# Patient Record
Sex: FEMALE | Race: OTHER | NOT HISPANIC OR LATINO
[De-identification: names, ages, dates, MRNs, and addresses within clinical notes are randomized per-mention and may not be internally consistent; named-entity substitution may affect disease eponyms.]

---

## 2018-09-07 ENCOUNTER — TRANSCRIPTION ENCOUNTER (OUTPATIENT)
Age: 38
End: 2018-09-07

## 2018-09-07 ENCOUNTER — INPATIENT (INPATIENT)
Facility: HOSPITAL | Age: 38
LOS: 0 days | Discharge: HOME | DRG: 287 | End: 2018-09-07
Attending: INTERNAL MEDICINE | Admitting: INTERNAL MEDICINE
Payer: MEDICAID

## 2018-09-07 VITALS
DIASTOLIC BLOOD PRESSURE: 92 MMHG | RESPIRATION RATE: 18 BRPM | SYSTOLIC BLOOD PRESSURE: 145 MMHG | TEMPERATURE: 98 F | WEIGHT: 138.67 LBS | OXYGEN SATURATION: 99 % | HEART RATE: 86 BPM

## 2018-09-07 VITALS
HEIGHT: 61.42 IN | WEIGHT: 134.92 LBS | TEMPERATURE: 98 F | HEART RATE: 86 BPM | OXYGEN SATURATION: 100 % | DIASTOLIC BLOOD PRESSURE: 92 MMHG | SYSTOLIC BLOOD PRESSURE: 145 MMHG | RESPIRATION RATE: 18 BRPM

## 2018-09-07 DIAGNOSIS — R07.9 CHEST PAIN, UNSPECIFIED: ICD-10-CM

## 2018-09-07 DIAGNOSIS — E11.9 TYPE 2 DIABETES MELLITUS WITHOUT COMPLICATIONS: ICD-10-CM

## 2018-09-07 DIAGNOSIS — Z98.51 TUBAL LIGATION STATUS: Chronic | ICD-10-CM

## 2018-09-07 DIAGNOSIS — Z98.891 HISTORY OF UTERINE SCAR FROM PREVIOUS SURGERY: Chronic | ICD-10-CM

## 2018-09-07 DIAGNOSIS — I10 ESSENTIAL (PRIMARY) HYPERTENSION: ICD-10-CM

## 2018-09-07 LAB
ALBUMIN SERPL ELPH-MCNC: 3.3 G/DL — SIGNIFICANT CHANGE UP (ref 3.3–5)
ALP SERPL-CCNC: 80 U/L — SIGNIFICANT CHANGE UP (ref 40–120)
ALT FLD-CCNC: 11 U/L — SIGNIFICANT CHANGE UP (ref 10–45)
ANION GAP SERPL CALC-SCNC: 15 MMOL/L — SIGNIFICANT CHANGE UP (ref 5–17)
APTT BLD: 29 SEC — SIGNIFICANT CHANGE UP (ref 27.5–37.4)
AST SERPL-CCNC: 15 U/L — SIGNIFICANT CHANGE UP (ref 10–40)
BASOPHILS NFR BLD AUTO: 0.3 % — SIGNIFICANT CHANGE UP (ref 0–2)
BILIRUB SERPL-MCNC: 0.3 MG/DL — SIGNIFICANT CHANGE UP (ref 0.2–1.2)
BUN SERPL-MCNC: 18 MG/DL — SIGNIFICANT CHANGE UP (ref 7–23)
CALCIUM SERPL-MCNC: 9.1 MG/DL — SIGNIFICANT CHANGE UP (ref 8.4–10.5)
CHLORIDE SERPL-SCNC: 95 MMOL/L — LOW (ref 96–108)
CK MB CFR SERPL CALC: 2.7 NG/ML — SIGNIFICANT CHANGE UP (ref 0–6.7)
CK SERPL-CCNC: 78 U/L — SIGNIFICANT CHANGE UP (ref 25–170)
CO2 SERPL-SCNC: 22 MMOL/L — SIGNIFICANT CHANGE UP (ref 22–31)
CREAT SERPL-MCNC: 0.92 MG/DL — SIGNIFICANT CHANGE UP (ref 0.5–1.3)
EOSINOPHIL NFR BLD AUTO: 1.7 % — SIGNIFICANT CHANGE UP (ref 0–6)
GLUCOSE SERPL-MCNC: 424 MG/DL — HIGH (ref 70–99)
HCG SERPL-ACNC: <.1 MIU/ML — SIGNIFICANT CHANGE UP
HCT VFR BLD CALC: 39.5 % — SIGNIFICANT CHANGE UP (ref 34.5–45)
HGB BLD-MCNC: 13.3 G/DL — SIGNIFICANT CHANGE UP (ref 11.5–15.5)
INR BLD: 0.99 — SIGNIFICANT CHANGE UP (ref 0.88–1.16)
LYMPHOCYTES # BLD AUTO: 22.8 % — SIGNIFICANT CHANGE UP (ref 13–44)
MCHC RBC-ENTMCNC: 29.2 PG — SIGNIFICANT CHANGE UP (ref 27–34)
MCHC RBC-ENTMCNC: 33.7 G/DL — SIGNIFICANT CHANGE UP (ref 32–36)
MCV RBC AUTO: 86.6 FL — SIGNIFICANT CHANGE UP (ref 80–100)
MONOCYTES NFR BLD AUTO: 6.4 % — SIGNIFICANT CHANGE UP (ref 2–14)
NEUTROPHILS NFR BLD AUTO: 68.8 % — SIGNIFICANT CHANGE UP (ref 43–77)
PLATELET # BLD AUTO: 293 K/UL — SIGNIFICANT CHANGE UP (ref 150–400)
POTASSIUM SERPL-MCNC: 4.4 MMOL/L — SIGNIFICANT CHANGE UP (ref 3.5–5.3)
POTASSIUM SERPL-SCNC: 4.4 MMOL/L — SIGNIFICANT CHANGE UP (ref 3.5–5.3)
PROT SERPL-MCNC: 7.1 G/DL — SIGNIFICANT CHANGE UP (ref 6–8.3)
PROTHROM AB SERPL-ACNC: 11 SEC — SIGNIFICANT CHANGE UP (ref 9.8–12.7)
RBC # BLD: 4.56 M/UL — SIGNIFICANT CHANGE UP (ref 3.8–5.2)
RBC # FLD: 12.7 % — SIGNIFICANT CHANGE UP (ref 10.3–16.9)
SODIUM SERPL-SCNC: 132 MMOL/L — LOW (ref 135–145)
TROPONIN T SERPL-MCNC: <0.01 NG/ML — SIGNIFICANT CHANGE UP (ref 0–0.01)
WBC # BLD: 11.5 K/UL — HIGH (ref 3.8–10.5)
WBC # FLD AUTO: 11.5 K/UL — HIGH (ref 3.8–10.5)

## 2018-09-07 PROCEDURE — 71045 X-RAY EXAM CHEST 1 VIEW: CPT | Mod: 26

## 2018-09-07 PROCEDURE — 93010 ELECTROCARDIOGRAM REPORT: CPT

## 2018-09-07 PROCEDURE — 93458 L HRT ARTERY/VENTRICLE ANGIO: CPT | Mod: 26

## 2018-09-07 PROCEDURE — 99285 EMERGENCY DEPT VISIT HI MDM: CPT | Mod: 25

## 2018-09-07 PROCEDURE — 99205 OFFICE O/P NEW HI 60 MIN: CPT

## 2018-09-07 RX ORDER — INSULIN LISPRO 100/ML
VIAL (ML) SUBCUTANEOUS
Qty: 0 | Refills: 0 | Status: DISCONTINUED | OUTPATIENT
Start: 2018-09-07 | End: 2018-09-07

## 2018-09-07 RX ORDER — ASPIRIN/CALCIUM CARB/MAGNESIUM 324 MG
81 TABLET ORAL DAILY
Qty: 0 | Refills: 0 | Status: DISCONTINUED | OUTPATIENT
Start: 2018-09-07 | End: 2018-09-07

## 2018-09-07 RX ORDER — SODIUM CHLORIDE 9 MG/ML
1000 INJECTION INTRAMUSCULAR; INTRAVENOUS; SUBCUTANEOUS
Qty: 0 | Refills: 0 | Status: DISCONTINUED | OUTPATIENT
Start: 2018-09-07 | End: 2018-09-07

## 2018-09-07 RX ORDER — GLUCAGON INJECTION, SOLUTION 0.5 MG/.1ML
1 INJECTION, SOLUTION SUBCUTANEOUS ONCE
Qty: 0 | Refills: 0 | Status: DISCONTINUED | OUTPATIENT
Start: 2018-09-07 | End: 2018-09-07

## 2018-09-07 RX ORDER — DEXTROSE 50 % IN WATER 50 %
12.5 SYRINGE (ML) INTRAVENOUS ONCE
Qty: 0 | Refills: 0 | Status: DISCONTINUED | OUTPATIENT
Start: 2018-09-07 | End: 2018-09-07

## 2018-09-07 RX ORDER — METFORMIN HYDROCHLORIDE 850 MG/1
1 TABLET ORAL
Qty: 0 | Refills: 0 | COMMUNITY

## 2018-09-07 RX ORDER — ASPIRIN/CALCIUM CARB/MAGNESIUM 324 MG
325 TABLET ORAL ONCE
Qty: 0 | Refills: 0 | Status: COMPLETED | OUTPATIENT
Start: 2018-09-07 | End: 2018-09-07

## 2018-09-07 RX ORDER — DEXTROSE 50 % IN WATER 50 %
25 SYRINGE (ML) INTRAVENOUS ONCE
Qty: 0 | Refills: 0 | Status: DISCONTINUED | OUTPATIENT
Start: 2018-09-07 | End: 2018-09-07

## 2018-09-07 RX ORDER — METOPROLOL TARTRATE 50 MG
25 TABLET ORAL DAILY
Qty: 0 | Refills: 0 | Status: DISCONTINUED | OUTPATIENT
Start: 2018-09-07 | End: 2018-09-07

## 2018-09-07 RX ORDER — CLOPIDOGREL BISULFATE 75 MG/1
75 TABLET, FILM COATED ORAL ONCE
Qty: 0 | Refills: 0 | Status: COMPLETED | OUTPATIENT
Start: 2018-09-07 | End: 2018-09-07

## 2018-09-07 RX ORDER — SODIUM CHLORIDE 9 MG/ML
1000 INJECTION, SOLUTION INTRAVENOUS
Qty: 0 | Refills: 0 | Status: DISCONTINUED | OUTPATIENT
Start: 2018-09-07 | End: 2018-09-07

## 2018-09-07 RX ORDER — CLOPIDOGREL BISULFATE 75 MG/1
450 TABLET, FILM COATED ORAL ONCE
Qty: 0 | Refills: 0 | Status: COMPLETED | OUTPATIENT
Start: 2018-09-07 | End: 2018-09-07

## 2018-09-07 RX ORDER — ONDANSETRON 8 MG/1
4 TABLET, FILM COATED ORAL ONCE
Qty: 0 | Refills: 0 | Status: COMPLETED | OUTPATIENT
Start: 2018-09-07 | End: 2018-09-07

## 2018-09-07 RX ORDER — SODIUM CHLORIDE 9 MG/ML
500 INJECTION INTRAMUSCULAR; INTRAVENOUS; SUBCUTANEOUS
Qty: 0 | Refills: 0 | Status: DISCONTINUED | OUTPATIENT
Start: 2018-09-07 | End: 2018-09-07

## 2018-09-07 RX ORDER — GABAPENTIN 400 MG/1
100 CAPSULE ORAL DAILY
Qty: 0 | Refills: 0 | Status: DISCONTINUED | OUTPATIENT
Start: 2018-09-07 | End: 2018-09-07

## 2018-09-07 RX ORDER — DEXTROSE 50 % IN WATER 50 %
15 SYRINGE (ML) INTRAVENOUS ONCE
Qty: 0 | Refills: 0 | Status: DISCONTINUED | OUTPATIENT
Start: 2018-09-07 | End: 2018-09-07

## 2018-09-07 RX ORDER — CLOPIDOGREL BISULFATE 75 MG/1
600 TABLET, FILM COATED ORAL ONCE
Qty: 0 | Refills: 0 | Status: DISCONTINUED | OUTPATIENT
Start: 2018-09-07 | End: 2018-09-07

## 2018-09-07 RX ORDER — CLOPIDOGREL BISULFATE 75 MG/1
1 TABLET, FILM COATED ORAL
Qty: 0 | Refills: 0 | COMMUNITY

## 2018-09-07 RX ORDER — CLOPIDOGREL BISULFATE 75 MG/1
75 TABLET, FILM COATED ORAL DAILY
Qty: 0 | Refills: 0 | Status: DISCONTINUED | OUTPATIENT
Start: 2018-09-07 | End: 2018-09-07

## 2018-09-07 RX ORDER — SIMVASTATIN 20 MG/1
40 TABLET, FILM COATED ORAL AT BEDTIME
Qty: 0 | Refills: 0 | Status: DISCONTINUED | OUTPATIENT
Start: 2018-09-07 | End: 2018-09-07

## 2018-09-07 RX ADMIN — Medication: at 17:00

## 2018-09-07 RX ADMIN — CLOPIDOGREL BISULFATE 75 MILLIGRAM(S): 75 TABLET, FILM COATED ORAL at 13:45

## 2018-09-07 RX ADMIN — CLOPIDOGREL BISULFATE 450 MILLIGRAM(S): 75 TABLET, FILM COATED ORAL at 14:27

## 2018-09-07 RX ADMIN — Medication 325 MILLIGRAM(S): at 11:22

## 2018-09-07 RX ADMIN — ONDANSETRON 4 MILLIGRAM(S): 8 TABLET, FILM COATED ORAL at 19:35

## 2018-09-07 NOTE — DISCHARGE NOTE ADULT - MEDICATION SUMMARY - MEDICATIONS TO TAKE
I will START or STAY ON the medications listed below when I get home from the hospital:    Aspirin Enteric Coated 81 mg oral delayed release tablet  -- 1 tab(s) by mouth once a day  -- Indication: For Coronary Artery Disease    gabapentin 100 mg oral capsule  -- 1 cap(s) by mouth once a day  -- Indication: For Pain    metFORMIN 1000 mg oral tablet  -- 1 tab(s) by mouth 2 times a day, HOLD FOR 2 DAYS AND RESUME REGULAR USE ON MONDAY, SEPTEMBER 10, 2018  -- Indication: For Diabetes    simvastatin 40 mg oral tablet  -- 1 tab(s) by mouth once a day (at bedtime)  -- Indication: For Cholesterol    Toprol-XL 25 mg oral tablet, extended release  -- 1 tab(s) by mouth once a day  -- Indication: For Coronary Artery Disease

## 2018-09-07 NOTE — CONSULT NOTE ADULT - ASSESSMENT
38 y/o F with former smoker, strong FHx of heart disease (father  @age 42 from MI), PMHx of HTN, HLD, DM, recent MI in 2017 managed medically in [facility], who presents to [facility] ED 18 c/o progressively worsening chest pain x 1 week. Patient states symptom onset is typically after walking 4-5 blocks, describes it as being non-radiating midsternal chest tightness, 4-5/10 in severity. Associated symptoms including SOB and palpitations. Patient denies any N/V, diaphoresis, dizziness, recent PND, orthopnea or LE edema. Patient denies recent noninvasive cardiac work-up and states she has been compliant with all her medications. In ED,  EKG revealed NSR@85BPM with TWI in inferolateral leads. Cardiac enzymes were negative x 1 set. Cardiac Cath on 18 revealed,  normal LM, 85% pLAD lesion, 80% mLAD lesion, 50% mD1 lesion, 50-60% mLCx lesion, 50% mRCA lesion, 80% dRCA lesion. EF:40-45%. Dr. Vasquez was consulted for MIDCAB evaluation and possible future PCI to RCA.      Plan: As discussed with Dr. Vasquez   -will need pre-op w/u for MIDCAB   -Carotid u/s, PFTs, TSH, A1c, EKG, CXR (PA/Lat), Echocardiogram, 2 Type and Screens  - Will continue to follow, continue medical care as per primary team   - No ACE/ARB prior to OR due to it's dilating effects   -continue BB and ASA   -needs GI ppx 38 y/o F with former smoker, strong FHx of heart disease (father  @age 42 from MI), PMHx of HTN, HLD, DM, recent MI in 2017 managed medically in [facility], who presents to [facility] ED 18 c/o progressively worsening chest pain x 1 week. Patient states symptom onset is typically after walking 4-5 blocks, describes it as being non-radiating midsternal chest tightness, 4-5/10 in severity. Associated symptoms including SOB and palpitations. Patient denies any N/V, diaphoresis, dizziness, recent PND, orthopnea or LE edema. Patient denies recent noninvasive cardiac work-up and states she has been compliant with all her medications. In ED,  EKG revealed NSR@85BPM with TWI in inferolateral leads. Cardiac enzymes were negative x 1 set. Cardiac Cath on 18 revealed,  normal LM, 85% pLAD lesion, 80% mLAD lesion, 50% mD1 lesion, 50-60% mLCx lesion, 50% mRCA lesion, 80% dRCA lesion. EF:40-45%. Dr. Vasquez was consulted for MIDCAB evaluation and possible future PCI to RCA.      Plan: As discussed with Dr. Vasquez   - As per Dr. Vasquez, if cleared by primary team, pt may d/c home and come as outpatient to Dr. Vasquez's office for pre-op w/u completion: needs EKG/Pa/latCXR/TSH/A1c/Type&Sx2/ Echocardiogram/carotid U/S, consent.   -Plan for OR Wednesday for OPCAB w/ Dr. Vasquez as SDA

## 2018-09-07 NOTE — H&P ADULT - PROBLEM SELECTOR PLAN 3
continue FS's, ICS PRN, F/U HgbAIC. Metformin on hold given plan for cardiac cath.      Dispo: Patient hemodynamically stable, NPO awaiting cardiac catheterization with Dr. Gayle.

## 2018-09-07 NOTE — ED PROVIDER NOTE - MEDICAL DECISION MAKING DETAILS
Pt w chest pain on exertion with hx of prior MI, DM  consider ACS, no sx at present, less likely acute PE/dissecn/pna  - obtain labs, cxr No

## 2018-09-07 NOTE — H&P ADULT - FAMILY HISTORY
No pertinent family history in first degree relatives Father  Still living? Unknown  Family history of heart disease, Age at diagnosis: 41-50

## 2018-09-07 NOTE — H&P ADULT - ASSESSMENT
37 yr old F with PMHx of DM, recent MI in 12/2017 treated medically in [facility], who presents to [facility] ED 9/7/18 c/o progressively worsening exertional chest pain x 1 week, EKG with inferolateral TWIs, cardiac enzymes negative x 1 set, planned for cardiac catheterization with possible intervention. 37 yr old F former smoker with strong FHx of heart disease, PMHx of HTN, hyperlipidemia, DM, recent MI in 12/2017 treated medically in [facility], who presents to [facility] ED 9/7/18 c/o worsening exertional chest pain x 1 week, EKG with inferolateral TWIs, cardiac enzymes negative x 1 set, planned for cardiac catheterization with possible intervention.    ASA III                 Mallampati II    Risks & benefits of procedure and alternative therapy have been explained to the patient including but not limited to: allergic reaction, bleeding w/possible need for blood transfusion, infection, renal and vascular compromise, limb damage, arrhythmia, stroke, vessel dissection/perforation, Myocardial infarction, emergent CABG. Informed consent obtained and in chart.

## 2018-09-07 NOTE — ED ADULT NURSE NOTE - NSIMPLEMENTINTERV_GEN_ALL_ED
Implemented All Universal Safety Interventions:  [place] to call system. Call bell, personal items and telephone within reach. Instruct patient to call for assistance. Room bathroom lighting operational. Non-slip footwear when patient is off stretcher. Physically safe environment: no spills, clutter or unnecessary equipment. Stretcher in lowest position, wheels locked, appropriate side rails in place.

## 2018-09-07 NOTE — ED ADULT NURSE REASSESSMENT NOTE - NS ED NURSE REASSESS COMMENT FT1
Pt sleeping at this time, VSS. No c/o CP, SOB. Pt pending sign out and cath lab per MD esquivel. Pt aware with plan of care, remains NPO and aware of NPO status. Will continue to monitor.

## 2018-09-07 NOTE — DISCHARGE NOTE ADULT - CARE PROVIDERS DIRECT ADDRESSES
,[email or web address]

## 2018-09-07 NOTE — H&P ADULT - NSHPSOCIALHISTORY_GEN_ALL_CORE
Tobacco/ETOH/illicit drugs: denies Tobacco: quit 1 year ago, previously 1 pack/week x 4 years  ETOH/Illicit drugs: denies

## 2018-09-07 NOTE — DISCHARGE NOTE ADULT - ADDITIONAL INSTRUCTIONS
Follow-up with Dr. Vasquez at your appointment on Tuesday, September 11, 2018. His office will call you on Monday 9/10/18 to give you the time to report on Tuesday. Follow-up with Dr. Vasquez at your appointment on Tuesday, September 11, 2018. His office will call you on Monday 9/10/18 to give you the time to report on Tuesday.    Follow-up with your cardiologist Dr. Neumann in 1-2 weeks.

## 2018-09-07 NOTE — ED ADULT NURSE NOTE - OBJECTIVE STATEMENT
Pt presents to ED for medical eval. Pt with PMH MI last year, reports her PMD referred her for a cardiologist appt, saw cards yesterday who referred her for cath today. Pt reports x1 episode CP in the last week, on 9/1 when she was "walking a long distance, I became short of breath and had some pain but not like last year when I had my heart attack." On arrival to ED pt has no specific complaints, denies fever, chills, HA, cough, dizziness, lightheadedness, numbness/tingling, neck pain, CP, palpitations, SOB, N/V/D, difficulty urinating, difficulty passing BM. Pt presents in NAD speaking full sentences ambulatory through triage. EKG preformed in triage. Pt placed on continuous cardiac monitor and pulse ox on arrival to bed 18. Pt reports she did not take her metformin this morning because she was told to be NPO, pt also knows she is on a blood thinner but does not know the names of her other medications.

## 2018-09-07 NOTE — DISCHARGE NOTE ADULT - CARE PLAN
Principal Discharge DX:	Chest pain  Goal:	Follow-up with Dr. Vasquez at your appointment on Tuesday, September 11, 2018. His office will call you on Monday 9/10/18 to give you the time to report on Tuesday.  Assessment and plan of treatment:	You underwent a coronary angiogram which revealed multiple blockages in your coronary arteries. You should wait 3 days before returning to ordinary activities. Do not drive for 2 days. Consult your doctor before returning to vigorous activity. You may return to work in 3-5 days. The catheter from your wrist was removed and you may remove the dressing in 24 hours. Once the dressing is removed, you may shower, but avoid baths, hot tubs, or swimming for 5 days to prevent infection. If you notice bleeding from the site, hardening and pain at the site, drainage or redness from the site, coolness/paleness of the extremity, swelling, or fever, please call [phone]. Please continue your Aspirin and Plavix as prescribed unless otherwise indicated by your cardiologist. If you experience any symptoms including but not limited to chest pain, shortness of breath, or dizziness, please call your doctor and seek immediate medical attention. Please follow up with Dr. Vasquez at your scheduled appointment on Tuesday, September 11, 2018.  Secondary Diagnosis:	Hypertension  Assessment and plan of treatment:	You have a history of elevated blood pressure and you should continue your blood pressure medications as prescribed.  Secondary Diagnosis:	Hyperlipidemia  Assessment and plan of treatment:	Please continue your daily statin to ensure your cardiac stent remains open.  Secondary Diagnosis:	Type 2 diabetes mellitus  Assessment and plan of treatment:	You have a diagnosis of diabetes. Please DO NOT TAKE YOUR METFORMIN FOR 2 DAYS to prevent interaction with the contrast dye you received. Restart regular use on Monday, September 10, 2018. Principal Discharge DX:	Chest pain  Goal:	Follow-up with Dr. Vasquez at your appointment on Tuesday, September 11, 2018. His office will call you on Monday 9/10/18 to give you the time to report on Tuesday.  Assessment and plan of treatment:	You underwent a coronary angiogram which revealed multiple blockages in your coronary arteries. You should wait 3 days before returning to ordinary activities. Do not drive for 2 days. Consult your doctor before returning to vigorous activity. You may return to work in 3-5 days. The catheter from your wrist was removed and you may remove the dressing in 24 hours. Once the dressing is removed, you may shower, but avoid baths, hot tubs, or swimming for 5 days to prevent infection. If you notice bleeding from the site, hardening and pain at the site, drainage or redness from the site, coolness/paleness of the extremity, swelling, or fever, please call [phone]. Please continue your Aspirin as prescribed until otherwise instructed by your cardiologist. Please STOP taking Plavix in preparation for your upcoming surgery. If you experience any symptoms including but not limited to chest pain, shortness of breath, or dizziness, please call your doctor and seek immediate medical attention. Please follow up with Dr. Vasquez at your scheduled appointment on Tuesday, September 11, 2018.  Secondary Diagnosis:	Hypertension  Assessment and plan of treatment:	You have a history of elevated blood pressure and you should continue your blood pressure medications as prescribed.  Secondary Diagnosis:	Hyperlipidemia  Assessment and plan of treatment:	Please continue your daily statin to ensure your cardiac stent remains open.  Secondary Diagnosis:	Type 2 diabetes mellitus  Assessment and plan of treatment:	You have a diagnosis of diabetes. Please DO NOT TAKE YOUR METFORMIN FOR 2 DAYS to prevent interaction with the contrast dye you received. Restart regular use on Monday, September 10, 2018. Principal Discharge DX:	Chest pain  Goal:	Follow-up with Dr. Vasquez at your appointment on Tuesday, September 11, 2018. His office will call you on Monday 9/10/18 to give you the time to report on Tuesday.  Assessment and plan of treatment:	You underwent a coronary angiogram which revealed multiple blockages in your coronary arteries. You should wait 3 days before returning to ordinary activities. Do not drive for 2 days. Consult your doctor before returning to vigorous activity. You may return to work in 3-5 days. The catheter from your wrist was removed and you may remove the dressing in 24 hours. Once the dressing is removed, you may shower, but avoid baths, hot tubs, or swimming for 5 days to prevent infection. If you notice bleeding from the site, hardening and pain at the site, drainage or redness from the site, coolness/paleness of the extremity, swelling, or fever, please call [phone]. Please continue your Aspirin as prescribed until otherwise instructed by your cardiologist. Please STOP taking Plavix in preparation for your upcoming surgery. If you experience any symptoms including but not limited to chest pain, shortness of breath, or dizziness, please call your doctor and seek immediate medical attention. Please follow up with Dr. Vasquez at your scheduled appointment on Tuesday, September 11, 2018.  Secondary Diagnosis:	Hypertension  Assessment and plan of treatment:	You have a history of elevated blood pressure and you should continue your blood pressure medications as prescribed.  Secondary Diagnosis:	Hyperlipidemia  Assessment and plan of treatment:	Please continue your daily statin to control your cholesterol levels.  Secondary Diagnosis:	Type 2 diabetes mellitus  Assessment and plan of treatment:	You have a diagnosis of diabetes. Please DO NOT TAKE YOUR METFORMIN FOR 2 DAYS to prevent interaction with the contrast dye you received. Restart regular use on Monday, September 10, 2018.

## 2018-09-07 NOTE — DISCHARGE NOTE ADULT - CARE PROVIDER_API CALL
Tip Vasquez (MD), Cardiovascular Surgery  [address]  [address]  [place], [place] [address]  Phone: [phone]  Fax: [phone] Tip Vasquez), Cardiovascular Surgery  [address]  [address] [place] [address]  Phone: [phone]  Fax: [phone]    Shaik KEN Neumann (MD), Cardiology; Nuclear Medicine  [address] [place] [address]  Phone: [phone]  Fax: [phone]

## 2018-09-07 NOTE — H&P ADULT - PROBLEM SELECTOR PLAN 1
currently chest pain free, EKG with TWI inferolateral leads, cardiac enzymes negative x 1 set. Will continue ASA/Plavix/BB/Statin. NPO for cardiac catheterization with Dr. Gayle this afternoon.

## 2018-09-07 NOTE — DISCHARGE NOTE ADULT - PLAN OF CARE
You have a history of elevated blood pressure and you should continue your blood pressure medications as prescribed. Please continue your daily statin to ensure your cardiac stent remains open. You have a diagnosis of diabetes. Please DO NOT TAKE YOUR METFORMIN FOR 2 DAYS to prevent interaction with the contrast dye you received. Restart regular use on Monday, September 10, 2018. Follow-up with Dr. Vasquez at your appointment on Tuesday, September 11, 2018. His office will call you on Monday 9/10/18 to give you the time to report on Tuesday. You underwent a coronary angiogram which revealed multiple blockages in your coronary arteries. You should wait 3 days before returning to ordinary activities. Do not drive for 2 days. Consult your doctor before returning to vigorous activity. You may return to work in 3-5 days. The catheter from your wrist was removed and you may remove the dressing in 24 hours. Once the dressing is removed, you may shower, but avoid baths, hot tubs, or swimming for 5 days to prevent infection. If you notice bleeding from the site, hardening and pain at the site, drainage or redness from the site, coolness/paleness of the extremity, swelling, or fever, please call [phone]. Please continue your Aspirin and Plavix as prescribed unless otherwise indicated by your cardiologist. If you experience any symptoms including but not limited to chest pain, shortness of breath, or dizziness, please call your doctor and seek immediate medical attention. Please follow up with Dr. Vasquez at your scheduled appointment on Tuesday, September 11, 2018. You underwent a coronary angiogram which revealed multiple blockages in your coronary arteries. You should wait 3 days before returning to ordinary activities. Do not drive for 2 days. Consult your doctor before returning to vigorous activity. You may return to work in 3-5 days. The catheter from your wrist was removed and you may remove the dressing in 24 hours. Once the dressing is removed, you may shower, but avoid baths, hot tubs, or swimming for 5 days to prevent infection. If you notice bleeding from the site, hardening and pain at the site, drainage or redness from the site, coolness/paleness of the extremity, swelling, or fever, please call [phone]. Please continue your Aspirin as prescribed until otherwise instructed by your cardiologist. Please STOP taking Plavix in preparation for your upcoming surgery. If you experience any symptoms including but not limited to chest pain, shortness of breath, or dizziness, please call your doctor and seek immediate medical attention. Please follow up with Dr. Vasquez at your scheduled appointment on Tuesday, September 11, 2018. Please continue your daily statin to control your cholesterol levels.

## 2018-09-07 NOTE — CONSULT NOTE ADULT - SUBJECTIVE AND OBJECTIVE BOX
Surgeon: Dr. Vasquez     Requesting Physician: Interventional Cardiology     HISTORY OF PRESENT ILLNESS:  This is a 38 y/o F with former smoker, strong FHx of heart disease (father  @age 42 from MI), PMHx of HTN, HLD, DM, recent MI in 2017 managed medically in [facility], who presents to [facility] ED 18 c/o progressively worsening chest pain x 1 week. Patient states symptom onset is typically after walking 4-5 blocks, describes it as being non-radiating midsternal chest tightness, 4-5/10 in severity. Associated symptoms including SOB and palpitations. Patient denies any N/V, diaphoresis, dizziness, recent PND, orthopnea or LE edema. Patient denies recent noninvasive cardiac work-up and states she has been compliant with all her medications. In ED,  EKG revealed NSR@85BPM with TWI in inferolateral leads. Cardiac enzymes were negative x 1 set. Cardiac Cath on 18 revealed,  normal LM, 85% pLAD lesion, 80% mLAD lesion, 50% mD1 lesion, 50-60% mLCx lesion, 50% mRCA lesion, 80% dRCA lesion. EF:40-45%. Dr. Vasquez was consulted for MIDCAB evaluation and possible future PCI to RCA.    PAST MEDICAL & SURGICAL HISTORY:  Hyperlipidemia  Hypertension  Type 2 diabetes mellitus  MI (myocardial infarction)  H/O tubal ligation  S/P       MEDICATIONS  (STANDING):  aspirin enteric coated 81 milliGRAM(s) Oral daily  clopidogrel Tablet 75 milliGRAM(s) Oral daily  dextrose 5%. 1000 milliLiter(s) (50 mL/Hr) IV Continuous <Continuous>  dextrose 50% Injectable 12.5 Gram(s) IV Push once  dextrose 50% Injectable 25 Gram(s) IV Push once  dextrose 50% Injectable 25 Gram(s) IV Push once  gabapentin 100 milliGRAM(s) Oral daily  insulin lispro (HumaLOG) corrective regimen sliding scale   SubCutaneous Before meals and at bedtime  metoprolol succinate ER 25 milliGRAM(s) Oral daily  simvastatin 40 milliGRAM(s) Oral at bedtime  sodium chloride 0.9%. 500 milliLiter(s) (50 mL/Hr) IV Continuous <Continuous>    MEDICATIONS  (PRN):  dextrose 40% Gel 15 Gram(s) Oral once PRN Blood Glucose LESS THAN 70 milliGRAM(s)/deciliter  glucagon  Injectable 1 milliGRAM(s) IntraMuscular once PRN Glucose LESS THAN 70 milligrams/deciliter      Allergies: No Known Allergies    SOCIAL HISTORY:   Tobacco: quit 1 year ago, previously 1 pack/week x 4 years  ETOH/Illicit drugs: denies    FAMILY HISTORY:  Family history of heart disease (Father)      Review of Systems  CONSTITUTIONAL:     NEGATIVE  NEURO:       NEGATIVE  EYES:     NEGATIVE  ENMT:        NEGATIVE  CV:  +Chest pain, +palpitations, +GODINEZ, -orthopnea                                                                                           RESPIRATORY:  -Wheezing, +SOB, -cough / sputum, -hemoptysis                                                                GI:    NEGATIVE  :  NEGATIVE  MUSKULOSKELETAL:   NEGATIVE  SKIN/BREAST:      NEGATIVE  PSYCH:    NEGATIVE  HEME/LYMPH:   NEGATIVE  ENDOCRINE:     NEGATIVE    PHYSICAL EXAM  Vital Signs Last 24 Hrs  T(C): 36.7 (07 Sep 2018 13:11), Max: 36.7 (07 Sep 2018 10:07)  T(F): 98 (07 Sep 2018 13:11), Max: 98 (07 Sep 2018 10:07)  HR: 86 (07 Sep 2018 13:11) (82 - 86)  BP: 145/92 (07 Sep 2018 13:11) (123/82 - 145/92)  BP(mean): 109 (07 Sep 2018 13:11) (109 - 109)  RR: 18 (07 Sep 2018 13:11) (18 - 18)  SpO2: 99% (07 Sep 2018 13:11) (99% - 100%)    CONSTITUTIONAL:                                                                          WNL  NEURO:                                                                                             WNL                      EYES:                                                                                                  WNL  ENMT:                                                                                                WNL  CV:                                                                                                      WNL  RESPIRATORY:                                                                                  WNL  GI:                                                                                                       WNL  : BRYANT + / -                                                                                 WNL  MUSKULOSKELETAL:                                                                       WNL  SKIN / BREAST:                                                                                 WNL                                                          LABS:                        13.3   11.5  )-----------( 293      ( 07 Sep 2018 10:47 )             39.5     -    132<L>  |  95<L>  |  18  ----------------------------<  424<H>  4.4   |  22  |  0.92    Ca    9.1      07 Sep 2018 10:47    TPro  7.1  /  Alb  3.3  /  TBili  0.3  /  DBili  x   /  AST  15  /  ALT  11  /  AlkPhos  80      PT/INR - ( 07 Sep 2018 10:47 )   PT: 11.0 sec;   INR: 0.99          PTT - ( 07 Sep 2018 10:47 )  PTT:29.0 sec    CARDIAC MARKERS ( 07 Sep 2018 10:47 )  x     / <0.01 ng/mL / 78 U/L / x     / 2.7 ng/mL          RADIOLOGY & ADDITIONAL STUDIES:  CAROTID U/S: PENDING    CXR: pending    EKG: pending    TTE: pending    Cardiac Cath: 18 revealed,  normal LM, 85% pLAD lesion, 80% mLAD lesion, 50% mD1 lesion, 50-60% mLCx lesion, 50% mRCA lesion, 80% dRCA lesion. EF:40-45% Surgeon: Dr. Vasquez     Requesting Physician: Interventional Cardiology     HISTORY OF PRESENT ILLNESS:  This is a 38 y/o F with former smoker, strong FHx of heart disease (father  @age 42 from MI), PMHx of HTN, HLD, DM, recent MI in 2017 managed medically in [facility], who presents to [facility] ED 18 c/o progressively worsening chest pain x 1 week. Patient states symptom onset is typically after walking 4-5 blocks, describes it as being non-radiating midsternal chest tightness, 4-5/10 in severity. Associated symptoms including SOB and palpitations. Patient denies any N/V, diaphoresis, dizziness, recent PND, orthopnea or LE edema. Patient denies recent noninvasive cardiac work-up and states she has been compliant with all her medications. In ED,  EKG revealed NSR@85BPM with TWI in inferolateral leads. Cardiac enzymes were negative x 1 set. Cardiac Cath on 18 revealed,  normal LM, 85% pLAD lesion, 80% mLAD lesion, 50% mD1 lesion, 50-60% mLCx lesion, 50% mRCA lesion, 80% dRCA lesion. EF:40-45%. Dr. Vasquez was consulted for MIDCAB evaluation and possible future PCI to RCA.    PAST MEDICAL & SURGICAL HISTORY:  Hyperlipidemia  Hypertension  Type 2 diabetes mellitus  MI (myocardial infarction)  H/O tubal ligation  S/P       MEDICATIONS  (STANDING):  aspirin enteric coated 81 milliGRAM(s) Oral daily  clopidogrel Tablet 75 milliGRAM(s) Oral daily  dextrose 5%. 1000 milliLiter(s) (50 mL/Hr) IV Continuous <Continuous>  dextrose 50% Injectable 12.5 Gram(s) IV Push once  dextrose 50% Injectable 25 Gram(s) IV Push once  dextrose 50% Injectable 25 Gram(s) IV Push once  gabapentin 100 milliGRAM(s) Oral daily  insulin lispro (HumaLOG) corrective regimen sliding scale   SubCutaneous Before meals and at bedtime  metoprolol succinate ER 25 milliGRAM(s) Oral daily  simvastatin 40 milliGRAM(s) Oral at bedtime  sodium chloride 0.9%. 500 milliLiter(s) (50 mL/Hr) IV Continuous <Continuous>    MEDICATIONS  (PRN):  dextrose 40% Gel 15 Gram(s) Oral once PRN Blood Glucose LESS THAN 70 milliGRAM(s)/deciliter  glucagon  Injectable 1 milliGRAM(s) IntraMuscular once PRN Glucose LESS THAN 70 milligrams/deciliter      Allergies: No Known Allergies    SOCIAL HISTORY:   Tobacco: quit 1 year ago, previously 1 pack/week x 4 years  ETOH/Illicit drugs: denies    FAMILY HISTORY:  Family history of heart disease (Father)      Review of Systems  CONSTITUTIONAL:     NEGATIVE  NEURO:       NEGATIVE  EYES:     NEGATIVE  ENMT:        NEGATIVE  CV:  +Chest pain, +palpitations, +GODINEZ, -orthopnea                                                                                           RESPIRATORY:  -Wheezing, +SOB, -cough / sputum, -hemoptysis                                                                GI:    NEGATIVE  :  NEGATIVE  MUSKULOSKELETAL:   NEGATIVE  SKIN/BREAST:      NEGATIVE  PSYCH:    NEGATIVE  HEME/LYMPH:   NEGATIVE  ENDOCRINE:     NEGATIVE    PHYSICAL EXAM  Vital Signs Last 24 Hrs  T(C): 36.7 (07 Sep 2018 13:11), Max: 36.7 (07 Sep 2018 10:07)  T(F): 98 (07 Sep 2018 13:11), Max: 98 (07 Sep 2018 10:07)  HR: 86 (07 Sep 2018 13:11) (82 - 86)  BP: 145/92 (07 Sep 2018 13:11) (123/82 - 145/92)  BP(mean): 109 (07 Sep 2018 13:11) (109 - 109)  RR: 18 (07 Sep 2018 13:11) (18 - 18)  SpO2: 99% (07 Sep 2018 13:11) (99% - 100%)    General: NAD, sitting, laying in bed  Neuro: moving all extremities with no focal defiicts  CV: RRR, no m/r/g  Resp: CTA b/l   PV: warm and well perfused, no edema or calf tenderness b/l   Vascular: +2 radial left, +2 DP RLE, +1 LLE, +right radial band                                                          LABS:                        13.3   11.5  )-----------( 293      ( 07 Sep 2018 10:47 )             39.5     09-07    132<L>  |  95<L>  |  18  ----------------------------<  424<H>  4.4   |  22  |  0.92    Ca    9.1      07 Sep 2018 10:47    TPro  7.1  /  Alb  3.3  /  TBili  0.3  /  DBili  x   /  AST  15  /  ALT  11  /  AlkPhos  80  -    PT/INR - ( 07 Sep 2018 10:47 )   PT: 11.0 sec;   INR: 0.99          PTT - ( 07 Sep 2018 10:47 )  PTT:29.0 sec    CARDIAC MARKERS ( 07 Sep 2018 10:47 )  x     / <0.01 ng/mL / 78 U/L / x     / 2.7 ng/mL    RADIOLOGY & ADDITIONAL STUDIES:  CAROTID U/S: PENDING    CXR: pending    EKG: pending    TTE: pending    Cardiac Cath: 18 revealed,  normal LM, 85% pLAD lesion, 80% mLAD lesion, 50% mD1 lesion, 50-60% mLCx lesion, 50% mRCA lesion, 80% dRCA lesion. EF:40-45% Surgeon: Dr. Vasquez     Requesting Physician: Interventional Cardiology     HISTORY OF PRESENT ILLNESS:  This is a 36 y/o F with former smoker, strong FHx of heart disease (father  @age 42 from MI), PMHx of HTN, HLD, DM, recent MI in 2017 managed medically in [facility], who presents to [facility] ED 18 c/o progressively worsening chest pain x 1 week. Patient states symptom onset is typically after walking 4-5 blocks, describes it as being non-radiating midsternal chest tightness, 4-5/10 in severity. Associated symptoms including SOB and palpitations. Patient denies any N/V, diaphoresis, dizziness, recent PND, orthopnea or LE edema. Patient denies recent noninvasive cardiac work-up and states she has been compliant with all her medications. In ED,  EKG revealed NSR@85BPM with TWI in inferolateral leads. Cardiac enzymes were negative x 1 set. Cardiac Cath on 18 revealed,  normal LM, 85% pLAD lesion, 80% mLAD lesion, 50% mD1 lesion, 50-60% mLCx lesion, 50% mRCA lesion, 80% dRCA lesion. EF:40-45%. Dr. Vasquez was consulted for OPCAB evaluation.    PAST MEDICAL & SURGICAL HISTORY:  Hyperlipidemia  Hypertension  Type 2 diabetes mellitus  MI (myocardial infarction)  H/O tubal ligation  S/P       MEDICATIONS  (STANDING):  aspirin enteric coated 81 milliGRAM(s) Oral daily  clopidogrel Tablet 75 milliGRAM(s) Oral daily  dextrose 5%. 1000 milliLiter(s) (50 mL/Hr) IV Continuous <Continuous>  dextrose 50% Injectable 12.5 Gram(s) IV Push once  dextrose 50% Injectable 25 Gram(s) IV Push once  dextrose 50% Injectable 25 Gram(s) IV Push once  gabapentin 100 milliGRAM(s) Oral daily  insulin lispro (HumaLOG) corrective regimen sliding scale   SubCutaneous Before meals and at bedtime  metoprolol succinate ER 25 milliGRAM(s) Oral daily  simvastatin 40 milliGRAM(s) Oral at bedtime  sodium chloride 0.9%. 500 milliLiter(s) (50 mL/Hr) IV Continuous <Continuous>    MEDICATIONS  (PRN):  dextrose 40% Gel 15 Gram(s) Oral once PRN Blood Glucose LESS THAN 70 milliGRAM(s)/deciliter  glucagon  Injectable 1 milliGRAM(s) IntraMuscular once PRN Glucose LESS THAN 70 milligrams/deciliter      Allergies: No Known Allergies    SOCIAL HISTORY:   Tobacco: quit 1 year ago, previously 1 pack/week x 4 years  ETOH/Illicit drugs: denies    FAMILY HISTORY:  Family history of heart disease (Father)      Review of Systems  CONSTITUTIONAL:     NEGATIVE  NEURO:       NEGATIVE  EYES:     NEGATIVE  ENMT:        NEGATIVE  CV:  +Chest pain, +palpitations, +GODINEZ, -orthopnea                                                                                           RESPIRATORY:  -Wheezing, +SOB, -cough / sputum, -hemoptysis                                                                GI:    NEGATIVE  :  NEGATIVE  MUSKULOSKELETAL:   NEGATIVE  SKIN/BREAST:      NEGATIVE  PSYCH:    NEGATIVE  HEME/LYMPH:   NEGATIVE  ENDOCRINE:     NEGATIVE    PHYSICAL EXAM  Vital Signs Last 24 Hrs  T(C): 36.7 (07 Sep 2018 13:11), Max: 36.7 (07 Sep 2018 10:07)  T(F): 98 (07 Sep 2018 13:11), Max: 98 (07 Sep 2018 10:07)  HR: 86 (07 Sep 2018 13:11) (82 - 86)  BP: 145/92 (07 Sep 2018 13:11) (123/82 - 145/92)  BP(mean): 109 (07 Sep 2018 13:11) (109 - 109)  RR: 18 (07 Sep 2018 13:11) (18 - 18)  SpO2: 99% (07 Sep 2018 13:11) (99% - 100%)    General: NAD, sitting, laying in bed  Neuro: moving all extremities with no focal defiicts  CV: RRR, no m/r/g  Resp: CTA b/l   PV: warm and well perfused, no edema or calf tenderness b/l   Vascular: +2 radial left, +2 DP RLE, +1 LLE, +right radial band                                                          LABS:                        13.3   11.5  )-----------( 293      ( 07 Sep 2018 10:47 )             39.5     09-07    132<L>  |  95<L>  |  18  ----------------------------<  424<H>  4.4   |  22  |  0.92    Ca    9.1      07 Sep 2018 10:47    TPro  7.1  /  Alb  3.3  /  TBili  0.3  /  DBili  x   /  AST  15  /  ALT  11  /  AlkPhos  80  09-07    PT/INR - ( 07 Sep 2018 10:47 )   PT: 11.0 sec;   INR: 0.99          PTT - ( 07 Sep 2018 10:47 )  PTT:29.0 sec    CARDIAC MARKERS ( 07 Sep 2018 10:47 )  x     / <0.01 ng/mL / 78 U/L / x     / 2.7 ng/mL    RADIOLOGY & ADDITIONAL STUDIES:  CAROTID U/S: PENDING    CXR: pending    EKG: pending    TTE: pending    Cardiac Cath: 18 revealed,  normal LM, 85% pLAD lesion, 80% mLAD lesion, 50% mD1 lesion, 50-60% mLCx lesion, 50% mRCA lesion, 80% dRCA lesion. EF:40-45%

## 2018-09-07 NOTE — DISCHARGE NOTE ADULT - PATIENT PORTAL LINK FT
You can access the [facility] Portal, offered by [facility], by registering with the following website: [email or web address]

## 2018-09-07 NOTE — H&P ADULT - PMH
MI (myocardial infarction)    Type 2 diabetes mellitus Hyperlipidemia    Hypertension    MI (myocardial infarction)    Type 2 diabetes mellitus

## 2018-09-07 NOTE — ED ADULT TRIAGE NOTE - CHIEF COMPLAINT QUOTE
" My doctor referred me here for a test for my heart, I had a heart attack last year, I have been having chest pain for a week now when I walk".

## 2018-09-07 NOTE — DISCHARGE NOTE ADULT - HOSPITAL COURSE
37 yr old F former smoker, strong FHx heart disease, with PMHx of HTN, hyperlipidemia, DM, recent MI in 12/2017 treated medically in [facility], who presents to [facility] ED 9/7/18 c/o progressively worsening exertional chest pain x 1 week, EKG with inferolateral TWIs, cardiac enzymes negative x 1 set, planned for cardiac catheterization with possible intervention. The patient is now s/p diagnostic cardiac cath 9/7 which revealed normal LM, 85% pLAD lesion, 80% mLAD lesion, 50% mD1 lesion, 50-60% mLCx lesion, 50% mRCA lesion, 80% dRCA lesion. EF:40-45%. R groin PC, R radial access. Dr Vasquez consulted for possible mid CAB followed by PCI of RCA. CTS consulted, and as per Dr. Vasquez and primary team, patient is cleared for discharge home and come as outpatient to Dr. Vasquez's office for pre-op w/u completion: needs EKG/Pa/latCXR/TSH/A1c/Type&Sx2/ Echocardiogram/carotid U/S, consent. Plan for appointment with Dr. Vasquez on Tuesday 9/11/18. Plan for OR Wednesday for OPCAB w/ Dr. Vasquez as SDA. Discharge plan discussed with interventionalist  Dr. Campos who agrees.

## 2018-09-07 NOTE — ED PROVIDER NOTE - OBJECTIVE STATEMENT
38 yo hx of DM w chest pain for one week, intermittent in nature when pt ambulates,  across chest described as tightness. stated last had MI in Dec with similar symptoms. no symptoms at present. no assoc radiation of pain, sob, abd pain, n/v/f/c. currently on asa, statin, does not remember other medications. no other recent illnesses, cough, no prior stents in place. currently on period and has some lower abd cramping due to that.

## 2018-09-07 NOTE — ED PROVIDER NOTE - PROGRESS NOTE DETAILS
disc with Dr. Shaikh niño's cardiologist, to be cath today, already disc by him with Dr. Deven Gayle

## 2018-09-07 NOTE — H&P ADULT - HISTORY OF PRESENT ILLNESS
37 yr old F with PMHx of DM, recent MI in 12/2017 treated medically in [facility], who presents to [facility] ED 9/7/18 c/o progressively worsening chest pain x 1 week. Patient states symptom onset is typically after walking 1-2 blocks, describes it as being exertional midsternal chest pressure, 5/10 in severity. Associated symptoms including SOB and palpitations. In ED, BP: 145/92, HR:86, RR: 18, Temp: 98F, O2 sat: 99% RA. EKG revealed NSR@85BPM with TWI in inferolateral leads. Cardiac enzymes negative x 1 set.   Patient treated with ASA 325mg PO x 1 dose.    Patient currently stable now planned for cardiac catheterization with possible intervention. 37 yr old F with former smoker, strong FHx of heart disease (father  @age 42 from MI), PMHx of HTN, hyperlipidemia, DM, recent MI in 2017 managed medically in [facility], who presents to [facility] ED 18 c/o progressively worsening chest pain x 1 week. Patient states symptom onset is typically after walking 4-5 blocks, describes it as being nonradiating midsternal chest tightness, 4-5/10 in severity. Associated symptoms including SOB and palpitations. Patient denies any N/V, diaphoresis, dizziness, recent PND, orthopnea or LE edema. Patient denies recent noninvasive cardiac work-up and states she has been compliant with all her medications. In ED, BP: 145/92, HR:86, RR: 18, Temp: 98F, O2 sat: 99% RA. EKG revealed NSR@85BPM with TWI in inferolateral leads. Cardiac enzymes negative x 1 set. Patient treated with ASA 325mg PO x 1 dose.  Patient currently stable now planned for cardiac catheterization with possible intervention.

## 2018-09-10 VITALS
DIASTOLIC BLOOD PRESSURE: 92 MMHG | HEIGHT: 61.42 IN | SYSTOLIC BLOOD PRESSURE: 145 MMHG | RESPIRATION RATE: 18 BRPM | HEART RATE: 86 BPM | TEMPERATURE: 98 F | WEIGHT: 134.92 LBS | OXYGEN SATURATION: 99 %

## 2018-09-10 PROBLEM — Z00.00 ENCOUNTER FOR PREVENTIVE HEALTH EXAMINATION: Status: ACTIVE | Noted: 2018-09-10

## 2018-09-10 PROBLEM — E11.9 TYPE 2 DIABETES MELLITUS WITHOUT COMPLICATIONS: Chronic | Status: ACTIVE | Noted: 2018-09-07

## 2018-09-10 PROBLEM — E78.5 HYPERLIPIDEMIA, UNSPECIFIED: Chronic | Status: ACTIVE | Noted: 2018-09-07

## 2018-09-10 PROBLEM — I10 ESSENTIAL (PRIMARY) HYPERTENSION: Chronic | Status: ACTIVE | Noted: 2018-09-07

## 2018-09-10 NOTE — H&P ADULT - HISTORY OF PRESENT ILLNESS
36 y/o female, former smoker, strong FHx of heart disease (father  @age 42 from MI), PMHx of HTN, HLD, DM, recent MI in 2017 (managed medically in [facility]), who presented to [facility] ED 18 c/o progressively worsening, exerional chest pain x 1 week. Patient stated symptom onset arose after walking 4-5 blocks. Pain described as non-radiating, midsternal chest tightness, 4-5/10 in severity. Associated symptoms included SOB and palpitations. Patient underwent cardiac catheterization with Dr. Campos on 18, which revealed normal LM, 85% pLAD lesion, 80% mLAD lesion, 50% mD1 lesion, 50-60% mLCx lesion, 50% mRCA lesion, 80% dRCA lesion. EF:40-45%. Dr. Vasquez was consulted for OPCAB evaluation. She presents today for elective surgery.

## 2018-09-10 NOTE — H&P ADULT - ASSESSMENT
37 year old female with stable angina and triple -vessel CAD, consulted by Dr. Vasquez for cardiac surgery. Dr. Vasquez reviewed the cardiac cath imaging and reports with the patient and family and discussed the case with Dr. Shaikh Neumann.  Dr. Vasquez discussed the risks, benefits and alternatives to surgery. Risks include but not limited to death, heart attack, bleeding, stroke, kidney problems and infection. He quoted a 1-2% operative mortality and complication risk.  He also discussed the various approaches, minimally invasive versus sternotomy. Dr. Vasquez feels the patient will benefit and is a candidate for OPCAB. All questions were addressed and patient agrees to proceed with surgery on Wednesday, September 12th.     Plan  3 soaps given  Metorpolol on morning of surgery  SDA- 9/12/18 37 year old female with stable angina and triple -vessel CAD, consulted by Dr. Vasquez for cardiac surgery. Dr. Vasquez reviewed the cardiac cath imaging and reports with the patient and family and discussed the case with Dr. Shaikh Neumann.  Dr. Vasquez discussed the risks, benefits and alternatives to surgery. Risks include but not limited to death, heart attack, bleeding, stroke, kidney problems and infection. He quoted a 1-2% operative mortality and complication risk.  He also discussed the various approaches, minimally invasive versus sternotomy. Dr. Vasquez feels the patient will benefit and is a candidate for OPCAB. All questions were addressed and patient agrees to proceed with surgery on Wednesday, September 12th.     Plan  3 soaps given  Metoprolol on morning of surgery  SDA- 9/12/18

## 2018-09-10 NOTE — H&P ADULT - NSHPLABSRESULTS_GEN_ALL_CORE
Hgb A1C = <4.2  creat = 0.92   hct= 39.5  hgb= 13.3  plt= 293  WBC= 11.5  INR= 0.99  tot alb=3.3  tot bili=0.3    TSH=       Chest xray:   EKG:     Echo:    Cardiac Cath: normal LM, 85% pLAD lesion, 80% mLAD lesion, 50% mD1 lesion, 50-60% mLCx lesion, 50% mRCA lesion, 80% dRCA lesion. EF:40-45%. Hgb A1C = <4.2  creat = 0.92   hct= 39.5  hgb= 13.3  plt= 293  WBC= 11.5  INR= 0.99  tot alb=3.3  tot bili=0.3    TSH=       Chest xray:   EKG: NSR HR 85bpm    Echo:    Cardiac Cath: normal LM, 85% pLAD lesion, 80% mLAD lesion, 50% mD1 lesion, 50-60% mLCx lesion, 50% mRCA lesion, 80% dRCA lesion. EF:40-45%.

## 2018-09-11 ENCOUNTER — OUTPATIENT (OUTPATIENT)
Dept: OUTPATIENT SERVICES | Facility: HOSPITAL | Age: 38
LOS: 1 days | End: 2018-09-11
Payer: MEDICAID

## 2018-09-11 DIAGNOSIS — Z98.891 HISTORY OF UTERINE SCAR FROM PREVIOUS SURGERY: Chronic | ICD-10-CM

## 2018-09-11 DIAGNOSIS — Z98.51 TUBAL LIGATION STATUS: Chronic | ICD-10-CM

## 2018-09-11 DIAGNOSIS — Z01.818 ENCOUNTER FOR OTHER PREPROCEDURAL EXAMINATION: ICD-10-CM

## 2018-09-11 LAB
APPEARANCE UR: CLEAR — SIGNIFICANT CHANGE UP
BACTERIA # UR AUTO: PRESENT /HPF
BILIRUB UR-MCNC: NEGATIVE — SIGNIFICANT CHANGE UP
COLOR SPEC: YELLOW — SIGNIFICANT CHANGE UP
DIFF PNL FLD: ABNORMAL
EPI CELLS # UR: ABNORMAL /HPF (ref 0–5)
GLUCOSE UR QL: 500
HCG SERPL-ACNC: 0.2 MIU/ML — SIGNIFICANT CHANGE UP
KETONES UR-MCNC: NEGATIVE — SIGNIFICANT CHANGE UP
LEUKOCYTE ESTERASE UR-ACNC: NEGATIVE — SIGNIFICANT CHANGE UP
NITRITE UR-MCNC: NEGATIVE — SIGNIFICANT CHANGE UP
PH UR: 5.5 — SIGNIFICANT CHANGE UP (ref 5–8)
PROT UR-MCNC: 100 MG/DL
RBC CASTS # UR COMP ASSIST: ABNORMAL /HPF
SP GR SPEC: 1.02 — SIGNIFICANT CHANGE UP (ref 1–1.03)
UROBILINOGEN FLD QL: 0.2 E.U./DL — SIGNIFICANT CHANGE UP
WBC UR QL: < 5 /HPF — SIGNIFICANT CHANGE UP

## 2018-09-11 RX ORDER — INFLUENZA VIRUS VACCINE 15; 15; 15; 15 UG/.5ML; UG/.5ML; UG/.5ML; UG/.5ML
0.5 SUSPENSION INTRAMUSCULAR ONCE
Qty: 0 | Refills: 0 | Status: DISCONTINUED | OUTPATIENT
Start: 2018-09-12 | End: 2018-09-17

## 2018-09-11 NOTE — PRE-OP CHECKLIST - SELECT TESTS ORDERED
CBC/Type and Screen/EKG/CXR/CMP/PT/PTT/INR/Urinalysis Urinalysis/EKG/CMP/CBC/INR/HCG/CXR/PT/PTT/Type and Screen/HCG negative 09.12.2018

## 2018-09-11 NOTE — PRE-OP CHECKLIST - ORDERS/MEDICATION ADMINISTRATION RECORD ON CHART
On reviewing the records patient left AMA from the emergency department and admitted to the medical floor. I did see the patient in the emergency department and she was going to be admitted for possible seizures and further evaluation.       Sue Essex, MD  [phone] done

## 2018-09-12 ENCOUNTER — APPOINTMENT (OUTPATIENT)
Dept: CARDIOTHORACIC SURGERY | Facility: HOSPITAL | Age: 38
End: 2018-09-12
Payer: MEDICAID

## 2018-09-12 ENCOUNTER — INPATIENT (INPATIENT)
Facility: HOSPITAL | Age: 38
LOS: 4 days | Discharge: HOME HEALTH | DRG: 236 | End: 2018-09-17
Attending: THORACIC SURGERY (CARDIOTHORACIC VASCULAR SURGERY) | Admitting: THORACIC SURGERY (CARDIOTHORACIC VASCULAR SURGERY)
Payer: MEDICAID

## 2018-09-12 DIAGNOSIS — Z87.891 PERSONAL HISTORY OF NICOTINE DEPENDENCE: ICD-10-CM

## 2018-09-12 DIAGNOSIS — Z98.891 HISTORY OF UTERINE SCAR FROM PREVIOUS SURGERY: Chronic | ICD-10-CM

## 2018-09-12 DIAGNOSIS — Z82.49 FAMILY HISTORY OF ISCHEMIC HEART DISEASE AND OTHER DISEASES OF THE CIRCULATORY SYSTEM: ICD-10-CM

## 2018-09-12 DIAGNOSIS — Z98.51 TUBAL LIGATION STATUS: Chronic | ICD-10-CM

## 2018-09-12 DIAGNOSIS — Z86.39 PERSONAL HISTORY OF OTHER ENDOCRINE, NUTRITIONAL AND METABOLIC DISEASE: ICD-10-CM

## 2018-09-12 DIAGNOSIS — I25.2 OLD MYOCARDIAL INFARCTION: ICD-10-CM

## 2018-09-12 DIAGNOSIS — Z86.79 PERSONAL HISTORY OF OTHER DISEASES OF THE CIRCULATORY SYSTEM: ICD-10-CM

## 2018-09-12 DIAGNOSIS — Z09 ENCOUNTER FOR FOLLOW-UP EXAMINATION AFTER COMPLETED TREATMENT FOR CONDITIONS OTHER THAN MALIGNANT NEOPLASM: ICD-10-CM

## 2018-09-12 PROBLEM — I21.9 ACUTE MYOCARDIAL INFARCTION, UNSPECIFIED: Chronic | Status: ACTIVE | Noted: 2018-09-07

## 2018-09-12 LAB
ALBUMIN SERPL ELPH-MCNC: 2.3 G/DL — LOW (ref 3.3–5)
ALP SERPL-CCNC: 46 U/L — SIGNIFICANT CHANGE UP (ref 40–120)
ALT FLD-CCNC: 7 U/L — LOW (ref 10–45)
ANION GAP SERPL CALC-SCNC: 12 MMOL/L — SIGNIFICANT CHANGE UP (ref 5–17)
APTT BLD: 27.7 SEC — SIGNIFICANT CHANGE UP (ref 27.5–37.4)
AST SERPL-CCNC: 17 U/L — SIGNIFICANT CHANGE UP (ref 10–40)
BASOPHILS NFR BLD AUTO: 0.2 % — SIGNIFICANT CHANGE UP (ref 0–2)
BILIRUB SERPL-MCNC: 0.2 MG/DL — SIGNIFICANT CHANGE UP (ref 0.2–1.2)
BLD GP AB SCN SERPL QL: NEGATIVE — SIGNIFICANT CHANGE UP
BUN SERPL-MCNC: 12 MG/DL — SIGNIFICANT CHANGE UP (ref 7–23)
CALCIUM SERPL-MCNC: 9.5 MG/DL — SIGNIFICANT CHANGE UP (ref 8.4–10.5)
CHLORIDE SERPL-SCNC: 104 MMOL/L — SIGNIFICANT CHANGE UP (ref 96–108)
CO2 SERPL-SCNC: 21 MMOL/L — LOW (ref 22–31)
CREAT SERPL-MCNC: 0.66 MG/DL — SIGNIFICANT CHANGE UP (ref 0.5–1.3)
EOSINOPHIL NFR BLD AUTO: 0.8 % — SIGNIFICANT CHANGE UP (ref 0–6)
GAS PNL BLDA: SIGNIFICANT CHANGE UP
GAS PNL BLDA: SIGNIFICANT CHANGE UP
GLUCOSE BLDC GLUCOMTR-MCNC: 234 MG/DL — HIGH (ref 70–99)
GLUCOSE BLDC GLUCOMTR-MCNC: 252 MG/DL — HIGH (ref 70–99)
GLUCOSE BLDC GLUCOMTR-MCNC: 287 MG/DL — HIGH (ref 70–99)
GLUCOSE BLDC GLUCOMTR-MCNC: 313 MG/DL — HIGH (ref 70–99)
GLUCOSE BLDC GLUCOMTR-MCNC: 343 MG/DL — HIGH (ref 70–99)
GLUCOSE SERPL-MCNC: 191 MG/DL — HIGH (ref 70–99)
HCT VFR BLD CALC: 28 % — LOW (ref 34.5–45)
HGB BLD-MCNC: 9.6 G/DL — LOW (ref 11.5–15.5)
INR BLD: 1.17 — HIGH (ref 0.88–1.16)
LACTATE SERPL-SCNC: 1.3 MMOL/L — SIGNIFICANT CHANGE UP (ref 0.5–2)
LACTATE SERPL-SCNC: 2.8 MMOL/L — HIGH (ref 0.5–2)
LYMPHOCYTES # BLD AUTO: 22.6 % — SIGNIFICANT CHANGE UP (ref 13–44)
MAGNESIUM SERPL-MCNC: 1.2 MG/DL — LOW (ref 1.6–2.6)
MCHC RBC-ENTMCNC: 29.8 PG — SIGNIFICANT CHANGE UP (ref 27–34)
MCHC RBC-ENTMCNC: 34.3 G/DL — SIGNIFICANT CHANGE UP (ref 32–36)
MCV RBC AUTO: 87 FL — SIGNIFICANT CHANGE UP (ref 80–100)
MONOCYTES NFR BLD AUTO: 5.9 % — SIGNIFICANT CHANGE UP (ref 2–14)
NEUTROPHILS NFR BLD AUTO: 70.5 % — SIGNIFICANT CHANGE UP (ref 43–77)
PHOSPHATE SERPL-MCNC: 3.5 MG/DL — SIGNIFICANT CHANGE UP (ref 2.5–4.5)
PLATELET # BLD AUTO: 231 K/UL — SIGNIFICANT CHANGE UP (ref 150–400)
POTASSIUM SERPL-MCNC: 3.9 MMOL/L — SIGNIFICANT CHANGE UP (ref 3.5–5.3)
POTASSIUM SERPL-SCNC: 3.9 MMOL/L — SIGNIFICANT CHANGE UP (ref 3.5–5.3)
PROT SERPL-MCNC: 4.3 G/DL — LOW (ref 6–8.3)
PROTHROM AB SERPL-ACNC: 13 SEC — HIGH (ref 9.8–12.7)
RBC # BLD: 3.22 M/UL — LOW (ref 3.8–5.2)
RBC # FLD: 12.5 % — SIGNIFICANT CHANGE UP (ref 10.3–16.9)
RH IG SCN BLD-IMP: POSITIVE — SIGNIFICANT CHANGE UP
SODIUM SERPL-SCNC: 137 MMOL/L — SIGNIFICANT CHANGE UP (ref 135–145)
WBC # BLD: 24.7 K/UL — HIGH (ref 3.8–10.5)
WBC # FLD AUTO: 24.7 K/UL — HIGH (ref 3.8–10.5)

## 2018-09-12 PROCEDURE — 99292 CRITICAL CARE ADDL 30 MIN: CPT

## 2018-09-12 PROCEDURE — 86900 BLOOD TYPING SEROLOGIC ABO: CPT

## 2018-09-12 PROCEDURE — 33517 CABG ARTERY-VEIN SINGLE: CPT | Mod: 80

## 2018-09-12 PROCEDURE — 99291 CRITICAL CARE FIRST HOUR: CPT

## 2018-09-12 PROCEDURE — 33535 CABG ARTERIAL THREE: CPT | Mod: 80

## 2018-09-12 PROCEDURE — 36620 INSERTION CATHETER ARTERY: CPT

## 2018-09-12 PROCEDURE — 86850 RBC ANTIBODY SCREEN: CPT

## 2018-09-12 PROCEDURE — 71045 X-RAY EXAM CHEST 1 VIEW: CPT | Mod: 26

## 2018-09-12 PROCEDURE — 33535 CABG ARTERIAL THREE: CPT

## 2018-09-12 PROCEDURE — 33517 CABG ARTERY-VEIN SINGLE: CPT

## 2018-09-12 PROCEDURE — 76998 US GUIDE INTRAOP: CPT | Mod: 26,59

## 2018-09-12 PROCEDURE — 93010 ELECTROCARDIOGRAM REPORT: CPT

## 2018-09-12 PROCEDURE — 86901 BLOOD TYPING SEROLOGIC RH(D): CPT

## 2018-09-12 PROCEDURE — 81001 URINALYSIS AUTO W/SCOPE: CPT

## 2018-09-12 PROCEDURE — 84702 CHORIONIC GONADOTROPIN TEST: CPT

## 2018-09-12 RX ORDER — CHLORHEXIDINE GLUCONATE 213 G/1000ML
5 SOLUTION TOPICAL EVERY 4 HOURS
Qty: 0 | Refills: 0 | Status: DISCONTINUED | OUTPATIENT
Start: 2018-09-12 | End: 2018-09-14

## 2018-09-12 RX ORDER — INSULIN LISPRO 100/ML
VIAL (ML) SUBCUTANEOUS
Qty: 0 | Refills: 0 | Status: DISCONTINUED | OUTPATIENT
Start: 2018-09-12 | End: 2018-09-13

## 2018-09-12 RX ORDER — ACETAMINOPHEN 500 MG
1000 TABLET ORAL ONCE
Qty: 0 | Refills: 0 | Status: COMPLETED | OUTPATIENT
Start: 2018-09-12 | End: 2018-09-12

## 2018-09-12 RX ORDER — DEXTROSE 50 % IN WATER 50 %
25 SYRINGE (ML) INTRAVENOUS
Qty: 0 | Refills: 0 | Status: DISCONTINUED | OUTPATIENT
Start: 2018-09-12 | End: 2018-09-15

## 2018-09-12 RX ORDER — DEXTROSE 50 % IN WATER 50 %
25 SYRINGE (ML) INTRAVENOUS ONCE
Qty: 0 | Refills: 0 | Status: DISCONTINUED | OUTPATIENT
Start: 2018-09-12 | End: 2018-09-15

## 2018-09-12 RX ORDER — GLUCAGON INJECTION, SOLUTION 0.5 MG/.1ML
1 INJECTION, SOLUTION SUBCUTANEOUS ONCE
Qty: 0 | Refills: 0 | Status: DISCONTINUED | OUTPATIENT
Start: 2018-09-12 | End: 2018-09-15

## 2018-09-12 RX ORDER — CEFAZOLIN SODIUM 1 G
2000 VIAL (EA) INJECTION EVERY 8 HOURS
Qty: 0 | Refills: 0 | Status: DISCONTINUED | OUTPATIENT
Start: 2018-09-12 | End: 2018-09-12

## 2018-09-12 RX ORDER — CLOPIDOGREL BISULFATE 75 MG/1
75 TABLET, FILM COATED ORAL DAILY
Qty: 0 | Refills: 0 | Status: DISCONTINUED | OUTPATIENT
Start: 2018-09-12 | End: 2018-09-17

## 2018-09-12 RX ORDER — FENTANYL CITRATE 50 UG/ML
25 INJECTION INTRAVENOUS EVERY 4 HOURS
Qty: 0 | Refills: 0 | Status: DISCONTINUED | OUTPATIENT
Start: 2018-09-12 | End: 2018-09-13

## 2018-09-12 RX ORDER — POTASSIUM CHLORIDE 20 MEQ
20 PACKET (EA) ORAL ONCE
Qty: 0 | Refills: 0 | Status: COMPLETED | OUTPATIENT
Start: 2018-09-12 | End: 2018-09-12

## 2018-09-12 RX ORDER — INSULIN HUMAN 100 [IU]/ML
1 INJECTION, SOLUTION SUBCUTANEOUS
Qty: 50 | Refills: 0 | Status: DISCONTINUED | OUTPATIENT
Start: 2018-09-12 | End: 2018-09-13

## 2018-09-12 RX ORDER — NOREPINEPHRINE BITARTRATE/D5W 8 MG/250ML
0.05 PLASTIC BAG, INJECTION (ML) INTRAVENOUS
Qty: 8 | Refills: 0 | Status: DISCONTINUED | OUTPATIENT
Start: 2018-09-12 | End: 2018-09-13

## 2018-09-12 RX ORDER — PROPOFOL 10 MG/ML
5 INJECTION, EMULSION INTRAVENOUS
Qty: 1000 | Refills: 0 | Status: DISCONTINUED | OUTPATIENT
Start: 2018-09-12 | End: 2018-09-13

## 2018-09-12 RX ORDER — CEFAZOLIN SODIUM 1 G
2000 VIAL (EA) INJECTION EVERY 8 HOURS
Qty: 0 | Refills: 0 | Status: COMPLETED | OUTPATIENT
Start: 2018-09-12 | End: 2018-09-14

## 2018-09-12 RX ORDER — VASOPRESSIN 20 [USP'U]/ML
0.03 INJECTION INTRAVENOUS
Qty: 100 | Refills: 0 | Status: DISCONTINUED | OUTPATIENT
Start: 2018-09-12 | End: 2018-09-13

## 2018-09-12 RX ORDER — DEXMEDETOMIDINE HYDROCHLORIDE IN 0.9% SODIUM CHLORIDE 4 UG/ML
0.2 INJECTION INTRAVENOUS
Qty: 200 | Refills: 0 | Status: DISCONTINUED | OUTPATIENT
Start: 2018-09-12 | End: 2018-09-13

## 2018-09-12 RX ORDER — SIMVASTATIN 20 MG/1
40 TABLET, FILM COATED ORAL AT BEDTIME
Qty: 0 | Refills: 0 | Status: DISCONTINUED | OUTPATIENT
Start: 2018-09-12 | End: 2018-09-17

## 2018-09-12 RX ORDER — DEXTROSE 50 % IN WATER 50 %
15 SYRINGE (ML) INTRAVENOUS ONCE
Qty: 0 | Refills: 0 | Status: DISCONTINUED | OUTPATIENT
Start: 2018-09-12 | End: 2018-09-15

## 2018-09-12 RX ORDER — HEPARIN SODIUM 5000 [USP'U]/ML
5000 INJECTION INTRAVENOUS; SUBCUTANEOUS EVERY 8 HOURS
Qty: 0 | Refills: 0 | Status: DISCONTINUED | OUTPATIENT
Start: 2018-09-12 | End: 2018-09-17

## 2018-09-12 RX ORDER — SODIUM CHLORIDE 9 MG/ML
1000 INJECTION, SOLUTION INTRAVENOUS
Qty: 0 | Refills: 0 | Status: DISCONTINUED | OUTPATIENT
Start: 2018-09-12 | End: 2018-09-15

## 2018-09-12 RX ORDER — DEXTROSE 50 % IN WATER 50 %
12.5 SYRINGE (ML) INTRAVENOUS ONCE
Qty: 0 | Refills: 0 | Status: DISCONTINUED | OUTPATIENT
Start: 2018-09-12 | End: 2018-09-15

## 2018-09-12 RX ORDER — SODIUM CHLORIDE 9 MG/ML
1000 INJECTION INTRAMUSCULAR; INTRAVENOUS; SUBCUTANEOUS
Qty: 0 | Refills: 0 | Status: DISCONTINUED | OUTPATIENT
Start: 2018-09-12 | End: 2018-09-15

## 2018-09-12 RX ORDER — MEPERIDINE HYDROCHLORIDE 50 MG/ML
25 INJECTION INTRAMUSCULAR; INTRAVENOUS; SUBCUTANEOUS ONCE
Qty: 0 | Refills: 0 | Status: DISCONTINUED | OUTPATIENT
Start: 2018-09-12 | End: 2018-09-12

## 2018-09-12 RX ORDER — MAGNESIUM SULFATE 500 MG/ML
2 VIAL (ML) INJECTION ONCE
Qty: 0 | Refills: 0 | Status: COMPLETED | OUTPATIENT
Start: 2018-09-12 | End: 2018-09-12

## 2018-09-12 RX ORDER — DEXTROSE 50 % IN WATER 50 %
50 SYRINGE (ML) INTRAVENOUS
Qty: 0 | Refills: 0 | Status: DISCONTINUED | OUTPATIENT
Start: 2018-09-12 | End: 2018-09-15

## 2018-09-12 RX ORDER — ASPIRIN/CALCIUM CARB/MAGNESIUM 324 MG
81 TABLET ORAL DAILY
Qty: 0 | Refills: 0 | Status: DISCONTINUED | OUTPATIENT
Start: 2018-09-12 | End: 2018-09-17

## 2018-09-12 RX ORDER — PANTOPRAZOLE SODIUM 20 MG/1
40 TABLET, DELAYED RELEASE ORAL DAILY
Qty: 0 | Refills: 0 | Status: DISCONTINUED | OUTPATIENT
Start: 2018-09-12 | End: 2018-09-13

## 2018-09-12 RX ADMIN — Medication 400 MILLIGRAM(S): at 22:04

## 2018-09-12 RX ADMIN — PANTOPRAZOLE SODIUM 40 MILLIGRAM(S): 20 TABLET, DELAYED RELEASE ORAL at 22:05

## 2018-09-12 RX ADMIN — Medication 1000 MILLIGRAM(S): at 22:19

## 2018-09-12 RX ADMIN — FENTANYL CITRATE 25 MICROGRAM(S): 50 INJECTION INTRAVENOUS at 20:21

## 2018-09-12 RX ADMIN — Medication 100 MILLIEQUIVALENT(S): at 21:22

## 2018-09-12 RX ADMIN — Medication 50 GRAM(S): at 21:13

## 2018-09-12 RX ADMIN — FENTANYL CITRATE 25 MICROGRAM(S): 50 INJECTION INTRAVENOUS at 21:51

## 2018-09-12 RX ADMIN — Medication 4: at 10:43

## 2018-09-12 NOTE — PROGRESS NOTE ADULT - SUBJECTIVE AND OBJECTIVE BOX
CTICU  CRITICAL  CARE  attending     Hand off received 					   Pertinent clinical, laboratory, radiographic, hemodynamic, echocardiographic, respiratory data, microbiologic data and chart were reviewed and analyzed frequently throughout the course of the day and night  Patient seen and examined with CTS/ SH attending at bedside    36 y/o female, former smoker, strong FHx of heart disease (father  @age 42 from MI), PMHx of HTN, HLD, DM, recent MI in 2017 (managed medically in [place]), who presented to [facility] ED 18 c/o progressively worsening, exerional chest pain x 1 week. Patient stated symptom onset arose after walking 4-5 blocks. Pain described as non-radiating, midsternal chest tightness, 4-5/10 in severity. Associated symptoms included SOB and palpitations. Patient underwent cardiac catheterization with Dr. Campos on 18, which revealed normal LM, 85% pLAD lesion, 80% mLAD lesion, 50% mD1 lesion, 50-60% mLCx lesion, 50% mRCA lesion, 80% dRCA lesion. EF:40-45%. Dr. Vasquez was consulted for OPCAB evaluation. She presents today for elective surgery.         FAMILY HISTORY:  Family history of heart disease (Father)  PAST MEDICAL & SURGICAL HISTORY:  Hyperlipidemia  Hypertension  Type 2 diabetes mellitus  MI (myocardial infarction): 2017  H/O tubal ligation  S/P     Patient is a 37y old  Female who presents with a chief complaint of CABG (11 Sep 2018 12:07)      14 system review was unremarkable  acute changes include acute respiratory failure  Vital signs, hemodynamic and respiratory parameters were reviewed from the bedside nursing flow sheet.  ICU Vital Signs Last 24 Hrs  T(C): --  T(F): --  HR: 96 (12 Sep 2018 19:30) (82 - 96)  BP: 181/88 (12 Sep 2018 19:30) (181/88 - 181/88)  BP(mean): 125 (12 Sep 2018 19:30) (125 - 125)  ABP: 120/98 (12 Sep 2018 19:15) (116/98 - 120/98)  ABP(mean): 106 (12 Sep 2018 19:15) (106 - 106)  RR: 10 (12 Sep 2018 19:30) (0 - 10)  SpO2: 100% (12 Sep 2018 19:30) (100% - 100%)    Adult Advanced Hemodynamics Last 24 Hrs  CVP(mm Hg): --  CVP(cm H2O): --  CO: --  CI: --  PA: --  PA(mean): --  PCWP: --  SVR: --  SVRI: --  PVR: --  PVRI: --, ABG - ( 12 Sep 2018 19:16 )  pH, Arterial: 7.39  pH, Blood: x     /  pCO2: 34    /  pO2: 134   / HCO3: 20    / Base Excess: -4.2  /  SaO2: 99                  Intake and output was reviewed and the fluid balance was calculated  Daily     Daily   I&O's Summary      All lines and drain sites were assessed  Glycemic trend was reviewed CAPILLARY BLOOD GLUCOSE      POCT Blood Glucose.: 287 mg/dL (12 Sep 2018 11:34)    NEURO: PUPILS 3 mm (Reactive). Not Fully Awake yet.  CVS: S1, S2, No S3.  RESPIRATORY: Auscultation of the chest reveals equal breath sounds.  GI: Abdomen is soft  Extremities are warm and well perfused.  VASCULAR: + Distal  pulses.  Wounds appear clean and unremarkable  Antibiotics are perioperative cefazolin.        labs  CBC Full  -  ( 12 Sep 2018 19:22 )  WBC Count : 24.7 K/uL  Hemoglobin : 9.6 g/dL  Hematocrit : 28.0 %  Platelet Count - Automated : 231 K/uL  Mean Cell Volume : 87.0 fL  Mean Cell Hemoglobin : 29.8 pg  Mean Cell Hemoglobin Concentration : 34.3 g/dL  Auto Neutrophil # : x  Auto Lymphocyte # : x  Auto Monocyte # : x  Auto Eosinophil # : x  Auto Basophil # : x  Auto Neutrophil % : 70.5 %  Auto Lymphocyte % : 22.6 %  Auto Monocyte % : 5.9 %  Auto Eosinophil % : 0.8 %  Auto Basophil % : 0.2 %        137  |  104  |  12  ----------------------------<  191<H>  3.9   |  21<L>  |  0.66    Ca    9.5      12 Sep 2018 19:22  Phos  3.5       Mg     1.2         TPro  4.3<L>  /  Alb  2.3<L>  /  TBili  0.2  /  DBili  x   /  AST  17  /  ALT  7<L>  /  AlkPhos  46      PT/INR - ( 12 Sep 2018 19:22 )   PT: 13.0 sec;   INR: 1.17          PTT - ( 12 Sep 2018 19:22 )  PTT:27.7 sec  The current medications were reviewed   MEDICATIONS  (STANDING):  acetaminophen  IVPB .. 1000 milliGRAM(s) IV Intermittent once  aspirin enteric coated 81 milliGRAM(s) Oral daily  ceFAZolin  Injectable. 2000 milliGRAM(s) IV Push every 8 hours  chlorhexidine 0.12% Liquid 5 milliLiter(s) Swish and Spit every 4 hours  clopidogrel Tablet 75 milliGRAM(s) Oral daily  dextrose 5%. 1000 milliLiter(s) (50 mL/Hr) IV Continuous <Continuous>  dextrose 50% Injectable 12.5 Gram(s) IV Push once  dextrose 50% Injectable 25 Gram(s) IV Push once  dextrose 50% Injectable 25 Gram(s) IV Push once  dextrose 50% Injectable 50 milliLiter(s) IV Push every 15 minutes  dextrose 50% Injectable 25 milliLiter(s) IV Push every 15 minutes  heparin  Injectable 5000 Unit(s) SubCutaneous every 8 hours  influenza   Vaccine 0.5 milliLiter(s) IntraMuscular once  insulin Infusion 1 Unit(s)/Hr (1 mL/Hr) IV Continuous <Continuous>  insulin lispro (HumaLOG) corrective regimen sliding scale   SubCutaneous Before meals and at bedtime  norepinephrine Infusion 0.05 MICROgram(s)/kG/Min (5.738 mL/Hr) IV Continuous <Continuous>  pantoprazole  Injectable 40 milliGRAM(s) IV Push daily  potassium chloride  20 mEq/100 mL IVPB 20 milliEquivalent(s) IV Intermittent once  simvastatin 40 milliGRAM(s) Oral at bedtime  sodium chloride 0.9%. 1000 milliLiter(s) (10 mL/Hr) IV Continuous <Continuous>  vasopressin Infusion 0.033 Unit(s)/Min (2 mL/Hr) IV Continuous <Continuous>    MEDICATIONS  (PRN):  dextrose 40% Gel 15 Gram(s) Oral once PRN Blood Glucose LESS THAN 70 milliGRAM(s)/deciliter  fentaNYL    Injectable 25 MICROGram(s) IV Push every 4 hours PRN Severe Pain (7 - 10)  glucagon  Injectable 1 milliGRAM(s) IntraMuscular once PRN Glucose LESS THAN 70 milligrams/deciliter      PROBLEM LIST/ ASSESSMENT:  HEALTH ISSUES - PROBLEM Dx:        Patient is a 37y old  Female who presents with triple vessel CAD  S/P  CABG   Hyperglycemic metabolic acidosis.  Hemodynamically stable.  Good oxygenation.  Diuresing well.          My plan includes :  Close hemodynamic, ventilatory and drain monitoring and management.  WEAN to Extubate.  Monitor for arrhythmias and monitor parameters for organ perfusion  Monitor neurologic status  Head of the bed should remain elevated to 45 deg .   Chest PT and IS will be encouraged  Monitor adequacy of oxygenation and ventilation and attempt to wean oxygen  Nutritional goals will be met using po eventually , ensure adequate caloric intake and monitor  the same  Stress ulcer and VTE prophylaxis will be achieved    OPTIMIZE Glycemic control to satisfactory levels.  Electrolytes have been repleted as necessary and wound care has been carried out. Pain control has been achieved.   Aggressive physical therapy and early mobility and ambulation goals will be met   The family was updated about the course and plan  CRITICAL CARE TIME SPENT in evaluation and management, reassessments, review and interpretation of labs and x-rays, ventilator and hemodynamic management, formulating a plan and coordinating care: ___90____ MIN.  Time does not include procedural time.  CTICU ATTENDING     					      Jose Cruz MD

## 2018-09-12 NOTE — BRIEF OPERATIVE NOTE - PROCEDURE
<<-----Click on this checkbox to enter Procedure CABG  09/12/2018  LIMA-Diag, MARGAUX-LAD, Radial -OM, SVG-PDA  Active  MODONOGUE

## 2018-09-12 NOTE — PROGRESS NOTE ADULT - SUBJECTIVE AND OBJECTIVE BOX
CTICU  CRITICAL  CARE  attending     Hand off received 					   Pertinent clinical, laboratory, radiographic, hemodynamic, echocardiographic, respiratory data, microbiologic data and chart were reviewed and analyzed frequently throughout the course of the day and night  Patient seen and examined with CTS/ SH attending at bedside  Pt is a 37y , Female, HEALTH ISSUES - PROBLEM Dx:      , FAMILY HISTORY:  Family history of heart disease (Father)  PAST MEDICAL & SURGICAL HISTORY:  Hyperlipidemia  Hypertension  Type 2 diabetes mellitus  MI (myocardial infarction): 2017  H/O tubal ligation  S/P     Patient is a 37y old  Female who presents with a chief complaint of CAD (13 Sep 2018 08:55)      14 system review was unremarkable  acute changes include acute respiratory failure  Vital signs, hemodynamic and respiratory parameters were reviewed from the bedside nursing flowsheet.  ICU Vital Signs Last 24 Hrs  T(C): 36.2 (13 Sep 2018 09:30), Max: 36.2 (13 Sep 2018 09:30)  T(F): 97.2 (13 Sep 2018 09:30), Max: 97.2 (13 Sep 2018 09:30)  HR: 100 (13 Sep 2018 12:00) (72 - 110)  BP: 112/77 (13 Sep 2018 11:00) (112/77 - 181/88)  BP(mean): 95 (13 Sep 2018 11:00) (95 - 125)  ABP: 110/56 (13 Sep 2018 12:00) (96/58 - 154/148)  ABP(mean): 76 (13 Sep 2018 12:00) (68 - 150)  RR: 18 (13 Sep 2018 11:00) (0 - 21)  SpO2: 100% (13 Sep 2018 12:00) (98% - 100%)    Adult Advanced Hemodynamics Last 24 Hrs  CVP(mm Hg): --  CVP(cm H2O): --  CO: --  CI: --  PA: --  PA(mean): --  PCWP: --  SVR: --  SVRI: --  PVR: --  PVRI: --, ABG - ( 13 Sep 2018 09:39 )  pH, Arterial: 7.40  pH, Blood: x     /  pCO2: 31    /  pO2: 120   / HCO3: 19    / Base Excess: -5.1  /  SaO2: 99                Mode: CPAP with PS  FiO2: 50  PEEP: 5  PS: 10    Intake and output was reviewed and the fluid balance was calculated  Daily     Daily   I&O's Summary    12 Sep 2018 07:  -  13 Sep 2018 07:00  --------------------------------------------------------  IN: 1668 mL / OUT: 1365 mL / NET: 303 mL    13 Sep 2018 07:  -  13 Sep 2018 12:27  --------------------------------------------------------  IN: 415 mL / OUT: 785 mL / NET: -370 mL        All lines and drain sites were assessed  Glycemic trend was reviewedCAPILLARY BLOOD GLUCOSE      POCT Blood Glucose.: 172 mg/dL (13 Sep 2018 10:49)    No acute change in mental status  Auscultation of the chest reveals equal bs  Abdomen is soft  Extremities are warm and well perfused  Wounds appear clean and unremarkable  Antibiotics are periop    labs  CBC Full  -  ( 13 Sep 2018 09:47 )  WBC Count : 13.5 K/uL  Hemoglobin : 10.7 g/dL  Hematocrit : 31.4 %  Platelet Count - Automated : 206 K/uL  Mean Cell Volume : 85.8 fL  Mean Cell Hemoglobin : 29.2 pg  Mean Cell Hemoglobin Concentration : 34.1 g/dL  Auto Neutrophil # : x  Auto Lymphocyte # : x  Auto Monocyte # : x  Auto Eosinophil # : x  Auto Basophil # : x  Auto Neutrophil % : x  Auto Lymphocyte % : x  Auto Monocyte % : x  Auto Eosinophil % : x  Auto Basophil % : x        136  |  102  |  14  ----------------------------<  180<H>  4.5   |  20<L>  |  0.67    Ca    8.6      13 Sep 2018 09:47  Phos  3.4       Mg     3.1         TPro  5.2<L>  /  Alb  3.0<L>  /  TBili  0.7  /  DBili  x   /  AST  26  /  ALT  7<L>  /  AlkPhos  45      PT/INR - ( 13 Sep 2018 09:47 )   PT: 12.6 sec;   INR: 1.13          PTT - ( 13 Sep 2018 09:47 )  PTT:29.4 sec  The current medications were reviewed   MEDICATIONS  (STANDING):  albumin human  5% IVPB 250 milliLiter(s) IV Intermittent once  albumin human  5% IVPB 250 milliLiter(s) IV Intermittent once  aspirin enteric coated 81 milliGRAM(s) Oral daily  ceFAZolin  Injectable. 2000 milliGRAM(s) IV Push every 8 hours  chlorhexidine 0.12% Liquid 5 milliLiter(s) Swish and Spit every 4 hours  chlorhexidine 2% Cloths 1 Application(s) Topical daily  clopidogrel Tablet 75 milliGRAM(s) Oral daily  dextrose 5%. 1000 milliLiter(s) (50 mL/Hr) IV Continuous <Continuous>  dextrose 5%. 1000 milliLiter(s) (50 mL/Hr) IV Continuous <Continuous>  dextrose 50% Injectable 12.5 Gram(s) IV Push once  dextrose 50% Injectable 25 Gram(s) IV Push once  dextrose 50% Injectable 25 Gram(s) IV Push once  dextrose 50% Injectable 12.5 Gram(s) IV Push once  dextrose 50% Injectable 25 Gram(s) IV Push once  dextrose 50% Injectable 25 Gram(s) IV Push once  dextrose 50% Injectable 50 milliLiter(s) IV Push every 15 minutes  dextrose 50% Injectable 25 milliLiter(s) IV Push every 15 minutes  heparin  Injectable 5000 Unit(s) SubCutaneous every 8 hours  influenza   Vaccine 0.5 milliLiter(s) IntraMuscular once  insulin lispro (HumaLOG) corrective regimen sliding scale   SubCutaneous Before meals and at bedtime  pantoprazole    Tablet 40 milliGRAM(s) Oral before breakfast  potassium chloride  20 mEq/100 mL IVPB 20 milliEquivalent(s) IV Intermittent once  simvastatin 40 milliGRAM(s) Oral at bedtime  sodium chloride 0.9%. 1000 milliLiter(s) (10 mL/Hr) IV Continuous <Continuous>    MEDICATIONS  (PRN):  dextrose 40% Gel 15 Gram(s) Oral once PRN Blood Glucose LESS THAN 70 milliGRAM(s)/deciliter  dextrose 40% Gel 15 Gram(s) Oral once PRN Blood Glucose LESS THAN 70 milliGRAM(s)/deciliter  glucagon  Injectable 1 milliGRAM(s) IntraMuscular once PRN Glucose LESS THAN 70 milligrams/deciliter  glucagon  Injectable 1 milliGRAM(s) IntraMuscular once PRN Glucose LESS THAN 70 milligrams/deciliter  oxyCODONE    5 mG/acetaminophen 325 mG 1 Tablet(s) Oral every 6 hours PRN Moderate Pain (4 - 6)       PROBLEM LIST/ ASSESSMENT:  HEALTH ISSUES - PROBLEM Dx:      ,   Patient is a 37y old  Female who presents with a chief complaint of CAD (13 Sep 2018 08:55)     s/p cardiac surgery      My plan includes :  close hemodynamic, ventilatory and drain monitoring and management per post op routine    Monitor for arrhythmias and monitor parameters for organ perfusion  monitor neurologic status  Head of the bed should remain elevated to 45 deg .   chest PT and IS will be encouraged  monitor adequacy of oxygenation and ventilation and attempt to wean oxygen  Nutritional goals will be met using po eventually , ensure adequate caloric intake and montior the same  Stress ulcer and VTE prophylaxis will be achieved    Glycemic control is satisfactory  Electrolytes have been repleted as necessary and wound care has been carried out. Pain control has been achieved.   agressive physical therapy and early mobility and ambulation goals will be met   The family was updated about the course and plan  CRITICAL CARE TIME SPENT in evaluation and management, reassessments, review and interpretation of labs and x-rays, ventilator and hemodynamic management, formulating a plan and coordinating care: ___90____ MIN.  Time does not include procedural time.  CTICU ATTENDING     					    Deng Lala MD

## 2018-09-13 LAB
ALBUMIN SERPL ELPH-MCNC: 2.4 G/DL — LOW (ref 3.3–5)
ALBUMIN SERPL ELPH-MCNC: 2.6 G/DL — LOW (ref 3.3–5)
ALBUMIN SERPL ELPH-MCNC: 2.9 G/DL — LOW (ref 3.3–5)
ALBUMIN SERPL ELPH-MCNC: 3 G/DL — LOW (ref 3.3–5)
ALP SERPL-CCNC: 36 U/L — LOW (ref 40–120)
ALP SERPL-CCNC: 43 U/L — SIGNIFICANT CHANGE UP (ref 40–120)
ALP SERPL-CCNC: 45 U/L — SIGNIFICANT CHANGE UP (ref 40–120)
ALP SERPL-CCNC: 56 U/L — SIGNIFICANT CHANGE UP (ref 40–120)
ALT FLD-CCNC: 10 U/L — SIGNIFICANT CHANGE UP (ref 10–45)
ALT FLD-CCNC: 6 U/L — LOW (ref 10–45)
ALT FLD-CCNC: 6 U/L — LOW (ref 10–45)
ALT FLD-CCNC: 7 U/L — LOW (ref 10–45)
ANION GAP SERPL CALC-SCNC: 13 MMOL/L — SIGNIFICANT CHANGE UP (ref 5–17)
ANION GAP SERPL CALC-SCNC: 14 MMOL/L — SIGNIFICANT CHANGE UP (ref 5–17)
ANION GAP SERPL CALC-SCNC: 14 MMOL/L — SIGNIFICANT CHANGE UP (ref 5–17)
ANION GAP SERPL CALC-SCNC: 15 MMOL/L — SIGNIFICANT CHANGE UP (ref 5–17)
APTT BLD: 27.5 SEC — SIGNIFICANT CHANGE UP (ref 27.5–37.4)
APTT BLD: 27.8 SEC — SIGNIFICANT CHANGE UP (ref 27.5–37.4)
APTT BLD: 29.4 SEC — SIGNIFICANT CHANGE UP (ref 27.5–37.4)
AST SERPL-CCNC: 19 U/L — SIGNIFICANT CHANGE UP (ref 10–40)
AST SERPL-CCNC: 21 U/L — SIGNIFICANT CHANGE UP (ref 10–40)
AST SERPL-CCNC: 26 U/L — SIGNIFICANT CHANGE UP (ref 10–40)
AST SERPL-CCNC: 26 U/L — SIGNIFICANT CHANGE UP (ref 10–40)
BILIRUB SERPL-MCNC: 0.4 MG/DL — SIGNIFICANT CHANGE UP (ref 0.2–1.2)
BILIRUB SERPL-MCNC: 0.4 MG/DL — SIGNIFICANT CHANGE UP (ref 0.2–1.2)
BILIRUB SERPL-MCNC: 0.6 MG/DL — SIGNIFICANT CHANGE UP (ref 0.2–1.2)
BILIRUB SERPL-MCNC: 0.7 MG/DL — SIGNIFICANT CHANGE UP (ref 0.2–1.2)
BUN SERPL-MCNC: 12 MG/DL — SIGNIFICANT CHANGE UP (ref 7–23)
BUN SERPL-MCNC: 13 MG/DL — SIGNIFICANT CHANGE UP (ref 7–23)
BUN SERPL-MCNC: 14 MG/DL — SIGNIFICANT CHANGE UP (ref 7–23)
BUN SERPL-MCNC: 14 MG/DL — SIGNIFICANT CHANGE UP (ref 7–23)
CALCIUM SERPL-MCNC: 7.6 MG/DL — LOW (ref 8.4–10.5)
CALCIUM SERPL-MCNC: 8.6 MG/DL — SIGNIFICANT CHANGE UP (ref 8.4–10.5)
CALCIUM SERPL-MCNC: 8.7 MG/DL — SIGNIFICANT CHANGE UP (ref 8.4–10.5)
CALCIUM SERPL-MCNC: 8.9 MG/DL — SIGNIFICANT CHANGE UP (ref 8.4–10.5)
CHLORIDE SERPL-SCNC: 101 MMOL/L — SIGNIFICANT CHANGE UP (ref 96–108)
CHLORIDE SERPL-SCNC: 102 MMOL/L — SIGNIFICANT CHANGE UP (ref 96–108)
CHLORIDE SERPL-SCNC: 104 MMOL/L — SIGNIFICANT CHANGE UP (ref 96–108)
CHLORIDE SERPL-SCNC: 104 MMOL/L — SIGNIFICANT CHANGE UP (ref 96–108)
CO2 SERPL-SCNC: 18 MMOL/L — LOW (ref 22–31)
CO2 SERPL-SCNC: 19 MMOL/L — LOW (ref 22–31)
CO2 SERPL-SCNC: 20 MMOL/L — LOW (ref 22–31)
CO2 SERPL-SCNC: 22 MMOL/L — SIGNIFICANT CHANGE UP (ref 22–31)
CREAT SERPL-MCNC: 0.67 MG/DL — SIGNIFICANT CHANGE UP (ref 0.5–1.3)
CREAT SERPL-MCNC: 0.77 MG/DL — SIGNIFICANT CHANGE UP (ref 0.5–1.3)
CREAT SERPL-MCNC: 0.83 MG/DL — SIGNIFICANT CHANGE UP (ref 0.5–1.3)
CREAT SERPL-MCNC: 0.98 MG/DL — SIGNIFICANT CHANGE UP (ref 0.5–1.3)
GAS PNL BLDA: SIGNIFICANT CHANGE UP
GLUCOSE BLDC GLUCOMTR-MCNC: 110 MG/DL — HIGH (ref 70–99)
GLUCOSE BLDC GLUCOMTR-MCNC: 122 MG/DL — HIGH (ref 70–99)
GLUCOSE BLDC GLUCOMTR-MCNC: 135 MG/DL — HIGH (ref 70–99)
GLUCOSE BLDC GLUCOMTR-MCNC: 143 MG/DL — HIGH (ref 70–99)
GLUCOSE BLDC GLUCOMTR-MCNC: 153 MG/DL — HIGH (ref 70–99)
GLUCOSE BLDC GLUCOMTR-MCNC: 155 MG/DL — HIGH (ref 70–99)
GLUCOSE BLDC GLUCOMTR-MCNC: 169 MG/DL — HIGH (ref 70–99)
GLUCOSE BLDC GLUCOMTR-MCNC: 172 MG/DL — HIGH (ref 70–99)
GLUCOSE BLDC GLUCOMTR-MCNC: 172 MG/DL — HIGH (ref 70–99)
GLUCOSE BLDC GLUCOMTR-MCNC: 184 MG/DL — HIGH (ref 70–99)
GLUCOSE BLDC GLUCOMTR-MCNC: 188 MG/DL — HIGH (ref 70–99)
GLUCOSE BLDC GLUCOMTR-MCNC: 267 MG/DL — HIGH (ref 70–99)
GLUCOSE SERPL-MCNC: 106 MG/DL — HIGH (ref 70–99)
GLUCOSE SERPL-MCNC: 156 MG/DL — HIGH (ref 70–99)
GLUCOSE SERPL-MCNC: 180 MG/DL — HIGH (ref 70–99)
GLUCOSE SERPL-MCNC: 262 MG/DL — HIGH (ref 70–99)
HCT VFR BLD CALC: 21.9 % — LOW (ref 34.5–45)
HCT VFR BLD CALC: 28 % — LOW (ref 34.5–45)
HCT VFR BLD CALC: 30.2 % — LOW (ref 34.5–45)
HCT VFR BLD CALC: 31.4 % — LOW (ref 34.5–45)
HGB BLD-MCNC: 10.4 G/DL — LOW (ref 11.5–15.5)
HGB BLD-MCNC: 10.7 G/DL — LOW (ref 11.5–15.5)
HGB BLD-MCNC: 7.4 G/DL — LOW (ref 11.5–15.5)
HGB BLD-MCNC: 9.8 G/DL — LOW (ref 11.5–15.5)
INR BLD: 1.07 — SIGNIFICANT CHANGE UP (ref 0.88–1.16)
INR BLD: 1.13 — SIGNIFICANT CHANGE UP (ref 0.88–1.16)
INR BLD: 1.21 — HIGH (ref 0.88–1.16)
LACTATE SERPL-SCNC: 1 MMOL/L — SIGNIFICANT CHANGE UP (ref 0.5–2)
LACTATE SERPL-SCNC: 2.3 MMOL/L — HIGH (ref 0.5–2)
LACTATE SERPL-SCNC: 2.9 MMOL/L — HIGH (ref 0.5–2)
MAGNESIUM SERPL-MCNC: 1.9 MG/DL — SIGNIFICANT CHANGE UP (ref 1.6–2.6)
MAGNESIUM SERPL-MCNC: 2.1 MG/DL — SIGNIFICANT CHANGE UP (ref 1.6–2.6)
MAGNESIUM SERPL-MCNC: 3.1 MG/DL — HIGH (ref 1.6–2.6)
MCHC RBC-ENTMCNC: 29.2 PG — SIGNIFICANT CHANGE UP (ref 27–34)
MCHC RBC-ENTMCNC: 29.4 PG — SIGNIFICANT CHANGE UP (ref 27–34)
MCHC RBC-ENTMCNC: 29.5 PG — SIGNIFICANT CHANGE UP (ref 27–34)
MCHC RBC-ENTMCNC: 29.8 PG — SIGNIFICANT CHANGE UP (ref 27–34)
MCHC RBC-ENTMCNC: 33.8 G/DL — SIGNIFICANT CHANGE UP (ref 32–36)
MCHC RBC-ENTMCNC: 34.1 G/DL — SIGNIFICANT CHANGE UP (ref 32–36)
MCHC RBC-ENTMCNC: 34.4 G/DL — SIGNIFICANT CHANGE UP (ref 32–36)
MCHC RBC-ENTMCNC: 35 G/DL — SIGNIFICANT CHANGE UP (ref 32–36)
MCV RBC AUTO: 85.1 FL — SIGNIFICANT CHANGE UP (ref 80–100)
MCV RBC AUTO: 85.8 FL — SIGNIFICANT CHANGE UP (ref 80–100)
MCV RBC AUTO: 85.8 FL — SIGNIFICANT CHANGE UP (ref 80–100)
MCV RBC AUTO: 86.9 FL — SIGNIFICANT CHANGE UP (ref 80–100)
PHOSPHATE SERPL-MCNC: 2.6 MG/DL — SIGNIFICANT CHANGE UP (ref 2.5–4.5)
PHOSPHATE SERPL-MCNC: 3.4 MG/DL — SIGNIFICANT CHANGE UP (ref 2.5–4.5)
PHOSPHATE SERPL-MCNC: 4 MG/DL — SIGNIFICANT CHANGE UP (ref 2.5–4.5)
PLATELET # BLD AUTO: 183 K/UL — SIGNIFICANT CHANGE UP (ref 150–400)
PLATELET # BLD AUTO: 201 K/UL — SIGNIFICANT CHANGE UP (ref 150–400)
PLATELET # BLD AUTO: 206 K/UL — SIGNIFICANT CHANGE UP (ref 150–400)
PLATELET # BLD AUTO: 263 K/UL — SIGNIFICANT CHANGE UP (ref 150–400)
POTASSIUM SERPL-MCNC: 4 MMOL/L — SIGNIFICANT CHANGE UP (ref 3.5–5.3)
POTASSIUM SERPL-MCNC: 4 MMOL/L — SIGNIFICANT CHANGE UP (ref 3.5–5.3)
POTASSIUM SERPL-MCNC: 4.1 MMOL/L — SIGNIFICANT CHANGE UP (ref 3.5–5.3)
POTASSIUM SERPL-MCNC: 4.5 MMOL/L — SIGNIFICANT CHANGE UP (ref 3.5–5.3)
POTASSIUM SERPL-SCNC: 4 MMOL/L — SIGNIFICANT CHANGE UP (ref 3.5–5.3)
POTASSIUM SERPL-SCNC: 4 MMOL/L — SIGNIFICANT CHANGE UP (ref 3.5–5.3)
POTASSIUM SERPL-SCNC: 4.1 MMOL/L — SIGNIFICANT CHANGE UP (ref 3.5–5.3)
POTASSIUM SERPL-SCNC: 4.5 MMOL/L — SIGNIFICANT CHANGE UP (ref 3.5–5.3)
PROT SERPL-MCNC: 4.6 G/DL — LOW (ref 6–8.3)
PROT SERPL-MCNC: 5 G/DL — LOW (ref 6–8.3)
PROT SERPL-MCNC: 5.2 G/DL — LOW (ref 6–8.3)
PROT SERPL-MCNC: 5.3 G/DL — LOW (ref 6–8.3)
PROTHROM AB SERPL-ACNC: 11.9 SEC — SIGNIFICANT CHANGE UP (ref 9.8–12.7)
PROTHROM AB SERPL-ACNC: 12.6 SEC — SIGNIFICANT CHANGE UP (ref 9.8–12.7)
PROTHROM AB SERPL-ACNC: 13.5 SEC — HIGH (ref 9.8–12.7)
RBC # BLD: 2.52 M/UL — LOW (ref 3.8–5.2)
RBC # BLD: 3.29 M/UL — LOW (ref 3.8–5.2)
RBC # BLD: 3.52 M/UL — LOW (ref 3.8–5.2)
RBC # BLD: 3.66 M/UL — LOW (ref 3.8–5.2)
RBC # FLD: 12.5 % — SIGNIFICANT CHANGE UP (ref 10.3–16.9)
RBC # FLD: 12.7 % — SIGNIFICANT CHANGE UP (ref 10.3–16.9)
RBC # FLD: 13.3 % — SIGNIFICANT CHANGE UP (ref 10.3–16.9)
RBC # FLD: 13.4 % — SIGNIFICANT CHANGE UP (ref 10.3–16.9)
SODIUM SERPL-SCNC: 135 MMOL/L — SIGNIFICANT CHANGE UP (ref 135–145)
SODIUM SERPL-SCNC: 136 MMOL/L — SIGNIFICANT CHANGE UP (ref 135–145)
SODIUM SERPL-SCNC: 136 MMOL/L — SIGNIFICANT CHANGE UP (ref 135–145)
SODIUM SERPL-SCNC: 139 MMOL/L — SIGNIFICANT CHANGE UP (ref 135–145)
WBC # BLD: 13.5 K/UL — HIGH (ref 3.8–10.5)
WBC # BLD: 14.5 K/UL — HIGH (ref 3.8–10.5)
WBC # BLD: 15.5 K/UL — HIGH (ref 3.8–10.5)
WBC # BLD: 20 K/UL — HIGH (ref 3.8–10.5)
WBC # FLD AUTO: 13.5 K/UL — HIGH (ref 3.8–10.5)
WBC # FLD AUTO: 14.5 K/UL — HIGH (ref 3.8–10.5)
WBC # FLD AUTO: 15.5 K/UL — HIGH (ref 3.8–10.5)
WBC # FLD AUTO: 20 K/UL — HIGH (ref 3.8–10.5)

## 2018-09-13 PROCEDURE — 99291 CRITICAL CARE FIRST HOUR: CPT

## 2018-09-13 PROCEDURE — 71045 X-RAY EXAM CHEST 1 VIEW: CPT | Mod: 26

## 2018-09-13 RX ORDER — ALBUMIN HUMAN 25 %
250 VIAL (ML) INTRAVENOUS ONCE
Qty: 0 | Refills: 0 | Status: COMPLETED | OUTPATIENT
Start: 2018-09-13 | End: 2018-09-14

## 2018-09-13 RX ORDER — ALBUMIN HUMAN 25 %
250 VIAL (ML) INTRAVENOUS ONCE
Qty: 0 | Refills: 0 | Status: COMPLETED | OUTPATIENT
Start: 2018-09-13 | End: 2018-09-13

## 2018-09-13 RX ORDER — CHLORHEXIDINE GLUCONATE 213 G/1000ML
1 SOLUTION TOPICAL DAILY
Qty: 0 | Refills: 0 | Status: DISCONTINUED | OUTPATIENT
Start: 2018-09-13 | End: 2018-09-15

## 2018-09-13 RX ORDER — KETOROLAC TROMETHAMINE 30 MG/ML
30 SYRINGE (ML) INJECTION ONCE
Qty: 0 | Refills: 0 | Status: DISCONTINUED | OUTPATIENT
Start: 2018-09-13 | End: 2018-09-13

## 2018-09-13 RX ORDER — PANTOPRAZOLE SODIUM 20 MG/1
40 TABLET, DELAYED RELEASE ORAL
Qty: 0 | Refills: 0 | Status: DISCONTINUED | OUTPATIENT
Start: 2018-09-13 | End: 2018-09-17

## 2018-09-13 RX ORDER — METOPROLOL TARTRATE 50 MG
12.5 TABLET ORAL EVERY 12 HOURS
Qty: 0 | Refills: 0 | Status: DISCONTINUED | OUTPATIENT
Start: 2018-09-13 | End: 2018-09-14

## 2018-09-13 RX ORDER — POLYETHYLENE GLYCOL 3350 17 G/17G
17 POWDER, FOR SOLUTION ORAL DAILY
Qty: 0 | Refills: 0 | Status: DISCONTINUED | OUTPATIENT
Start: 2018-09-13 | End: 2018-09-17

## 2018-09-13 RX ORDER — DEXTROSE 50 % IN WATER 50 %
25 SYRINGE (ML) INTRAVENOUS ONCE
Qty: 0 | Refills: 0 | Status: DISCONTINUED | OUTPATIENT
Start: 2018-09-13 | End: 2018-09-17

## 2018-09-13 RX ORDER — DEXTROSE 50 % IN WATER 50 %
12.5 SYRINGE (ML) INTRAVENOUS ONCE
Qty: 0 | Refills: 0 | Status: DISCONTINUED | OUTPATIENT
Start: 2018-09-13 | End: 2018-09-17

## 2018-09-13 RX ORDER — SODIUM CHLORIDE 9 MG/ML
1000 INJECTION, SOLUTION INTRAVENOUS
Qty: 0 | Refills: 0 | Status: DISCONTINUED | OUTPATIENT
Start: 2018-09-13 | End: 2018-09-15

## 2018-09-13 RX ORDER — OXYCODONE AND ACETAMINOPHEN 5; 325 MG/1; MG/1
1 TABLET ORAL EVERY 6 HOURS
Qty: 0 | Refills: 0 | Status: DISCONTINUED | OUTPATIENT
Start: 2018-09-13 | End: 2018-09-15

## 2018-09-13 RX ORDER — SODIUM CHLORIDE 9 MG/ML
500 INJECTION, SOLUTION INTRAVENOUS ONCE
Qty: 0 | Refills: 0 | Status: COMPLETED | OUTPATIENT
Start: 2018-09-13 | End: 2018-09-13

## 2018-09-13 RX ORDER — GLUCAGON INJECTION, SOLUTION 0.5 MG/.1ML
1 INJECTION, SOLUTION SUBCUTANEOUS ONCE
Qty: 0 | Refills: 0 | Status: DISCONTINUED | OUTPATIENT
Start: 2018-09-13 | End: 2018-09-17

## 2018-09-13 RX ORDER — DOCUSATE SODIUM 100 MG
100 CAPSULE ORAL
Qty: 0 | Refills: 0 | Status: DISCONTINUED | OUTPATIENT
Start: 2018-09-13 | End: 2018-09-17

## 2018-09-13 RX ORDER — MAGNESIUM SULFATE 500 MG/ML
2 VIAL (ML) INJECTION ONCE
Qty: 0 | Refills: 0 | Status: COMPLETED | OUTPATIENT
Start: 2018-09-13 | End: 2018-09-13

## 2018-09-13 RX ORDER — ACETAMINOPHEN 500 MG
650 TABLET ORAL EVERY 6 HOURS
Qty: 0 | Refills: 0 | Status: DISCONTINUED | OUTPATIENT
Start: 2018-09-13 | End: 2018-09-17

## 2018-09-13 RX ORDER — METOCLOPRAMIDE HCL 10 MG
10 TABLET ORAL ONCE
Qty: 0 | Refills: 0 | Status: COMPLETED | OUTPATIENT
Start: 2018-09-13 | End: 2018-09-13

## 2018-09-13 RX ORDER — ACETAMINOPHEN 500 MG
1000 TABLET ORAL ONCE
Qty: 0 | Refills: 0 | Status: COMPLETED | OUTPATIENT
Start: 2018-09-13 | End: 2018-09-13

## 2018-09-13 RX ORDER — INSULIN LISPRO 100/ML
VIAL (ML) SUBCUTANEOUS
Qty: 0 | Refills: 0 | Status: DISCONTINUED | OUTPATIENT
Start: 2018-09-13 | End: 2018-09-17

## 2018-09-13 RX ORDER — LIDOCAINE 4 G/100G
1 CREAM TOPICAL DAILY
Qty: 0 | Refills: 0 | Status: DISCONTINUED | OUTPATIENT
Start: 2018-09-13 | End: 2018-09-17

## 2018-09-13 RX ORDER — SENNA PLUS 8.6 MG/1
2 TABLET ORAL AT BEDTIME
Qty: 0 | Refills: 0 | Status: DISCONTINUED | OUTPATIENT
Start: 2018-09-13 | End: 2018-09-17

## 2018-09-13 RX ORDER — DEXTROSE 50 % IN WATER 50 %
15 SYRINGE (ML) INTRAVENOUS ONCE
Qty: 0 | Refills: 0 | Status: DISCONTINUED | OUTPATIENT
Start: 2018-09-13 | End: 2018-09-17

## 2018-09-13 RX ADMIN — Medication 30 MILLIGRAM(S): at 14:15

## 2018-09-13 RX ADMIN — Medication 2: at 22:07

## 2018-09-13 RX ADMIN — Medication 50 GRAM(S): at 05:12

## 2018-09-13 RX ADMIN — DEXMEDETOMIDINE HYDROCHLORIDE IN 0.9% SODIUM CHLORIDE 3.06 MICROGRAM(S)/KG/HR: 4 INJECTION INTRAVENOUS at 00:50

## 2018-09-13 RX ADMIN — CHLORHEXIDINE GLUCONATE 5 MILLILITER(S): 213 SOLUTION TOPICAL at 00:50

## 2018-09-13 RX ADMIN — Medication 125 MILLILITER(S): at 15:41

## 2018-09-13 RX ADMIN — Medication 125 MILLILITER(S): at 02:09

## 2018-09-13 RX ADMIN — Medication 81 MILLIGRAM(S): at 10:54

## 2018-09-13 RX ADMIN — Medication 30 MILLIGRAM(S): at 23:48

## 2018-09-13 RX ADMIN — SODIUM CHLORIDE 1000 MILLILITER(S): 9 INJECTION, SOLUTION INTRAVENOUS at 02:21

## 2018-09-13 RX ADMIN — SODIUM CHLORIDE 500 MILLILITER(S): 9 INJECTION, SOLUTION INTRAVENOUS at 13:42

## 2018-09-13 RX ADMIN — OXYCODONE AND ACETAMINOPHEN 1 TABLET(S): 5; 325 TABLET ORAL at 21:51

## 2018-09-13 RX ADMIN — Medication 30 MILLIGRAM(S): at 23:30

## 2018-09-13 RX ADMIN — HEPARIN SODIUM 5000 UNIT(S): 5000 INJECTION INTRAVENOUS; SUBCUTANEOUS at 21:51

## 2018-09-13 RX ADMIN — OXYCODONE AND ACETAMINOPHEN 1 TABLET(S): 5; 325 TABLET ORAL at 22:30

## 2018-09-13 RX ADMIN — Medication 125 MILLILITER(S): at 16:28

## 2018-09-13 RX ADMIN — Medication 100 MILLIGRAM(S): at 17:29

## 2018-09-13 RX ADMIN — Medication 10 MILLIGRAM(S): at 05:12

## 2018-09-13 RX ADMIN — FENTANYL CITRATE 25 MICROGRAM(S): 50 INJECTION INTRAVENOUS at 00:44

## 2018-09-13 RX ADMIN — Medication 30 MILLIGRAM(S): at 14:00

## 2018-09-13 RX ADMIN — FENTANYL CITRATE 25 MICROGRAM(S): 50 INJECTION INTRAVENOUS at 11:49

## 2018-09-13 RX ADMIN — SENNA PLUS 2 TABLET(S): 8.6 TABLET ORAL at 21:51

## 2018-09-13 RX ADMIN — CLOPIDOGREL BISULFATE 75 MILLIGRAM(S): 75 TABLET, FILM COATED ORAL at 10:54

## 2018-09-13 RX ADMIN — PANTOPRAZOLE SODIUM 40 MILLIGRAM(S): 20 TABLET, DELAYED RELEASE ORAL at 11:13

## 2018-09-13 RX ADMIN — CHLORHEXIDINE GLUCONATE 5 MILLILITER(S): 213 SOLUTION TOPICAL at 02:10

## 2018-09-13 RX ADMIN — HEPARIN SODIUM 5000 UNIT(S): 5000 INJECTION INTRAVENOUS; SUBCUTANEOUS at 05:13

## 2018-09-13 RX ADMIN — Medication 2: at 17:29

## 2018-09-13 RX ADMIN — FENTANYL CITRATE 25 MICROGRAM(S): 50 INJECTION INTRAVENOUS at 00:59

## 2018-09-13 RX ADMIN — Medication 1000 MILLIGRAM(S): at 09:30

## 2018-09-13 RX ADMIN — SIMVASTATIN 40 MILLIGRAM(S): 20 TABLET, FILM COATED ORAL at 21:51

## 2018-09-13 RX ADMIN — Medication 12.5 MILLIGRAM(S): at 18:30

## 2018-09-13 RX ADMIN — OXYCODONE AND ACETAMINOPHEN 1 TABLET(S): 5; 325 TABLET ORAL at 13:43

## 2018-09-13 RX ADMIN — Medication 2000 MILLIGRAM(S): at 02:52

## 2018-09-13 RX ADMIN — Medication 2: at 10:54

## 2018-09-13 RX ADMIN — Medication 12.5 MILLIGRAM(S): at 13:03

## 2018-09-13 RX ADMIN — Medication 2000 MILLIGRAM(S): at 10:55

## 2018-09-13 RX ADMIN — HEPARIN SODIUM 5000 UNIT(S): 5000 INJECTION INTRAVENOUS; SUBCUTANEOUS at 13:03

## 2018-09-13 RX ADMIN — Medication 125 MILLILITER(S): at 02:21

## 2018-09-13 RX ADMIN — Medication 400 MILLIGRAM(S): at 09:00

## 2018-09-13 RX ADMIN — Medication 2000 MILLIGRAM(S): at 18:30

## 2018-09-13 RX ADMIN — OXYCODONE AND ACETAMINOPHEN 1 TABLET(S): 5; 325 TABLET ORAL at 12:53

## 2018-09-13 RX ADMIN — FENTANYL CITRATE 25 MICROGRAM(S): 50 INJECTION INTRAVENOUS at 09:00

## 2018-09-13 NOTE — PROGRESS NOTE ADULT - SUBJECTIVE AND OBJECTIVE BOX
INTERVAL HPI/OVERNIGHT EVENTS:    POD #1 OPCAB x 4  EF 40%    38yo female with Hx tobacco use,  HTN, HLD, DM, CAD/MI 2017, strong family Hx CAD with sxs angina/SOB.    Cath (Dr. Campos ) EF 40%. normal LM, 85% pLAD lesion, 80% mLAD lesion, 50% mD1 lesion, 50-60% mLCx lesion, 50% mRCA lesion, 80% dRCA lesion.    OR  - OPCAB x 4 - 1 U pRBC given intraop  additional 1 U pRBC given overnight for Hct 21  Extubated 6:30 a this am to FM    PMHx includes but is not limited to:   Hyperlipidemia  Hypertension  Type 2 diabetes mellitus  CAD/MI: 2017  H/O tubal ligation  S/P     ICU Vital Signs Last 24 Hrs  T(C): 36.1 (13 Sep 2018 05:01), Max: 36.1 (13 Sep 2018 01:01)  T(F): 96.9 (13 Sep 2018 05:01), Max: 97 (13 Sep 2018 01:01)  HR: 80 (13 Sep 2018 08:00) (72 - 106) sinus  BP: 162/92 (12 Sep 2018 19:45) (162/92 - 181/88)  BP(mean): 118 (12 Sep 2018 19:45) (118 - 125)  ABP: 112/64 (13 Sep 2018 08:00) (96/58 - 154/148)  ABP(mean): 84 (13 Sep 2018 08:00) (68 - 150)  SpO2: 100% (13 Sep 2018 08:00) (98% - 100%) Fm 50%    Qtts:  insulin - to be transitioned off  Levophed 0.02    I&O's Summary    12 Sep 2018 07:01  -  13 Sep 2018 07:00  --------------------------------------------------------  IN: 1668 mL / OUT: 1365 mL / NET: 303 mL    13 Sep 2018 07:01  -  13 Sep 2018 08:55  --------------------------------------------------------  IN: 25 mL / OUT: 190 mL / NET: -165 mL        Mode: CPAP with PS  FiO2: 50  PEEP: 5  PS: 10      Physical Exam    Heart  Lungs  Abd  Ext  Chest  Neuro  Skin    LABS:                        7.4    14.5  )-----------( 183      ( 13 Sep 2018 03:12 )             21.9         139  |  104  |  12  ----------------------------<  106<H>  4.0   |  22  |  0.77    Ca    8.7      13 Sep 2018 03:12  Phos  2.6       Mg     1.9         TPro  4.6<L>  /  Alb  2.9<L>  /  TBili  0.4  /  DBili  x   /  AST  19  /  ALT  6<L>  /  AlkPhos  36<L>      PT/INR - ( 13 Sep 2018 03:12 )   PT: 13.5 sec;   INR: 1.21          PTT - ( 13 Sep 2018 03:12 )  PTT:27.5 sec  Urinalysis Basic - ( 11 Sep 2018 09:33 )    Color: Yellow / Appearance: Clear / S.025 / pH: x  Gluc: x / Ketone: NEGATIVE  / Bili: Negative / Urobili: 0.2 E.U./dL   Blood: x / Protein: 100 mg/dL / Nitrite: NEGATIVE   Leuk Esterase: NEGATIVE / RBC: Many /HPF / WBC < 5 /HPF   Sq Epi: x / Non Sq Epi: Many /HPF / Bacteria: Present /HPF      ABG - ( 13 Sep 2018 07:43 )  pH, Arterial: 7.45  pH, Blood: x     /  pCO2: 32    /  pO2: 170   / HCO3: 22    / Base Excess: -1.7  /  SaO2: 99                  RADIOLOGY & ADDITIONAL STUDIES:    I have spent/provided stated minutes of critical care time to this patient: INTERVAL HPI/OVERNIGHT EVENTS:    POD #1 OPCAB x 4  EF 40%    36yo female with Hx tobacco use,  HTN, HLD, DM, CAD/MI 2017, strong family Hx CAD with sxs angina/SOB.    Cath (Dr. Campos ) EF 40%. normal LM, 85% pLAD lesion, 80% mLAD lesion, 50% mD1 lesion, 50-60% mLCx lesion, 50% mRCA lesion, 80% dRCA lesion.    OR  - OPCAB x 4 - 1 U pRBC given intraop  additional 1 U pRBC given overnight for Hct 21  Extubated 6:30 a this am to FM    PMHx includes but is not limited to:   Hyperlipidemia  Hypertension  Diabetes   CAD/MI: 2017  H/O tubal ligation  S/P     ICU Vital Signs Last 24 Hrs  T(C): 36.1 (13 Sep 2018 05:01), Max: 36.1 (13 Sep 2018 01:01)  T(F): 96.9 (13 Sep 2018 05:01), Max: 97 (13 Sep 2018 01:01)  HR: 80 (13 Sep 2018 08:00) (72 - 106) sinus  BP: 162/92 (12 Sep 2018 19:45) (162/92 - 181/88)  BP(mean): 118 (12 Sep 2018 19:45) (118 - 125)  ABP: 112/64 (13 Sep 2018 08:00) (96/58 - 154/148)  ABP(mean): 84 (13 Sep 2018 08:00) (68 - 150)  SpO2: 100% (13 Sep 2018 08:00) (98% - 100%) Fm 50% - titrated to Nasal cannula - now on 2L    Qtts:  insulin -  transitioned off this am  Levophed - now    I&O's Summary    12 Sep 2018 07:  -  13 Sep 2018 07:00  --------------------------------------------------------  IN: 1668 mL / OUT: 1365 mL / NET: 303 mL    13 Sep 2018 07:01  -  13 Sep 2018 08:55  --------------------------------------------------------  IN: 25 mL / OUT: 190 mL / NET: -165 mL    Physical Exam    Heart - regular (-) rub/gallop  Lungs - poor inspiratory effort/splinting (-)rhonchi/wheeze - dec BS bases appreciated  Abd = (+)BS soft - NTND (-)r/r/g  Ext - warm to touch; no cyanosis/clubbing/edema  Chest -op bandage in place  Neuro - alert/oriented and interactive;  moving all extremities and non-focal  Skin - no rash    LABS:                        7.4    14.5  )-----------( 183      ( 13 Sep 2018 03:12 )             21.9         139  |  104  |  12  ----------------------------<  106<H>  4.0   |  22  |  0.77    Ca    8.7      13 Sep 2018 03:12  Phos  2.6       Mg     1.9         TPro  4.6<L>  /  Alb  2.9<L>  /  TBili  0.4  /  DBili  x   /  AST  19  /  ALT  6<L>  /  AlkPhos  36<L>      PT/INR - ( 13 Sep 2018 03:12 )   PT: 13.5 sec;   INR: 1.21     PTT - ( 13 Sep 2018 03:12 )  PTT:27.5 sec    ABG - ( 13 Sep 2018 07:43 ) 7.45/32/170/77    RADIOLOGY & ADDITIONAL STUDIES: reviewed    Patient with known CAD and MI - Dec 2017 with persistent anginal sxs now s/p OPCAB x 4    1. CV  hemodynamically stable  sinus  complete cefazolin prophylaxis  ASA/Plavix/statin  assess for ability to start BB today    2. Endocrine  Hx DM on metformin  POC testing 122/135  transitioned off insulin infusion - ISS for now    d/w patient/family present in room/staff and CTS    I have spent/provided stated minutes of critical care time to this patient: 60 min

## 2018-09-13 NOTE — PHYSICAL THERAPY INITIAL EVALUATION ADULT - ADDITIONAL COMMENTS
Pt lives with her mom in an apartment with no stairs to enter. Prior to hospitalization ambulated independently with no DME. Pt preoccupied with pain and not answering further social history questions at time of evaluation.

## 2018-09-13 NOTE — PHYSICAL THERAPY INITIAL EVALUATION ADULT - PERTINENT HX OF CURRENT PROBLEM, REHAB EVAL
36 y/o female, former smoker, strong FHx of heart disease (father  @age 42 from MI), PMHx of HTN, HLD, DM, recent MI in 2017 (managed medically in [facility]), who presented to [facility] ED 18 c/o progressively worsening, exerional chest pain x 1 week.

## 2018-09-13 NOTE — PHYSICAL THERAPY INITIAL EVALUATION ADULT - IMPAIRMENTS FOUND, PT EVAL
muscle strength/ventilation and respiration/gas exchange/gait, locomotion, and balance/aerobic capacity/endurance

## 2018-09-13 NOTE — PHYSICAL THERAPY INITIAL EVALUATION ADULT - ACTIVE RANGE OF MOTION EXAMINATION, REHAB EVAL
b/l UE AROM WFL with exception of shoulder flex/abd limited to 90 degrees secondary to sternal precautions/bilateral  lower extremity Active ROM was WFL (within functional limits)

## 2018-09-13 NOTE — PHYSICAL THERAPY INITIAL EVALUATION ADULT - GENERAL OBSERVATIONS, REHAB EVAL
Pt received supine, +TPM, +CT to suction x2, +R axillary a-line, +sternal incision bandage C/D/I, +2L NC O2, L wrist ace bandage, +ponce, +telemetry, +pulse ox, +RIJ heplock, +b/l SCDs, NAD, agreeable to PT.

## 2018-09-14 LAB
ALBUMIN SERPL ELPH-MCNC: 3 G/DL — LOW (ref 3.3–5)
ALBUMIN SERPL ELPH-MCNC: 3.4 G/DL — SIGNIFICANT CHANGE UP (ref 3.3–5)
ALP SERPL-CCNC: 100 U/L — SIGNIFICANT CHANGE UP (ref 40–120)
ALP SERPL-CCNC: 80 U/L — SIGNIFICANT CHANGE UP (ref 40–120)
ALT FLD-CCNC: 16 U/L — SIGNIFICANT CHANGE UP (ref 10–45)
ALT FLD-CCNC: 16 U/L — SIGNIFICANT CHANGE UP (ref 10–45)
ANION GAP SERPL CALC-SCNC: 14 MMOL/L — SIGNIFICANT CHANGE UP (ref 5–17)
ANION GAP SERPL CALC-SCNC: 15 MMOL/L — SIGNIFICANT CHANGE UP (ref 5–17)
APTT BLD: 33.3 SEC — SIGNIFICANT CHANGE UP (ref 27.5–37.4)
APTT BLD: 34.7 SEC — SIGNIFICANT CHANGE UP (ref 27.5–37.4)
AST SERPL-CCNC: 50 U/L — HIGH (ref 10–40)
AST SERPL-CCNC: 60 U/L — HIGH (ref 10–40)
BASE EXCESS BLDA CALC-SCNC: -4.8 MMOL/L — LOW (ref -2–3)
BILIRUB DIRECT SERPL-MCNC: <0.2 MG/DL — SIGNIFICANT CHANGE UP (ref 0–0.2)
BILIRUB INDIRECT FLD-MCNC: >0.5 MG/DL — SIGNIFICANT CHANGE UP (ref 0.2–1)
BILIRUB SERPL-MCNC: 0.5 MG/DL — SIGNIFICANT CHANGE UP (ref 0.2–1.2)
BILIRUB SERPL-MCNC: 0.7 MG/DL — SIGNIFICANT CHANGE UP (ref 0.2–1.2)
BUN SERPL-MCNC: 19 MG/DL — SIGNIFICANT CHANGE UP (ref 7–23)
BUN SERPL-MCNC: 19 MG/DL — SIGNIFICANT CHANGE UP (ref 7–23)
CALCIUM SERPL-MCNC: 7.7 MG/DL — LOW (ref 8.4–10.5)
CALCIUM SERPL-MCNC: 8 MG/DL — LOW (ref 8.4–10.5)
CHLORIDE SERPL-SCNC: 104 MMOL/L — SIGNIFICANT CHANGE UP (ref 96–108)
CHLORIDE SERPL-SCNC: 104 MMOL/L — SIGNIFICANT CHANGE UP (ref 96–108)
CO2 SERPL-SCNC: 18 MMOL/L — LOW (ref 22–31)
CO2 SERPL-SCNC: 21 MMOL/L — LOW (ref 22–31)
CREAT SERPL-MCNC: 1.03 MG/DL — SIGNIFICANT CHANGE UP (ref 0.5–1.3)
CREAT SERPL-MCNC: 1.18 MG/DL — SIGNIFICANT CHANGE UP (ref 0.5–1.3)
GAS PNL BLDA: SIGNIFICANT CHANGE UP
GAS PNL BLDA: SIGNIFICANT CHANGE UP
GLUCOSE BLDC GLUCOMTR-MCNC: 151 MG/DL — HIGH (ref 70–99)
GLUCOSE BLDC GLUCOMTR-MCNC: 189 MG/DL — HIGH (ref 70–99)
GLUCOSE BLDC GLUCOMTR-MCNC: 191 MG/DL — HIGH (ref 70–99)
GLUCOSE BLDC GLUCOMTR-MCNC: 281 MG/DL — HIGH (ref 70–99)
GLUCOSE SERPL-MCNC: 177 MG/DL — HIGH (ref 70–99)
GLUCOSE SERPL-MCNC: 241 MG/DL — HIGH (ref 70–99)
HCO3 BLDA-SCNC: 20 MMOL/L — LOW (ref 21–28)
HCT VFR BLD CALC: 24.8 % — LOW (ref 34.5–45)
HCT VFR BLD CALC: 30.8 % — LOW (ref 34.5–45)
HGB BLD-MCNC: 10.4 G/DL — LOW (ref 11.5–15.5)
HGB BLD-MCNC: 8.5 G/DL — LOW (ref 11.5–15.5)
INR BLD: 1.32 — HIGH (ref 0.88–1.16)
INR BLD: 1.37 — HIGH (ref 0.88–1.16)
MAGNESIUM SERPL-MCNC: 2.1 MG/DL — SIGNIFICANT CHANGE UP (ref 1.6–2.6)
MCHC RBC-ENTMCNC: 29 PG — SIGNIFICANT CHANGE UP (ref 27–34)
MCHC RBC-ENTMCNC: 29.6 PG — SIGNIFICANT CHANGE UP (ref 27–34)
MCHC RBC-ENTMCNC: 33.8 G/DL — SIGNIFICANT CHANGE UP (ref 32–36)
MCHC RBC-ENTMCNC: 34.3 G/DL — SIGNIFICANT CHANGE UP (ref 32–36)
MCV RBC AUTO: 85.8 FL — SIGNIFICANT CHANGE UP (ref 80–100)
MCV RBC AUTO: 86.4 FL — SIGNIFICANT CHANGE UP (ref 80–100)
PCO2 BLDA: 35 MMHG — SIGNIFICANT CHANGE UP (ref 32–45)
PH BLDA: 7.38 — SIGNIFICANT CHANGE UP (ref 7.35–7.45)
PHOSPHATE SERPL-MCNC: 3.5 MG/DL — SIGNIFICANT CHANGE UP (ref 2.5–4.5)
PLATELET # BLD AUTO: 174 K/UL — SIGNIFICANT CHANGE UP (ref 150–400)
PLATELET # BLD AUTO: 183 K/UL — SIGNIFICANT CHANGE UP (ref 150–400)
PO2 BLDA: 132 MMHG — HIGH (ref 83–108)
POTASSIUM SERPL-MCNC: 4.1 MMOL/L — SIGNIFICANT CHANGE UP (ref 3.5–5.3)
POTASSIUM SERPL-MCNC: 4.2 MMOL/L — SIGNIFICANT CHANGE UP (ref 3.5–5.3)
POTASSIUM SERPL-SCNC: 4.1 MMOL/L — SIGNIFICANT CHANGE UP (ref 3.5–5.3)
POTASSIUM SERPL-SCNC: 4.2 MMOL/L — SIGNIFICANT CHANGE UP (ref 3.5–5.3)
PROT SERPL-MCNC: 4.9 G/DL — LOW (ref 6–8.3)
PROT SERPL-MCNC: 6.1 G/DL — SIGNIFICANT CHANGE UP (ref 6–8.3)
PROTHROM AB SERPL-ACNC: 14.7 SEC — HIGH (ref 9.8–12.7)
PROTHROM AB SERPL-ACNC: 15.3 SEC — HIGH (ref 9.8–12.7)
RBC # BLD: 2.87 M/UL — LOW (ref 3.8–5.2)
RBC # BLD: 3.59 M/UL — LOW (ref 3.8–5.2)
RBC # FLD: 13.7 % — SIGNIFICANT CHANGE UP (ref 10.3–16.9)
RBC # FLD: 13.7 % — SIGNIFICANT CHANGE UP (ref 10.3–16.9)
SAO2 % BLDA: 98 % — SIGNIFICANT CHANGE UP (ref 95–100)
SODIUM SERPL-SCNC: 137 MMOL/L — SIGNIFICANT CHANGE UP (ref 135–145)
SODIUM SERPL-SCNC: 139 MMOL/L — SIGNIFICANT CHANGE UP (ref 135–145)
WBC # BLD: 13.2 K/UL — HIGH (ref 3.8–10.5)
WBC # BLD: 15 K/UL — HIGH (ref 3.8–10.5)
WBC # FLD AUTO: 13.2 K/UL — HIGH (ref 3.8–10.5)
WBC # FLD AUTO: 15 K/UL — HIGH (ref 3.8–10.5)

## 2018-09-14 PROCEDURE — 99291 CRITICAL CARE FIRST HOUR: CPT

## 2018-09-14 PROCEDURE — 71045 X-RAY EXAM CHEST 1 VIEW: CPT | Mod: 26,77,76

## 2018-09-14 PROCEDURE — 71045 X-RAY EXAM CHEST 1 VIEW: CPT | Mod: 26

## 2018-09-14 RX ORDER — ACETAMINOPHEN 500 MG
1000 TABLET ORAL ONCE
Qty: 0 | Refills: 0 | Status: COMPLETED | OUTPATIENT
Start: 2018-09-14 | End: 2018-09-14

## 2018-09-14 RX ORDER — INSULIN LISPRO 100/ML
3 VIAL (ML) SUBCUTANEOUS
Qty: 0 | Refills: 0 | Status: DISCONTINUED | OUTPATIENT
Start: 2018-09-14 | End: 2018-09-17

## 2018-09-14 RX ORDER — ONDANSETRON 8 MG/1
4 TABLET, FILM COATED ORAL ONCE
Qty: 0 | Refills: 0 | Status: COMPLETED | OUTPATIENT
Start: 2018-09-14 | End: 2018-09-14

## 2018-09-14 RX ORDER — ALBUMIN HUMAN 25 %
250 VIAL (ML) INTRAVENOUS ONCE
Qty: 0 | Refills: 0 | Status: COMPLETED | OUTPATIENT
Start: 2018-09-14 | End: 2018-09-14

## 2018-09-14 RX ORDER — CALCIUM GLUCONATE 100 MG/ML
2 VIAL (ML) INTRAVENOUS ONCE
Qty: 0 | Refills: 0 | Status: COMPLETED | OUTPATIENT
Start: 2018-09-14 | End: 2018-09-14

## 2018-09-14 RX ORDER — POTASSIUM CHLORIDE 20 MEQ
20 PACKET (EA) ORAL
Qty: 0 | Refills: 0 | Status: DISCONTINUED | OUTPATIENT
Start: 2018-09-14 | End: 2018-09-16

## 2018-09-14 RX ORDER — FUROSEMIDE 40 MG
20 TABLET ORAL ONCE
Qty: 0 | Refills: 0 | Status: COMPLETED | OUTPATIENT
Start: 2018-09-14 | End: 2018-09-14

## 2018-09-14 RX ORDER — INSULIN GLARGINE 100 [IU]/ML
10 INJECTION, SOLUTION SUBCUTANEOUS AT BEDTIME
Qty: 0 | Refills: 0 | Status: DISCONTINUED | OUTPATIENT
Start: 2018-09-14 | End: 2018-09-17

## 2018-09-14 RX ORDER — PANTOPRAZOLE SODIUM 20 MG/1
20 TABLET, DELAYED RELEASE ORAL ONCE
Qty: 0 | Refills: 0 | Status: COMPLETED | OUTPATIENT
Start: 2018-09-14 | End: 2018-09-14

## 2018-09-14 RX ADMIN — OXYCODONE AND ACETAMINOPHEN 1 TABLET(S): 5; 325 TABLET ORAL at 17:20

## 2018-09-14 RX ADMIN — Medication 100 MILLIGRAM(S): at 19:10

## 2018-09-14 RX ADMIN — SIMVASTATIN 40 MILLIGRAM(S): 20 TABLET, FILM COATED ORAL at 22:28

## 2018-09-14 RX ADMIN — Medication 81 MILLIGRAM(S): at 11:31

## 2018-09-14 RX ADMIN — Medication 2000 MILLIGRAM(S): at 11:31

## 2018-09-14 RX ADMIN — Medication 2: at 12:50

## 2018-09-14 RX ADMIN — Medication 100 MILLIGRAM(S): at 07:27

## 2018-09-14 RX ADMIN — POLYETHYLENE GLYCOL 3350 17 GRAM(S): 17 POWDER, FOR SOLUTION ORAL at 11:31

## 2018-09-14 RX ADMIN — ONDANSETRON 4 MILLIGRAM(S): 8 TABLET, FILM COATED ORAL at 12:09

## 2018-09-14 RX ADMIN — LIDOCAINE 1 PATCH: 4 CREAM TOPICAL at 22:28

## 2018-09-14 RX ADMIN — Medication 125 MILLILITER(S): at 03:26

## 2018-09-14 RX ADMIN — Medication 6: at 16:47

## 2018-09-14 RX ADMIN — Medication 3 UNIT(S): at 16:48

## 2018-09-14 RX ADMIN — Medication 1000 MILLIGRAM(S): at 09:19

## 2018-09-14 RX ADMIN — HEPARIN SODIUM 5000 UNIT(S): 5000 INJECTION INTRAVENOUS; SUBCUTANEOUS at 07:27

## 2018-09-14 RX ADMIN — PANTOPRAZOLE SODIUM 40 MILLIGRAM(S): 20 TABLET, DELAYED RELEASE ORAL at 07:27

## 2018-09-14 RX ADMIN — Medication 2: at 07:31

## 2018-09-14 RX ADMIN — OXYCODONE AND ACETAMINOPHEN 1 TABLET(S): 5; 325 TABLET ORAL at 16:48

## 2018-09-14 RX ADMIN — SENNA PLUS 2 TABLET(S): 8.6 TABLET ORAL at 22:28

## 2018-09-14 RX ADMIN — CHLORHEXIDINE GLUCONATE 1 APPLICATION(S): 213 SOLUTION TOPICAL at 07:22

## 2018-09-14 RX ADMIN — Medication 200 GRAM(S): at 16:37

## 2018-09-14 RX ADMIN — HEPARIN SODIUM 5000 UNIT(S): 5000 INJECTION INTRAVENOUS; SUBCUTANEOUS at 15:09

## 2018-09-14 RX ADMIN — CLOPIDOGREL BISULFATE 75 MILLIGRAM(S): 75 TABLET, FILM COATED ORAL at 11:31

## 2018-09-14 RX ADMIN — Medication 12.5 MILLIGRAM(S): at 07:27

## 2018-09-14 RX ADMIN — OXYCODONE AND ACETAMINOPHEN 1 TABLET(S): 5; 325 TABLET ORAL at 04:24

## 2018-09-14 RX ADMIN — Medication 400 MILLIGRAM(S): at 08:51

## 2018-09-14 RX ADMIN — PANTOPRAZOLE SODIUM 20 MILLIGRAM(S): 20 TABLET, DELAYED RELEASE ORAL at 23:55

## 2018-09-14 RX ADMIN — INSULIN GLARGINE 10 UNIT(S): 100 INJECTION, SOLUTION SUBCUTANEOUS at 22:28

## 2018-09-14 RX ADMIN — Medication 2: at 22:28

## 2018-09-14 RX ADMIN — Medication 20 MILLIGRAM(S): at 01:02

## 2018-09-14 RX ADMIN — HEPARIN SODIUM 5000 UNIT(S): 5000 INJECTION INTRAVENOUS; SUBCUTANEOUS at 22:28

## 2018-09-14 RX ADMIN — Medication 125 MILLILITER(S): at 03:27

## 2018-09-14 RX ADMIN — OXYCODONE AND ACETAMINOPHEN 1 TABLET(S): 5; 325 TABLET ORAL at 05:30

## 2018-09-14 RX ADMIN — LIDOCAINE 1 PATCH: 4 CREAM TOPICAL at 11:31

## 2018-09-14 RX ADMIN — Medication 2000 MILLIGRAM(S): at 03:37

## 2018-09-14 NOTE — PROGRESS NOTE ADULT - SUBJECTIVE AND OBJECTIVE BOX
s/p OPCAB X4 EF 40-45% (LIMA to Diag, MARGAUX to LAD, RAD to OM, SVG to PDA)     36yo with history of tobacco, HTN, HLD, DM, CAD/MI in 2017 with recent Angina symptoms.  On LHD on  diagnosed with multivessel CAD now s/p CABG.      ; EF 40%. normal LM, 85% pLAD lesion, 80% mLAD lesion, 50% mD1 lesion, 50-60% mLCx lesion, 50% mRCA lesion, 80% dRCA lesion.    PMH :  Hyperlipidemia  Hypertension  Type 2 diabetes mellitus  MI (myocardial infarction)  Coronary artery disease with other form of angina pectoris  CABG  H/O tubal ligation  S/P     ROS :  incision pain  All other systems reviewed and negative.    ICU Vital Signs Last 24 Hrs9/  T(C): 36.7 (18 @ 10:00), Max: 36.7 (18 @ 01:01)  T(F): 98.1 (18 @ 10:00), Max: 98.1 (18 @ 10:00)  HR: 92 (18 @ 10:00) (80 - 112)  ABP: 138/80 (18 @ 10:00) (100/58 - 150/74)  ABP(mean): 102 (18 @ 10:00) (74 - 106)  RR: 30 (18 @ 10:00) (14 - 30)  SpO2: 94% (18 @ 10:00) (94% - 100%)    I&O's Summary    13 Sep 2018 07:  -  14 Sep 2018 07:00  --------------------------------------------------------  IN: 3255 mL / OUT: 1775 mL / NET: 1480 mL    14 Sep 2018 07:  -  14 Sep 2018 11:56  --------------------------------------------------------  IN: 350 mL / OUT: 705 mL / NET: -355 mL      ABG - ( 14 Sep 2018 03:30 )  pH: 7.38  /  pCO2: 35    /  pO2: 132   / HCO3: 20    / Base Excess: -4.8  /  SaO2: 98                              8.5    13.2  )-----------( 174      ( 14 Sep 2018 03:19 )             24.8     14 Sep 2018 03:19    139    |  104    |  19     ----------------------------<  177    4.2     |  21     |  1.18     Ca    7.7        14 Sep 2018 03:19  Phos  3.5       14 Sep 2018 03:19  Mg     2.1       14 Sep 2018 03:19    TPro  4.9    /  Alb  3.0    /  TBili  0.5    /  DBili  x      /  AST  60     /  ALT  16     /  AlkPhos  80     14 Sep 2018 03:19    PT/INR - ( 14 Sep 2018 03:19 )   PT: 15.3 sec;   INR: 1.37     PTT - ( 14 Sep 2018 03:19 )  PTT:33.3 sec    MEDICATIONS  (STANDING):  aspirin enteric coated 81 milliGRAM(s) Oral daily  clopidogrel Tablet 75 milliGRAM(s) Oral daily  heparin  Injectable 5000 Unit(s) SubCutaneous every 8 hours  insulin lispro (HumaLOG) corrective regimen sliding scale   SubCutaneous Before meals and at bedtime  pantoprazole    Tablet 40 milliGRAM(s) Oral before breakfast  potassium chloride  20 mEq/100 mL IVPB 20 milliEquivalent(s) IV Intermittent once  senna 2 Tablet(s) Oral at bedtime  simvastatin 40 milliGRAM(s) Oral at bedtime      Home Medications:  Aspirin Enteric Coated 81 mg oral delayed release tablet: 1 tab(s) orally once a day (11 Sep 2018 12:47)  clopidogrel 75 mg oral tablet: 1 tab(s) orally once a day (11 Sep 2018 12:47)  gabapentin 100 mg oral capsule: 1 cap(s) orally once a day (11 Sep 2018 12:47)  metFORMIN 1000 mg oral tablet: 1 tab(s) orally 2 times a day, HOLD FOR 2 DAYS AND RESUME REGULAR USE ON MONDAY, SEPTEMBER 10, 2018 (11 Sep 2018 12:47)  simvastatin 40 mg oral tablet: 1 tab(s) orally once a day (at bedtime) (11 Sep 2018 12:47)  Toprol-XL 25 mg oral tablet, extended release: 1 tab(s) orally once a day (11 Sep 2018 12:47)    PHYSICAL EXAM:  Gen : no acute distress  Neck: No LAD, No JVD  Respiratory: decreased in the bases  Cardiovascular: S1 and S2, RRR, no M/G/R  Gastrointestinal: BS+, soft, NT/ND  Extremities: No peripheral edema  Vascular: 2+ peripheral pulses  Neurological: A/O x 3, no focal deficits  Incision: clean dry/ no sign of infection  Lines: no sign of infection

## 2018-09-14 NOTE — CHART NOTE - NSCHARTNOTEFT_GEN_A_CORE
CT Removal:    Pt seen and examined at bedside.  Case discussed with Dr. Servin   Minimal output from CTs.  No air leak appreciated.  CT removed without incident per Dr. Servin's request.  Occlusive DSD placed.  CXR no obvious PTX noted.  Pt tolerated procedure well.

## 2018-09-14 NOTE — PROGRESS NOTE ADULT - ASSESSMENT
Problems  1. Problems  1. s/p OPCAB X4  2.Chronic systolic CHF  3. H/o HTN  4. H/o DM    Plan   Neuro -- pain control  CVS - Holding BB, pt with borderline UOP, allowing permissive hypertension for renal perfusion  Will need CHF regimen started  ASA/Plavix   Pulm - stable resp status, weaning oxygen.    IS, pulm toileting  sternal precautions (pt with arteril grafts - BIMA)   GI - GI proph   - monitor UOP  Endo - glycemic control  Heme - DVT proph      Critical post op.    Critical care time spent 50 min

## 2018-09-15 DIAGNOSIS — E78.5 HYPERLIPIDEMIA, UNSPECIFIED: ICD-10-CM

## 2018-09-15 DIAGNOSIS — I25.110 ATHEROSCLEROTIC HEART DISEASE OF NATIVE CORONARY ARTERY WITH UNSTABLE ANGINA PECTORIS: ICD-10-CM

## 2018-09-15 DIAGNOSIS — Z87.891 PERSONAL HISTORY OF NICOTINE DEPENDENCE: ICD-10-CM

## 2018-09-15 DIAGNOSIS — Z79.84 LONG TERM (CURRENT) USE OF ORAL HYPOGLYCEMIC DRUGS: ICD-10-CM

## 2018-09-15 DIAGNOSIS — E11.9 TYPE 2 DIABETES MELLITUS WITHOUT COMPLICATIONS: ICD-10-CM

## 2018-09-15 DIAGNOSIS — I10 ESSENTIAL (PRIMARY) HYPERTENSION: ICD-10-CM

## 2018-09-15 DIAGNOSIS — I25.2 OLD MYOCARDIAL INFARCTION: ICD-10-CM

## 2018-09-15 LAB
ANION GAP SERPL CALC-SCNC: 15 MMOL/L — SIGNIFICANT CHANGE UP (ref 5–17)
APTT BLD: 134.9 SEC — CRITICAL HIGH (ref 27.5–37.4)
APTT BLD: 57.3 SEC — HIGH (ref 27.5–37.4)
BUN SERPL-MCNC: 18 MG/DL — SIGNIFICANT CHANGE UP (ref 7–23)
CALCIUM SERPL-MCNC: 8.8 MG/DL — SIGNIFICANT CHANGE UP (ref 8.4–10.5)
CHLORIDE SERPL-SCNC: 100 MMOL/L — SIGNIFICANT CHANGE UP (ref 96–108)
CO2 SERPL-SCNC: 21 MMOL/L — LOW (ref 22–31)
CREAT SERPL-MCNC: 0.98 MG/DL — SIGNIFICANT CHANGE UP (ref 0.5–1.3)
GLUCOSE BLDC GLUCOMTR-MCNC: 125 MG/DL — HIGH (ref 70–99)
GLUCOSE BLDC GLUCOMTR-MCNC: 130 MG/DL — HIGH (ref 70–99)
GLUCOSE BLDC GLUCOMTR-MCNC: 175 MG/DL — HIGH (ref 70–99)
GLUCOSE BLDC GLUCOMTR-MCNC: 93 MG/DL — SIGNIFICANT CHANGE UP (ref 70–99)
GLUCOSE SERPL-MCNC: 136 MG/DL — HIGH (ref 70–99)
HCT VFR BLD CALC: 32.9 % — LOW (ref 34.5–45)
HGB BLD-MCNC: 11.2 G/DL — LOW (ref 11.5–15.5)
INR BLD: 1.2 — HIGH (ref 0.88–1.16)
INR BLD: 1.22 — HIGH (ref 0.88–1.16)
MAGNESIUM SERPL-MCNC: 1.9 MG/DL — SIGNIFICANT CHANGE UP (ref 1.6–2.6)
MCHC RBC-ENTMCNC: 29.2 PG — SIGNIFICANT CHANGE UP (ref 27–34)
MCHC RBC-ENTMCNC: 34 G/DL — SIGNIFICANT CHANGE UP (ref 32–36)
MCV RBC AUTO: 85.7 FL — SIGNIFICANT CHANGE UP (ref 80–100)
PHOSPHATE SERPL-MCNC: 2 MG/DL — LOW (ref 2.5–4.5)
PLATELET # BLD AUTO: 206 K/UL — SIGNIFICANT CHANGE UP (ref 150–400)
POTASSIUM SERPL-MCNC: 4.3 MMOL/L — SIGNIFICANT CHANGE UP (ref 3.5–5.3)
POTASSIUM SERPL-SCNC: 4.3 MMOL/L — SIGNIFICANT CHANGE UP (ref 3.5–5.3)
PROTHROM AB SERPL-ACNC: 13.4 SEC — HIGH (ref 9.8–12.7)
PROTHROM AB SERPL-ACNC: 23.6 SEC — HIGH (ref 9.8–12.7)
RBC # BLD: 3.84 M/UL — SIGNIFICANT CHANGE UP (ref 3.8–5.2)
RBC # FLD: 13.6 % — SIGNIFICANT CHANGE UP (ref 10.3–16.9)
SODIUM SERPL-SCNC: 136 MMOL/L — SIGNIFICANT CHANGE UP (ref 135–145)
WBC # BLD: 14 K/UL — HIGH (ref 3.8–10.5)
WBC # FLD AUTO: 14 K/UL — HIGH (ref 3.8–10.5)

## 2018-09-15 PROCEDURE — 99233 SBSQ HOSP IP/OBS HIGH 50: CPT

## 2018-09-15 PROCEDURE — 71045 X-RAY EXAM CHEST 1 VIEW: CPT | Mod: 26

## 2018-09-15 RX ORDER — MAGNESIUM OXIDE 400 MG ORAL TABLET 241.3 MG
800 TABLET ORAL ONCE
Qty: 0 | Refills: 0 | Status: COMPLETED | OUTPATIENT
Start: 2018-09-15 | End: 2018-09-15

## 2018-09-15 RX ORDER — METOPROLOL TARTRATE 50 MG
12.5 TABLET ORAL EVERY 6 HOURS
Qty: 0 | Refills: 0 | Status: DISCONTINUED | OUTPATIENT
Start: 2018-09-15 | End: 2018-09-16

## 2018-09-15 RX ORDER — KETOROLAC TROMETHAMINE 30 MG/ML
15 SYRINGE (ML) INJECTION EVERY 6 HOURS
Qty: 0 | Refills: 0 | Status: DISCONTINUED | OUTPATIENT
Start: 2018-09-15 | End: 2018-09-16

## 2018-09-15 RX ORDER — FUROSEMIDE 40 MG
20 TABLET ORAL ONCE
Qty: 0 | Refills: 0 | Status: COMPLETED | OUTPATIENT
Start: 2018-09-15 | End: 2018-09-15

## 2018-09-15 RX ORDER — MAGNESIUM OXIDE 400 MG ORAL TABLET 241.3 MG
400 TABLET ORAL
Qty: 0 | Refills: 0 | Status: COMPLETED | OUTPATIENT
Start: 2018-09-15 | End: 2018-09-15

## 2018-09-15 RX ADMIN — Medication 3 UNIT(S): at 08:03

## 2018-09-15 RX ADMIN — Medication 100 MILLIGRAM(S): at 17:33

## 2018-09-15 RX ADMIN — Medication 20 MILLIGRAM(S): at 15:17

## 2018-09-15 RX ADMIN — HEPARIN SODIUM 5000 UNIT(S): 5000 INJECTION INTRAVENOUS; SUBCUTANEOUS at 06:54

## 2018-09-15 RX ADMIN — PANTOPRAZOLE SODIUM 40 MILLIGRAM(S): 20 TABLET, DELAYED RELEASE ORAL at 06:54

## 2018-09-15 RX ADMIN — Medication 12.5 MILLIGRAM(S): at 17:33

## 2018-09-15 RX ADMIN — HEPARIN SODIUM 5000 UNIT(S): 5000 INJECTION INTRAVENOUS; SUBCUTANEOUS at 21:55

## 2018-09-15 RX ADMIN — SIMVASTATIN 40 MILLIGRAM(S): 20 TABLET, FILM COATED ORAL at 21:56

## 2018-09-15 RX ADMIN — Medication 15 MILLIGRAM(S): at 11:00

## 2018-09-15 RX ADMIN — CHLORHEXIDINE GLUCONATE 1 APPLICATION(S): 213 SOLUTION TOPICAL at 06:55

## 2018-09-15 RX ADMIN — Medication 12.5 MILLIGRAM(S): at 23:38

## 2018-09-15 RX ADMIN — MAGNESIUM OXIDE 400 MG ORAL TABLET 800 MILLIGRAM(S): 241.3 TABLET ORAL at 12:07

## 2018-09-15 RX ADMIN — Medication 100 MILLIGRAM(S): at 06:54

## 2018-09-15 RX ADMIN — LIDOCAINE 1 PATCH: 4 CREAM TOPICAL at 12:06

## 2018-09-15 RX ADMIN — SENNA PLUS 2 TABLET(S): 8.6 TABLET ORAL at 21:56

## 2018-09-15 RX ADMIN — Medication 15 MILLIGRAM(S): at 21:54

## 2018-09-15 RX ADMIN — INSULIN GLARGINE 10 UNIT(S): 100 INJECTION, SOLUTION SUBCUTANEOUS at 21:55

## 2018-09-15 RX ADMIN — Medication 15 MILLIGRAM(S): at 22:09

## 2018-09-15 RX ADMIN — MAGNESIUM OXIDE 400 MG ORAL TABLET 400 MILLIGRAM(S): 241.3 TABLET ORAL at 17:33

## 2018-09-15 RX ADMIN — POLYETHYLENE GLYCOL 3350 17 GRAM(S): 17 POWDER, FOR SOLUTION ORAL at 12:06

## 2018-09-15 RX ADMIN — Medication 81 MILLIGRAM(S): at 12:08

## 2018-09-15 RX ADMIN — OXYCODONE AND ACETAMINOPHEN 1 TABLET(S): 5; 325 TABLET ORAL at 03:55

## 2018-09-15 RX ADMIN — CLOPIDOGREL BISULFATE 75 MILLIGRAM(S): 75 TABLET, FILM COATED ORAL at 12:08

## 2018-09-15 RX ADMIN — Medication 15 MILLIGRAM(S): at 11:30

## 2018-09-15 RX ADMIN — Medication 2: at 21:55

## 2018-09-15 RX ADMIN — Medication 3 UNIT(S): at 12:07

## 2018-09-15 RX ADMIN — OXYCODONE AND ACETAMINOPHEN 1 TABLET(S): 5; 325 TABLET ORAL at 02:45

## 2018-09-15 RX ADMIN — Medication 12.5 MILLIGRAM(S): at 09:13

## 2018-09-15 RX ADMIN — HEPARIN SODIUM 5000 UNIT(S): 5000 INJECTION INTRAVENOUS; SUBCUTANEOUS at 15:17

## 2018-09-15 RX ADMIN — MAGNESIUM OXIDE 400 MG ORAL TABLET 400 MILLIGRAM(S): 241.3 TABLET ORAL at 06:56

## 2018-09-15 RX ADMIN — LIDOCAINE 1 PATCH: 4 CREAM TOPICAL at 23:38

## 2018-09-15 RX ADMIN — MAGNESIUM OXIDE 400 MG ORAL TABLET 400 MILLIGRAM(S): 241.3 TABLET ORAL at 12:07

## 2018-09-15 RX ADMIN — Medication 3 UNIT(S): at 17:14

## 2018-09-15 NOTE — PROGRESS NOTE ADULT - SUBJECTIVE AND OBJECTIVE BOX
s/p OPCAB X4 EF 40-45% (LIMA to Diag, MARGAUX to LAD, RAD to OM, SVG to PDA)     36yo with history of tobacco, HTN, HLD, DM, CAD/MI in 2017 with recent Angina symptoms.  On LHD on  diagnosed with multivessel CAD now s/p CABG.      ; EF 40%. normal LM, 85% pLAD lesion, 80% mLAD lesion, 50% mD1 lesion, 50-60% mLCx lesion, 50% mRCA lesion, 80% dRCA lesion.    24hrs;  No acute issues  BB started  Diuresis  Ambulation/pulm toileting    PMH :  Hyperlipidemia  Hypertension  Type 2 diabetes mellitus  MI (myocardial infarction)  Coronary artery disease with other form of angina pectoris  CABG  H/O tubal ligation  S/P     ROS no compliant  All other systems reviewed and negative.    ICU Vital Signs Last 24 Hrs  T(C): 36.6 (09-15-18 @ 14:00), Max: 37.6 (18 @ 20:41)  T(F): 97.9 (09-15-18 @ 14:00), Max: 99.7 (-18 @ 20:41)  HR: 94 (09-15-18 @ 15:00) (92 - 110)  BP: 133/86 (09-15-18 @ 15:00) (133/73 - 173/88)  BP(mean): 106 (09-15-18 @ 15:00) (93 - 122)  ABP: 146/76 (18 @ 18:00) (146/76 - 160/80)  ABP(mean): 104 (18 @ 18:00) (104 - 112)  RR: 15 (09-15-18 @ 15:00) (15 - 33)  SpO2: 94% (09-15-18 @ 15:00) (92% - 97%)    I&O's Summary    14 Sep 2018 07:01  -  15 Sep 2018 07:00  --------------------------------------------------------  IN: 770 mL / OUT: 1855 mL / NET: -1085 mL    15 Sep 2018 07:01  -  15 Sep 2018 15:13  --------------------------------------------------------  IN: 550 mL / OUT: 700 mL / NET: -150 mL        ABG - ( 14 Sep 2018 13:01 )  pH: 7.45  /  pCO2: 28    /  pO2: 76    / HCO3: 19    / Base Excess: -3.7  /  SaO2: 96                              11.2   14.0  )-----------( 206      ( 15 Sep 2018 03:16 )             32.9     15 Sep 2018 03:16    136    |  100    |  18     ----------------------------<  136    4.3     |  21     |  0.98     Ca    8.8        15 Sep 2018 03:16  Phos  2.0       15 Sep 2018 03:16  Mg     1.9       15 Sep 2018 03:16    TPro  6.1    /  Alb  3.4    /  TBili  0.7    /  DBili  <0.2   /  AST  50     /  ALT  16     /  AlkPhos  100    14 Sep 2018 13:08    PT/INR - ( 15 Sep 2018 04:27 )   PT: 13.4 sec;   INR: 1.20      PTT - ( 15 Sep 2018 04:27 )  PTT:57.3 sec    MEDICATIONS  (STANDING):  aspirin enteric coated 81 milliGRAM(s) Oral daily  clopidogrel Tablet 75 milliGRAM(s) Oral daily  docusate sodium 100 milliGRAM(s) Oral two times a day  heparin  Injectable 5000 Unit(s) SubCutaneous every 8 hours  insulin glargine Injectable (LANTUS) 10 Unit(s) SubCutaneous at bedtime  insulin lispro (HumaLOG) corrective regimen sliding scale   SubCutaneous Before meals and at bedtime  insulin lispro Injectable (HumaLOG) 3 Unit(s) SubCutaneous three times a day before meals  metoprolol tartrate 12.5 milliGRAM(s) Oral every 6 hours  pantoprazole    Tablet 40 milliGRAM(s) Oral before breakfast  simvastatin 40 milliGRAM(s) Oral at bedtime    Home Medications:  Aspirin Enteric Coated 81 mg oral delayed release tablet: 1 tab(s) orally once a day (11 Sep 2018 12:47)  clopidogrel 75 mg oral tablet: 1 tab(s) orally once a day (11 Sep 2018 12:47)  gabapentin 100 mg oral capsule: 1 cap(s) orally once a day (11 Sep 2018 12:47)  metFORMIN 1000 mg oral tablet: 1 tab(s) orally 2 times a day, HOLD FOR 2 DAYS AND RESUME REGULAR USE ON MONDAY, SEPTEMBER 10, 2018 (11 Sep 2018 12:47)  simvastatin 40 mg oral tablet: 1 tab(s) orally once a day (at bedtime) (11 Sep 2018 12:47)  Toprol-XL 25 mg oral tablet, extended release: 1 tab(s) orally once a day (11 Sep 2018 12:47)    PHYSICAL EXAM:  Gen : no acute distress  Neck: No LAD, No JVD  Respiratory: decreased in the bases  Cardiovascular: S1 and S2, RRR, no M/G/R  Gastrointestinal: BS+, soft, NT/ND  Extremities: No peripheral edema  Vascular: 2+ peripheral pulses  Neurological: A/O x 3, no focal deficits  Incision: clean dry/ no sign of infection

## 2018-09-15 NOTE — PROGRESS NOTE ADULT - ASSESSMENT
Problems  1. s/p OPCAB X4  2. Chronic systolic CHF  3. H/o HTN  4. H/o DM    Plan   Neuro -- pain control  CVS - BB started, diuresis DAPT  Will need CHF regimen started  Pulm - CXR with bibasilar ATX, but stable RA  IS, pulm toileting  sternal precautions (pt with arterial grafts - BIMA)   GI - GI proph   - monitor UOP  Endo - glycemic control (120-180s)  Heme - DVT proph      Floor eligible

## 2018-09-16 LAB
ANION GAP SERPL CALC-SCNC: 14 MMOL/L — SIGNIFICANT CHANGE UP (ref 5–17)
BUN SERPL-MCNC: 17 MG/DL — SIGNIFICANT CHANGE UP (ref 7–23)
CALCIUM SERPL-MCNC: 9.3 MG/DL — SIGNIFICANT CHANGE UP (ref 8.4–10.5)
CHLORIDE SERPL-SCNC: 98 MMOL/L — SIGNIFICANT CHANGE UP (ref 96–108)
CO2 SERPL-SCNC: 27 MMOL/L — SIGNIFICANT CHANGE UP (ref 22–31)
CREAT SERPL-MCNC: 0.93 MG/DL — SIGNIFICANT CHANGE UP (ref 0.5–1.3)
GLUCOSE BLDC GLUCOMTR-MCNC: 107 MG/DL — HIGH (ref 70–99)
GLUCOSE BLDC GLUCOMTR-MCNC: 126 MG/DL — HIGH (ref 70–99)
GLUCOSE BLDC GLUCOMTR-MCNC: 133 MG/DL — HIGH (ref 70–99)
GLUCOSE BLDC GLUCOMTR-MCNC: 223 MG/DL — HIGH (ref 70–99)
GLUCOSE BLDC GLUCOMTR-MCNC: 82 MG/DL — SIGNIFICANT CHANGE UP (ref 70–99)
GLUCOSE SERPL-MCNC: 120 MG/DL — HIGH (ref 70–99)
HCT VFR BLD CALC: 37.3 % — SIGNIFICANT CHANGE UP (ref 34.5–45)
HGB BLD-MCNC: 12.4 G/DL — SIGNIFICANT CHANGE UP (ref 11.5–15.5)
MAGNESIUM SERPL-MCNC: 1.9 MG/DL — SIGNIFICANT CHANGE UP (ref 1.6–2.6)
MCHC RBC-ENTMCNC: 29.2 PG — SIGNIFICANT CHANGE UP (ref 27–34)
MCHC RBC-ENTMCNC: 33.2 G/DL — SIGNIFICANT CHANGE UP (ref 32–36)
MCV RBC AUTO: 88 FL — SIGNIFICANT CHANGE UP (ref 80–100)
PHOSPHATE SERPL-MCNC: 2.9 MG/DL — SIGNIFICANT CHANGE UP (ref 2.5–4.5)
PLATELET # BLD AUTO: 316 K/UL — SIGNIFICANT CHANGE UP (ref 150–400)
POTASSIUM SERPL-MCNC: 4.2 MMOL/L — SIGNIFICANT CHANGE UP (ref 3.5–5.3)
POTASSIUM SERPL-SCNC: 4.2 MMOL/L — SIGNIFICANT CHANGE UP (ref 3.5–5.3)
RBC # BLD: 4.24 M/UL — SIGNIFICANT CHANGE UP (ref 3.8–5.2)
RBC # FLD: 13.5 % — SIGNIFICANT CHANGE UP (ref 10.3–16.9)
SODIUM SERPL-SCNC: 139 MMOL/L — SIGNIFICANT CHANGE UP (ref 135–145)
WBC # BLD: 10.4 K/UL — SIGNIFICANT CHANGE UP (ref 3.8–10.5)
WBC # FLD AUTO: 10.4 K/UL — SIGNIFICANT CHANGE UP (ref 3.8–10.5)

## 2018-09-16 PROCEDURE — 71046 X-RAY EXAM CHEST 2 VIEWS: CPT | Mod: 26

## 2018-09-16 PROCEDURE — 76604 US EXAM CHEST: CPT | Mod: 26

## 2018-09-16 RX ORDER — FUROSEMIDE 40 MG
20 TABLET ORAL ONCE
Qty: 0 | Refills: 0 | Status: DISCONTINUED | OUTPATIENT
Start: 2018-09-16 | End: 2018-09-16

## 2018-09-16 RX ORDER — BENZOCAINE AND MENTHOL 5; 1 G/100ML; G/100ML
1 LIQUID ORAL ONCE
Qty: 0 | Refills: 0 | Status: COMPLETED | OUTPATIENT
Start: 2018-09-16 | End: 2018-09-16

## 2018-09-16 RX ORDER — MAGNESIUM OXIDE 400 MG ORAL TABLET 241.3 MG
800 TABLET ORAL ONCE
Qty: 0 | Refills: 0 | Status: COMPLETED | OUTPATIENT
Start: 2018-09-16 | End: 2018-09-16

## 2018-09-16 RX ORDER — FUROSEMIDE 40 MG
20 TABLET ORAL ONCE
Qty: 0 | Refills: 0 | Status: COMPLETED | OUTPATIENT
Start: 2018-09-16 | End: 2018-09-16

## 2018-09-16 RX ORDER — METOPROLOL TARTRATE 50 MG
25 TABLET ORAL EVERY 8 HOURS
Qty: 0 | Refills: 0 | Status: DISCONTINUED | OUTPATIENT
Start: 2018-09-16 | End: 2018-09-17

## 2018-09-16 RX ORDER — POTASSIUM CHLORIDE 20 MEQ
20 PACKET (EA) ORAL ONCE
Qty: 0 | Refills: 0 | Status: COMPLETED | OUTPATIENT
Start: 2018-09-16 | End: 2018-09-16

## 2018-09-16 RX ADMIN — Medication 100 MILLIGRAM(S): at 17:32

## 2018-09-16 RX ADMIN — LIDOCAINE 1 PATCH: 4 CREAM TOPICAL at 23:00

## 2018-09-16 RX ADMIN — PANTOPRAZOLE SODIUM 40 MILLIGRAM(S): 20 TABLET, DELAYED RELEASE ORAL at 06:02

## 2018-09-16 RX ADMIN — Medication 12.5 MILLIGRAM(S): at 12:02

## 2018-09-16 RX ADMIN — Medication 3 UNIT(S): at 12:03

## 2018-09-16 RX ADMIN — CLOPIDOGREL BISULFATE 75 MILLIGRAM(S): 75 TABLET, FILM COATED ORAL at 12:02

## 2018-09-16 RX ADMIN — POLYETHYLENE GLYCOL 3350 17 GRAM(S): 17 POWDER, FOR SOLUTION ORAL at 12:05

## 2018-09-16 RX ADMIN — Medication 650 MILLIGRAM(S): at 22:28

## 2018-09-16 RX ADMIN — Medication 12.5 MILLIGRAM(S): at 06:02

## 2018-09-16 RX ADMIN — HEPARIN SODIUM 5000 UNIT(S): 5000 INJECTION INTRAVENOUS; SUBCUTANEOUS at 06:02

## 2018-09-16 RX ADMIN — Medication 4: at 12:02

## 2018-09-16 RX ADMIN — HEPARIN SODIUM 5000 UNIT(S): 5000 INJECTION INTRAVENOUS; SUBCUTANEOUS at 14:46

## 2018-09-16 RX ADMIN — Medication 20 MILLIEQUIVALENT(S): at 14:47

## 2018-09-16 RX ADMIN — Medication 650 MILLIGRAM(S): at 23:30

## 2018-09-16 RX ADMIN — Medication 81 MILLIGRAM(S): at 12:02

## 2018-09-16 RX ADMIN — HEPARIN SODIUM 5000 UNIT(S): 5000 INJECTION INTRAVENOUS; SUBCUTANEOUS at 22:29

## 2018-09-16 RX ADMIN — Medication 3 UNIT(S): at 07:29

## 2018-09-16 RX ADMIN — Medication 3 UNIT(S): at 17:32

## 2018-09-16 RX ADMIN — Medication 15 MILLIGRAM(S): at 10:06

## 2018-09-16 RX ADMIN — INSULIN GLARGINE 10 UNIT(S): 100 INJECTION, SOLUTION SUBCUTANEOUS at 22:29

## 2018-09-16 RX ADMIN — SIMVASTATIN 40 MILLIGRAM(S): 20 TABLET, FILM COATED ORAL at 22:29

## 2018-09-16 RX ADMIN — Medication 15 MILLIGRAM(S): at 11:04

## 2018-09-16 RX ADMIN — Medication 20 MILLIGRAM(S): at 14:47

## 2018-09-16 RX ADMIN — MAGNESIUM OXIDE 400 MG ORAL TABLET 800 MILLIGRAM(S): 241.3 TABLET ORAL at 12:03

## 2018-09-16 RX ADMIN — Medication 25 MILLIGRAM(S): at 22:28

## 2018-09-16 RX ADMIN — Medication 100 MILLIGRAM(S): at 06:02

## 2018-09-16 RX ADMIN — LIDOCAINE 1 PATCH: 4 CREAM TOPICAL at 12:02

## 2018-09-16 RX ADMIN — SENNA PLUS 2 TABLET(S): 8.6 TABLET ORAL at 22:28

## 2018-09-16 NOTE — PROGRESS NOTE ADULT - ASSESSMENT
36 y/o female, former smoker, strong FHx of heart disease (father  @age 42 from MI), PMHx of HTN, HLD, DM, recent MI in 2017 (managed medically in [facility]), who presented to [facility] ED 18 c/o progressively worsening, exertional chest pain x 1 week.  Cardiac catheterization with Dr. Campos on 18, revealed normal LM, 85% pLAD lesion, 80% mLAD lesion, 50% mD1 lesion, 50-60% mLCx lesion, 50% mRCA lesion, 80% dRCA lesion. EF:40-45%. Dr. Vasquez was consulted for OPCAB evaluation. She was deemed a good surgical candidate and on 18 underwent CABG LIMA-Diag, MARGAUX-LAD, Radial -OM, SVG-PDA, EF normal, uncomplicated, and brought to the CTICU. POD1 extubated. POD2 delined, chest tubes removed, small apical PTX, stable on CXR. POD4 transferred to floor.       A/P:  Neurovascular: No delirium. Pain well controlled with current regimen.  -Toradol, tylenol PRN    Cardiovascular: Hemodynamically stable. HR controlled. NSR.  - PMHx of HTN, HLD, MI, s/p CABG POD4  - Continue ASA, Plavix, metoprolol 25mg PO q8hrs (titrate up as tolerated), simvastatin 40mg PO qhs.   - Continue to monitor HR/BP/tele.     Respiratory: 02 Sat = 98% on RA.  -If on oxygen wean to RA from for O2 Sat > 93%.  -Encourage C+DB and Use of IS 10x / hr while awake.  - Needs encouragement, pulling 500cc, after education, can get up to 600cc.  -CXR with Right effusion, ultrasound today shows ~300cc. Continue diuresis today and U/s in AM.      GI: Stable. Has not yet had a BM, but is passing flatus.   -GI PPX with pantoprazole.   -PO Diet.  - continue bowel regimen    Renal / : BUN/Cr: 17/0.93  - Lasix 20mg PO with potassium supplementation, patient refusing IV medications.  - negative 1.9L over 24hrs  -Monitor renal function.  -Monitor I/O's.    Endocrine:  PMHx of DM  - Continue ISS, Lantus 10U qhs and Lispro 3U with meals, continue to monitor BS. Goal is <150  -A1c: 4.2  -TSH: 1.314    Hematologic: H&H: 12.4/37.3  - F/u AM CBC    ID: afebrile  - WBC: 10.4  -Observe for SIRS/Sepsis Syndrome.    Prophylaxis:  -DVT prophylaxis with 5000 SubQ Heparin q8h.  -SCD's    Disposition:  - Home tomorrow/Tuesday

## 2018-09-16 NOTE — PROGRESS NOTE ADULT - SUBJECTIVE AND OBJECTIVE BOX
Patient discussed on morning rounds with Dr. Gracia and Dr. Servin    Operation / Date: 09/12/2018  CABG LIMA-Diag, MARGAUX-LAD, Radial -OM, SVG-PDA     SUBJECTIVE ASSESSMENT:  37y Female seen after walking over from CTICU. Patient c/o cough x2days with "scratchy throat" with occasional "whitish phlegm." Otherwise patient is ambulating multiple times in the halls on room air, using IS, pulling 500cc, and passing flatus, but has not yet had a BM. Denies chest pain, SOB, palpitations, hemopytsis, dysphagia, feeling of throat closure, N/V/D, abdominal pain, dizziness, fever or chills.       Vital Signs Last 24 Hrs  T(C): 36.6 (16 Sep 2018 13:31), Max: 36.8 (16 Sep 2018 05:21)  T(F): 97.8 (16 Sep 2018 13:31), Max: 98.2 (16 Sep 2018 05:21)  HR: 94 (16 Sep 2018 14:40) (90 - 102)  BP: 132/88 (16 Sep 2018 14:40) (132/88 - 152/86)  BP(mean): 105 (16 Sep 2018 14:40) (101 - 113)  RR: 17 (16 Sep 2018 10:10) (15 - 26)  SpO2: 98% (16 Sep 2018 14:40) (94% - 99%)  I&O's Detail    15 Sep 2018 07:01  -  16 Sep 2018 07:00  --------------------------------------------------------  IN:    Oral Fluid: 1090 mL  Total IN: 1090 mL    OUT:    Voided: 3075 mL  Total OUT: 3075 mL    Total NET: -1985 mL      16 Sep 2018 07:01  -  16 Sep 2018 14:54  --------------------------------------------------------  IN:    Oral Fluid: 805 mL  Total IN: 805 mL    OUT:    Voided: 565 mL  Total OUT: 565 mL    Total NET: 240 mL          CHEST TUBE:  No.   PETER DRAIN:  No.  EPICARDIAL WIRES: Yes.  TIE DOWNS: Yes.  BRYANT: No.    PHYSICAL EXAM:    General: Young patient sitting comfortably in bed, mother at bedside, no acute distress     Neurological: Alert and oriented. No focal neurological deficits     Cardiovascular: S1S2, RRR, no murmurs appreciated on exam     Respiratory: Diminished at the bases, otherwise clear to auscultation, no wheezes rales or rhonchi.     Gastrointestinal: Abdomen soft, non tender, non distended     Extremities: Warm and well perfused. No peripheral edema or calf tenderness     Vascular: Peripheral pulses palpable bilaterally.     Incision Sites: MSI: c/d/i, no sternal click or dehiscence. R EVH: c/d/i, soft, no ecchymosis or surrounding erythema.     LABS:                        12.4   10.4  )-----------( 316      ( 16 Sep 2018 07:07 )             37.3       COUMADIN:  No.   PT/INR - ( 15 Sep 2018 04:27 )   PT: 13.4 sec;   INR: 1.20          PTT - ( 15 Sep 2018 04:27 )  PTT:57.3 sec    09-16    139  |  98  |  17  ----------------------------<  120<H>  4.2   |  27  |  0.93    Ca    9.3      16 Sep 2018 07:07  Phos  2.9     09-16  Mg     1.9     09-16            MEDICATIONS  (STANDING):  aspirin enteric coated 81 milliGRAM(s) Oral daily  clopidogrel Tablet 75 milliGRAM(s) Oral daily  dextrose 50% Injectable 12.5 Gram(s) IV Push once  dextrose 50% Injectable 25 Gram(s) IV Push once  dextrose 50% Injectable 25 Gram(s) IV Push once  docusate sodium 100 milliGRAM(s) Oral two times a day  heparin  Injectable 5000 Unit(s) SubCutaneous every 8 hours  influenza   Vaccine 0.5 milliLiter(s) IntraMuscular once  insulin glargine Injectable (LANTUS) 10 Unit(s) SubCutaneous at bedtime  insulin lispro (HumaLOG) corrective regimen sliding scale   SubCutaneous Before meals and at bedtime  insulin lispro Injectable (HumaLOG) 3 Unit(s) SubCutaneous three times a day before meals  lidocaine   Patch 1 Patch Transdermal daily  metoprolol tartrate 12.5 milliGRAM(s) Oral every 6 hours  pantoprazole    Tablet 40 milliGRAM(s) Oral before breakfast  polyethylene glycol 3350 17 Gram(s) Oral daily  senna 2 Tablet(s) Oral at bedtime  simvastatin 40 milliGRAM(s) Oral at bedtime    MEDICATIONS  (PRN):  acetaminophen   Tablet .. 650 milliGRAM(s) Oral every 6 hours PRN Mild Pain (1 - 3)  dextrose 40% Gel 15 Gram(s) Oral once PRN Blood Glucose LESS THAN 70 milliGRAM(s)/deciliter  glucagon  Injectable 1 milliGRAM(s) IntraMuscular once PRN Glucose LESS THAN 70 milligrams/deciliter  ketorolac   Injectable 15 milliGRAM(s) IV Push every 6 hours PRN Moderate Pain (4 - 6)        RADIOLOGY & ADDITIONAL TESTS:  < from: Xray Chest 1 View- PORTABLE-Routine (09.15.18 @ 03:20) >    EXAM:  XR CHEST PORTABLE ROUTINE 1V                          PROCEDURE DATE:  09/15/2018          INTERPRETATION:  Clinical History: Postop    Portable examination the chest demonstrates cardiomegaly. Bilateral   effusions. Underlying infiltrates cannot be excluded    Impression: Bilateral effusions. Underlying infiltrates cannot be excluded      < end of copied text >

## 2018-09-17 ENCOUNTER — TRANSCRIPTION ENCOUNTER (OUTPATIENT)
Age: 38
End: 2018-09-17

## 2018-09-17 VITALS — TEMPERATURE: 98 F

## 2018-09-17 LAB
ANION GAP SERPL CALC-SCNC: 13 MMOL/L — SIGNIFICANT CHANGE UP (ref 5–17)
APTT BLD: 31.9 SEC — SIGNIFICANT CHANGE UP (ref 27.5–37.4)
BUN SERPL-MCNC: 19 MG/DL — SIGNIFICANT CHANGE UP (ref 7–23)
CALCIUM SERPL-MCNC: 9 MG/DL — SIGNIFICANT CHANGE UP (ref 8.4–10.5)
CHLORIDE SERPL-SCNC: 98 MMOL/L — SIGNIFICANT CHANGE UP (ref 96–108)
CO2 SERPL-SCNC: 27 MMOL/L — SIGNIFICANT CHANGE UP (ref 22–31)
CREAT SERPL-MCNC: 0.99 MG/DL — SIGNIFICANT CHANGE UP (ref 0.5–1.3)
GLUCOSE BLDC GLUCOMTR-MCNC: 95 MG/DL — SIGNIFICANT CHANGE UP (ref 70–99)
GLUCOSE SERPL-MCNC: 98 MG/DL — SIGNIFICANT CHANGE UP (ref 70–99)
HCT VFR BLD CALC: 35.1 % — SIGNIFICANT CHANGE UP (ref 34.5–45)
HGB BLD-MCNC: 11.8 G/DL — SIGNIFICANT CHANGE UP (ref 11.5–15.5)
INR BLD: 1.15 — SIGNIFICANT CHANGE UP (ref 0.88–1.16)
MAGNESIUM SERPL-MCNC: 1.9 MG/DL — SIGNIFICANT CHANGE UP (ref 1.6–2.6)
MCHC RBC-ENTMCNC: 29.6 PG — SIGNIFICANT CHANGE UP (ref 27–34)
MCHC RBC-ENTMCNC: 33.6 G/DL — SIGNIFICANT CHANGE UP (ref 32–36)
MCV RBC AUTO: 88.2 FL — SIGNIFICANT CHANGE UP (ref 80–100)
PLATELET # BLD AUTO: 348 K/UL — SIGNIFICANT CHANGE UP (ref 150–400)
POTASSIUM SERPL-MCNC: 4 MMOL/L — SIGNIFICANT CHANGE UP (ref 3.5–5.3)
POTASSIUM SERPL-SCNC: 4 MMOL/L — SIGNIFICANT CHANGE UP (ref 3.5–5.3)
PROTHROM AB SERPL-ACNC: 12.8 SEC — HIGH (ref 9.8–12.7)
RBC # BLD: 3.98 M/UL — SIGNIFICANT CHANGE UP (ref 3.8–5.2)
RBC # FLD: 13.2 % — SIGNIFICANT CHANGE UP (ref 10.3–16.9)
SODIUM SERPL-SCNC: 138 MMOL/L — SIGNIFICANT CHANGE UP (ref 135–145)
WBC # BLD: 7.7 K/UL — SIGNIFICANT CHANGE UP (ref 3.8–10.5)
WBC # FLD AUTO: 7.7 K/UL — SIGNIFICANT CHANGE UP (ref 3.8–10.5)

## 2018-09-17 PROCEDURE — 85610 PROTHROMBIN TIME: CPT

## 2018-09-17 PROCEDURE — 85027 COMPLETE CBC AUTOMATED: CPT

## 2018-09-17 PROCEDURE — 97162 PT EVAL MOD COMPLEX 30 MIN: CPT

## 2018-09-17 PROCEDURE — 82962 GLUCOSE BLOOD TEST: CPT

## 2018-09-17 PROCEDURE — 82803 BLOOD GASES ANY COMBINATION: CPT

## 2018-09-17 PROCEDURE — 71046 X-RAY EXAM CHEST 2 VIEWS: CPT

## 2018-09-17 PROCEDURE — 86850 RBC ANTIBODY SCREEN: CPT

## 2018-09-17 PROCEDURE — 84295 ASSAY OF SERUM SODIUM: CPT

## 2018-09-17 PROCEDURE — 86923 COMPATIBILITY TEST ELECTRIC: CPT

## 2018-09-17 PROCEDURE — 80048 BASIC METABOLIC PNL TOTAL CA: CPT

## 2018-09-17 PROCEDURE — 80053 COMPREHEN METABOLIC PANEL: CPT

## 2018-09-17 PROCEDURE — 86900 BLOOD TYPING SEROLOGIC ABO: CPT

## 2018-09-17 PROCEDURE — 71045 X-RAY EXAM CHEST 1 VIEW: CPT

## 2018-09-17 PROCEDURE — 86901 BLOOD TYPING SEROLOGIC RH(D): CPT

## 2018-09-17 PROCEDURE — 36415 COLL VENOUS BLD VENIPUNCTURE: CPT

## 2018-09-17 PROCEDURE — 80076 HEPATIC FUNCTION PANEL: CPT

## 2018-09-17 PROCEDURE — 36430 TRANSFUSION BLD/BLD COMPNT: CPT

## 2018-09-17 PROCEDURE — 71045 X-RAY EXAM CHEST 1 VIEW: CPT | Mod: 26,59

## 2018-09-17 PROCEDURE — P9016: CPT

## 2018-09-17 PROCEDURE — 85025 COMPLETE CBC W/AUTO DIFF WBC: CPT

## 2018-09-17 PROCEDURE — 82330 ASSAY OF CALCIUM: CPT

## 2018-09-17 PROCEDURE — 71046 X-RAY EXAM CHEST 2 VIEWS: CPT | Mod: 26

## 2018-09-17 PROCEDURE — P9045: CPT

## 2018-09-17 PROCEDURE — 93005 ELECTROCARDIOGRAM TRACING: CPT

## 2018-09-17 PROCEDURE — 84132 ASSAY OF SERUM POTASSIUM: CPT

## 2018-09-17 PROCEDURE — 83605 ASSAY OF LACTIC ACID: CPT

## 2018-09-17 PROCEDURE — 83735 ASSAY OF MAGNESIUM: CPT

## 2018-09-17 PROCEDURE — 84100 ASSAY OF PHOSPHORUS: CPT

## 2018-09-17 PROCEDURE — C1889: CPT

## 2018-09-17 PROCEDURE — 85730 THROMBOPLASTIN TIME PARTIAL: CPT

## 2018-09-17 RX ORDER — ASPIRIN/CALCIUM CARB/MAGNESIUM 324 MG
1 TABLET ORAL
Qty: 30 | Refills: 0
Start: 2018-09-17 | End: 2018-10-16

## 2018-09-17 RX ORDER — DOCUSATE SODIUM 100 MG
1 CAPSULE ORAL
Qty: 14 | Refills: 0
Start: 2018-09-17 | End: 2018-09-23

## 2018-09-17 RX ORDER — METOPROLOL TARTRATE 50 MG
1 TABLET ORAL
Qty: 0 | Refills: 0 | COMMUNITY

## 2018-09-17 RX ORDER — CLOPIDOGREL BISULFATE 75 MG/1
1 TABLET, FILM COATED ORAL
Qty: 30 | Refills: 0
Start: 2018-09-17 | End: 2018-10-16

## 2018-09-17 RX ORDER — MAGNESIUM OXIDE 400 MG ORAL TABLET 241.3 MG
800 TABLET ORAL ONCE
Qty: 0 | Refills: 0 | Status: COMPLETED | OUTPATIENT
Start: 2018-09-17 | End: 2018-09-17

## 2018-09-17 RX ORDER — ACETAMINOPHEN 500 MG
2 TABLET ORAL
Qty: 240 | Refills: 0
Start: 2018-09-17 | End: 2018-10-16

## 2018-09-17 RX ORDER — CLOPIDOGREL BISULFATE 75 MG/1
1 TABLET, FILM COATED ORAL
Qty: 0 | Refills: 0 | COMMUNITY

## 2018-09-17 RX ORDER — ASPIRIN/CALCIUM CARB/MAGNESIUM 324 MG
1 TABLET ORAL
Qty: 0 | Refills: 0 | COMMUNITY

## 2018-09-17 RX ORDER — FUROSEMIDE 40 MG
1 TABLET ORAL
Qty: 30 | Refills: 0
Start: 2018-09-17 | End: 2018-10-16

## 2018-09-17 RX ORDER — FUROSEMIDE 40 MG
20 TABLET ORAL DAILY
Qty: 0 | Refills: 0 | Status: DISCONTINUED | OUTPATIENT
Start: 2018-09-17 | End: 2018-09-17

## 2018-09-17 RX ORDER — POTASSIUM CHLORIDE 20 MEQ
20 PACKET (EA) ORAL ONCE
Qty: 0 | Refills: 0 | Status: COMPLETED | OUTPATIENT
Start: 2018-09-17 | End: 2018-09-17

## 2018-09-17 RX ORDER — POTASSIUM CHLORIDE 20 MEQ
10 PACKET (EA) ORAL DAILY
Qty: 0 | Refills: 0 | Status: DISCONTINUED | OUTPATIENT
Start: 2018-09-17 | End: 2018-09-17

## 2018-09-17 RX ORDER — SIMVASTATIN 20 MG/1
1 TABLET, FILM COATED ORAL
Qty: 30 | Refills: 0
Start: 2018-09-17 | End: 2018-10-16

## 2018-09-17 RX ORDER — METFORMIN HYDROCHLORIDE 850 MG/1
1 TABLET ORAL
Qty: 60 | Refills: 0
Start: 2018-09-17 | End: 2018-10-16

## 2018-09-17 RX ORDER — PANTOPRAZOLE SODIUM 20 MG/1
1 TABLET, DELAYED RELEASE ORAL
Qty: 30 | Refills: 0
Start: 2018-09-17 | End: 2018-10-16

## 2018-09-17 RX ORDER — POTASSIUM CHLORIDE 20 MEQ
1 PACKET (EA) ORAL
Qty: 30 | Refills: 0
Start: 2018-09-17 | End: 2018-10-16

## 2018-09-17 RX ORDER — METFORMIN HYDROCHLORIDE 850 MG/1
1 TABLET ORAL
Qty: 0 | Refills: 0 | COMMUNITY

## 2018-09-17 RX ORDER — METOPROLOL TARTRATE 50 MG
1 TABLET ORAL
Qty: 90 | Refills: 0
Start: 2018-09-17 | End: 2018-10-16

## 2018-09-17 RX ORDER — IBUPROFEN 200 MG
1 TABLET ORAL
Qty: 28 | Refills: 0
Start: 2018-09-17 | End: 2018-09-23

## 2018-09-17 RX ORDER — SIMVASTATIN 20 MG/1
1 TABLET, FILM COATED ORAL
Qty: 0 | Refills: 0 | COMMUNITY

## 2018-09-17 RX ORDER — POLYETHYLENE GLYCOL 3350 17 G/17G
17 POWDER, FOR SOLUTION ORAL
Qty: 119 | Refills: 0
Start: 2018-09-17 | End: 2018-09-23

## 2018-09-17 RX ADMIN — PANTOPRAZOLE SODIUM 40 MILLIGRAM(S): 20 TABLET, DELAYED RELEASE ORAL at 06:06

## 2018-09-17 RX ADMIN — HEPARIN SODIUM 5000 UNIT(S): 5000 INJECTION INTRAVENOUS; SUBCUTANEOUS at 06:06

## 2018-09-17 RX ADMIN — Medication 20 MILLIGRAM(S): at 09:29

## 2018-09-17 RX ADMIN — Medication 100 MILLIGRAM(S): at 06:06

## 2018-09-17 RX ADMIN — Medication 81 MILLIGRAM(S): at 09:29

## 2018-09-17 RX ADMIN — CLOPIDOGREL BISULFATE 75 MILLIGRAM(S): 75 TABLET, FILM COATED ORAL at 09:30

## 2018-09-17 RX ADMIN — Medication 25 MILLIGRAM(S): at 06:06

## 2018-09-17 RX ADMIN — Medication 650 MILLIGRAM(S): at 11:18

## 2018-09-17 RX ADMIN — MAGNESIUM OXIDE 400 MG ORAL TABLET 800 MILLIGRAM(S): 241.3 TABLET ORAL at 09:29

## 2018-09-17 RX ADMIN — Medication 650 MILLIGRAM(S): at 09:36

## 2018-09-17 RX ADMIN — Medication 10 MILLIEQUIVALENT(S): at 09:30

## 2018-09-17 NOTE — PROGRESS NOTE ADULT - SUBJECTIVE AND OBJECTIVE BOX
Patient discussed on morning rounds with Dr. Vasquez      Operation / Date: 18 OPCAB x 4     Surgeon: Dr. Vasquez     Referring Physician: Dr. Shaikh Neumann     SUBJECTIVE ASSESSMENT:  Patient seen this morning at bedside, doing well and not offering any complaints at this time. States her incisional pain is controlled with current pain regimen and she denies any shortness of breath or palpitations. Ambulated 6 times yesterday without difficulty and practiced stairs this morning. Patient ready and agreeable for discharge home.     Hospital Course:  37 year old female, former smoker with strong family history of heart disease (father  at age 42 from MI) with PMHx of HTN, HLD, DM, recent MI 2017 medically managed in [place] originally presented to [facility] ED on 18 complaining of worsening exertional chest pain. She underwent cardiac cath on  revealing normal LM, 85% pLAD lesion, 80% mLAD lesion, 50% mD1 lesion, 50-60% mLCx lesion, 50% mRCA lesion, 80% dRCA lesion. EF:40-45%. She was evaluated by Dr. Vasquez and discharged home and returned to [facility] on  for elective surgery. Patient underwent OPCAB x 4 with Dr. Vasquez on , procedure was uncomplicated and she arrived to CT ICU intubated and stable. She was extubated POD#1, she continued to remain stable and transferred to  on POD#4. She continued to ambulate without difficulty, on room air, tolerating PO and as per Dr. Vasquez is ready for discharge home on POD#5.     Of note, patient found to have small R sided effusion on CXR and ultrasound, as per Dr. Vasquez patient discharged home on Lasix 20mg PO and will follow up in 1 week with repeat CXR Pa/Lat.       Vital Signs Last 24 Hrs  T(C): 37.1 (17 Sep 2018 09:51), Max: 37.2 (17 Sep 2018 05:50)  T(F): 98.7 (17 Sep 2018 09:51), Max: 99 (17 Sep 2018 05:50)  HR: 84 (17 Sep 2018 08:40) (84 - 96)  BP: 144/81 (17 Sep 2018 08:40) (132/88 - 167/86)  BP(mean): 107 (17 Sep 2018 08:40) (101 - 121)  RR: 16 (17 Sep 2018 08:40) (15 - 16)  SpO2: 97% (17 Sep 2018 08:40) (96% - 100%)  I&O's Detail    16 Sep 2018 07:01  -  17 Sep 2018 07:00  --------------------------------------------------------  IN:    Oral Fluid: 1325 mL  Total IN: 1325 mL    OUT:    Voided: 3965 mL  Total OUT: 3965 mL    Total NET: -2640 mL          EPICARDIAL WIRES REMOVED: Yes.  TIE DOWNS REMOVED: Yes    PHYSICAL EXAM:    General: Patient lying comfortably in bed, no acute distress     Neurological: Alert and oriented. No focal neurological deficits     Cardiovascular: S1S2, RRR, no murmurs appreciated on exam     Respiratory: Clear to ausculation bilaterally     Gastrointestinal: Abdomen soft, non tender, non distended     Extremities: Warm and well perfused. No edema or calf tenderness     Vascular: Peripheral pulses 2+ bilaterally     Incision Sites:  Sternotomy incision C/D/I, no drainage or surrounding erythema  L radial EVH site C/D/I   LLE EVH site C/D/I     LABS:                        11.8   7.7   )-----------( 348      ( 17 Sep 2018 06:20 )             35.1       COUMADIN:  No    PT/INR - ( 17 Sep 2018 06:20 )   PT: 12.8 sec;   INR: 1.15          PTT - ( 17 Sep 2018 06:20 )  PTT:31.9 sec        138  |  98  |  19  ----------------------------<  98  4.0   |  27  |  0.99    Ca    9.0      17 Sep 2018 06:20  Phos  2.9     -16  Mg     1.9                 MEDICATIONS  (STANDING): See Med Rec     Discharge CXR: Patient discussed on morning rounds with Dr. Vasquez      Operation / Date: 18 OPCAB x 4     Surgeon: Dr. Vasuqez     Referring Physician: Dr. Shaikh Neumann     SUBJECTIVE ASSESSMENT:  Patient seen this morning at bedside, doing well and not offering any complaints at this time. States her incisional pain is controlled with current pain regimen and she denies any shortness of breath or palpitations. Ambulated 6 times yesterday without difficulty and practiced stairs this morning. Patient ready and agreeable for discharge home.     Hospital Course:  37 year old female, former smoker with strong family history of heart disease (father  at age 42 from MI) with PMHx of HTN, HLD, DM, recent MI 2017 medically managed in [place] originally presented to [facility] ED on 18 complaining of worsening exertional chest pain. She underwent cardiac cath on  revealing normal LM, 85% pLAD lesion, 80% mLAD lesion, 50% mD1 lesion, 50-60% mLCx lesion, 50% mRCA lesion, 80% dRCA lesion. EF:40-45%. She was evaluated by Dr. Vasquez and discharged home and returned to [facility] on  for elective surgery. Patient underwent OPCAB x 4 with Dr. Vasquez on , procedure was uncomplicated and she arrived to CT ICU intubated and stable. She was extubated POD#1, she continued to remain stable and transferred to  on POD#4. She continued to ambulate without difficulty, on room air, tolerating PO and as per Dr. Vasquez is ready for discharge home on POD#5.     Of note, patient found to have small R sided effusion on CXR and ultrasound, as per Dr. Vasquez patient discharged home on Lasix 20mg PO and will follow up in 1 week with repeat CXR Pa/Lat.       Vital Signs Last 24 Hrs  T(C): 37.1 (17 Sep 2018 09:51), Max: 37.2 (17 Sep 2018 05:50)  T(F): 98.7 (17 Sep 2018 09:51), Max: 99 (17 Sep 2018 05:50)  HR: 84 (17 Sep 2018 08:40) (84 - 96)  BP: 144/81 (17 Sep 2018 08:40) (132/88 - 167/86)  BP(mean): 107 (17 Sep 2018 08:40) (101 - 121)  RR: 16 (17 Sep 2018 08:40) (15 - 16)  SpO2: 97% (17 Sep 2018 08:40) (96% - 100%)  I&O's Detail    16 Sep 2018 07:01  -  17 Sep 2018 07:00  --------------------------------------------------------  IN:    Oral Fluid: 1325 mL  Total IN: 1325 mL    OUT:    Voided: 3965 mL  Total OUT: 3965 mL    Total NET: -2640 mL          EPICARDIAL WIRES REMOVED: Yes.  TIE DOWNS REMOVED: Yes    PHYSICAL EXAM:    General: Patient lying comfortably in bed, no acute distress     Neurological: Alert and oriented. No focal neurological deficits     Cardiovascular: S1S2, RRR, no murmurs appreciated on exam     Respiratory: Clear to ausculation bilaterally     Gastrointestinal: Abdomen soft, non tender, non distended     Extremities: Warm and well perfused. No edema or calf tenderness     Vascular: Peripheral pulses 2+ bilaterally     Incision Sites:  Sternotomy incision C/D/I, no drainage or surrounding erythema  L radial EVH site C/D/I   LLE EVH site C/D/I     LABS:                        11.8   7.7   )-----------( 348      ( 17 Sep 2018 06:20 )             35.1       COUMADIN:  No    PT/INR - ( 17 Sep 2018 06:20 )   PT: 12.8 sec;   INR: 1.15          PTT - ( 17 Sep 2018 06:20 )  PTT:31.9 sec        138  |  98  |  19  ----------------------------<  98  4.0   |  27  |  0.99    Ca    9.0      17 Sep 2018 06:20  Phos  2.9     -16  Mg     1.9     -17            MEDICATIONS  (STANDING): See Med Rec     Discharge CXR: pending official read, small right effusion improved compared to prior exam     - Please follow up with Dr. Vasquez on 18 @ 11:30AM.  The office is located at [facility], [facility], 4th floor. Call us with any questions #[phone].    - Please follow up with Dr. Shaikh Neumann on 18 at 11:30 AM. His office information is listed below, please call  [phone] with any questions.

## 2018-09-17 NOTE — DISCHARGE NOTE ADULT - PATIENT PORTAL LINK FT
You can access the [facility], offered by [facility], by registering with the following website: [email or web address]

## 2018-09-17 NOTE — DISCHARGE NOTE ADULT - HOSPITAL COURSE
37 year old female, former smoker with strong family history of heart disease (father  at age 42 from MI) with PMHx of HTN, HLD, DM, recent MI 2017 medically managed in [place] originally presented to [facility] ED on 18 complaining of worsening exertional chest pain. She underwent cardiac cath on  revealing normal LM, 85% pLAD lesion, 80% mLAD lesion, 50% mD1 lesion, 50-60% mLCx lesion, 50% mRCA lesion, 80% dRCA lesion. EF:40-45%. She was evaluated by Dr. Vasquez and discharged home and returned to [facility] on  for elective surgery. Patient underwent OPCAB x 4 with Dr. Vasquez on , procedure was uncomplicated and she arrived to CT ICU intubated and stable. She was extubated POD#1, she continued to remain stable and transferred to  on POD#4. She continued to ambulate without difficulty, on room air, tolerating PO and as per Dr. Vasquez is ready for discharge home on POD#5.     Of note, patient found to have small R sided effusion on CXR and ultrasound, as per Dr. Vasquez patient discharged home on Lasix 20mg PO and will follow up in 1 week with repeat CXR Pa/Lat.

## 2018-09-17 NOTE — DISCHARGE NOTE ADULT - MEDICATION SUMMARY - MEDICATIONS TO TAKE
I will START or STAY ON the medications listed below when I get home from the hospital:    aspirin 81 mg oral delayed release tablet  -- 1 tab(s) by mouth once a day  -- Indication: For Heart Disease    acetaminophen 325 mg oral tablet  -- 2 tab(s) by mouth every 6 hours, As needed, Mild Pain (1 - 3). Please do not exceed more than 4g of acetaminophen per day  -- Indication: For As needed for mild pain     mg oral tablet  -- 1 tab(s) by mouth every 6 hours, As Needed for moderate pain  -- Do not take this drug if you are pregnant.  It is very important that you take or use this exactly as directed.  Do not skip doses or discontinue unless directed by your doctor.  May cause drowsiness or dizziness.  Obtain medical advice before taking any non-prescription drugs as some may affect the action of this medication.  Take with food or milk.    -- Indication: For As needed for moderate pian    oxyCODONE-acetaminophen 5 mg-325 mg oral tablet  -- 1 tab(s) by mouth every 6 hours, As Needed for severe pain MDD:4. Please do not exceed more than 4g of acetaminophen per day  -- Caution federal law prohibits the transfer of this drug to any person other  than the person for whom it was prescribed.  May cause drowsiness.  Alcohol may intensify this effect.  Use care when operating dangerous machinery.  This prescription cannot be refilled.  This product contains acetaminophen.  Do not use  with any other product containing acetaminophen to prevent possible liver damage.  Using more of this medication than prescribed may cause serious breathing problems.    -- Indication: For As needed for severe pain    gabapentin 100 mg oral capsule  -- 1 cap(s) by mouth once a day  -- Indication: For Diabetic neuropathy     metFORMIN 1000 mg oral tablet  -- 1 tab(s) by mouth 2 times a day  -- Indication: For Diabetes    simvastatin 40 mg oral tablet  -- 1 tab(s) by mouth once a day (at bedtime)  -- Indication: For Cholesterol    clopidogrel 75 mg oral tablet  -- 1 tab(s) by mouth once a day  -- Indication: For Heart Disease    metoprolol tartrate 25 mg oral tablet  -- 1 tab(s) by mouth every 8 hours  -- Indication: For Heart Disease    furosemide 20 mg oral tablet  -- 1 tab(s) by mouth once a day  -- Indication: For Water pill    docusate sodium 100 mg oral capsule  -- 1 cap(s) by mouth 2 times a day  -- Indication: For Stool softener, nold for loose stool    polyethylene glycol 3350 oral powder for reconstitution  -- 17 gram(s) by mouth once a day, As Needed for constipation  -- Indication: For As needed  for constipation     potassium chloride 10 mEq oral tablet, extended release  -- 1 tab(s) by mouth once a day  -- Indication: For Potassium to take while taking furosemide     pantoprazole 40 mg oral delayed release tablet  -- 1 tab(s) by mouth once a day (before a meal)  -- Indication: For GI protection

## 2018-09-17 NOTE — CHART NOTE - NSCHARTNOTEFT_GEN_A_CORE
As per Dr. Vasquez epicardial wires to be removed prior to discharge. Ventricular epicardial wire cleansed with chlorhexidine and cut at skin level. Patient tolerated procedure well and remained hemodynamically stable.

## 2018-09-17 NOTE — DISCHARGE NOTE ADULT - CARE PLAN
Principal Discharge DX:	CAD (coronary artery disease)  Goal:	s/p CABG  Assessment and plan of treatment:	- Please follow up with Dr. Vasquez on 9/25/18 @ 11:30AM.  The office is located at [facility], [facility], 4th floor. Call us with any questions #[phone].    - Please follow up with Dr. Shaikh Neumann on 9/20/18 at 11:30 AM. His office information is listed below, please call  [phone] with any questions.     -Walk daily as tolerated and use your incentive spirometer every hour.    -No driving or strenuous activity/exercise for 6 weeks, or until cleared by your surgeon.    -Gently clean your incisions with anti-bacterial soap and water, pat dry.  You may leave them open to air.    -Call your doctor if you have shortness of breath, chest pain not relieved by pain medication, dizziness, fever >101.5, or increased redness or drainage from incisions.

## 2018-09-17 NOTE — DISCHARGE NOTE ADULT - PLAN OF CARE
s/p CABG - Please follow up with Dr. Vasquez on 9/25/18 @ 11:30AM.  The office is located at [facility], [facility], 4th floor. Call us with any questions #[phone].    - Please follow up with Dr. Shaikh Neumann on 9/20/18 at 11:30 AM. His office information is listed below, please call  [phone] with any questions.     -Walk daily as tolerated and use your incentive spirometer every hour.    -No driving or strenuous activity/exercise for 6 weeks, or until cleared by your surgeon.    -Gently clean your incisions with anti-bacterial soap and water, pat dry.  You may leave them open to air.    -Call your doctor if you have shortness of breath, chest pain not relieved by pain medication, dizziness, fever >101.5, or increased redness or drainage from incisions.

## 2018-09-18 VITALS — WEIGHT: 134.48 LBS | BODY MASS INDEX: 21.61 KG/M2 | HEIGHT: 65.98 IN

## 2018-09-18 PROBLEM — Z86.79 HISTORY OF HYPERTENSION: Status: RESOLVED | Noted: 2018-09-18 | Resolved: 2018-09-18

## 2018-09-18 PROBLEM — Z86.39 HISTORY OF HYPERLIPIDEMIA: Status: RESOLVED | Noted: 2018-09-18 | Resolved: 2018-09-18

## 2018-09-18 PROBLEM — Z86.39 HISTORY OF DIABETES MELLITUS: Status: RESOLVED | Noted: 2018-09-18 | Resolved: 2018-09-18

## 2018-09-18 PROBLEM — Z82.49 FAMILY HISTORY OF HEART ATTACK: Status: ACTIVE | Noted: 2018-09-18

## 2018-09-18 PROBLEM — Z09 POSTOP CHECK: Status: ACTIVE | Noted: 2018-09-18

## 2018-09-18 PROBLEM — Z87.891 FORMER SMOKER: Status: ACTIVE | Noted: 2018-09-18

## 2018-09-18 PROBLEM — I25.2 HISTORY OF NON-ST ELEVATION MYOCARDIAL INFARCTION (NSTEMI): Status: RESOLVED | Noted: 2018-09-18 | Resolved: 2018-09-18

## 2018-09-18 RX ORDER — METOPROLOL SUCCINATE 25 MG/1
25 TABLET, EXTENDED RELEASE ORAL
Refills: 0 | Status: COMPLETED | COMMUNITY
End: 2018-09-18

## 2018-09-19 DIAGNOSIS — I25.118 ATHEROSCLEROTIC HEART DISEASE OF NATIVE CORONARY ARTERY WITH OTHER FORMS OF ANGINA PECTORIS: ICD-10-CM

## 2018-09-19 DIAGNOSIS — I11.0 HYPERTENSIVE HEART DISEASE WITH HEART FAILURE: ICD-10-CM

## 2018-09-19 DIAGNOSIS — E78.5 HYPERLIPIDEMIA, UNSPECIFIED: ICD-10-CM

## 2018-09-19 DIAGNOSIS — E87.2 ACIDOSIS: ICD-10-CM

## 2018-09-19 DIAGNOSIS — I25.2 OLD MYOCARDIAL INFARCTION: ICD-10-CM

## 2018-09-19 DIAGNOSIS — Z87.891 PERSONAL HISTORY OF NICOTINE DEPENDENCE: ICD-10-CM

## 2018-09-19 DIAGNOSIS — Z98.51 TUBAL LIGATION STATUS: ICD-10-CM

## 2018-09-19 DIAGNOSIS — I50.32 CHRONIC DIASTOLIC (CONGESTIVE) HEART FAILURE: ICD-10-CM

## 2018-09-19 DIAGNOSIS — Z82.49 FAMILY HISTORY OF ISCHEMIC HEART DISEASE AND OTHER DISEASES OF THE CIRCULATORY SYSTEM: ICD-10-CM

## 2018-09-19 DIAGNOSIS — E11.65 TYPE 2 DIABETES MELLITUS WITH HYPERGLYCEMIA: ICD-10-CM

## 2018-09-21 ENCOUNTER — APPOINTMENT (OUTPATIENT)
Age: 38
End: 2018-09-21
Payer: MEDICAID

## 2018-09-21 PROCEDURE — XXXXX: CPT

## 2018-09-24 ENCOUNTER — FORM ENCOUNTER (OUTPATIENT)
Age: 38
End: 2018-09-24

## 2018-09-25 ENCOUNTER — OUTPATIENT (OUTPATIENT)
Dept: OUTPATIENT SERVICES | Facility: HOSPITAL | Age: 38
LOS: 1 days | End: 2018-09-25
Payer: MEDICAID

## 2018-09-25 ENCOUNTER — APPOINTMENT (OUTPATIENT)
Dept: CARDIOTHORACIC SURGERY | Facility: CLINIC | Age: 38
End: 2018-09-25
Payer: MEDICAID

## 2018-09-25 VITALS
BODY MASS INDEX: 21.48 KG/M2 | HEART RATE: 96 BPM | WEIGHT: 133 LBS | RESPIRATION RATE: 20 BRPM | SYSTOLIC BLOOD PRESSURE: 105 MMHG | OXYGEN SATURATION: 98 % | DIASTOLIC BLOOD PRESSURE: 69 MMHG | TEMPERATURE: 97.3 F

## 2018-09-25 DIAGNOSIS — Z98.51 TUBAL LIGATION STATUS: Chronic | ICD-10-CM

## 2018-09-25 DIAGNOSIS — Z98.891 HISTORY OF UTERINE SCAR FROM PREVIOUS SURGERY: Chronic | ICD-10-CM

## 2018-09-25 PROCEDURE — 99024 POSTOP FOLLOW-UP VISIT: CPT

## 2018-09-25 PROCEDURE — 71046 X-RAY EXAM CHEST 2 VIEWS: CPT

## 2018-09-25 PROCEDURE — 71046 X-RAY EXAM CHEST 2 VIEWS: CPT | Mod: 26

## 2018-10-31 ENCOUNTER — MESSAGE (OUTPATIENT)
Age: 38
End: 2018-10-31

## 2018-11-08 ENCOUNTER — MESSAGE (OUTPATIENT)
Age: 38
End: 2018-11-08

## 2018-11-12 ENCOUNTER — MESSAGE (OUTPATIENT)
Age: 38
End: 2018-11-12

## 2018-12-26 PROCEDURE — 99285 EMERGENCY DEPT VISIT HI MDM: CPT | Mod: 25

## 2018-12-26 PROCEDURE — 80053 COMPREHEN METABOLIC PANEL: CPT

## 2018-12-26 PROCEDURE — 85610 PROTHROMBIN TIME: CPT

## 2018-12-26 PROCEDURE — 36415 COLL VENOUS BLD VENIPUNCTURE: CPT

## 2018-12-26 PROCEDURE — 84702 CHORIONIC GONADOTROPIN TEST: CPT

## 2018-12-26 PROCEDURE — 83036 HEMOGLOBIN GLYCOSYLATED A1C: CPT

## 2018-12-26 PROCEDURE — C1760: CPT

## 2018-12-26 PROCEDURE — 82962 GLUCOSE BLOOD TEST: CPT

## 2018-12-26 PROCEDURE — 82553 CREATINE MB FRACTION: CPT

## 2018-12-26 PROCEDURE — 84484 ASSAY OF TROPONIN QUANT: CPT

## 2018-12-26 PROCEDURE — 84443 ASSAY THYROID STIM HORMONE: CPT

## 2018-12-26 PROCEDURE — C1894: CPT

## 2018-12-26 PROCEDURE — 93005 ELECTROCARDIOGRAM TRACING: CPT

## 2018-12-26 PROCEDURE — 85025 COMPLETE CBC W/AUTO DIFF WBC: CPT

## 2018-12-26 PROCEDURE — 71045 X-RAY EXAM CHEST 1 VIEW: CPT

## 2018-12-26 PROCEDURE — C1887: CPT

## 2018-12-26 PROCEDURE — 82550 ASSAY OF CK (CPK): CPT

## 2018-12-26 PROCEDURE — C1889: CPT

## 2018-12-26 PROCEDURE — C1769: CPT

## 2018-12-26 PROCEDURE — 85730 THROMBOPLASTIN TIME PARTIAL: CPT

## 2019-06-06 NOTE — H&P ADULT - WEIGHT IN LBS
Extubation    Cuff leak noted Yes  Stridor present no     FiO2%: 30 % (06/06/19 0800)        Patient toleration Yes  RCP Complete? Yes  Events/Summary/Plan: Parameters obtained. Patient placed back on support per protocol (06/06/19 0734)    Pt extubated, placed on 2 liter nasal cannula.    
Respiratory Trauma Red Note    Intubation yes  Positive Color Change on EZCap? Yes  Difficult Airway no  Number of attempts 1  Evidence of aspiration no                                   
134.9

## 2020-11-16 ENCOUNTER — APPOINTMENT (OUTPATIENT)
Dept: ELECTROPHYSIOLOGY | Facility: CLINIC | Age: 40
End: 2020-11-16
Payer: MEDICAID

## 2020-11-16 VITALS
SYSTOLIC BLOOD PRESSURE: 107 MMHG | DIASTOLIC BLOOD PRESSURE: 72 MMHG | WEIGHT: 130 LBS | BODY MASS INDEX: 20.4 KG/M2 | HEART RATE: 84 BPM | RESPIRATION RATE: 16 BRPM | TEMPERATURE: 96.6 F | OXYGEN SATURATION: 100 % | HEIGHT: 67 IN

## 2020-11-16 DIAGNOSIS — Z86.73 PERSONAL HISTORY OF TRANSIENT ISCHEMIC ATTACK (TIA), AND CEREBRAL INFARCTION W/OUT RESIDUAL DEFICITS: ICD-10-CM

## 2020-11-16 PROCEDURE — 99205 OFFICE O/P NEW HI 60 MIN: CPT

## 2020-11-16 PROCEDURE — 93000 ELECTROCARDIOGRAM COMPLETE: CPT

## 2020-11-16 PROCEDURE — 99072 ADDL SUPL MATRL&STAF TM PHE: CPT

## 2020-11-16 RX ORDER — PEN NEEDLE, DIABETIC 32 GX 1/4"
32G X 6 MM NEEDLE, DISPOSABLE MISCELLANEOUS
Qty: 100 | Refills: 0 | Status: ACTIVE | COMMUNITY
Start: 2020-08-12

## 2020-11-16 RX ORDER — LACTULOSE 10 G/15ML
10 SOLUTION ORAL
Qty: 300 | Refills: 0 | Status: DISCONTINUED | COMMUNITY
Start: 2020-06-09

## 2020-11-16 RX ORDER — POTASSIUM CHLORIDE 750 MG/1
10 CAPSULE, EXTENDED RELEASE ORAL DAILY
Refills: 1 | Status: DISCONTINUED | COMMUNITY
End: 2020-11-16

## 2020-11-16 RX ORDER — GLIMEPIRIDE 4 MG/1
4 TABLET ORAL
Qty: 30 | Refills: 0 | Status: DISCONTINUED | COMMUNITY
Start: 2020-06-10

## 2020-11-16 RX ORDER — BLOOD-GLUCOSE METER
31G X 5/16" EACH MISCELLANEOUS
Qty: 100 | Refills: 0 | Status: ACTIVE | COMMUNITY
Start: 2020-09-06

## 2020-11-16 RX ORDER — BLOOD SUGAR DIAGNOSTIC
STRIP MISCELLANEOUS
Qty: 50 | Refills: 0 | Status: DISCONTINUED | COMMUNITY
Start: 2020-08-12

## 2020-11-16 RX ORDER — DAPAGLIFLOZIN 10 MG/1
10 TABLET, FILM COATED ORAL
Qty: 30 | Refills: 0 | Status: DISCONTINUED | COMMUNITY
Start: 2020-06-05

## 2020-11-16 RX ORDER — SPIRONOLACTONE 25 MG/1
25 TABLET ORAL
Qty: 30 | Refills: 0 | Status: DISCONTINUED | COMMUNITY
Start: 2020-11-05

## 2020-11-16 RX ORDER — SYRINGE AND NEEDLE,INSULIN,1ML 31 GX5/16"
30G X 5/16" SYRINGE, EMPTY DISPOSABLE MISCELLANEOUS
Qty: 100 | Refills: 0 | Status: ACTIVE | COMMUNITY
Start: 2020-06-15

## 2020-11-16 RX ORDER — PEN NEEDLE, DIABETIC 29 G X1/2"
31G X 8 MM NEEDLE, DISPOSABLE MISCELLANEOUS
Qty: 100 | Refills: 0 | Status: ACTIVE | COMMUNITY
Start: 2020-07-06

## 2020-11-16 RX ORDER — INSULIN LISPRO 100 [IU]/ML
100 INJECTION, SOLUTION INTRAVENOUS; SUBCUTANEOUS
Qty: 10 | Refills: 0 | Status: DISCONTINUED | COMMUNITY
Start: 2020-06-10

## 2020-11-16 RX ORDER — BLOOD-GLUCOSE METER
31G X 5 MM EACH MISCELLANEOUS
Qty: 100 | Refills: 0 | Status: ACTIVE | COMMUNITY
Start: 2020-06-15

## 2020-11-16 RX ORDER — METFORMIN HYDROCHLORIDE 500 MG/1
500 TABLET, COATED ORAL
Qty: 60 | Refills: 0 | Status: DISCONTINUED | COMMUNITY
Start: 2020-07-06

## 2020-11-16 RX ORDER — BLOOD PRESSURE TEST KIT-MEDIUM
KIT MISCELLANEOUS
Qty: 1 | Refills: 0 | Status: DISCONTINUED | COMMUNITY
Start: 2020-06-03

## 2020-11-16 RX ORDER — METOLAZONE 10 MG/1
10 TABLET ORAL
Qty: 30 | Refills: 0 | Status: DISCONTINUED | COMMUNITY
Start: 2020-11-05

## 2020-11-16 RX ORDER — METOPROLOL SUCCINATE 25 MG/1
25 CAPSULE, EXTENDED RELEASE ORAL
Qty: 3 | Refills: 0 | Status: DISCONTINUED | COMMUNITY
Start: 2020-07-25

## 2020-11-16 RX ORDER — FUROSEMIDE 40 MG/1
40 TABLET ORAL
Qty: 60 | Refills: 0 | Status: DISCONTINUED | COMMUNITY
Start: 2020-06-01

## 2020-11-16 RX ORDER — ATORVASTATIN CALCIUM 80 MG/1
80 TABLET, FILM COATED ORAL
Qty: 30 | Refills: 0 | Status: DISCONTINUED | COMMUNITY
Start: 2020-06-01

## 2020-11-16 RX ORDER — INSULIN GLARGINE 100 [IU]/ML
100 INJECTION, SOLUTION SUBCUTANEOUS
Qty: 15 | Refills: 0 | Status: DISCONTINUED | COMMUNITY
Start: 2020-06-11

## 2020-11-16 RX ORDER — IBUPROFEN 400 MG/1
400 TABLET, FILM COATED ORAL
Refills: 0 | Status: DISCONTINUED | COMMUNITY
End: 2020-11-16

## 2020-11-16 RX ORDER — FUROSEMIDE 80 MG/1
80 TABLET ORAL
Qty: 30 | Refills: 0 | Status: DISCONTINUED | COMMUNITY
Start: 2020-11-05

## 2020-11-16 RX ORDER — OXYCODONE HYDROCHLORIDE AND ACETAMINOPHEN 5; 325 MG/1; MG/1
5-325 TABLET ORAL
Refills: 0 | Status: DISCONTINUED | COMMUNITY
End: 2020-11-16

## 2020-11-16 RX ORDER — GABAPENTIN 300 MG/1
300 CAPSULE ORAL
Qty: 30 | Refills: 0 | Status: DISCONTINUED | COMMUNITY
Start: 2020-07-06

## 2020-11-16 NOTE — PHYSICAL EXAM
[General Appearance - Well Developed] : well developed [Normal Appearance] : normal appearance [Well Groomed] : well groomed [General Appearance - Well Nourished] : well nourished [No Deformities] : no deformities [General Appearance - In No Acute Distress] : no acute distress [Normal Conjunctiva] : the conjunctiva exhibited no abnormalities [Eyelids - No Xanthelasma] : the eyelids demonstrated no xanthelasmas [Normal Oral Mucosa] : normal oral mucosa [No Oral Pallor] : no oral pallor [No Oral Cyanosis] : no oral cyanosis [Normal Jugular Venous A Waves Present] : normal jugular venous A waves present [Normal Jugular Venous V Waves Present] : normal jugular venous V waves present [No Jugular Venous Luong A Waves] : no jugular venous luong A waves [Heart Rate And Rhythm] : heart rate and rhythm were normal [Heart Sounds] : normal S1 and S2 [Murmurs] : no murmurs present [Respiration, Rhythm And Depth] : normal respiratory rhythm and effort [Exaggerated Use Of Accessory Muscles For Inspiration] : no accessory muscle use [Auscultation Breath Sounds / Voice Sounds] : lungs were clear to auscultation bilaterally [Abdomen Soft] : soft [Abdomen Tenderness] : non-tender [Abdomen Mass (___ Cm)] : no abdominal mass palpated [Abnormal Walk] : normal gait [Gait - Sufficient For Exercise Testing] : the gait was sufficient for exercise testing [Nail Clubbing] : no clubbing of the fingernails [Cyanosis, Localized] : no localized cyanosis [Petechial Hemorrhages (___cm)] : no petechial hemorrhages [Skin Color & Pigmentation] : normal skin color and pigmentation [] : no rash [No Venous Stasis] : no venous stasis [Skin Lesions] : no skin lesions [No Skin Ulcers] : no skin ulcer [No Xanthoma] : no  xanthoma was observed [Oriented To Time, Place, And Person] : oriented to person, place, and time [Affect] : the affect was normal [Mood] : the mood was normal [No Anxiety] : not feeling anxious

## 2020-11-16 NOTE — HISTORY OF PRESENT ILLNESS
[FreeTextEntry1] : Shaik Nel MD\par \par Jocelyne Conrad is a 40y/o woman with Hx of HTN, HLD, both of which are stable, DMII, CAD s/p CABG, LV thrombus, on warfarin, CVA, and ICM/chronic systolic CHF who presents today for initial evaluation. Admits stable dyspnea on exertion. Compliant with medication as prescribed. On OMT including Entresto and metoprolol. Denies chest pain, palpitations, SOB at rest, syncope or near syncope.\par

## 2020-11-16 NOTE — DISCUSSION/SUMMARY
[FreeTextEntry1] : Jocelyne Conrad is a 40y/o woman with Hx of HTN, HLD, both of which are stable, DMII, CAD s/p CABG, LV thrombus, on warfarin, CVA, and ICM/chronic systolic CHF who presents today for initial evaluation. \par \par Impression:\par \par 1. ICM/chronic systolic CHF: EKG today NSR. NYHA Class II symptoms. LVEF < 35% despite > 3 mo of OMT. Recommend placement of ICD for primary prevention of SCD. Risks, benefits, and alternatives discussed. \par \par 2. HTN: resume oral antihypertensives as prescribed. Encouraged heart healthy diet, sodium restriction, and weight loss. Continue regular f/u with Cardiologist for further HTN management.\par \par 3. HLD: resume statin therapy as prescribed and regular f/u with Cardiologist for routine lipid monitoring and management.\par \par 4. LV thrombus: resume warfarin as prescribed.\par \par Plan for dual chamber ICD placement.

## 2020-11-17 ENCOUNTER — NON-APPOINTMENT (OUTPATIENT)
Age: 40
End: 2020-11-17

## 2020-11-27 ENCOUNTER — OUTPATIENT (OUTPATIENT)
Dept: OUTPATIENT SERVICES | Facility: HOSPITAL | Age: 40
LOS: 1 days | End: 2020-11-27

## 2020-11-27 VITALS
OXYGEN SATURATION: 99 % | SYSTOLIC BLOOD PRESSURE: 126 MMHG | HEIGHT: 64.5 IN | WEIGHT: 130.95 LBS | TEMPERATURE: 97 F | DIASTOLIC BLOOD PRESSURE: 70 MMHG | RESPIRATION RATE: 16 BRPM | HEART RATE: 87 BPM

## 2020-11-27 DIAGNOSIS — E11.9 TYPE 2 DIABETES MELLITUS WITHOUT COMPLICATIONS: ICD-10-CM

## 2020-11-27 DIAGNOSIS — I25.5 ISCHEMIC CARDIOMYOPATHY: ICD-10-CM

## 2020-11-27 DIAGNOSIS — Z01.818 ENCOUNTER FOR OTHER PREPROCEDURAL EXAMINATION: ICD-10-CM

## 2020-11-27 DIAGNOSIS — Z98.891 HISTORY OF UTERINE SCAR FROM PREVIOUS SURGERY: Chronic | ICD-10-CM

## 2020-11-27 DIAGNOSIS — I25.10 ATHEROSCLEROTIC HEART DISEASE OF NATIVE CORONARY ARTERY WITHOUT ANGINA PECTORIS: Chronic | ICD-10-CM

## 2020-11-27 DIAGNOSIS — Z98.51 TUBAL LIGATION STATUS: Chronic | ICD-10-CM

## 2020-11-27 DIAGNOSIS — Z86.79 PERSONAL HISTORY OF OTHER DISEASES OF THE CIRCULATORY SYSTEM: ICD-10-CM

## 2020-11-27 LAB
ALBUMIN SERPL ELPH-MCNC: 4.1 G/DL — SIGNIFICANT CHANGE UP (ref 3.3–5)
ALP SERPL-CCNC: 87 U/L — SIGNIFICANT CHANGE UP (ref 40–120)
ALT FLD-CCNC: 13 U/L — SIGNIFICANT CHANGE UP (ref 4–33)
ANION GAP SERPL CALC-SCNC: 17 MMO/L — HIGH (ref 7–14)
APTT BLD: 32.7 SEC — SIGNIFICANT CHANGE UP (ref 27–36.3)
AST SERPL-CCNC: 16 U/L — SIGNIFICANT CHANGE UP (ref 4–32)
BILIRUB SERPL-MCNC: 0.3 MG/DL — SIGNIFICANT CHANGE UP (ref 0.2–1.2)
BUN SERPL-MCNC: 29 MG/DL — HIGH (ref 7–23)
CALCIUM SERPL-MCNC: 9.7 MG/DL — SIGNIFICANT CHANGE UP (ref 8.4–10.5)
CHLORIDE SERPL-SCNC: 97 MMOL/L — LOW (ref 98–107)
CO2 SERPL-SCNC: 22 MMOL/L — SIGNIFICANT CHANGE UP (ref 22–31)
CREAT SERPL-MCNC: 1.27 MG/DL — SIGNIFICANT CHANGE UP (ref 0.5–1.3)
GLUCOSE SERPL-MCNC: 173 MG/DL — HIGH (ref 70–99)
HBA1C BLD-MCNC: 9.4 % — HIGH (ref 4–5.6)
HCT VFR BLD CALC: 33.7 % — LOW (ref 34.5–45)
HGB BLD-MCNC: 9.3 G/DL — LOW (ref 11.5–15.5)
INR BLD: 2.07 — HIGH (ref 0.88–1.16)
MCHC RBC-ENTMCNC: 20.4 PG — LOW (ref 27–34)
MCHC RBC-ENTMCNC: 27.6 % — LOW (ref 32–36)
MCV RBC AUTO: 73.7 FL — LOW (ref 80–100)
NRBC # FLD: 0 K/UL — SIGNIFICANT CHANGE UP (ref 0–0)
PLATELET # BLD AUTO: 479 K/UL — HIGH (ref 150–400)
PMV BLD: 11.4 FL — SIGNIFICANT CHANGE UP (ref 7–13)
POTASSIUM SERPL-MCNC: 4.3 MMOL/L — SIGNIFICANT CHANGE UP (ref 3.5–5.3)
POTASSIUM SERPL-SCNC: 4.3 MMOL/L — SIGNIFICANT CHANGE UP (ref 3.5–5.3)
PROT SERPL-MCNC: 8.8 G/DL — HIGH (ref 6–8.3)
PROTHROM AB SERPL-ACNC: 22.8 SEC — HIGH (ref 10.6–13.6)
RBC # BLD: 4.57 M/UL — SIGNIFICANT CHANGE UP (ref 3.8–5.2)
RBC # FLD: 19.6 % — HIGH (ref 10.3–14.5)
SODIUM SERPL-SCNC: 136 MMOL/L — SIGNIFICANT CHANGE UP (ref 135–145)
WBC # BLD: 10.39 K/UL — SIGNIFICANT CHANGE UP (ref 3.8–10.5)
WBC # FLD AUTO: 10.39 K/UL — SIGNIFICANT CHANGE UP (ref 3.8–10.5)

## 2020-11-27 RX ORDER — METFORMIN HYDROCHLORIDE 850 MG/1
0 TABLET ORAL
Qty: 0 | Refills: 0 | DISCHARGE

## 2020-11-27 RX ORDER — GABAPENTIN 400 MG/1
1 CAPSULE ORAL
Qty: 0 | Refills: 0 | DISCHARGE

## 2020-11-27 NOTE — H&P PST ADULT - NSICDXPROBLEM_GEN_ALL_CORE_FT
PROBLEM DIAGNOSES  Problem: History of CHF (congestive heart failure)  Assessment and Plan: This is a 40 y/o female who is scheduled for implantable cardioverter defibrillator implant on 12-4-20  * Scheduled for covid testing  * Given preop and cleanser instructions with good teach back and patient verbalized understanding  * Await stress test and echo done by Dr. Neumann  * Instructed to take normal am dose of        PROBLEM DIAGNOSES  Problem: History of CHF (congestive heart failure)  Assessment and Plan: This is a 38 y/o female who is scheduled for implantable cardioverter defibrillator implant on 12-4-20  * Scheduled for covid testing  * Given preop and cleanser instructions with good teach back and patient verbalized understanding  * Await stress test and echo done by Dr. Neumann  * Instructed to take normal am dose of     Problem: Diabetes mellitus  Assessment and Plan: Await stress test and echo from cardiologist  * Instructed to continue aspirin and warfarin as per protocol  * Instructed to stop metformin 24 hours before surgery  * Instructed to stop farxiga 3 days before surgery  * Instructed to take Lantus 8 units (80% normal pm dose) the night before surgery  * Instructed to take normal am dose of aspirin, furosemide, metoprolol, entresto, and famotidine the am of surgery       PROBLEM DIAGNOSES  Problem: History of CHF (congestive heart failure)  Assessment and Plan: This is a 38 y/o female who is scheduled for implantable cardioverter defibrillator implant on 12-4-20  * Scheduled for covid testing  * Given preop and cleanser instructions with good teach back and patient verbalized understanding      Problem: Diabetes mellitus  Assessment and Plan: Await stress test and echo from cardiologist  * Instructed to continue aspirin and warfarin as per protocol  * Instructed to stop metformin 24 hours before surgery  * Instructed to stop farxiga 3 days before surgery  * Instructed to take Lantus 8 units (80% normal pm dose) the night before surgery  * Instructed to take normal am dose of aspirin, furosemide, metoprolol, entresto, and famotidine the am of surgery

## 2020-11-27 NOTE — H&P PST ADULT - NSICDXPASTMEDICALHX_GEN_ALL_CORE_FT
PAST MEDICAL HISTORY:  Cerebrovascular accident (CVA) residual of right sided weakness    Coronary artery disease had CABG x 4    H/O congestive heart disease ICM/chronic systolic CHF as per Dr. Diana Menjivar    History of cardiomyopathy     Hyperlipidemia     Hypertension     MI (myocardial infarction) 12/2017    Thrombus left ventricular mural thrombus    Type 2 diabetes mellitus

## 2020-11-27 NOTE — H&P PST ADULT - REASON FOR ADMISSION
they are putting in a pacemaker (patient had to be told by NP that she is having a defibrillator inserted. Instructed her to call DR. Menjivar and inquire why she is having the defibrillator

## 2020-11-27 NOTE — H&P PST ADULT - NSICDXFAMILYHX_GEN_ALL_CORE_FT
FAMILY HISTORY:  Father  Still living? Unknown  Family history of heart disease, Age at diagnosis: 41-50

## 2020-11-27 NOTE — H&P PST ADULT - ENDOCRINE COMMENTS
denies thyroid disease; DM type II -- finger sticks in the am range 110-130; in the afternoon 160-170

## 2020-11-27 NOTE — H&P PST ADULT - HISTORY OF PRESENT ILLNESS
This is a 38 y/o female who presents with h/o hypertension, HLD DM type II, CAD s/p CABG x 4, LV thrombus (on warfarin), CVA and ICM/Chronic systolic CHF who is scheduled for a defibrillator. Asymptomatic except for stable dyspnea on exertion. Scheduled for implantable cardioverter defibrillator implant This is a 38 y/o female who presents with h/o hypertension, HLD DM type II, CAD s/p CABG x 4, LV thrombus (on warfarin), CVA and ICM/Chronic systolic CHF who is scheduled for a defibrillator. Asymptomatic except for stable dyspnea on exertion. Scheduled for implantable cardioverter defibrillator implant)  ADDENDUM: 11-30-20  Spoke with Kerry in surgeon's office and confirmed patient is having an AICD placement

## 2020-11-27 NOTE — H&P PST ADULT - NSICDXPASTSURGICALHX_GEN_ALL_CORE_FT
PAST SURGICAL HISTORY:  Coronary artery disease CABG x 4 in 2018    H/O tubal ligation 2016    S/P

## 2020-11-30 ENCOUNTER — NON-APPOINTMENT (OUTPATIENT)
Age: 40
End: 2020-11-30

## 2020-11-30 PROBLEM — Z86.79 PERSONAL HISTORY OF OTHER DISEASES OF THE CIRCULATORY SYSTEM: Chronic | Status: ACTIVE | Noted: 2020-11-27

## 2020-11-30 PROBLEM — I25.10 ATHEROSCLEROTIC HEART DISEASE OF NATIVE CORONARY ARTERY WITHOUT ANGINA PECTORIS: Chronic | Status: ACTIVE | Noted: 2020-11-27

## 2020-11-30 PROBLEM — I82.90 ACUTE EMBOLISM AND THROMBOSIS OF UNSPECIFIED VEIN: Chronic | Status: ACTIVE | Noted: 2020-11-27

## 2020-11-30 PROBLEM — I63.9 CEREBRAL INFARCTION, UNSPECIFIED: Chronic | Status: ACTIVE | Noted: 2020-11-27

## 2020-12-01 ENCOUNTER — APPOINTMENT (OUTPATIENT)
Dept: DISASTER EMERGENCY | Facility: CLINIC | Age: 40
End: 2020-12-01

## 2020-12-02 LAB — SARS-COV-2 N GENE NPH QL NAA+PROBE: NOT DETECTED

## 2020-12-04 ENCOUNTER — OUTPATIENT (OUTPATIENT)
Dept: OUTPATIENT SERVICES | Facility: HOSPITAL | Age: 40
LOS: 1 days | Discharge: HOME | End: 2020-12-04
Payer: MEDICAID

## 2020-12-04 ENCOUNTER — RESULT REVIEW (OUTPATIENT)
Age: 40
End: 2020-12-04

## 2020-12-04 DIAGNOSIS — Z98.51 TUBAL LIGATION STATUS: Chronic | ICD-10-CM

## 2020-12-04 DIAGNOSIS — I25.10 ATHEROSCLEROTIC HEART DISEASE OF NATIVE CORONARY ARTERY WITHOUT ANGINA PECTORIS: Chronic | ICD-10-CM

## 2020-12-04 DIAGNOSIS — I25.5 ISCHEMIC CARDIOMYOPATHY: ICD-10-CM

## 2020-12-04 DIAGNOSIS — Z98.891 HISTORY OF UTERINE SCAR FROM PREVIOUS SURGERY: Chronic | ICD-10-CM

## 2020-12-04 LAB
GLUCOSE BLDC GLUCOMTR-MCNC: 124 MG/DL — HIGH (ref 70–99)
GLUCOSE BLDC GLUCOMTR-MCNC: 165 MG/DL — HIGH (ref 70–99)
INR BLD: 2.12 — HIGH (ref 0.88–1.16)
PROTHROM AB SERPL-ACNC: 23.5 SEC — HIGH (ref 10.6–13.6)

## 2020-12-04 PROCEDURE — 71045 X-RAY EXAM CHEST 1 VIEW: CPT | Mod: 26

## 2020-12-04 PROCEDURE — 93010 ELECTROCARDIOGRAM REPORT: CPT

## 2020-12-04 PROCEDURE — 33249 INSJ/RPLCMT DEFIB W/LEAD(S): CPT

## 2020-12-04 RX ORDER — SODIUM CHLORIDE 9 MG/ML
3 INJECTION INTRAMUSCULAR; INTRAVENOUS; SUBCUTANEOUS EVERY 8 HOURS
Refills: 0 | Status: DISCONTINUED | OUTPATIENT
Start: 2020-12-04 | End: 2020-12-18

## 2020-12-04 RX ORDER — FUROSEMIDE 40 MG
60 TABLET ORAL
Qty: 0 | Refills: 0 | DISCHARGE

## 2020-12-04 NOTE — CHART NOTE - NSCHARTNOTEFT_GEN_A_CORE
Type of Procedure: ICD implant  Licensed independent practitioner: Diana Menjivar MD  Assistant: None  Description of procedure:   After informed consent was obtained, and in the fasting state, the patient was prepped and draped in the usual sterile fashion.  Vancomycin 1g was administered for prophylaxis.    Local anesthetic was delivered to the left pectoral region, and an incision was made in the left deltopectoral groove.  The incision was extended inward by blunt dissection and the cephalic vein was identified.  The vein was cannulated and an 9F peel away sheath was placed in the vein over a retaining wire.  A single coil active fixation lead was advanced to the right ventricle under fluoroscopic guidance and screwed in place.  Ventricular pacing and sensing thresholds were checked and were good.    Next, A 7F peel away sheath was then placed in the vein along with the ventricular lead, and a pacing lead was placed in the RAA under fluoroscopic guidance.  Atrial pacing and sensing thresholds were checked and were good.  No far field sensing by the atrial lead was noted. High output pacing from neither lead resulted in diaphragmatic pacing.  The leads were sutured to the underlying pectoralis muscle using 2-0 Ethibond suture.  Final sensing and pacing thresholds were checked and were good.  A subcutaneous pocket was created using blunt dissection, and was copiously irrigated with a saline solution containing gentamicin and vancomycin.  The leads were connected to a +DR ICD, and the entire system was implanted into the pocket.    The subcutaneous tissues were then closed with a layer of interrupted 2-0 vicryl suture and a running layer of 2-0 vicryl suture and the skin was closed with dermabond.  The wound was covered with a dry sterile dressing.  The patient tolerated the procedure well and left the laboratory in good condition.    Conscious sedation was provided and appropriate monitoring performed by members of the EP nursing staff and Anesthesia.  During the procedure, a MDT rep was present to manage a complex .    Findings of procedure: As above  Estimated blood loss: 10CC  Specimen removed: N/A  Preoperative Dx: Cardiomyopathy  Postoperative Dx: Cardiomyopathy  Complications: None   Anesthesia type: Conscious

## 2020-12-04 NOTE — CHART NOTE - NSCHARTNOTEFT_GEN_A_CORE
40 y/o F w/ PMH pf HTN, HLD, DMII, CAD s/p CABG, LV thrombus on warfarin, CVA and ICM/chronic systolic CHF who presents today for elective ICD implantation. LVEF < 35% despite > 3 mo of OMT. ICD will be placed for primary prevention of SCD.    PST H&P and labs reviewed  Pt is COVID negative as of 12/1/2020  Pt has no complaints at this time

## 2020-12-04 NOTE — CHART NOTE - NSCHARTNOTEFT_GEN_A_CORE
Patient is s/p ICD implantation. Device site clean, dry and intact with no bleeding or hematoma. Device teaching given to patient and she demonstrates understanding of the instructions. Written instructions given. F/U appointment with device clinic on 12/17/20 @ 2:30 pm

## 2020-12-07 ENCOUNTER — NON-APPOINTMENT (OUTPATIENT)
Age: 40
End: 2020-12-07

## 2020-12-16 ENCOUNTER — APPOINTMENT (OUTPATIENT)
Dept: ELECTROPHYSIOLOGY | Facility: CLINIC | Age: 40
End: 2020-12-16
Payer: MEDICAID

## 2020-12-16 PROCEDURE — 99024 POSTOP FOLLOW-UP VISIT: CPT

## 2021-02-25 ENCOUNTER — APPOINTMENT (OUTPATIENT)
Dept: ELECTROPHYSIOLOGY | Facility: CLINIC | Age: 41
End: 2021-02-25
Payer: MEDICAID

## 2021-02-25 VITALS — DIASTOLIC BLOOD PRESSURE: 84 MMHG | SYSTOLIC BLOOD PRESSURE: 131 MMHG

## 2021-02-25 PROCEDURE — 93283 PRGRMG EVAL IMPLANTABLE DFB: CPT

## 2021-02-25 PROCEDURE — 99072 ADDL SUPL MATRL&STAF TM PHE: CPT

## 2021-02-25 RX ORDER — WARFARIN 6 MG/1
6 TABLET ORAL
Qty: 16 | Refills: 0 | Status: DISCONTINUED | COMMUNITY
Start: 2020-06-04 | End: 2021-02-25

## 2021-02-25 RX ORDER — WARFARIN 1 MG/1
1 TABLET ORAL
Qty: 6 | Refills: 0 | Status: DISCONTINUED | COMMUNITY
Start: 2020-08-03 | End: 2021-02-25

## 2021-02-25 RX ORDER — CLOPIDOGREL BISULFATE 75 MG/1
75 TABLET, FILM COATED ORAL DAILY
Qty: 1 | Refills: 0 | Status: DISCONTINUED | COMMUNITY
End: 2021-02-25

## 2021-03-04 ENCOUNTER — APPOINTMENT (OUTPATIENT)
Dept: CARDIOLOGY | Facility: CLINIC | Age: 41
End: 2021-03-04
Payer: MEDICAID

## 2021-03-04 ENCOUNTER — NON-APPOINTMENT (OUTPATIENT)
Age: 41
End: 2021-03-04

## 2021-03-04 ENCOUNTER — LABORATORY RESULT (OUTPATIENT)
Age: 41
End: 2021-03-04

## 2021-03-04 VITALS
RESPIRATION RATE: 16 BRPM | SYSTOLIC BLOOD PRESSURE: 125 MMHG | BODY MASS INDEX: 20.4 KG/M2 | DIASTOLIC BLOOD PRESSURE: 85 MMHG | WEIGHT: 130 LBS | TEMPERATURE: 94.6 F | OXYGEN SATURATION: 100 % | HEART RATE: 94 BPM | HEIGHT: 67 IN

## 2021-03-04 PROCEDURE — 99204 OFFICE O/P NEW MOD 45 MIN: CPT

## 2021-03-04 PROCEDURE — 99072 ADDL SUPL MATRL&STAF TM PHE: CPT

## 2021-03-04 PROCEDURE — 93000 ELECTROCARDIOGRAM COMPLETE: CPT

## 2021-03-04 PROCEDURE — 36415 COLL VENOUS BLD VENIPUNCTURE: CPT

## 2021-03-04 RX ORDER — FUROSEMIDE 40 MG/1
40 TABLET ORAL TWICE DAILY
Qty: 60 | Refills: 5 | Status: DISCONTINUED | COMMUNITY
End: 2021-03-04

## 2021-03-04 RX ORDER — GABAPENTIN 100 MG/1
100 CAPSULE ORAL
Qty: 90 | Refills: 3 | Status: DISCONTINUED | COMMUNITY
End: 2021-03-04

## 2021-03-04 RX ORDER — METOPROLOL TARTRATE 25 MG/1
25 TABLET, FILM COATED ORAL
Refills: 0 | Status: DISCONTINUED | COMMUNITY
End: 2021-03-04

## 2021-03-04 RX ORDER — SIMVASTATIN 40 MG/1
40 TABLET, FILM COATED ORAL
Qty: 30 | Refills: 0 | Status: DISCONTINUED | COMMUNITY
End: 2021-03-04

## 2021-03-04 NOTE — REVIEW OF SYSTEMS
[Dyspnea on exertion] : dyspnea during exertion [Negative] : Heme/Lymph [Lower Ext Edema] : lower extremity edema

## 2021-03-04 NOTE — PHYSICAL EXAM
[Normal Appearance] : normal appearance [Well Groomed] : well groomed [General Appearance - In No Acute Distress] : no acute distress [Normal Oral Mucosa] : normal oral mucosa [Normal Oropharynx] : normal oropharynx [Heart Rate And Rhythm] : heart rate and rhythm were normal [Heart Sounds] : normal S1 and S2 [Arterial Pulses Normal] : the arterial pulses were normal [] : no respiratory distress [Respiration, Rhythm And Depth] : normal respiratory rhythm and effort [Bowel Sounds] : normal bowel sounds [Abdomen Soft] : soft [Abdomen Tenderness] : non-tender [Abnormal Walk] : normal gait [Nail Clubbing] : no clubbing of the fingernails [Cyanosis, Localized] : no localized cyanosis [FreeTextEntry1] : 1+ lower extremity edema bilaterally [Skin Color & Pigmentation] : normal skin color and pigmentation [Oriented To Time, Place, And Person] : oriented to person, place, and time [Affect] : the affect was normal

## 2021-03-04 NOTE — ASSESSMENT
[FreeTextEntry1] : 39y/o woman with Hx of HTN, HLD,  DMII, CAD s/p 4 V CABG ( [place] 2018), LV thrombus, on warfarin but changed to Eliquis, CVA, and ICM/chronic systolic CHF, S/P ICD 12/4/21 who presents today for initial evaluation secondary to chronic systolic heart failure LVEF 25%. Pt had a recent admission at [facility] 1 month ago with increasing edema and orthopnea and was transferred to [place] where she had a C and reports grafts were open. Pt is here for an initial HF evaluation to assist with optimization of HF therapy. \par ACC/AHA Stage C, NYHA Class II-III\par Appears mildly volume overloaded and normotensive

## 2021-03-04 NOTE — DISCUSSION/SUMMARY
[___ Week(s)] : [unfilled] week(s) [FreeTextEntry1] : 1. ICM/chronic systolic CHF: EKG today NSR. NYHA Class II-III symptoms\par - . LVEF < 35% on prior TTE and is S/P ICD\par - d/c furosemide and will start torsemide 40 mg daily\par - d/c metoprolol and will start Coreg 6.25 mg bid\par - schedule TTE to eval LVEF\par - lab work today and will up titrate Entresto as tolerated, currently on 24-26 mg bid\par \par 2. HTN B/P 125/85\par - Coreg as above\par - Entresto as above \par -  Encouraged heart healthy diet, sodium restriction, and weight loss. \par \par 3. HLD: \par - continue statin therapy as prescribed and regular f/u with Cardiologist for routine lipid monitoring and management.\par \par 4. LV thrombus/ Prior CVA\par - continue Eliquis 5 mg bid\par - schedule Carotid Dopplers\par \par 5. CAD S/P 4 V CABG- no active chest pain or EKG changes\par - continue ASA 81 mg daily\par - continue statin\par \par Follow up in office in 2 weeks, will call with labs\par \par

## 2021-03-05 LAB
25(OH)D3 SERPL-MCNC: 12.7 NG/ML
ALBUMIN SERPL ELPH-MCNC: 3.3 G/DL
ALP BLD-CCNC: 88 U/L
ALT SERPL-CCNC: 7 U/L
ANION GAP SERPL CALC-SCNC: 15 MMOL/L
AST SERPL-CCNC: 19 U/L
BASOPHILS # BLD AUTO: 0.07 K/UL
BASOPHILS NFR BLD AUTO: 0.8 %
BILIRUB SERPL-MCNC: 0.3 MG/DL
BUN SERPL-MCNC: 16 MG/DL
CALCIUM SERPL-MCNC: 9.8 MG/DL
CHLORIDE SERPL-SCNC: 97 MMOL/L
CHOLEST SERPL-MCNC: 185 MG/DL
CO2 SERPL-SCNC: 26 MMOL/L
CREAT SERPL-MCNC: 1.15 MG/DL
EOSINOPHIL # BLD AUTO: 0.26 K/UL
EOSINOPHIL NFR BLD AUTO: 2.9 %
ESTIMATED AVERAGE GLUCOSE: 232 MG/DL
HBA1C MFR BLD HPLC: 9.7 %
HCT VFR BLD CALC: 35.2 %
HDLC SERPL-MCNC: 33 MG/DL
HGB BLD-MCNC: 10.4 G/DL
IMM GRANULOCYTES NFR BLD AUTO: 0.2 %
LDLC SERPL CALC-MCNC: 117 MG/DL
LYMPHOCYTES # BLD AUTO: 2.19 K/UL
LYMPHOCYTES NFR BLD AUTO: 24.7 %
MAGNESIUM SERPL-MCNC: 2 MG/DL
MAN DIFF?: NORMAL
MCHC RBC-ENTMCNC: 23.3 PG
MCHC RBC-ENTMCNC: 29.5 GM/DL
MCV RBC AUTO: 78.7 FL
MONOCYTES # BLD AUTO: 0.79 K/UL
MONOCYTES NFR BLD AUTO: 8.9 %
NEUTROPHILS # BLD AUTO: 5.54 K/UL
NEUTROPHILS NFR BLD AUTO: 62.5 %
NONHDLC SERPL-MCNC: 153 MG/DL
NT-PROBNP SERPL-MCNC: 4916 PG/ML
PLATELET # BLD AUTO: 424 K/UL
POTASSIUM SERPL-SCNC: 4.1 MMOL/L
PROT SERPL-MCNC: 7.8 G/DL
RBC # BLD: 4.47 M/UL
RBC # FLD: 25.5 %
SODIUM SERPL-SCNC: 138 MMOL/L
TRIGL SERPL-MCNC: 181 MG/DL
TSH SERPL-ACNC: 2 UIU/ML
WBC # FLD AUTO: 8.87 K/UL

## 2021-03-15 ENCOUNTER — NON-APPOINTMENT (OUTPATIENT)
Age: 41
End: 2021-03-15

## 2021-03-15 ENCOUNTER — APPOINTMENT (OUTPATIENT)
Dept: CARDIOLOGY | Facility: CLINIC | Age: 41
End: 2021-03-15
Payer: MEDICAID

## 2021-03-15 ENCOUNTER — APPOINTMENT (OUTPATIENT)
Dept: CV DIAGNOSITCS | Facility: HOSPITAL | Age: 41
End: 2021-03-15
Payer: MEDICAID

## 2021-03-15 ENCOUNTER — OUTPATIENT (OUTPATIENT)
Dept: OUTPATIENT SERVICES | Facility: HOSPITAL | Age: 41
LOS: 1 days | End: 2021-03-15

## 2021-03-15 VITALS
HEART RATE: 94 BPM | TEMPERATURE: 96.3 F | BODY MASS INDEX: 22.2 KG/M2 | WEIGHT: 130 LBS | SYSTOLIC BLOOD PRESSURE: 120 MMHG | DIASTOLIC BLOOD PRESSURE: 80 MMHG | HEIGHT: 64 IN | OXYGEN SATURATION: 100 %

## 2021-03-15 DIAGNOSIS — Z98.891 HISTORY OF UTERINE SCAR FROM PREVIOUS SURGERY: Chronic | ICD-10-CM

## 2021-03-15 DIAGNOSIS — I25.5 ISCHEMIC CARDIOMYOPATHY: ICD-10-CM

## 2021-03-15 DIAGNOSIS — Z98.51 TUBAL LIGATION STATUS: Chronic | ICD-10-CM

## 2021-03-15 DIAGNOSIS — I25.10 ATHEROSCLEROTIC HEART DISEASE OF NATIVE CORONARY ARTERY WITHOUT ANGINA PECTORIS: Chronic | ICD-10-CM

## 2021-03-15 PROCEDURE — 99072 ADDL SUPL MATRL&STAF TM PHE: CPT

## 2021-03-15 PROCEDURE — 93306 TTE W/DOPPLER COMPLETE: CPT | Mod: 26

## 2021-03-15 PROCEDURE — 93880 EXTRACRANIAL BILAT STUDY: CPT | Mod: 26

## 2021-03-15 PROCEDURE — 99214 OFFICE O/P EST MOD 30 MIN: CPT

## 2021-03-15 PROCEDURE — 36415 COLL VENOUS BLD VENIPUNCTURE: CPT

## 2021-03-15 PROCEDURE — 93000 ELECTROCARDIOGRAM COMPLETE: CPT

## 2021-03-16 LAB
ALBUMIN SERPL ELPH-MCNC: 3.4 G/DL
ALP BLD-CCNC: 87 U/L
ALT SERPL-CCNC: 8 U/L
ANION GAP SERPL CALC-SCNC: 16 MMOL/L
AST SERPL-CCNC: 18 U/L
BILIRUB SERPL-MCNC: 0.5 MG/DL
BUN SERPL-MCNC: 18 MG/DL
CALCIUM SERPL-MCNC: 9.6 MG/DL
CHLORIDE SERPL-SCNC: 97 MMOL/L
CO2 SERPL-SCNC: 22 MMOL/L
CREAT SERPL-MCNC: 1.04 MG/DL
NT-PROBNP SERPL-MCNC: 2433 PG/ML
POTASSIUM SERPL-SCNC: 4.6 MMOL/L
PROT SERPL-MCNC: 8.1 G/DL
SODIUM SERPL-SCNC: 134 MMOL/L

## 2021-03-16 NOTE — PHYSICAL EXAM
[Normal Appearance] : normal appearance [Well Groomed] : well groomed [General Appearance - In No Acute Distress] : no acute distress [Normal Oral Mucosa] : normal oral mucosa [Normal Oropharynx] : normal oropharynx [] : no respiratory distress [Respiration, Rhythm And Depth] : normal respiratory rhythm and effort [Heart Rate And Rhythm] : heart rate and rhythm were normal [Heart Sounds] : normal S1 and S2 [Arterial Pulses Normal] : the arterial pulses were normal [Bowel Sounds] : normal bowel sounds [Abdomen Soft] : soft [Abdomen Tenderness] : non-tender [Abnormal Walk] : normal gait [Nail Clubbing] : no clubbing of the fingernails [Cyanosis, Localized] : no localized cyanosis [Skin Color & Pigmentation] : normal skin color and pigmentation [Oriented To Time, Place, And Person] : oriented to person, place, and time [Affect] : the affect was normal [Auscultation Breath Sounds / Voice Sounds] : lungs were clear to auscultation bilaterally [FreeTextEntry1] : no lower extremity edema bilaterally

## 2021-03-16 NOTE — DISCUSSION/SUMMARY
[Patient] : the patient [___ Week(s)] : [unfilled] week(s) [FreeTextEntry1] : 1. ICM/chronic systolic CHF: EKG today NSR. NYHA Class II-III symptoms\par - . LVEF < 35% on prior TTE and is S/P ICD\par - continue torsemide 40 mg daily\par - increase Coreg to 12.5 mg bid from  6.25 mg bid\par -  TTE done today with results pending. \par - lab work today and will up titrate Entresto as tolerated, currently on 49-51 mg bid\par \par 2. HTN B/P 125/85\par - Coreg as above\par - Entresto as above \par -  Encouraged heart healthy diet, sodium restriction, and weight loss. \par \par 3. HLD: \par - continue statin therapy as prescribed and regular f/u with Cardiologist for routine lipid monitoring and management.\par \par 4. LV thrombus/ Prior CVA\par - continue Eliquis 5 mg bid, unsure if it is causing lesions on arms. Will apply aquaphor to arms\par - Follow up with dermatology or in derm clinic\par -  Carotid Dopplers with LILI with 16-49% plaque with no hemodynamically significant stenosis, LICA normal\par \par 5. CAD S/P 4 V CABG- no active chest pain or EKG changes\par - continue ASA 81 mg daily\par - continue statin\par \par Follow up in office in 3 weeks, will call with labs\par \par

## 2021-03-16 NOTE — ASSESSMENT
[FreeTextEntry1] : 39y/o woman with Hx of HTN, HLD,  DMII, CAD s/p 4 V CABG ( [place] 2018), LV thrombus, on warfarin but changed to Eliquis, CVA, and ICM/chronic systolic CHF, S/P ICD 12/4/21 who presents today for initial evaluation secondary to chronic systolic heart failure LVEF 25%. Pt had a recent admission at [facility] 1 month ago with increasing edema and orthopnea and was transferred to [place] where she had a C and reports grafts were open. Pt is here for an initial HF evaluation to assist with optimization of HF therapy. \par ACC/AHA Stage C, NYHA Class II-III\par Appears euvolemic and normotensive

## 2021-03-16 NOTE — HISTORY OF PRESENT ILLNESS
[FreeTextEntry1] : Amrit Coello is a 41y/o woman with Hx of HTN, HLD,  DMII, CAD s/p 4 V CABG ( [place] 2018), LV thrombus, on warfarin but changed to Eliquis, CVA, and ICM/chronic systolic CHF, S/P ICD 12/4/21 who presents today for initial evaluation secondary to chronic systolic heart failure LVEF 25%. Pt had a recent admission at [facility] 1 month ago with increasing edema and orthopnea and was transferred to [place] where she had a [facility] and reports grafts were open. Pt is here for a follow up, TTE and carotid doppers after an initial HF evaluation to assist with optimization of HF therapy on 3/4/21 . 3/15/21 TTE with LVEF 27%.\par PCP: Shaik Nel MD\[facility] \[facility] Pt states she came from [place] in 2017, sponsored by her mother. She has a  and 4 year old in [place] and 2 older children here. 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 She is a non smoker and no ETOH, no family history. \par \par Pt is here for a follow up after an initial consultation 3/4 and TTE with results pending. Last visit, Entresto was increased to 49-51 mg bid from 24-26 mg bid and furosemide 40 mg was d/c and started torsemide 40 mg daily. Since last visit, she has been feeling better. States she can walk 3-4 blocks, can climb a fight of stairs and sleeps with one pillow with no orthopnea. She states she has some skin lesions on her arms that are itchy that she associates with starting on Eliquis. Carotid dopplers with 16-49% mild plaque in prox LILI, no hemodynamically sig stenosis and LICA normal. \par  She is adhering to a low salt diet and is drinking less than 2 liters of fluid per day. Denies chest pain, palpitations, SOB at rest, syncope or near syncope and has not had any ICD firing. Of note, pt had a device check 2/25/21 and Optivol was elevated. \par \par \par Patient name: AMRIT COELLO\[facility] Date of test: 3/15/2021\par MR#: [id number] #: [id number]    Location: [facility]\par Ref Physician(s): , BAYLEE SAENZ\par Interpreted by: Bridger Castillo MD, [facility], [facility], [facility], BISI\par Tech: Manuel Guzman, [facility]\par Type of Test: Carotid Doppler Evaluation\par ------------------------------------------------------------------------\par Procedure: Duplex Real-time grayscale/color Doppler\par ultrasonography used to interrogate extracranial arteries\par bilaterally.\par Indications: Occlusion and stenosis of bilateral carotid\par arteries (I65.23)\par ------------------------------------------------------------------------\par VELOCITY: \par ------------------------------------------------------------------------\par RIGHT: \par   Subclavian: 163 / \par   Prox CCA: 90/28\par   Mid CCA: 93/37\par   Dist CCA: 82/32\par   Prox ICA: 108/51\par   Mid ICA: 96 / 41\par   Distal ICA: 92 / 41\par   ECA: 88 / 14\par   Vertebral: 96 / 37\par   ICA/CCA: 1.2\par ------------------------------------------------------------------------\par   Subclavian: Normal\par   CCA Plaque: Normal\par   ICA Plaque: Mild\par   Vertebral: Antegrade flow\par ------------------------------------------------------------------------\par LEFT: \par   Subclavian: 173 / \par   Prox CCA: 100/45\par   Mid CCA: 117/51\par   Dist CCA: 106/43\par   Prox ICA: 74/33\par   Mid ICA: 69 / 30\par   Distal ICA: 109 / 56\par   ECA: 64 / 15\par   Vertebral: 33 / 15\par   ICA/CCA: 1.0\par ------------------------------------------------------------------------\par   Subclavian: Normal\par   CCA Plaque: Normal\par   ICA Plaque: Normal\par   Vertebral: Antegrade flow\par ------------------------------------------------------------------------\par Right Findings: Right Common Carotid Artery:  Normal right\par common carotid artery.\par Right Internal Carotid Artery:  B-mode and spectral\par analyses consistent with a diameter reduction of 16-49%.\par There is mild heterogenous plaque within the proximal\par vessel. No hemodynamically significant stenosis.\par Right Vertebral Artery:  Antegrade flow with normal\par velocities.\par Left Findings: Left Common Carotid Artery:  Normal left\par common carotid artery.\par Left Internal Carotid Artery: Normal left internal carotid\par artery.  No hemodynamically significant stenosis.\par Left Vertebral Artery:  Antegrade flow with normal\par velocities.\par ------------------------------------------------------------------------\par Summary/Impressions: \par Right Internal Carotid Artery:  There is mild heterogenous\par plaque within the proximal vessel, consistent with a\par 16-49% stenosis. No hemodynamically significant stenosis.\par Left Internal Carotid Artery:  Normal left internal\par carotid artery.  No hemodynamically significant stenosis\par noted.\par ------------------------------------------------------------------------\par Confirmed on  3/15/2021 - 12:08 PM by Bridger Castillo MD,\par [facility], [place], MARGARET\par By signing this report, the attending physician certifies\par that he or she has personally supervised and interpreted\par the vascular study and has reviewed and or edited and\par agrees with the written comments contained within the\par report.

## 2021-03-16 NOTE — ADDENDUM
[FreeTextEntry1] : Called with results of TTE with LVEF 27%, LVIDD 5.6 cm, severe global LV systolic dysfunction, decreased RV systolic dysfunction. \par Labs with normal K 4.6 and renal function 18/1.04 and decrease in pro BNP to 2432 from 4916. Will increase Entresto to  mg bid and decrease torsemide to 20 mg daily from 40 mg daily. States her B/P went down to 96 with increasing Coreg so will go badk to 6.125 mg bid for now and may consider changing to metoprolol succinate. May consider adding SGLT2.

## 2021-04-05 ENCOUNTER — NON-APPOINTMENT (OUTPATIENT)
Age: 41
End: 2021-04-05

## 2021-04-12 ENCOUNTER — LABORATORY RESULT (OUTPATIENT)
Age: 41
End: 2021-04-12

## 2021-04-12 ENCOUNTER — APPOINTMENT (OUTPATIENT)
Dept: CARDIOLOGY | Facility: CLINIC | Age: 41
End: 2021-04-12
Payer: MEDICAID

## 2021-04-12 VITALS
HEART RATE: 89 BPM | TEMPERATURE: 97.1 F | SYSTOLIC BLOOD PRESSURE: 115 MMHG | OXYGEN SATURATION: 100 % | BODY MASS INDEX: 22.92 KG/M2 | DIASTOLIC BLOOD PRESSURE: 78 MMHG | HEIGHT: 64 IN

## 2021-04-12 VITALS — WEIGHT: 133.5 LBS | BODY MASS INDEX: 22.92 KG/M2

## 2021-04-12 PROCEDURE — 36415 COLL VENOUS BLD VENIPUNCTURE: CPT

## 2021-04-12 PROCEDURE — 99072 ADDL SUPL MATRL&STAF TM PHE: CPT

## 2021-04-12 PROCEDURE — 93000 ELECTROCARDIOGRAM COMPLETE: CPT

## 2021-04-12 PROCEDURE — 99214 OFFICE O/P EST MOD 30 MIN: CPT

## 2021-04-13 ENCOUNTER — NON-APPOINTMENT (OUTPATIENT)
Age: 41
End: 2021-04-13

## 2021-04-13 NOTE — PHYSICAL EXAM
[Normal Appearance] : normal appearance [Well Groomed] : well groomed [General Appearance - In No Acute Distress] : no acute distress [Normal Oral Mucosa] : normal oral mucosa [Normal Oropharynx] : normal oropharynx [] : no respiratory distress [Respiration, Rhythm And Depth] : normal respiratory rhythm and effort [Auscultation Breath Sounds / Voice Sounds] : lungs were clear to auscultation bilaterally [Heart Rate And Rhythm] : heart rate and rhythm were normal [Heart Sounds] : normal S1 and S2 [Arterial Pulses Normal] : the arterial pulses were normal [Bowel Sounds] : normal bowel sounds [Abdomen Soft] : soft [Abdomen Tenderness] : non-tender [Abnormal Walk] : normal gait [Nail Clubbing] : no clubbing of the fingernails [Cyanosis, Localized] : no localized cyanosis [Skin Color & Pigmentation] : normal skin color and pigmentation [Oriented To Time, Place, And Person] : oriented to person, place, and time [Affect] : the affect was normal [FreeTextEntry1] : no lower extremity edema bilaterally

## 2021-04-13 NOTE — ASSESSMENT
[FreeTextEntry1] : 39 y/o woman with Hx of HTN, HLD,  DMII, CAD s/p 4 V CABG ( [place] 2018), LV thrombus, on warfarin but changed to Eliquis, CVA, and ICM/chronic systolic CHF, S/P ICD 12/4/21 who presents today for initial evaluation secondary to chronic systolic heart failure LVEF 25%. Pt had a recent admission at [facility] 1 month ago with increasing edema and orthopnea and was transferred to [place] where she had a C and reports grafts were open. Carotid dopplers with 16-49% mild plaque in prox LILI, no hemodynamically sig stenosis and LICA normal. \par ACC/AHA Stage C, NYHA Class II-III\par Appears euvolemic and normotensive

## 2021-04-13 NOTE — REVIEW OF SYSTEMS
[Negative] : Heme/Lymph [Recent Weight Gain (___ Lbs)] : recent [unfilled] ~Ulb weight gain [FreeTextEntry1] : skin lesions on her arms with urticaria

## 2021-04-13 NOTE — ADDENDUM
[FreeTextEntry1] : Labs notable for decrease in H&H to 8.1/27.6 from 10.4/35.2 . Pt had heavy menses recently and is on eliiquis 5 mg bid for LV thrombus and ASA 81 mg daily. Will have patient follow up with GYN and increase iron to bid. CMP with K 4.7 and BUN/creat 29/1.29 on Entresto  mg bid and everton 25 mg daily, will continue up titration of Coreg.

## 2021-04-13 NOTE — HISTORY OF PRESENT ILLNESS
[FreeTextEntry1] : Amrit Coello is a 41y/o woman with Hx of HTN, HLD,  DMII, CAD s/p 4 V CABG ( [place] 2018), LV thrombus, on warfarin but changed to Eliquis, CVA, and ICM/chronic systolic CHF, S/P ICD 12/4/21 who presents today for initial evaluation secondary to chronic systolic heart failure LVEF 25%. Pt had a recent admission at [facility] 1 month ago with increasing edema and orthopnea and was transferred to [place] where she had a [facility] and reports grafts were open. Carotid dopplers with 16-49% mild plaque in prox LILI, no hemodynamically sig stenosis and LICA normal. Pt is here for a follow up, TTE and carotid dopplers.  3/15/21 TTE with LVEF 27%.\par PCP: Shaik Nel MD\[facility] \[facility] Pt states she came from [place] in 2017, sponsored by her mother. She has a  and 4 year old in [place] and 2 older children here. 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 She is a non smoker and no ETOH, no family history. \par \par Pt is here for a follow up. Pt is tolerating the up titration of Entresto to  mg bid and has been taking everton 25 mg daily.  Her torsemide was decreased to 20 mg daily from 40 mg daily. States she can walk 3-4 blocks, can climb a fight of stairs and sleeps with one pillow with no orthopnea.  \par She is adhering to a low salt diet and is drinking less than 2 liters of fluid per day. Denies chest pain, palpitations, SOB at rest, syncope or near syncope and has not had any ICD firing. \par \par \par Patient name: AMRIT COELLO\[facility] Date of test: 3/15/2021\par MR#: [id number]\[facility] #: [id number]    Location: [facility] Ref Physician(s): , BAYLEE SAENZ\[facility] Interpreted by: Bridger Castillo MD, FACC, FASE, RPVI\par Tech: Manuel Guzman, RVT\par Type of Test: Carotid Doppler Evaluation\par ------------------------------------------------------------------------\par Procedure: Duplex Real-time grayscale/color Doppler\par ultrasonography used to interrogate extracranial arteries\par bilaterally.\par Indications: Occlusion and stenosis of bilateral carotid\par arteries (I65.23)\par ------------------------------------------------------------------------\par VELOCITY: \par ------------------------------------------------------------------------\par RIGHT: \par   Subclavian: 163 / \par   Prox CCA: 90/28\par   Mid CCA: 93/37\par   Dist CCA: 82/32\par   Prox ICA: 108/51\par   Mid ICA: 96 / 41\par   Distal ICA: 92 / 41\par   ECA: 88 / 14\par   Vertebral: 96 / 37\par   ICA/CCA: 1.2\par ------------------------------------------------------------------------\par   Subclavian: Normal\par   CCA Plaque: Normal\par   ICA Plaque: Mild\par   Vertebral: Antegrade flow\par ------------------------------------------------------------------------\par LEFT: \par   Subclavian: 173 / \par   Prox CCA: 100/45\par   Mid CCA: 117/51\par   Dist CCA: 106/43\par   Prox ICA: 74/33\par   Mid ICA: 69 / 30\par   Distal ICA: 109 / 56\par   ECA: 64 / 15\par   Vertebral: 33 / 15\par   ICA/CCA: 1.0\par ------------------------------------------------------------------------\par   Subclavian: Normal\par   CCA Plaque: Normal\par   ICA Plaque: Normal\par   Vertebral: Antegrade flow\par ------------------------------------------------------------------------\par Right Findings: Right Common Carotid Artery:  Normal right\par common carotid artery.\par Right Internal Carotid Artery:  B-mode and spectral\par analyses consistent with a diameter reduction of 16-49%.\par There is mild heterogenous plaque within the proximal\par vessel. No hemodynamically significant stenosis.\par Right Vertebral Artery:  Antegrade flow with normal\par velocities.\par Left Findings: Left Common Carotid Artery:  Normal left\par common carotid artery.\par Left Internal Carotid Artery: Normal left internal carotid\par artery.  No hemodynamically significant stenosis.\par Left Vertebral Artery:  Antegrade flow with normal\par velocities.\par ------------------------------------------------------------------------\par Summary/Impressions: \par Right Internal Carotid Artery:  There is mild heterogenous\par plaque within the proximal vessel, consistent with a\par 16-49% stenosis. No hemodynamically significant stenosis.\par Left Internal Carotid Artery:  Normal left internal\par carotid artery.  No hemodynamically significant stenosis\par noted.\par ------------------------------------------------------------------------\par Confirmed on  3/15/2021 - 12:08 PM by Bridger Castillo MD,\par [facility], [place], MARGARET\par By signing this report, the attending physician certifies\par that he or she has personally supervised and interpreted\par the vascular study and has reviewed and or edited and\par agrees with the written comments contained within the\par report.

## 2021-04-13 NOTE — DISCUSSION/SUMMARY
[Patient] : the patient [___ Week(s)] : [unfilled] week(s) [FreeTextEntry1] : 1. ICM/chronic systolic CHF: EKG today NSR. NYHA Class II-III symptoms\par - . LVEF < 35% on prior TTE and is S/P ICD\par - TTE 3/15/21 with LVEF 27%,  LVIDD 5.6 cm, severe global LV systolic dysfunction, decreased RV systolic function, mild TR\par - continue torsemide 20 mg daily\par - increase Coreg to  9.375 mg bid from 6.25 mg bid, did not tolerate 12.5 mg bid previously but due to low B/P but improved after decrease in torsemide\par - lab work today on Entresto\par f\par 2. HTN B/P 125/85\par - Coreg as above\par - Entresto as above \par -  Encouraged heart healthy diet, sodium restriction, and weight loss. \par \par 3. HLD: \par - continue statin therapy as prescribed and regular f/u with Cardiologist for routine lipid monitoring and management.\par \par 4. LV thrombus/ Prior CVA\par - continue Eliquis 5 mg bid\par -  Carotid Dopplers with LILI with 16-49% plaque with no hemodynamically significant stenosis, LICA normal\par \par 5. CAD S/P 4 V CABG- no active chest pain or EKG changes\par - continue ASA 81 mg daily\par - continue statin\par \par Follow up in office in 3-4 weeks, will call with labs\par \par

## 2021-04-14 LAB
ALBUMIN SERPL ELPH-MCNC: 3.4 G/DL
ALP BLD-CCNC: 68 U/L
ALT SERPL-CCNC: 8 U/L
ANION GAP SERPL CALC-SCNC: 12 MMOL/L
AST SERPL-CCNC: 15 U/L
BASOPHILS # BLD AUTO: 0.06 K/UL
BASOPHILS NFR BLD AUTO: 0.7 %
BILIRUB SERPL-MCNC: 0.2 MG/DL
BUN SERPL-MCNC: 29 MG/DL
CALCIUM SERPL-MCNC: 9.3 MG/DL
CHLORIDE SERPL-SCNC: 101 MMOL/L
CO2 SERPL-SCNC: 22 MMOL/L
CREAT SERPL-MCNC: 1.24 MG/DL
EOSINOPHIL # BLD AUTO: 0.19 K/UL
EOSINOPHIL NFR BLD AUTO: 2.2 %
HCT VFR BLD CALC: 27.6 %
HGB BLD-MCNC: 8.1 G/DL
IMM GRANULOCYTES NFR BLD AUTO: 0.2 %
LYMPHOCYTES # BLD AUTO: 1.78 K/UL
LYMPHOCYTES NFR BLD AUTO: 20.8 %
MAN DIFF?: NORMAL
MCHC RBC-ENTMCNC: 23.7 PG
MCHC RBC-ENTMCNC: 29.3 GM/DL
MCV RBC AUTO: 80.7 FL
MONOCYTES # BLD AUTO: 0.98 K/UL
MONOCYTES NFR BLD AUTO: 11.4 %
NEUTROPHILS # BLD AUTO: 5.54 K/UL
NEUTROPHILS NFR BLD AUTO: 64.7 %
NT-PROBNP SERPL-MCNC: 2446 PG/ML
PLATELET # BLD AUTO: 467 K/UL
POTASSIUM SERPL-SCNC: 4.7 MMOL/L
PROT SERPL-MCNC: 7.1 G/DL
RBC # BLD: 3.42 M/UL
RBC # FLD: 20.1 %
SODIUM SERPL-SCNC: 134 MMOL/L
WBC # FLD AUTO: 8.57 K/UL

## 2021-05-10 ENCOUNTER — LABORATORY RESULT (OUTPATIENT)
Age: 41
End: 2021-05-10

## 2021-05-10 ENCOUNTER — NON-APPOINTMENT (OUTPATIENT)
Age: 41
End: 2021-05-10

## 2021-05-10 ENCOUNTER — APPOINTMENT (OUTPATIENT)
Dept: CARDIOLOGY | Facility: CLINIC | Age: 41
End: 2021-05-10
Payer: MEDICAID

## 2021-05-10 VITALS
HEART RATE: 90 BPM | BODY MASS INDEX: 24.07 KG/M2 | HEIGHT: 64 IN | DIASTOLIC BLOOD PRESSURE: 85 MMHG | TEMPERATURE: 97.2 F | OXYGEN SATURATION: 99 % | WEIGHT: 141 LBS | SYSTOLIC BLOOD PRESSURE: 147 MMHG

## 2021-05-10 PROCEDURE — 99072 ADDL SUPL MATRL&STAF TM PHE: CPT

## 2021-05-10 PROCEDURE — 99214 OFFICE O/P EST MOD 30 MIN: CPT

## 2021-05-10 PROCEDURE — 93000 ELECTROCARDIOGRAM COMPLETE: CPT

## 2021-05-10 PROCEDURE — 36415 COLL VENOUS BLD VENIPUNCTURE: CPT

## 2021-05-11 LAB
ALBUMIN SERPL ELPH-MCNC: 3.5 G/DL
ALP BLD-CCNC: 152 U/L
ALT SERPL-CCNC: 47 U/L
ANION GAP SERPL CALC-SCNC: 11 MMOL/L
AST SERPL-CCNC: 75 U/L
BASOPHILS # BLD AUTO: 0.08 K/UL
BASOPHILS NFR BLD AUTO: 0.7 %
BILIRUB SERPL-MCNC: 0.4 MG/DL
BUN SERPL-MCNC: 37 MG/DL
CALCIUM SERPL-MCNC: 9.9 MG/DL
CHLORIDE SERPL-SCNC: 91 MMOL/L
CO2 SERPL-SCNC: 26 MMOL/L
CREAT SERPL-MCNC: 1.75 MG/DL
EOSINOPHIL # BLD AUTO: 0.23 K/UL
EOSINOPHIL NFR BLD AUTO: 2 %
HCT VFR BLD CALC: 27 %
HGB BLD-MCNC: 7.9 G/DL
IMM GRANULOCYTES NFR BLD AUTO: 0.4 %
LYMPHOCYTES # BLD AUTO: 1.84 K/UL
LYMPHOCYTES NFR BLD AUTO: 15.6 %
MAN DIFF?: NORMAL
MCHC RBC-ENTMCNC: 23.4 PG
MCHC RBC-ENTMCNC: 29.3 GM/DL
MCV RBC AUTO: 80.1 FL
MONOCYTES # BLD AUTO: 0.83 K/UL
MONOCYTES NFR BLD AUTO: 7 %
NEUTROPHILS # BLD AUTO: 8.76 K/UL
NEUTROPHILS NFR BLD AUTO: 74.3 %
NT-PROBNP SERPL-MCNC: ABNORMAL PG/ML
PLATELET # BLD AUTO: 416 K/UL
POTASSIUM SERPL-SCNC: 5.4 MMOL/L
PROT SERPL-MCNC: 7.8 G/DL
RBC # BLD: 3.37 M/UL
RBC # FLD: 20.5 %
SODIUM SERPL-SCNC: 128 MMOL/L
WBC # FLD AUTO: 11.79 K/UL

## 2021-05-11 NOTE — REVIEW OF SYSTEMS
[SOB] : shortness of breath [Dyspnea on exertion] : dyspnea during exertion [Orthopnea] : orthopnea [Negative] : Constitutional [FreeTextEntry5] : + lower extremity edema

## 2021-05-11 NOTE — HISTORY OF PRESENT ILLNESS
[FreeTextEntry1] : Amrit Coello is a 41y/o woman with Hx of HTN, HLD,  DMII, CAD s/p 4 V CABG ( [place] 2018), LV thrombus, on warfarin but changed to Eliquis, CVA, and ICM/chronic systolic CHF, S/P ICD 12/4/21 who presents today for initial evaluation secondary to chronic systolic heart failure LVEF 25%. Pt had a recent admission at [facility] 1 month ago with increasing edema and orthopnea and was transferred to [place] where she had a [facility] and reports grafts were open. Carotid dopplers with 16-49% mild plaque in prox LILI, no hemodynamically sig stenosis and LICA normal. Pt is here for a follow up, TTE and carotid dopplers.  3/15/21 TTE with LVEF 27%.\par [facility] Pt states she came from [place] in 2017, sponsored by her mother. She has a  and 4 year old in [place] and 2 older children here. 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 She is a non smoker and no ETOH, no family history. \par \[facility] Pt is here for a follow up. Patient states that she has had weight gain of 8 lbs since last visit with increasing edema in her legs for 3 days and with orthopnea.  Her torsemide was previously decreased to 20 mg daily from 40 mg daily when her Entresto was elevated.  Previously, she could walk 3-4 blocks and climb a fight of stairs with no orthopnea. Currently, she has shortness of breath with mild exertion and if walking 1 block and is using 3 pillows with orthopnea. She admits to heavy menses and had low H&H 8.1/27.6 from 10.4/36.2 and is 7.9/27 on labs today with increase in pro BNP to 11,129 from 2446. \par She is adhering to a low salt diet and is drinking less than 2 liters of fluid per day. Denies chest pain, palpitations, syncope or near syncope and has not had any ICD firing. \par \par \[facility] Patient name: AMRIT COELLO\[facility] Date of test: 3/15/2021\[facility] MR#: [id number] [place] #: [id number]    Location: [facility]\par Ref Physician(s): , BAYLEE SAENZ\par Interpreted by: Bridger Castillo MD, [facility], [place], BISI\par Tech: Manuel Guzman, T\par Type of Test: Carotid Doppler Evaluation\par ------------------------------------------------------------------------\par Procedure: Duplex Real-time grayscale/color Doppler\par ultrasonography used to interrogate extracranial arteries\par bilaterally.\par Indications: Occlusion and stenosis of bilateral carotid\par arteries (I65.23)\par ------------------------------------------------------------------------\par VELOCITY: \par ------------------------------------------------------------------------\par RIGHT: \par   Subclavian: 163 / \par   Prox CCA: 90/28\par   Mid CCA: 93/37\par   Dist CCA: 82/32\par   Prox ICA: 108/51\par   Mid ICA: 96 / 41\par   Distal ICA: 92 / 41\par   ECA: 88 / 14\par   Vertebral: 96 / 37\par   ICA/CCA: 1.2\par ------------------------------------------------------------------------\par   Subclavian: Normal\par   CCA Plaque: Normal\par   ICA Plaque: Mild\par   Vertebral: Antegrade flow\par ------------------------------------------------------------------------\par LEFT: \par   Subclavian: 173 / \par   Prox CCA: 100/45\par   Mid CCA: 117/51\par   Dist CCA: 106/43\par   Prox ICA: 74/33\par   Mid ICA: 69 / 30\par   Distal ICA: 109 / 56\par   ECA: 64 / 15\par   Vertebral: 33 / 15\par   ICA/CCA: 1.0\par ------------------------------------------------------------------------\par   Subclavian: Normal\par   CCA Plaque: Normal\par   ICA Plaque: Normal\par   Vertebral: Antegrade flow\par ------------------------------------------------------------------------\par Right Findings: Right Common Carotid Artery:  Normal right\par common carotid artery.\par Right Internal Carotid Artery:  B-mode and spectral\par analyses consistent with a diameter reduction of 16-49%.\par There is mild heterogenous plaque within the proximal\par vessel. No hemodynamically significant stenosis.\par Right Vertebral Artery:  Antegrade flow with normal\par velocities.\par Left Findings: Left Common Carotid Artery:  Normal left\par common carotid artery.\par Left Internal Carotid Artery: Normal left internal carotid\par artery.  No hemodynamically significant stenosis.\par Left Vertebral Artery:  Antegrade flow with normal\par velocities.\par ------------------------------------------------------------------------\par Summary/Impressions: \par Right Internal Carotid Artery:  There is mild heterogenous\par plaque within the proximal vessel, consistent with a\par 16-49% stenosis. No hemodynamically significant stenosis.\par Left Internal Carotid Artery:  Normal left internal\par carotid artery.  No hemodynamically significant stenosis\par noted.\par ------------------------------------------------------------------------\par Confirmed on  3/15/2021 - 12:08 PM by Bridger Castillo MD,\par [facility], [place], MARGARET\par By signing this report, the attending physician certifies\par that he or she has personally supervised and interpreted\par the vascular study and has reviewed and or edited and\par agrees with the written comments contained within the\par report.

## 2021-05-11 NOTE — DISCUSSION/SUMMARY
[Patient] : the patient [___ Week(s)] : in [unfilled] week(s) [FreeTextEntry1] : 1. ICM/chronic systolic CHF: EKG today NSR. NYHA Class II-III symptoms\par Appears volume overloaded and hypertensive with anemia, elevated pro BNP\par - . LVEF < 35% on prior TTE and is S/P ICD\par - TTE 3/15/21 with LVEF 27%,  LVIDD 5.6 cm, severe global LV systolic dysfunction, decreased RV systolic function, mild TR\par - torsemide 40 mg bid for 3 days and then torsemide 40 mg daily from 20 mg daily\par - Continue Coreg 9.375 mg bid \par - lab work today on Entresto with K 5.4, BUN/creat 37/1.75 from K 4.7, BUN/creat 29/1.24\par - Increasing torsemide and will recheck K\par \par 2. HTN B/P 147/85- volume overloaded\par - increase torsemide as above\par - Coreg as above\par - Entresto as above \par -  Encouraged heart healthy diet, sodium restriction, and weight loss. \par \par 3. HLD: \par - continue statin therapy as prescribed and regular f/u with Cardiologist for routine lipid monitoring and management.\par \par 4. LV thrombus/ Prior CVA\par - continue Eliquis 5 mg bid\par -  Carotid Dopplers with LILI with 16-49% plaque with no hemodynamically significant stenosis, LICA normal\par \par 5. CAD S/P 4 V CABG- no active chest pain or EKG changes\par - continue ASA 81 mg daily\par - continue statin\par \par Follow up in office in 1-2 weeks to reassess volume status.H&H is low, continue Iron but may need workup and treatment with IV iron.\par \par

## 2021-05-11 NOTE — PHYSICAL EXAM
[Normal Appearance] : normal appearance [General Appearance - In No Acute Distress] : no acute distress [Normal Oral Mucosa] : normal oral mucosa [Normal Oropharynx] : normal oropharynx [] : no respiratory distress [Respiration, Rhythm And Depth] : normal respiratory rhythm and effort [Auscultation Breath Sounds / Voice Sounds] : lungs were clear to auscultation bilaterally [Heart Rate And Rhythm] : heart rate and rhythm were normal [Heart Sounds] : normal S1 and S2 [Arterial Pulses Normal] : the arterial pulses were normal [Bowel Sounds] : normal bowel sounds [Abdomen Soft] : soft [Abdomen Tenderness] : non-tender [Abnormal Walk] : normal gait [Nail Clubbing] : no clubbing of the fingernails [Cyanosis, Localized] : no localized cyanosis [Skin Color & Pigmentation] : normal skin color and pigmentation [Oriented To Time, Place, And Person] : oriented to person, place, and time [Affect] : the affect was normal [FreeTextEntry1] : 1+ pitting  lower extremity edema bilaterally

## 2021-05-11 NOTE — ADDENDUM
[FreeTextEntry1] : Called with results of labs notable for decrease in H&H 7.9 and elevated K  and BUN/creat. Pt is following up with PCP for anemia and will increase iron to bid. She was volume overloaded on exam and will increase torsemide.

## 2021-05-17 ENCOUNTER — NON-APPOINTMENT (OUTPATIENT)
Age: 41
End: 2021-05-17

## 2021-05-17 ENCOUNTER — APPOINTMENT (OUTPATIENT)
Dept: CARDIOLOGY | Facility: CLINIC | Age: 41
End: 2021-05-17
Payer: MEDICAID

## 2021-05-17 VITALS
TEMPERATURE: 96.3 F | WEIGHT: 137 LBS | OXYGEN SATURATION: 100 % | SYSTOLIC BLOOD PRESSURE: 123 MMHG | HEIGHT: 64 IN | BODY MASS INDEX: 23.39 KG/M2 | DIASTOLIC BLOOD PRESSURE: 85 MMHG | HEART RATE: 80 BPM

## 2021-05-17 PROCEDURE — 99072 ADDL SUPL MATRL&STAF TM PHE: CPT

## 2021-05-17 PROCEDURE — 93000 ELECTROCARDIOGRAM COMPLETE: CPT

## 2021-05-17 PROCEDURE — 99214 OFFICE O/P EST MOD 30 MIN: CPT

## 2021-05-17 PROCEDURE — 36415 COLL VENOUS BLD VENIPUNCTURE: CPT

## 2021-05-18 LAB
ALBUMIN SERPL ELPH-MCNC: 3.5 G/DL
ALP BLD-CCNC: 114 U/L
ALT SERPL-CCNC: 19 U/L
ANION GAP SERPL CALC-SCNC: 16 MMOL/L
AST SERPL-CCNC: 27 U/L
BASOPHILS # BLD AUTO: 0.08 K/UL
BASOPHILS NFR BLD AUTO: 0.8 %
BILIRUB SERPL-MCNC: 0.5 MG/DL
BUN SERPL-MCNC: 35 MG/DL
CALCIUM SERPL-MCNC: 9.3 MG/DL
CHLORIDE SERPL-SCNC: 94 MMOL/L
CO2 SERPL-SCNC: 24 MMOL/L
CREAT SERPL-MCNC: 1.79 MG/DL
EOSINOPHIL # BLD AUTO: 0.34 K/UL
EOSINOPHIL NFR BLD AUTO: 3.2 %
HCT VFR BLD CALC: 31.8 %
HGB BLD-MCNC: 8.7 G/DL
IMM GRANULOCYTES NFR BLD AUTO: 0.3 %
LYMPHOCYTES # BLD AUTO: 2.41 K/UL
LYMPHOCYTES NFR BLD AUTO: 22.8 %
MAN DIFF?: NORMAL
MCHC RBC-ENTMCNC: 22.3 PG
MCHC RBC-ENTMCNC: 27.4 GM/DL
MCV RBC AUTO: 81.5 FL
MONOCYTES # BLD AUTO: 0.85 K/UL
MONOCYTES NFR BLD AUTO: 8 %
NEUTROPHILS # BLD AUTO: 6.85 K/UL
NEUTROPHILS NFR BLD AUTO: 64.9 %
NT-PROBNP SERPL-MCNC: 9863 PG/ML
PLATELET # BLD AUTO: 472 K/UL
POTASSIUM SERPL-SCNC: 4.7 MMOL/L
PROT SERPL-MCNC: 8.1 G/DL
RBC # BLD: 3.9 M/UL
RBC # FLD: 21.5 %
SODIUM SERPL-SCNC: 134 MMOL/L
WBC # FLD AUTO: 10.56 K/UL

## 2021-05-18 NOTE — DISCUSSION/SUMMARY
[Patient] : the patient [___ Week(s)] : in [unfilled] week(s) [FreeTextEntry3] : will call next week to report weight and B/P [FreeTextEntry1] : 1. ICM/chronic systolic CHF: EKG today NSR. NYHA Class II-III symptoms\par Appears mild to mod volume overloaded but normotensive with anemia, elevated pro BNP\par - . LVEF < 35% on prior TTE and is S/P ICD\par - TTE 3/15/21 with LVEF 27%,  LVIDD 5.6 cm, severe global LV systolic dysfunction, decreased RV systolic function, mild TR\par - torsemide 40 mg bid for 7 days and then torsemide 40 mg daily \par - Continue Coreg 9.375 mg bid \par - lab work today on Entresto with K 5.4, BUN/creat 37/1.75 from K 4.7, BUN/creat 29/1.24\par - Increasing torsemide and will recheck K\par \par 2. HTN B/P stable \par - increase torsemide as above\par - Coreg as above\par - Entresto as above \par -  Encouraged heart healthy diet, sodium restriction, and weight loss. \par \par 3. HLD: \par - continue statin therapy as prescribed and regular f/u with Cardiologist for routine lipid monitoring and management.\par \par 4. LV thrombus/ Prior CVA\par - continue Eliquis 5 mg bid- will discuss with Cardiologist changing back to coumadin if associates itchiness with eliquis\par - will add claritin 10 mg as needed\par -  Carotid Dopplers with LILI with 16-49% plaque with no hemodynamically significant stenosis, LICA normal\par \par 5. CAD S/P 4 V CABG- no active chest pain or EKG changes\par - continue ASA 81 mg daily\par - continue statin\par \par Follow up in office in 4 weeks, will call next week with weight and B/P, will call with labs. \par \par

## 2021-05-18 NOTE — REVIEW OF SYSTEMS
[Dyspnea on exertion] : dyspnea during exertion [Negative] : Constitutional [FreeTextEntry5] : + lower extremity edema

## 2021-05-18 NOTE — ASSESSMENT
[FreeTextEntry1] : 41 y/o woman with Hx of HTN, HLD,  DMII, CAD s/p 4 V CABG ( [place] 2018), LV thrombus, on warfarin but changed to Eliquis, CVA, and ICM/chronic systolic CHF, S/P ICD 12/4/21 who presents today for initial evaluation secondary to chronic systolic heart failure LVEF 25%. Pt had a recent admission at [facility] 1 month ago with increasing edema and orthopnea and was transferred to [place] where she had a C and reports grafts were open. Carotid dopplers with 16-49% mild plaque in prox LILI, no hemodynamically sig stenosis and LICA normal. \par ACC/AHA Stage C, NYHA Class II-III\par Appears mild to moderately volume overloaded with JVD and normotensive, improving since last visit 5/10

## 2021-05-18 NOTE — PHYSICAL EXAM
[Normal Appearance] : normal appearance [General Appearance - In No Acute Distress] : no acute distress [Normal Oral Mucosa] : normal oral mucosa [Normal Oropharynx] : normal oropharynx [] : no respiratory distress [Respiration, Rhythm And Depth] : normal respiratory rhythm and effort [Auscultation Breath Sounds / Voice Sounds] : lungs were clear to auscultation bilaterally [Heart Rate And Rhythm] : heart rate and rhythm were normal [Heart Sounds] : normal S1 and S2 [Arterial Pulses Normal] : the arterial pulses were normal [Bowel Sounds] : normal bowel sounds [Abdomen Soft] : soft [Abdomen Tenderness] : non-tender [Abnormal Walk] : normal gait [Nail Clubbing] : no clubbing of the fingernails [Cyanosis, Localized] : no localized cyanosis [Skin Color & Pigmentation] : normal skin color and pigmentation [Oriented To Time, Place, And Person] : oriented to person, place, and time [Affect] : the affect was normal [FreeTextEntry1] : + generalized skin lesions

## 2021-05-18 NOTE — HISTORY OF PRESENT ILLNESS
[FreeTextEntry1] : Amrit Coello is a 39y/o woman with Hx of HTN, HLD,  DMII, CAD s/p 4 V CABG ( [place] [facility] 2018), LV thrombus, on warfarin but changed to Eliquis, CVA, and ICM/chronic systolic CHF, S/P ICD 12/4/21 who presents today for initial evaluation secondary to chronic systolic heart failure LVEF 25%. Pt had a recent admission at [facility] 1 month ago with increasing edema and orthopnea and was transferred to [place] where she had a [facility] and reports grafts were open. Carotid dopplers with 16-49% mild plaque in prox LILI, no hemodynamically sig stenosis and LICA normal. Pt is here for a follow up, TTE and carotid dopplers.  3/15/21 TTE with LVEF 27%.\par PCP: Shaik Nel MD\par \par Pt states she came from [place] in 2017, sponsored by her mother. She has a  and 4 year old in [place] and 2 older children here. 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 She is a non smoker and no ETOH, no family history. \par \par Pt is here for a one week follow up after she was seen 5/10 and gained 8 lbs with increasing edema in her legs for 3 days and with orthopnea.  Her torsemide was increased to 40 mg bid and she is feeling a little better but still has some edema and dyspnea, weight went down by 4 lbs. Previously, she could walk 3-4 blocks and climb a fight of stairs with no orthopnea. Last week she had shortness of breath with mild exertion and if walking 1 block and is using 3 pillows with orthopnea but this week she does not have orthopnea and can walk a longer distance. She has generalized itchiness that she states started when she changed from coumadin to eliquis. . She admits to heavy menses and had low H&H 8.1/27.6 from 10.4/36.2 and is 7.9/27 on labs today with increase in pro BNP to 11,129 from 2446. \par She is adhering to a low salt diet and is drinking less than 2 liters of fluid per day. Denies chest pain, palpitations, syncope or near syncope and has not had any ICD firing. \par \par \par Patient name: AMRIT COELLO\par Date of test: 3/15/2021\par MR#: [id number]\[facility] #: [id number]    Location: [facility]\par Ref Physician(s): , BAYLEE SAENZ\par Interpreted by: Bridger Castillo MD, [facility], FASE, RPVI\par Tech: Manuel Guzman, T\par Type of Test: Carotid Doppler Evaluation\par ------------------------------------------------------------------------\par Procedure: Duplex Real-time grayscale/color Doppler\par ultrasonography used to interrogate extracranial arteries\par bilaterally.\par Indications: Occlusion and stenosis of bilateral carotid\par arteries (I65.23)\par ------------------------------------------------------------------------\par VELOCITY: \par ------------------------------------------------------------------------\par RIGHT: \par   Subclavian: 163 / \par   Prox CCA: 90/28\par   Mid CCA: 93/37\par   Dist CCA: 82/32\par   Prox ICA: 108/51\par   Mid ICA: 96 / 41\par   Distal ICA: 92 / 41\par   ECA: 88 / 14\par   Vertebral: 96 / 37\par   ICA/CCA: 1.2\par ------------------------------------------------------------------------\par   Subclavian: Normal\par   CCA Plaque: Normal\par   ICA Plaque: Mild\par   Vertebral: Antegrade flow\par ------------------------------------------------------------------------\par LEFT: \par   Subclavian: 173 / \par   Prox CCA: 100/45\par   Mid CCA: 117/51\par   Dist CCA: 106/43\par   Prox ICA: 74/33\par   Mid ICA: 69 / 30\par   Distal ICA: 109 / 56\par   ECA: 64 / 15\par   Vertebral: 33 / 15\par   ICA/CCA: 1.0\par ------------------------------------------------------------------------\par   Subclavian: Normal\par   CCA Plaque: Normal\par   ICA Plaque: Normal\par   Vertebral: Antegrade flow\par ------------------------------------------------------------------------\par Right Findings: Right Common Carotid Artery:  Normal right\par common carotid artery.\par Right Internal Carotid Artery:  B-mode and spectral\par analyses consistent with a diameter reduction of 16-49%.\par There is mild heterogenous plaque within the proximal\par vessel. No hemodynamically significant stenosis.\par Right Vertebral Artery:  Antegrade flow with normal\par velocities.\par Left Findings: Left Common Carotid Artery:  Normal left\par common carotid artery.\par Left Internal Carotid Artery: Normal left internal carotid\par artery.  No hemodynamically significant stenosis.\par Left Vertebral Artery:  Antegrade flow with normal\par velocities.\par ------------------------------------------------------------------------\par Summary/Impressions: \par Right Internal Carotid Artery:  There is mild heterogenous\par plaque within the proximal vessel, consistent with a\par 16-49% stenosis. No hemodynamically significant stenosis.\par Left Internal Carotid Artery:  Normal left internal\par carotid artery.  No hemodynamically significant stenosis\par noted.\par ------------------------------------------------------------------------\par Confirmed on  3/15/2021 - 12:08 PM by Bridger Castillo MD,\par FAC, DELON, MARGARET\par By signing this report, the attending physician certifies\par that he or she has personally supervised and interpreted\par the vascular study and has reviewed and or edited and\par agrees with the written comments contained within the\par report.

## 2021-05-24 ENCOUNTER — NON-APPOINTMENT (OUTPATIENT)
Age: 41
End: 2021-05-24

## 2021-05-26 ENCOUNTER — NON-APPOINTMENT (OUTPATIENT)
Age: 41
End: 2021-05-26

## 2021-05-26 ENCOUNTER — APPOINTMENT (OUTPATIENT)
Dept: ELECTROPHYSIOLOGY | Facility: CLINIC | Age: 41
End: 2021-05-26
Payer: MEDICAID

## 2021-05-26 PROCEDURE — 93295 DEV INTERROG REMOTE 1/2/MLT: CPT

## 2021-05-26 PROCEDURE — 93296 REM INTERROG EVL PM/IDS: CPT

## 2021-06-02 ENCOUNTER — APPOINTMENT (OUTPATIENT)
Dept: CARDIOLOGY | Facility: CLINIC | Age: 41
End: 2021-06-02
Payer: MEDICAID

## 2021-06-02 PROCEDURE — 36415 COLL VENOUS BLD VENIPUNCTURE: CPT

## 2021-06-03 ENCOUNTER — NON-APPOINTMENT (OUTPATIENT)
Age: 41
End: 2021-06-03

## 2021-06-03 LAB
ALBUMIN SERPL ELPH-MCNC: 3.9 G/DL
ALP BLD-CCNC: 104 U/L
ALT SERPL-CCNC: 13 U/L
ANION GAP SERPL CALC-SCNC: 22 MMOL/L
AST SERPL-CCNC: 21 U/L
BASOPHILS # BLD AUTO: 0.08 K/UL
BASOPHILS NFR BLD AUTO: 0.7 %
BILIRUB SERPL-MCNC: 0.6 MG/DL
BUN SERPL-MCNC: 23 MG/DL
CALCIUM SERPL-MCNC: 10 MG/DL
CHLORIDE SERPL-SCNC: 97 MMOL/L
CO2 SERPL-SCNC: 22 MMOL/L
CREAT SERPL-MCNC: 1.35 MG/DL
EOSINOPHIL # BLD AUTO: 0.19 K/UL
EOSINOPHIL NFR BLD AUTO: 1.7 %
HCT VFR BLD CALC: 37.2 %
HGB BLD-MCNC: 10.2 G/DL
IMM GRANULOCYTES NFR BLD AUTO: 0.2 %
LYMPHOCYTES # BLD AUTO: 1.66 K/UL
LYMPHOCYTES NFR BLD AUTO: 15.2 %
MAN DIFF?: NORMAL
MCHC RBC-ENTMCNC: 25.2 PG
MCHC RBC-ENTMCNC: 27.4 GM/DL
MCV RBC AUTO: 91.9 FL
MONOCYTES # BLD AUTO: 0.5 K/UL
MONOCYTES NFR BLD AUTO: 4.6 %
NEUTROPHILS # BLD AUTO: 8.48 K/UL
NEUTROPHILS NFR BLD AUTO: 77.6 %
NT-PROBNP SERPL-MCNC: ABNORMAL PG/ML
PLATELET # BLD AUTO: 442 K/UL
POTASSIUM SERPL-SCNC: 4.6 MMOL/L
PROT SERPL-MCNC: 8.6 G/DL
RBC # BLD: 4.05 M/UL
RBC # FLD: 26 %
SODIUM SERPL-SCNC: 141 MMOL/L
WBC # FLD AUTO: 10.93 K/UL

## 2021-06-28 ENCOUNTER — LABORATORY RESULT (OUTPATIENT)
Age: 41
End: 2021-06-28

## 2021-06-28 ENCOUNTER — NON-APPOINTMENT (OUTPATIENT)
Age: 41
End: 2021-06-28

## 2021-06-28 ENCOUNTER — APPOINTMENT (OUTPATIENT)
Dept: CARDIOLOGY | Facility: CLINIC | Age: 41
End: 2021-06-28
Payer: MEDICAID

## 2021-06-28 VITALS
SYSTOLIC BLOOD PRESSURE: 105 MMHG | WEIGHT: 133 LBS | OXYGEN SATURATION: 96 % | HEIGHT: 64 IN | HEART RATE: 81 BPM | DIASTOLIC BLOOD PRESSURE: 69 MMHG | BODY MASS INDEX: 22.71 KG/M2

## 2021-06-28 PROCEDURE — 99214 OFFICE O/P EST MOD 30 MIN: CPT

## 2021-06-28 PROCEDURE — 93000 ELECTROCARDIOGRAM COMPLETE: CPT

## 2021-06-28 PROCEDURE — 36415 COLL VENOUS BLD VENIPUNCTURE: CPT

## 2021-06-29 LAB
ALBUMIN SERPL ELPH-MCNC: 2.5 G/DL
ALP BLD-CCNC: 97 U/L
ALT SERPL-CCNC: 12 U/L
ANION GAP SERPL CALC-SCNC: 11 MMOL/L
AST SERPL-CCNC: 22 U/L
BASOPHILS # BLD AUTO: 0.08 K/UL
BASOPHILS NFR BLD AUTO: 0.9 %
BILIRUB SERPL-MCNC: 0.2 MG/DL
BUN SERPL-MCNC: 17 MG/DL
CALCIUM SERPL-MCNC: 9 MG/DL
CHLORIDE SERPL-SCNC: 100 MMOL/L
CO2 SERPL-SCNC: 25 MMOL/L
CREAT SERPL-MCNC: 1.33 MG/DL
EOSINOPHIL # BLD AUTO: 0.26 K/UL
EOSINOPHIL NFR BLD AUTO: 2.8 %
HCT VFR BLD CALC: 37.5 %
HGB BLD-MCNC: 11.3 G/DL
IMM GRANULOCYTES NFR BLD AUTO: 0.3 %
LYMPHOCYTES # BLD AUTO: 2.03 K/UL
LYMPHOCYTES NFR BLD AUTO: 21.8 %
MAN DIFF?: NORMAL
MCHC RBC-ENTMCNC: 27.2 PG
MCHC RBC-ENTMCNC: 30.1 GM/DL
MCV RBC AUTO: 90.4 FL
MONOCYTES # BLD AUTO: 0.76 K/UL
MONOCYTES NFR BLD AUTO: 8.2 %
NEUTROPHILS # BLD AUTO: 6.14 K/UL
NEUTROPHILS NFR BLD AUTO: 66 %
NT-PROBNP SERPL-MCNC: 8239 PG/ML
PLATELET # BLD AUTO: 507 K/UL
POTASSIUM SERPL-SCNC: 4.4 MMOL/L
PROT SERPL-MCNC: 6.9 G/DL
RBC # BLD: 4.15 M/UL
RBC # FLD: 21.2 %
SODIUM SERPL-SCNC: 136 MMOL/L
WBC # FLD AUTO: 9.3 K/UL

## 2021-06-29 NOTE — ASSESSMENT
[FreeTextEntry1] : 39 y/o woman with Hx of HTN, HLD,  DMII, CAD s/p 4 V CABG ( [place] [facility] 2018), LV thrombus, on warfarin but changed to Eliquis, CVA, and ICM/chronic systolic CHF, S/P ICD 12/4/21 who presents today for initial evaluation secondary to chronic systolic heart failure LVEF 25%. Pt had a recent admission at [facility] 1 month ago with increasing edema and orthopnea and was transferred to [place] where she had a C and reports grafts were open. Carotid dopplers with 16-49% mild plaque in prox LILI, no hemodynamically sig stenosis and LICA normal. \par ACC/AHA Stage C, NYHA Class II-III\par Appears compensated and normotensive

## 2021-06-29 NOTE — DISCUSSION/SUMMARY
[Patient] : the patient [___ Week(s)] : in [unfilled] week(s) [FreeTextEntry1] : 1. ICM/chronic systolic CHF: EKG today NSR. NYHA Class II-III symptoms- improved volume status\par - . LVEF < 35% on prior TTE and is S/P ICD\par - TTE 3/15/21 with LVEF 27%,  LVIDD 5.6 cm, severe global LV systolic dysfunction, decreased RV systolic function, mild TR\par - Continue torsemide  60 mg daily \par - Increase Coreg to 25mg bid from 12.5 mg bid \par - lab work today on Entresto  mg bid\par - repeat TTE on GDMT\par \par 2. HTN B/P stable \par - torsemide as above\par - Coreg as above\par - Entresto as above \par -  Encouraged heart healthy diet, sodium restriction, and weight loss. \par \par 3. HLD: \par - continue statin therapy as prescribed and regular f/u with Cardiologist for routine lipid monitoring and management.\par \par 4. LV thrombus/ Prior CVA\par - continue Eliquis 5 mg bid- will discuss with Cardiologist changing back to coumadin if associates itchiness with eliquis\par - Continue claritin 10 mg as needed\par -  Carotid Dopplers with LILI with 16-49% plaque with no hemodynamically significant stenosis, LICA normal\par \par 5. CAD S/P 4 V CABG- no active chest pain or EKG changes\par - continue ASA 81 mg daily\par - continue statin\par \par Pt is traveling to [place] to see family, will follow up in office in 8 weeks, will call with labs. \par \par

## 2021-06-29 NOTE — HISTORY OF PRESENT ILLNESS
[FreeTextEntry1] : Amrit Coello is a 39y/o woman with Hx of HTN, HLD,  DMII, CAD s/p 4 V CABG ( [facility] [facility] 2018), LV thrombus, on warfarin but changed to Eliquis, CVA, and ICM/chronic systolic CHF, S/P ICD 12/4/21 who presents today for initial evaluation secondary to chronic systolic heart failure LVEF 25%. Pt had  admission at [facility] 2/[facility]21 with increasing edema and orthopnea and was transferred to [place] where she had a [facility] and reports grafts were open. Carotid dopplers with 16-49% mild plaque in prox LILI, no hemodynamically sig stenosis and LICA normal.  3/15/21 TTE with LVEF 27%. Pt is here for a follow up. \par PCP: Shaik Nel MD\par \par Pt states she came from [place] in 2017, sponsored by her mother. She has a  and 4 year old in [place] and 2 older children here. 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 She is a non smoker and no ETOH, no family history. \par \par Pt is here for a follow up after recently found to be volume overloaded and diuretic were adjusted. She is feeling better since last visit, weight has gone down to 133 lbs from a high of 141 lbs. States she can walk 3-4 blocks and climb a fight of stairs with no orthopnea. She had heavy menses last month with low H&H 8.1/27.6 from 10.4/36.2 but states it isn't as heavy this month.  \par She is adhering to a low salt diet and is drinking less than 2 liters of fluid per day. Denies chest pain, palpitations, syncope or near syncope and has not had any ICD firing. \par Plans on visiting her young child and  in [place] in July.\par \par She received the Covid 19 vaccine x 2 without adverse reaction. \par \par \par Patient name: AMRIT COELOL\par Date of test: 3/15/2021\par MR#: [id number]\par Hospital #: [id number]    Location: [facility] Ref Physician(s): , BYALEE SAENZ\par Interpreted by: Bridger Castillo MD, FACC, DELON, MARGARET\par Tech: Manuel Guzman, T\par Type of Test: Carotid Doppler Evaluation\par ------------------------------------------------------------------------\par Procedure: Duplex Real-time grayscale/color Doppler\par ultrasonography used to interrogate extracranial arteries\par bilaterally.\par Indications: Occlusion and stenosis of bilateral carotid\par arteries (I65.23)\par ------------------------------------------------------------------------\par VELOCITY: \par ------------------------------------------------------------------------\par RIGHT: \par   Subclavian: 163 / \par   Prox CCA: 90/28\par   Mid CCA: 93/37\par   Dist CCA: 82/32\par   Prox ICA: 108/51\par   Mid ICA: 96 / 41\par   Distal ICA: 92 / 41\par   ECA: 88 / 14\par   Vertebral: 96 / 37\par   ICA/CCA: 1.2\par ------------------------------------------------------------------------\par   Subclavian: Normal\par   CCA Plaque: Normal\par   ICA Plaque: Mild\par   Vertebral: Antegrade flow\par ------------------------------------------------------------------------\par LEFT: \par   Subclavian: 173 / \par   Prox CCA: 100/45\par   Mid CCA: 117/51\par   Dist CCA: 106/43\par   Prox ICA: 74/33\par   Mid ICA: 69 / 30\par   Distal ICA: 109 / 56\par   ECA: 64 / 15\par   Vertebral: 33 / 15\par   ICA/CCA: 1.0\par ------------------------------------------------------------------------\par   Subclavian: Normal\par   CCA Plaque: Normal\par   ICA Plaque: Normal\par   Vertebral: Antegrade flow\par ------------------------------------------------------------------------\par Right Findings: Right Common Carotid Artery:  Normal right\par common carotid artery.\par Right Internal Carotid Artery:  B-mode and spectral\par analyses consistent with a diameter reduction of 16-49%.\par There is mild heterogenous plaque within the proximal\par vessel. No hemodynamically significant stenosis.\par Right Vertebral Artery:  Antegrade flow with normal\par velocities.\par Left Findings: Left Common Carotid Artery:  Normal left\par common carotid artery.\par Left Internal Carotid Artery: Normal left internal carotid\par artery.  No hemodynamically significant stenosis.\par Left Vertebral Artery:  Antegrade flow with normal\par velocities.\par ------------------------------------------------------------------------\par Summary/Impressions: \par Right Internal Carotid Artery:  There is mild heterogenous\par plaque within the proximal vessel, consistent with a\par 16-49% stenosis. No hemodynamically significant stenosis.\par Left Internal Carotid Artery:  Normal left internal\par carotid artery.  No hemodynamically significant stenosis\par noted.\par ------------------------------------------------------------------------\par Confirmed on  3/15/2021 - 12:08 PM by Bridger Castillo MD,\par [facility], DELON, RPVI\par By signing this report, the attending physician certifies\par that he or she has personally supervised and interpreted\par the vascular study and has reviewed and or edited and\par agrees with the written comments contained within the\par report.

## 2021-07-23 NOTE — ED ADULT NURSE NOTE - TEMPLATE
HEMATOLOGY ONCOLOGY CONSULT NOTE     CONSULTING PHYSICIAN:  Timbo Shah MD  DATE OF SERVICE: 07/23/21     REASON FOR CONSULT:  Invasive ductal carcinoma left breast    STAGING:   Cancer Staging Summary for Zulma Lowe     Malignant neoplasm of upper-outer quadrant of left breast in female, estrogen receptor positive (CMS/HCC)     Stage Date Classification Stage Status    5/28/21 Pathologic Stage IA (pT2, pN1mi, cM0, G1, ER+, CT+, HER2-) Signed by Timbo Shah MD on 6/7/21                 HISTORY OF PRESENT ILLNESS:   Zulma Lowe is a 47 year old female patient with left breast cancer. The patient noted a lump in the lateral aspect of her left breast in March 2021, prompting her presentation. A mammogram and ultrasound on 4/12/2021 revealed an irregular 2.1 cm mass in the left breast in the 2 o'clock position 7 cm from the nipple. A US guided biopsy on 4/15/2021 was consistent with a grade 1 invasive ductal carcinoma which was estrogen receptor positive (100%), progesterone receptor positive (100%), HER-2/traci negative (2+ by IHC and non amplified by FISH), Ki-67 of 15%,. MammaPrint was low risk. The patient has a strong family history of breast cancer and underwent multi-panel genetic testing demonstrating no deleterious mutation. The patient underwent an MRI bilateral breast on 4/26/2021 demonstrated the known tumor  (2.7 cm) as well as a satellite nodule just anterior to the tumor (7 mm).  She was also noted to have prominent left axillary lymph nodes.  On 5/28/2021, she had a left breast oncoplasty and right breast reduction surgery. Pathology revealed an invasive and in situ ductal carcinoma measuring 3.2 cm which was grade 1.  1 out of 4 sentinel lymph nodes showed micrometastatic carcinoma.  She had no extranodal invasion.  The margins were negative.  She was staged as a T2N1mi.   On 6/16/2021, she had a bilateral lower extremity venous ultrasound revealing areas of mild isolated reflux within  the right greater saphenous vein at the knee and left calf greater saphenous vein.  Also at the distal right lesser saphenous vein; no DVT; no reflux within the deep systems; no abnormal perforators. She had an MRI abdomen on 6/30/2021 showing significant interval improvement in splenic lesion since 10/16/2020, with imaging features most compatible with a resolving scar, and no suspicious features currently.    She is well today.      HISTORIES:    Oncology History  Oncology History   Malignant neoplasm of upper-outer quadrant of left breast in female, estrogen receptor positive (CMS/HCC)   5/28/2021 -  Cancer Staged    Staging form: Breast, AJCC 8th Edition  - Pathologic stage from 5/28/2021: Stage IA (pT2, pN1mi, cM0, G1, ER+, VT+, HER2-) - Signed by Timbo Shah MD on 6/7/2021 6/7/2021 Initial Diagnosis    Malignant neoplasm of upper-outer quadrant of left breast in female, estrogen receptor positive (CMS/HCC)     Malignant neoplasm of upper-outer quadrant of female breast (CMS/HCC)   6/17/2021 Initial Diagnosis    Malignant neoplasm of upper-outer quadrant of female breast (CMS/HCC)     6/17/2021 -  Radiation Therapy    The patient saw Thao Smith MD for radiation treatment. This is the current list of radiation treatment:  Radiation Treatments     No radiation treatments to show. (Treatments may have been administered in another system.)                Past Medical History  Past Medical History:   Diagnosis Date   • Chronic kidney disease     kidney stones   • Essential (primary) hypertension    • Malignant neoplasm (CMS/HCC)     left breast   • Sleep apnea     does not use c pap       Past Surgical History  Past Surgical History:   Procedure Laterality Date   • Breast lumpectomy     • Hb lithotripsy     • Iud insertion     • Shoulder surgery Right        Family History  Family History   Problem Relation Age of Onset   • Coronary Artery Disease Mother    • Hypertension Mother    •  Stroke/TIA Father    • Diabetes Father    • Suicide Brother    • Diabetes Son        Social History  Social History     Tobacco Use   • Smoking status: Never Smoker   • Smokeless tobacco: Never Used   Substance Use Topics   • Alcohol use: Yes     Comment: occasionally       Medications   Current Outpatient Medications   Medication Sig Dispense Refill   • metFORMIN (GLUCOPHAGE) 500 MG tablet TAKE 1 TABLET BY MOUTH EVERY DAY WITH A MEAL     • losartan (COZAAR) 50 MG tablet Take 50 mg by mouth 2 times daily.     • amLODIPine (NORVASC) 2.5 MG tablet Take 2.5 mg by mouth 2 times daily.     • hydrochlorothiazide (MICROZIDE) 12.5 MG capsule TAKE 1 CAPSULE BY MOUTH EVERY DAY IN THE MORNING     • amLODIPine (NORVASC) 5 MG tablet Take 5 mg by mouth daily.     • losartan (COZAAR) 25 MG tablet Take 25 mg by mouth 2 times daily.     • metFORMIN (GLUCOPHAGE-XR) 500 MG 24 hr tablet Take 500 mg by mouth daily (with breakfast).       No current facility-administered medications for this visit.        Allergies  ALLERGIES:   Allergen Reactions   • Cefdinir Other (See Comments)   • Codeine Other (See Comments)        REVIEW OF SYSTEMS:    Constitutional: no fatigue. no weight loss, no fever, no chills, no night sweats.  Eyes: no diplopia, no transient or permanent loss of vision, no scotomata.  ENT/mouth: no tinnitus, normal hearing, no epistaxis, no hoarseness, no oral ulcers, no gingival bleeding, no sore throat, no postnasal drip, no nasal drip, no mouth pain, no sinus pain  Cardiovascular: no chest pain, no palpitations, no syncope, no upper extremity edema, no lower extremity edema, no calf discomfort.  Respiratory: no cough, no hemoptysis, no dyspnea, no pleurisy, no wheezing.  Gastrointestinal: no abdominal pain, no nausea, no vomiting, no constipation, no hematochezia, no jaundice, no abdominal cramping, no hematemesis, no diarrhea, no melena, no dyspepsia, no dysphagia.  Musculoskeletal: no muscle pain, no swollen joints, no  joint redness, no bone pain, no spine tenderness.  Skin: no rash, no nodules, no pruritus, no lesions.  Neurologic: no confusion, no seizures, no syncope, no tremor, no speech change, no headache, no hiccups, no abnormal gait, no weakness, no sensory changes.  Psychiatric: no anxiety.  no depression, concentration normal.  Endocrine: no polyuria, no polydipsia, no thyroid disease symptoms, no hot flashes.  Hematologic: no epistaxis, no gingival bleeding, no petechiae, no ecchymosis.  Lymphatic: no lymphadenopathy, no lymphedema.  Allergy/immunologic: no eczema, no frequent mucous membrane infections, no frequent respiratory infections, no recurrent urticaria, no frequent skin infections.     OBJECTIVE:      Vital Signs  There were no vitals filed for this visit.   ECOG   ECOG Performance Status         Physical Examination  General: awake, alert oriented, NAD, appears staged age  Skin: skin color, texture, and turgor normal. No visible rashes or skin lesions  Head: normocephalic, without obvious abnormality  Eyes: conjunctiva clear bilaterally, no scleral icterus   Ears/Nose/Throat: external ears and nose normal, nares normal, moist mucus membranes   Neck: supple, no masses.  Lungs: clear to auscultation bilaterally, no wheezing/rales/rhonchi. Symmetrical breath sounds.   Heart: regular rate and rhythm. S1 and S2 normal. No murmur/rubs/gallops.   Abdomen: soft, non-tender, non-distended, bowel sounds normal.  No organomegaly.  Lymph Nodes: no cervical adenopathy, no axillary adenopathy, no supraclavicular adenopathy.  Musculoskeletal: symmetrical strength and tone.  Neurologic:  awake, alert, oriented x 3, no focal deficits.       ASSESSMENT/PLAN:  47 year old lady presents for evaluation with left breast cancer.    1. Left breast cancer: She palpated a mass in her left breast.  Imaging revealed a mass in the 2 o'clock position of her left breast.  Biopsy revealed a grade 1, invasive ductal carcinoma which was ER/NC  positive and HER-2 negative.  MammaPrint revealed a low risk luminal a malignancy.  MRI breast showed the known malignancy as well as prominent lymphadenopathy and a satellite nodule.  She underwent surgery on May 28, 2021.  Pathology revealed an invasive and in situ ductal carcinoma measuring 3.2 cm which was grade 1.  1 out of 4 sentinel lymph nodes showed micrometastatic carcinoma.  She had no extranodal invasion.  The margins were negative.  She was staged as a T2N1mi.  This is stage Ia breast cancer.  Given the low risk MammaPrint, the benefit to chemotherapy would be low.  She was evaluated by Dr. Martines for radiation.  After completion of radiation, we will start her on endocrine therapy. Since she is premenopausal, I will give her tamoxifen 20 mg p.o. daily.  She will need a yearly pelvic exam while on tamoxifen.  We have reviewed the side effects which include: Hair thinning, fatigue, hot flashes, mood swings, arthralgias, myalgias, dry skin, vaginal dryness, deep vein thrombosis, uterine cancer, cataracts. She will need her IUD removed.     2. Splenic lesion: This was found incidentally.  CT on 09/29/2020  revealed a heterogeneously hypovascular lesion in the spleen measuring 2.5 cm.  MRI on 10/16/2020 demonstrated a slight interval decrease in size of previously noted inferior splenic lesion now measuring 2.2 cm in diameter with smooth peripheral enhancement. Nuclear scan on 11/09/2020 showed a small peripheral wedge-shaped photopenic defect in the posterior spleen corresponding to the lesion seen on previous MRI. Echo on 12/29/2020 was normal. This could be an infarct and/or infection. She had an MRI abdomen on 6/30/2021 showing significant interval improvement in splenic lesion since 10/16/2020, with imaging features most compatible with a resolving scar, and no suspicious features currently.    3. Venous insufficiency:  On 6/16/2021, she had a bilateral lower extremity venous ultrasound revealing areas  of mild isolated reflux within the right greater saphenous vein at the knee and left calf greater saphenous vein.  Also at the distal right lesser saphenous vein; no DVT; no reflux within the deep systems; no abnormal perforators.    All of the patient's questions were answered to their satisfaction. I will see her in 6-7 weeks to finalize therapy  -----------------------------------------------------------------------------------------------------------------------  Charting performed by She BE for Dr. Shah  The documentation recorded by the she accurately reflects the service I personally performed and the decisions made by sloane Shah MD   Cardiac

## 2021-08-23 ENCOUNTER — APPOINTMENT (OUTPATIENT)
Dept: CARDIOLOGY | Facility: CLINIC | Age: 41
End: 2021-08-23

## 2021-08-25 ENCOUNTER — APPOINTMENT (OUTPATIENT)
Dept: ELECTROPHYSIOLOGY | Facility: CLINIC | Age: 41
End: 2021-08-25
Payer: MEDICAID

## 2021-08-25 ENCOUNTER — NON-APPOINTMENT (OUTPATIENT)
Age: 41
End: 2021-08-25

## 2021-08-25 PROCEDURE — 93296 REM INTERROG EVL PM/IDS: CPT

## 2021-08-25 PROCEDURE — 93295 DEV INTERROG REMOTE 1/2/MLT: CPT

## 2021-12-21 ENCOUNTER — APPOINTMENT (OUTPATIENT)
Dept: ELECTROPHYSIOLOGY | Facility: CLINIC | Age: 41
End: 2021-12-21

## 2022-01-19 ENCOUNTER — APPOINTMENT (OUTPATIENT)
Dept: ELECTROPHYSIOLOGY | Facility: CLINIC | Age: 42
End: 2022-01-19

## 2022-01-20 ENCOUNTER — APPOINTMENT (OUTPATIENT)
Dept: CARDIOLOGY | Facility: CLINIC | Age: 42
End: 2022-01-20

## 2022-01-27 ENCOUNTER — FORM ENCOUNTER (OUTPATIENT)
Age: 42
End: 2022-01-27

## 2022-02-16 ENCOUNTER — APPOINTMENT (OUTPATIENT)
Dept: ELECTROPHYSIOLOGY | Facility: CLINIC | Age: 42
End: 2022-02-16

## 2022-02-24 ENCOUNTER — NON-APPOINTMENT (OUTPATIENT)
Age: 42
End: 2022-02-24

## 2022-02-24 ENCOUNTER — APPOINTMENT (OUTPATIENT)
Dept: CARDIOLOGY | Facility: CLINIC | Age: 42
End: 2022-02-24
Payer: MEDICAID

## 2022-02-24 ENCOUNTER — APPOINTMENT (OUTPATIENT)
Dept: ELECTROPHYSIOLOGY | Facility: CLINIC | Age: 42
End: 2022-02-24
Payer: MEDICAID

## 2022-02-24 VITALS
SYSTOLIC BLOOD PRESSURE: 152 MMHG | HEIGHT: 64 IN | BODY MASS INDEX: 24.84 KG/M2 | DIASTOLIC BLOOD PRESSURE: 91 MMHG | RESPIRATION RATE: 16 BRPM | HEART RATE: 80 BPM | WEIGHT: 145.5 LBS | OXYGEN SATURATION: 96 % | TEMPERATURE: 97 F

## 2022-02-24 VITALS — DIASTOLIC BLOOD PRESSURE: 90 MMHG | SYSTOLIC BLOOD PRESSURE: 149 MMHG

## 2022-02-24 PROCEDURE — 93283 PRGRMG EVAL IMPLANTABLE DFB: CPT

## 2022-02-24 PROCEDURE — 36415 COLL VENOUS BLD VENIPUNCTURE: CPT

## 2022-02-24 PROCEDURE — 99214 OFFICE O/P EST MOD 30 MIN: CPT

## 2022-02-24 PROCEDURE — 93000 ELECTROCARDIOGRAM COMPLETE: CPT

## 2022-02-24 RX ORDER — FERROUS GLUCONATE 324(37.5)
324 (37.5 FE) TABLET ORAL
Qty: 180 | Refills: 3 | Status: ACTIVE | COMMUNITY
Start: 2022-02-24 | End: 1900-01-01

## 2022-02-24 RX ORDER — FERROUS SULFATE TAB EC 324 MG (65 MG FE EQUIVALENT) 324 (65 FE) MG
324 (65 FE) TABLET DELAYED RESPONSE ORAL
Qty: 180 | Refills: 3 | Status: DISCONTINUED | COMMUNITY
Start: 2021-04-05 | End: 2022-02-24

## 2022-02-24 RX ORDER — APIXABAN 5 MG/1
5 TABLET, FILM COATED ORAL
Qty: 180 | Refills: 2 | Status: DISCONTINUED | COMMUNITY
End: 2022-02-24

## 2022-02-24 RX ORDER — FAMOTIDINE 20 MG/1
20 TABLET, FILM COATED ORAL
Qty: 90 | Refills: 0 | Status: DISCONTINUED | COMMUNITY
Start: 2021-03-04 | End: 2022-02-24

## 2022-02-24 RX ORDER — PANTOPRAZOLE 40 MG/1
40 TABLET, DELAYED RELEASE ORAL DAILY
Qty: 90 | Refills: 0 | Status: DISCONTINUED | COMMUNITY
End: 2022-02-24

## 2022-02-24 RX ORDER — MULTIVIT-MINS/IRON/FOLIC/LYCOP 8-200-600
TABLET ORAL DAILY
Qty: 90 | Refills: 0 | Status: ACTIVE | COMMUNITY
Start: 2022-02-24 | End: 1900-01-01

## 2022-02-24 RX ORDER — OMEPRAZOLE 40 MG/1
40 CAPSULE, DELAYED RELEASE ORAL
Qty: 90 | Refills: 3 | Status: ACTIVE | COMMUNITY
Start: 2022-02-24 | End: 1900-01-01

## 2022-02-25 LAB
25(OH)D3 SERPL-MCNC: 17.6 NG/ML
ALBUMIN SERPL ELPH-MCNC: 3.1 G/DL
ALP BLD-CCNC: 86 U/L
ALT SERPL-CCNC: 13 U/L
ANION GAP SERPL CALC-SCNC: 15 MMOL/L
AST SERPL-CCNC: 21 U/L
BASOPHILS # BLD AUTO: 0.08 K/UL
BASOPHILS NFR BLD AUTO: 0.8 %
BILIRUB SERPL-MCNC: 0.2 MG/DL
BUN SERPL-MCNC: 27 MG/DL
CALCIUM SERPL-MCNC: 8.9 MG/DL
CHLORIDE SERPL-SCNC: 101 MMOL/L
CHOLEST SERPL-MCNC: 207 MG/DL
CO2 SERPL-SCNC: 21 MMOL/L
CREAT SERPL-MCNC: 1.15 MG/DL
EOSINOPHIL # BLD AUTO: 0.3 K/UL
EOSINOPHIL NFR BLD AUTO: 3 %
ESTIMATED AVERAGE GLUCOSE: 163 MG/DL
HBA1C MFR BLD HPLC: 7.3 %
HCT VFR BLD CALC: 40.2 %
HDLC SERPL-MCNC: 34 MG/DL
HGB BLD-MCNC: 12.6 G/DL
IMM GRANULOCYTES NFR BLD AUTO: 0.2 %
LDLC SERPL CALC-MCNC: 128 MG/DL
LYMPHOCYTES # BLD AUTO: 2.7 K/UL
LYMPHOCYTES NFR BLD AUTO: 27 %
MAGNESIUM SERPL-MCNC: 2.2 MG/DL
MAN DIFF?: NORMAL
MCHC RBC-ENTMCNC: 29.3 PG
MCHC RBC-ENTMCNC: 31.3 GM/DL
MCV RBC AUTO: 93.5 FL
MONOCYTES # BLD AUTO: 0.91 K/UL
MONOCYTES NFR BLD AUTO: 9.1 %
NEUTROPHILS # BLD AUTO: 5.99 K/UL
NEUTROPHILS NFR BLD AUTO: 59.9 %
NONHDLC SERPL-MCNC: 173 MG/DL
NT-PROBNP SERPL-MCNC: 2595 PG/ML
PLATELET # BLD AUTO: 393 K/UL
POTASSIUM SERPL-SCNC: 5.9 MMOL/L
PROT SERPL-MCNC: 7.2 G/DL
RBC # BLD: 4.3 M/UL
RBC # FLD: 13.5 %
SODIUM SERPL-SCNC: 137 MMOL/L
TRIGL SERPL-MCNC: 226 MG/DL
TSH SERPL-ACNC: 3.53 UIU/ML
WBC # FLD AUTO: 10 K/UL

## 2022-02-25 NOTE — HISTORY OF PRESENT ILLNESS
[FreeTextEntry1] : Jocelyne Conrad is a 42 y/o woman with Hx of HTN, HLD,  DMII, CAD s/p 4 V CABG ( [place] 2018), LV thrombus, on warfarin but changed to Eliquis, CVA, and ICM/chronic systolic CHF, S/P ICD 12/4/21 who presents today for initial evaluation secondary to chronic systolic heart failure LVEF 25%. Pt had  admission at [facility] 2/2021 with increasing edema and orthopnea and was transferred to [place] where she had a [facility] and reports grafts were open. Carotid Dopplers with 16-49% mild plaque in prox LILI, no hemodynamically significant  stenosis and LICA normal.  3/15/21 TTE with LVEF 27%. Pt is here for a follow up. \par PCP: Shaik Nel MD\par \par Pt states she came from [place] in 2017, sponsored by her mother. She has a  and 4 year old in [place] and 2 older children here. 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 She is a non smoker and no ETOH, no family history. \par \par Pt is here for a follow up. Since last visit on 6/28/22, she followed up with her cardiologist Dr. Neumann and had a TTE reported to have improvement in LVEF 33%. He also took her off Eliquis, had previously had anemia with heavy menses.  She spent some time visiting family in [place] including her young daughter.. \par States her torsemide was decreased to 20 mg QOD from 40 mg bid since last visit. Her weight is 149 lbs today with clothes from 133 lbs last visit. She has a remote monitor but the transmissions have not come through. She had a device check in office today and assisted with setting up remote monitor. Interrogation with Optivol that was above threshold in January and is currently returning to baseline. \par States she can walk 3-4 blocks and climb 4 fights of stairs up to he apartment with mild dyspnea but feels a stiffness in her chest after. She sleeps with 2 pillows with no orthopnea. She reports she has been having increased reflux and states pepcid has not helped. \par She is adhering to a low salt diet and is drinking less than 2 liters of fluid per day. Denies chest pain, palpitations, syncope or near syncope and has not had any ICD firing. \par \par She received the Covid 19 vaccine x 2 without adverse reaction. \par \par \par

## 2022-02-25 NOTE — CARDIOLOGY SUMMARY
[de-identified] : 2/24/22 NSR 83, PRWP, NSST\par 6/28/21 NSR, PRWP, NSST\par 5/17/21 NSR, PRWP, NSST\par  [de-identified] : \par 3/15/21 TTE: LVEF 27%, LVIDD 5.6 cm, severe global LV systolic dysfunction, decreased RV systolic function\par \par 10/20/2020 LVEF 25%. \par \par 2/17/2020, LVEF 37% \par  [de-identified] : 9/7/2018, ormal LM, 85% pLAD lesion, 80% mLAD lesion, 50% mD1 lesion, 50-60% mLCx lesion, 50% mRCA lesion, 80% dRCA lesion. EF:40-45%. \par \par  \par  [de-identified] : 3/15/21; Right Internal Carotid Artery: There is mild heterogenous\par plaque within the proximal vessel, consistent with a\par 16-49% stenosis. No hemodynamically significant stenosis.\par Left Internal Carotid Artery: Normal left internal\par carotid artery. No hemodynamically significant stenosis\par noted.\par

## 2022-02-25 NOTE — DISCUSSION/SUMMARY
[FreeTextEntry1] : 1. ICM/chronic systolic CHF: EKG today NSR. \par - NYHA Class II symptoms\par - . LVEF < 35% on prior TTE and is S/P ICD\par - device interrogated today and explained how to set up remote monitor. Optivol elevated in January and improving in February\par - start Farxiga 10 mg daily for HF and DM benefit. Pt was given a coupon for free 30 days\par - continue Entresto  mg bid\par - continue everton 25 mg daily\par - check labs today including CMP, CBC, HgA1C and serum pro BNP\par - Continue torsemide  20 mg daily with additional as needed for weight gain of 2-3 lbs in 2-3 days\par - Continue Coreg  25 mg bid \par - limit salt to less than 2000 mg per day\par - limit fluid to less than 2 liters per day\par - daily weight and B/P log\par \par 2. HTN B/P elevated\par - torsemide as above\par - Coreg as above\par - Entresto as above \par - everton as above\par - add Farxiga as above\par -  Encouraged heart healthy diet, sodium restriction, and weight loss. \par \par 3. HLD: \par - continue statin therapy as prescribed and regular f/u with Cardiologist for routine lipid monitoring and management.\par \par 4. LV thrombus/ Prior CVA\par - pt was taken off eliquis but cardiologist Dr. Neumann . She had urticaria with eliquis \par - Continue claritin 10 mg as needed\par -  Carotid Dopplers with LILI with 16-49% plaque with no hemodynamically significant stenosis, LICA normal\par \par 5. CAD S/P 4 V CABG- no active chest pain or EKG changes\par - continue ASA 81 mg daily\par - continue statin\par \par Follow up in office in 6-8 weeks, will call with labs. Pt will report B/P readings.\par \par

## 2022-02-25 NOTE — ASSESSMENT
[FreeTextEntry1] : 39 y/o woman with Hx of HTN, HLD,  DMII, CAD s/p 4 V CABG ( [place] 2018), LV thrombus, on warfarin but changed to Eliquis, CVA, and ICM/chronic systolic CHF, S/P ICD 12/4/21 who presents today for initial evaluation secondary to chronic systolic heart failure LVEF 25%. Pt had a recent admission at [facility] 1 month ago with increasing edema and orthopnea and was transferred to [place] where she had a C and reports grafts were open. Carotid dopplers with 16-49% mild plaque in prox LILI, no hemodynamically sig stenosis and LICA normal. \par ACC/AHA Stage C, NYHA Class II-III\par Appears compensated but with B/P 152/91 to 149/90

## 2022-02-25 NOTE — PHYSICAL EXAM
[Well Nourished] : well nourished [No Acute Distress] : no acute distress [Normal Conjunctiva] : normal conjunctiva [Normal S1, S2] : normal S1, S2 [Clear Lung Fields] : clear lung fields [Good Air Entry] : good air entry [Soft] : abdomen soft [Non Tender] : non-tender [Normal Gait] : normal gait [Normal] : alert and oriented, normal memory [de-identified] : JVP 8 cm  [de-identified] : Left chest ICD [de-identified] : trace lower extremity edema B/L

## 2022-02-25 NOTE — ADDENDUM
[FreeTextEntry1] : Called with results of labs notable for K 5.9. Will decrease everton to 12.5 mg dailly and will take 2 additional doses of torsemide 20 mg and then 20 mg every other day. Repeat labs on 3/9 at 830 am including fasting lipid profile and BMP.

## 2022-03-09 ENCOUNTER — APPOINTMENT (OUTPATIENT)
Dept: CARDIOLOGY | Facility: CLINIC | Age: 42
End: 2022-03-09

## 2022-04-14 ENCOUNTER — APPOINTMENT (OUTPATIENT)
Dept: CARDIOLOGY | Facility: CLINIC | Age: 42
End: 2022-04-14

## 2022-05-26 ENCOUNTER — NON-APPOINTMENT (OUTPATIENT)
Age: 42
End: 2022-05-26

## 2022-05-26 ENCOUNTER — APPOINTMENT (OUTPATIENT)
Dept: ELECTROPHYSIOLOGY | Facility: CLINIC | Age: 42
End: 2022-05-26
Payer: MEDICAID

## 2022-05-26 PROCEDURE — 93296 REM INTERROG EVL PM/IDS: CPT

## 2022-05-26 PROCEDURE — 93295 DEV INTERROG REMOTE 1/2/MLT: CPT

## 2022-06-21 NOTE — HISTORY OF PRESENT ILLNESS
Detail Level: Zone
Detail Level: Simple
Detail Level: Detailed
[FreeTextEntry1] : Jocelyne Conrad is a 39y/o woman with Hx of HTN, HLD,  DMII, CAD s/p 4 V CABG ( [place] 2018), LV thrombus, on warfarin but changed to Eliquis, CVA, and ICM/chronic systolic CHF, S/P ICD 12/4/21 who presents today for initial evaluation secondary to chronic systolic heart failure LVEF 25%. Pt had a recent admission at [facility] 1 month ago with increasing edema and orthopnea and was transferred to [place] where she had a [facility] and reports grafts were open. Pt is here for an initial HF evaluation to assist with optimization of HF therapy. \par PCP: Shaik Nel MD\par \par Pt is here for an initial consultation. Pt states she came from [place] in 2017, sponsored by her mother. She has a  and 4 year old in [place] and 2 older children here. 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 She is a non smoker and no ETOH, no family history. \par Pt had been admitted to [facility] and transferred to [place]. Her meds were changed o D/C and she was started on Entresto 24-26 mg bid, metoprolol 25 mg bid, eeliquis 5 mg bid and furosemide 40 mg daily. \par Currently, she can walk 4 blocks and can climb one flight of stairs without dyspnea.. Compliant with medication as prescribed. She is adhering to a low salt diet and is drinking less than 2 liters of fluid per day. Denies chest pain, palpitations, SOB at rest, syncope or near syncope and has not had any ICD firing. Of note, pt had a device check 2/25/21 and Optivol was elevated. \par

## 2022-08-25 ENCOUNTER — NON-APPOINTMENT (OUTPATIENT)
Age: 42
End: 2022-08-25

## 2022-08-25 ENCOUNTER — APPOINTMENT (OUTPATIENT)
Dept: ELECTROPHYSIOLOGY | Facility: CLINIC | Age: 42
End: 2022-08-25

## 2022-08-25 PROCEDURE — 93295 DEV INTERROG REMOTE 1/2/MLT: CPT

## 2022-08-25 PROCEDURE — 93296 REM INTERROG EVL PM/IDS: CPT

## 2022-11-25 ENCOUNTER — APPOINTMENT (OUTPATIENT)
Dept: ELECTROPHYSIOLOGY | Facility: CLINIC | Age: 42
End: 2022-11-25

## 2022-11-25 ENCOUNTER — NON-APPOINTMENT (OUTPATIENT)
Age: 42
End: 2022-11-25

## 2022-11-25 PROCEDURE — 93295 DEV INTERROG REMOTE 1/2/MLT: CPT

## 2022-11-25 PROCEDURE — 93296 REM INTERROG EVL PM/IDS: CPT

## 2022-11-26 ENCOUNTER — EMERGENCY (EMERGENCY)
Facility: HOSPITAL | Age: 42
LOS: 1 days | Discharge: HOME | End: 2022-11-26
Attending: STUDENT IN AN ORGANIZED HEALTH CARE EDUCATION/TRAINING PROGRAM | Admitting: STUDENT IN AN ORGANIZED HEALTH CARE EDUCATION/TRAINING PROGRAM

## 2022-11-26 VITALS
TEMPERATURE: 99 F | HEART RATE: 86 BPM | DIASTOLIC BLOOD PRESSURE: 60 MMHG | RESPIRATION RATE: 16 BRPM | SYSTOLIC BLOOD PRESSURE: 95 MMHG | OXYGEN SATURATION: 100 %

## 2022-11-26 DIAGNOSIS — I25.10 ATHEROSCLEROTIC HEART DISEASE OF NATIVE CORONARY ARTERY WITHOUT ANGINA PECTORIS: Chronic | ICD-10-CM

## 2022-11-26 DIAGNOSIS — Z98.51 TUBAL LIGATION STATUS: Chronic | ICD-10-CM

## 2022-11-26 DIAGNOSIS — Z98.891 HISTORY OF UTERINE SCAR FROM PREVIOUS SURGERY: Chronic | ICD-10-CM

## 2022-11-26 PROCEDURE — 99284 EMERGENCY DEPT VISIT MOD MDM: CPT

## 2022-11-26 NOTE — ED ADULT TRIAGE NOTE - RESPIRATORY RATE (BREATHS/MIN)
Hpi Title: Evaluation of Skin Lesions
How Severe Are Your Spot(S)?: mild
Have Your Spot(S) Been Treated In The Past?: has been treated
16

## 2022-11-26 NOTE — ED PROVIDER NOTE - OBJECTIVE STATEMENT
41 year old female with a PMHx of CAD s/p CABG, HTN, diabetes presenting with Abnormal US results. Patient has been having intermittent Left sided abdominal pain over the last month. Pain has not been present for 1 week. She had a complete abdominal US on 11/22 showing Right sided pleural effusion, Stone filled contracted gallbladder. She was told to see a physician, thus why she is presenting to the ED today. Patient denies fevers, cp, sob, abdominal pain, nausea, vomiting.

## 2022-11-26 NOTE — ED ADULT NURSE NOTE - OBJECTIVE STATEMENT
Pt presents to ED ambulatory with steady gait c/o intermittent sharp left sided abdominal pain x1 month, but has not had the pain in a week. States she had an abdominal US done on 11/22 which showed a right pleural effusion and gallstones. States she was told to see an MD. Denies any other medical complaints. call bell within reach. Education provided to pt on how to and when to use call bell for assistance. Will continue to monitor.

## 2022-11-26 NOTE — ED PROVIDER NOTE - NSFOLLOWUPINSTRUCTIONS_ED_ALL_ED_FT
Please follow up with your primary care physician within the next 4-6 days.    There are no signs of emergency conditions requiring admission to the hospital on today's workup. Based on the evaluation, a presumptive diagnosis was made, however, further evaluation may be required by your primary care physician or a specialist for a more definitive diagnosis. Please bring a copy of your discharge paperwork (including any test results) to your doctor. Therefore, please follow-up as directed or return to the Emergency Department if your symptoms change or worsen.     Please return to the emergency department if you experience any of the following symptoms:    Fever  Chest pain  Difficulty breathing  Abdominal pain  Nausea  Vomiting Please follow up with your primary care physician within the next 4-6 days to discuss your abdominal ultrasound results.    There are no signs of emergency conditions requiring admission to the hospital on today's workup. Based on the evaluation, a presumptive diagnosis was made, however, further evaluation may be required by your primary care physician or a specialist for a more definitive diagnosis. Please bring a copy of your discharge paperwork (including any test results) to your doctor. Therefore, please follow-up as directed or return to the Emergency Department if your symptoms change or worsen.     Please return to the emergency department if you experience any of the following symptoms:    Fever  Chest pain  Difficulty breathing  Abdominal pain  Nausea  Vomiting

## 2022-11-26 NOTE — ED ADULT TRIAGE NOTE - CHIEF COMPLAINT QUOTE
Patient c/o left sided abdominal pain  on and off x 1 month.  Patient had a sonogram done on Nov 21st . results show a moderate right pleural effusion and gallbladder stone.  Type 2 diabetes  fs = 151 .

## 2022-11-26 NOTE — ED PROVIDER NOTE - ATTENDING CONTRIBUTION TO CARE
Dr. Toledo, Attending Physician-  I performed a face to face bedside interview with patient regarding history of present illness, review of symptoms and past medical history. I completed an independent physical exam.  I have discussed patient's plan of care with the resident.    41F, CAD s/p CABG, HTN, who presents after sono results. Patient with localized abdominal pain for the past one month. Non-exertional. No PND/orthopnea. No changes in exercise tolerance. Had outpatient sono that showing contracted bladder (no evidence of cholecystitis). Patient is without complaints at this time. No headaches, fevers, chills, cough, sputum, sob, nvd, dysuria, hematuria, recent travel, trauma, syncope. Physical: afebrile, well appearing, rrr, ctabl, adbomen soft, no cvat, no le edema. Plan: dc with PMD follow up.

## 2022-11-26 NOTE — ED PROVIDER NOTE - CLINICAL SUMMARY MEDICAL DECISION MAKING FREE TEXT BOX
41 year old female with a PMHx of CAD s/p CABG, HTN, diabetes presenting with Abnormal US results after having intermittent left sided abdominal pain for 1 month. Patient was told to seek medical attention which is what brought her to the ED today.

## 2022-11-26 NOTE — ED PROVIDER NOTE - PATIENT PORTAL LINK FT
You can access the FollowMyHealth Patient Portal offered by [facility] by registering at the following website: [email or web address]. By joining [facility] FollowMyHealth portal, you will also be able to view your health information using other applications (apps) compatible with our system.

## 2023-01-01 ENCOUNTER — APPOINTMENT (OUTPATIENT)
Dept: CARDIOLOGY | Facility: CLINIC | Age: 43
End: 2023-01-01
Payer: MEDICAID

## 2023-01-01 ENCOUNTER — LABORATORY RESULT (OUTPATIENT)
Age: 43
End: 2023-01-01

## 2023-01-01 ENCOUNTER — APPOINTMENT (OUTPATIENT)
Dept: ELECTROPHYSIOLOGY | Facility: CLINIC | Age: 43
End: 2023-01-01
Payer: MEDICAID

## 2023-01-01 ENCOUNTER — APPOINTMENT (OUTPATIENT)
Dept: HEART FAILURE | Facility: CLINIC | Age: 43
End: 2023-01-01
Payer: MEDICAID

## 2023-01-01 ENCOUNTER — NON-APPOINTMENT (OUTPATIENT)
Age: 43
End: 2023-01-01

## 2023-01-01 ENCOUNTER — APPOINTMENT (OUTPATIENT)
Dept: ELECTROPHYSIOLOGY | Facility: CLINIC | Age: 43
End: 2023-01-01

## 2023-01-01 ENCOUNTER — APPOINTMENT (OUTPATIENT)
Dept: VASCULAR SURGERY | Facility: CLINIC | Age: 43
End: 2023-01-01
Payer: MEDICAID

## 2023-01-01 ENCOUNTER — APPOINTMENT (OUTPATIENT)
Dept: HEART FAILURE | Facility: CLINIC | Age: 43
End: 2023-01-01

## 2023-01-01 VITALS
SYSTOLIC BLOOD PRESSURE: 81 MMHG | HEART RATE: 80 BPM | OXYGEN SATURATION: 96 % | HEIGHT: 64 IN | DIASTOLIC BLOOD PRESSURE: 44 MMHG | BODY MASS INDEX: 24.03 KG/M2

## 2023-01-01 VITALS
WEIGHT: 140 LBS | DIASTOLIC BLOOD PRESSURE: 69 MMHG | SYSTOLIC BLOOD PRESSURE: 103 MMHG | OXYGEN SATURATION: 99 % | HEIGHT: 64 IN | BODY MASS INDEX: 23.9 KG/M2 | HEART RATE: 85 BPM

## 2023-01-01 VITALS
OXYGEN SATURATION: 97 % | DIASTOLIC BLOOD PRESSURE: 70 MMHG | HEART RATE: 73 BPM | HEIGHT: 64 IN | SYSTOLIC BLOOD PRESSURE: 113 MMHG

## 2023-01-01 VITALS — BODY MASS INDEX: 24.03 KG/M2 | WEIGHT: 140 LBS

## 2023-01-01 VITALS — DIASTOLIC BLOOD PRESSURE: 65 MMHG | SYSTOLIC BLOOD PRESSURE: 96 MMHG

## 2023-01-01 VITALS
WEIGHT: 137 LBS | HEART RATE: 80 BPM | HEIGHT: 64 IN | TEMPERATURE: 98.7 F | SYSTOLIC BLOOD PRESSURE: 98 MMHG | DIASTOLIC BLOOD PRESSURE: 64 MMHG | BODY MASS INDEX: 23.39 KG/M2

## 2023-01-01 DIAGNOSIS — E78.5 HYPERLIPIDEMIA, UNSPECIFIED: ICD-10-CM

## 2023-01-01 DIAGNOSIS — S88.112A COMPLETE TRAUMATIC AMPUTATION AT LVL BETWEEN KNEE AND ANKLE, LEFT LOWER LEG, INITIAL ENCOUNTER: ICD-10-CM

## 2023-01-01 DIAGNOSIS — I25.10 ATHEROSCLEROTIC HEART DISEASE OF NATIVE CORONARY ARTERY W/OUT ANGINA PECTORIS: ICD-10-CM

## 2023-01-01 DIAGNOSIS — I10 ESSENTIAL (PRIMARY) HYPERTENSION: ICD-10-CM

## 2023-01-01 DIAGNOSIS — Z95.1 PRESENCE OF AORTOCORONARY BYPASS GRAFT: ICD-10-CM

## 2023-01-01 DIAGNOSIS — I51.3 INTRACARDIAC THROMBOSIS, NOT ELSEWHERE CLASSIFIED: ICD-10-CM

## 2023-01-01 DIAGNOSIS — I25.5 ISCHEMIC CARDIOMYOPATHY: ICD-10-CM

## 2023-01-01 DIAGNOSIS — I50.22 CHRONIC SYSTOLIC (CONGESTIVE) HEART FAILURE: ICD-10-CM

## 2023-01-01 LAB
ALBUMIN SERPL ELPH-MCNC: 2.4 G/DL
ALBUMIN SERPL ELPH-MCNC: 2.8 G/DL
ALP BLD-CCNC: 100 U/L
ALP BLD-CCNC: 103 U/L
ALT SERPL-CCNC: 16 U/L
ALT SERPL-CCNC: 37 U/L
ANION GAP SERPL CALC-SCNC: 13 MMOL/L
ANION GAP SERPL CALC-SCNC: 14 MMOL/L
AST SERPL-CCNC: 22 U/L
AST SERPL-CCNC: 96 U/L
BASOPHILS # BLD AUTO: 0.04 K/UL
BASOPHILS # BLD AUTO: 0.12 K/UL
BASOPHILS NFR BLD AUTO: 0.5 %
BASOPHILS NFR BLD AUTO: 1.8 %
BILIRUB SERPL-MCNC: 0.2 MG/DL
BILIRUB SERPL-MCNC: 0.3 MG/DL
BUN SERPL-MCNC: 42 MG/DL
BUN SERPL-MCNC: 52 MG/DL
CALCIUM SERPL-MCNC: 8.3 MG/DL
CALCIUM SERPL-MCNC: 9.1 MG/DL
CHLORIDE SERPL-SCNC: 93 MMOL/L
CHLORIDE SERPL-SCNC: 97 MMOL/L
CO2 SERPL-SCNC: 25 MMOL/L
CO2 SERPL-SCNC: 27 MMOL/L
CREAT SERPL-MCNC: 1.4 MG/DL
CREAT SERPL-MCNC: 1.82 MG/DL
EGFR: 35 ML/MIN/1.73M2
EGFR: 48 ML/MIN/1.73M2
EOSINOPHIL # BLD AUTO: 0.18 K/UL
EOSINOPHIL # BLD AUTO: 0.25 K/UL
EOSINOPHIL NFR BLD AUTO: 2.6 %
EOSINOPHIL NFR BLD AUTO: 3 %
ESTIMATED AVERAGE GLUCOSE: 166 MG/DL
ESTIMATED AVERAGE GLUCOSE: 203 MG/DL
HBA1C MFR BLD HPLC: 7.4 %
HBA1C MFR BLD HPLC: 8.7 %
HCT VFR BLD CALC: 27.2 %
HCT VFR BLD CALC: 34.7 %
HGB BLD-MCNC: 10.3 G/DL
HGB BLD-MCNC: 7.6 G/DL
IMM GRANULOCYTES NFR BLD AUTO: 0.2 %
LYMPHOCYTES # BLD AUTO: 1.14 K/UL
LYMPHOCYTES # BLD AUTO: 2.13 K/UL
LYMPHOCYTES NFR BLD AUTO: 16.7 %
LYMPHOCYTES NFR BLD AUTO: 25.9 %
MAGNESIUM SERPL-MCNC: 2.2 MG/DL
MAN DIFF?: NORMAL
MAN DIFF?: NORMAL
MCHC RBC-ENTMCNC: 25.1 PG
MCHC RBC-ENTMCNC: 27.9 GM/DL
MCHC RBC-ENTMCNC: 28 PG
MCHC RBC-ENTMCNC: 29.7 GM/DL
MCV RBC AUTO: 89.8 FL
MCV RBC AUTO: 94.3 FL
MONOCYTES # BLD AUTO: 0.48 K/UL
MONOCYTES # BLD AUTO: 0.81 K/UL
MONOCYTES NFR BLD AUTO: 7 %
MONOCYTES NFR BLD AUTO: 9.9 %
NEUTROPHILS # BLD AUTO: 4.9 K/UL
NEUTROPHILS # BLD AUTO: 4.97 K/UL
NEUTROPHILS NFR BLD AUTO: 60.5 %
NEUTROPHILS NFR BLD AUTO: 71.9 %
NT-PROBNP SERPL-MCNC: 1827 PG/ML
NT-PROBNP SERPL-MCNC: ABNORMAL PG/ML
PLATELET # BLD AUTO: 298 K/UL
PLATELET # BLD AUTO: 568 K/UL
POTASSIUM SERPL-SCNC: 4.6 MMOL/L
POTASSIUM SERPL-SCNC: 5.3 MMOL/L
PROT SERPL-MCNC: 7 G/DL
PROT SERPL-MCNC: 7.2 G/DL
RBC # BLD: 3.03 M/UL
RBC # BLD: 3.68 M/UL
RBC # FLD: 17.5 %
RBC # FLD: 22.3 %
SODIUM SERPL-SCNC: 133 MMOL/L
SODIUM SERPL-SCNC: 135 MMOL/L
TSH SERPL-ACNC: 3.69 UIU/ML
TSH SERPL-ACNC: 6.91 UIU/ML
URATE SERPL-MCNC: 7.3 MG/DL
WBC # FLD AUTO: 6.82 K/UL
WBC # FLD AUTO: 8.22 K/UL

## 2023-01-01 PROCEDURE — 93000 ELECTROCARDIOGRAM COMPLETE: CPT

## 2023-01-01 PROCEDURE — 36415 COLL VENOUS BLD VENIPUNCTURE: CPT

## 2023-01-01 PROCEDURE — 99214 OFFICE O/P EST MOD 30 MIN: CPT | Mod: 25

## 2023-01-01 PROCEDURE — 93296 REM INTERROG EVL PM/IDS: CPT

## 2023-01-01 PROCEDURE — 93295 DEV INTERROG REMOTE 1/2/MLT: CPT

## 2023-01-01 PROCEDURE — 99213 OFFICE O/P EST LOW 20 MIN: CPT

## 2023-01-01 PROCEDURE — 93283 PRGRMG EVAL IMPLANTABLE DFB: CPT

## 2023-01-01 RX ORDER — SPIRONOLACTONE 25 MG/1
25 TABLET ORAL
Qty: 90 | Refills: 5 | Status: DISCONTINUED | COMMUNITY
Start: 2021-04-12 | End: 2023-01-01

## 2023-01-01 RX ORDER — DAPAGLIFLOZIN 10 MG/1
10 TABLET, FILM COATED ORAL
Qty: 30 | Refills: 3 | Status: DISCONTINUED | COMMUNITY
Start: 2022-02-24 | End: 2023-01-01

## 2023-01-01 RX ORDER — ASPIRIN ENTERIC COATED TABLETS 81 MG 81 MG/1
81 TABLET, DELAYED RELEASE ORAL DAILY
Qty: 90 | Refills: 3 | Status: DISCONTINUED | COMMUNITY
Start: 2018-09-10 | End: 2023-01-01

## 2023-01-01 RX ORDER — PETROLATUM,WHITE 41 %
OINTMENT (GRAM) TOPICAL
Qty: 1 | Refills: 0 | Status: DISCONTINUED | COMMUNITY
Start: 2021-03-15 | End: 2023-01-01

## 2023-01-01 RX ORDER — BUMETANIDE 1 MG/1
1 TABLET ORAL
Qty: 270 | Refills: 3 | Status: ACTIVE | COMMUNITY
Start: 2023-01-01 | End: 1900-01-01

## 2023-01-01 RX ORDER — ATORVASTATIN CALCIUM 80 MG/1
80 TABLET, FILM COATED ORAL
Qty: 30 | Refills: 6 | Status: ACTIVE | COMMUNITY
Start: 2021-03-04

## 2023-01-01 RX ORDER — APIXABAN 5 MG/1
5 TABLET, FILM COATED ORAL
Qty: 60 | Refills: 5 | Status: ACTIVE | COMMUNITY
Start: 2023-01-01 | End: 1900-01-01

## 2023-01-01 RX ORDER — TORSEMIDE 20 MG/1
20 TABLET ORAL
Qty: 90 | Refills: 1 | Status: DISCONTINUED | COMMUNITY
Start: 2021-03-04 | End: 2023-01-01

## 2023-01-01 RX ORDER — METOPROLOL SUCCINATE 50 MG/1
50 TABLET, EXTENDED RELEASE ORAL
Refills: 3 | Status: ACTIVE | COMMUNITY
Start: 2023-01-01

## 2023-01-01 RX ORDER — INSULIN DETEMIR 100 [IU]/ML
100 INJECTION, SOLUTION SUBCUTANEOUS
Refills: 0 | Status: ACTIVE | COMMUNITY
Start: 2023-01-01

## 2023-01-01 RX ORDER — GLIMEPIRIDE 4 MG/1
4 TABLET ORAL
Qty: 60 | Refills: 0 | Status: DISCONTINUED | COMMUNITY
Start: 2023-01-01 | End: 2023-01-01

## 2023-01-01 RX ORDER — METFORMIN HYDROCHLORIDE 500 MG/1
500 TABLET, COATED ORAL
Qty: 60 | Refills: 3 | Status: DISCONTINUED | COMMUNITY
End: 2023-01-01

## 2023-01-01 RX ORDER — MULTIVIT-MIN/FOLIC/VIT K/LYCOP 400-300MCG
50 MCG TABLET ORAL
Qty: 90 | Refills: 3 | Status: DISCONTINUED | COMMUNITY
Start: 2021-03-05 | End: 2023-01-01

## 2023-01-01 RX ORDER — INSULIN LISPRO 100 U/ML
100 INJECTION, SOLUTION INTRAVENOUS; SUBCUTANEOUS
Refills: 0 | Status: ACTIVE | COMMUNITY
Start: 2023-01-01

## 2023-01-01 RX ORDER — NAPROXEN SODIUM 220 MG/1
1000 TABLET ORAL
Qty: 90 | Refills: 0 | Status: DISCONTINUED | COMMUNITY
Start: 2021-03-05 | End: 2023-01-01

## 2023-01-01 RX ORDER — CARVEDILOL 6.25 MG/1
6.25 TABLET, FILM COATED ORAL
Qty: 60 | Refills: 3 | Status: DISCONTINUED | COMMUNITY
Start: 2021-03-04 | End: 2023-01-01

## 2023-01-25 ENCOUNTER — INPATIENT (INPATIENT)
Facility: HOSPITAL | Age: 43
LOS: 27 days | Discharge: NURSING, REHAB OR LONG-TERM CARE | End: 2023-02-22
Attending: HOSPITALIST | Admitting: HOSPITALIST
Payer: MEDICAID

## 2023-01-25 VITALS
HEART RATE: 86 BPM | OXYGEN SATURATION: 100 % | RESPIRATION RATE: 18 BRPM | SYSTOLIC BLOOD PRESSURE: 130 MMHG | DIASTOLIC BLOOD PRESSURE: 72 MMHG | TEMPERATURE: 98 F

## 2023-01-25 DIAGNOSIS — Z98.51 TUBAL LIGATION STATUS: Chronic | ICD-10-CM

## 2023-01-25 DIAGNOSIS — M79.675 PAIN IN LEFT TOE(S): ICD-10-CM

## 2023-01-25 DIAGNOSIS — Z98.891 HISTORY OF UTERINE SCAR FROM PREVIOUS SURGERY: Chronic | ICD-10-CM

## 2023-01-25 DIAGNOSIS — I96 GANGRENE, NOT ELSEWHERE CLASSIFIED: ICD-10-CM

## 2023-01-25 DIAGNOSIS — I50.20 UNSPECIFIED SYSTOLIC (CONGESTIVE) HEART FAILURE: ICD-10-CM

## 2023-01-25 DIAGNOSIS — E11.9 TYPE 2 DIABETES MELLITUS WITHOUT COMPLICATIONS: ICD-10-CM

## 2023-01-25 DIAGNOSIS — I10 ESSENTIAL (PRIMARY) HYPERTENSION: ICD-10-CM

## 2023-01-25 DIAGNOSIS — I73.9 PERIPHERAL VASCULAR DISEASE, UNSPECIFIED: ICD-10-CM

## 2023-01-25 DIAGNOSIS — Z29.9 ENCOUNTER FOR PROPHYLACTIC MEASURES, UNSPECIFIED: ICD-10-CM

## 2023-01-25 DIAGNOSIS — I25.10 ATHEROSCLEROTIC HEART DISEASE OF NATIVE CORONARY ARTERY WITHOUT ANGINA PECTORIS: Chronic | ICD-10-CM

## 2023-01-25 LAB
ALBUMIN SERPL ELPH-MCNC: 2.6 G/DL — LOW (ref 3.3–5)
ALP SERPL-CCNC: 151 U/L — HIGH (ref 40–120)
ALT FLD-CCNC: 16 U/L — SIGNIFICANT CHANGE UP (ref 4–33)
ANION GAP SERPL CALC-SCNC: 10 MMOL/L — SIGNIFICANT CHANGE UP (ref 7–14)
AST SERPL-CCNC: 22 U/L — SIGNIFICANT CHANGE UP (ref 4–32)
BASOPHILS # BLD AUTO: 0.05 K/UL — SIGNIFICANT CHANGE UP (ref 0–0.2)
BASOPHILS NFR BLD AUTO: 0.5 % — SIGNIFICANT CHANGE UP (ref 0–2)
BILIRUB SERPL-MCNC: <0.2 MG/DL — SIGNIFICANT CHANGE UP (ref 0.2–1.2)
BLOOD GAS VENOUS COMPREHENSIVE RESULT: SIGNIFICANT CHANGE UP
BUN SERPL-MCNC: 27 MG/DL — HIGH (ref 7–23)
CALCIUM SERPL-MCNC: 8.6 MG/DL — SIGNIFICANT CHANGE UP (ref 8.4–10.5)
CHLORIDE SERPL-SCNC: 99 MMOL/L — SIGNIFICANT CHANGE UP (ref 98–107)
CO2 SERPL-SCNC: 27 MMOL/L — SIGNIFICANT CHANGE UP (ref 22–31)
CREAT SERPL-MCNC: 1.22 MG/DL — SIGNIFICANT CHANGE UP (ref 0.5–1.3)
CRP SERPL-MCNC: 56.5 MG/L — HIGH
EGFR: 56 ML/MIN/1.73M2 — LOW
EOSINOPHIL # BLD AUTO: 0.21 K/UL — SIGNIFICANT CHANGE UP (ref 0–0.5)
EOSINOPHIL NFR BLD AUTO: 2 % — SIGNIFICANT CHANGE UP (ref 0–6)
ERYTHROCYTE [SEDIMENTATION RATE] IN BLOOD: 96 MM/HR — HIGH (ref 4–25)
FLUAV AG NPH QL: SIGNIFICANT CHANGE UP
FLUBV AG NPH QL: SIGNIFICANT CHANGE UP
GLUCOSE SERPL-MCNC: 180 MG/DL — HIGH (ref 70–99)
HCG SERPL-ACNC: <5 MIU/ML — SIGNIFICANT CHANGE UP
HCT VFR BLD CALC: 30.3 % — LOW (ref 34.5–45)
HGB BLD-MCNC: 8.7 G/DL — LOW (ref 11.5–15.5)
IANC: 7.88 K/UL — HIGH (ref 1.8–7.4)
IMM GRANULOCYTES NFR BLD AUTO: 0.3 % — SIGNIFICANT CHANGE UP (ref 0–0.9)
LYMPHOCYTES # BLD AUTO: 1.88 K/UL — SIGNIFICANT CHANGE UP (ref 1–3.3)
LYMPHOCYTES # BLD AUTO: 17.5 % — SIGNIFICANT CHANGE UP (ref 13–44)
MCHC RBC-ENTMCNC: 24.4 PG — LOW (ref 27–34)
MCHC RBC-ENTMCNC: 28.7 GM/DL — LOW (ref 32–36)
MCV RBC AUTO: 85.1 FL — SIGNIFICANT CHANGE UP (ref 80–100)
MONOCYTES # BLD AUTO: 0.7 K/UL — SIGNIFICANT CHANGE UP (ref 0–0.9)
MONOCYTES NFR BLD AUTO: 6.5 % — SIGNIFICANT CHANGE UP (ref 2–14)
NEUTROPHILS # BLD AUTO: 7.88 K/UL — HIGH (ref 1.8–7.4)
NEUTROPHILS NFR BLD AUTO: 73.2 % — SIGNIFICANT CHANGE UP (ref 43–77)
NRBC # BLD: 0 /100 WBCS — SIGNIFICANT CHANGE UP (ref 0–0)
NRBC # FLD: 0 K/UL — SIGNIFICANT CHANGE UP (ref 0–0)
PLATELET # BLD AUTO: 674 K/UL — HIGH (ref 150–400)
POTASSIUM SERPL-MCNC: 3.8 MMOL/L — SIGNIFICANT CHANGE UP (ref 3.5–5.3)
POTASSIUM SERPL-SCNC: 3.8 MMOL/L — SIGNIFICANT CHANGE UP (ref 3.5–5.3)
PROT SERPL-MCNC: 7.9 G/DL — SIGNIFICANT CHANGE UP (ref 6–8.3)
RBC # BLD: 3.56 M/UL — LOW (ref 3.8–5.2)
RBC # FLD: 17.3 % — HIGH (ref 10.3–14.5)
RSV RNA NPH QL NAA+NON-PROBE: SIGNIFICANT CHANGE UP
SARS-COV-2 RNA SPEC QL NAA+PROBE: SIGNIFICANT CHANGE UP
SODIUM SERPL-SCNC: 136 MMOL/L — SIGNIFICANT CHANGE UP (ref 135–145)
WBC # BLD: 10.75 K/UL — HIGH (ref 3.8–10.5)
WBC # FLD AUTO: 10.75 K/UL — HIGH (ref 3.8–10.5)

## 2023-01-25 PROCEDURE — 99285 EMERGENCY DEPT VISIT HI MDM: CPT

## 2023-01-25 PROCEDURE — 75635 CT ANGIO ABDOMINAL ARTERIES: CPT | Mod: 26

## 2023-01-25 PROCEDURE — 99223 1ST HOSP IP/OBS HIGH 75: CPT | Mod: GC

## 2023-01-25 PROCEDURE — 73630 X-RAY EXAM OF FOOT: CPT | Mod: 26,LT

## 2023-01-25 RX ORDER — METFORMIN HYDROCHLORIDE 850 MG/1
1 TABLET ORAL
Qty: 0 | Refills: 0 | DISCHARGE

## 2023-01-25 RX ORDER — WARFARIN SODIUM 2.5 MG/1
1 TABLET ORAL
Qty: 0 | Refills: 0 | DISCHARGE

## 2023-01-25 RX ORDER — ACETAMINOPHEN 500 MG
650 TABLET ORAL EVERY 6 HOURS
Refills: 0 | Status: DISCONTINUED | OUTPATIENT
Start: 2023-01-25 | End: 2023-01-28

## 2023-01-25 RX ORDER — SODIUM CHLORIDE 9 MG/ML
1000 INJECTION, SOLUTION INTRAVENOUS
Refills: 0 | Status: DISCONTINUED | OUTPATIENT
Start: 2023-01-25 | End: 2023-02-22

## 2023-01-25 RX ORDER — INSULIN GLARGINE 100 [IU]/ML
5 INJECTION, SOLUTION SUBCUTANEOUS AT BEDTIME
Refills: 0 | Status: DISCONTINUED | OUTPATIENT
Start: 2023-01-25 | End: 2023-01-29

## 2023-01-25 RX ORDER — DEXTROSE 50 % IN WATER 50 %
12.5 SYRINGE (ML) INTRAVENOUS ONCE
Refills: 0 | Status: DISCONTINUED | OUTPATIENT
Start: 2023-01-25 | End: 2023-02-22

## 2023-01-25 RX ORDER — GLUCAGON INJECTION, SOLUTION 0.5 MG/.1ML
1 INJECTION, SOLUTION SUBCUTANEOUS ONCE
Refills: 0 | Status: DISCONTINUED | OUTPATIENT
Start: 2023-01-25 | End: 2023-02-22

## 2023-01-25 RX ORDER — INSULIN GLARGINE 100 [IU]/ML
10 INJECTION, SOLUTION SUBCUTANEOUS
Qty: 0 | Refills: 0 | DISCHARGE

## 2023-01-25 RX ORDER — FUROSEMIDE 40 MG
1 TABLET ORAL
Qty: 0 | Refills: 0 | DISCHARGE

## 2023-01-25 RX ORDER — GLIMEPIRIDE 1 MG
1 TABLET ORAL
Qty: 0 | Refills: 0 | DISCHARGE

## 2023-01-25 RX ORDER — PANTOPRAZOLE SODIUM 20 MG/1
1 TABLET, DELAYED RELEASE ORAL
Qty: 0 | Refills: 0 | DISCHARGE

## 2023-01-25 RX ORDER — INSULIN GLARGINE 100 [IU]/ML
8 INJECTION, SOLUTION SUBCUTANEOUS
Qty: 0 | Refills: 0 | DISCHARGE

## 2023-01-25 RX ORDER — DEXTROSE 50 % IN WATER 50 %
25 SYRINGE (ML) INTRAVENOUS ONCE
Refills: 0 | Status: DISCONTINUED | OUTPATIENT
Start: 2023-01-25 | End: 2023-02-22

## 2023-01-25 RX ORDER — ATORVASTATIN CALCIUM 80 MG/1
1 TABLET, FILM COATED ORAL
Qty: 0 | Refills: 0 | DISCHARGE

## 2023-01-25 RX ORDER — INSULIN LISPRO 100/ML
VIAL (ML) SUBCUTANEOUS
Refills: 0 | Status: DISCONTINUED | OUTPATIENT
Start: 2023-01-25 | End: 2023-02-22

## 2023-01-25 RX ORDER — PIPERACILLIN AND TAZOBACTAM 4; .5 G/20ML; G/20ML
3.38 INJECTION, POWDER, LYOPHILIZED, FOR SOLUTION INTRAVENOUS ONCE
Refills: 0 | Status: COMPLETED | OUTPATIENT
Start: 2023-01-25 | End: 2023-01-25

## 2023-01-25 RX ORDER — VANCOMYCIN HCL 1 G
1000 VIAL (EA) INTRAVENOUS ONCE
Refills: 0 | Status: COMPLETED | OUTPATIENT
Start: 2023-01-25 | End: 2023-01-25

## 2023-01-25 RX ORDER — METOPROLOL TARTRATE 50 MG
1 TABLET ORAL
Qty: 0 | Refills: 0 | DISCHARGE

## 2023-01-25 RX ORDER — INSULIN LISPRO 100/ML
VIAL (ML) SUBCUTANEOUS AT BEDTIME
Refills: 0 | Status: DISCONTINUED | OUTPATIENT
Start: 2023-01-25 | End: 2023-02-22

## 2023-01-25 RX ORDER — SACUBITRIL AND VALSARTAN 24; 26 MG/1; MG/1
1 TABLET, FILM COATED ORAL
Qty: 0 | Refills: 0 | DISCHARGE

## 2023-01-25 RX ORDER — FAMOTIDINE 10 MG/ML
1 INJECTION INTRAVENOUS
Qty: 0 | Refills: 0 | DISCHARGE

## 2023-01-25 RX ORDER — DEXTROSE 50 % IN WATER 50 %
15 SYRINGE (ML) INTRAVENOUS ONCE
Refills: 0 | Status: DISCONTINUED | OUTPATIENT
Start: 2023-01-25 | End: 2023-02-22

## 2023-01-25 RX ORDER — DAPAGLIFLOZIN 10 MG/1
1 TABLET, FILM COATED ORAL
Qty: 0 | Refills: 0 | DISCHARGE

## 2023-01-25 RX ORDER — MORPHINE SULFATE 50 MG/1
4 CAPSULE, EXTENDED RELEASE ORAL ONCE
Refills: 0 | Status: DISCONTINUED | OUTPATIENT
Start: 2023-01-25 | End: 2023-01-25

## 2023-01-25 RX ORDER — ASPIRIN/CALCIUM CARB/MAGNESIUM 324 MG
1 TABLET ORAL
Qty: 0 | Refills: 0 | DISCHARGE

## 2023-01-25 RX ADMIN — PIPERACILLIN AND TAZOBACTAM 200 GRAM(S): 4; .5 INJECTION, POWDER, LYOPHILIZED, FOR SOLUTION INTRAVENOUS at 15:33

## 2023-01-25 RX ADMIN — MORPHINE SULFATE 4 MILLIGRAM(S): 50 CAPSULE, EXTENDED RELEASE ORAL at 15:43

## 2023-01-25 RX ADMIN — MORPHINE SULFATE 4 MILLIGRAM(S): 50 CAPSULE, EXTENDED RELEASE ORAL at 16:07

## 2023-01-25 RX ADMIN — Medication 250 MILLIGRAM(S): at 16:03

## 2023-01-25 NOTE — H&P ADULT - NSHPPHYSICALEXAM_GEN_ALL_CORE
LOS:     VITALS:   T(C): 36.7 (01-25-23 @ 23:12), Max: 36.8 (01-25-23 @ 15:53)  HR: 85 (01-25-23 @ 23:12) (85 - 91)  BP: 130/75 (01-25-23 @ 23:12) (130/72 - 133/71)  RR: 17 (01-25-23 @ 23:12) (17 - 18)  SpO2: 100% (01-25-23 @ 23:12) (100% - 100%)    GENERAL: NAD, lying in bed comfortably  HEAD:  Atraumatic, Normocephalic  EYES: EOMI, PERRLA, conjunctiva and sclera clear  ENT: Moist mucous membranes  NECK: Supple, No JVD  CHEST/LUNG: Clear to auscultation bilaterally; No rales, rhonchi, wheezing, or rubs. Unlabored respirations  HEART: Regular rate and rhythm; No murmurs, rubs, or gallops  ABDOMEN: BSx4; Soft, nontender, nondistended  EXTREMITIES:   No clubbing, cyanosis. +2 pitting edema R > L,   NERVOUS SYSTEM:  A&Ox3, no focal deficits   SKIN: No rashes or lesions LOS:     VITALS:   T(C): 36.7 (01-25-23 @ 23:12), Max: 36.8 (01-25-23 @ 15:53)  HR: 85 (01-25-23 @ 23:12) (85 - 91)  BP: 130/75 (01-25-23 @ 23:12) (130/72 - 133/71)  RR: 17 (01-25-23 @ 23:12) (17 - 18)  SpO2: 100% (01-25-23 @ 23:12) (100% - 100%)    GENERAL: NAD, lying in bed comfortably  HEAD:  Atraumatic, Normocephalic  EYES: EOMI, PERRLA, conjunctiva and sclera clear  ENT: Moist mucous membranes  NECK: Supple, No JVD  CHEST/LUNG: Clear to auscultation bilaterally; No rales, rhonchi, wheezing, or rubs. Unlabored respirations  HEART: Regular rate and rhythm; No murmurs, rubs, or gallops  ABDOMEN: BSx4; Soft, nontender, nondistended  EXTREMITIES:   No clubbing, cyanosis. +2 pitting edema R > L,  cold to touch, unable to palpate DP pulse, ttp left sole, granulation tissue at site of 3rd toe amputation, no visible purulence   NERVOUS SYSTEM:  A&Ox3, no focal deficits   SKIN: No rashes or lesions LOS:     VITALS:   T(C): 36.7 (01-25-23 @ 23:12), Max: 36.8 (01-25-23 @ 15:53)  HR: 85 (01-25-23 @ 23:12) (85 - 91)  BP: 130/75 (01-25-23 @ 23:12) (130/72 - 133/71)  RR: 17 (01-25-23 @ 23:12) (17 - 18)  SpO2: 100% (01-25-23 @ 23:12) (100% - 100%)    GENERAL: NAD, lying in bed comfortably  HEAD:  Atraumatic, Normocephalic  EYES: EOMI, PERRLA, conjunctiva and sclera clear  ENT: Moist mucous membranes  NECK: Supple, No JVD  CHEST/LUNG: Clear to auscultation bilaterally; No rales, rhonchi, wheezing, or rubs. Unlabored respirations  HEART: S1S2 Regular rate and rhythm; No murmurs, rubs, or gallops  ABDOMEN: BSx4; Soft, nontender, nondistended  EXTREMITIES:   No clubbing, cyanosis. +2 pitting edema R > L,  cold to touch, unable to palpate DP pulse, ttp left sole, granulation tissue at site of 3rd toe amputation, no visible purulence   NERVOUS SYSTEM:  A&Ox3, no focal deficits   SKIN: necrotic 2nd digit L foot

## 2023-01-25 NOTE — ED PROVIDER NOTE - OBJECTIVE STATEMENT
42-year-old female past medical history coronary artery disease status post CABG, CHF, diabetes, PAD status post amputation of third and fourth left toes December 2022, here complaining of discolored left second toe x1 week.  States after her surgery she was started on 1 month of IV antibiotics at home, which she completed.  Has not followed up with her podiatrist/vascular doctor since the surgery.  Does not want to follow [facility] anymore.  Complaining of pain to the left 2nd toe.  Denies fevers or chills.

## 2023-01-25 NOTE — ED PROVIDER NOTE - NS ED ATTENDING STATEMENT MOD
This was a shared visit with the SASHA. I reviewed and verified the documentation and independently performed the documented:

## 2023-01-25 NOTE — ED PROVIDER NOTE - CLINICAL SUMMARY MEDICAL DECISION MAKING FREE TEXT BOX
42-year-old female past medical history coronary artery disease status post CABG, CHF, diabetes, PAD status post amputation of third and fourth left toes December 2022, here complaining of discolored left second toe x1 week.  States after her surgery she was started on 1 month of IV antibiotics at home, which she completed.  Has not followed up with her podiatrist/vascular doctor since the surgery.  Does not want to follow [facility] anymore.  Complaining of pain to the left 2nd toe.  Denies fevers or chills. L 2nd toe discolored, cold foot, unable to palpate DP or PT pulses.  Vascular surgery and podiatry consulted immediately.  Plan for labs/inflammatory markers, broad-spectrum antibiotics, x-ray to evaluate for subcutaneous gas.  CT angio to assess vascular flow.  Patient seen by surgery, recommend duplex of bilateral lower extremities, dispo pending results.

## 2023-01-25 NOTE — H&P ADULT - ATTENDING COMMENTS
42-year-old female w/CAD s/p CABG x4 in 2018, HF s/p ICD placement (interrogated 2022), IDDM2, PAD s/p 3rd toe amputation who presents with left 2nd toe pain concerning for dry gangrene. Seen by podiatry and vascular, recs appreciated, plans for possible transmetatarsal amputation pending vascular w/u.

## 2023-01-25 NOTE — H&P ADULT - PROBLEM SELECTOR PLAN 1
Blackening of 2nd digit i/s/o PAD, concerning for dry gangrene, elevated ESR/CRP. No signs of active infection   X-ray foot without gas or osseous involvement, limited to soft tissue   Podiatry and vascular recs appreciated  CT Angio with occluded left posterior tibial and peroneal arteries  - start IV unasyn   - f/u SEBASTIÁN/PVRs  - plans for L transmetatarsal amputation Necrosis of 2nd digit i/s/o PAD, concerning for dry gangrene, elevated ESR/CRP. No signs of active infection   X-ray foot without gas or osseous involvement, limited to soft tissue   Podiatry and vascular recs appreciated  CT Angio prelim read with occluded left posterior tibial and peroneal arteries  - start IV unasyn   - f/u SEBASTIÁN/PVRs  - plans for L transmetatarsal amputation pending further vascular recs

## 2023-01-25 NOTE — H&P ADULT - PROBLEM SELECTOR PLAN 5
DVT PPx:   Diet: CC   Dispo: Medically active    Code Status: Full Code DVT PPx: lovenox 40mg   Diet: CC   Dispo: Medically active    Code Status: Full Code Does not appear to be acutely decompensated on exam  Last TTE from 3/21 with EF 27%, s/p ICD placement and interrogated in 2022   - c/w coreg 25 BID  - c/w entresto 24/26  - c/w torsemide 20mg   - c/w spironolactone 25mg

## 2023-01-25 NOTE — ED PROVIDER NOTE - PROGRESS NOTE DETAILS
ANALISA Starkey: Pt seen by surgery, recommend vascular arterial duplex, vascular lab called to expedite study,. ANALISA Starkey: Vascular surgery recommend CTA AORTA w/ run off, reordered. Recommend broad spectrum abx and admission to medicine. Pt ok w/ plan.

## 2023-01-25 NOTE — ED ADULT NURSE NOTE - OBJECTIVE STATEMENT
Pt arrived to ED room 12B, aaox4, ambulatory, breathing even and unlabored in bed. Pt came to the ED with c/o 9/10 left toe pain on second digit. PMHx: DM, HTN, MI, and AICD. Pt states she had a toe amputation of third left foot digit about a month ago, ever since amputation the pt states her second toe started to hurt. Pt received toe amputation r/t diabetes, pt states her foot was ran over by a stretcher and due to her diabetes the wound did not heal and became infected. No pallor or cyanosis noted upon assessment. Pt denies SOB, chest pain, headache, dizziness, N/V/D. Second digit appears black in color, pt states toe has looked like this for the past few days with 9/10 pain. #20G placed in left AC, labs drawn and sent, bed in lowest position, family member at bedside. Pt arrived to ED room 12B, aaox4, ambulatory, breathing even and unlabored in bed. Pt came to the ED with c/o 9/10 left toe pain on second digit. PMHx: DM, HTN, MI, and AICD. Pt states she had a toe amputation of third left foot digit about a month ago, ever since amputation the pt states her second toe started to hurt. Pt received toe amputation r/t diabetes, pt states her foot was ran over by a stretcher and due to her diabetes the wound did not heal and became infected. Upon assessment surgical site is red. Right foot shows +3 edema. No pallor or cyanosis noted upon assessment. Pt denies SOB, chest pain, headache, dizziness, N/V/D. Second digit appears black in color, pt states toe has looked like this for the past few days with 9/10 pain. #20G placed in left AC, labs drawn and sent, bed in lowest position, family member at bedside.

## 2023-01-25 NOTE — H&P ADULT - NSHPREVIEWOFSYSTEMS_GEN_ALL_CORE
Review of Systems:     CONSTITUTIONAL: No fever, weight loss  EYES: No eye pain, visual disturbances, or discharge  ENMT:  No difficulty hearing, tinnitus, vertigo; No sinus or throat pain  RESPIRATORY: No SOB. No cough, wheezing, chills or hemoptysis  CARDIOVASCULAR: No chest pain, palpitations, dizziness, or leg swelling  GASTROINTESTINAL: No abdominal or epigastric pain. +nausea, no vomiting, or hematemesis; No diarrhea or constipation. No melena or hematochezia.  GENITOURINARY: No dysuria, frequency, hematuria, or incontinence  NEUROLOGICAL: No headaches, memory loss, loss of strength, numbness, or tremors  SKIN: No itching, burning,  LYMPH NODES: No enlarged glands  ENDOCRINE: No heat or cold intolerance; No hair loss  MUSCULOSKELETAL: No joint pain or swelling; No muscle, back pain  PSYCHIATRIC: No depression, anxiety, mood swings, or difficulty sleeping  HEME/LYMPH: No easy bruising, or bleeding gums

## 2023-01-25 NOTE — CONSULT NOTE ADULT - SUBJECTIVE AND OBJECTIVE BOX
Patient is a 42y old  Female who presents with a chief complaint of left foot gangrene.    HPI: 42-year-old female past medical history coronary artery disease status post CABG, CHF, diabetes, PAD status post amputation of third and fourth left toes 2022, here complaining of discolored left second toe x1 week.  States after her surgery she was started on 1 month of IV antibiotics at home, which she completed.  Has not followed up with her podiatrist/vascular doctor since the surgery.  Does not want to follow [facility] anymore.  Complaining of pain to the left 2nd toe.  Denies fevers or chills.      PAST MEDICAL & SURGICAL HISTORY:  MI (myocardial infarction)  2017      Type 2 diabetes mellitus      Hypertension      Hyperlipidemia      Coronary artery disease  had CABG x 4      Cerebrovascular accident (CVA)  residual of right sided weakness      H/O congestive heart disease  ICM/chronic systolic CHF as per Dr. Diana Menjivar      History of cardiomyopathy      Thrombus  left ventricular mural thrombus      S/P       H/O tubal ligation  2016      Coronary artery disease  CABG x 4 in 2018          MEDICATIONS  (STANDING):    MEDICATIONS  (PRN):      Allergies    No Known Allergies    Intolerances        VITALS:    Vital Signs Last 24 Hrs  T(C): 36.8 (2023 15:53), Max: 36.8 (2023 15:53)  T(F): 98.3 (2023 15:53), Max: 98.3 (2023 15:53)  HR: 91 (2023 15:53) (86 - 91)  BP: 133/71 (2023 15:53) (130/72 - 133/71)  BP(mean): --  RR: 18 (2023 15:53) (18 - 18)  SpO2: 100% (2023 15:53) (100% - 100%)    Parameters below as of 2023 15:53  Patient On (Oxygen Delivery Method): room air        LABS:                          8.7    10.75 )-----------( 674      ( 2023 15:03 )             30.3           136  |  99  |  27<H>  ----------------------------<  180<H>  3.8   |  27  |  1.22    Ca    8.6      2023 15:03    TPro  7.9  /  Alb  2.6<L>  /  TBili  <0.2  /  DBili  x   /  AST  22  /  ALT  16  /  AlkPhos  151<H>        CAPILLARY BLOOD GLUCOSE      POCT Blood Glucose.: 217 mg/dL (2023 13:06)          LOWER EXTREMITY PHYSICAL EXAM:    Vascular: DP/PT 0/4 left, DT/PT 1/4, CFT <3 seconds B/L, Temperature gradient warm to cold left, warm to cool right.   Neuro: Epicritic sensation decreased to the level of toes, B/L.  Musculoskeletal/Ortho: s/p left foot partial 3rd ray resection.  Skin: Left foot 2nd digit dry gangrene, dehisced partial 3rd ray resection to subq, ischemic changes the dorsal MPJs 1-5, no drainage, no purulence, no acute signs of infection. Right foot no wounds, no acute signs of infection.    RADIOLOGY & ADDITIONAL STUDIES:

## 2023-01-25 NOTE — H&P ADULT - PROBLEM SELECTOR PLAN 4
Does not appear to be acutely decompensated on exam  Last TTE from 3/21 with EF 27%, s/p ICD placement and interrogated in 2022   - c/w coreg 25 BID  - c/w entresto 24/26  - c/w torsemide 20mg   - c/w spironolactone 25mg On metformin 500mg BID, glimepiride 4mg, lantus 8U at bedtime   - started 5U lantus + ROOSEVELT  - f/u HA1C  - check A1c

## 2023-01-25 NOTE — H&P ADULT - PROBLEM SELECTOR PLAN 2
CT angio with moderate-marked bilateral lower extremity peripheral vascular disease, calcifications of AAA   Per vascular, no concern for acute limb ischemia, no surgical intervention   - c/w atorvastatin   - ASA?  - f/u lipid panel CT angio with moderate-marked bilateral lower extremity peripheral vascular disease, calcifications of AAA   Per vascular, no concern for acute limb ischemia, no surgical intervention   - c/w atorvastatin   - c/w ASA  - f/u lipid panel CT angio with moderate-marked bilateral lower extremity peripheral vascular disease, calcifications of AAA   Per vascular, no concern for acute limb ischemia, no surgical intervention   - c/w atorvastatin   - c/w ASA  - f/u lipid panel  - trend lactate

## 2023-01-25 NOTE — ED PROVIDER NOTE - PHYSICAL EXAMINATION
Left foot- necrotic appearing 2nd toe, wound to base of 2nd/3rd toe sp amputation w/o active drainage or warmth to touch, foot +cold to touch, diminished sensation.

## 2023-01-25 NOTE — H&P ADULT - PROBLEM SELECTOR PLAN 3
On metformin 500mg BID, glimepiride 4mg, lantus 8U at bedtime   - started 5U lantus + ROOSEVELT  - f/u HA1C relatively stable since 11/2020  no signs/sx of bleeding  trend H/H  outpt f/u with PMD for further w/u and age appropriate cancer screening

## 2023-01-25 NOTE — H&P ADULT - TIME BILLING
Preparing to see the patient including review of tests and other providers' notes, confirming history with patient, performing medical examination and evaluation, counseling and educating the patient, ordering tests, confirming orders, communicating with other health care professionals, documenting clinical information in the EMR, independently interpreting results and communicating results to the patient, care coordination

## 2023-01-25 NOTE — CONSULT NOTE ADULT - ASSESSMENT
42F presents with left foot 2nd digit dry gangrene and ischemic changes to dorsal MPJs 1-5.  - Pt seen and evaluated.  - Afebrile, WBC 10.75, ESR pending, CRP 5.65.  - Left foot 2nd digit dry gangrene, dehisced partial 3rd ray resection to subq, ischemic changes the dorsal MPJs 1-5, no drainage, no purulence, no acute signs of infection. Right foot no wounds, no acute signs of infection.  - Recommend admit to medicine.  - Recommend IV unasyn.  - No left foot wound culture taken 2/2 no acute signs of infection.  - Ordered SEBASTIÁN/PVRs.  - Pod Plan: Left foot transmetatarsal amputation with tendoachilles lengthening pending SEBASTIÁN/PVRs and vasc recs.  - Please document medical/cardiac clearance for podiatric procedure under anesthesia.  - Discussed with attending. 42F presents with left foot 2nd digit dry gangrene and ischemic changes to dorsal MPJs 1-5.  - Pt seen and evaluated.  - Afebrile, WBC 10.75, ESR pending, CRP 5.65.  - Left Foot X-Ray: No gas, no osteomyelitis.  - Left foot 2nd digit dry gangrene, dehisced partial 3rd ray resection to subq, ischemic changes the dorsal MPJs 1-5, no drainage, no purulence, no acute signs of infection. Right foot no wounds, no acute signs of infection.  - Recommend admit to medicine.  - Recommend IV unasyn.  - No left foot wound culture taken 2/2 no acute signs of infection.  - Ordered SEBASTIÁN/PVRs.  - Pod Plan: Left foot transmetatarsal amputation with tendoachilles lengthening pending SEBASTIÁN/PVRs and vasc recs.  - Please document medical/cardiac clearance for podiatric procedure under anesthesia.  - Discussed with attending.

## 2023-01-25 NOTE — H&P ADULT - HISTORY OF PRESENT ILLNESS
Pt is 42-year-old female past medical history coronary artery disease status post CABG x4 in 2018, CHF s/p ICD placement (interrogated 2022), DM, PAD s/p 3rd toe amputation who presents with left 2nd toe pain. Patient reports that she was discharged from [facility] post amputation beginning of December 2022. Reports that the trigger was a cart running over her toe, did not have any issues with her 2nd toe at the time. She was put on IV antibiotics for a month post-discharge and completed 1/3. Has not followed up with her podiatrist/vascular doctor since the surgery Patient reports that she has been walking without issues until 5 days ago when she noticed blackening of the toe as well as severe pain. Notices that it is worse when she lays flat or walks around, had been taking tylenol every few hours without relief. Reports feeling chills occasionally but denies fevers at home. Reports poor PO intake due to loss of appetite every since she was discharged. Denies chest pain, palpitations, SOB, diarrhea, dysuria.     ED course:   130/72, HR 72, afebrile, 100% on RA   S/p 1 dose vanc/zosyn  Pt is 42-year-old female past medical history coronary artery disease status post CABG x4 in 2018, CHF s/p ICD placement (interrogated 2022), DM, PAD s/p 3rd toe amputation who presents with left 2nd toe pain. Patient reports that she was discharged from [facility] post amputation beginning of December 2022. Reports that the trigger was a cart running over her toe, did not have any issues with her 2nd toe at the time. She was put on IV antibiotics for a month post-discharge and completed 1/3. Has not followed up with her podiatrist/vascular doctor since the surgery Patient reports that she has been walking without issues until 5 days ago when she noticed blackening of the toe as well as severe pain. Notices that it is worse when she lays flat or walks around, had been taking tylenol every few hours without relief. Reports feeling chills occasionally but denies fevers at home. Reports poor PO intake due to loss of appetite every since she was discharged. Denies chest pain, palpitations, worsening SOB, diarrhea, dysuria.     ED course:   130/72, HR 72, afebrile, 100% on RA   S/p 1 dose vanc/zosyn

## 2023-01-25 NOTE — ED ADULT TRIAGE NOTE - CHIEF COMPLAINT QUOTE
Pt reporting to the ED for L foot pain and possible  infection starting 5 days ago. pmh of CHF, AICD placement, DM, HTN.

## 2023-01-25 NOTE — H&P ADULT - PROBLEM SELECTOR PLAN 6
DVT PPx: lovenox 40mg   Diet: CC   Dispo: Medically active    Code Status: Full Code DVT PPx: lovenox 40mg   Diet: CC   Dispo: Medically active    LE dopplers pending given asymmetric edema, however may be secondary to PAD.     Code Status: Full Code

## 2023-01-25 NOTE — ED PROVIDER NOTE - ATTENDING APP SHARED VISIT CONTRIBUTION OF CARE
I have evaluated the patient and agree with the documentation and assessment as made by the SASHA. We have discussed plan of care and work up for the patient.   This was a shared visit with the SASHA. I reviewed and verified the documentation and independently performed the documented:   History, Exam and Medical Decision Making.    42F, CAD, CHF, PAD s/p amputation of L 3/4 toes, who presents with discoloration of L 2nd toe for the past one week. Reports that she just finished a 6 week course of IV ABX for the L toe amputations. Since then, has not followed with podiatrist. No fevers/chills/purulence. No headaches, cough, sputum, cp, sob, belly pain. Physical: afebrile, non-toxic appearing, rrr, ctabl, abdomen soft. L Foot: gangrenous 2nd toe, foot diffusely cold to touch, unable to palpate pulses, however silt to s/s/t/dp/sp. Plan: labs, will attempt to doppler pulses of LE, vascular/podiatry consultation, ABX, CTA imaging to evaluate patency of LE vasculature.

## 2023-01-25 NOTE — H&P ADULT - NSHPLABSRESULTS_GEN_ALL_CORE
Personally reviewed labs, imaging, ekg                           8.7    10.75 )-----------( 674      ( 25 Jan 2023 15:03 )             30.3       01-25    136  |  99  |  27<H>  ----------------------------<  180<H>  3.8   |  27  |  1.22    Ca    8.6      25 Jan 2023 15:03    TPro  7.9  /  Alb  2.6<L>  /  TBili  <0.2  /  DBili  x   /  AST  22  /  ALT  16  /  AlkPhos  151<H>  01-25                      Lactate Trend            CAPILLARY BLOOD GLUCOSE      POCT Blood Glucose.: 267 mg/dL (25 Jan 2023 23:33)    Personal interpretation EKG: not performed    < from: Xray Foot AP + Lateral + Oblique, Left (01.25.23 @ 15:18) >    OSSEOUS STRUCTURES    Fractures:  None.    Postoperative Changes: Prior 3rd toe amputation is noted.    JOINTS    Joint Space(s):  Maintained.    OTHER FINDINGS:  No soft tissue gas is seen. Soft tissue wound appears to   be present around the 3rd distal phalanx. Plantar and retrocalcaneal   enthesophytes are noted.    IMPRESSION:  1.  No soft tissue gas or osseous destruction is seen.    < end of copied text > 8.7    10.75 )-----------( 674      ( 25 Jan 2023 15:03 )             30.3       01-25    136  |  99  |  27<H>  ----------------------------<  180<H>  3.8   |  27  |  1.22    Ca    8.6      25 Jan 2023 15:03    TPro  7.9  /  Alb  2.6<L>  /  TBili  <0.2  /  DBili  x   /  AST  22  /  ALT  16  /  AlkPhos  151<H>  01-25    C-Reactive Protein, Serum: 56.5 mg/L (01.25.23 @ 15:03)  Sedimentation Rate, Erythrocyte: 96 mm/hr (01.25.23 @ 15:03)    15:03 - VBG - pH: 7.34  | pCO2: 52    | pO2: <20   | Lactate: 2.9          HCG Quantitative, Serum: <5.0 mIU/mL (01.25.23 @ 15:03)    CAPILLARY BLOOD GLUCOSE  POCT Blood Glucose.: 267 mg/dL (25 Jan 2023 23:33)    Flu With COVID-19 By CARLOTA (01.25.23 @ 18:31)    SARS-CoV-2 Result: NotDetec    Influenza A Result: NotDetec    Influenza B Result: NotDetec    Resp Syn Virus Result: NotDetec    < from: CT Angio Abd Aorta w/run-off w/ IV Cont (01.25.23 @ 20:40) >  ******PRELIMINARY REPORT******    IMPRESSION:  1. Moderate-marked bilateral lower extremity peripheral vascular disease.  2. Multifocal mural irregularity-thrombus and calcification of the abdominal aorta consistent with atherosclerotic disease. No evidence abdominal aortic aneurysm or dissection.  3. Patent bilateral aortic, iliac, femoral and popliteal arteries.  4. Occluded distal right peroneal artery and left posterior tibial and peroneal arteries as described above.  5. Large right pleural effusion.  6. Near complete collapse right lower lobe. Uterus appears within normal limits.  7. Mild-moderate abdominal - pelvic wall and bilateral lower extremity soft tissue edema.  8. No evidence of small bowel obstruction.  < end of copied text >    < from: Xray Foot AP + Lateral + Oblique, Left (01.25.23 @ 15:18) >  OSSEOUS STRUCTURES    Fractures:  None.    Postoperative Changes: Prior 3rd toe amputation is noted.  JOINTS    Joint Space(s):  Maintained.  OTHER FINDINGS:  No soft tissue gas is seen. Soft tissue wound appears to be present around the 3rd distal phalanx. Plantar and retrocalcaneal enthesophytes are noted.  IMPRESSION:  No soft tissue gas or osseous destruction is seen.  < end of copied text >    EKG personally reviewed and interpreted - ST 103bpm, biphasic T in II, III, aVF, V5-V6, Q in III, QTc 458ms (similar to prior)

## 2023-01-25 NOTE — CONSULT NOTE ADULT - SUBJECTIVE AND OBJECTIVE BOX
VASCULAR SURGERY CONSULT NOTE  --------------------------------------------------------------------------------------------    Patient is a 42y old  Female who presents with a chief complaint of L foot pain    HPI: 42F w PMHx HTN, HLD, CAD w CABG, LV thrombus previously on coumadin now off, CVA w/o residual deficits, CHF, ICD, DM2 who presents with left foot pain. Vascular surgery consulted in the setting of possible limb ischemia. Patient reports Left foot pain for approximately 1 week, in the setting of recent L 3rd toe amputation @ [facility] ~ 1.5 months ago. She had post operative pain for the first month, however reports gradual increase in pain and discoloration of 2nd toe ~ 1 week ago. Denies fevers, but does report chills. Denies CP, SOB, claudication symptoms, drainage from L foot. Ambulates at baseline but reports difficulty ambulating in past week d/t severe pain.       PAST MEDICAL & SURGICAL HISTORY:  MI (myocardial infarction)  2017      Type 2 diabetes mellitus      Hypertension      Hyperlipidemia      Coronary artery disease  had CABG x 4      Cerebrovascular accident (CVA)  residual of right sided weakness      H/O congestive heart disease  ICM/chronic systolic CHF as per Dr. Diana Menjivar      History of cardiomyopathy      Thrombus  left ventricular mural thrombus      S/P       H/O tubal ligation  2016      Coronary artery disease  CABG x 4 in 2018        FAMILY HISTORY:  Family history of heart disease (Father)      [] Family history not pertinent as reviewed with the patient and family    ALLERGIES: No Known Allergies    CURRENT MEDICATIONS  MEDICATIONS (STANDING):   MEDICATIONS (PRN):  --------------------------------------------------------------------------------------------    Vitals:   T(C): 36.8 (23 @ 15:53), Max: 36.8 (23 @ 15:53)  HR: 91 (23 @ 15:53) (86 - 91)  BP: 133/71 (23 @ 15:53) (130/72 - 133/71)  RR: 18 (23 @ 15:53) (18 - 18)  SpO2: 100% (23 @ 15:53) (100% - 100%)  CAPILLARY BLOOD GLUCOSE      POCT Blood Glucose.: 217 mg/dL (2023 13:06)    CAPILLARY BLOOD GLUCOSE      POCT Blood Glucose.: 217 mg/dL (2023 13:06)          PHYSICAL EXAM:   General: Alert, NAD  Neuro: A+Ox3  HEENT: NC/AT, no asymmetry, no scleral icterus  Neck: Soft, supple  Cardio: RRR  Resp: Airway patent, unlabored breathing  Thorax: No chest wall tenderness, well healed sternotomy  GI/Abd: Soft, NT/ND, no rebound/guarding, no masses palpated  Vascular: B/l Upper extremities warm, R lower extremity warm and well perfused, Left lower extremity with distal foot cool to touch, some mottling present  -Pulses:   -Right Lower: Fem palpable, Pop palpable, DP/PT dopplerable  -Left Lower: Fem palpable, pop palpable, AT dopperlable, PT/DP no signals   Skin: L 2nd toe gangrene, open wound at prior 3rd toe, non draining  Musculoskeletal: All 4 extremities moving spontaneously, plantar/dorsiflexion intact bilaterally, toes wiggle b/l, L  to light touch, sensation diminished over distal L dorsal/plantar foot, b/l LE edema   --------------------------------------------------------------------------------------------    LABS  CBC ( @ 15:03)                              8.7<L>                         10.75<H>  )----------------(  674<H>     73.2  % Neutrophils, 17.5  % Lymphocytes, ANC: 7.88<H>                              30.3<L>    BMP ( 15:03)             136     |  99      |  27<H> 		Ca++ --      Ca 8.6                ---------------------------------( 180<H>		Mg --                 3.8     |  27      |  1.22  			Ph --        LFTs ( 15:03)      TPro 7.9 / Alb 2.6<L> / TBili <0.2 / DBili -- / AST 22 / ALT 16 / AlkPhos 151<H>          VBG ( 15:03)     7.34 / 52<H> / <20 / 28 / 1.8 / 22.3%     Lactate: 2.9<H>    --------------------------------------------------------------------------------------------    MICROBIOLOGY      --------------------------------------------------------------------------------------------    IMAGING

## 2023-01-25 NOTE — CONSULT NOTE ADULT - ASSESSMENT
42F w PMHx HTN, HLD, CAD w CABG, LV thrombus previously on coumadin now off, CVA w/o residual deficits, CHF, ICD, DM2 who presents with left foot pain. Vascular surgery consulted in the setting of possible limb ischemia. History and physical exam consistent with likely chronic limb ischemia, particularly in the setting of recent non healing toe amputation @ OSH. Coolness to touch is concerning, although AT signal is present and proximal pulses palpable, therefore low suspicion for acute limb ischemia. L 2nd toe appears non viable and will likely require amputation, although await podiatry recs.     Plan:   -No acute surgical intervention at this time   -Obtain STAT CTA Aorta with b/l run off   -Obtain SEBASTIÁN/PVR w Toe Pressure   -Admit to Medicine   -Broad spectrum IV abx   -Appreciate Podiatry recommendations   -Will follow up imaging  -Vascular to follow     Discussed with Vascular Fellow Dr. Ga on behalf of Attending Surgeon Dr. Kvng Leonard MD PGY2   C Team, [id number]    42F w PMHx HTN, HLD, CAD w CABG, LV thrombus previously on coumadin now off, CVA w/o residual deficits, CHF, ICD, DM2 who presents with left foot pain. Vascular surgery consulted in the setting of possible limb ischemia. History and physical exam consistent with likely chronic limb ischemia, particularly in the setting of recent non healing toe amputation @ OSH. Coolness to touch is concerning, although AT signal is present and proximal pulses palpable, therefore low suspicion for acute limb ischemia. L 2nd toe appears non viable and will likely require amputation, although await podiatry recs.     Plan:   -No acute surgical intervention at this time   -Obtain STAT CTA Aorta with b/l run off   -Obtain SEBASTIÁN/PVR w Toe Pressure   -Admit to Medicine   -Broad spectrum IV abx   -Appreciate Podiatry recommendations   -Will follow up imaging  -Vascular to follow     Discussed with Vascular Fellow Dr. Ga on behalf of Attending Surgeon Dr. Kvng Leonard MD PGY2   C Team, [id number]     ATTENDING STATEMENT :   Full consult note as above; discussed with surgery resident and vascular fellow.   She has PAD, DM and a toe wound. She has an elevated creatinine . She was getting diuresis. She is now hypotensive and hypothermic. She appears to be getting sepsis. The toe wound has dry gangrene. It could be contributing to  bacteremia but there is no evidence that the toe in itself is infected.  There is no clear indication for intervention on the toe. If she stabilizes at some point she should get an angiogram to assess the blood supply in her leg and potentially get an angioplasty / stent.

## 2023-01-25 NOTE — H&P ADULT - NSHPSOCIALHISTORY_GEN_ALL_CORE
Patient denies smoking, alcohol use or other illicit drug use. Now lives with mother since [place]  [place]. Does have a home health aid. Before this episode, patient was able to ambulate independently.

## 2023-01-25 NOTE — H&P ADULT - ASSESSMENT
Pt is 42-year-old female past medical history coronary artery disease status post CABG x4 in 2018, CHF s/p ICD placement (interrogated 2022), DM, PAD s/p 3rd toe amputation who presents with left 2nd toe pain concerning for dry gangrene. Seen by podiatry and vascular teams with plans for possible transmetatarsal amputation.

## 2023-01-26 DIAGNOSIS — D64.9 ANEMIA, UNSPECIFIED: ICD-10-CM

## 2023-01-26 LAB
A1C WITH ESTIMATED AVERAGE GLUCOSE RESULT: 7.1 % — HIGH (ref 4–5.6)
ALBUMIN SERPL ELPH-MCNC: 2 G/DL — LOW (ref 3.3–5)
ALP SERPL-CCNC: 131 U/L — HIGH (ref 40–120)
ALT FLD-CCNC: 10 U/L — SIGNIFICANT CHANGE UP (ref 4–33)
ANION GAP SERPL CALC-SCNC: 9 MMOL/L — SIGNIFICANT CHANGE UP (ref 7–14)
APTT BLD: 31.2 SEC — SIGNIFICANT CHANGE UP (ref 27–36.3)
AST SERPL-CCNC: 21 U/L — SIGNIFICANT CHANGE UP (ref 4–32)
BILIRUB SERPL-MCNC: 0.2 MG/DL — SIGNIFICANT CHANGE UP (ref 0.2–1.2)
BUN SERPL-MCNC: 25 MG/DL — HIGH (ref 7–23)
CALCIUM SERPL-MCNC: 8.3 MG/DL — LOW (ref 8.4–10.5)
CHLORIDE SERPL-SCNC: 101 MMOL/L — SIGNIFICANT CHANGE UP (ref 98–107)
CHOLEST SERPL-MCNC: 112 MG/DL — SIGNIFICANT CHANGE UP
CO2 SERPL-SCNC: 25 MMOL/L — SIGNIFICANT CHANGE UP (ref 22–31)
CREAT SERPL-MCNC: 1.2 MG/DL — SIGNIFICANT CHANGE UP (ref 0.5–1.3)
EGFR: 57 ML/MIN/1.73M2 — LOW
ESTIMATED AVERAGE GLUCOSE: 157 — SIGNIFICANT CHANGE UP
GLUCOSE BLDC GLUCOMTR-MCNC: 114 MG/DL — HIGH (ref 70–99)
GLUCOSE BLDC GLUCOMTR-MCNC: 194 MG/DL — HIGH (ref 70–99)
GLUCOSE BLDC GLUCOMTR-MCNC: 93 MG/DL — SIGNIFICANT CHANGE UP (ref 70–99)
GLUCOSE SERPL-MCNC: 91 MG/DL — SIGNIFICANT CHANGE UP (ref 70–99)
HCT VFR BLD CALC: 27.5 % — LOW (ref 34.5–45)
HDLC SERPL-MCNC: 20 MG/DL — LOW
HGB BLD-MCNC: 8 G/DL — LOW (ref 11.5–15.5)
INR BLD: 1.43 RATIO — HIGH (ref 0.88–1.16)
LACTATE SERPL-SCNC: 1.9 MMOL/L — SIGNIFICANT CHANGE UP (ref 0.5–2)
LIPID PNL WITH DIRECT LDL SERPL: 69 MG/DL — SIGNIFICANT CHANGE UP
MCHC RBC-ENTMCNC: 24.5 PG — LOW (ref 27–34)
MCHC RBC-ENTMCNC: 29.1 GM/DL — LOW (ref 32–36)
MCV RBC AUTO: 84.4 FL — SIGNIFICANT CHANGE UP (ref 80–100)
NON HDL CHOLESTEROL: 92 MG/DL — SIGNIFICANT CHANGE UP
NRBC # BLD: 0 /100 WBCS — SIGNIFICANT CHANGE UP (ref 0–0)
NRBC # FLD: 0 K/UL — SIGNIFICANT CHANGE UP (ref 0–0)
PLATELET # BLD AUTO: 601 K/UL — HIGH (ref 150–400)
POTASSIUM SERPL-MCNC: 3.9 MMOL/L — SIGNIFICANT CHANGE UP (ref 3.5–5.3)
POTASSIUM SERPL-SCNC: 3.9 MMOL/L — SIGNIFICANT CHANGE UP (ref 3.5–5.3)
PROT SERPL-MCNC: 6.9 G/DL — SIGNIFICANT CHANGE UP (ref 6–8.3)
PROTHROM AB SERPL-ACNC: 16.7 SEC — HIGH (ref 10.5–13.4)
RBC # BLD: 3.26 M/UL — LOW (ref 3.8–5.2)
RBC # FLD: 17.4 % — HIGH (ref 10.3–14.5)
SODIUM SERPL-SCNC: 135 MMOL/L — SIGNIFICANT CHANGE UP (ref 135–145)
TRANSFERRIN SERPL-MCNC: 191 MG/DL — LOW (ref 200–360)
TRIGL SERPL-MCNC: 116 MG/DL — SIGNIFICANT CHANGE UP
WBC # BLD: 9.45 K/UL — SIGNIFICANT CHANGE UP (ref 3.8–10.5)
WBC # FLD AUTO: 9.45 K/UL — SIGNIFICANT CHANGE UP (ref 3.8–10.5)

## 2023-01-26 PROCEDURE — 99233 SBSQ HOSP IP/OBS HIGH 50: CPT | Mod: GC

## 2023-01-26 PROCEDURE — 71045 X-RAY EXAM CHEST 1 VIEW: CPT | Mod: 26

## 2023-01-26 PROCEDURE — 93923 UPR/LXTR ART STDY 3+ LVLS: CPT | Mod: 26

## 2023-01-26 PROCEDURE — 93970 EXTREMITY STUDY: CPT | Mod: 26

## 2023-01-26 PROCEDURE — 93306 TTE W/DOPPLER COMPLETE: CPT | Mod: 26

## 2023-01-26 RX ORDER — POTASSIUM CHLORIDE 20 MEQ
20 PACKET (EA) ORAL
Refills: 0 | Status: COMPLETED | OUTPATIENT
Start: 2023-01-26 | End: 2023-01-26

## 2023-01-26 RX ORDER — SACUBITRIL AND VALSARTAN 24; 26 MG/1; MG/1
1 TABLET, FILM COATED ORAL
Refills: 0 | Status: DISCONTINUED | OUTPATIENT
Start: 2023-01-26 | End: 2023-01-27

## 2023-01-26 RX ORDER — CARVEDILOL PHOSPHATE 80 MG/1
25 CAPSULE, EXTENDED RELEASE ORAL EVERY 12 HOURS
Refills: 0 | Status: DISCONTINUED | OUTPATIENT
Start: 2023-01-26 | End: 2023-01-29

## 2023-01-26 RX ORDER — SPIRONOLACTONE 25 MG/1
25 TABLET, FILM COATED ORAL DAILY
Refills: 0 | Status: DISCONTINUED | OUTPATIENT
Start: 2023-01-26 | End: 2023-01-27

## 2023-01-26 RX ORDER — MORPHINE SULFATE 50 MG/1
2 CAPSULE, EXTENDED RELEASE ORAL ONCE
Refills: 0 | Status: DISCONTINUED | OUTPATIENT
Start: 2023-01-26 | End: 2023-01-26

## 2023-01-26 RX ORDER — PANTOPRAZOLE SODIUM 20 MG/1
40 TABLET, DELAYED RELEASE ORAL
Refills: 0 | Status: DISCONTINUED | OUTPATIENT
Start: 2023-01-26 | End: 2023-02-22

## 2023-01-26 RX ORDER — FUROSEMIDE 40 MG
40 TABLET ORAL
Refills: 0 | Status: DISCONTINUED | OUTPATIENT
Start: 2023-01-26 | End: 2023-01-27

## 2023-01-26 RX ORDER — ENOXAPARIN SODIUM 100 MG/ML
40 INJECTION SUBCUTANEOUS EVERY 24 HOURS
Refills: 0 | Status: DISCONTINUED | OUTPATIENT
Start: 2023-01-26 | End: 2023-01-30

## 2023-01-26 RX ORDER — MAGNESIUM SULFATE 500 MG/ML
2 VIAL (ML) INJECTION ONCE
Refills: 0 | Status: DISCONTINUED | OUTPATIENT
Start: 2023-01-26 | End: 2023-01-30

## 2023-01-26 RX ORDER — ATORVASTATIN CALCIUM 80 MG/1
80 TABLET, FILM COATED ORAL AT BEDTIME
Refills: 0 | Status: DISCONTINUED | OUTPATIENT
Start: 2023-01-26 | End: 2023-02-22

## 2023-01-26 RX ORDER — CARVEDILOL PHOSPHATE 80 MG/1
25 CAPSULE, EXTENDED RELEASE ORAL EVERY 12 HOURS
Refills: 0 | Status: DISCONTINUED | OUTPATIENT
Start: 2023-01-26 | End: 2023-01-26

## 2023-01-26 RX ORDER — INFLUENZA VIRUS VACCINE 15; 15; 15; 15 UG/.5ML; UG/.5ML; UG/.5ML; UG/.5ML
0.5 SUSPENSION INTRAMUSCULAR ONCE
Refills: 0 | Status: DISCONTINUED | OUTPATIENT
Start: 2023-01-26 | End: 2023-02-22

## 2023-01-26 RX ORDER — AMPICILLIN SODIUM AND SULBACTAM SODIUM 250; 125 MG/ML; MG/ML
3 INJECTION, POWDER, FOR SUSPENSION INTRAMUSCULAR; INTRAVENOUS EVERY 6 HOURS
Refills: 0 | Status: DISCONTINUED | OUTPATIENT
Start: 2023-01-26 | End: 2023-01-29

## 2023-01-26 RX ORDER — ASPIRIN/CALCIUM CARB/MAGNESIUM 324 MG
81 TABLET ORAL DAILY
Refills: 0 | Status: DISCONTINUED | OUTPATIENT
Start: 2023-01-26 | End: 2023-02-22

## 2023-01-26 RX ADMIN — AMPICILLIN SODIUM AND SULBACTAM SODIUM 200 GRAM(S): 250; 125 INJECTION, POWDER, FOR SUSPENSION INTRAMUSCULAR; INTRAVENOUS at 23:48

## 2023-01-26 RX ADMIN — INSULIN GLARGINE 5 UNIT(S): 100 INJECTION, SOLUTION SUBCUTANEOUS at 22:09

## 2023-01-26 RX ADMIN — AMPICILLIN SODIUM AND SULBACTAM SODIUM 200 GRAM(S): 250; 125 INJECTION, POWDER, FOR SUSPENSION INTRAMUSCULAR; INTRAVENOUS at 11:18

## 2023-01-26 RX ADMIN — Medication 40 MILLIGRAM(S): at 22:08

## 2023-01-26 RX ADMIN — PANTOPRAZOLE SODIUM 40 MILLIGRAM(S): 20 TABLET, DELAYED RELEASE ORAL at 05:05

## 2023-01-26 RX ADMIN — MORPHINE SULFATE 2 MILLIGRAM(S): 50 CAPSULE, EXTENDED RELEASE ORAL at 11:41

## 2023-01-26 RX ADMIN — Medication 650 MILLIGRAM(S): at 01:46

## 2023-01-26 RX ADMIN — Medication 650 MILLIGRAM(S): at 23:47

## 2023-01-26 RX ADMIN — Medication 1: at 00:23

## 2023-01-26 RX ADMIN — MORPHINE SULFATE 2 MILLIGRAM(S): 50 CAPSULE, EXTENDED RELEASE ORAL at 11:56

## 2023-01-26 RX ADMIN — Medication 20 MILLIGRAM(S): at 05:05

## 2023-01-26 RX ADMIN — AMPICILLIN SODIUM AND SULBACTAM SODIUM 200 GRAM(S): 250; 125 INJECTION, POWDER, FOR SUSPENSION INTRAMUSCULAR; INTRAVENOUS at 17:42

## 2023-01-26 RX ADMIN — Medication 650 MILLIGRAM(S): at 10:21

## 2023-01-26 RX ADMIN — SACUBITRIL AND VALSARTAN 1 TABLET(S): 24; 26 TABLET, FILM COATED ORAL at 09:21

## 2023-01-26 RX ADMIN — Medication 650 MILLIGRAM(S): at 02:46

## 2023-01-26 RX ADMIN — Medication 81 MILLIGRAM(S): at 11:18

## 2023-01-26 RX ADMIN — CARVEDILOL PHOSPHATE 25 MILLIGRAM(S): 80 CAPSULE, EXTENDED RELEASE ORAL at 17:42

## 2023-01-26 RX ADMIN — CARVEDILOL PHOSPHATE 25 MILLIGRAM(S): 80 CAPSULE, EXTENDED RELEASE ORAL at 06:17

## 2023-01-26 RX ADMIN — ATORVASTATIN CALCIUM 80 MILLIGRAM(S): 80 TABLET, FILM COATED ORAL at 22:09

## 2023-01-26 RX ADMIN — Medication 20 MILLIEQUIVALENT(S): at 22:09

## 2023-01-26 RX ADMIN — SPIRONOLACTONE 25 MILLIGRAM(S): 25 TABLET, FILM COATED ORAL at 05:05

## 2023-01-26 RX ADMIN — AMPICILLIN SODIUM AND SULBACTAM SODIUM 200 GRAM(S): 250; 125 INJECTION, POWDER, FOR SUSPENSION INTRAMUSCULAR; INTRAVENOUS at 05:05

## 2023-01-26 RX ADMIN — Medication 20 MILLIEQUIVALENT(S): at 20:00

## 2023-01-26 RX ADMIN — Medication 650 MILLIGRAM(S): at 09:21

## 2023-01-26 RX ADMIN — ENOXAPARIN SODIUM 40 MILLIGRAM(S): 100 INJECTION SUBCUTANEOUS at 06:17

## 2023-01-26 RX ADMIN — INSULIN GLARGINE 5 UNIT(S): 100 INJECTION, SOLUTION SUBCUTANEOUS at 01:43

## 2023-01-26 NOTE — PROGRESS NOTE ADULT - SUBJECTIVE AND OBJECTIVE BOX
SURGERY  Pager: #[phone]    INTERVAL EVENTS/SUBJECTIVE: No acute events overnight.     ______________________________________________  OBJECTIVE:   T(C): 36.3 (01-26-23 @ 06:50), Max: 36.9 (01-26-23 @ 00:14)  HR: 78 (01-26-23 @ 06:50) (78 - 101)  BP: 133/65 (01-26-23 @ 06:50) (102/70 - 144/79)  RR: 18 (01-26-23 @ 06:50) (17 - 20)  SpO2: 99% (01-26-23 @ 06:50) (97% - 100%)  Wt(kg): --  CAPILLARY BLOOD GLUCOSE      POCT Blood Glucose.: 222 mg/dL (26 Jan 2023 01:42)  POCT Blood Glucose.: 267 mg/dL (25 Jan 2023 23:33)  POCT Blood Glucose.: 217 mg/dL (25 Jan 2023 13:06)    I&O's Detail        PHYSICAL EXAM:   General: Alert, NAD  Neuro: A+Ox3  HEENT: NC/AT, no asymmetry, no scleral icterus  Neck: Soft, supple  Cardio: RRR  Resp: Airway patent, unlabored breathing  Thorax: No chest wall tenderness, well healed sternotomy  GI/Abd: Soft, NT/ND, no rebound/guarding, no masses palpated  Vascular: B/l Upper extremities warm, R lower extremity warm and well perfused, Left lower extremity with distal foot cool to touch, some mottling present  -Pulses:   -Right Lower: Fem palpable, Pop palpable, DP/PT dopplerable  -Left Lower: Fem palpable, pop palpable, AT dopperlable, PT/DP no signals   Skin: L 2nd toe gangrene, open wound at prior 3rd toe, non draining  Musculoskeletal: All 4 extremities moving spontaneously, plantar/dorsiflexion intact bilaterally, toes wiggle b/l, L  to light touch, sensation diminished over distal L dorsal/plantar foot, b/l LE edema   ______________________________________________  LABS:  CBC Full  -  ( 25 Jan 2023 15:03 )  WBC Count : 10.75 K/uL  RBC Count : 3.56 M/uL  Hemoglobin : 8.7 g/dL  Hematocrit : 30.3 %  Platelet Count - Automated : 674 K/uL  Mean Cell Volume : 85.1 fL  Mean Cell Hemoglobin : 24.4 pg  Mean Cell Hemoglobin Concentration : 28.7 gm/dL  Auto Neutrophil # : 7.88 K/uL  Auto Lymphocyte # : 1.88 K/uL  Auto Monocyte # : 0.70 K/uL  Auto Eosinophil # : 0.21 K/uL  Auto Basophil # : 0.05 K/uL  Auto Neutrophil % : 73.2 %  Auto Lymphocyte % : 17.5 %  Auto Monocyte % : 6.5 %  Auto Eosinophil % : 2.0 %  Auto Basophil % : 0.5 %    01-25    136  |  99  |  27<H>  ----------------------------<  180<H>  3.8   |  27  |  1.22    Ca    8.6      25 Jan 2023 15:03    TPro  7.9  /  Alb  2.6<L>  /  TBili  <0.2  /  DBili  x   /  AST  22  /  ALT  16  /  AlkPhos  151<H>  01-25    _____________________________________________  RADIOLOGY:

## 2023-01-26 NOTE — PROGRESS NOTE ADULT - PROBLEM SELECTOR PLAN 4
On metformin 500mg BID, glimepiride 4mg, lantus 8U at bedtime   - started 5U lantus + ROOSEVELT  - f/u HA1C  - check A1c On metformin 500mg BID, glimepiride 4mg, lantus 8U at bedtime   A1c 7.1    - started 5U lantus + ROOSEVELT

## 2023-01-26 NOTE — PROGRESS NOTE ADULT - SUBJECTIVE AND OBJECTIVE BOX
Patient is a 42y old  Female who presents with a chief complaint of Toe pain (26 Jan 2023 08:18)       INTERVAL HPI/OVERNIGHT EVENTS:  Patient seen and evaluated at bedside.  Pt is resting comfortable in NAD. Denies N/V/F/C.    Allergies    No Known Allergies    Intolerances        Vital Signs Last 24 Hrs  T(C): 36.3 (26 Jan 2023 06:50), Max: 36.9 (26 Jan 2023 00:14)  T(F): 97.4 (26 Jan 2023 06:50), Max: 98.5 (26 Jan 2023 00:14)  HR: 78 (26 Jan 2023 06:50) (78 - 101)  BP: 133/65 (26 Jan 2023 06:50) (102/70 - 144/79)  BP(mean): --  RR: 18 (26 Jan 2023 06:50) (17 - 20)  SpO2: 99% (26 Jan 2023 06:50) (97% - 100%)    Parameters below as of 26 Jan 2023 06:50  Patient On (Oxygen Delivery Method): room air        LABS:                        8.0    9.45  )-----------( 601      ( 26 Jan 2023 07:30 )             27.5     01-26    135  |  101  |  25<H>  ----------------------------<  91  3.9   |  25  |  1.20    Ca    8.3<L>      26 Jan 2023 07:30  Phos  3.5     01-26  Mg     1.60     01-26    TPro  6.9  /  Alb  2.0<L>  /  TBili  0.2  /  DBili  x   /  AST  21  /  ALT  10  /  AlkPhos  131<H>  01-26    PT/INR - ( 26 Jan 2023 07:30 )   PT: 16.7 sec;   INR: 1.43 ratio         PTT - ( 26 Jan 2023 07:30 )  PTT:31.2 sec    CAPILLARY BLOOD GLUCOSE      POCT Blood Glucose.: 78 mg/dL (26 Jan 2023 08:38)  POCT Blood Glucose.: 71 mg/dL (26 Jan 2023 08:37)  POCT Blood Glucose.: 222 mg/dL (26 Jan 2023 01:42)  POCT Blood Glucose.: 267 mg/dL (25 Jan 2023 23:33)  POCT Blood Glucose.: 217 mg/dL (25 Jan 2023 13:06)      Lower Extremity Physical Exam:  Vascular: DP/PT 0/4 left, DT/PT 1/4, CFT <3 seconds B/L, Temperature gradient warm to cold left, warm to cool right.   Neuro: Epicritic sensation decreased to the level of toes, B/L.  Musculoskeletal/Ortho: s/p left foot partial 3rd ray resection.  Skin: Left foot 2nd digit dry gangrene, dehisced partial 3rd ray resection to subq, ischemic changes the dorsal MPJs 1-5, no drainage, no purulence, no acute signs of infection. Right foot no wounds, no acute signs of infection.    RADIOLOGY & ADDITIONAL TESTS:

## 2023-01-26 NOTE — PROGRESS NOTE ADULT - ATTENDING COMMENTS
42-year-old female w/CAD s/p CABG x4 in 2018, HFrEF (EF 21% on 1/26) s/p ICD placement, IDDM2, PAD s/p 3rd toe amputation who presents with left 2nd toe pain concerning for dry gangrene. Seen by podiatry and vascular, recs appreciated, plans for possible transmetatarsal amputation with podiatry.     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 X-ray foot without gas or osseous involvement  - Podiatry and vascular recs appreciated  - c/w IV unasyn   - plan for L transmetatarsal amputation per podiatry   - TTE on 1/26 with EF  21%, severe global left ventricular systolic dysfunction, Increased E/e'  is consistent with elevated left ventricular filling pressure. Right ventricular enlargement with decreased right ventricular systolic function.  - RCRI >11% risk of adverse cardiac outcome. Patient is at high risk for complication from surgery.    - given significant vasculopathy and increased filling pressures on echo, consult cardiology for risk strat and recs on further optimization PRIOR to surgery. Patient should NOT yet proceed to surgery.     #Peripheral artery disease  - aspirin/statin   - f/u vascular studies and recs     #HFrEF   - TTE on 1/26 with EF  21%, severe global left ventricular systolic dysfunction, Increased E/e'  is consistent with elevated left ventricular filling pressure. Right ventricular enlargement with decreased right ventricular systolic function.  - check CXR, BNP ordered (although on entresto)   - c/w coreg 25 BID  - c/w entresto 24/26  - c/w torsemide 20mg   - c/w spironolactone 25mg  - interrogate ICD tomorrow   - cards consult

## 2023-01-26 NOTE — PROGRESS NOTE ADULT - ASSESSMENT
42F presents with left foot 2nd digit dry gangrene and ischemic changes to dorsal MPJs 1-5.  - Pt seen and evaluated.  - Afebrile, WBC 9.45, ESR 96, CRP 5.65.  - Left Foot X-Ray: No gas, no osteomyelitis.  - Left foot 2nd digit dry gangrene, dehisced partial 3rd ray resection to subq, ischemic changes the dorsal MPJs 1-5, no drainage, no purulence, no acute signs of infection. Right foot no wounds, no acute signs of infection.  - Recommend continuing IV unasyn.  - No left foot wound culture taken 2/2 no acute signs of infection.  - SEBASTIÁN/PVR: Pending.  - CTA: Right distal peroneal artery and left PT/peroneal arteries occluded.  - Vasc recs, appreciated.  - Daily wound care nursing orders placed.  - Pod Plan: Left foot transmetatarsal amputation with tendoachilles lengthening pending SEBASTIÁN/PVRs and vasc recs.  - Please document medical/cardiac clearance for podiatric procedure under anesthesia.  - Seen with attending. 42F presents with left foot 2nd digit dry gangrene and ischemic changes to dorsal MPJs 1-5.  - Pt seen and evaluated.  - Afebrile, WBC 9.45, ESR 96, CRP 5.65.  - Left Foot X-Ray: No gas, no osteomyelitis.  - Left foot 2nd digit dry gangrene, dehisced partial 3rd ray resection to subq, ischemic changes the dorsal MPJs 1-5, no drainage, no purulence, no acute signs of infection. Right foot no wounds, no acute signs of infection.  - Recommend continuing IV unasyn.  - No left foot wound culture taken 2/2 no acute signs of infection.  - SEBASTIÁN/PVR: Pending.  - CTA: Right distal peroneal artery and left PT/peroneal arteries occluded.  - Vasc recs, appreciated.  - Daily wound care nursing orders placed.  - Recommend EP consult to interrogate pacemaker.  - Pod Plan: Left foot transmetatarsal amputation with tendoachilles lengthening pending SEBASTIÁN/PVRs and vasc recs.  - Please document medical/cardiac clearance for podiatric procedure under anesthesia.  - Seen with attending.

## 2023-01-26 NOTE — PROGRESS NOTE ADULT - SUBJECTIVE AND OBJECTIVE BOX
PROGRESS NOTE:   Authored by Dr. Edwin Joseph    Patient is a 42y old  Female who presents with a chief complaint of Toe pain (26 Jan 2023 07:41)      SUBJECTIVE / OVERNIGHT EVENTS: admitted to floors overnight.     ADDITIONAL REVIEW OF SYSTEMS:  No fever     MEDICATIONS  (STANDING):  ampicillin/sulbactam  IVPB 3 Gram(s) IV Intermittent every 6 hours  aspirin enteric coated 81 milliGRAM(s) Oral daily  atorvastatin 80 milliGRAM(s) Oral at bedtime  carvedilol 25 milliGRAM(s) Oral every 12 hours  dextrose 5%. 1000 milliLiter(s) (100 mL/Hr) IV Continuous <Continuous>  dextrose 5%. 1000 milliLiter(s) (50 mL/Hr) IV Continuous <Continuous>  dextrose 50% Injectable 25 Gram(s) IV Push once  dextrose 50% Injectable 12.5 Gram(s) IV Push once  dextrose 50% Injectable 25 Gram(s) IV Push once  enoxaparin Injectable 40 milliGRAM(s) SubCutaneous every 24 hours  glucagon  Injectable 1 milliGRAM(s) IntraMuscular once  influenza   Vaccine 0.5 milliLiter(s) IntraMuscular once  insulin glargine Injectable (LANTUS) 5 Unit(s) SubCutaneous at bedtime  insulin lispro (ADMELOG) corrective regimen sliding scale   SubCutaneous three times a day before meals  insulin lispro (ADMELOG) corrective regimen sliding scale   SubCutaneous at bedtime  pantoprazole    Tablet 40 milliGRAM(s) Oral before breakfast  sacubitril 24 mG/valsartan 26 mG 1 Tablet(s) Oral <User Schedule>  spironolactone 25 milliGRAM(s) Oral daily  torsemide 20 milliGRAM(s) Oral daily    MEDICATIONS  (PRN):  acetaminophen     Tablet .. 650 milliGRAM(s) Oral every 6 hours PRN Mild Pain (1 - 3), Moderate Pain (4 - 6)  dextrose Oral Gel 15 Gram(s) Oral once PRN Blood Glucose LESS THAN 70 milliGRAM(s)/deciliter      CAPILLARY BLOOD GLUCOSE      POCT Blood Glucose.: 222 mg/dL (26 Jan 2023 01:42)  POCT Blood Glucose.: 267 mg/dL (25 Jan 2023 23:33)  POCT Blood Glucose.: 217 mg/dL (25 Jan 2023 13:06)    I&O's Summary      PHYSICAL EXAM:  Vital Signs Last 24 Hrs  T(C): 36.3 (26 Jan 2023 06:50), Max: 36.9 (26 Jan 2023 00:14)  T(F): 97.4 (26 Jan 2023 06:50), Max: 98.5 (26 Jan 2023 00:14)  HR: 78 (26 Jan 2023 06:50) (78 - 101)  BP: 133/65 (26 Jan 2023 06:50) (102/70 - 144/79)  BP(mean): --  RR: 18 (26 Jan 2023 06:50) (17 - 20)  SpO2: 99% (26 Jan 2023 06:50) (97% - 100%)    Parameters below as of 26 Jan 2023 06:50  Patient On (Oxygen Delivery Method): room air        GENERAL: NAD, lying comfortably in bed  HEAD: Atraumatic, normocephalic  EYES: EOMI b/l, conjunctiva and sclera clear  NECK: Supple, No JVD, No LAD  RESPIRATORY: Normal respiratory effort; lungs are clear to auscultation bilaterally  CARDIOVASCULAR: Regular rate and rhythm, normal S1 and S2, no murmur/rub/gallop; No lower extremity edema  ABDOMEN: Nontender, normoactive bowel sounds, no rebound/guarding; No hepatosplenomegaly  MUSCULOSKELETAL: no clubbing or cyanosis of digits; no joint swelling or tenderness to palpation  NEURO: Non focal   SKIN:   PSYCH: A+O to person, place, and time; affect appropriate          LABS:                          8.0    9.45  )-----------( 601      ( 26 Jan 2023 07:30 )             27.5     01-25    136  |  99  |  27<H>  ----------------------------<  180<H>  3.8   |  27  |  1.22    Ca    8.6      25 Jan 2023 15:03    TPro  7.9  /  Alb  2.6<L>  /  TBili  <0.2  /  DBili  x   /  AST  22  /  ALT  16  /  AlkPhos  151<H>  01-25                  RADIOLOGY:    Consulted note reviewed  Reviewed Imaging personally   PROGRESS NOTE:   Authored by Dr. Edwin Joseph    Patient is a 42y old  Female who presents with a chief complaint of Toe pain (26 Jan 2023 07:41)      SUBJECTIVE / OVERNIGHT EVENTS: admitted to floors overnight. Patient eating breakfast, states her pain is controlled with acetaminophen. at 4 vs. 10 before. No other active complaints.     ADDITIONAL REVIEW OF SYSTEMS:  No fever   No SOB  No CP  No abd pain  No N/V  + leg edema more pronounced on R, improving.  + pain and black discoloration of L 2nd toe.      MEDICATIONS  (STANDING):  ampicillin/sulbactam  IVPB 3 Gram(s) IV Intermittent every 6 hours  aspirin enteric coated 81 milliGRAM(s) Oral daily  atorvastatin 80 milliGRAM(s) Oral at bedtime  carvedilol 25 milliGRAM(s) Oral every 12 hours  dextrose 5%. 1000 milliLiter(s) (100 mL/Hr) IV Continuous <Continuous>  dextrose 5%. 1000 milliLiter(s) (50 mL/Hr) IV Continuous <Continuous>  dextrose 50% Injectable 25 Gram(s) IV Push once  dextrose 50% Injectable 12.5 Gram(s) IV Push once  dextrose 50% Injectable 25 Gram(s) IV Push once  enoxaparin Injectable 40 milliGRAM(s) SubCutaneous every 24 hours  glucagon  Injectable 1 milliGRAM(s) IntraMuscular once  influenza   Vaccine 0.5 milliLiter(s) IntraMuscular once  insulin glargine Injectable (LANTUS) 5 Unit(s) SubCutaneous at bedtime  insulin lispro (ADMELOG) corrective regimen sliding scale   SubCutaneous three times a day before meals  insulin lispro (ADMELOG) corrective regimen sliding scale   SubCutaneous at bedtime  pantoprazole    Tablet 40 milliGRAM(s) Oral before breakfast  sacubitril 24 mG/valsartan 26 mG 1 Tablet(s) Oral <User Schedule>  spironolactone 25 milliGRAM(s) Oral daily  torsemide 20 milliGRAM(s) Oral daily    MEDICATIONS  (PRN):  acetaminophen     Tablet .. 650 milliGRAM(s) Oral every 6 hours PRN Mild Pain (1 - 3), Moderate Pain (4 - 6)  dextrose Oral Gel 15 Gram(s) Oral once PRN Blood Glucose LESS THAN 70 milliGRAM(s)/deciliter      CAPILLARY BLOOD GLUCOSE      POCT Blood Glucose.: 222 mg/dL (26 Jan 2023 01:42)  POCT Blood Glucose.: 267 mg/dL (25 Jan 2023 23:33)  POCT Blood Glucose.: 217 mg/dL (25 Jan 2023 13:06)    I&O's Summary      PHYSICAL EXAM:  Vital Signs Last 24 Hrs  T(C): 36.3 (26 Jan 2023 06:50), Max: 36.9 (26 Jan 2023 00:14)  T(F): 97.4 (26 Jan 2023 06:50), Max: 98.5 (26 Jan 2023 00:14)  HR: 78 (26 Jan 2023 06:50) (78 - 101)  BP: 133/65 (26 Jan 2023 06:50) (102/70 - 144/79)  BP(mean): --  RR: 18 (26 Jan 2023 06:50) (17 - 20)  SpO2: 99% (26 Jan 2023 06:50) (97% - 100%)    Parameters below as of 26 Jan 2023 06:50  Patient On (Oxygen Delivery Method): room air        GENERAL: NAD, sitting comfortably in bed eating breakfast   HEAD: Atraumatic, normocephalic  EYES: EOMI b/l, conjunctiva and sclera clear  NECK: Supple   RESPIRATORY: Normal respiratory effort; lungs are clear to auscultation bilaterally  CARDIOVASCULAR: Regular rate and rhythm, normal S1 and S2, no murmur/rub/gallop; 3+ pitting edema R, trace edema L LE   ABDOMEN: Nontender, normoactive bowel sounds, no rebound/guarding; No hepatosplenomegaly  MUSCULOSKELETAL: no joint swelling or tenderness to palpation. L 2nd toe with dressing intact, dry gangrene with eschar around the 2nd toe extending to plantar surface, tender to light palpation. L 3rd toe s/p amputation. No other abnormalities noted.   NEURO: Non focal   SKIN: eschar of L 2nd toe as mentioned above.   PSYCH: A+O to person, place, and time; affect appropriate          LABS:                          8.0    9.45  )-----------( 601      ( 26 Jan 2023 07:30 )             27.5     01-25    136  |  99  |  27<H>  ----------------------------<  180<H>  3.8   |  27  |  1.22    Ca    8.6      25 Jan 2023 15:03    TPro  7.9  /  Alb  2.6<L>  /  TBili  <0.2  /  DBili  x   /  AST  22  /  ALT  16  /  AlkPhos  151<H>  01-25                  RADIOLOGY:    Consulted note reviewed  Reviewed Imaging personally

## 2023-01-26 NOTE — PATIENT PROFILE ADULT - FALL HARM RISK - HARM RISK INTERVENTIONS
Assistance with ambulation/Assistance OOB with selected safe patient handling equipment/Communicate Risk of Fall with Harm to all staff/Discuss with provider need for PT consult/Monitor gait and stability/Reinforce activity limits and safety measures with patient and family/Tailored Fall Risk Interventions/Visual Cue: Yellow wristband and red socks/Bed in lowest position, wheels locked, appropriate side rails in place/Call bell, personal items and telephone in reach/Instruct patient to call for assistance before getting out of bed or chair/Non-slip footwear when patient is out of bed/[place] to call system/Physically safe environment - no spills, clutter or unnecessary equipment/Purposeful Proactive Rounding/Room/bathroom lighting operational, light cord in reach

## 2023-01-26 NOTE — PATIENT PROFILE ADULT - LIVING ENVIRONMENT
no

## 2023-01-26 NOTE — PROGRESS NOTE ADULT - PROBLEM SELECTOR PLAN 6
DVT PPx: lovenox 40mg   Diet: CC   Dispo: Medically active    Code Status: Full Code DVT PPx: lovenox 40mg   Diet: CC   Dispo: pending amputation surgery     Code Status: Full Code

## 2023-01-26 NOTE — PROGRESS NOTE ADULT - PROBLEM SELECTOR PLAN 3
relatively stable since 11/2020  no signs/sx of bleeding, likely related to inflammation   trend H/H  outpt f/u with PMD for further w/u and age appropriate cancer screening relatively stable since 11/2020  hb today 8.0 from 8.7  no signs/sx of bleeding, likely related to inflammation   trend H/H  outpt f/u with PMD for further w/u and age appropriate cancer screening

## 2023-01-26 NOTE — PROGRESS NOTE ADULT - PROBLEM SELECTOR PLAN 5
Does not appear to be acutely decompensated on exam  Last TTE from 3/21 with EF 27%, s/p ICD placement and interrogated in 2022   - repeat TTE?   - c/w coreg 25 BID  - c/w entresto 24/26  - c/w torsemide 20mg   - c/w spironolactone 25mg Does not appear to be acutely decompensated on exam  Last TTE from 3/21 with EF 27%, s/p ICD placement and interrogated in 2022     - repeat TTE pending for medical clearance  - c/w coreg 25 BID  - c/w entresto 24/26  - c/w torsemide 20mg   - c/w spironolactone 25mg

## 2023-01-26 NOTE — PROGRESS NOTE ADULT - ASSESSMENT
42F w PMHx HTN, HLD, CAD w CABG, LV thrombus previously on coumadin now off, CVA w/o residual deficits, CHF, ICD, DM2 who presents with left foot pain. Vascular surgery consulted in the setting of possible limb ischemia. History and physical exam consistent with likely chronic limb ischemia, particularly in the setting of recent non healing toe amputation @ OSH. Coolness to touch is concerning, although AT signal is present and proximal pulses palpable, therefore low suspicion for acute limb ischemia. L 2nd toe appears non viable and will likely require amputation, although await podiatry recs.     Plan:   - CTA showing patient bilateral aortic, iliac, femoral and popliteal arteries; occluded distal R peroneal artery at the level of the ankle. L proximal PT and peroneal occlusions, with reconstitution at level of ankle.   - Obtain SEBASTIÁN/PVR w Toe Pressure   - Continue IV abx   - Appreciate Podiatry recommendations: plan for L foot TMA       C Team Surgery  [id number]

## 2023-01-26 NOTE — PROGRESS NOTE ADULT - PROBLEM SELECTOR PLAN 1
Necrosis of 2nd digit i/s/o PAD, concerning for dry gangrene, elevated ESR/CRP. No signs of active infection   X-ray foot without gas or osseous involvement, limited to soft tissue   Podiatry and vascular recs appreciated  CT Angio prelim read with occluded left posterior tibial and peroneal arteries  - start IV unasyn   - f/u SEBASTIÁN/PVRs  - plans for L transmetatarsal amputation pending further vascular recs Necrosis of 2nd digit i/s/o PAD, concerning for dry gangrene, elevated ESR/CRP. No signs of active infection   X-ray foot without gas or osseous involvement, limited to soft tissue   Podiatry and vascular recs appreciated  CT Angio prelim read with occluded left posterior tibial and peroneal arteries  - c/w IV unasyn   - f/u SEBASTIÁN/PVRs  - f/u duplex venous LE  - plans for L transmetatarsal amputation pending further vascular recs on date

## 2023-01-26 NOTE — PROGRESS NOTE ADULT - PROBLEM SELECTOR PLAN 2
CT angio with moderate-marked bilateral lower extremity peripheral vascular disease, calcifications of AAA   Per vascular, no concern for acute limb ischemia, no surgical intervention   - c/w atorvastatin   - c/w ASA  - f/u lipid panel  - trend lactate CT angio with moderate-marked bilateral lower extremity peripheral vascular disease, calcifications of AAA   Per vascular, no concern for acute limb ischemia, no surgical intervention   unremarkable lipid panel   repeat lactate 1.9 from 2.9    - c/w atorvastatin   - c/w ASA

## 2023-01-26 NOTE — ED ADULT NURSE REASSESSMENT NOTE - NS ED NURSE REASSESS COMMENT FT1
3 attempts to give report to unit. [phone] ext used. UR stated at second call that RN was in rapid. 3rd call, made aware of RN faxing report.
Admelog administered as ordered. Unit ([id number]) called, spoke with unit RN Tanika. Made aware that Admelog was given and Lantus would be given if received in ED from pharmacy (order message already put in for Lantus). RN verbalizes understanding. Agrees to administering Lantus if transport arrives prior to Lantus being received.
Elevated finger stick noted. 267. Kimberlyn Melendrez made aware via Teams. No further orders at this time.
Pt at baseline mental status, laying in bed breathing even and unlabored. Pt states her pain decreased significantly to 3/10.
Report faxed to [facility] ([phone]). Faxed confirmation and copy of report tubed to [address] [place] [address] (station [id number]). Original copies in pt's chart. Awaiting transport.
Report received from day RNSilvana. A&Ox4 and amb with walker at baseline. Verbalizes improvement in left toe pain. Left foot, 2nd digit necrosis noted. Denies CP, SOB, nausea, vomiting, HA, fever or chills. Resp even and unlabored. No acute distress noted. Speaking in full and complete sentences. Bed in lowest position, wheels locked, safety maintained. Pt is admitted, awaiting bed assignment.
Pt at baseline mental status, breathing even and unlabored. Pt c/o 8/10 foot pain, pt medicated as per eMar.

## 2023-01-27 DIAGNOSIS — N17.9 ACUTE KIDNEY FAILURE, UNSPECIFIED: ICD-10-CM

## 2023-01-27 DIAGNOSIS — J90 PLEURAL EFFUSION, NOT ELSEWHERE CLASSIFIED: ICD-10-CM

## 2023-01-27 LAB
ALBUMIN SERPL ELPH-MCNC: 2.2 G/DL — LOW (ref 3.3–5)
ALP SERPL-CCNC: 135 U/L — HIGH (ref 40–120)
ALT FLD-CCNC: 11 U/L — SIGNIFICANT CHANGE UP (ref 4–33)
ANION GAP SERPL CALC-SCNC: 10 MMOL/L — SIGNIFICANT CHANGE UP (ref 7–14)
ANION GAP SERPL CALC-SCNC: 11 MMOL/L — SIGNIFICANT CHANGE UP (ref 7–14)
ANION GAP SERPL CALC-SCNC: 12 MMOL/L — SIGNIFICANT CHANGE UP (ref 7–14)
AST SERPL-CCNC: 17 U/L — SIGNIFICANT CHANGE UP (ref 4–32)
BASOPHILS # BLD AUTO: 0.08 K/UL — SIGNIFICANT CHANGE UP (ref 0–0.2)
BASOPHILS NFR BLD AUTO: 1 % — SIGNIFICANT CHANGE UP (ref 0–2)
BILIRUB SERPL-MCNC: 0.3 MG/DL — SIGNIFICANT CHANGE UP (ref 0.2–1.2)
BLD GP AB SCN SERPL QL: NEGATIVE — SIGNIFICANT CHANGE UP
BUN SERPL-MCNC: 30 MG/DL — HIGH (ref 7–23)
BUN SERPL-MCNC: 31 MG/DL — HIGH (ref 7–23)
BUN SERPL-MCNC: 32 MG/DL — HIGH (ref 7–23)
CALCIUM SERPL-MCNC: 8.4 MG/DL — SIGNIFICANT CHANGE UP (ref 8.4–10.5)
CALCIUM SERPL-MCNC: 8.8 MG/DL — SIGNIFICANT CHANGE UP (ref 8.4–10.5)
CALCIUM SERPL-MCNC: 9.2 MG/DL — SIGNIFICANT CHANGE UP (ref 8.4–10.5)
CHLORIDE SERPL-SCNC: 101 MMOL/L — SIGNIFICANT CHANGE UP (ref 98–107)
CHLORIDE SERPL-SCNC: 102 MMOL/L — SIGNIFICANT CHANGE UP (ref 98–107)
CHLORIDE SERPL-SCNC: 98 MMOL/L — SIGNIFICANT CHANGE UP (ref 98–107)
CO2 SERPL-SCNC: 22 MMOL/L — SIGNIFICANT CHANGE UP (ref 22–31)
CO2 SERPL-SCNC: 23 MMOL/L — SIGNIFICANT CHANGE UP (ref 22–31)
CO2 SERPL-SCNC: 24 MMOL/L — SIGNIFICANT CHANGE UP (ref 22–31)
CREAT SERPL-MCNC: 2.07 MG/DL — HIGH (ref 0.5–1.3)
CREAT SERPL-MCNC: 2.46 MG/DL — HIGH (ref 0.5–1.3)
CREAT SERPL-MCNC: 2.61 MG/DL — HIGH (ref 0.5–1.3)
EGFR: 23 ML/MIN/1.73M2 — LOW
EGFR: 24 ML/MIN/1.73M2 — LOW
EGFR: 30 ML/MIN/1.73M2 — LOW
EOSINOPHIL # BLD AUTO: 0.14 K/UL — SIGNIFICANT CHANGE UP (ref 0–0.5)
EOSINOPHIL NFR BLD AUTO: 1.7 % — SIGNIFICANT CHANGE UP (ref 0–6)
GLUCOSE BLDC GLUCOMTR-MCNC: 110 MG/DL — HIGH (ref 70–99)
GLUCOSE BLDC GLUCOMTR-MCNC: 158 MG/DL — HIGH (ref 70–99)
GLUCOSE BLDC GLUCOMTR-MCNC: 72 MG/DL — SIGNIFICANT CHANGE UP (ref 70–99)
GLUCOSE BLDC GLUCOMTR-MCNC: 73 MG/DL — SIGNIFICANT CHANGE UP (ref 70–99)
GLUCOSE SERPL-MCNC: 175 MG/DL — HIGH (ref 70–99)
GLUCOSE SERPL-MCNC: 68 MG/DL — LOW (ref 70–99)
GLUCOSE SERPL-MCNC: 74 MG/DL — SIGNIFICANT CHANGE UP (ref 70–99)
HCT VFR BLD CALC: 26.8 % — LOW (ref 34.5–45)
HCT VFR BLD CALC: 28.3 % — LOW (ref 34.5–45)
HGB BLD-MCNC: 7.8 G/DL — LOW (ref 11.5–15.5)
HGB BLD-MCNC: 8.2 G/DL — LOW (ref 11.5–15.5)
IANC: 5.74 K/UL — SIGNIFICANT CHANGE UP (ref 1.8–7.4)
IMM GRANULOCYTES NFR BLD AUTO: 0.5 % — SIGNIFICANT CHANGE UP (ref 0–0.9)
LYMPHOCYTES # BLD AUTO: 1.4 K/UL — SIGNIFICANT CHANGE UP (ref 1–3.3)
LYMPHOCYTES # BLD AUTO: 16.8 % — SIGNIFICANT CHANGE UP (ref 13–44)
MAGNESIUM SERPL-MCNC: 1.7 MG/DL — SIGNIFICANT CHANGE UP (ref 1.6–2.6)
MAGNESIUM SERPL-MCNC: 1.8 MG/DL — SIGNIFICANT CHANGE UP (ref 1.6–2.6)
MCHC RBC-ENTMCNC: 24.3 PG — LOW (ref 27–34)
MCHC RBC-ENTMCNC: 24.6 PG — LOW (ref 27–34)
MCHC RBC-ENTMCNC: 29 GM/DL — LOW (ref 32–36)
MCHC RBC-ENTMCNC: 29.1 GM/DL — LOW (ref 32–36)
MCV RBC AUTO: 83.5 FL — SIGNIFICANT CHANGE UP (ref 80–100)
MCV RBC AUTO: 84.7 FL — SIGNIFICANT CHANGE UP (ref 80–100)
MONOCYTES # BLD AUTO: 0.95 K/UL — HIGH (ref 0–0.9)
MONOCYTES NFR BLD AUTO: 11.4 % — SIGNIFICANT CHANGE UP (ref 2–14)
NEUTROPHILS # BLD AUTO: 5.74 K/UL — SIGNIFICANT CHANGE UP (ref 1.8–7.4)
NEUTROPHILS NFR BLD AUTO: 68.6 % — SIGNIFICANT CHANGE UP (ref 43–77)
NRBC # BLD: 0 /100 WBCS — SIGNIFICANT CHANGE UP (ref 0–0)
NRBC # BLD: 0 /100 WBCS — SIGNIFICANT CHANGE UP (ref 0–0)
NRBC # FLD: 0 K/UL — SIGNIFICANT CHANGE UP (ref 0–0)
NRBC # FLD: 0 K/UL — SIGNIFICANT CHANGE UP (ref 0–0)
NT-PROBNP SERPL-SCNC: HIGH PG/ML
PHOSPHATE SERPL-MCNC: 4.8 MG/DL — HIGH (ref 2.5–4.5)
PHOSPHATE SERPL-MCNC: 5.5 MG/DL — HIGH (ref 2.5–4.5)
PLATELET # BLD AUTO: 588 K/UL — HIGH (ref 150–400)
PLATELET # BLD AUTO: 605 K/UL — HIGH (ref 150–400)
POTASSIUM SERPL-MCNC: 5 MMOL/L — SIGNIFICANT CHANGE UP (ref 3.5–5.3)
POTASSIUM SERPL-MCNC: 5.5 MMOL/L — HIGH (ref 3.5–5.3)
POTASSIUM SERPL-MCNC: 5.6 MMOL/L — HIGH (ref 3.5–5.3)
POTASSIUM SERPL-SCNC: 5 MMOL/L — SIGNIFICANT CHANGE UP (ref 3.5–5.3)
POTASSIUM SERPL-SCNC: 5.5 MMOL/L — HIGH (ref 3.5–5.3)
POTASSIUM SERPL-SCNC: 5.6 MMOL/L — HIGH (ref 3.5–5.3)
PROT SERPL-MCNC: 6.6 G/DL — SIGNIFICANT CHANGE UP (ref 6–8.3)
RBC # BLD: 3.21 M/UL — LOW (ref 3.8–5.2)
RBC # BLD: 3.34 M/UL — LOW (ref 3.8–5.2)
RBC # FLD: 17.6 % — HIGH (ref 10.3–14.5)
RBC # FLD: 17.7 % — HIGH (ref 10.3–14.5)
RH IG SCN BLD-IMP: POSITIVE — SIGNIFICANT CHANGE UP
SODIUM SERPL-SCNC: 132 MMOL/L — LOW (ref 135–145)
SODIUM SERPL-SCNC: 135 MMOL/L — SIGNIFICANT CHANGE UP (ref 135–145)
SODIUM SERPL-SCNC: 136 MMOL/L — SIGNIFICANT CHANGE UP (ref 135–145)
WBC # BLD: 8.35 K/UL — SIGNIFICANT CHANGE UP (ref 3.8–10.5)
WBC # BLD: 9.74 K/UL — SIGNIFICANT CHANGE UP (ref 3.8–10.5)
WBC # FLD AUTO: 8.35 K/UL — SIGNIFICANT CHANGE UP (ref 3.8–10.5)
WBC # FLD AUTO: 9.74 K/UL — SIGNIFICANT CHANGE UP (ref 3.8–10.5)

## 2023-01-27 PROCEDURE — 99233 SBSQ HOSP IP/OBS HIGH 50: CPT | Mod: GC

## 2023-01-27 PROCEDURE — 99222 1ST HOSP IP/OBS MODERATE 55: CPT

## 2023-01-27 RX ORDER — CALCIUM GLUCONATE 100 MG/ML
1 VIAL (ML) INTRAVENOUS ONCE
Refills: 0 | Status: COMPLETED | OUTPATIENT
Start: 2023-01-27 | End: 2023-01-27

## 2023-01-27 RX ORDER — MORPHINE SULFATE 50 MG/1
2 CAPSULE, EXTENDED RELEASE ORAL ONCE
Refills: 0 | Status: DISCONTINUED | OUTPATIENT
Start: 2023-01-27 | End: 2023-01-27

## 2023-01-27 RX ORDER — DEXTROSE 50 % IN WATER 50 %
25 SYRINGE (ML) INTRAVENOUS ONCE
Refills: 0 | Status: COMPLETED | OUTPATIENT
Start: 2023-01-27 | End: 2023-01-27

## 2023-01-27 RX ORDER — HYDROMORPHONE HYDROCHLORIDE 2 MG/ML
0.5 INJECTION INTRAMUSCULAR; INTRAVENOUS; SUBCUTANEOUS ONCE
Refills: 0 | Status: DISCONTINUED | OUTPATIENT
Start: 2023-01-27 | End: 2023-01-27

## 2023-01-27 RX ORDER — CALCIUM GLUCONATE 100 MG/ML
2 VIAL (ML) INTRAVENOUS ONCE
Refills: 0 | Status: DISCONTINUED | OUTPATIENT
Start: 2023-01-27 | End: 2023-01-27

## 2023-01-27 RX ORDER — INSULIN HUMAN 100 [IU]/ML
5 INJECTION, SOLUTION SUBCUTANEOUS ONCE
Refills: 0 | Status: COMPLETED | OUTPATIENT
Start: 2023-01-27 | End: 2023-01-27

## 2023-01-27 RX ORDER — HYDROMORPHONE HYDROCHLORIDE 2 MG/ML
0.5 INJECTION INTRAMUSCULAR; INTRAVENOUS; SUBCUTANEOUS EVERY 6 HOURS
Refills: 0 | Status: DISCONTINUED | OUTPATIENT
Start: 2023-01-27 | End: 2023-01-28

## 2023-01-27 RX ORDER — SODIUM ZIRCONIUM CYCLOSILICATE 10 G/10G
10 POWDER, FOR SUSPENSION ORAL ONCE
Refills: 0 | Status: COMPLETED | OUTPATIENT
Start: 2023-01-27 | End: 2023-01-27

## 2023-01-27 RX ORDER — ONDANSETRON 8 MG/1
4 TABLET, FILM COATED ORAL EVERY 8 HOURS
Refills: 0 | Status: DISCONTINUED | OUTPATIENT
Start: 2023-01-27 | End: 2023-02-20

## 2023-01-27 RX ADMIN — ONDANSETRON 4 MILLIGRAM(S): 8 TABLET, FILM COATED ORAL at 22:11

## 2023-01-27 RX ADMIN — SPIRONOLACTONE 25 MILLIGRAM(S): 25 TABLET, FILM COATED ORAL at 05:21

## 2023-01-27 RX ADMIN — AMPICILLIN SODIUM AND SULBACTAM SODIUM 200 GRAM(S): 250; 125 INJECTION, POWDER, FOR SUSPENSION INTRAMUSCULAR; INTRAVENOUS at 05:22

## 2023-01-27 RX ADMIN — ATORVASTATIN CALCIUM 80 MILLIGRAM(S): 80 TABLET, FILM COATED ORAL at 22:15

## 2023-01-27 RX ADMIN — SODIUM ZIRCONIUM CYCLOSILICATE 10 GRAM(S): 10 POWDER, FOR SUSPENSION ORAL at 18:29

## 2023-01-27 RX ADMIN — Medication 81 MILLIGRAM(S): at 18:15

## 2023-01-27 RX ADMIN — INSULIN HUMAN 5 UNIT(S): 100 INJECTION, SOLUTION SUBCUTANEOUS at 18:30

## 2023-01-27 RX ADMIN — Medication 650 MILLIGRAM(S): at 20:59

## 2023-01-27 RX ADMIN — AMPICILLIN SODIUM AND SULBACTAM SODIUM 200 GRAM(S): 250; 125 INJECTION, POWDER, FOR SUSPENSION INTRAMUSCULAR; INTRAVENOUS at 12:28

## 2023-01-27 RX ADMIN — CARVEDILOL PHOSPHATE 25 MILLIGRAM(S): 80 CAPSULE, EXTENDED RELEASE ORAL at 05:21

## 2023-01-27 RX ADMIN — Medication 50 GRAM(S): at 18:34

## 2023-01-27 RX ADMIN — Medication 40 MILLIGRAM(S): at 05:23

## 2023-01-27 RX ADMIN — Medication 650 MILLIGRAM(S): at 00:47

## 2023-01-27 RX ADMIN — MORPHINE SULFATE 2 MILLIGRAM(S): 50 CAPSULE, EXTENDED RELEASE ORAL at 03:47

## 2023-01-27 RX ADMIN — HYDROMORPHONE HYDROCHLORIDE 0.5 MILLIGRAM(S): 2 INJECTION INTRAMUSCULAR; INTRAVENOUS; SUBCUTANEOUS at 18:14

## 2023-01-27 RX ADMIN — CARVEDILOL PHOSPHATE 25 MILLIGRAM(S): 80 CAPSULE, EXTENDED RELEASE ORAL at 18:15

## 2023-01-27 RX ADMIN — HYDROMORPHONE HYDROCHLORIDE 0.5 MILLIGRAM(S): 2 INJECTION INTRAMUSCULAR; INTRAVENOUS; SUBCUTANEOUS at 09:38

## 2023-01-27 RX ADMIN — HYDROMORPHONE HYDROCHLORIDE 0.5 MILLIGRAM(S): 2 INJECTION INTRAMUSCULAR; INTRAVENOUS; SUBCUTANEOUS at 22:27

## 2023-01-27 RX ADMIN — HYDROMORPHONE HYDROCHLORIDE 0.5 MILLIGRAM(S): 2 INJECTION INTRAMUSCULAR; INTRAVENOUS; SUBCUTANEOUS at 09:18

## 2023-01-27 RX ADMIN — HYDROMORPHONE HYDROCHLORIDE 0.5 MILLIGRAM(S): 2 INJECTION INTRAMUSCULAR; INTRAVENOUS; SUBCUTANEOUS at 22:12

## 2023-01-27 RX ADMIN — SACUBITRIL AND VALSARTAN 1 TABLET(S): 24; 26 TABLET, FILM COATED ORAL at 09:19

## 2023-01-27 RX ADMIN — Medication 1: at 08:44

## 2023-01-27 RX ADMIN — AMPICILLIN SODIUM AND SULBACTAM SODIUM 200 GRAM(S): 250; 125 INJECTION, POWDER, FOR SUSPENSION INTRAMUSCULAR; INTRAVENOUS at 23:59

## 2023-01-27 RX ADMIN — AMPICILLIN SODIUM AND SULBACTAM SODIUM 200 GRAM(S): 250; 125 INJECTION, POWDER, FOR SUSPENSION INTRAMUSCULAR; INTRAVENOUS at 18:15

## 2023-01-27 RX ADMIN — ONDANSETRON 4 MILLIGRAM(S): 8 TABLET, FILM COATED ORAL at 12:26

## 2023-01-27 RX ADMIN — Medication 25 MILLILITER(S): at 18:27

## 2023-01-27 RX ADMIN — Medication 50 GRAM(S): at 20:24

## 2023-01-27 RX ADMIN — PANTOPRAZOLE SODIUM 40 MILLIGRAM(S): 20 TABLET, DELAYED RELEASE ORAL at 05:21

## 2023-01-27 RX ADMIN — MORPHINE SULFATE 2 MILLIGRAM(S): 50 CAPSULE, EXTENDED RELEASE ORAL at 04:02

## 2023-01-27 NOTE — PROGRESS NOTE ADULT - PROBLEM SELECTOR PLAN 6
DVT PPx: lovenox 40mg   Diet: CC   Dispo: pending amputation surgery     Code Status: Full Code On metformin 500mg BID, glimepiride 4mg, lantus 8U at bedtime   A1c 7.1    - started 5U lantus + ROOSEVELT

## 2023-01-27 NOTE — CONSULT NOTE ADULT - SUBJECTIVE AND OBJECTIVE BOX
Patient seen and evaluated at bedside    Chief Complaint:    HPI:  Pt is 42-year-old female past medical history coronary artery disease status post CABG x4 in 2018, CHF s/p ICD placement (interrogated ), DM, PAD s/p 3rd toe amputation who presents with left 2nd toe pain. Patient reports that she was discharged from [facility] post amputation beginning of 2022. Reports that the trigger was a cart running over her toe, did not have any issues with her 2nd toe at the time. She was put on IV antibiotics for a month post-discharge and completed 1/3. Has not followed up with her podiatrist/vascular doctor since the surgery Patient reports that she has been walking without issues until 5 days ago when she noticed blackening of the toe as well as severe pain. Notices that it is worse when she lays flat or walks around, had been taking tylenol every few hours without relief. Reports feeling chills occasionally but denies fevers at home. Reports poor PO intake due to loss of appetite every since she was discharged. Denies chest pain, palpitations, worsening SOB, diarrhea, dysuria.     ED course:   130/72, HR 72, afebrile, 100% on RA   S/p 1 dose vanc/zosyn  (2023 23:16)    43 yo F w/ PMH CAD s/p CABG, HFrEF, ICD, T2DM, PAD who presents with left toe pain. She had an injury more than a month ago to her toe which caused an infection. Was treated in December at [facility] w/ abx. However, she continues to have pain of her left toe especially when walking, but in the past week, she notes pain at rest as well. She also noted discloration of the toe as well. She denies any chest pain, SOB, palpitations, NV, lightheadedness, dizziness. She uses 2 pillows usually to sleep at night. Has noted orthopnea since admission.     EKG shows sinus tachy with LVH and T wave inversions in precordial leads       PMHx:   MI (myocardial infarction)    Type 2 diabetes mellitus    Hypertension    Hyperlipidemia    Coronary artery disease    Cerebrovascular accident (CVA)    H/O congestive heart disease    History of cardiomyopathy    Thrombus        PSHx:   No significant past surgical history    S/P     H/O tubal ligation    Coronary artery disease        Allergies:  No Known Allergies      Home Meds:    Current Medications:   acetaminophen     Tablet .. 650 milliGRAM(s) Oral every 6 hours PRN  ampicillin/sulbactam  IVPB 3 Gram(s) IV Intermittent every 6 hours  aspirin enteric coated 81 milliGRAM(s) Oral daily  atorvastatin 80 milliGRAM(s) Oral at bedtime  carvedilol 25 milliGRAM(s) Oral every 12 hours  dextrose 5%. 1000 milliLiter(s) IV Continuous <Continuous>  dextrose 5%. 1000 milliLiter(s) IV Continuous <Continuous>  dextrose 50% Injectable 25 Gram(s) IV Push once  dextrose 50% Injectable 12.5 Gram(s) IV Push once  dextrose 50% Injectable 25 Gram(s) IV Push once  dextrose Oral Gel 15 Gram(s) Oral once PRN  enoxaparin Injectable 40 milliGRAM(s) SubCutaneous every 24 hours  furosemide   Injectable 40 milliGRAM(s) IV Push two times a day  glucagon  Injectable 1 milliGRAM(s) IntraMuscular once  influenza   Vaccine 0.5 milliLiter(s) IntraMuscular once  insulin glargine Injectable (LANTUS) 5 Unit(s) SubCutaneous at bedtime  insulin lispro (ADMELOG) corrective regimen sliding scale   SubCutaneous three times a day before meals  insulin lispro (ADMELOG) corrective regimen sliding scale   SubCutaneous at bedtime  magnesium sulfate  IVPB 2 Gram(s) IV Intermittent once  pantoprazole    Tablet 40 milliGRAM(s) Oral before breakfast  sacubitril 24 mG/valsartan 26 mG 1 Tablet(s) Oral <User Schedule>  spironolactone 25 milliGRAM(s) Oral daily      FAMILY HISTORY:  Family history of heart disease (Father)        Social History:  Smoking History:  Alcohol Use:  Drug Use:    REVIEW OF SYSTEMS:  As above       Physical Exam:  T(F): 97.6 (), Max: 97.9 ()  HR: 77 () (72 - 80)  BP: 117/67 () (115/71 - 126/76)  RR: 17 ()  SpO2: 100% ()  GENERAL: No acute distress   ENT: No JVD noted   CHEST/LUNG: Decreased R breath sounds   HEART: Regular rate and rhythm; No murmurs, rubs, or gallops  EXTREMITIES: LE edema   PSYCH: Nl behavior, nl affect  NEUROLOGY: AAOx3, non-focal   SKIN: Normal color, No rashes or lesions  LINES:    Cardiovascular Diagnostic Testing:    ECG: Personally reviewed:    Echo:    1. Tethered mitral valve leaflets with normal opening.  2. Normal trileaflet aortic valve.  3. Normal left ventricular internal dimensions and wall  thicknesses.  4. Endocardial visualization enhanced with intravenous  injection of echo contrast (Definity). Severe global left  ventricular systolic dysfunction.  5. Increased E/e'  is consistent with elevated left  ventricular filling pressure.  6. A device wire is noted in the right heart.  7. Right ventricular enlargement with decreased right  ventricular systolic function.    Stress Testing:    Cath:    Imaging:    CXR: Personally reviewed    Labs: Personally reviewed                        8.0    9.45  )-----------( 601      ( 2023 07:30 )             27.5         135  |  101  |  25<H>  ----------------------------<  91  3.9   |  25  |  1.20    Ca    8.3<L>      2023 07:30  Phos  3.5       Mg     1.60         TPro  6.9  /  Alb  2.0<L>  /  TBili  0.2  /  DBili  x   /  AST  21  /  ALT  10  /  AlkPhos  131<H>      PT/INR - ( 2023 07:30 )   PT: 16.7 sec;   INR: 1.43 ratio         PTT - ( 2023 07:30 )  PTT:31.2 sec    Total Cholesterol: 112  LDL: --  HDL: 20  T

## 2023-01-27 NOTE — PROGRESS NOTE ADULT - PROBLEM SELECTOR PLAN 2
CT angio with moderate-marked bilateral lower extremity peripheral vascular disease, calcifications of AAA   Per vascular, no concern for acute limb ischemia, no surgical intervention   unremarkable lipid panel   repeat lactate 1.9 from 2.9    - c/w atorvastatin   - c/w ASA reduced breath sound on R lung field  CXR 1/26: moderate R sided pleural effusion.   on RA, stable, no complaints of sob    could be secondary to CHF     - f/u CT chest non con  - possible IR consult next week to tap reduced breath sound on R lung field  CXR 1/26: moderate R sided pleural effusion.   on RA, stable, no complaints of sob    could be secondary to CHF     - f/u CT chest non con  - f/u abdominal US   - possible IR consult next week to tap

## 2023-01-27 NOTE — CONSULT NOTE ADULT - ASSESSMENT
43 yo F w/ PMH CAD s/p CABG, HFrEF, ICD, T2DM, PAD who presents with left toe pain found to have left toe gangrene plan for L foot TMA. Cardiology consulted for pre-op risk stratification.     #L foot TMA   Cardiovascular Risk Stratification - RCRI =  3  1. History of ischemic heart disease: 1  2. History of congestive heart failure: 1  3. History of cerebrovascular disease (stroke or transient ischemic attack):   4. History of diabetes requiring preoperative insulin use: 1  5. Chronic kidney disease (creatinine > 2 mg/dL):   6. Undergoing suprainguinal vascular, intraperitoneal, or intrathoracic surgery:   0 predictors = 0.4%, 1 predictor = 0.9%, 2 predictors = 6.6%, =3 predictors = >11%    - Overall this patient is as >11% risk (for cardiac death, nonfatal myocardial infarction, and nonfatal cardiac arrest perioperatively for this moderate risk procedure).    No need for further cardiac testing, METS >4. Patient endorsing orthopnea and abd distension. Notable R pleural effusion on CT/CXRAY. Recommend to diurese patient with lasix IV before procedure and evaluate if patient can lie flat without hypoxia/orthopnea.     #CAD  #Abd aortic plaque   -C/w aspirin, statin     #HFrEF   -C/w IV lasix   -C/w entresto, carvedilol, spirinolactone  43 yo F w/ PMH CAD s/p CABG, HFrEF, ICD, T2DM, PAD who presents with left toe pain found to have left toe gangrene plan for L foot TMA. Cardiology consulted for pre-op risk stratification.     #L foot TMA   Cardiovascular Risk Stratification - RCRI =  3  1. History of ischemic heart disease: 1  2. History of congestive heart failure: 1  3. History of cerebrovascular disease (stroke or transient ischemic attack):   4. History of diabetes requiring preoperative insulin use: 1  5. Chronic kidney disease (creatinine > 2 mg/dL):   6. Undergoing suprainguinal vascular, intraperitoneal, or intrathoracic surgery:   0 predictors = 0.4%, 1 predictor = 0.9%, 2 predictors = 6.6%, =3 predictors = >11%    - Overall this patient is as >11% risk (for cardiac death, nonfatal myocardial infarction, and nonfatal cardiac arrest perioperatively for this moderate risk procedure).    No need for further cardiac testing, METS >4. Patient endorsing orthopnea and abd distension. Notable R pleural effusion on CT/CXRAY. Recommend to diurese patient with lasix IV before procedure and evaluate if patient can lie flat without hypoxia/orthopnea.     #CAD  #Abd aortic plaque   -C/w aspirin, statin     #HFrEF   -Can hold lasix for today given elevated cr   -C/w entresto, carvedilol, spirinolactone

## 2023-01-27 NOTE — PROGRESS NOTE ADULT - ASSESSMENT
42F w PMHx HTN, HLD, CAD w CABG, LV thrombus previously on coumadin now off, CVA w/o residual deficits, CHF, ICD, DM2 who presents with left foot pain. Vascular surgery consulted in the setting of possible limb ischemia. History and physical exam consistent with likely chronic limb ischemia, particularly in the setting of recent non healing toe amputation @ OSH. Coolness to touch is concerning, although AT signal is present and proximal pulses palpable, therefore low suspicion for acute limb ischemia. L 2nd toe appears non viable and will likely require amputation, although await podiatry recs.     Recommendations:   - Continue IV abx   - Appreciate Podiatry recommendations: plan for L foot TMA   - CTA showing patient bilateral aortic, iliac, femoral and popliteal arteries; occluded distal R peroneal artery at the level of the ankle. L proximal PT and peroneal occlusions, with reconstitution at level of ankle.   - SEBASTIÁN/PVRs reviewed  - Planning for add-on today for left leg angio pending cath lab availability   - Please keep patient NPO, type+screen pending     C Team Surgery  #[id number]

## 2023-01-27 NOTE — PROGRESS NOTE ADULT - PROBLEM SELECTOR PLAN 7
Does not appear to be acutely decompensated on exam  Last TTE from 3/21 with EF 27%, s/p ICD placement and interrogated in 2022   repeat TTE: 21%. Severe LV systolic dysfunction. decreased RV function     - c/w coreg 25 BID  - c/w entresto 24/26  - c/w spironolactone 25mg  - d/c home torsemide -> IV lasix per cardiology preop Does not appear to be acutely decompensated on exam  Last TTE from 3/21 with EF 27%, s/p ICD placement and interrogated in 2022   repeat TTE: 21%. Severe LV systolic dysfunction. decreased RV function     - c/w coreg 25 BID  - c/w entresto 24/26  -> hold given BOB   - c/w spironolactone 25mg -> hold given BOB   - d/c home torsemide -> IV lasix per cardiology preop

## 2023-01-27 NOTE — PROGRESS NOTE ADULT - PROBLEM SELECTOR PLAN 4
On metformin 500mg BID, glimepiride 4mg, lantus 8U at bedtime   A1c 7.1    - started 5U lantus + ROOSEVELT CT angio with moderate-marked bilateral lower extremity peripheral vascular disease, calcifications of AAA   Per vascular, no concern for acute limb ischemia, no surgical intervention   unremarkable lipid panel   repeat lactate 1.9 from 2.9    - c/w atorvastatin   - c/w ASA

## 2023-01-27 NOTE — PROGRESS NOTE ADULT - PROBLEM SELECTOR PLAN 3
Hb today 7.8 from 8.7 on admission   no signs/sx of bleeding, likely related to inflammation   trend H/H  outpt f/u with PMD for further w/u and age appropriate cancer screening Cr 1.20 - Cr 1.20 - 2.02    could be from contrast from CT angiogram on 1/25 or IV furosemide yesterday.     - hold further diuresis today per cards  - urine lytes   - repeat BMP in the afternoon

## 2023-01-27 NOTE — PROGRESS NOTE ADULT - PROBLEM SELECTOR PLAN 5
Does not appear to be acutely decompensated on exam  Last TTE from 3/21 with EF 27%, s/p ICD placement and interrogated in 2022   repeat TTE: 21%. Severe LV systolic dysfunction. decreased RV function     - c/w coreg 25 BID  - c/w entresto 24/26  - c/w spironolactone 25mg  - d/c home torsemide -> IV lasix per cardiology preop Hb today 7.8 from 8.7 on admission   no signs/sx of bleeding, likely related to inflammation   trend H/H  outpt f/u with PMD for further w/u and age appropriate cancer screening Hb today 7.8 from 8.7 on admission   no signs/sx of bleeding, likely related to inflammation   pt notes brown urine   trend H/H  outpt f/u with PMD for further w/u and age appropriate cancer screening    - urinalysis  - repeat CBC in the afternoon

## 2023-01-27 NOTE — PROGRESS NOTE ADULT - PROBLEM SELECTOR PLAN 1
Necrosis of 2nd digit i/s/o PAD, concerning for dry gangrene, elevated ESR/CRP. No signs of active infection   X-ray foot without gas or osseous involvement, limited to soft tissue   Podiatry and vascular recs appreciated  CT Angio prelim read with occluded left posterior tibial and peroneal arteries  No DVT on venous duplex    - c/w IV unasyn   - f/u SEBASTIÁN/PVRs  - plans for L transmetatarsal amputation pending further vascular recs on date Necrosis of 2nd digit i/s/o PAD, concerning for dry gangrene, elevated ESR/CRP. No signs of active infection   X-ray foot without gas or osseous involvement, limited to soft tissue   Podiatry and vascular recs appreciated  CT Angio prelim read with occluded left posterior tibial and peroneal arteries  No DVT on venous duplex    - c/w IV unasyn   - f/u SEBASTIÁN/PVRs  - possible angiogram of L leg with vascular team today   - plans for L transmetatarsal amputation pending further vascular recs on date  - cardiology consulted for medical clearance; >11% risk of postop complication.      s/p IV 40mg furosemide 1/26, but with new BOB with Cr 2.02 from 1.20 day prior, cardiology recs to hold diuresis or IV fluid. Necrosis of 2nd digit i/s/o PAD, concerning for dry gangrene, elevated ESR/CRP. No signs of active infection   X-ray foot without gas or osseous involvement, limited to soft tissue   Podiatry and vascular recs appreciated  CT Angio prelim read with occluded left posterior tibial and peroneal arteries  No DVT on venous duplex    - c/w IV unasyn   - pain management with IV 0.5mg dilaudid PRN for severe pain and acetaminophen for mild-mod pain   - f/u SEBASTIÁN/PVRs  - possible angiogram of L leg with vascular team today   - plans for L transmetatarsal amputation pending further vascular recs on date  - cardiology consulted for medical clearance; >11% risk of postop complication.      s/p IV 40mg furosemide 1/26, but with new BOB with Cr 2.02 from 1.20 day prior, cardiology recs to hold diuresis or IV fluid.

## 2023-01-27 NOTE — PROGRESS NOTE ADULT - SUBJECTIVE AND OBJECTIVE BOX
SURGERY  Pager: #[phone]    INTERVAL EVENTS/SUBJECTIVE: No acute events overnight.     ______________________________________________  OBJECTIVE:   T(C): 36.4 (01-27-23 @ 04:58), Max: 36.6 (01-26-23 @ 21:13)  HR: 77 (01-27-23 @ 04:58) (72 - 80)  BP: 117/67 (01-27-23 @ 04:58) (115/71 - 126/76)  RR: 17 (01-27-23 @ 04:58) (16 - 17)  SpO2: 100% (01-27-23 @ 04:58) (98% - 100%)  Wt(kg): --  CAPILLARY BLOOD GLUCOSE      POCT Blood Glucose.: 194 mg/dL (26 Jan 2023 21:59)  POCT Blood Glucose.: 114 mg/dL (26 Jan 2023 18:00)  POCT Blood Glucose.: 93 mg/dL (26 Jan 2023 12:40)  POCT Blood Glucose.: 78 mg/dL (26 Jan 2023 08:38)  POCT Blood Glucose.: 71 mg/dL (26 Jan 2023 08:37)    I&O's Detail      Physical exam:  Gen: resting in bed comfortably in NAD  Chest: no increased WOB, regular inspiratory effort   Abdomen: Soft, nontender, nondistended with no rebound tenderness or guarding. Incisions clean, dry, intact, with no erythema.   Vascular: B/l Upper extremities warm, R lower extremity warm and well perfused, Left lower extremity with distal foot cool to touch, some mottling present  -Pulses:   -Right Lower: Fem palpable, Pop palpable, DP/PT dopplerable  -Left Lower: Fem palpable, pop palpable, AT dopperlable, PT/DP no signals   Skin: L 2nd toe gangrene, open wound at prior 3rd toe, non draining  Musculoskeletal: All 4 extremities moving spontaneously, plantar/dorsiflexion intact bilaterally, toes wiggle b/l, L  to light touch, sensation diminished over distal L dorsal/plantar foot, b/l LE edema     ______________________________________________  LABS:  CBC Full  -  ( 27 Jan 2023 07:25 )  WBC Count : 8.35 K/uL  RBC Count : 3.21 M/uL  Hemoglobin : 7.8 g/dL  Hematocrit : 26.8 %  Platelet Count - Automated : 588 K/uL  Mean Cell Volume : 83.5 fL  Mean Cell Hemoglobin : 24.3 pg  Mean Cell Hemoglobin Concentration : 29.1 gm/dL  Auto Neutrophil # : 5.74 K/uL  Auto Lymphocyte # : 1.40 K/uL  Auto Monocyte # : 0.95 K/uL  Auto Eosinophil # : 0.14 K/uL  Auto Basophil # : 0.08 K/uL  Auto Neutrophil % : 68.6 %  Auto Lymphocyte % : 16.8 %  Auto Monocyte % : 11.4 %  Auto Eosinophil % : 1.7 %  Auto Basophil % : 1.0 %    01-26    135  |  101  |  25<H>  ----------------------------<  91  3.9   |  25  |  1.20    Ca    8.3<L>      26 Jan 2023 07:30  Phos  3.5     01-26  Mg     1.70     01-27    TPro  6.9  /  Alb  2.0<L>  /  TBili  0.2  /  DBili  x   /  AST  21  /  ALT  10  /  AlkPhos  131<H>  01-26    _____________________________________________  RADIOLOGY:

## 2023-01-27 NOTE — CONSULT NOTE ADULT - ATTENDING COMMENTS
There are no active cardiac conditions. There is no evidence of ACS, heart failure, or obstructive valvular heart disease. The patient may safely proceed with the planned procedure, including the use of sedation or GET as clinically indicated.  No further cardiac testing is required.

## 2023-01-27 NOTE — PROGRESS NOTE ADULT - SUBJECTIVE AND OBJECTIVE BOX
PROGRESS NOTE:   Authored by Dr. Edwin Joseph    Patient is a 42y old  Female who presents with a chief complaint of Toe pain (27 Jan 2023 07:39)      SUBJECTIVE / OVERNIGHT EVENTS: No overnight events.     ADDITIONAL REVIEW OF SYSTEMS:  No fever      MEDICATIONS  (STANDING):  ampicillin/sulbactam  IVPB 3 Gram(s) IV Intermittent every 6 hours  aspirin enteric coated 81 milliGRAM(s) Oral daily  atorvastatin 80 milliGRAM(s) Oral at bedtime  carvedilol 25 milliGRAM(s) Oral every 12 hours  dextrose 5%. 1000 milliLiter(s) (100 mL/Hr) IV Continuous <Continuous>  dextrose 5%. 1000 milliLiter(s) (50 mL/Hr) IV Continuous <Continuous>  dextrose 50% Injectable 25 Gram(s) IV Push once  dextrose 50% Injectable 12.5 Gram(s) IV Push once  dextrose 50% Injectable 25 Gram(s) IV Push once  enoxaparin Injectable 40 milliGRAM(s) SubCutaneous every 24 hours  furosemide   Injectable 40 milliGRAM(s) IV Push two times a day  glucagon  Injectable 1 milliGRAM(s) IntraMuscular once  influenza   Vaccine 0.5 milliLiter(s) IntraMuscular once  insulin glargine Injectable (LANTUS) 5 Unit(s) SubCutaneous at bedtime  insulin lispro (ADMELOG) corrective regimen sliding scale   SubCutaneous three times a day before meals  insulin lispro (ADMELOG) corrective regimen sliding scale   SubCutaneous at bedtime  magnesium sulfate  IVPB 2 Gram(s) IV Intermittent once  pantoprazole    Tablet 40 milliGRAM(s) Oral before breakfast  sacubitril 24 mG/valsartan 26 mG 1 Tablet(s) Oral <User Schedule>  spironolactone 25 milliGRAM(s) Oral daily    MEDICATIONS  (PRN):  acetaminophen     Tablet .. 650 milliGRAM(s) Oral every 6 hours PRN Mild Pain (1 - 3), Moderate Pain (4 - 6)  dextrose Oral Gel 15 Gram(s) Oral once PRN Blood Glucose LESS THAN 70 milliGRAM(s)/deciliter      CAPILLARY BLOOD GLUCOSE      POCT Blood Glucose.: 194 mg/dL (26 Jan 2023 21:59)  POCT Blood Glucose.: 114 mg/dL (26 Jan 2023 18:00)  POCT Blood Glucose.: 93 mg/dL (26 Jan 2023 12:40)  POCT Blood Glucose.: 78 mg/dL (26 Jan 2023 08:38)  POCT Blood Glucose.: 71 mg/dL (26 Jan 2023 08:37)    I&O's Summary      PHYSICAL EXAM:  Vital Signs Last 24 Hrs  T(C): 36.4 (27 Jan 2023 04:58), Max: 36.6 (26 Jan 2023 21:13)  T(F): 97.6 (27 Jan 2023 04:58), Max: 97.9 (26 Jan 2023 21:13)  HR: 77 (27 Jan 2023 04:58) (72 - 80)  BP: 117/67 (27 Jan 2023 04:58) (115/71 - 126/76)  BP(mean): --  RR: 17 (27 Jan 2023 04:58) (16 - 17)  SpO2: 100% (27 Jan 2023 04:58) (98% - 100%)    Parameters below as of 27 Jan 2023 04:58  Patient On (Oxygen Delivery Method): room air        GENERAL: NAD, lying comfortably in bed  HEAD: Atraumatic, normocephalic  EYES: EOMI b/l, conjunctiva and sclera clear  NECK: Supple   RESPIRATORY: Normal respiratory effort; lungs are clear to auscultation bilaterally  CARDIOVASCULAR: Regular rate and rhythm, normal S1 and S2, no murmur/rub/gallop; No lower extremity edema  ABDOMEN: Nontender, normoactive bowel sounds, no rebound/guarding   MUSCULOSKELETAL: no joint swelling or tenderness to palpation  NEURO: Non focal   SKIN: eschar around L 2nd toe.   PSYCH: A+O to person, place, and time; affect appropriate    LABS:                          7.8    8.35  )-----------( 588      ( 27 Jan 2023 07:25 )             26.8     01-26    135  |  101  |  25<H>  ----------------------------<  91  3.9   |  25  |  1.20    Ca    8.3<L>      26 Jan 2023 07:30  Phos  3.5     01-26  Mg     1.70     01-27    TPro  6.9  /  Alb  2.0<L>  /  TBili  0.2  /  DBili  x   /  AST  21  /  ALT  10  /  AlkPhos  131<H>  01-26    PT/INR - ( 26 Jan 2023 07:30 )   PT: 16.7 sec;   INR: 1.43 ratio         PTT - ( 26 Jan 2023 07:30 )  PTT:31.2 sec              RADIOLOGY:    Consulted note reviewed  Reviewed Imaging personally   PROGRESS NOTE:   Authored by Dr. Edwin Joseph    Patient is a 42y old  Female who presents with a chief complaint of Toe pain (27 Jan 2023 07:39)      SUBJECTIVE / OVERNIGHT EVENTS: No overnight events. Patient received 2mg IV morphine due to foot pain overnight. This morning patient in 7.5/10 pain. Denies any other active complaints, including sob, CP, abdominal pain, N/V.     ADDITIONAL REVIEW OF SYSTEMS:  No fever  No SOB  No abd pain  No N/V  + toe pain  + RLE edema  + brown urine  No dysuria       MEDICATIONS  (STANDING):  ampicillin/sulbactam  IVPB 3 Gram(s) IV Intermittent every 6 hours  aspirin enteric coated 81 milliGRAM(s) Oral daily  atorvastatin 80 milliGRAM(s) Oral at bedtime  carvedilol 25 milliGRAM(s) Oral every 12 hours  dextrose 5%. 1000 milliLiter(s) (100 mL/Hr) IV Continuous <Continuous>  dextrose 5%. 1000 milliLiter(s) (50 mL/Hr) IV Continuous <Continuous>  dextrose 50% Injectable 25 Gram(s) IV Push once  dextrose 50% Injectable 12.5 Gram(s) IV Push once  dextrose 50% Injectable 25 Gram(s) IV Push once  enoxaparin Injectable 40 milliGRAM(s) SubCutaneous every 24 hours  furosemide   Injectable 40 milliGRAM(s) IV Push two times a day  glucagon  Injectable 1 milliGRAM(s) IntraMuscular once  influenza   Vaccine 0.5 milliLiter(s) IntraMuscular once  insulin glargine Injectable (LANTUS) 5 Unit(s) SubCutaneous at bedtime  insulin lispro (ADMELOG) corrective regimen sliding scale   SubCutaneous three times a day before meals  insulin lispro (ADMELOG) corrective regimen sliding scale   SubCutaneous at bedtime  magnesium sulfate  IVPB 2 Gram(s) IV Intermittent once  pantoprazole    Tablet 40 milliGRAM(s) Oral before breakfast  sacubitril 24 mG/valsartan 26 mG 1 Tablet(s) Oral <User Schedule>  spironolactone 25 milliGRAM(s) Oral daily    MEDICATIONS  (PRN):  acetaminophen     Tablet .. 650 milliGRAM(s) Oral every 6 hours PRN Mild Pain (1 - 3), Moderate Pain (4 - 6)  dextrose Oral Gel 15 Gram(s) Oral once PRN Blood Glucose LESS THAN 70 milliGRAM(s)/deciliter      CAPILLARY BLOOD GLUCOSE      POCT Blood Glucose.: 194 mg/dL (26 Jan 2023 21:59)  POCT Blood Glucose.: 114 mg/dL (26 Jan 2023 18:00)  POCT Blood Glucose.: 93 mg/dL (26 Jan 2023 12:40)  POCT Blood Glucose.: 78 mg/dL (26 Jan 2023 08:38)  POCT Blood Glucose.: 71 mg/dL (26 Jan 2023 08:37)    I&O's Summary      PHYSICAL EXAM:  Vital Signs Last 24 Hrs  T(C): 36.4 (27 Jan 2023 04:58), Max: 36.6 (26 Jan 2023 21:13)  T(F): 97.6 (27 Jan 2023 04:58), Max: 97.9 (26 Jan 2023 21:13)  HR: 77 (27 Jan 2023 04:58) (72 - 80)  BP: 117/67 (27 Jan 2023 04:58) (115/71 - 126/76)  BP(mean): --  RR: 17 (27 Jan 2023 04:58) (16 - 17)  SpO2: 100% (27 Jan 2023 04:58) (98% - 100%)    Parameters below as of 27 Jan 2023 04:58  Patient On (Oxygen Delivery Method): room air        GENERAL: NAD, lying in bed grimacing in pain   HEAD: Atraumatic, normocephalic  EYES: EOMI b/l, conjunctiva and sclera clear  NECK: Supple   RESPIRATORY: Normal respiratory effort; reduced breath sound RLL. otherwise clear to auscultation without wheezes, crackles or rhonchi   CARDIOVASCULAR: Regular rate and rhythm, normal S1 and S2, no murmur/rub/gallop; 2+pitting edema RLE, trace pitting edema LLE   ABDOMEN: Nontender, normoactive bowel sounds, no rebound/guarding   MUSCULOSKELETAL: no joint swelling or tenderness to palpation, L foot wrapped in dressing.   NEURO: Non focal   SKIN: eschar around L 2nd toe.   PSYCH: A+O to person, place, and time; affect appropriate        LABS:                          7.8    8.35  )-----------( 588      ( 27 Jan 2023 07:25 )             26.8     01-26    135  |  101  |  25<H>  ----------------------------<  91  3.9   |  25  |  1.20    Ca    8.3<L>      26 Jan 2023 07:30  Phos  3.5     01-26  Mg     1.70     01-27    TPro  6.9  /  Alb  2.0<L>  /  TBili  0.2  /  DBili  x   /  AST  21  /  ALT  10  /  AlkPhos  131<H>  01-26    PT/INR - ( 26 Jan 2023 07:30 )   PT: 16.7 sec;   INR: 1.43 ratio         PTT - ( 26 Jan 2023 07:30 )  PTT:31.2 sec              RADIOLOGY:    Consulted note reviewed  Reviewed Imaging personally

## 2023-01-27 NOTE — PROGRESS NOTE ADULT - ATTENDING COMMENTS
42-year-old female w/CAD s/p CABG x4 in 2018, HFrEF (EF 21% on 1/26) s/p ICD placement, IDDM2, PAD s/p 3rd toe amputation who presents with left 2nd toe pain concerning for dry gangrene. Seen by podiatry and vascular, recs appreciated, plans for angiogram with vascular and transmetatarsal amputation with podiatry.     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 X-ray foot without gas or osseous involvement  - Podiatry and vascular recs appreciated  - c/w IV unasyn   - plan for angiogram with vascular prior to L transmetatarsal amputation per podiatry   - TTE on 1/26 with EF  21%, severe global left ventricular systolic dysfunction, Increased E/e'  is consistent with elevated left ventricular filling pressure. Right ventricular enlargement with decreased right ventricular systolic function.  - RCRI >11% risk of adverse cardiac outcome. Patient is at high risk for complication from surgery.    - given significant vasculopathy and increased filling pressures on echo, consult cardiology for risk strat and recs on further optimization PRIOR to surgery. Patient should NOT yet proceed to surgery unless further optimized.     #Peripheral artery disease  - CTA showing occluded distal R peroneal artery at the level of the ankle. L proximal PT and peroneal occlusions   - HOLD on left leg angio. Patient now has an BOB and any further contrast administration may lead to worsening BOB necessitating dialysis    - aspirin/statin     #HFrEF   - TTE on 1/26 with EF  21%, severe global left ventricular systolic dysfunction, Increased E/e'  is consistent with elevated left ventricular filling pressure. Right ventricular enlargement with decreased right ventricular systolic function.  - CXR with right mod pleural effusion, BNP 30k+ (although on entresto)   - c/w coreg 25 BID  - hold IV diuresis given bob, hold home torsemide 20mg   - given BOB: hold home entresto 24/26,  spironolactone 25mg  - strict in/out   - interrogate ICD    - cards recs     #Mod right pleural effusion   - patient may have had it drained at [facility] in the past  - check CT chest for further characterization   - therapeutic and diagnostic tap with IR on Monday   - hold diuresis, but if Cr improves, will diurese further    Discussed with HS 8

## 2023-01-28 DIAGNOSIS — N18.9 CHRONIC KIDNEY DISEASE, UNSPECIFIED: ICD-10-CM

## 2023-01-28 LAB
ALBUMIN SERPL ELPH-MCNC: 2 G/DL — LOW (ref 3.3–5)
ALP SERPL-CCNC: 131 U/L — HIGH (ref 40–120)
ALT FLD-CCNC: 10 U/L — SIGNIFICANT CHANGE UP (ref 4–33)
ANION GAP SERPL CALC-SCNC: 11 MMOL/L — SIGNIFICANT CHANGE UP (ref 7–14)
APPEARANCE UR: ABNORMAL
AST SERPL-CCNC: 17 U/L — SIGNIFICANT CHANGE UP (ref 4–32)
BACTERIA # UR AUTO: ABNORMAL
BASOPHILS # BLD AUTO: 0.06 K/UL — SIGNIFICANT CHANGE UP (ref 0–0.2)
BASOPHILS NFR BLD AUTO: 0.6 % — SIGNIFICANT CHANGE UP (ref 0–2)
BILIRUB SERPL-MCNC: 0.3 MG/DL — SIGNIFICANT CHANGE UP (ref 0.2–1.2)
BILIRUB UR-MCNC: NEGATIVE — SIGNIFICANT CHANGE UP
BLD GP AB SCN SERPL QL: NEGATIVE — SIGNIFICANT CHANGE UP
BUN SERPL-MCNC: 32 MG/DL — HIGH (ref 7–23)
CALCIUM SERPL-MCNC: 8.9 MG/DL — SIGNIFICANT CHANGE UP (ref 8.4–10.5)
CHLORIDE SERPL-SCNC: 100 MMOL/L — SIGNIFICANT CHANGE UP (ref 98–107)
CK SERPL-CCNC: 30 U/L — SIGNIFICANT CHANGE UP (ref 25–170)
CO2 SERPL-SCNC: 23 MMOL/L — SIGNIFICANT CHANGE UP (ref 22–31)
COLOR SPEC: YELLOW — SIGNIFICANT CHANGE UP
COMMENT - URINE: SIGNIFICANT CHANGE UP
CREAT ?TM UR-MCNC: 68 MG/DL — SIGNIFICANT CHANGE UP
CREAT SERPL-MCNC: 2.99 MG/DL — HIGH (ref 0.5–1.3)
DIFF PNL FLD: ABNORMAL
EGFR: 19 ML/MIN/1.73M2 — LOW
EOSINOPHIL # BLD AUTO: 0.17 K/UL — SIGNIFICANT CHANGE UP (ref 0–0.5)
EOSINOPHIL NFR BLD AUTO: 1.6 % — SIGNIFICANT CHANGE UP (ref 0–6)
EPI CELLS # UR: >27 /HPF — HIGH (ref 0–5)
GLUCOSE BLDC GLUCOMTR-MCNC: 105 MG/DL — HIGH (ref 70–99)
GLUCOSE BLDC GLUCOMTR-MCNC: 109 MG/DL — HIGH (ref 70–99)
GLUCOSE BLDC GLUCOMTR-MCNC: 110 MG/DL — HIGH (ref 70–99)
GLUCOSE BLDC GLUCOMTR-MCNC: 133 MG/DL — HIGH (ref 70–99)
GLUCOSE BLDC GLUCOMTR-MCNC: 72 MG/DL — SIGNIFICANT CHANGE UP (ref 70–99)
GLUCOSE SERPL-MCNC: 64 MG/DL — LOW (ref 70–99)
GLUCOSE UR QL: ABNORMAL
GRAN CASTS # UR COMP ASSIST: 3 /LPF — HIGH
HCT VFR BLD CALC: 27.1 % — LOW (ref 34.5–45)
HGB BLD-MCNC: 7.7 G/DL — LOW (ref 11.5–15.5)
HYALINE CASTS # UR AUTO: 7 /LPF — SIGNIFICANT CHANGE UP (ref 0–7)
IANC: 7.66 K/UL — HIGH (ref 1.8–7.4)
IMM GRANULOCYTES NFR BLD AUTO: 0.5 % — SIGNIFICANT CHANGE UP (ref 0–0.9)
KETONES UR-MCNC: ABNORMAL
LEUKOCYTE ESTERASE UR-ACNC: ABNORMAL
LYMPHOCYTES # BLD AUTO: 1.71 K/UL — SIGNIFICANT CHANGE UP (ref 1–3.3)
LYMPHOCYTES # BLD AUTO: 16.1 % — SIGNIFICANT CHANGE UP (ref 13–44)
MAGNESIUM SERPL-MCNC: 1.8 MG/DL — SIGNIFICANT CHANGE UP (ref 1.6–2.6)
MCHC RBC-ENTMCNC: 24.1 PG — LOW (ref 27–34)
MCHC RBC-ENTMCNC: 28.4 GM/DL — LOW (ref 32–36)
MCV RBC AUTO: 84.7 FL — SIGNIFICANT CHANGE UP (ref 80–100)
MONOCYTES # BLD AUTO: 0.94 K/UL — HIGH (ref 0–0.9)
MONOCYTES NFR BLD AUTO: 8.9 % — SIGNIFICANT CHANGE UP (ref 2–14)
NEUTROPHILS # BLD AUTO: 7.66 K/UL — HIGH (ref 1.8–7.4)
NEUTROPHILS NFR BLD AUTO: 72.3 % — SIGNIFICANT CHANGE UP (ref 43–77)
NITRITE UR-MCNC: NEGATIVE — SIGNIFICANT CHANGE UP
NRBC # BLD: 0 /100 WBCS — SIGNIFICANT CHANGE UP (ref 0–0)
NRBC # FLD: 0 K/UL — SIGNIFICANT CHANGE UP (ref 0–0)
OSMOLALITY UR: 325 MOSM/KG — SIGNIFICANT CHANGE UP (ref 50–1200)
PH UR: 6 — SIGNIFICANT CHANGE UP (ref 5–8)
PHOSPHATE SERPL-MCNC: 6.3 MG/DL — HIGH (ref 2.5–4.5)
PLATELET # BLD AUTO: 580 K/UL — HIGH (ref 150–400)
POTASSIUM SERPL-MCNC: 5.3 MMOL/L — SIGNIFICANT CHANGE UP (ref 3.5–5.3)
POTASSIUM SERPL-SCNC: 5.3 MMOL/L — SIGNIFICANT CHANGE UP (ref 3.5–5.3)
POTASSIUM UR-SCNC: 65.3 MMOL/L — SIGNIFICANT CHANGE UP
PROT ?TM UR-MCNC: 556 MG/DL — SIGNIFICANT CHANGE UP
PROT SERPL-MCNC: 6.6 G/DL — SIGNIFICANT CHANGE UP (ref 6–8.3)
PROT UR-MCNC: ABNORMAL
PROT/CREAT UR-RTO: 8.1 RATIO — HIGH (ref 0–0.2)
RBC # BLD: 3.2 M/UL — LOW (ref 3.8–5.2)
RBC # FLD: 17.6 % — HIGH (ref 10.3–14.5)
RBC CASTS # UR COMP ASSIST: 7 /HPF — HIGH (ref 0–4)
RH IG SCN BLD-IMP: POSITIVE — SIGNIFICANT CHANGE UP
SODIUM SERPL-SCNC: 134 MMOL/L — LOW (ref 135–145)
SODIUM UR-SCNC: 23 MMOL/L — SIGNIFICANT CHANGE UP
SP GR SPEC: 1.04 — SIGNIFICANT CHANGE UP (ref 1.01–1.05)
UROBILINOGEN FLD QL: SIGNIFICANT CHANGE UP
UUN UR-MCNC: 151.4 MG/DL — SIGNIFICANT CHANGE UP
WBC # BLD: 10.59 K/UL — HIGH (ref 3.8–10.5)
WBC # FLD AUTO: 10.59 K/UL — HIGH (ref 3.8–10.5)
WBC UR QL: 34 /HPF — HIGH (ref 0–5)

## 2023-01-28 PROCEDURE — 99233 SBSQ HOSP IP/OBS HIGH 50: CPT | Mod: GC

## 2023-01-28 PROCEDURE — 99254 IP/OBS CNSLTJ NEW/EST MOD 60: CPT | Mod: GC

## 2023-01-28 RX ORDER — OXYCODONE AND ACETAMINOPHEN 5; 325 MG/1; MG/1
1 TABLET ORAL EVERY 6 HOURS
Refills: 0 | Status: DISCONTINUED | OUTPATIENT
Start: 2023-01-28 | End: 2023-01-28

## 2023-01-28 RX ORDER — FERROUS SULFATE 325(65) MG
325 TABLET ORAL DAILY
Refills: 0 | Status: DISCONTINUED | OUTPATIENT
Start: 2023-01-28 | End: 2023-02-15

## 2023-01-28 RX ORDER — HYDROMORPHONE HYDROCHLORIDE 2 MG/ML
0.5 INJECTION INTRAMUSCULAR; INTRAVENOUS; SUBCUTANEOUS EVERY 4 HOURS
Refills: 0 | Status: DISCONTINUED | OUTPATIENT
Start: 2023-01-28 | End: 2023-01-28

## 2023-01-28 RX ORDER — ACETAMINOPHEN 500 MG
650 TABLET ORAL EVERY 6 HOURS
Refills: 0 | Status: DISCONTINUED | OUTPATIENT
Start: 2023-01-28 | End: 2023-02-18

## 2023-01-28 RX ORDER — OXYCODONE HYDROCHLORIDE 5 MG/1
5 TABLET ORAL EVERY 4 HOURS
Refills: 0 | Status: DISCONTINUED | OUTPATIENT
Start: 2023-01-28 | End: 2023-02-04

## 2023-01-28 RX ORDER — SODIUM ZIRCONIUM CYCLOSILICATE 10 G/10G
10 POWDER, FOR SUSPENSION ORAL ONCE
Refills: 0 | Status: COMPLETED | OUTPATIENT
Start: 2023-01-28 | End: 2023-01-28

## 2023-01-28 RX ORDER — HYDROMORPHONE HYDROCHLORIDE 2 MG/ML
1 INJECTION INTRAMUSCULAR; INTRAVENOUS; SUBCUTANEOUS EVERY 4 HOURS
Refills: 0 | Status: DISCONTINUED | OUTPATIENT
Start: 2023-01-28 | End: 2023-02-04

## 2023-01-28 RX ADMIN — HYDROMORPHONE HYDROCHLORIDE 1 MILLIGRAM(S): 2 INJECTION INTRAMUSCULAR; INTRAVENOUS; SUBCUTANEOUS at 10:35

## 2023-01-28 RX ADMIN — HYDROMORPHONE HYDROCHLORIDE 1 MILLIGRAM(S): 2 INJECTION INTRAMUSCULAR; INTRAVENOUS; SUBCUTANEOUS at 10:50

## 2023-01-28 RX ADMIN — HYDROMORPHONE HYDROCHLORIDE 1 MILLIGRAM(S): 2 INJECTION INTRAMUSCULAR; INTRAVENOUS; SUBCUTANEOUS at 19:41

## 2023-01-28 RX ADMIN — AMPICILLIN SODIUM AND SULBACTAM SODIUM 200 GRAM(S): 250; 125 INJECTION, POWDER, FOR SUSPENSION INTRAMUSCULAR; INTRAVENOUS at 17:24

## 2023-01-28 RX ADMIN — ENOXAPARIN SODIUM 40 MILLIGRAM(S): 100 INJECTION SUBCUTANEOUS at 05:34

## 2023-01-28 RX ADMIN — HYDROMORPHONE HYDROCHLORIDE 1 MILLIGRAM(S): 2 INJECTION INTRAMUSCULAR; INTRAVENOUS; SUBCUTANEOUS at 23:40

## 2023-01-28 RX ADMIN — HYDROMORPHONE HYDROCHLORIDE 0.5 MILLIGRAM(S): 2 INJECTION INTRAMUSCULAR; INTRAVENOUS; SUBCUTANEOUS at 01:24

## 2023-01-28 RX ADMIN — SODIUM ZIRCONIUM CYCLOSILICATE 10 GRAM(S): 10 POWDER, FOR SUSPENSION ORAL at 10:36

## 2023-01-28 RX ADMIN — HYDROMORPHONE HYDROCHLORIDE 1 MILLIGRAM(S): 2 INJECTION INTRAMUSCULAR; INTRAVENOUS; SUBCUTANEOUS at 19:56

## 2023-01-28 RX ADMIN — OXYCODONE HYDROCHLORIDE 5 MILLIGRAM(S): 5 TABLET ORAL at 10:02

## 2023-01-28 RX ADMIN — HYDROMORPHONE HYDROCHLORIDE 0.5 MILLIGRAM(S): 2 INJECTION INTRAMUSCULAR; INTRAVENOUS; SUBCUTANEOUS at 06:06

## 2023-01-28 RX ADMIN — CARVEDILOL PHOSPHATE 25 MILLIGRAM(S): 80 CAPSULE, EXTENDED RELEASE ORAL at 05:38

## 2023-01-28 RX ADMIN — Medication 325 MILLIGRAM(S): at 18:47

## 2023-01-28 RX ADMIN — CARVEDILOL PHOSPHATE 25 MILLIGRAM(S): 80 CAPSULE, EXTENDED RELEASE ORAL at 18:47

## 2023-01-28 RX ADMIN — INSULIN GLARGINE 5 UNIT(S): 100 INJECTION, SOLUTION SUBCUTANEOUS at 21:28

## 2023-01-28 RX ADMIN — AMPICILLIN SODIUM AND SULBACTAM SODIUM 200 GRAM(S): 250; 125 INJECTION, POWDER, FOR SUSPENSION INTRAMUSCULAR; INTRAVENOUS at 05:34

## 2023-01-28 RX ADMIN — ATORVASTATIN CALCIUM 80 MILLIGRAM(S): 80 TABLET, FILM COATED ORAL at 21:17

## 2023-01-28 RX ADMIN — Medication 81 MILLIGRAM(S): at 13:05

## 2023-01-28 RX ADMIN — HYDROMORPHONE HYDROCHLORIDE 1 MILLIGRAM(S): 2 INJECTION INTRAMUSCULAR; INTRAVENOUS; SUBCUTANEOUS at 23:55

## 2023-01-28 RX ADMIN — HYDROMORPHONE HYDROCHLORIDE 0.5 MILLIGRAM(S): 2 INJECTION INTRAMUSCULAR; INTRAVENOUS; SUBCUTANEOUS at 01:54

## 2023-01-28 RX ADMIN — AMPICILLIN SODIUM AND SULBACTAM SODIUM 200 GRAM(S): 250; 125 INJECTION, POWDER, FOR SUSPENSION INTRAMUSCULAR; INTRAVENOUS at 23:09

## 2023-01-28 RX ADMIN — AMPICILLIN SODIUM AND SULBACTAM SODIUM 200 GRAM(S): 250; 125 INJECTION, POWDER, FOR SUSPENSION INTRAMUSCULAR; INTRAVENOUS at 13:04

## 2023-01-28 RX ADMIN — OXYCODONE HYDROCHLORIDE 5 MILLIGRAM(S): 5 TABLET ORAL at 09:02

## 2023-01-28 RX ADMIN — PANTOPRAZOLE SODIUM 40 MILLIGRAM(S): 20 TABLET, DELAYED RELEASE ORAL at 06:06

## 2023-01-28 RX ADMIN — HYDROMORPHONE HYDROCHLORIDE 0.5 MILLIGRAM(S): 2 INJECTION INTRAMUSCULAR; INTRAVENOUS; SUBCUTANEOUS at 06:03

## 2023-01-28 NOTE — PROGRESS NOTE ADULT - TIME BILLING
Preparing to see the patient including review of tests and other providers' notes, confirming history with patient, performing medical examination and evaluation, counseling and educating the patient, ordering tests, confirming orders, communicating with other health care professionals, documenting clinical information in the EMR, independently interpreting results and communicating results to the patient, care coordination
Preparing to see the patient including review of tests and other providers' notes, confirming history with patient, performing medical examination and evaluation, counseling and educating the patient, ordering tests, confirming orders, communicating with other health care professionals, documenting clinical information in the EMR, independently interpreting results and communicating results to the patient, care coordination

## 2023-01-28 NOTE — PROGRESS NOTE ADULT - PROBLEM SELECTOR PLAN 5
Hb today 7.8 from 8.7 on admission   no signs/sx of bleeding, likely related to inflammation   pt notes brown urine   trend H/H  outpt f/u with PMD for further w/u and age appropriate cancer screening    - urinalysis  - repeat CBC in the afternoon

## 2023-01-28 NOTE — PROGRESS NOTE ADULT - SUBJECTIVE AND OBJECTIVE BOX
Patient is a 42y old  Female who presents with a chief complaint of Toe pain (28 Jan 2023 06:29)       INTERVAL HPI/OVERNIGHT EVENTS:  Patient seen and evaluated at bedside.  Pt is resting comfortable in NAD. Denies N/V/F/C.    Allergies    No Known Allergies    Intolerances        Vital Signs Last 24 Hrs  T(C): 36.3 (28 Jan 2023 05:30), Max: 36.8 (27 Jan 2023 12:24)  T(F): 97.3 (28 Jan 2023 05:30), Max: 98.2 (27 Jan 2023 12:24)  HR: 72 (28 Jan 2023 05:30) (70 - 80)  BP: 111/66 (28 Jan 2023 05:30) (107/70 - 121/77)  BP(mean): --  RR: 18 (28 Jan 2023 05:30) (16 - 18)  SpO2: 100% (28 Jan 2023 05:30) (100% - 100%)    Parameters below as of 28 Jan 2023 05:30  Patient On (Oxygen Delivery Method): room air        LABS:                        7.7    10.59 )-----------( 580      ( 28 Jan 2023 06:36 )             27.1     01-28    134<L>  |  100  |  32<H>  ----------------------------<  64<L>  5.3   |  23  |  2.99<H>    Ca    8.9      28 Jan 2023 06:36  Phos  6.3     01-28  Mg     1.80     01-28    TPro  6.6  /  Alb  2.0<L>  /  TBili  0.3  /  DBili  x   /  AST  17  /  ALT  10  /  AlkPhos  131<H>  01-28        CAPILLARY BLOOD GLUCOSE      POCT Blood Glucose.: 110 mg/dL (28 Jan 2023 10:18)  POCT Blood Glucose.: 72 mg/dL (28 Jan 2023 08:49)  POCT Blood Glucose.: 72 mg/dL (27 Jan 2023 21:08)  POCT Blood Glucose.: 73 mg/dL (27 Jan 2023 18:06)  POCT Blood Glucose.: 110 mg/dL (27 Jan 2023 12:12)      Lower Extremity Physical Exam:  Vascular: DP/PT 0/4 left, DT/PT 1/4, CFT <3 seconds B/L, Temperature gradient warm to cold left, warm to cool right.   Neuro: Epicritic sensation decreased to the level of toes, B/L.  Musculoskeletal/Ortho: s/p left foot partial 3rd ray resection.  Skin: Left foot 2nd digit dry gangrene, dehisced partial 3rd ray resection to sub-dermis, ischemic changes the dorsal MPJs 1-5, no drainage, no purulence, no acute signs of infection. Right foot no wounds, no acute signs of infection.    RADIOLOGY & ADDITIONAL TESTS:

## 2023-01-28 NOTE — PROGRESS NOTE ADULT - PROBLEM SELECTOR PLAN 2
reduced breath sound on R lung field  CXR 1/26: moderate R sided pleural effusion.   on RA, stable, no complaints of sob    could be secondary to CHF     - f/u CT chest non con  - f/u abdominal US   - possible IR consult next week to tap Baseline Cr 1.20, now rising to 2.02  DDx ATN, 2/2 contrast from CT angiogram on 1/25 or IV furosemide yesterday. Patient also hypervolemic as well     - hold further diuresis as well as nephrotoxic medications  - Pending renal lytes  - Bladder scan  - Pending abdominal ultrasound to look at kidneys specifically  - Renal to be consulted    #Hyperkalemia  - Improved  -s/p Lokelma, Insulin, D50

## 2023-01-28 NOTE — CONSULT NOTE ADULT - PROBLEM SELECTOR RECOMMENDATION 9
Pt with BOB on CKD in setting of CHF and recent IV contrast use. Upon review of labs on [facility] SCr was elevated at 1.33 on 6/28/21 and was 1.15 on 2/24/22. SCr was 1.2 on 1/26/23. Pt underwent LE angiogram on presentation. Pt also noted to be on diuretics (torsemide, aldactone) that was held given worsening renal functions. Pt with nephrotic syndrome likely due to DM nephropathy. CT angio showed no evidence of JAMIL. Echo reviewed. Scr increased to 2.99 today. Pt is clinically volume overload. Recommend IV Bumex 1 mg q12 hours. Check kidney/bladder US with doppler to rule out renal vein thromboisi. Strict I/O. Bladder scan prn. Avoid nephrotoxins, Dose meds as per egfr.

## 2023-01-28 NOTE — PROGRESS NOTE ADULT - PROBLEM SELECTOR PLAN 6
On metformin 500mg BID, glimepiride 4mg, lantus 8U at bedtime   A1c 7.1    - started 5U lantus + [place]

## 2023-01-28 NOTE — PROGRESS NOTE ADULT - SUBJECTIVE AND OBJECTIVE BOX
Francia Adame, PGY-2  Internal Medicine  Pager: [phone]/ARTUR [phone]    PROGRESS NOTE:     Patient is a 42y old  Female who presents with a chief complaint of Toe pain (27 Jan 2023 08:07)      SUBJECTIVE / OVERNIGHT EVENTS:    ADDITIONAL REVIEW OF SYSTEMS:    MEDICATIONS  (STANDING):  ampicillin/sulbactam  IVPB 3 Gram(s) IV Intermittent every 6 hours  aspirin enteric coated 81 milliGRAM(s) Oral daily  atorvastatin 80 milliGRAM(s) Oral at bedtime  carvedilol 25 milliGRAM(s) Oral every 12 hours  dextrose 5%. 1000 milliLiter(s) (100 mL/Hr) IV Continuous <Continuous>  dextrose 5%. 1000 milliLiter(s) (50 mL/Hr) IV Continuous <Continuous>  dextrose 50% Injectable 25 Gram(s) IV Push once  dextrose 50% Injectable 12.5 Gram(s) IV Push once  dextrose 50% Injectable 25 Gram(s) IV Push once  enoxaparin Injectable 40 milliGRAM(s) SubCutaneous every 24 hours  glucagon  Injectable 1 milliGRAM(s) IntraMuscular once  influenza   Vaccine 0.5 milliLiter(s) IntraMuscular once  insulin glargine Injectable (LANTUS) 5 Unit(s) SubCutaneous at bedtime  insulin lispro (ADMELOG) corrective regimen sliding scale   SubCutaneous three times a day before meals  insulin lispro (ADMELOG) corrective regimen sliding scale   SubCutaneous at bedtime  magnesium sulfate  IVPB 2 Gram(s) IV Intermittent once  pantoprazole    Tablet 40 milliGRAM(s) Oral before breakfast    MEDICATIONS  (PRN):  acetaminophen     Tablet .. 650 milliGRAM(s) Oral every 6 hours PRN Mild Pain (1 - 3), Moderate Pain (4 - 6)  dextrose Oral Gel 15 Gram(s) Oral once PRN Blood Glucose LESS THAN 70 milliGRAM(s)/deciliter  HYDROmorphone  Injectable 0.5 milliGRAM(s) IV Push every 4 hours PRN Severe Pain (7 - 10)  ondansetron Injectable 4 milliGRAM(s) IV Push every 8 hours PRN Nausea and/or Vomiting      CAPILLARY BLOOD GLUCOSE      POCT Blood Glucose.: 72 mg/dL (27 Jan 2023 21:08)  POCT Blood Glucose.: 73 mg/dL (27 Jan 2023 18:06)  POCT Blood Glucose.: 110 mg/dL (27 Jan 2023 12:12)  POCT Blood Glucose.: 158 mg/dL (27 Jan 2023 08:29)    I&O's Summary      PHYSICAL EXAM:  Vital Signs Last 24 Hrs  T(C): 36.3 (27 Jan 2023 20:07), Max: 36.8 (27 Jan 2023 12:24)  T(F): 97.4 (27 Jan 2023 20:07), Max: 98.2 (27 Jan 2023 12:24)  HR: 77 (27 Jan 2023 20:07) (70 - 80)  BP: 107/70 (27 Jan 2023 20:07) (107/70 - 121/77)  BP(mean): --  RR: 16 (27 Jan 2023 20:07) (16 - 17)  SpO2: 100% (27 Jan 2023 20:07) (100% - 100%)    Parameters below as of 27 Jan 2023 20:07  Patient On (Oxygen Delivery Method): room air        CONSTITUTIONAL: NAD, well-developed  RESPIRATORY: Normal respiratory effort; lungs are clear to auscultation bilaterally  CARDIOVASCULAR: Regular rate and rhythm, normal S1 and S2, no murmur/rub/gallop; No lower extremity edema; Peripheral pulses are 2+ bilaterally  ABDOMEN: Nontender to palpation, normoactive bowel sounds, no rebound/guarding; No hepatosplenomegaly  MUSCLOSKELETAL: no clubbing or cyanosis of digits; no joint swelling or tenderness to palpation  PSYCH: A+O to person, place, and time; affect appropriate    LABS:                        8.2    9.74  )-----------( 605      ( 27 Jan 2023 16:37 )             28.3     01-27    135  |  101  |  31<H>  ----------------------------<  68<L>  5.5<H>   |  22  |  2.61<H>    Ca    9.2      27 Jan 2023 22:05  Phos  5.5     01-27  Mg     1.80     01-27    TPro  6.6  /  Alb  2.2<L>  /  TBili  0.3  /  DBili  x   /  AST  17  /  ALT  11  /  AlkPhos  135<H>  01-27    PT/INR - ( 26 Jan 2023 07:30 )   PT: 16.7 sec;   INR: 1.43 ratio         PTT - ( 26 Jan 2023 07:30 )  PTT:31.2 sec            RADIOLOGY & ADDITIONAL TESTS:  Results Reviewed:   Imaging Personally Reviewed:  Electrocardiogram Personally Reviewed:    COORDINATION OF CARE:  Care Discussed with Consultants/Other Providers [Y/N]:  Prior or Outpatient Records Reviewed [Y/N]:   Francia Adame, PGY-3  Internal Medicine  Pager: [phone]/[facility] [phone]    PROGRESS NOTE:     Patient is a 42y old  Female who presents with a chief complaint of Toe pain (27 Jan 2023 08:07)      SUBJECTIVE / OVERNIGHT EVENTS:    ADDITIONAL REVIEW OF SYSTEMS:    MEDICATIONS  (STANDING):  ampicillin/sulbactam  IVPB 3 Gram(s) IV Intermittent every 6 hours  aspirin enteric coated 81 milliGRAM(s) Oral daily  atorvastatin 80 milliGRAM(s) Oral at bedtime  carvedilol 25 milliGRAM(s) Oral every 12 hours  dextrose 5%. 1000 milliLiter(s) (100 mL/Hr) IV Continuous <Continuous>  dextrose 5%. 1000 milliLiter(s) (50 mL/Hr) IV Continuous <Continuous>  dextrose 50% Injectable 25 Gram(s) IV Push once  dextrose 50% Injectable 12.5 Gram(s) IV Push once  dextrose 50% Injectable 25 Gram(s) IV Push once  enoxaparin Injectable 40 milliGRAM(s) SubCutaneous every 24 hours  glucagon  Injectable 1 milliGRAM(s) IntraMuscular once  influenza   Vaccine 0.5 milliLiter(s) IntraMuscular once  insulin glargine Injectable (LANTUS) 5 Unit(s) SubCutaneous at bedtime  insulin lispro (ADMELOG) corrective regimen sliding scale   SubCutaneous three times a day before meals  insulin lispro (ADMELOG) corrective regimen sliding scale   SubCutaneous at bedtime  magnesium sulfate  IVPB 2 Gram(s) IV Intermittent once  pantoprazole    Tablet 40 milliGRAM(s) Oral before breakfast    MEDICATIONS  (PRN):  acetaminophen     Tablet .. 650 milliGRAM(s) Oral every 6 hours PRN Mild Pain (1 - 3), Moderate Pain (4 - 6)  dextrose Oral Gel 15 Gram(s) Oral once PRN Blood Glucose LESS THAN 70 milliGRAM(s)/deciliter  HYDROmorphone  Injectable 0.5 milliGRAM(s) IV Push every 4 hours PRN Severe Pain (7 - 10)  ondansetron Injectable 4 milliGRAM(s) IV Push every 8 hours PRN Nausea and/or Vomiting      CAPILLARY BLOOD GLUCOSE      POCT Blood Glucose.: 72 mg/dL (27 Jan 2023 21:08)  POCT Blood Glucose.: 73 mg/dL (27 Jan 2023 18:06)  POCT Blood Glucose.: 110 mg/dL (27 Jan 2023 12:12)  POCT Blood Glucose.: 158 mg/dL (27 Jan 2023 08:29)    I&O's Summary      PHYSICAL EXAM:  Vital Signs Last 24 Hrs  T(C): 36.3 (27 Jan 2023 20:07), Max: 36.8 (27 Jan 2023 12:24)  T(F): 97.4 (27 Jan 2023 20:07), Max: 98.2 (27 Jan 2023 12:24)  HR: 77 (27 Jan 2023 20:07) (70 - 80)  BP: 107/70 (27 Jan 2023 20:07) (107/70 - 121/77)  BP(mean): --  RR: 16 (27 Jan 2023 20:07) (16 - 17)  SpO2: 100% (27 Jan 2023 20:07) (100% - 100%)    Parameters below as of 27 Jan 2023 20:07  Patient On (Oxygen Delivery Method): room air        CONSTITUTIONAL: NAD, well-developed  RESPIRATORY: Normal respiratory effort; lungs are clear to auscultation bilaterally  CARDIOVASCULAR: Regular rate and rhythm, normal S1 and S2, no murmur/rub/gallop; No lower extremity edema; Peripheral pulses are 2+ bilaterally  ABDOMEN: Nontender to palpation, normoactive bowel sounds, no rebound/guarding; No hepatosplenomegaly  MUSCLOSKELETAL: no clubbing or cyanosis of digits; no joint swelling or tenderness to palpation  PSYCH: A+O to person, place, and time; affect appropriate    LABS:                        8.2    9.74  )-----------( 605      ( 27 Jan 2023 16:37 )             28.3     01-27    135  |  101  |  31<H>  ----------------------------<  68<L>  5.5<H>   |  22  |  2.61<H>    Ca    9.2      27 Jan 2023 22:05  Phos  5.5     01-27  Mg     1.80     01-27    TPro  6.6  /  Alb  2.2<L>  /  TBili  0.3  /  DBili  x   /  AST  17  /  ALT  11  /  AlkPhos  135<H>  01-27    PT/INR - ( 26 Jan 2023 07:30 )   PT: 16.7 sec;   INR: 1.43 ratio         PTT - ( 26 Jan 2023 07:30 )  PTT:31.2 sec            RADIOLOGY & ADDITIONAL TESTS:  Results Reviewed:   Imaging Personally Reviewed:  Electrocardiogram Personally Reviewed:    COORDINATION OF CARE:  Care Discussed with Consultants/Other Providers [Y/N]:  Prior or Outpatient Records Reviewed [Y/N]:   Francia Adame, PGY-3  Internal Medicine  Pager: [phone]/[facility] [phone]    PROGRESS NOTE:     Patient is a 42y old  Female who presents with a chief complaint of Toe pain (27 Jan 2023 08:07)      SUBJECTIVE / OVERNIGHT EVENTS: Patient with pain overnight. Pt seen and examined at bedside. Patient describing lancing shooting pain in left foot. No complaints of chest pain, abdominal pain, SOB.     MEDICATIONS  (STANDING):  ampicillin/sulbactam  IVPB 3 Gram(s) IV Intermittent every 6 hours  aspirin enteric coated 81 milliGRAM(s) Oral daily  atorvastatin 80 milliGRAM(s) Oral at bedtime  carvedilol 25 milliGRAM(s) Oral every 12 hours  dextrose 5%. 1000 milliLiter(s) (100 mL/Hr) IV Continuous <Continuous>  dextrose 5%. 1000 milliLiter(s) (50 mL/Hr) IV Continuous <Continuous>  dextrose 50% Injectable 25 Gram(s) IV Push once  dextrose 50% Injectable 12.5 Gram(s) IV Push once  dextrose 50% Injectable 25 Gram(s) IV Push once  enoxaparin Injectable 40 milliGRAM(s) SubCutaneous every 24 hours  glucagon  Injectable 1 milliGRAM(s) IntraMuscular once  influenza   Vaccine 0.5 milliLiter(s) IntraMuscular once  insulin glargine Injectable (LANTUS) 5 Unit(s) SubCutaneous at bedtime  insulin lispro (ADMELOG) corrective regimen sliding scale   SubCutaneous three times a day before meals  insulin lispro (ADMELOG) corrective regimen sliding scale   SubCutaneous at bedtime  magnesium sulfate  IVPB 2 Gram(s) IV Intermittent once  pantoprazole    Tablet 40 milliGRAM(s) Oral before breakfast    MEDICATIONS  (PRN):  acetaminophen     Tablet .. 650 milliGRAM(s) Oral every 6 hours PRN Mild Pain (1 - 3), Moderate Pain (4 - 6)  dextrose Oral Gel 15 Gram(s) Oral once PRN Blood Glucose LESS THAN 70 milliGRAM(s)/deciliter  HYDROmorphone  Injectable 0.5 milliGRAM(s) IV Push every 4 hours PRN Severe Pain (7 - 10)  ondansetron Injectable 4 milliGRAM(s) IV Push every 8 hours PRN Nausea and/or Vomiting      CAPILLARY BLOOD GLUCOSE      POCT Blood Glucose.: 72 mg/dL (27 Jan 2023 21:08)  POCT Blood Glucose.: 73 mg/dL (27 Jan 2023 18:06)  POCT Blood Glucose.: 110 mg/dL (27 Jan 2023 12:12)  POCT Blood Glucose.: 158 mg/dL (27 Jan 2023 08:29)    I&O's Summary      PHYSICAL EXAM:  Vital Signs Last 24 Hrs  T(C): 36.3 (27 Jan 2023 20:07), Max: 36.8 (27 Jan 2023 12:24)  T(F): 97.4 (27 Jan 2023 20:07), Max: 98.2 (27 Jan 2023 12:24)  HR: 77 (27 Jan 2023 20:07) (70 - 80)  BP: 107/70 (27 Jan 2023 20:07) (107/70 - 121/77)  BP(mean): --  RR: 16 (27 Jan 2023 20:07) (16 - 17)  SpO2: 100% (27 Jan 2023 20:07) (100% - 100%)    Parameters below as of 27 Jan 2023 20:07  Patient On (Oxygen Delivery Method): room air      GENERAL: NAD, lying in bed grimacing in pain   HEAD: Atraumatic, normocephalic  EYES: EOMI b/l, conjunctiva and sclera clear  NECK: Supple   RESPIRATORY: Normal respiratory effort; reduced breath sound RLL. otherwise clear to auscultation without wheezes, crackles or rhonchi   CARDIOVASCULAR: Regular rate and rhythm, normal S1 and S2, no murmur/rub/gallop; 1-2+pitting edema RLE, trace pitting edema LLE   ABDOMEN: Nontender, normoactive bowel sounds, no rebound/guarding   MUSCULOSKELETAL: no joint swelling or tenderness to palpation, L foot wrapped in dressing.   NEURO: Non focal   SKIN: eschar around L 2nd toe.   PSYCH: A+O to person, place, and time; affect appropriate    LABS:                        8.2    9.74  )-----------( 605      ( 27 Jan 2023 16:37 )             28.3     01-27    135  |  101  |  31<H>  ----------------------------<  68<L>  5.5<H>   |  22  |  2.61<H>    Ca    9.2      27 Jan 2023 22:05  Phos  5.5     01-27  Mg     1.80     01-27    TPro  6.6  /  Alb  2.2<L>  /  TBili  0.3  /  DBili  x   /  AST  17  /  ALT  11  /  AlkPhos  135<H>  01-27    PT/INR - ( 26 Jan 2023 07:30 )   PT: 16.7 sec;   INR: 1.43 ratio         PTT - ( 26 Jan 2023 07:30 )  PTT:31.2 sec            RADIOLOGY & ADDITIONAL TESTS:  Results Reviewed:   Imaging Personally Reviewed:  Electrocardiogram Personally Reviewed:    COORDINATION OF CARE:  Care Discussed with Consultants/Other Providers [Y/N]:  Prior or Outpatient Records Reviewed [Y/N]:   WEAKNESS

## 2023-01-28 NOTE — PROGRESS NOTE ADULT - PROBLEM SELECTOR PLAN 1
Necrosis of 2nd digit i/s/o PAD, concerning for dry gangrene, elevated ESR/CRP. No signs of active infection   X-ray foot without gas or osseous involvement, limited to soft tissue   Podiatry and vascular recs appreciated  CT Angio prelim read with occluded left posterior tibial and peroneal arteries  No DVT on venous duplex    - c/w IV unasyn   - pain management with IV 0.5mg dilaudid PRN for severe pain and acetaminophen for mild-mod pain   - f/u SEBASTIÁN/PVRs  - possible angiogram of L leg with vascular team today   - plans for L transmetatarsal amputation pending further vascular recs on date  - cardiology consulted for medical clearance; >11% risk of postop complication.      s/p IV 40mg furosemide 1/26, but with new BOB with Cr 2.02 from 1.20 day prior, cardiology recs to hold diuresis or IV fluid. Necrosis of 2nd digit i/s/o PAD, concerning for dry gangrene, elevated ESR/CRP. No signs of active infection   X-ray foot without gas or osseous involvement, limited to soft tissue   Podiatry and vascular recs appreciated  CT Angio prelim read with occluded left posterior tibial and peroneal arteries  No DVT on venous duplex    - c/w IV unasyn   - pain management with IV 1mg dilaudid PRN for severe pain, oxycodone for moderate pain, acetaminophen for mild pain   - SEBASTIÁN/PVRs bilateral with moderate-severe peripheral vascular disease  - Holding off on angiogram of L leg with vascular team due to worsening renal function   - plans for L transmetatarsal amputation pending further vascular recs on date  - cardiology consulted for medical clearance; >11% risk of postop complication.      s/p IV 40mg furosemide 1/26, but with new BOB with Cr 2.02 from 1.20 day prior, cardiology recs to hold diuresis or IV fluid.

## 2023-01-28 NOTE — PROGRESS NOTE ADULT - ATTENDING COMMENTS
42-year-old female w/CAD s/p CABG x4 in 2018, HFrEF (EF 21% on 1/26) s/p ICD placement, IDDM2, PAD s/p 3rd toe amputation who presents with left 2nd toe pain concerning for dry gangrene. Seen by podiatry and vascular, recs appreciated, plans for angiogram with vascular and transmetatarsal amputation with podiatry.     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 X-ray foot without gas or osseous involvement  - Podiatry and vascular recs appreciated  - c/w IV unasyn   - plan for angiogram with vascular prior to L transmetatarsal amputation per podiatry   - TTE on 1/26 with EF  21%, severe global left ventricular systolic dysfunction, Increased E/e'  is consistent with elevated left ventricular filling pressure. Right ventricular enlargement with decreased right ventricular systolic function.  - RCRI >11% risk of adverse cardiac outcome. Patient is at high risk for complication from surgery. Cards rec appreciated, no further testing required and can proceed to TMA with podiatry. Patient should not yet proceed to angiogram      #Peripheral artery disease  - CTA showing occluded distal R peroneal artery at the level of the ankle. L proximal PT and peroneal occlusions   - HOLD on left leg angio. Patient now has an BOB and any further contrast administration may lead to worsening BOB necessitating dialysis    - aspirin/statin     #BOB   - continues to worsen    - monitor K closely, treat as needed  - trend Cr   - avoid nephrotoxins   - check urine ltyes   - check renal US   - consult nephrology     #HFrEF   - TTE on 1/26 with EF  21%, severe global left ventricular systolic dysfunction, Increased E/e'  is consistent with elevated left ventricular filling pressure. Right ventricular enlargement with decreased right ventricular systolic function.  - CXR with right mod pleural effusion, BNP 30k+ (although on entresto)   - c/w coreg 25 BID  - hold home torsemide 20mg   - given BOB: hold home entresto 24/26,  spironolactone 25mg  - strict in/out   - interrogate ICD    - cards recs     #Mod right pleural effusion   - patient may have had it drained at [place] in the past  - check CT chest for further characterization   - therapeutic and diagnostic tap with IR on Monday     Discussed with HS 8

## 2023-01-28 NOTE — CONSULT NOTE ADULT - SUBJECTIVE AND OBJECTIVE BOX
[facility] DIVISION OF KIDNEY DISEASES AND HYPERTENSION -- [phone]  -- INITIAL CONSULT NOTE  --------------------------------------------------------------------------------  HPI: 42 year female with PMH of HTN, HLD, DM, PAD, CHF admitted with left foot toe pain. Pt during the hospitla course was noted to have worsening renal function. Nephrology consulted for BOB.     Pt seen and examined today. Pt with extensive hx of PAD and underwent 3rd toe amputation. Pt also gives hx of CHF and is followed by cardiology. Upon review of labs on [facility] SCr was elevated at 1.33 on 21 and was 1.15 on 22. Pt with long standing hx of DM and gives hx of retinopathy. SCr was 1.2 on 23. Pt underwent LE angiogram on presentation. Pt also noted to be on diuretics (torsemide, aldactone) that was held given worsening renal functions.     Pt complaining of LE toe pain and feeling tired. Denies fever, chills, nausea, vomiting, CP and SOB during rounds. UA done today showed hematuria, proteinuria and pyuria. Scr increased to 2.99 on AM labs done today. UPC significantly elevated 8.1 gms with low albumin.     PAST HISTORY  --------------------------------------------------------------------------------  PAST MEDICAL & SURGICAL HISTORY:  MI (myocardial infarction)  2017  Type 2 diabetes mellitus  Hypertension  Hyperlipidemia  Coronary artery disease  had CABG x 4  Cerebrovascular accident (CVA)  residual of right sided weakness  H/O congestive heart disease  ICM/chronic systolic CHF as per Dr. Diana Menjivar  History of cardiomyopathy  Thrombus  left ventricular mural thrombus  S/P   H/O tubal ligation  2016  Coronary artery disease  CABG x 4 in 2018      FAMILY HISTORY:  Family history of heart disease (Father)      PAST SOCIAL HISTORY:    ALLERGIES & MEDICATIONS  --------------------------------------------------------------------------------  Allergies    No Known Allergies    Intolerances      Standing Inpatient Medications  ampicillin/sulbactam  IVPB 3 Gram(s) IV Intermittent every 6 hours  aspirin enteric coated 81 milliGRAM(s) Oral daily  atorvastatin 80 milliGRAM(s) Oral at bedtime  carvedilol 25 milliGRAM(s) Oral every 12 hours  dextrose 5%. 1000 milliLiter(s) IV Continuous <Continuous>  dextrose 5%. 1000 milliLiter(s) IV Continuous <Continuous>  dextrose 50% Injectable 25 Gram(s) IV Push once  dextrose 50% Injectable 12.5 Gram(s) IV Push once  dextrose 50% Injectable 25 Gram(s) IV Push once  enoxaparin Injectable 40 milliGRAM(s) SubCutaneous every 24 hours  glucagon  Injectable 1 milliGRAM(s) IntraMuscular once  influenza   Vaccine 0.5 milliLiter(s) IntraMuscular once  insulin glargine Injectable (LANTUS) 5 Unit(s) SubCutaneous at bedtime  insulin lispro (ADMELOG) corrective regimen sliding scale   SubCutaneous three times a day before meals  insulin lispro (ADMELOG) corrective regimen sliding scale   SubCutaneous at bedtime  magnesium sulfate  IVPB 2 Gram(s) IV Intermittent once  pantoprazole    Tablet 40 milliGRAM(s) Oral before breakfast    PRN Inpatient Medications  acetaminophen     Tablet .. 650 milliGRAM(s) Oral every 6 hours PRN  dextrose Oral Gel 15 Gram(s) Oral once PRN  HYDROmorphone  Injectable 1 milliGRAM(s) IV Push every 4 hours PRN  ondansetron Injectable 4 milliGRAM(s) IV Push every 8 hours PRN  oxyCODONE    IR 5 milliGRAM(s) Oral every 4 hours PRN      REVIEW OF SYSTEMS  --------------------------------------------------------------------------------  Gen: No fevers/chills  Head/Eyes/Ears: No HA  Respiratory: No dyspnea, cough  CV: No chest pain  GI: No abdominal pain, diarrhea  : No dysuria, hematuria  MSK: LE edema  Skin: No rashes, toe gangrene  Heme: No easy bruising or bleeding    All other systems were reviewed and are negative, except as noted.    VITALS/PHYSICAL EXAM  --------------------------------------------------------------------------------  T(C): 36.1 (23 @ 12:55), Max: 36.7 (23 @ 18:14)  HR: 75 (23 @ 12:55) (72 - 80)  BP: 114/71 (23 @ 12:55) (107/70 - 121/65)  RR: 18 (23 @ 12:55) (16 - 18)  SpO2: 100% (23 @ 12:55) (100% - 100%)  Wt(kg): --        23 @ 07:  -  23 @ 07:00  --------------------------------------------------------  IN: 350 mL / OUT: 100 mL / NET: 250 mL    23 @ 07:  -  23 @ 14:41  --------------------------------------------------------  IN: 280 mL / OUT: 200 mL / NET: 80 mL      Physical Exam:  	Gen: middle age, female, resting, NAD  	HEENT: Anicteric  	Pulm: CTA B/L  	CV: S1S2+  	Abd: Soft, +BS        	Ext: RLE>LLE edema B/L, gangrenous toe of LLE seen  	Neuro: Awake         	Skin: Warm and dry,   	  LABS/STUDIES  --------------------------------------------------------------------------------              7.7    10.59 >-----------<  580      [23 06:36]              27.1     134  |  100  |  32  ----------------------------<  64      [23 @ 06:36]  5.3   |  23  |  2.99        Ca     8.9     [23 @ 06:36]      Mg     1.80     [23 06:36]      Phos  6.3     [23 06:36]    Creatinine Trend:  SCr 2.99 [ 06:36]  SCr 2.61 [ 22:05]  SCr 2.46 [ 16:37]  SCr 2.07 [ 07:25]  SCr 1.20 [ 07:30]    Urinalysis - [23 @ 11:01]      Color Yellow / Appearance Slightly Turbid / SG 1.036 / pH 6.0      Gluc Trace / Ketone Trace  / Bili Negative / Urobili <2 mg/dL       Blood Trace / Protein 300 mg/dL / Leuk Est Small / Nitrite Negative      RBC 7 / WBC 34 / Hyaline 7 / Gran 3 / Sq Epi  / Non Sq Epi >27 / Bacteria Moderate    Urine Creatinine 68      [23 @ 11:01]  Urine Protein 556      [23 @ 11:01]  Urine Sodium 23      [23 @ 11:01]  Urine Urea Nitrogen 151.4      [23 @ 11:01]  Urine Potassium 65.3      [23 @ 11:01]  Urine Osmolality 325      [23 @ 11:01] [facility] DIVISION OF KIDNEY DISEASES AND HYPERTENSION -- [phone]  -- INITIAL CONSULT NOTE  --------------------------------------------------------------------------------  HPI: 42 year female with PMH of HTN, HLD, DM, PAD, CHF admitted with left foot toe pain. Pt during the hospitla course was noted to have worsening renal function. Nephrology consulted for BOB.     Pt seen and examined today. Pt with extensive hx of PAD and underwent 3rd toe amputation. Pt also gives hx of CHF and is followed by cardiology. Upon review of labs on [facility] SCr was elevated at 1.33 on 21 and was 1.15 on 22. Pt with long standing hx of DM and gives hx of retinopathy. SCr was 1.2 on 23. Pt underwent LE angiogram on presentation. Pt also noted to be on diuretics (torsemide, aldactone) that was held given worsening renal functions.     Pt complaining of LE toe pain and feeling tired. Denies fever, chills, nausea, vomiting, CP and SOB during rounds. UA done today showed hematuria, proteinuria and pyuria. Scr increased to 2.99 on AM labs done today. UPC significantly elevated 8.1 gms with low albumin.     PAST HISTORY  --------------------------------------------------------------------------------  PAST MEDICAL & SURGICAL HISTORY:  MI (myocardial infarction)  2017  Type 2 diabetes mellitus  Hypertension  Hyperlipidemia  Coronary artery disease  had CABG x 4  Cerebrovascular accident (CVA)  residual of right sided weakness  H/O congestive heart disease  ICM/chronic systolic CHF as per Dr. Diana Menjivar  History of cardiomyopathy  Thrombus  left ventricular mural thrombus  S/P   H/O tubal ligation  2016  Coronary artery disease  CABG x 4 in 2018      FAMILY HISTORY:  Family history of heart disease (Father)      PAST SOCIAL HISTORY: no reported substance use.  Lives with mother.     ALLERGIES & MEDICATIONS  --------------------------------------------------------------------------------  Allergies    No Known Allergies    Intolerances      Standing Inpatient Medications  ampicillin/sulbactam  IVPB 3 Gram(s) IV Intermittent every 6 hours  aspirin enteric coated 81 milliGRAM(s) Oral daily  atorvastatin 80 milliGRAM(s) Oral at bedtime  carvedilol 25 milliGRAM(s) Oral every 12 hours  dextrose 5%. 1000 milliLiter(s) IV Continuous <Continuous>  dextrose 5%. 1000 milliLiter(s) IV Continuous <Continuous>  dextrose 50% Injectable 25 Gram(s) IV Push once  dextrose 50% Injectable 12.5 Gram(s) IV Push once  dextrose 50% Injectable 25 Gram(s) IV Push once  enoxaparin Injectable 40 milliGRAM(s) SubCutaneous every 24 hours  glucagon  Injectable 1 milliGRAM(s) IntraMuscular once  influenza   Vaccine 0.5 milliLiter(s) IntraMuscular once  insulin glargine Injectable (LANTUS) 5 Unit(s) SubCutaneous at bedtime  insulin lispro (ADMELOG) corrective regimen sliding scale   SubCutaneous three times a day before meals  insulin lispro (ADMELOG) corrective regimen sliding scale   SubCutaneous at bedtime  magnesium sulfate  IVPB 2 Gram(s) IV Intermittent once  pantoprazole    Tablet 40 milliGRAM(s) Oral before breakfast    PRN Inpatient Medications  acetaminophen     Tablet .. 650 milliGRAM(s) Oral every 6 hours PRN  dextrose Oral Gel 15 Gram(s) Oral once PRN  HYDROmorphone  Injectable 1 milliGRAM(s) IV Push every 4 hours PRN  ondansetron Injectable 4 milliGRAM(s) IV Push every 8 hours PRN  oxyCODONE    IR 5 milliGRAM(s) Oral every 4 hours PRN      REVIEW OF SYSTEMS  --------------------------------------------------------------------------------  Gen: No fevers/chills  Head/Eyes/Ears: No HA  Respiratory: No dyspnea, cough  CV: No chest pain  GI: No abdominal pain, diarrhea  : No dysuria, hematuria  MSK: LE edema  Skin: No rashes, toe gangrene  Heme: No easy bruising or bleeding    All other systems were reviewed and are negative, except as noted.    VITALS/PHYSICAL EXAM  --------------------------------------------------------------------------------  T(C): 36.1 (23 @ 12:55), Max: 36.7 (23 @ 18:14)  HR: 75 (23 @ 12:55) (72 - 80)  BP: 114/71 (23 @ 12:55) (107/70 - 121/65)  RR: 18 (23 @ 12:55) (16 - 18)  SpO2: 100% (23 @ 12:55) (100% - 100%)  Wt(kg): --        23 @ 07:01  -  23 @ 07:00  --------------------------------------------------------  IN: 350 mL / OUT: 100 mL / NET: 250 mL    23 @ 07:  -  23 @ 14:41  --------------------------------------------------------  IN: 280 mL / OUT: 200 mL / NET: 80 mL      Physical Exam:  	Gen: middle age, female, resting, NAD  	HEENT: Anicteric  	Pulm: CTA B/L  	CV: S1S2+  	Abd: Soft, +BS        	Ext: RLE>LLE edema B/L, gangrenous toe of LLE seen  	Neuro: Awake         	Skin: Warm and dry,   	  LABS/STUDIES  --------------------------------------------------------------------------------              7.7    10.59 >-----------<  580      [23 @ 06:36]              27.1     134  |  100  |  32  ----------------------------<  64      [23 @ 06:36]  5.3   |  23  |  2.99        Ca     8.9     [23 @ 06:36]      Mg     1.80     [23 06:36]      Phos  6.3     [23 06:36]    Creatinine Trend:  SCr 2.99 [ 06:36]  SCr 2.61 [ 22:05]  SCr 2.46 [ 16:37]  SCr 2.07 [ 07:25]  SCr 1.20 [ 07:30]    Urinalysis - [23 @ 11:01]      Color Yellow / Appearance Slightly Turbid / SG 1.036 / pH 6.0      Gluc Trace / Ketone Trace  / Bili Negative / Urobili <2 mg/dL       Blood Trace / Protein 300 mg/dL / Leuk Est Small / Nitrite Negative      RBC 7 / WBC 34 / Hyaline 7 / Gran 3 / Sq Epi  / Non Sq Epi >27 / Bacteria Moderate    Urine Creatinine 68      [23 @ 11:01]  Urine Protein 556      [23 @ 11:01]  Urine Sodium 23      [23 11:01]  Urine Urea Nitrogen 151.4      [23 @ 11:01]  Urine Potassium 65.3      [23 @ 11:01]  Urine Osmolality 325      [23 @ 11:01]

## 2023-01-28 NOTE — PROGRESS NOTE ADULT - PROBLEM SELECTOR PLAN 7
Does not appear to be acutely decompensated on exam  Last TTE from 3/21 with EF 27%, s/p ICD placement and interrogated in 2022   repeat TTE: 21%. Severe LV systolic dysfunction. decreased RV function     - c/w coreg 25 BID  - c/w entresto 24/26  -> hold given BOB   - c/w spironolactone 25mg -> hold given BOB   - d/c home torsemide -> IV lasix per cardiology preop

## 2023-01-28 NOTE — PROGRESS NOTE ADULT - ASSESSMENT
Pt is 42-year-old female past medical history coronary artery disease status post CABG x4 in 2018, CHF s/p ICD placement (interrogated 2022), DM, PAD s/p 3rd toe amputation who presents with left 2nd toe pain concerning for dry gangrene. Seen by podiatry and vascular teams with plans for possible transmetatarsal amputation. Course c/b worsening renal function.

## 2023-01-28 NOTE — CONSULT NOTE ADULT - ATTENDING COMMENTS
Patient with history of DM, HTN, PAD, CHF, CABG, CVA, diabetic retinopathy, admitted with left foot pain (gangrenous toe left foot) with course complicated by BOB.    1. BOB on CKD - baseline 1.15 ot 1.3.  Given high urinary protein with a history of DM and diabetic retinopathy, patient likely with diabetic renal disease.  Need to balance her cardiac function and volume status with that of her renal function.  For now, will provide low dose of Bumex and monitor closely.  BOB component could be related to contrast, possible cardiorenal element.  Please send uric acid levels with 1/29 labs.  2. Hyperkalemia - monitor with addition of dietary potassium restriction.  3. Hyperphosphatemia - recheck with 1/29 labs. Patient with history of DM, HTN, PAD, CHF, CABG, CVA, diabetic retinopathy, admitted with left foot pain (gangrenous toe left foot) with course complicated by BOB.      1. BOB on CKD - baseline 1.15 ot 1.3.  Given high urinary protein with a history of DM and diabetic retinopathy, patient likely with diabetic renal disease and potential additional component of arteriolonephrosclerosis.  Need to balance her cardiac function and volume status with that of her renal function.  For now, will provide low dose of Bumex and monitor closely.  BOB component could be related to contrast, possible cardiorenal element.  Urinalysis supportive of ATN.  Please send uric acid levels with 1/29 labs.  2. Hyperkalemia - monitor with addition of dietary potassium restriction.  3. Hyperphosphatemia - recheck with 1/29 labs.  4. Anemia - low tsat and ferritin.  Review occult.

## 2023-01-28 NOTE — PROGRESS NOTE ADULT - ASSESSMENT
42F presents with left foot 2nd digit dry gangrene and ischemic changes to dorsal MPJs 1-5.  - Pt seen and evaluated.  - Afebrile, WBC 10.59.  - Left Foot X-Ray: No gas, no osteomyelitis.  - Left foot 2nd digit dry gangrene, dehisced partial 3rd ray resection to sub-dermis, ischemic changes the dorsal MPJs 1-5, no drainage, no purulence, no acute signs of infection. Right foot no wounds, no acute signs of infection.  - Recommend continuing IV unasyn.  - No left foot wound culture taken 2/2 no acute signs of infection.  - SEBASTIÁN/PVR: Pending.  - CTA: Right distal peroneal artery and left PT/peroneal arteries occluded.  - Vasc added-on pt yesterday for LLE angio pending cath lab availability, appreciated.  - Recommend EP consult to interrogate pacemaker.  - Pod Plan: Left foot transmetatarsal amputation with tendoachilles lengthening pending SEBASTIÁN/PVRs and vasc recs.  - Please document medical/cardiac clearance for podiatric procedure under anesthesia.  - Seen with attending.     42F presents with left foot 2nd digit dry gangrene and ischemic changes to dorsal MPJs 1-5.  - Pt seen and evaluated.  - Afebrile, WBC 10.59.  - Left Foot X-Ray: No gas, no osteomyelitis.  - Left foot 2nd digit dry gangrene, dehisced partial 3rd ray resection to sub-dermis, ischemic changes the dorsal MPJs 1-5, no drainage, no purulence, no acute signs of infection. Right foot no wounds, no acute signs of infection.  - Recommend continuing IV unasyn.  - No left foot wound culture taken 2/2 no acute signs of infection.  - SEBASTIÁN/PVR: RABI ncv, RTBI 0.66, LABI 0.58, LTBI flat wvfms, BL wvfms multi-seg art disease.  - CTA: Right distal peroneal artery and left PT/peroneal arteries occluded.  - Vasc added-on pt yesterday for LLE angio pending cath lab availability, appreciated.  - Recommend EP consult to interrogate pacemaker.  - Pod Plan: Left foot transmetatarsal amputation with tendoachilles lengthening pending vasc recs.  - Please document medical/cardiac clearance for podiatric procedure under anesthesia.  - Seen with attending.

## 2023-01-28 NOTE — PROGRESS NOTE ADULT - PROBLEM SELECTOR PLAN 3
Cr 1.20 - 2.02    could be from contrast from CT angiogram on 1/25 or IV furosemide yesterday.     - hold further diuresis today per cards  - urine lytes   - repeat BMP in the afternoon reduced breath sound on R lung field  CXR 1/26: moderate R sided pleural effusion.   on RA, stable, no complaints of sob    could be secondary to CHF     - f/u CT chest non con  - f/u abdominal US   - possible IR consult next week to tap

## 2023-01-29 DIAGNOSIS — R65.10 SYSTEMIC INFLAMMATORY RESPONSE SYNDROME (SIRS) OF NON-INFECTIOUS ORIGIN WITHOUT ACUTE ORGAN DYSFUNCTION: ICD-10-CM

## 2023-01-29 LAB
ALBUMIN SERPL ELPH-MCNC: 1.8 G/DL — LOW (ref 3.3–5)
ALBUMIN SERPL ELPH-MCNC: 1.8 G/DL — LOW (ref 3.3–5)
ALBUMIN SERPL ELPH-MCNC: 2.1 G/DL — LOW (ref 3.3–5)
ALP SERPL-CCNC: 104 U/L — SIGNIFICANT CHANGE UP (ref 40–120)
ALP SERPL-CCNC: 105 U/L — SIGNIFICANT CHANGE UP (ref 40–120)
ALP SERPL-CCNC: 110 U/L — SIGNIFICANT CHANGE UP (ref 40–120)
ALT FLD-CCNC: 10 U/L — SIGNIFICANT CHANGE UP (ref 4–33)
ALT FLD-CCNC: 10 U/L — SIGNIFICANT CHANGE UP (ref 4–33)
ALT FLD-CCNC: 6 U/L — SIGNIFICANT CHANGE UP (ref 4–33)
ANION GAP SERPL CALC-SCNC: 12 MMOL/L — SIGNIFICANT CHANGE UP (ref 7–14)
ANION GAP SERPL CALC-SCNC: 13 MMOL/L — SIGNIFICANT CHANGE UP (ref 7–14)
ANION GAP SERPL CALC-SCNC: 16 MMOL/L — HIGH (ref 7–14)
APPEARANCE UR: ABNORMAL
APTT BLD: 33.5 SEC — SIGNIFICANT CHANGE UP (ref 27–36.3)
AST SERPL-CCNC: 12 U/L — SIGNIFICANT CHANGE UP (ref 4–32)
AST SERPL-CCNC: 14 U/L — SIGNIFICANT CHANGE UP (ref 4–32)
AST SERPL-CCNC: 17 U/L — SIGNIFICANT CHANGE UP (ref 4–32)
BACTERIA # UR AUTO: ABNORMAL
BASE EXCESS BLDV CALC-SCNC: -3.2 MMOL/L — LOW (ref -2–3)
BASE EXCESS BLDV CALC-SCNC: -3.7 MMOL/L — LOW (ref -2–3)
BASOPHILS # BLD AUTO: 0.04 K/UL — SIGNIFICANT CHANGE UP (ref 0–0.2)
BASOPHILS # BLD AUTO: 0.05 K/UL — SIGNIFICANT CHANGE UP (ref 0–0.2)
BASOPHILS NFR BLD AUTO: 0.3 % — SIGNIFICANT CHANGE UP (ref 0–2)
BASOPHILS NFR BLD AUTO: 0.4 % — SIGNIFICANT CHANGE UP (ref 0–2)
BILIRUB SERPL-MCNC: 0.2 MG/DL — SIGNIFICANT CHANGE UP (ref 0.2–1.2)
BILIRUB UR-MCNC: ABNORMAL
BLOOD GAS VENOUS COMPREHENSIVE RESULT: SIGNIFICANT CHANGE UP
BLOOD GAS VENOUS COMPREHENSIVE RESULT: SIGNIFICANT CHANGE UP
BUN SERPL-MCNC: 36 MG/DL — HIGH (ref 7–23)
BUN SERPL-MCNC: 36 MG/DL — HIGH (ref 7–23)
BUN SERPL-MCNC: 39 MG/DL — HIGH (ref 7–23)
CALCIUM SERPL-MCNC: 7.8 MG/DL — LOW (ref 8.4–10.5)
CALCIUM SERPL-MCNC: 8.1 MG/DL — LOW (ref 8.4–10.5)
CALCIUM SERPL-MCNC: 8.2 MG/DL — LOW (ref 8.4–10.5)
CHLORIDE BLDV-SCNC: 100 MMOL/L — SIGNIFICANT CHANGE UP (ref 96–108)
CHLORIDE BLDV-SCNC: 100 MMOL/L — SIGNIFICANT CHANGE UP (ref 96–108)
CHLORIDE SERPL-SCNC: 94 MMOL/L — LOW (ref 98–107)
CHLORIDE SERPL-SCNC: 97 MMOL/L — LOW (ref 98–107)
CHLORIDE SERPL-SCNC: 97 MMOL/L — LOW (ref 98–107)
CO2 BLDV-SCNC: 24.9 MMOL/L — SIGNIFICANT CHANGE UP (ref 22–26)
CO2 BLDV-SCNC: 25 MMOL/L — SIGNIFICANT CHANGE UP (ref 22–26)
CO2 SERPL-SCNC: 20 MMOL/L — LOW (ref 22–31)
CO2 SERPL-SCNC: 21 MMOL/L — LOW (ref 22–31)
CO2 SERPL-SCNC: 22 MMOL/L — SIGNIFICANT CHANGE UP (ref 22–31)
COLOR SPEC: SIGNIFICANT CHANGE UP
COMMENT - URINE: SIGNIFICANT CHANGE UP
CREAT SERPL-MCNC: 3.9 MG/DL — HIGH (ref 0.5–1.3)
CREAT SERPL-MCNC: 3.95 MG/DL — HIGH (ref 0.5–1.3)
CREAT SERPL-MCNC: 4.05 MG/DL — HIGH (ref 0.5–1.3)
DIFF PNL FLD: NEGATIVE — SIGNIFICANT CHANGE UP
EGFR: 13 ML/MIN/1.73M2 — LOW
EGFR: 14 ML/MIN/1.73M2 — LOW
EGFR: 14 ML/MIN/1.73M2 — LOW
EOSINOPHIL # BLD AUTO: 0.03 K/UL — SIGNIFICANT CHANGE UP (ref 0–0.5)
EOSINOPHIL # BLD AUTO: 0.19 K/UL — SIGNIFICANT CHANGE UP (ref 0–0.5)
EOSINOPHIL NFR BLD AUTO: 0.2 % — SIGNIFICANT CHANGE UP (ref 0–6)
EOSINOPHIL NFR BLD AUTO: 1.5 % — SIGNIFICANT CHANGE UP (ref 0–6)
EPI CELLS # UR: 3 /HPF — SIGNIFICANT CHANGE UP (ref 0–5)
GAS PNL BLDV: 129 MMOL/L — LOW (ref 136–145)
GAS PNL BLDV: 131 MMOL/L — LOW (ref 136–145)
GAS PNL BLDV: SIGNIFICANT CHANGE UP
GLUCOSE BLDC GLUCOMTR-MCNC: 116 MG/DL — HIGH (ref 70–99)
GLUCOSE BLDC GLUCOMTR-MCNC: 137 MG/DL — HIGH (ref 70–99)
GLUCOSE BLDC GLUCOMTR-MCNC: 144 MG/DL — HIGH (ref 70–99)
GLUCOSE BLDC GLUCOMTR-MCNC: 154 MG/DL — HIGH (ref 70–99)
GLUCOSE BLDC GLUCOMTR-MCNC: 160 MG/DL — HIGH (ref 70–99)
GLUCOSE BLDC GLUCOMTR-MCNC: 72 MG/DL — SIGNIFICANT CHANGE UP (ref 70–99)
GLUCOSE BLDV-MCNC: 118 MG/DL — HIGH (ref 70–99)
GLUCOSE BLDV-MCNC: 149 MG/DL — HIGH (ref 70–99)
GLUCOSE SERPL-MCNC: 121 MG/DL — HIGH (ref 70–99)
GLUCOSE SERPL-MCNC: 125 MG/DL — HIGH (ref 70–99)
GLUCOSE SERPL-MCNC: 151 MG/DL — HIGH (ref 70–99)
GLUCOSE UR QL: NEGATIVE — SIGNIFICANT CHANGE UP
HCO3 BLDV-SCNC: 23 MMOL/L — SIGNIFICANT CHANGE UP (ref 22–29)
HCO3 BLDV-SCNC: 24 MMOL/L — SIGNIFICANT CHANGE UP (ref 22–29)
HCT VFR BLD CALC: 26.8 % — LOW (ref 34.5–45)
HCT VFR BLD CALC: 27.8 % — LOW (ref 34.5–45)
HCT VFR BLD CALC: 28 % — LOW (ref 34.5–45)
HCT VFR BLDA CALC: 23 % — LOW (ref 34.5–46.5)
HCT VFR BLDA CALC: 25 % — LOW (ref 34.5–46.5)
HGB BLD CALC-MCNC: 7.6 G/DL — LOW (ref 11.5–15.5)
HGB BLD CALC-MCNC: 8.3 G/DL — LOW (ref 11.5–15.5)
HGB BLD-MCNC: 7.5 G/DL — LOW (ref 11.5–15.5)
HGB BLD-MCNC: 7.9 G/DL — LOW (ref 11.5–15.5)
HGB BLD-MCNC: 8.1 G/DL — LOW (ref 11.5–15.5)
HYALINE CASTS # UR AUTO: 1 /LPF — SIGNIFICANT CHANGE UP (ref 0–7)
IANC: 10.13 K/UL — HIGH (ref 1.8–7.4)
IANC: 11.77 K/UL — HIGH (ref 1.8–7.4)
IMM GRANULOCYTES NFR BLD AUTO: 0.4 % — SIGNIFICANT CHANGE UP (ref 0–0.9)
IMM GRANULOCYTES NFR BLD AUTO: 0.6 % — SIGNIFICANT CHANGE UP (ref 0–0.9)
INR BLD: 1.48 RATIO — HIGH (ref 0.88–1.16)
KETONES UR-MCNC: ABNORMAL
LACTATE BLDV-MCNC: 1.6 MMOL/L — SIGNIFICANT CHANGE UP (ref 0.5–2)
LACTATE BLDV-MCNC: 1.7 MMOL/L — SIGNIFICANT CHANGE UP (ref 0.5–2)
LEUKOCYTE ESTERASE UR-ACNC: ABNORMAL
LYMPHOCYTES # BLD AUTO: 1.28 K/UL — SIGNIFICANT CHANGE UP (ref 1–3.3)
LYMPHOCYTES # BLD AUTO: 1.3 K/UL — SIGNIFICANT CHANGE UP (ref 1–3.3)
LYMPHOCYTES # BLD AUTO: 10.4 % — LOW (ref 13–44)
LYMPHOCYTES # BLD AUTO: 9.1 % — LOW (ref 13–44)
MAGNESIUM SERPL-MCNC: 1.8 MG/DL — SIGNIFICANT CHANGE UP (ref 1.6–2.6)
MAGNESIUM SERPL-MCNC: 1.9 MG/DL — SIGNIFICANT CHANGE UP (ref 1.6–2.6)
MAGNESIUM SERPL-MCNC: 1.9 MG/DL — SIGNIFICANT CHANGE UP (ref 1.6–2.6)
MCHC RBC-ENTMCNC: 24 PG — LOW (ref 27–34)
MCHC RBC-ENTMCNC: 24.3 PG — LOW (ref 27–34)
MCHC RBC-ENTMCNC: 24.5 PG — LOW (ref 27–34)
MCHC RBC-ENTMCNC: 28 GM/DL — LOW (ref 32–36)
MCHC RBC-ENTMCNC: 28.4 GM/DL — LOW (ref 32–36)
MCHC RBC-ENTMCNC: 28.9 GM/DL — LOW (ref 32–36)
MCV RBC AUTO: 84.5 FL — SIGNIFICANT CHANGE UP (ref 80–100)
MCV RBC AUTO: 84.6 FL — SIGNIFICANT CHANGE UP (ref 80–100)
MCV RBC AUTO: 86.7 FL — SIGNIFICANT CHANGE UP (ref 80–100)
MONOCYTES # BLD AUTO: 0.75 K/UL — SIGNIFICANT CHANGE UP (ref 0–0.9)
MONOCYTES # BLD AUTO: 0.89 K/UL — SIGNIFICANT CHANGE UP (ref 0–0.9)
MONOCYTES NFR BLD AUTO: 6 % — SIGNIFICANT CHANGE UP (ref 2–14)
MONOCYTES NFR BLD AUTO: 6.3 % — SIGNIFICANT CHANGE UP (ref 2–14)
NEUTROPHILS # BLD AUTO: 10.13 K/UL — HIGH (ref 1.8–7.4)
NEUTROPHILS # BLD AUTO: 11.77 K/UL — HIGH (ref 1.8–7.4)
NEUTROPHILS NFR BLD AUTO: 81.1 % — HIGH (ref 43–77)
NEUTROPHILS NFR BLD AUTO: 83.7 % — HIGH (ref 43–77)
NITRITE UR-MCNC: NEGATIVE — SIGNIFICANT CHANGE UP
NRBC # BLD: 0 /100 WBCS — SIGNIFICANT CHANGE UP (ref 0–0)
NRBC # FLD: 0 K/UL — SIGNIFICANT CHANGE UP (ref 0–0)
PCO2 BLDV: 50 MMHG — HIGH (ref 39–42)
PCO2 BLDV: 52 MMHG — HIGH (ref 39–42)
PH BLDV: 7.26 — LOW (ref 7.32–7.43)
PH BLDV: 7.28 — LOW (ref 7.32–7.43)
PH UR: 5.5 — SIGNIFICANT CHANGE UP (ref 5–8)
PHOSPHATE SERPL-MCNC: 6.7 MG/DL — HIGH (ref 2.5–4.5)
PHOSPHATE SERPL-MCNC: 6.7 MG/DL — HIGH (ref 2.5–4.5)
PLATELET # BLD AUTO: 548 K/UL — HIGH (ref 150–400)
PLATELET # BLD AUTO: 576 K/UL — HIGH (ref 150–400)
PLATELET # BLD AUTO: 583 K/UL — HIGH (ref 150–400)
PO2 BLDV: 37 MMHG — SIGNIFICANT CHANGE UP
PO2 BLDV: 39 MMHG — SIGNIFICANT CHANGE UP
POTASSIUM BLDV-SCNC: 4.4 MMOL/L — SIGNIFICANT CHANGE UP (ref 3.5–5.1)
POTASSIUM BLDV-SCNC: 4.4 MMOL/L — SIGNIFICANT CHANGE UP (ref 3.5–5.1)
POTASSIUM SERPL-MCNC: 4.4 MMOL/L — SIGNIFICANT CHANGE UP (ref 3.5–5.3)
POTASSIUM SERPL-MCNC: 4.5 MMOL/L — SIGNIFICANT CHANGE UP (ref 3.5–5.3)
POTASSIUM SERPL-MCNC: 4.7 MMOL/L — SIGNIFICANT CHANGE UP (ref 3.5–5.3)
POTASSIUM SERPL-SCNC: 4.4 MMOL/L — SIGNIFICANT CHANGE UP (ref 3.5–5.3)
POTASSIUM SERPL-SCNC: 4.5 MMOL/L — SIGNIFICANT CHANGE UP (ref 3.5–5.3)
POTASSIUM SERPL-SCNC: 4.7 MMOL/L — SIGNIFICANT CHANGE UP (ref 3.5–5.3)
PROT SERPL-MCNC: 6.1 G/DL — SIGNIFICANT CHANGE UP (ref 6–8.3)
PROT SERPL-MCNC: 6.3 G/DL — SIGNIFICANT CHANGE UP (ref 6–8.3)
PROT SERPL-MCNC: 6.6 G/DL — SIGNIFICANT CHANGE UP (ref 6–8.3)
PROT UR-MCNC: ABNORMAL
PROTHROM AB SERPL-ACNC: 17.2 SEC — HIGH (ref 10.5–13.4)
RBC # BLD: 3.09 M/UL — LOW (ref 3.8–5.2)
RBC # BLD: 3.29 M/UL — LOW (ref 3.8–5.2)
RBC # BLD: 3.31 M/UL — LOW (ref 3.8–5.2)
RBC # FLD: 17.9 % — HIGH (ref 10.3–14.5)
RBC # FLD: 18 % — HIGH (ref 10.3–14.5)
RBC # FLD: 18 % — HIGH (ref 10.3–14.5)
RBC CASTS # UR COMP ASSIST: 1 /HPF — SIGNIFICANT CHANGE UP (ref 0–4)
SAO2 % BLDV: 52.9 % — SIGNIFICANT CHANGE UP
SAO2 % BLDV: 57.8 % — SIGNIFICANT CHANGE UP
SODIUM SERPL-SCNC: 130 MMOL/L — LOW (ref 135–145)
SODIUM SERPL-SCNC: 131 MMOL/L — LOW (ref 135–145)
SODIUM SERPL-SCNC: 131 MMOL/L — LOW (ref 135–145)
SP GR SPEC: >1.03 — SIGNIFICANT CHANGE UP (ref 1.01–1.05)
URATE SERPL-MCNC: 9.6 MG/DL — HIGH (ref 2.5–7)
UROBILINOGEN FLD QL: SIGNIFICANT CHANGE UP
WBC # BLD: 12.5 K/UL — HIGH (ref 3.8–10.5)
WBC # BLD: 13.9 K/UL — HIGH (ref 3.8–10.5)
WBC # BLD: 14.07 K/UL — HIGH (ref 3.8–10.5)
WBC # FLD AUTO: 12.5 K/UL — HIGH (ref 3.8–10.5)
WBC # FLD AUTO: 13.9 K/UL — HIGH (ref 3.8–10.5)
WBC # FLD AUTO: 14.07 K/UL — HIGH (ref 3.8–10.5)
WBC UR QL: 6 /HPF — HIGH (ref 0–5)

## 2023-01-29 PROCEDURE — 71045 X-RAY EXAM CHEST 1 VIEW: CPT | Mod: 26

## 2023-01-29 PROCEDURE — 99232 SBSQ HOSP IP/OBS MODERATE 35: CPT

## 2023-01-29 PROCEDURE — 74176 CT ABD & PELVIS W/O CONTRAST: CPT | Mod: 26

## 2023-01-29 PROCEDURE — 76700 US EXAM ABDOM COMPLETE: CPT | Mod: 26

## 2023-01-29 PROCEDURE — 71250 CT THORAX DX C-: CPT | Mod: 26

## 2023-01-29 PROCEDURE — 36000 PLACE NEEDLE IN VEIN: CPT

## 2023-01-29 PROCEDURE — 99291 CRITICAL CARE FIRST HOUR: CPT | Mod: GC

## 2023-01-29 PROCEDURE — 93010 ELECTROCARDIOGRAM REPORT: CPT

## 2023-01-29 PROCEDURE — 99233 SBSQ HOSP IP/OBS HIGH 50: CPT | Mod: GC

## 2023-01-29 RX ORDER — DIPHENHYDRAMINE HCL 50 MG
25 CAPSULE ORAL ONCE
Refills: 0 | Status: COMPLETED | OUTPATIENT
Start: 2023-01-29 | End: 2023-01-29

## 2023-01-29 RX ORDER — PIPERACILLIN AND TAZOBACTAM 4; .5 G/20ML; G/20ML
3.38 INJECTION, POWDER, LYOPHILIZED, FOR SOLUTION INTRAVENOUS ONCE
Refills: 0 | Status: DISCONTINUED | OUTPATIENT
Start: 2023-01-29 | End: 2023-01-29

## 2023-01-29 RX ORDER — VANCOMYCIN HCL 1 G
1000 VIAL (EA) INTRAVENOUS ONCE
Refills: 0 | Status: COMPLETED | OUTPATIENT
Start: 2023-01-29 | End: 2023-01-29

## 2023-01-29 RX ORDER — NOREPINEPHRINE BITARTRATE/D5W 8 MG/250ML
0.05 PLASTIC BAG, INJECTION (ML) INTRAVENOUS
Qty: 8 | Refills: 0 | Status: DISCONTINUED | OUTPATIENT
Start: 2023-01-29 | End: 2023-02-03

## 2023-01-29 RX ORDER — CEFEPIME 1 G/1
1000 INJECTION, POWDER, FOR SOLUTION INTRAMUSCULAR; INTRAVENOUS ONCE
Refills: 0 | Status: COMPLETED | OUTPATIENT
Start: 2023-01-29 | End: 2023-01-29

## 2023-01-29 RX ORDER — CALAMINE AND ZINC OXIDE AND PHENOL 160; 10 MG/ML; MG/ML
1 LOTION TOPICAL THREE TIMES A DAY
Refills: 0 | Status: DISCONTINUED | OUTPATIENT
Start: 2023-01-29 | End: 2023-02-22

## 2023-01-29 RX ORDER — GABAPENTIN 400 MG/1
400 CAPSULE ORAL ONCE
Refills: 0 | Status: DISCONTINUED | OUTPATIENT
Start: 2023-01-29 | End: 2023-01-29

## 2023-01-29 RX ORDER — ACETAMINOPHEN 500 MG
1000 TABLET ORAL ONCE
Refills: 0 | Status: COMPLETED | OUTPATIENT
Start: 2023-01-29 | End: 2023-01-29

## 2023-01-29 RX ORDER — METRONIDAZOLE 500 MG
TABLET ORAL
Refills: 0 | Status: DISCONTINUED | OUTPATIENT
Start: 2023-01-29 | End: 2023-01-30

## 2023-01-29 RX ORDER — INSULIN GLARGINE 100 [IU]/ML
3 INJECTION, SOLUTION SUBCUTANEOUS AT BEDTIME
Refills: 0 | Status: DISCONTINUED | OUTPATIENT
Start: 2023-01-29 | End: 2023-01-29

## 2023-01-29 RX ORDER — METRONIDAZOLE 500 MG
500 TABLET ORAL ONCE
Refills: 0 | Status: COMPLETED | OUTPATIENT
Start: 2023-01-29 | End: 2023-01-29

## 2023-01-29 RX ORDER — CEFEPIME 1 G/1
INJECTION, POWDER, FOR SOLUTION INTRAMUSCULAR; INTRAVENOUS
Refills: 0 | Status: DISCONTINUED | OUTPATIENT
Start: 2023-01-29 | End: 2023-01-30

## 2023-01-29 RX ORDER — METRONIDAZOLE 500 MG
500 TABLET ORAL EVERY 8 HOURS
Refills: 0 | Status: DISCONTINUED | OUTPATIENT
Start: 2023-01-29 | End: 2023-01-30

## 2023-01-29 RX ORDER — DIPHENHYDRAMINE HCL 50 MG
25 CAPSULE ORAL ONCE
Refills: 0 | Status: DISCONTINUED | OUTPATIENT
Start: 2023-01-29 | End: 2023-01-29

## 2023-01-29 RX ORDER — ALBUMIN HUMAN 25 %
250 VIAL (ML) INTRAVENOUS ONCE
Refills: 0 | Status: COMPLETED | OUTPATIENT
Start: 2023-01-29 | End: 2023-01-29

## 2023-01-29 RX ORDER — CEFEPIME 1 G/1
1000 INJECTION, POWDER, FOR SOLUTION INTRAMUSCULAR; INTRAVENOUS EVERY 24 HOURS
Refills: 0 | Status: DISCONTINUED | OUTPATIENT
Start: 2023-01-30 | End: 2023-01-30

## 2023-01-29 RX ORDER — CHLORHEXIDINE GLUCONATE 213 G/1000ML
1 SOLUTION TOPICAL
Refills: 0 | Status: DISCONTINUED | OUTPATIENT
Start: 2023-01-29 | End: 2023-02-03

## 2023-01-29 RX ORDER — HYDROMORPHONE HYDROCHLORIDE 2 MG/ML
0.5 INJECTION INTRAMUSCULAR; INTRAVENOUS; SUBCUTANEOUS ONCE
Refills: 0 | Status: DISCONTINUED | OUTPATIENT
Start: 2023-01-29 | End: 2023-01-29

## 2023-01-29 RX ADMIN — Medication 1: at 09:01

## 2023-01-29 RX ADMIN — AMPICILLIN SODIUM AND SULBACTAM SODIUM 200 GRAM(S): 250; 125 INJECTION, POWDER, FOR SUSPENSION INTRAMUSCULAR; INTRAVENOUS at 06:10

## 2023-01-29 RX ADMIN — Medication 6.71 MICROGRAM(S)/KG/MIN: at 21:13

## 2023-01-29 RX ADMIN — Medication 125 MILLILITER(S): at 10:00

## 2023-01-29 RX ADMIN — Medication 1000 MILLIGRAM(S): at 18:00

## 2023-01-29 RX ADMIN — ATORVASTATIN CALCIUM 80 MILLIGRAM(S): 80 TABLET, FILM COATED ORAL at 21:24

## 2023-01-29 RX ADMIN — Medication 81 MILLIGRAM(S): at 14:53

## 2023-01-29 RX ADMIN — HYDROMORPHONE HYDROCHLORIDE 1 MILLIGRAM(S): 2 INJECTION INTRAMUSCULAR; INTRAVENOUS; SUBCUTANEOUS at 21:40

## 2023-01-29 RX ADMIN — Medication 100 MILLIGRAM(S): at 14:52

## 2023-01-29 RX ADMIN — Medication 25 MILLIGRAM(S): at 17:20

## 2023-01-29 RX ADMIN — OXYCODONE HYDROCHLORIDE 5 MILLIGRAM(S): 5 TABLET ORAL at 18:59

## 2023-01-29 RX ADMIN — Medication 400 MILLIGRAM(S): at 17:38

## 2023-01-29 RX ADMIN — HYDROMORPHONE HYDROCHLORIDE 1 MILLIGRAM(S): 2 INJECTION INTRAMUSCULAR; INTRAVENOUS; SUBCUTANEOUS at 15:05

## 2023-01-29 RX ADMIN — Medication 325 MILLIGRAM(S): at 14:53

## 2023-01-29 RX ADMIN — HYDROMORPHONE HYDROCHLORIDE 1 MILLIGRAM(S): 2 INJECTION INTRAMUSCULAR; INTRAVENOUS; SUBCUTANEOUS at 21:24

## 2023-01-29 RX ADMIN — Medication 250 MILLIGRAM(S): at 10:00

## 2023-01-29 RX ADMIN — HYDROMORPHONE HYDROCHLORIDE 0.5 MILLIGRAM(S): 2 INJECTION INTRAMUSCULAR; INTRAVENOUS; SUBCUTANEOUS at 17:00

## 2023-01-29 RX ADMIN — HYDROMORPHONE HYDROCHLORIDE 1 MILLIGRAM(S): 2 INJECTION INTRAMUSCULAR; INTRAVENOUS; SUBCUTANEOUS at 14:50

## 2023-01-29 RX ADMIN — ENOXAPARIN SODIUM 40 MILLIGRAM(S): 100 INJECTION SUBCUTANEOUS at 06:10

## 2023-01-29 RX ADMIN — HYDROMORPHONE HYDROCHLORIDE 0.5 MILLIGRAM(S): 2 INJECTION INTRAMUSCULAR; INTRAVENOUS; SUBCUTANEOUS at 19:15

## 2023-01-29 RX ADMIN — Medication 100 MILLIGRAM(S): at 21:44

## 2023-01-29 RX ADMIN — OXYCODONE HYDROCHLORIDE 5 MILLIGRAM(S): 5 TABLET ORAL at 17:39

## 2023-01-29 RX ADMIN — PANTOPRAZOLE SODIUM 40 MILLIGRAM(S): 20 TABLET, DELAYED RELEASE ORAL at 07:38

## 2023-01-29 RX ADMIN — CEFEPIME 1000 MILLIGRAM(S): 1 INJECTION, POWDER, FOR SOLUTION INTRAMUSCULAR; INTRAVENOUS at 16:49

## 2023-01-29 NOTE — CONSULT NOTE ADULT - SUBJECTIVE AND OBJECTIVE BOX
Primary Service HPI:  Pt is 42-year-old female past medical history coronary artery disease status post CABG x4 in 2018, CHF s/p ICD placement (interrogated ), DM, PAD s/p 3rd toe amputation who presents with left 2nd toe pain. Patient reports that she was discharged from [facility] post amputation beginning of 2022. Reports that the trigger was a cart running over her toe, did not have any issues with her 2nd toe at the time. She was put on IV antibiotics for a month post-discharge and completed 1/3. Has not followed up with her podiatrist/vascular doctor since the surgery Patient reports that she has been walking without issues until 5 days ago when she noticed blackening of the toe as well as severe pain. Notices that it is worse when she lays flat or walks around, had been taking tylenol every few hours without relief. Reports feeling chills occasionally but denies fevers at home. Reports poor PO intake due to loss of appetite every since she was discharged. Denies chest pain, palpitations, worsening SOB, diarrhea, dysuria.     ED course:   130/72, HR 72, afebrile, 100% on RA   S/p 1 dose vanc/zosyn  (2023 23:16)    CCU consulted for hypotension. Patient had an RRT noted to be hypotensive to 60s and hypothermic to 94F with reported dizziness. Was given some IVF 500cc during RRT with mild improvement in BP however still hypotensive so started on IV pressors. Patient denies chest pain, shortness of breath or dizziness currently. On tele was in NSR during RRT.     PMH:   MI (myocardial infarction)    Type 2 diabetes mellitus    Hypertension    Hyperlipidemia    Coronary artery disease    Cerebrovascular accident (CVA)    H/O congestive heart disease    History of cardiomyopathy    Thrombus      PSH:   No significant past surgical history    S/P     H/O tubal ligation    Coronary artery disease      Medications:   acetaminophen     Tablet .. 650 milliGRAM(s) Oral every 6 hours PRN  albumin human  5% IVPB 250 milliLiter(s) IV Intermittent once  aspirin enteric coated 81 milliGRAM(s) Oral daily  atorvastatin 80 milliGRAM(s) Oral at bedtime  cefepime   IVPB      dextrose 5%. 1000 milliLiter(s) IV Continuous <Continuous>  dextrose 5%. 1000 milliLiter(s) IV Continuous <Continuous>  dextrose 50% Injectable 25 Gram(s) IV Push once  dextrose 50% Injectable 12.5 Gram(s) IV Push once  dextrose 50% Injectable 25 Gram(s) IV Push once  dextrose Oral Gel 15 Gram(s) Oral once PRN  enoxaparin Injectable 40 milliGRAM(s) SubCutaneous every 24 hours  ferrous    sulfate 325 milliGRAM(s) Oral daily  glucagon  Injectable 1 milliGRAM(s) IntraMuscular once  HYDROmorphone  Injectable 1 milliGRAM(s) IV Push every 4 hours PRN  influenza   Vaccine 0.5 milliLiter(s) IntraMuscular once  insulin lispro (ADMELOG) corrective regimen sliding scale   SubCutaneous three times a day before meals  insulin lispro (ADMELOG) corrective regimen sliding scale   SubCutaneous at bedtime  magnesium sulfate  IVPB 2 Gram(s) IV Intermittent once  metroNIDAZOLE  IVPB      norepinephrine Infusion 0.05 MICROgram(s)/kG/Min IV Continuous <Continuous>  ondansetron Injectable 4 milliGRAM(s) IV Push every 8 hours PRN  oxyCODONE    IR 5 milliGRAM(s) Oral every 4 hours PRN  pantoprazole    Tablet 40 milliGRAM(s) Oral before breakfast  vancomycin  IVPB 1000 milliGRAM(s) IV Intermittent once    Allergies:  No Known Allergies    FAMILY HISTORY:  Family history of heart disease (Father)      Social History:  Smoking:  Alcohol:  Drugs:    Review of Systems:  Constitutional: [ ] Fever [ ] Chills [ ] Fatigue [ ] Weight change   HEENT: [ ] Blurred vision [ ] Eye Pain [ ] Headache [ ] Runny nose [ ] Sore Throat   Respiratory: [ ] Cough [ ] Wheezing [ ] Shortness of breath  Cardiovascular: [ ] Chest Pain [ ] Palpitations [ ] GODINEZ [ ] PND [ ] Orthopnea  Gastrointestinal: [ ] Abdominal Pain [ ] Diarrhea [ ] Constipation [ ] Hemorrhoids [ ] Nausea [ ] Vomiting  Genitourinary: [ ] Nocturia [ ] Dysuria [ ] Incontinence  Extremities: [ ] Swelling [ ] Joint Pain  Neurologic: [ ] Focal deficit [ ] Paresthesias [ ] Syncope  Lymphatic: [ ] Swelling [ ] Lymphadenopathy   Skin: [ ] Rash [ ] Ecchymoses [ ] Wounds [ ] Lesions  Psychiatry: [ ] Depression [ ] Suicidal/Homicidal Ideation [ ] Anxiety [ ] Sleep Disturbances  [X ] 10 point review of systems is otherwise negative except as mentioned above            [ ]Unable to obtain    Physical Exam:  T(C): 36.7 (23 @ 06:10), Max: 36.8 (23 @ 18:41)  HR: 70 (23 @ 06:10) (70 - 76)  BP: 68/40 (23 @ 06:10) (68/40 - 114/71)  RR: 18 (23 @ 06:10) (18 - 18)  SpO2: 99% (23 @ 06:10) (99% - 100%)  Wt(kg): --     @ 07:  -   @ 07:00  --------------------------------------------------------  IN: 440 mL / OUT: 200 mL / NET: 240 mL      Daily     Daily     Appearance: NAD but lethargic   Eyes: PERRL, EOMI  HENT: Normal oral mucosa, NC/AT  Cardiovascular: normal S1 and S2, RRR, no m/r/g, no edema, +JVD  Respiratory: Decreased breath sounds right lower lung base with crackles in left lower lung base  Gastrointestinal: Soft, non-tender, non-distended, BS+  Extremities: poor pulses in LE bilaterally warm in the upper legs and cold in feet bilaterally   Neurologic: Non-focal lethargic  Lymphatic: No lymphadenopathy  Skin: 2nd L toe dry gangrene     Cardiovascular Diagnostic Testing:    EKG:  NSR with prolonged QTc 513      Echo:  Study Date: 2023  Location: [id number]: [facility]  Study quality: Technically good  Referring Physician: Von Gayle MD  Blood Pressure: 133/65 mmHg  Height: 167 cm  Weight: 71 kg  BSA: 1.8 m2  ------------------------------------------------------------------------  PROCEDURE: Transthoracic echocardiogram with 2-D, M-Mode  and complete spectral and color flow Doppler.  Intravenous ultrasound enhancing agent was administered for  improved left ventricular endocardial border definition.  Following the intravenous injection of ultrasound enhancing  agent, harmonic imaging was performed.  INDICATION: Abnormal electrocardiogram (ECG)(EKG) (R94.31)  ------------------------------------------------------------------------  DIMENSIONS:  Dimensions:     Normal Values:  LA:     3.6 cm    2.0 - 4.0 cm  Ao:     3.4 cm    2.0 - 3.8 cm  SEPTUM: 0.7 cm    0.6 - 1.2 cm  PWT:    0.6 cm    0.6 - 1.1 cm  LVIDd:  5.4 cm    3.0 - 5.6 cm  LVIDs:    ---     1.8 - 4.0 cm  Derived Variables:  LVMI: 66 g/m2  RWT: 0.22  Ejection Fraction (Modified Zheng Rule): 21 %  ------------------------------------------------------------------------  OBSERVATIONS:  Mitral Valve: Tethered mitral valve leaflets with normal  opening. Mild mitral regurgitation.  Aortic Root: Normal aortic root.  Aortic Valve: Normal trileaflet aortic valve. Minimal  aortic regurgitation.  Left Atrium: Normal left atrium.  LA volume index = 31  cc/m2.  Left Ventricle: Endocardial visualization enhanced with  intravenous injection of echo contrast (Definity). Severe  global left ventricular systolic dysfunction. Normal left  ventricular internal dimensions and wall thicknesses.  Increased E/e'  is consistent with elevated left  ventricular filling pressure.  Right Heart: A device wire is noted in the right heart.  Right ventricular enlargement with decreased right  ventricular systolic function. Normal tricuspid valve.  Moderate tricuspid regurgitation. Normal pulmonic valve.  Mild pulmonic regurgitation.  Pericardium/PleuraNormal pericardium with no pericardial  effusion.  Hemodynamic: Estimated right ventricular systolic pressure  equals 51 mm Hg, assuming right atrial pressure equals 10  mm Hg, consistent with moderate pulmonary hypertension.  ------------------------------------------------------------------------  CONCLUSIONS:  1. Tethered mitral valve leaflets with normal opening.  2. Normal trileaflet aortic valve.  3. Normal left ventricular internal dimensions and wall  thicknesses.  4. Endocardial visualization enhanced with intravenous  injection of echo contrast (Definity). Severe global left  ventricular systolic dysfunction.  5. Increased E/e'  is consistent with elevated left  ventricular filling pressure.  6. A device wire is noted in the right heart.  7. Right ventricular enlargement with decreased right  ventricular systolic function.      Labs:                        7.5    12.50 )-----------( 548      ( 2023 06:49 )             26.8         131<L>  |  97<L>  |  36<H>  ----------------------------<  151<H>  4.5   |  22  |  3.90<H>    Ca    8.1<L>      2023 06:49  Phos  6.7       Mg     1.90         TPro  6.1  /  Alb  1.8<L>  /  TBili  0.2  /  DBili  x   /  AST  14  /  ALT  10  /  AlkPhos  110        CARDIAC MARKERS ( 2023 06:36 )  x     / x     / 30 U/L / x     / x          Serum Pro-Brain Natriuretic Peptide: [id number] pg/mL ( @ 07:25)

## 2023-01-29 NOTE — PROVIDER CONTACT NOTE (CHANGE IN STATUS NOTIFICATION) - BACKGROUND
41 yo female PMH CAB s/p CABGx4, CHF, DM, PAD s/p 3rd toe amputation. P/W left 2nd toe pain concern for dry gangrene.

## 2023-01-29 NOTE — PROGRESS NOTE ADULT - ASSESSMENT
Pt is 42-year-old female past medical history coronary artery disease status post CABG x4 in 2018, CHF s/p ICD placement (interrogated 2022), DM, PAD s/p 3rd toe amputation who presents with left 2nd toe pain concerning for dry gangrene. Seen by podiatry and vascular teams with plans for possible transmetatarsal amputation. Course c/b worsening renal function, and hypotension with hypothermia requiring rapid response called, responded to fluids.  Pt is 42-year-old female past medical history coronary artery disease status post CABG x4 in 2018, CHF s/p ICD placement (interrogated 2022), DM, PAD s/p 3rd toe amputation who presents with left 2nd toe pain concerning for dry gangrene. Seen by podiatry and vascular teams with plans for possible transmetatarsal amputation. Course c/b worsening renal function, and hypotension with hypothermia requiring rapid response called, responded to fluids, however repeat rapid called for hypotension, and with increased size of R sided pleural effusion, transfer to MICU for pressor support.

## 2023-01-29 NOTE — PROGRESS NOTE ADULT - PROBLEM SELECTOR PLAN 9
DVT PPx: lovenox 40mg   Diet: CC   Dispo: pending amputation surgery     Code Status: Full Code DVT PPx: lovenox 40mg   Diet: CC   Dispo: MICU, pending medical course and amputation surgery     Code Status: Full Code

## 2023-01-29 NOTE — PROGRESS NOTE ADULT - PROBLEM SELECTOR PLAN 7
Does not appear to be acutely decompensated on exam  Last TTE from 3/21 with EF 27%, s/p ICD placement and interrogated in 2022   repeat TTE: 21%. Severe LV systolic dysfunction. decreased RV function     - c/w coreg 25 BID  - c/w entresto 24/26  -> hold given BOB   - c/w spironolactone 25mg -> hold given BOB   - d/c home torsemide -> IV lasix per cardiology preop Does not appear to be acutely decompensated on exam  Last TTE from 3/21 with EF 27%, s/p ICD placement and interrogated in 2022   repeat TTE: 21%. Severe LV systolic dysfunction. decreased RV function     - c/w coreg 25 BID  -> hold given hypotension   - c/w entresto 24/26  -> hold given BOB   - c/w spironolactone 25mg -> hold given BOB   - d/c home torsemide -> IV lasix per cardiology preop -> hold diuresis given BOB and hypotension On metformin 500mg BID, glimepiride 4mg, lantus 8U at bedtime   A1c 7.1    - 5U lantus -> ISS only in the setting of worsening BOB

## 2023-01-29 NOTE — PROGRESS NOTE ADULT - ATTENDING COMMENTS
42-year-old female w/CAD s/p CABG x4 in 2018, HFrEF (EF 21% on 1/26) s/p ICD placement, IDDM2, PAD s/p 3rd toe amputation who presents with left 2nd toe pain concerning for dry gangrene. Seen by podiatry and vascular, recs appreciated, plans for angiogram with vascular and transmetatarsal amputation with podiatry.     #Septic shock   - likely source could gangrene of 2nd left toe  - elevated ESR/CRP. X-ray foot without gas or osseous involvement  - Podiatry and vascular recs appreciated  - plan for angiogram with vascular prior to L transmetatarsal amputation per podiatry. Angiogram on hold, given ARF   - patient was on Unasyn   - morning of 1/29, noted to be hypotensive, hypothermic. RRT was called and pt was started on IVF and placed on a miguel hugger.   - upon my assessment in the morning, patient continued to be hypotensive despite being administered 1L of IVF.   - Second RRT was called, patient was broaden to vanc/cefepime/flagyl, started on levo, and transferred to MICU.     #ARF   - Cr continues to worsen and UOP has dropped off   - nephrology consulted, recs appreciated   - check urine ltyes   - check renal US     #HFrEF   - TTE on 1/26 with EF  21%, severe global left ventricular systolic dysfunction, Increased E/e'  is consistent with elevated left ventricular filling pressure. Right ventricular enlargement with decreased right ventricular systolic function  - CXR with right mod pleural effusion, BNP 30k+   - given shock, HOLD coreg 25 BID, torsemide 20mg, entresto 24/26,  spironolactone 25mg  - interrogate ICD    - cards recs     #Mod right pleural effusion   - patient may have had it drained at [place] in the past  - check CT chest for further characterization   - therapeutic and diagnostic tap with IR on Monday     Discussed with HS 8

## 2023-01-29 NOTE — PROGRESS NOTE ADULT - PROBLEM SELECTOR PLAN 8
DVT PPx: lovenox 40mg   Diet: CC   Dispo: pending amputation surgery     Code Status: Full Code Does not appear to be acutely decompensated on exam  Last TTE from 3/21 with EF 27%, s/p ICD placement and interrogated in 2022   repeat TTE: 21%. Severe LV systolic dysfunction. decreased RV function     - c/w coreg 25 BID  -> hold given hypotension   - c/w entresto 24/26  -> hold given BOB   - c/w spironolactone 25mg -> hold given BOB   - d/c home torsemide -> IV lasix per cardiology preop -> hold diuresis given BOB and hypotension

## 2023-01-29 NOTE — CONSULT NOTE ADULT - ASSESSMENT
Note not final until signed by attending       Tala Neumann MD  Cardiology Fellow PGY-5  Phone: [phone]    For all New Consults  [email or web address]   Login: [email or web address] 42-year-old female past medical history coronary artery disease status post CABG x4 (LIMA-diag, MARGAUX-LAD, Radial-OM, SVG-PDA) in 2018, CHF s/p AICD placement (interrogated 2022), DM, PAD s/p 3rd toe amputation who presents with left 2nd toe pain. Course complicated by hypotension CCU called for evaluation.    1. Hypotension - suspect given hypothermia with rising WBC and 2nd toe dry gangrene likely sepsis induced leading to septic shock. Has a UA+ but has significant amount of epithelial cells. Has known low EF but lower suspicion for cardiogenic induced currently  2. HFrEF - volume overloaded on exam     RECOMMENDATIONS:  - Accepted to MICU on IV pressors  - Agree with plan for CRRT given patient has had poor UOP and would avoid further IV resuscitation   - Can check a central venous O2 to calculate CO/CI if concerned for cardiogenic component   - Broad spectrum abx as per primary team   - Entresto and Aldactone on hold given BOB  - Coreg on hold given hypotension     Note not final until signed by attending       Tala Neumann MD  Cardiology Fellow PGY-5  Phone: [phone]    For all New Consults  [email or web address]   Login: [email or web address] 42-year-old female past medical history coronary artery disease status post CABG x4 (LIMA-diag, MARGAUX-LAD, Radial-OM, SVG-PDA) in 2018, CHF s/p AICD placement (interrogated 2022), DM, PAD s/p 3rd toe amputation who presents with left 2nd toe pain. Course complicated by hypotension CCU called for evaluation.    1. Hypotension - suspect given hypothermia with rising WBC and 2nd toe dry gangrene likely sepsis induced leading to septic shock. Has a UA+ but has significant amount of epithelial cells. Has known low EF but lower suspicion for cardiogenic induced currently  2. HFrEF - volume overloaded on exam     RECOMMENDATIONS:  - Accepted to MICU on IV pressors  - Agree with plan for CRRT given patient has had poor UOP and would avoid further IV resuscitation   - Can check a central venous O2 to calculate CO/CI if concerned for cardiogenic component   - Broad spectrum abx and cultures as per primary team   - Entresto and Aldactone on hold given BOB  - Coreg on hold given hypotension     Note not final until signed by attending       Tala Neumann MD  Cardiology Fellow PGY-5  Phone: [phone]    For all New Consults  [email or web address]   Login: eleonora 42-year-old female past medical history coronary artery disease status post CABG x4 (LIMA-diag, MARGAUX-LAD, Radial-OM, SVG-PDA) in 2018, CHF s/p AICD placement (interrogated 2022), DM, PAD s/p 3rd toe amputation who presents with left 2nd toe pain. Course complicated by hypotension CCU called for evaluation.    1. Hypotension - suspect given hypothermia with rising WBC and 2nd toe dry gangrene likely sepsis induced leading to septic shock. Has a UA+ but has significant amount of epithelial cells. Has known low EF but lower suspicion for cardiogenic induced currently  2. HFrEF - volume overloaded on exam     RECOMMENDATIONS:  - Accepted to MICU on IV pressors  - Agree with plan for CRRT given patient has had poor UOP and would avoid further IV resuscitation   - Can check a central venous O2 to calculate CO/CI if concerned for cardiogenic component   - Broad spectrum abx and cultures as per primary team   - Anemia w/u as per primary team  - Entresto and Aldactone on hold given BOB  - Coreg on hold given hypotension     Note not final until signed by attending       Tala Neumann MD  Cardiology Fellow PGY-5  Phone: [phone]    For all New Consults  [email or web address]   Login: [email or web address]

## 2023-01-29 NOTE — CONSULT NOTE ADULT - ATTENDING COMMENTS
Patient seen and examined during afternoon rounds.  Assessment and recommendations were reviewed with the Cardiology fellow, and as outlined above.

## 2023-01-29 NOTE — PROGRESS NOTE ADULT - PROBLEM SELECTOR PLAN 1
Necrosis of 2nd digit i/s/o PAD, concerning for dry gangrene, elevated ESR/CRP. No signs of active infection   X-ray foot without gas or osseous involvement, limited to soft tissue   Podiatry and vascular recs appreciated  CT Angio prelim read with occluded left posterior tibial and peroneal arteries  No DVT on venous duplex    - c/w IV unasyn   - pain management with IV 1mg dilaudid PRN for severe pain, oxycodone for moderate pain, acetaminophen for mild pain   - SEBASTIÁN/PVRs bilateral with moderate-severe peripheral vascular disease  - Holding off on angiogram of L leg with vascular team due to worsening renal function   - plans for L transmetatarsal amputation pending further vascular recs on date  - cardiology consulted for medical clearance; >11% risk of postop complication.     s/p IV 40mg furosemide 1/26, but with new BOB with Cr 2.02 from 1.20 day prior, cardiology recs to hold diuresis or IV fluid. RRT called 1/29 early morning for hypotension with systolic at 68.  Noted to be hypothermic to 94 during rapid, also with WC at 12, concerning for sepsis  Was being covered on IV Unasyn, likely needed broader spectrum   likely source is her L toe gangrene vs. urine   UA positive for RBC, bacteria, and leukocyte esterase. however, patient does not endorse urinary symptoms    - d/c Unasyn -> IV meropenem (avoid zosyn and vanc combination given nephrotoxicity)  - f/u BCx sent during RRT 1/29  - f/u MRSA/MSSA swab RRT called 1/29 early morning for hypotension with systolic at 68.  Noted to be hypothermic to 94 during rapid, also with WC at 12, concerning for sepsis  Was being covered on IV Unasyn, likely needed broader spectrum   likely source is her L toe gangrene vs. urine   UA positive for RBC, bacteria, and leukocyte esterase. however, patient does not endorse urinary symptoms    - d/c Unasyn -> IV vanc, flagyl, and cefepime given concern for sepsis (and no zosyn due to BOB)  - f/u BCx sent during RRT 1/29  - f/u MRSA/MSSA swab

## 2023-01-29 NOTE — PROGRESS NOTE ADULT - SUBJECTIVE AND OBJECTIVE BOX
PROGRESS NOTE:   Authored by Dr. Edwin Joseph    Patient is a 42y old  Female who presents with a chief complaint of Toe pain (2023 14:40)      SUBJECTIVE / OVERNIGHT EVENTS:    ADDITIONAL REVIEW OF SYSTEMS:    MEDICATIONS  (STANDING):  ampicillin/sulbactam  IVPB 3 Gram(s) IV Intermittent every 6 hours  aspirin enteric coated 81 milliGRAM(s) Oral daily  atorvastatin 80 milliGRAM(s) Oral at bedtime  dextrose 5%. 1000 milliLiter(s) (100 mL/Hr) IV Continuous <Continuous>  dextrose 5%. 1000 milliLiter(s) (50 mL/Hr) IV Continuous <Continuous>  dextrose 50% Injectable 25 Gram(s) IV Push once  dextrose 50% Injectable 12.5 Gram(s) IV Push once  dextrose 50% Injectable 25 Gram(s) IV Push once  enoxaparin Injectable 40 milliGRAM(s) SubCutaneous every 24 hours  ferrous    sulfate 325 milliGRAM(s) Oral daily  glucagon  Injectable 1 milliGRAM(s) IntraMuscular once  influenza   Vaccine 0.5 milliLiter(s) IntraMuscular once  insulin glargine Injectable (LANTUS) 5 Unit(s) SubCutaneous at bedtime  insulin lispro (ADMELOG) corrective regimen sliding scale   SubCutaneous three times a day before meals  insulin lispro (ADMELOG) corrective regimen sliding scale   SubCutaneous at bedtime  magnesium sulfate  IVPB 2 Gram(s) IV Intermittent once  pantoprazole    Tablet 40 milliGRAM(s) Oral before breakfast    MEDICATIONS  (PRN):  acetaminophen     Tablet .. 650 milliGRAM(s) Oral every 6 hours PRN Temp greater or equal to 38C (100.4F), Mild Pain (1 - 3)  dextrose Oral Gel 15 Gram(s) Oral once PRN Blood Glucose LESS THAN 70 milliGRAM(s)/deciliter  HYDROmorphone  Injectable 1 milliGRAM(s) IV Push every 4 hours PRN Severe Pain (7 - 10)  ondansetron Injectable 4 milliGRAM(s) IV Push every 8 hours PRN Nausea and/or Vomiting  oxyCODONE    IR 5 milliGRAM(s) Oral every 4 hours PRN Moderate Pain (4 - 6)      CAPILLARY BLOOD GLUCOSE      POCT Blood Glucose.: 72 mg/dL (2023 06:12)  POCT Blood Glucose.: 105 mg/dL (2023 21:08)  POCT Blood Glucose.: 109 mg/dL (2023 17:28)  POCT Blood Glucose.: 133 mg/dL (2023 13:07)  POCT Blood Glucose.: 110 mg/dL (2023 10:18)  POCT Blood Glucose.: 72 mg/dL (2023 08:49)    I&O's Summary    2023 07:01  -  2023 07:00  --------------------------------------------------------  IN: 440 mL / OUT: 200 mL / NET: 240 mL        PHYSICAL EXAM:  Vital Signs Last 24 Hrs  T(C): 36.4 (2023 21:12), Max: 36.8 (2023 18:41)  T(F): 97.5 (2023 21:12), Max: 98.2 (2023 18:41)  HR: 73 (2023 21:12) (73 - 76)  BP: 96/60 (2023 21:12) (96/60 - 114/71)  BP(mean): --  RR: 18 (2023 21:12) (18 - 18)  SpO2: 99% (2023 21:12) (99% - 100%)    Parameters below as of 2023 21:12  Patient On (Oxygen Delivery Method): room air        GENERAL: NAD, lying comfortably in bed  HEAD: Atraumatic, normocephalic  EYES: EOMI b/l, conjunctiva and sclera clear  NECK: Supple, No JVD, No LAD  RESPIRATORY: Normal respiratory effort; lungs are clear to auscultation bilaterally  CARDIOVASCULAR: Regular rate and rhythm, normal S1 and S2, no murmur/rub/gallop; No lower extremity edema  ABDOMEN: Nontender, normoactive bowel sounds, no rebound/guarding; No hepatosplenomegaly  MUSCULOSKELETAL: no clubbing or cyanosis of digits; no joint swelling or tenderness to palpation  NEURO: Non focal   SKIN:   PSYCH: A+O to person, place, and time; affect appropriate    LABS:                          7.7    10.59 )-----------( 580      ( 2023 06:36 )             27.1         134<L>  |  100  |  32<H>  ----------------------------<  64<L>  5.3   |  23  |  2.99<H>    Ca    8.9      2023 06:36  Phos  6.3       Mg     1.80         TPro  6.6  /  Alb  2.0<L>  /  TBili  0.3  /  DBili  x   /  AST  17  /  ALT  10  /  AlkPhos  131<H>        CARDIAC MARKERS ( 2023 06:36 )  x     / x     / 30 U/L / x     / x          Urinalysis Basic - ( 2023 11:01 )    Color: Yellow / Appearance: Slightly Turbid / S.036 / pH: x  Gluc: x / Ketone: Trace  / Bili: Negative / Urobili: <2 mg/dL   Blood: x / Protein: 300 mg/dL / Nitrite: Negative   Leuk Esterase: Small / RBC: 7 /HPF / WBC 34 /HPF   Sq Epi: x / Non Sq Epi: >27 /HPF / Bacteria: Moderate            RADIOLOGY:    Consulted note reviewed  Reviewed Imaging personally   PROGRESS NOTE:   Authored by Dr. Edwin Joseph    Patient is a 42y old  Female who presents with a chief complaint of Toe pain (28 Jan 2023 14:40)    SUBJECTIVE / OVERNIGHT EVENTS:   rapid team called for hypotension overnight to systolic 68. Patient awake during the rapid, but was diaphoretic, and endorsed weakness and dizziness. She was also found to be hypothermic, concerning for sepsis, likely from her L toe gangrene. Cultures were sent and patient responded to fluids.     This morning,     ADDITIONAL REVIEW OF SYSTEMS:  + hypothermia        MEDICATIONS  (STANDING):  ampicillin/sulbactam  IVPB 3 Gram(s) IV Intermittent every 6 hours  aspirin enteric coated 81 milliGRAM(s) Oral daily  atorvastatin 80 milliGRAM(s) Oral at bedtime  dextrose 5%. 1000 milliLiter(s) (100 mL/Hr) IV Continuous <Continuous>  dextrose 5%. 1000 milliLiter(s) (50 mL/Hr) IV Continuous <Continuous>  dextrose 50% Injectable 25 Gram(s) IV Push once  dextrose 50% Injectable 12.5 Gram(s) IV Push once  dextrose 50% Injectable 25 Gram(s) IV Push once  enoxaparin Injectable 40 milliGRAM(s) SubCutaneous every 24 hours  ferrous    sulfate 325 milliGRAM(s) Oral daily  glucagon  Injectable 1 milliGRAM(s) IntraMuscular once  influenza   Vaccine 0.5 milliLiter(s) IntraMuscular once  insulin glargine Injectable (LANTUS) 5 Unit(s) SubCutaneous at bedtime  insulin lispro (ADMELOG) corrective regimen sliding scale   SubCutaneous three times a day before meals  insulin lispro (ADMELOG) corrective regimen sliding scale   SubCutaneous at bedtime  magnesium sulfate  IVPB 2 Gram(s) IV Intermittent once  pantoprazole    Tablet 40 milliGRAM(s) Oral before breakfast    MEDICATIONS  (PRN):  acetaminophen     Tablet .. 650 milliGRAM(s) Oral every 6 hours PRN Temp greater or equal to 38C (100.4F), Mild Pain (1 - 3)  dextrose Oral Gel 15 Gram(s) Oral once PRN Blood Glucose LESS THAN 70 milliGRAM(s)/deciliter  HYDROmorphone  Injectable 1 milliGRAM(s) IV Push every 4 hours PRN Severe Pain (7 - 10)  ondansetron Injectable 4 milliGRAM(s) IV Push every 8 hours PRN Nausea and/or Vomiting  oxyCODONE    IR 5 milliGRAM(s) Oral every 4 hours PRN Moderate Pain (4 - 6)      CAPILLARY BLOOD GLUCOSE      POCT Blood Glucose.: 72 mg/dL (29 Jan 2023 06:12)  POCT Blood Glucose.: 105 mg/dL (28 Jan 2023 21:08)  POCT Blood Glucose.: 109 mg/dL (28 Jan 2023 17:28)  POCT Blood Glucose.: 133 mg/dL (28 Jan 2023 13:07)  POCT Blood Glucose.: 110 mg/dL (28 Jan 2023 10:18)  POCT Blood Glucose.: 72 mg/dL (28 Jan 2023 08:49)    I&O's Summary    28 Jan 2023 07:01  -  29 Jan 2023 07:00  --------------------------------------------------------  IN: 440 mL / OUT: 200 mL / NET: 240 mL        PHYSICAL EXAM:  Vital Signs Last 24 Hrs  T(C): 36.4 (28 Jan 2023 21:12), Max: 36.8 (28 Jan 2023 18:41)  T(F): 97.5 (28 Jan 2023 21:12), Max: 98.2 (28 Jan 2023 18:41)  HR: 73 (28 Jan 2023 21:12) (73 - 76)  BP: 96/60 (28 Jan 2023 21:12) (96/60 - 114/71)  BP(mean): --  RR: 18 (28 Jan 2023 21:12) (18 - 18)  SpO2: 99% (28 Jan 2023 21:12) (99% - 100%)    Parameters below as of 28 Jan 2023 21:12  Patient On (Oxygen Delivery Method): room air        GENERAL: NAD, lying comfortably in bed  HEAD: Atraumatic, normocephalic  EYES: EOMI b/l, conjunctiva and sclera clear  NECK: Supple, No JVD, No LAD  RESPIRATORY: Normal respiratory effort; lungs are clear to auscultation bilaterally  CARDIOVASCULAR: Regular rate and rhythm, normal S1 and S2, no murmur/rub/gallop; No lower extremity edema  ABDOMEN: Nontender, normoactive bowel sounds, no rebound/guarding; No hepatosplenomegaly  MUSCULOSKELETAL: no clubbing or cyanosis of digits; no joint swelling or tenderness to palpation  NEURO: Non focal   SKIN:   PSYCH: A+O to person, place, and time; affect appropriate        LABS:  01-28 @ 06:36 Creatine 30 U/L [25 - 170]                        7.5    12.50 )-----------( 548      ( 29 Jan 2023 06:49 )             26.8     01-29    131<L>  |  97<L>  |  36<H>  ----------------------------<  151<H>  4.5   |  22  |  3.90<H>    Ca    8.1<L>      29 Jan 2023 06:49  Phos  6.7     01-29  Mg     1.90     01-29    TPro  6.1  /  Alb  1.8<L>  /  TBili  0.2  /  DBili  x   /  AST  14  /  ALT  10  /  AlkPhos  110  01-29            RADIOLOGY:    Consulted note reviewed  Reviewed Imaging personally   PROGRESS NOTE:   Authored by Dr. Edwin Joseph    Patient is a 42y old  Female who presents with a chief complaint of Toe pain (28 Jan 2023 14:40)    SUBJECTIVE / OVERNIGHT EVENTS:   rapid team called for hypotension overnight to systolic 68. Patient awake during the rapid, but was diaphoretic, and endorsed weakness and dizziness. She was also found to be hypothermic, concerning for sepsis, likely from her L toe gangrene. Cultures were sent and patient responded to fluids.     Second rapid called in the morning for persistent hypotension. Patient awake and alert. Started on 2nd fluid bolus, also bradycardic to 50s. Given patient is not making urine, bladder scanned with 71cc in bladder. CXR showing increased size of R sided pleural effusion. Not a good candidate for midodrine given bradycardia, MICU consulted for transfer for pressor support.     ADDITIONAL REVIEW OF SYSTEMS:  + hypothermia  generalized weakness, fatigue  no sob  no abd pain  no N/V  + toe pain      MEDICATIONS  (STANDING):  ampicillin/sulbactam  IVPB 3 Gram(s) IV Intermittent every 6 hours  aspirin enteric coated 81 milliGRAM(s) Oral daily  atorvastatin 80 milliGRAM(s) Oral at bedtime  dextrose 5%. 1000 milliLiter(s) (100 mL/Hr) IV Continuous <Continuous>  dextrose 5%. 1000 milliLiter(s) (50 mL/Hr) IV Continuous <Continuous>  dextrose 50% Injectable 25 Gram(s) IV Push once  dextrose 50% Injectable 12.5 Gram(s) IV Push once  dextrose 50% Injectable 25 Gram(s) IV Push once  enoxaparin Injectable 40 milliGRAM(s) SubCutaneous every 24 hours  ferrous    sulfate 325 milliGRAM(s) Oral daily  glucagon  Injectable 1 milliGRAM(s) IntraMuscular once  influenza   Vaccine 0.5 milliLiter(s) IntraMuscular once  insulin glargine Injectable (LANTUS) 5 Unit(s) SubCutaneous at bedtime  insulin lispro (ADMELOG) corrective regimen sliding scale   SubCutaneous three times a day before meals  insulin lispro (ADMELOG) corrective regimen sliding scale   SubCutaneous at bedtime  magnesium sulfate  IVPB 2 Gram(s) IV Intermittent once  pantoprazole    Tablet 40 milliGRAM(s) Oral before breakfast    MEDICATIONS  (PRN):  acetaminophen     Tablet .. 650 milliGRAM(s) Oral every 6 hours PRN Temp greater or equal to 38C (100.4F), Mild Pain (1 - 3)  dextrose Oral Gel 15 Gram(s) Oral once PRN Blood Glucose LESS THAN 70 milliGRAM(s)/deciliter  HYDROmorphone  Injectable 1 milliGRAM(s) IV Push every 4 hours PRN Severe Pain (7 - 10)  ondansetron Injectable 4 milliGRAM(s) IV Push every 8 hours PRN Nausea and/or Vomiting  oxyCODONE    IR 5 milliGRAM(s) Oral every 4 hours PRN Moderate Pain (4 - 6)      CAPILLARY BLOOD GLUCOSE      POCT Blood Glucose.: 72 mg/dL (29 Jan 2023 06:12)  POCT Blood Glucose.: 105 mg/dL (28 Jan 2023 21:08)  POCT Blood Glucose.: 109 mg/dL (28 Jan 2023 17:28)  POCT Blood Glucose.: 133 mg/dL (28 Jan 2023 13:07)  POCT Blood Glucose.: 110 mg/dL (28 Jan 2023 10:18)  POCT Blood Glucose.: 72 mg/dL (28 Jan 2023 08:49)    I&O's Summary    28 Jan 2023 07:01  -  29 Jan 2023 07:00  --------------------------------------------------------  IN: 440 mL / OUT: 200 mL / NET: 240 mL        PHYSICAL EXAM:  Vital Signs Last 24 Hrs  T(C): 36.4 (28 Jan 2023 21:12), Max: 36.8 (28 Jan 2023 18:41)  T(F): 97.5 (28 Jan 2023 21:12), Max: 98.2 (28 Jan 2023 18:41)  HR: 73 (28 Jan 2023 21:12) (73 - 76)  BP: 96/60 (28 Jan 2023 21:12) (96/60 - 114/71)  BP(mean): --  RR: 18 (28 Jan 2023 21:12) (18 - 18)  SpO2: 99% (28 Jan 2023 21:12) (99% - 100%)    Parameters below as of 28 Jan 2023 21:12  Patient On (Oxygen Delivery Method): room air        GENERAL: lying in bed appearing fatigued  HEAD: Atraumatic, normocephalic  EYES: conjunctiva and sclera clear  NECK: Supple   RESPIRATORY: Normal respiratory effort; absent breath sound on R middle and lower lung field. Otherwise no crackles wheezes or rhonchi on L.   CARDIOVASCULAR: Regular rate and rhythm, normal S1 and S2, no murmur/rub/gallop; 2+ pitting edema on R leg more pronounced than L.   ABDOMEN: nondistended, soft.   MUSCULOSKELETAL: eschar around L 2nd toe. 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   NEURO: Non focal   SKIN: no other findings   PSYCH: A+O to person, place, and time         LABS:  01-28 @ 06:36 Creatine 30 U/L [25 - 170]                        7.5    12.50 )-----------( 548      ( 29 Jan 2023 06:49 )             26.8     01-29    131<L>  |  97<L>  |  36<H>  ----------------------------<  151<H>  4.5   |  22  |  3.90<H>    Ca    8.1<L>      29 Jan 2023 06:49  Phos  6.7     01-29  Mg     1.90     01-29    TPro  6.1  /  Alb  1.8<L>  /  TBili  0.2  /  DBili  x   /  AST  14  /  ALT  10  /  AlkPhos  110  01-29            RADIOLOGY:    Consulted note reviewed  Reviewed Imaging personally

## 2023-01-29 NOTE — CHART NOTE - NSCHARTNOTEFT_GEN_A_CORE
MICU Accept Note    CHIEF COMPLAINT: shock    HPI / INTERVAL HISTORY:    42-year-old female past medical history coronary artery disease status post CABG x4 in 2018, CHF s/p ICD placement (interrogated ), DM, PAD s/p 3rd toe amputation who presents with left 2nd toe pain. Patient reports that she was discharged from [facility] post amputation beginning of 2022. Reports that the trigger was a cart running over her toe, did not have any issues with her 2nd toe at the time. She was put on IV antibiotics for a month post-discharge and completed 1/3. Has not followed up with her podiatrist/vascular doctor since the surgery Patient reports that she has been walking without issues until 5 days prior to admission, when she noticed blackening of the toe as well as severe pain. Patient found to have dry gangrene of 2nd toe.     During hospital course patient was evaluated by podiatry and vascular w/ discussions of possible TMA. Xray of foot w/o gas or osseous involvement. Started on unasyn CT angio shows marked b/l LE PVD, occluded left posterior tibial and peroneal arteries, Vascular was planning for LLE angio in cath lab which was held due to worsening renal function, which also led to hyperkalemia. RRT called on  for hypotension refractory to fluids. Started on Levo for pressor support. Patient's antibiotics broadened to vanc/cefepime/flagyl.           Type 2 diabetes mellitus      Hypertension      Hyperlipidemia      Coronary artery disease  had CABG x 4      Cerebrovascular accident (CVA)  residual of right sided weakness      H/O congestive heart disease  ICM/chronic systolic CHF as per Dr. Diana Menjivar      History of cardiomyopathy      Thrombus  left ventricular mural thrombus      S/P       H/O tubal ligation  2016      Coronary artery disease  CABG x 4 in 2018          FAMILY HISTORY:  Family history of heart disease (Father)          Allergies    No Known Allergies    Intolerances          REVIEW OF SYSTEMS:  Constitutional: No fevers, +chills. No weight loss/weight gain  HEENT: No vision problems, eye pain, nasal congestion, rhinorrhea, sore throat, dysphagia  CV: No chest pain, orthopnea, palpitations  Resp: No cough, dyspnea, wheezing, hemoptysis  GI: No nausea, vomiting, diarrhea, constipation, abdominal pain  : [ ] dysuria [ ] nocturia [ ] hematuria [ ] increased urinary frequency  Musculoskeletal: Patient w/ LE pain b/l greatest at L 2nd digit  Skin: [ ] rash [ ] itch  Neurological: [ ] headache [ ] dizziness [ ] syncope [ ] weakness [ ] numbness  Psychiatric: [ ] anxiety [ ] depression  Endocrine: [ ] diabetes [ ] thyroid problem  Hematologic/Lymphatic: [ ] anemia [ ] bleeding problem  Allergic/Immunologic: [ ] itchy eyes [ ] nasal discharge [ ] hives [ ] angioedema  [ X ] All other systems negative  [ ] Unable to assess ROS because ________    OBJECTIVE:  ICU Vital Signs Last 24 Hrs  T(C): 35.4 (2023 11:00), Max: 36.8 (2023 18:41)  T(F): 95.8 (2023 11:00), Max: 98.2 (2023 18:41)  HR: 60 (2023 13:00) (57 - 76)  BP: 99/53 (2023 13:00) (68/40 - 103/59)  BP(mean): 80 (2023 13:00) (63 - 80)  ABP: --  ABP(mean): --  RR: 11 (2023 13:00) (11 - 18)  SpO2: 100% (2023 13:00) (97% - 100%)    O2 Parameters below as of 2023 13:00  Patient On (Oxygen Delivery Method): room air               @ : @ 07:00  --------------------------------------------------------  IN: 440 mL / OUT: 200 mL / NET: 240 mL     @ 07:  -   @ 14:26  --------------------------------------------------------  IN: 0 mL / OUT: 0 mL / NET: 0 mL      CAPILLARY BLOOD GLUCOSE      POCT Blood Glucose.: 160 mg/dL (2023 09:28)      PHYSICAL EXAM:  GENERAL: lying in bed appearing fatigued  HEAD: Atraumatic, normocephalic  EYES: conjunctiva and sclera clear  NECK: Supple   RESPIRATORY: Normal respiratory effort; diminished breath sound on R lower lung field. L Lung CTA.   CARDIOVASCULAR: Regular rate and rhythm, normal S1 and S2, no murmur/rub/gallop; 2+ pitting edema on R leg more pronounced than L.   ABDOMEN: nondistended, soft.   MUSCULOSKELETAL: eschar around L 2nd toe. 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   NEURO: Non focal. Moving all extremities   SKIN: no other findings   PSYCH: AO4    HOSPITAL MEDICATIONS:  MEDICATIONS  (STANDING):  aspirin enteric coated 81 milliGRAM(s) Oral daily  atorvastatin 80 milliGRAM(s) Oral at bedtime  cefepime   IVPB      chlorhexidine 4% Liquid 1 Application(s) Topical <User Schedule>  dextrose 5%. 1000 milliLiter(s) (100 mL/Hr) IV Continuous <Continuous>  dextrose 5%. 1000 milliLiter(s) (50 mL/Hr) IV Continuous <Continuous>  dextrose 50% Injectable 25 Gram(s) IV Push once  dextrose 50% Injectable 12.5 Gram(s) IV Push once  dextrose 50% Injectable 25 Gram(s) IV Push once  enoxaparin Injectable 40 milliGRAM(s) SubCutaneous every 24 hours  ferrous    sulfate 325 milliGRAM(s) Oral daily  glucagon  Injectable 1 milliGRAM(s) IntraMuscular once  influenza   Vaccine 0.5 milliLiter(s) IntraMuscular once  insulin lispro (ADMELOG) corrective regimen sliding scale   SubCutaneous three times a day before meals  insulin lispro (ADMELOG) corrective regimen sliding scale   SubCutaneous at bedtime  magnesium sulfate  IVPB 2 Gram(s) IV Intermittent once  metroNIDAZOLE  IVPB      metroNIDAZOLE  IVPB 500 milliGRAM(s) IV Intermittent once  metroNIDAZOLE  IVPB 500 milliGRAM(s) IV Intermittent every 8 hours  norepinephrine Infusion 0.05 MICROgram(s)/kG/Min (6.71 mL/Hr) IV Continuous <Continuous>  pantoprazole    Tablet 40 milliGRAM(s) Oral before breakfast    MEDICATIONS  (PRN):  acetaminophen     Tablet .. 650 milliGRAM(s) Oral every 6 hours PRN Temp greater or equal to 38C (100.4F), Mild Pain (1 - 3)  dextrose Oral Gel 15 Gram(s) Oral once PRN Blood Glucose LESS THAN 70 milliGRAM(s)/deciliter  HYDROmorphone  Injectable 1 milliGRAM(s) IV Push every 4 hours PRN Severe Pain (7 - 10)  ondansetron Injectable 4 milliGRAM(s) IV Push every 8 hours PRN Nausea and/or Vomiting  oxyCODONE    IR 5 milliGRAM(s) Oral every 4 hours PRN Moderate Pain (4 - 6)      LABS:                        8.1    13.90 )-----------( 583      ( 2023 09:57 )             28.0     Hgb Trend: 8.1<--, 7.5<--, 7.7<--, 8.2<--, 7.8<--      131<L>  |  97<L>  |  36<H>  ----------------------------<  125<H>  4.4   |  21<L>  |  3.95<H>    Ca    8.2<L>      2023 09:57  Phos  6.7       Mg     1.90         TPro  6.3  /  Alb  1.8<L>  /  TBili  0.2  /  DBili  x   /  AST  17  /  ALT  6   /  AlkPhos  104      Creatinine Trend: 3.95<--, 3.90<--, 2.99<--, 2.61<--, 2.46<--, 2.07<--  PT/INR - ( 2023 09:57 )   PT: 17.2 sec;   INR: 1.48 ratio         PTT - ( 2023 09:57 )  PTT:33.5 sec  Urinalysis Basic - ( 2023 11:01 )    Color: Yellow / Appearance: Slightly Turbid / S.036 / pH: x  Gluc: x / Ketone: Trace  / Bili: Negative / Urobili: <2 mg/dL   Blood: x / Protein: 300 mg/dL / Nitrite: Negative   Leuk Esterase: Small / RBC: 7 /HPF / WBC 34 /HPF   Sq Epi: x / Non Sq Epi: >27 /HPF / Bacteria: Moderate        Venous Blood Gas:   @ 09:57  7.28/50/39/24/57.8  VBG Lactate: 1.7  Venous Blood Gas:   @ 06:49  7.26/52/37/23/52.9  VBG Lactate: 1.6        ASSESSMENT AND PLAN:  Mr./Mrs./Ms. is a ___    NEURO    A&Ox3, no active issues    CARDIAC    RRR, no active issues at this time    RESPIRATORY     - Satting well on RA, no active issues at this time    RENAL     - trend Cr   - montior I&Os    GI    - Diet:    ENDO:  - f/u HbA1C, TSH  - monitor glucose    HEME/ONC  - Trend H/H  - A/C:    ID   - Stable WBC, afebrile  - observe off abx    LINES/PPX  - peripherals in tact  - DVT PPx:  - GOC:     Charlie Vasquez  Internal Medicine, PGY-1  Please Contact via TEAMS MICU Accept Note    CHIEF COMPLAINT: shock    HPI / INTERVAL HISTORY:    42-year-old female past medical history coronary artery disease status post CABG x4 in 2018, CHF s/p ICD placement (interrogated ), DM, PAD s/p 3rd toe amputation who presents with left 2nd toe pain. Patient reports that she was discharged from [facility] post amputation beginning of 2022. Reports that the trigger was a cart running over her toe, did not have any issues with her 2nd toe at the time. She was put on IV antibiotics for a month post-discharge and completed 1/3. Has not followed up with her podiatrist/vascular doctor since the surgery Patient reports that she has been walking without issues until 5 days prior to admission, when she noticed blackening of the toe as well as severe pain. Patient found to have dry gangrene of 2nd toe.     During hospital course patient was evaluated by podiatry and vascular w/ discussions of possible TMA. Xray of foot w/o gas or osseous involvement. Started on unasyn CT angio shows marked b/l LE PVD, occluded left posterior tibial and peroneal arteries, Vascular was planning for LLE angio in cath lab which was held due to worsening renal function, which also led to hyperkalemia. RRT called on  for hypotension refractory to fluids. Started on Levo for pressor support. Patient's antibiotics broadened to vanc/cefepime/flagyl.           Type 2 diabetes mellitus      Hypertension      Hyperlipidemia      Coronary artery disease  had CABG x 4      Cerebrovascular accident (CVA)  residual of right sided weakness  H/O congestive heart disease  ICM/chronic systolic CHF as per Dr. Diana Menjivar  History of cardiomyopathy  Thrombus  left ventricular mural thrombus  S/P   H/O tubal ligation  2016  Coronary artery disease  CABG x 4 in 2018      FAMILY HISTORY:  Family history of heart disease (Father)  Allergies  No Known Allergies  Intolerances          REVIEW OF SYSTEMS:  Constitutional: No fevers, +chills. No weight loss/weight gain  HEENT: No vision problems, eye pain, nasal congestion, rhinorrhea, sore throat, dysphagia  CV: No chest pain, orthopnea, palpitations  Resp: No cough, dyspnea, wheezing, hemoptysis  GI: No nausea, vomiting, diarrhea, constipation, abdominal pain  : [ ] dysuria [ ] nocturia [ ] hematuria [ ] increased urinary frequency  Musculoskeletal: Patient w/ LE pain b/l greatest at L 2nd digit  Skin: [ ] rash [ ] itch  Neurological: [ ] headache [ ] dizziness [ ] syncope [ ] weakness [ ] numbness  Psychiatric: [ ] anxiety [ ] depression  Endocrine: [ ] diabetes [ ] thyroid problem  Hematologic/Lymphatic: [ ] anemia [ ] bleeding problem  Allergic/Immunologic: [ ] itchy eyes [ ] nasal discharge [ ] hives [ ] angioedema  [ X ] All other systems negative  [ ] Unable to assess ROS because ________    OBJECTIVE:  ICU Vital Signs Last 24 Hrs  T(C): 35.4 (2023 11:00), Max: 36.8 (2023 18:41)  T(F): 95.8 (2023 11:00), Max: 98.2 (2023 18:41)  HR: 60 (2023 13:00) (57 - 76)  BP: 99/53 (2023 13:00) (68/40 - 103/59)  BP(mean): 80 (2023 13:00) (63 - 80)  ABP: --  ABP(mean): --  RR: 11 (2023 13:00) (11 - 18)  SpO2: 100% (2023 13:00) (97% - 100%)    O2 Parameters below as of 2023 13:00  Patient On (Oxygen Delivery Method): room air               @ : @ 07:00  --------------------------------------------------------  IN: 440 mL / OUT: 200 mL / NET: 240 mL     @ 07:  -   @ 14:26  --------------------------------------------------------  IN: 0 mL / OUT: 0 mL / NET: 0 mL      CAPILLARY BLOOD GLUCOSE      POCT Blood Glucose.: 160 mg/dL (2023 09:28)      PHYSICAL EXAM:  GENERAL: lying in bed appearing fatigued  HEAD: Atraumatic, normocephalic  EYES: conjunctiva and sclera clear  NECK: Supple   RESPIRATORY: Normal respiratory effort; diminished breath sound on R lower lung field. L Lung CTA.   CARDIOVASCULAR: Regular rate and rhythm, normal S1 and S2, no murmur/rub/gallop; 2+ pitting edema on R leg more pronounced than L.   ABDOMEN: nondistended, soft.   MUSCULOSKELETAL: eschar around L 2nd toe. 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   NEURO: Non focal. Moving all extremities   SKIN: no other findings   PSYCH: AO4    HOSPITAL MEDICATIONS:  MEDICATIONS  (STANDING):  aspirin enteric coated 81 milliGRAM(s) Oral daily  atorvastatin 80 milliGRAM(s) Oral at bedtime  cefepime   IVPB      chlorhexidine 4% Liquid 1 Application(s) Topical <User Schedule>  dextrose 5%. 1000 milliLiter(s) (100 mL/Hr) IV Continuous <Continuous>  dextrose 5%. 1000 milliLiter(s) (50 mL/Hr) IV Continuous <Continuous>  dextrose 50% Injectable 25 Gram(s) IV Push once  dextrose 50% Injectable 12.5 Gram(s) IV Push once  dextrose 50% Injectable 25 Gram(s) IV Push once  enoxaparin Injectable 40 milliGRAM(s) SubCutaneous every 24 hours  ferrous    sulfate 325 milliGRAM(s) Oral daily  glucagon  Injectable 1 milliGRAM(s) IntraMuscular once  influenza   Vaccine 0.5 milliLiter(s) IntraMuscular once  insulin lispro (ADMELOG) corrective regimen sliding scale   SubCutaneous three times a day before meals  insulin lispro (ADMELOG) corrective regimen sliding scale   SubCutaneous at bedtime  magnesium sulfate  IVPB 2 Gram(s) IV Intermittent once  metroNIDAZOLE  IVPB      metroNIDAZOLE  IVPB 500 milliGRAM(s) IV Intermittent once  metroNIDAZOLE  IVPB 500 milliGRAM(s) IV Intermittent every 8 hours  norepinephrine Infusion 0.05 MICROgram(s)/kG/Min (6.71 mL/Hr) IV Continuous <Continuous>  pantoprazole    Tablet 40 milliGRAM(s) Oral before breakfast    MEDICATIONS  (PRN):  acetaminophen     Tablet .. 650 milliGRAM(s) Oral every 6 hours PRN Temp greater or equal to 38C (100.4F), Mild Pain (1 - 3)  dextrose Oral Gel 15 Gram(s) Oral once PRN Blood Glucose LESS THAN 70 milliGRAM(s)/deciliter  HYDROmorphone  Injectable 1 milliGRAM(s) IV Push every 4 hours PRN Severe Pain (7 - 10)  ondansetron Injectable 4 milliGRAM(s) IV Push every 8 hours PRN Nausea and/or Vomiting  oxyCODONE    IR 5 milliGRAM(s) Oral every 4 hours PRN Moderate Pain (4 - 6)      LABS:                        8.1    13.90 )-----------( 583      ( 2023 09:57 )             28.0     Hgb Trend: 8.1<--, 7.5<--, 7.7<--, 8.2<--, 7.8<--      131<L>  |  97<L>  |  36<H>  ----------------------------<  125<H>  4.4   |  21<L>  |  3.95<H>    Ca    8.2<L>      2023 09:57  Phos  6.7       Mg     1.90         TPro  6.3  /  Alb  1.8<L>  /  TBili  0.2  /  DBili  x   /  AST  17  /  ALT  6   /  AlkPhos  104      Creatinine Trend: 3.95<--, 3.90<--, 2.99<--, 2.61<--, 2.46<--, 2.07<--  PT/INR - ( 2023 09:57 )   PT: 17.2 sec;   INR: 1.48 ratio         PTT - ( 2023 09:57 )  PTT:33.5 sec  Urinalysis Basic - ( 2023 11:01 )    Color: Yellow / Appearance: Slightly Turbid / S.036 / pH: x  Gluc: x / Ketone: Trace  / Bili: Negative / Urobili: <2 mg/dL   Blood: x / Protein: 300 mg/dL / Nitrite: Negative   Leuk Esterase: Small / RBC: 7 /HPF / WBC 34 /HPF   Sq Epi: x / Non Sq Epi: >27 /HPF / Bacteria: Moderate    Venous Blood Gas:   @ 09:57  7.28/50/39/24/57.8  VBG Lactate: 1.7  Venous Blood Gas:   @ 06:49  7.26/52/37/23/52.9  VBG Lactate: 1.6        ASSESSMENT AND PLAN:  Ms. Conrad is 42F w/ PMH coronary artery disease status post CABG x4 in , CHF s/p ICD placement (interrogated ), DM, PAD s/p 3rd toe amputation who presents with left 2nd toe pain concerning for dry gangrene. Seen by podiatry and vascular teams with plans for possible transmetatarsal amputation. Course c/b worsening renal function, and shock likely sepsis 2/2 LE wounds w/ possible cardiogenic component. Patient started on levophed, Vanc/Cefepime/Flagyl and transferred to MICU for further management.     NEURO  A&Ox3, no active issues    CARDIAC    RRR, no active issues at this time    RESPIRATORY     - Satting well on RA, no active issues at this time    RENAL     - trend Cr   - montior I&Os    GI    - Diet:    ENDO:  - f/u HbA1C, TSH  - monitor glucose    HEME/ONC  - Trend H/H  - A/C:    ID   - Stable WBC, afebrile  - observe off abx    LINES/PPX  - peripherals in tact  - DVT PPx:  - GOC:     Charlie Vasquez  Internal Medicine, PGY-1  Please Contact via TEAMS MICU Accept Note    CHIEF COMPLAINT: shock    HPI / INTERVAL HISTORY:    42-year-old female past medical history coronary artery disease status post CABG x4 in 2018, CHF s/p ICD placement (interrogated ), DM, PAD s/p 3rd toe amputation who presents with left 2nd toe pain. Patient reports that she was discharged from [facility] post amputation beginning of 2022. Reports that the trigger was a cart running over her toe, did not have any issues with her 2nd toe at the time. She was put on IV antibiotics for a month post-discharge and completed 1/3. Has not followed up with her podiatrist/vascular doctor since the surgery Patient reports that she has been walking without issues until 5 days prior to admission, when she noticed blackening of the toe as well as severe pain. Patient found to have dry gangrene of 2nd toe.     During hospital course patient was evaluated by podiatry and vascular w/ discussions of possible TMA. Xray of foot w/o gas or osseous involvement. Started on unasyn CT angio shows marked b/l LE PVD, occluded left posterior tibial and peroneal arteries, Vascular was planning for LLE angio in cath lab which was held due to worsening renal function, which also led to hyperkalemia. RRT called on  for hypotension refractory to fluids. Started on Levo for pressor support. Patient's antibiotics broadened to vanc/cefepime/flagyl.         PMH  Type 2 diabetes mellitus  Hypertension  Hyperlipidemia  Coronary artery disease  had CABG x 4  Cerebrovascular accident (CVA)  residual of right sided weakness  H/O congestive heart disease  ICM/chronic systolic CHF as per Dr. Diana Menjivar  History of cardiomyopathy  Thrombus  left ventricular mural thrombus  S/P   H/O tubal ligation  2016  Coronary artery disease  CABG x 4 in 2018      FAMILY HISTORY:  Family history of heart disease (Father)  Allergies  No Known Allergies  Intolerances          REVIEW OF SYSTEMS:  Constitutional: No fevers, +chills. No weight loss/weight gain  HEENT: No vision problems, eye pain, nasal congestion, rhinorrhea, sore throat, dysphagia  CV: No chest pain, orthopnea, palpitations  Resp: No cough, dyspnea, wheezing, hemoptysis  GI: No nausea, vomiting, diarrhea, constipation, abdominal pain  : [ ] dysuria [ ] nocturia [ ] hematuria [ ] increased urinary frequency  Musculoskeletal: Patient w/ LE pain b/l greatest at L 2nd digit  Skin: [ ] rash [ ] itch  Neurological: [ ] headache [ ] dizziness [ ] syncope [ ] weakness [ ] numbness  Psychiatric: [ ] anxiety [ ] depression  Endocrine: [ ] diabetes [ ] thyroid problem  Hematologic/Lymphatic: [ ] anemia [ ] bleeding problem  Allergic/Immunologic: [ ] itchy eyes [ ] nasal discharge [ ] hives [ ] angioedema  [ X ] All other systems negative  [ ] Unable to assess ROS because ________    OBJECTIVE:  ICU Vital Signs Last 24 Hrs  T(C): 35.4 (2023 11:00), Max: 36.8 (2023 18:41)  T(F): 95.8 (2023 11:00), Max: 98.2 (2023 18:41)  HR: 60 (2023 13:00) (57 - 76)  BP: 99/53 (2023 13:00) (68/40 - 103/59)  BP(mean): 80 (2023 13:00) (63 - 80)  ABP: --  ABP(mean): --  RR: 11 (2023 13:00) (11 - 18)  SpO2: 100% (2023 13:00) (97% - 100%)    O2 Parameters below as of 2023 13:00  Patient On (Oxygen Delivery Method): room air               @ : @ 07:00  --------------------------------------------------------  IN: 440 mL / OUT: 200 mL / NET: 240 mL     @ 07: @ 14:26  --------------------------------------------------------  IN: 0 mL / OUT: 0 mL / NET: 0 mL      CAPILLARY BLOOD GLUCOSE      POCT Blood Glucose.: 160 mg/dL (2023 09:28)      PHYSICAL EXAM:  GENERAL: lying in bed appearing fatigued  HEAD: Atraumatic, normocephalic  EYES: conjunctiva and sclera clear  NECK: Supple   RESPIRATORY: Normal respiratory effort; diminished breath sound on R lower lung field. L Lung CTA.   CARDIOVASCULAR: Regular rate and rhythm, normal S1 and S2, no murmur/rub/gallop; 2+ pitting edema on R leg more pronounced than L.   ABDOMEN: nondistended, soft.   MUSCULOSKELETAL: eschar around L 2nd toe. 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   NEURO: Non focal. Moving all extremities   SKIN: no other findings   PSYCH: AO4    HOSPITAL MEDICATIONS:  MEDICATIONS  (STANDING):  aspirin enteric coated 81 milliGRAM(s) Oral daily  atorvastatin 80 milliGRAM(s) Oral at bedtime  cefepime   IVPB      chlorhexidine 4% Liquid 1 Application(s) Topical <User Schedule>  dextrose 5%. 1000 milliLiter(s) (100 mL/Hr) IV Continuous <Continuous>  dextrose 5%. 1000 milliLiter(s) (50 mL/Hr) IV Continuous <Continuous>  dextrose 50% Injectable 25 Gram(s) IV Push once  dextrose 50% Injectable 12.5 Gram(s) IV Push once  dextrose 50% Injectable 25 Gram(s) IV Push once  enoxaparin Injectable 40 milliGRAM(s) SubCutaneous every 24 hours  ferrous    sulfate 325 milliGRAM(s) Oral daily  glucagon  Injectable 1 milliGRAM(s) IntraMuscular once  influenza   Vaccine 0.5 milliLiter(s) IntraMuscular once  insulin lispro (ADMELOG) corrective regimen sliding scale   SubCutaneous three times a day before meals  insulin lispro (ADMELOG) corrective regimen sliding scale   SubCutaneous at bedtime  magnesium sulfate  IVPB 2 Gram(s) IV Intermittent once  metroNIDAZOLE  IVPB      metroNIDAZOLE  IVPB 500 milliGRAM(s) IV Intermittent once  metroNIDAZOLE  IVPB 500 milliGRAM(s) IV Intermittent every 8 hours  norepinephrine Infusion 0.05 MICROgram(s)/kG/Min (6.71 mL/Hr) IV Continuous <Continuous>  pantoprazole    Tablet 40 milliGRAM(s) Oral before breakfast    MEDICATIONS  (PRN):  acetaminophen     Tablet .. 650 milliGRAM(s) Oral every 6 hours PRN Temp greater or equal to 38C (100.4F), Mild Pain (1 - 3)  dextrose Oral Gel 15 Gram(s) Oral once PRN Blood Glucose LESS THAN 70 milliGRAM(s)/deciliter  HYDROmorphone  Injectable 1 milliGRAM(s) IV Push every 4 hours PRN Severe Pain (7 - 10)  ondansetron Injectable 4 milliGRAM(s) IV Push every 8 hours PRN Nausea and/or Vomiting  oxyCODONE    IR 5 milliGRAM(s) Oral every 4 hours PRN Moderate Pain (4 - 6)      LABS:                        8.1    13.90 )-----------( 583      ( 2023 09:57 )             28.0     Hgb Trend: 8.1<--, 7.5<--, 7.7<--, 8.2<--, 7.8<--      131<L>  |  97<L>  |  36<H>  ----------------------------<  125<H>  4.4   |  21<L>  |  3.95<H>    Ca    8.2<L>      2023 09:57  Phos  6.7       Mg     1.90         TPro  6.3  /  Alb  1.8<L>  /  TBili  0.2  /  DBili  x   /  AST  17  /  ALT  6   /  AlkPhos  104      Creatinine Trend: 3.95<--, 3.90<--, 2.99<--, 2.61<--, 2.46<--, 2.07<--  PT/INR - ( 2023 09:57 )   PT: 17.2 sec;   INR: 1.48 ratio         PTT - ( 2023 09:57 )  PTT:33.5 sec  Urinalysis Basic - ( 2023 11:01 )    Color: Yellow / Appearance: Slightly Turbid / S.036 / pH: x  Gluc: x / Ketone: Trace  / Bili: Negative / Urobili: <2 mg/dL   Blood: x / Protein: 300 mg/dL / Nitrite: Negative   Leuk Esterase: Small / RBC: 7 /HPF / WBC 34 /HPF   Sq Epi: x / Non Sq Epi: >27 /HPF / Bacteria: Moderate    Venous Blood Gas:   @ 09:57  7.28/50/39/24/57.8  VBG Lactate: 1.7  Venous Blood Gas:   @ 06:49  7.26/52/37/23/52.9  VBG Lactate: 1.6        ASSESSMENT AND PLAN:  Ms. Conrad is 42F w/ PMH coronary artery disease status post CABG x4 in 2018, CHF s/p ICD placement (interrogated ), DM, PAD s/p 3rd toe amputation who presents with left 2nd toe pain concerning for dry gangrene. Seen by podiatry and vascular teams with plans for possible transmetatarsal amputation. Course c/b worsening renal function, and shock likely sepsis 2/2 LE wounds w/ possible cardiogenic component. Patient started on levophed, Vanc/Cefepime/Flagyl and transferred to MICU for further management.     NEURO  A&Ox3, no active issues    CARDIAC  #CAD s/p CABG  #HFrEF  - TTE this admission on 23 shows severe global LV systolic dysfunction w/ EF ~21%; elevated LV filling pressures; RV enlargement w/ decreases systolic function  - CCU consulted during RRT for possible cardiogenic shock. Low suspicion for cardiogenic shock. Rec checking central sat for CO/CI estimate  - Home cardiac meds held iso shock  - Pt currently RRR. Will monitor on tele    RESPIRATORY     #L pleural effusion  - Patient w/ L pleural effusion likely from chronic HFrEF  - Diuretics on hold   - Currently satting well on RA. Maintain SpO2>92%    RENAL   #BOB on CKD  - Patient w/ baseline SCr ~1.2 has been uptrending during hospitalization to 3.95  - Mixed etiology d/t shock, cardio-renal iso HFrEF vs possibly ATN vs less likely contrast injury     - Nephro consulted  - trend Cr   - montior I&Os    GI    - Diet:    ENDO:  - f/u HbA1C, TSH  - monitor glucose    HEME/ONC  - Trend H/H  - A/C:    ID   - Stable WBC, afebrile  - observe off abx    LINES/PPX  - peripherals in tact  - DVT PPx:  - GOC:     Charlie Vasquez  Internal Medicine, PGY-1  Please Contact via TEAMS MICU Accept Note    CHIEF COMPLAINT: shock    HPI / INTERVAL HISTORY:    42-year-old female past medical history coronary artery disease status post CABG x4 in 2018, CHF s/p ICD placement (interrogated ), DM, PAD s/p 3rd toe amputation who presents with left 2nd toe pain. Patient reports that she was discharged from [facility] post amputation beginning of 2022. Reports that the trigger was a cart running over her toe, did not have any issues with her 2nd toe at the time. She was put on IV antibiotics for a month post-discharge and completed 1/3. Has not followed up with her podiatrist/vascular doctor since the surgery Patient reports that she has been walking without issues until 5 days prior to admission, when she noticed blackening of the toe as well as severe pain. Patient found to have dry gangrene of 2nd toe.     During hospital course patient was evaluated by podiatry and vascular w/ discussions of possible TMA. Xray of foot w/o gas or osseous involvement. Started on unasyn CT angio shows marked b/l LE PVD, occluded left posterior tibial and peroneal arteries, Vascular was planning for LLE angio in cath lab which was held due to worsening renal function, which also led to hyperkalemia. RRT called on  for hypotension refractory to fluids. Started on Levo for pressor support. Patient's antibiotics broadened to vanc/cefepime/flagyl.         PMH  Type 2 diabetes mellitus  Hypertension  Hyperlipidemia  Coronary artery disease  had CABG x 4  Cerebrovascular accident (CVA)  residual of right sided weakness  H/O congestive heart disease  ICM/chronic systolic CHF as per Dr. Diana Menjivar  History of cardiomyopathy  Thrombus  left ventricular mural thrombus  S/P   H/O tubal ligation  2016  Coronary artery disease  CABG x 4 in 2018      FAMILY HISTORY:  Family history of heart disease (Father)  Allergies  No Known Allergies  Intolerances      REVIEW OF SYSTEMS:  Constitutional: No fevers, +chills. No weight loss/weight gain  HEENT: No vision problems, eye pain, nasal congestion, rhinorrhea, sore throat, dysphagia  CV: No chest pain, orthopnea, palpitations  Resp: No cough, dyspnea, wheezing, hemoptysis  GI: No nausea, vomiting, diarrhea, constipation, abdominal pain  : [ ] dysuria [ ] nocturia [ ] hematuria [ ] increased urinary frequency  Musculoskeletal: Patient w/ LE pain b/l greatest at L 2nd digit  Skin: [ ] rash [ ] itch  Neurological: [ ] headache [ ] dizziness [ ] syncope [ ] weakness [ ] numbness  Psychiatric: [ ] anxiety [ ] depression  Endocrine: [ ] diabetes [ ] thyroid problem  Hematologic/Lymphatic: [ ] anemia [ ] bleeding problem  Allergic/Immunologic: [ ] itchy eyes [ ] nasal discharge [ ] hives [ ] angioedema  [ X ] All other systems negative  [ ] Unable to assess ROS because ________    OBJECTIVE:  ICU Vital Signs Last 24 Hrs  T(C): 35.4 (2023 11:00), Max: 36.8 (2023 18:41)  T(F): 95.8 (2023 11:00), Max: 98.2 (2023 18:41)  HR: 60 (2023 13:00) (57 - 76)  BP: 99/53 (2023 13:00) (68/40 - 103/59)  BP(mean): 80 (2023 13:00) (63 - 80)  ABP: --  ABP(mean): --  RR: 11 (2023 13:00) (11 - 18)  SpO2: 100% (2023 13:00) (97% - 100%)    O2 Parameters below as of 2023 13:00  Patient On (Oxygen Delivery Method): room air               @ : @ 07:00  --------------------------------------------------------  IN: 440 mL / OUT: 200 mL / NET: 240 mL     @ 07: @ 14:26  --------------------------------------------------------  IN: 0 mL / OUT: 0 mL / NET: 0 mL      CAPILLARY BLOOD GLUCOSE      POCT Blood Glucose.: 160 mg/dL (2023 09:28)      PHYSICAL EXAM:  GENERAL: lying in bed appearing fatigued  HEAD: Atraumatic, normocephalic  EYES: conjunctiva and sclera clear  NECK: Supple   RESPIRATORY: Normal respiratory effort; diminished breath sound on R lower lung field. L Lung CTA.   CARDIOVASCULAR: Regular rate and rhythm, normal S1 and S2, no murmur/rub/gallop; 2+ pitting edema on R leg more pronounced than L.   ABDOMEN: nondistended, soft.   MUSCULOSKELETAL: eschar around L 2nd toe. 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   NEURO: Non focal. Moving all extremities   SKIN: no other findings   PSYCH: AO4    HOSPITAL MEDICATIONS:  MEDICATIONS  (STANDING):  aspirin enteric coated 81 milliGRAM(s) Oral daily  atorvastatin 80 milliGRAM(s) Oral at bedtime  cefepime   IVPB      chlorhexidine 4% Liquid 1 Application(s) Topical <User Schedule>  dextrose 5%. 1000 milliLiter(s) (100 mL/Hr) IV Continuous <Continuous>  dextrose 5%. 1000 milliLiter(s) (50 mL/Hr) IV Continuous <Continuous>  dextrose 50% Injectable 25 Gram(s) IV Push once  dextrose 50% Injectable 12.5 Gram(s) IV Push once  dextrose 50% Injectable 25 Gram(s) IV Push once  enoxaparin Injectable 40 milliGRAM(s) SubCutaneous every 24 hours  ferrous    sulfate 325 milliGRAM(s) Oral daily  glucagon  Injectable 1 milliGRAM(s) IntraMuscular once  influenza   Vaccine 0.5 milliLiter(s) IntraMuscular once  insulin lispro (ADMELOG) corrective regimen sliding scale   SubCutaneous three times a day before meals  insulin lispro (ADMELOG) corrective regimen sliding scale   SubCutaneous at bedtime  magnesium sulfate  IVPB 2 Gram(s) IV Intermittent once  metroNIDAZOLE  IVPB      metroNIDAZOLE  IVPB 500 milliGRAM(s) IV Intermittent once  metroNIDAZOLE  IVPB 500 milliGRAM(s) IV Intermittent every 8 hours  norepinephrine Infusion 0.05 MICROgram(s)/kG/Min (6.71 mL/Hr) IV Continuous <Continuous>  pantoprazole    Tablet 40 milliGRAM(s) Oral before breakfast    MEDICATIONS  (PRN):  acetaminophen     Tablet .. 650 milliGRAM(s) Oral every 6 hours PRN Temp greater or equal to 38C (100.4F), Mild Pain (1 - 3)  dextrose Oral Gel 15 Gram(s) Oral once PRN Blood Glucose LESS THAN 70 milliGRAM(s)/deciliter  HYDROmorphone  Injectable 1 milliGRAM(s) IV Push every 4 hours PRN Severe Pain (7 - 10)  ondansetron Injectable 4 milliGRAM(s) IV Push every 8 hours PRN Nausea and/or Vomiting  oxyCODONE    IR 5 milliGRAM(s) Oral every 4 hours PRN Moderate Pain (4 - 6)      LABS:                        8.1    13.90 )-----------( 583      ( 2023 09:57 )             28.0     Hgb Trend: 8.1<--, 7.5<--, 7.7<--, 8.2<--, 7.8<--      131<L>  |  97<L>  |  36<H>  ----------------------------<  125<H>  4.4   |  21<L>  |  3.95<H>    Ca    8.2<L>      2023 09:57  Phos  6.7       Mg     1.90         TPro  6.3  /  Alb  1.8<L>  /  TBili  0.2  /  DBili  x   /  AST  17  /  ALT  6   /  AlkPhos  104      Creatinine Trend: 3.95<--, 3.90<--, 2.99<--, 2.61<--, 2.46<--, 2.07<--  PT/INR - ( 2023 09:57 )   PT: 17.2 sec;   INR: 1.48 ratio         PTT - ( 2023 09:57 )  PTT:33.5 sec  Urinalysis Basic - ( 2023 11:01 )    Color: Yellow / Appearance: Slightly Turbid / S.036 / pH: x  Gluc: x / Ketone: Trace  / Bili: Negative / Urobili: <2 mg/dL   Blood: x / Protein: 300 mg/dL / Nitrite: Negative   Leuk Esterase: Small / RBC: 7 /HPF / WBC 34 /HPF   Sq Epi: x / Non Sq Epi: >27 /HPF / Bacteria: Moderate    Venous Blood Gas:   @ 09:57  7.28/50/39/24/57.8  VBG Lactate: 1.7  Venous Blood Gas:   @ 06:49  7.26/52/37/23/52.9  VBG Lactate: 1.6        ASSESSMENT AND PLAN:  Ms. Conrad is 42F w/ PMH coronary artery disease status post CABG x4 in 2018, CHF s/p ICD placement (interrogated ), DM, PAD s/p 3rd toe amputation who presents with left 2nd toe pain concerning for dry gangrene. Seen by podiatry and vascular teams with plans for possible transmetatarsal amputation. Course c/b worsening renal function, and shock likely sepsis 2/2 LE wounds w/ possible cardiogenic component. Patient started on levophed, Vanc/Cefepime/Flagyl and transferred to MICU for further management.     NEURO  A&Ox3, no active issues    CARDIAC    #Shock  - On levo   - Likely mixed septic and cardiogenic w/ sespsis as primary  given hypothermia and increasing leukocytosis  - TTE this admission on 23 shows severe global LV systolic dysfunction w/ EF ~21%; elevated LV filling pressures; RV enlargement w/ decreases systolic function  - CCU consulted during RRT for possible cardiogenic shock. Per CCU low suspicion for cardiogenic etiology as primary  of shock. Rec checking central sat for CO/CI estimate. Will hold off central line for now  - c/w vanc/cefepime/flagyl    #CAD s/p CABG  #HFrEF  - Home cardiac meds held iso shock  - Pt currently RRR. Will monitor on tele      RESPIRATORY     #Large R pleural effusion  - Patient w/ R pleural effusion resulting in near-complete atelectasis of RML/RLL. Etiology likely from severe HFrEF  - Diuretics on hold   - Currently satting well on RA. Maintain SpO2>92%    RENAL   #BOB on CKD  - Patient w/ baseline SCr ~1.2 has been uptrending during hospitalization to 3.95  - Mixed etiology d/t shock, cardio-renal iso HFrEF vs possibly ATN vs less likely contrast injury     - Nephro consulted  - trend Cr   - montior I&Os    GI    - Diet:    ENDO:  - f/u HbA1C, TSH  - monitor glucose    HEME/ONC  - Trend H/H  - A/C:    ID   - Stable WBC, afebrile  - observe off abx    LINES/PPX  - peripherals in tact  - DVT PPx:  - GOC:     Charlie Vasquez  Internal Medicine, PGY-1  Please Contact via TEAMS MICU Accept Note    CHIEF COMPLAINT: shock    HPI / INTERVAL HISTORY:    42-year-old female past medical history coronary artery disease status post CABG x4 in 2018, CHF s/p ICD placement (interrogated ), DM, PAD s/p 3rd toe amputation who presents with left 2nd toe pain. Patient reports that she was discharged from [facility] post amputation beginning of 2022. Reports that the trigger was a cart running over her toe, did not have any issues with her 2nd toe at the time. She was put on IV antibiotics for a month post-discharge and completed 1/3. Has not followed up with her podiatrist/vascular doctor since the surgery Patient reports that she has been walking without issues until 5 days prior to admission, when she noticed blackening of the toe as well as severe pain. Patient found to have dry gangrene of 2nd toe.     During hospital course patient was evaluated by podiatry and vascular w/ discussions of possible TMA. Xray of foot w/o gas or osseous involvement. Started on unasyn CT angio shows marked b/l LE PVD, occluded left posterior tibial and peroneal arteries, Vascular was planning for LLE angio in cath lab which was held due to worsening renal function, which also led to hyperkalemia. RRT called on  for hypotension refractory to fluids. Started on Levo for pressor support. Patient's antibiotics broadened to vanc/cefepime/flagyl.         PMH  Type 2 diabetes mellitus  Hypertension  Hyperlipidemia  Coronary artery disease  had CABG x 4  Cerebrovascular accident (CVA)  residual of right sided weakness  H/O congestive heart disease  ICM/chronic systolic CHF as per Dr. Diana Menjivar  History of cardiomyopathy  Thrombus  left ventricular mural thrombus  S/P   H/O tubal ligation  2016  Coronary artery disease  CABG x 4 in 2018      FAMILY HISTORY:  Family history of heart disease (Father)  Allergies  No Known Allergies  Intolerances      REVIEW OF SYSTEMS:  Constitutional: No fevers, +chills. No weight loss/weight gain  HEENT: No vision problems, eye pain, nasal congestion, rhinorrhea, sore throat, dysphagia  CV: No chest pain, orthopnea, palpitations  Resp: No cough, dyspnea, wheezing, hemoptysis  GI: No nausea, vomiting, diarrhea, constipation, abdominal pain  : [ ] dysuria [ ] nocturia [ ] hematuria [ ] increased urinary frequency  Musculoskeletal: Patient w/ LE pain b/l greatest at L 2nd digit  Skin: [ ] rash [ ] itch  Neurological: [ ] headache [ ] dizziness [ ] syncope [ ] weakness [ ] numbness  Psychiatric: [ ] anxiety [ ] depression  Endocrine: [ ] diabetes [ ] thyroid problem  Hematologic/Lymphatic: [ ] anemia [ ] bleeding problem  Allergic/Immunologic: [ ] itchy eyes [ ] nasal discharge [ ] hives [ ] angioedema  [ X ] All other systems negative  [ ] Unable to assess ROS because ________    OBJECTIVE:  ICU Vital Signs Last 24 Hrs  T(C): 35.4 (2023 11:00), Max: 36.8 (2023 18:41)  T(F): 95.8 (2023 11:00), Max: 98.2 (2023 18:41)  HR: 60 (2023 13:00) (57 - 76)  BP: 99/53 (2023 13:00) (68/40 - 103/59)  BP(mean): 80 (2023 13:00) (63 - 80)  ABP: --  ABP(mean): --  RR: 11 (2023 13:00) (11 - 18)  SpO2: 100% (2023 13:00) (97% - 100%)    O2 Parameters below as of 2023 13:00  Patient On (Oxygen Delivery Method): room air               @ : @ 07:00  --------------------------------------------------------  IN: 440 mL / OUT: 200 mL / NET: 240 mL     @ 07: @ 14:26  --------------------------------------------------------  IN: 0 mL / OUT: 0 mL / NET: 0 mL      CAPILLARY BLOOD GLUCOSE      POCT Blood Glucose.: 160 mg/dL (2023 09:28)      PHYSICAL EXAM:  GENERAL: lying in bed appearing fatigued  HEAD: Atraumatic, normocephalic  EYES: conjunctiva and sclera clear  NECK: Supple   RESPIRATORY: Normal respiratory effort; diminished breath sound on R lower lung field. L Lung CTA.   CARDIOVASCULAR: Regular rate and rhythm, normal S1 and S2, no murmur/rub/gallop; 2+ pitting edema on R leg more pronounced than L.   ABDOMEN: nondistended, soft.   MUSCULOSKELETAL: eschar around L 2nd toe. 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   NEURO: Non focal. Moving all extremities   SKIN: no other findings   PSYCH: AO4    HOSPITAL MEDICATIONS:  MEDICATIONS  (STANDING):  aspirin enteric coated 81 milliGRAM(s) Oral daily  atorvastatin 80 milliGRAM(s) Oral at bedtime  cefepime   IVPB      chlorhexidine 4% Liquid 1 Application(s) Topical <User Schedule>  dextrose 5%. 1000 milliLiter(s) (100 mL/Hr) IV Continuous <Continuous>  dextrose 5%. 1000 milliLiter(s) (50 mL/Hr) IV Continuous <Continuous>  dextrose 50% Injectable 25 Gram(s) IV Push once  dextrose 50% Injectable 12.5 Gram(s) IV Push once  dextrose 50% Injectable 25 Gram(s) IV Push once  enoxaparin Injectable 40 milliGRAM(s) SubCutaneous every 24 hours  ferrous    sulfate 325 milliGRAM(s) Oral daily  glucagon  Injectable 1 milliGRAM(s) IntraMuscular once  influenza   Vaccine 0.5 milliLiter(s) IntraMuscular once  insulin lispro (ADMELOG) corrective regimen sliding scale   SubCutaneous three times a day before meals  insulin lispro (ADMELOG) corrective regimen sliding scale   SubCutaneous at bedtime  magnesium sulfate  IVPB 2 Gram(s) IV Intermittent once  metroNIDAZOLE  IVPB      metroNIDAZOLE  IVPB 500 milliGRAM(s) IV Intermittent once  metroNIDAZOLE  IVPB 500 milliGRAM(s) IV Intermittent every 8 hours  norepinephrine Infusion 0.05 MICROgram(s)/kG/Min (6.71 mL/Hr) IV Continuous <Continuous>  pantoprazole    Tablet 40 milliGRAM(s) Oral before breakfast    MEDICATIONS  (PRN):  acetaminophen     Tablet .. 650 milliGRAM(s) Oral every 6 hours PRN Temp greater or equal to 38C (100.4F), Mild Pain (1 - 3)  dextrose Oral Gel 15 Gram(s) Oral once PRN Blood Glucose LESS THAN 70 milliGRAM(s)/deciliter  HYDROmorphone  Injectable 1 milliGRAM(s) IV Push every 4 hours PRN Severe Pain (7 - 10)  ondansetron Injectable 4 milliGRAM(s) IV Push every 8 hours PRN Nausea and/or Vomiting  oxyCODONE    IR 5 milliGRAM(s) Oral every 4 hours PRN Moderate Pain (4 - 6)      LABS:                        8.1    13.90 )-----------( 583      ( 2023 09:57 )             28.0     Hgb Trend: 8.1<--, 7.5<--, 7.7<--, 8.2<--, 7.8<--      131<L>  |  97<L>  |  36<H>  ----------------------------<  125<H>  4.4   |  21<L>  |  3.95<H>    Ca    8.2<L>      2023 09:57  Phos  6.7       Mg     1.90         TPro  6.3  /  Alb  1.8<L>  /  TBili  0.2  /  DBili  x   /  AST  17  /  ALT  6   /  AlkPhos  104      Creatinine Trend: 3.95<--, 3.90<--, 2.99<--, 2.61<--, 2.46<--, 2.07<--  PT/INR - ( 2023 09:57 )   PT: 17.2 sec;   INR: 1.48 ratio         PTT - ( 2023 09:57 )  PTT:33.5 sec  Urinalysis Basic - ( 2023 11:01 )    Color: Yellow / Appearance: Slightly Turbid / S.036 / pH: x  Gluc: x / Ketone: Trace  / Bili: Negative / Urobili: <2 mg/dL   Blood: x / Protein: 300 mg/dL / Nitrite: Negative   Leuk Esterase: Small / RBC: 7 /HPF / WBC 34 /HPF   Sq Epi: x / Non Sq Epi: >27 /HPF / Bacteria: Moderate    Venous Blood Gas:   @ 09:57  7.28/50/39/24/57.8  VBG Lactate: 1.7  Venous Blood Gas:   @ 06:49  7.26/52/37/23/52.9  VBG Lactate: 1.6        ASSESSMENT AND PLAN:  Ms. Conrad is 42F w/ PMH coronary artery disease status post CABG x4 in 2018, CHF s/p ICD placement (interrogated ), DM, PAD s/p 3rd toe amputation who presents with left 2nd toe pain concerning for dry gangrene. Seen by podiatry and vascular teams with plans for possible transmetatarsal amputation. Course c/b worsening renal function, and shock likely sepsis 2/2 LE wounds w/ possible cardiogenic component. Patient started on levophed, Vanc/Cefepime/Flagyl and transferred to MICU for further management.     NEURO  A&Ox3, no active issues    CARDIAC    #Shock  - On levo   - Likely mixed septic and cardiogenic w/ sespsis as primary  given hypothermia and increasing leukocytosis  - TTE this admission on 23 shows severe global LV systolic dysfunction w/ EF ~21%; elevated LV filling pressures; RV enlargement w/ decreases systolic function  - CCU consulted during RRT for possible cardiogenic shock. Per CCU low suspicion for cardiogenic etiology as primary  of shock. Rec checking central sat for CO/CI estimate. Will hold off central line for now  - c/w vanc/cefepime/flagyl    #CAD s/p CABG  #HFrEF  - Home cardiac meds held iso shock  - Pt currently RRR. Will monitor on tele      RESPIRATORY     #Large R pleural effusion  - Patient w/ R pleural effusion resulting in near-complete atelectasis of RML/RLL. Etiology likely from severe HFrEF  - Diuretics on hold   - Currently satting well on RA. Maintain SpO2>92%    RENAL   #BOB on CKD  - Patient w/ baseline SCr ~1.2 has been uptrending during hospitalization to 3.95  - Mixed etiology d/t shock, cardio-renal iso HFrEF vs possibly ATN vs less likely contrast injury     - Nephro consulted  - trend Cr   - montior I&Os    GI    - Diet:    ENDO:  - f/u HbA1C, TSH  - monitor glucose    HEME/ONC  - Trend H/H  - A/C:    ID   - Stable WBC, afebrile  - observe off abx    LINES/PPX  - peripherals in tact  - DVT PPx:  - GOC:     Charlie Vasquez  Internal Medicine, PGY-1  Please Contact via TEAMS      Attending Attestation    Pt with hypotension secondary to cardiogenic shock and possible septic shock state; hx of biventricular HF. Started on levophed drip, BOB on CKD, place ponce, monitor Is and Os, f/u repeat BMP for possible CRRT. large right pleural effusion but pt not hypoxemic, not in respiratory distress in supine position. Hypothermia, f/u blood cultures, urine. start broad spectrum antibiotics., dry gangrene of left 2nd toe, pending amputation but on hold now.     I have personally provided 35 minutes of critical care time concurrently with the resident/fellow and excludes time spent on separate procedures. Medical management as above, review of results/records, discussion with patient and primary team, documentation. MICU Accept Note    CHIEF COMPLAINT: shock    HPI / INTERVAL HISTORY:    42-year-old female past medical history coronary artery disease status post CABG x4 in 2018, CHF s/p ICD placement (interrogated ), DM, PAD s/p 3rd toe amputation who presents with left 2nd toe pain. Patient reports that she was discharged from [facility] post amputation beginning of 2022. Reports that the trigger was a cart running over her toe, did not have any issues with her 2nd toe at the time. She was put on IV antibiotics for a month post-discharge and completed 1/3. Has not followed up with her podiatrist/vascular doctor since the surgery Patient reports that she has been walking without issues until 5 days prior to admission, when she noticed blackening of the toe as well as severe pain. Patient found to have dry gangrene of 2nd toe.     During hospital course patient was evaluated by podiatry and vascular w/ discussions of possible TMA. Xray of foot w/o gas or osseous involvement. Started on unasyn CT angio shows marked b/l LE PVD, occluded left posterior tibial and peroneal arteries, Vascular was planning for LLE angio in cath lab which was held due to worsening renal function, which also led to hyperkalemia. RRT called on  for hypotension refractory to fluids. Started on Levo for pressor support. Patient's antibiotics broadened to vanc/cefepime/flagyl.         PMH  Type 2 diabetes mellitus  Hypertension  Hyperlipidemia  Coronary artery disease  had CABG x 4  Cerebrovascular accident (CVA)  residual of right sided weakness  H/O congestive heart disease  ICM/chronic systolic CHF as per Dr. Diana Menjivar  History of cardiomyopathy  Thrombus  left ventricular mural thrombus  S/P   H/O tubal ligation  2016  Coronary artery disease  CABG x 4 in 2018      FAMILY HISTORY:  Family history of heart disease (Father)  Allergies  No Known Allergies  Intolerances      REVIEW OF SYSTEMS:  Constitutional: No fevers, +chills. No weight loss/weight gain  HEENT: No vision problems, eye pain, nasal congestion, rhinorrhea, sore throat, dysphagia  CV: No chest pain, orthopnea, palpitations  Resp: No cough, dyspnea, wheezing, hemoptysis  GI: No nausea, vomiting, diarrhea, constipation, abdominal pain  : [ ] dysuria [ ] nocturia [ ] hematuria [ ] increased urinary frequency  Musculoskeletal: Patient w/ LE pain b/l greatest at L 2nd digit  Skin: [ ] rash [ ] itch  Neurological: [ ] headache [ ] dizziness [ ] syncope [ ] weakness [ ] numbness  Psychiatric: [ ] anxiety [ ] depression  Endocrine: [ ] diabetes [ ] thyroid problem  Hematologic/Lymphatic: [ ] anemia [ ] bleeding problem  Allergic/Immunologic: [ ] itchy eyes [ ] nasal discharge [ ] hives [ ] angioedema  [ X ] All other systems negative  [ ] Unable to assess ROS because ________    OBJECTIVE:  ICU Vital Signs Last 24 Hrs  T(C): 35.4 (2023 11:00), Max: 36.8 (2023 18:41)  T(F): 95.8 (2023 11:00), Max: 98.2 (2023 18:41)  HR: 60 (2023 13:00) (57 - 76)  BP: 99/53 (2023 13:00) (68/40 - 103/59)  BP(mean): 80 (2023 13:00) (63 - 80)  ABP: --  ABP(mean): --  RR: 11 (2023 13:00) (11 - 18)  SpO2: 100% (2023 13:00) (97% - 100%)    O2 Parameters below as of 2023 13:00  Patient On (Oxygen Delivery Method): room air               @ : @ 07:00  --------------------------------------------------------  IN: 440 mL / OUT: 200 mL / NET: 240 mL     @ 07: @ 14:26  --------------------------------------------------------  IN: 0 mL / OUT: 0 mL / NET: 0 mL      CAPILLARY BLOOD GLUCOSE      POCT Blood Glucose.: 160 mg/dL (2023 09:28)      PHYSICAL EXAM:  GENERAL: lying in bed appearing fatigued  HEAD: Atraumatic, normocephalic  EYES: conjunctiva and sclera clear  NECK: Supple   RESPIRATORY: Normal respiratory effort; diminished breath sound on R lower lung field. L Lung CTA.   CARDIOVASCULAR: Regular rate and rhythm, normal S1 and S2, no murmur/rub/gallop; 2+ pitting edema on R leg more pronounced than L.   ABDOMEN: nondistended, soft.   MUSCULOSKELETAL: eschar around L 2nd toe. 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   NEURO: Non focal. Moving all extremities   SKIN: no other findings   PSYCH: AO4    HOSPITAL MEDICATIONS:  MEDICATIONS  (STANDING):  aspirin enteric coated 81 milliGRAM(s) Oral daily  atorvastatin 80 milliGRAM(s) Oral at bedtime  cefepime   IVPB      chlorhexidine 4% Liquid 1 Application(s) Topical <User Schedule>  dextrose 5%. 1000 milliLiter(s) (100 mL/Hr) IV Continuous <Continuous>  dextrose 5%. 1000 milliLiter(s) (50 mL/Hr) IV Continuous <Continuous>  dextrose 50% Injectable 25 Gram(s) IV Push once  dextrose 50% Injectable 12.5 Gram(s) IV Push once  dextrose 50% Injectable 25 Gram(s) IV Push once  enoxaparin Injectable 40 milliGRAM(s) SubCutaneous every 24 hours  ferrous    sulfate 325 milliGRAM(s) Oral daily  glucagon  Injectable 1 milliGRAM(s) IntraMuscular once  influenza   Vaccine 0.5 milliLiter(s) IntraMuscular once  insulin lispro (ADMELOG) corrective regimen sliding scale   SubCutaneous three times a day before meals  insulin lispro (ADMELOG) corrective regimen sliding scale   SubCutaneous at bedtime  magnesium sulfate  IVPB 2 Gram(s) IV Intermittent once  metroNIDAZOLE  IVPB      metroNIDAZOLE  IVPB 500 milliGRAM(s) IV Intermittent once  metroNIDAZOLE  IVPB 500 milliGRAM(s) IV Intermittent every 8 hours  norepinephrine Infusion 0.05 MICROgram(s)/kG/Min (6.71 mL/Hr) IV Continuous <Continuous>  pantoprazole    Tablet 40 milliGRAM(s) Oral before breakfast    MEDICATIONS  (PRN):  acetaminophen     Tablet .. 650 milliGRAM(s) Oral every 6 hours PRN Temp greater or equal to 38C (100.4F), Mild Pain (1 - 3)  dextrose Oral Gel 15 Gram(s) Oral once PRN Blood Glucose LESS THAN 70 milliGRAM(s)/deciliter  HYDROmorphone  Injectable 1 milliGRAM(s) IV Push every 4 hours PRN Severe Pain (7 - 10)  ondansetron Injectable 4 milliGRAM(s) IV Push every 8 hours PRN Nausea and/or Vomiting  oxyCODONE    IR 5 milliGRAM(s) Oral every 4 hours PRN Moderate Pain (4 - 6)      LABS:                        8.1    13.90 )-----------( 583      ( 2023 09:57 )             28.0     Hgb Trend: 8.1<--, 7.5<--, 7.7<--, 8.2<--, 7.8<--      131<L>  |  97<L>  |  36<H>  ----------------------------<  125<H>  4.4   |  21<L>  |  3.95<H>    Ca    8.2<L>      2023 09:57  Phos  6.7       Mg     1.90         TPro  6.3  /  Alb  1.8<L>  /  TBili  0.2  /  DBili  x   /  AST  17  /  ALT  6   /  AlkPhos  104      Creatinine Trend: 3.95<--, 3.90<--, 2.99<--, 2.61<--, 2.46<--, 2.07<--  PT/INR - ( 2023 09:57 )   PT: 17.2 sec;   INR: 1.48 ratio         PTT - ( 2023 09:57 )  PTT:33.5 sec  Urinalysis Basic - ( 2023 11:01 )    Color: Yellow / Appearance: Slightly Turbid / S.036 / pH: x  Gluc: x / Ketone: Trace  / Bili: Negative / Urobili: <2 mg/dL   Blood: x / Protein: 300 mg/dL / Nitrite: Negative   Leuk Esterase: Small / RBC: 7 /HPF / WBC 34 /HPF   Sq Epi: x / Non Sq Epi: >27 /HPF / Bacteria: Moderate    Venous Blood Gas:   @ 09:57  7.28/50/39/24/57.8  VBG Lactate: 1.7  Venous Blood Gas:   @ 06:49  7.26/52/37/23/52.9  VBG Lactate: 1.6        ASSESSMENT AND PLAN:  Ms. Conrad is 42F w/ PMH coronary artery disease status post CABG x4 in 2018, CHF s/p ICD placement (interrogated ), DM, PAD s/p 3rd toe amputation who presents with left 2nd toe pain concerning for dry gangrene. Seen by podiatry and vascular teams with plans for possible transmetatarsal amputation. Course c/b worsening renal function, and shock likely sepsis 2/2 LE wounds w/ possible cardiogenic component. Patient started on levophed, Vanc/Cefepime/Flagyl and transferred to MICU for further management.     NEURO  A&Ox3, no active issues    CARDIAC    #Shock  - On levo   - Likely mixed septic and cardiogenic w/ sespsis as primary  given hypothermia and increasing leukocytosis  - TTE this admission on 23 shows severe global LV systolic dysfunction w/ EF ~21%; elevated LV filling pressures; RV enlargement w/ decreases systolic function  - CCU consulted during RRT for possible cardiogenic shock. Per CCU low suspicion for cardiogenic etiology as primary  of shock. Rec checking central sat for CO/CI estimate. Will hold off central line for now  - c/w vanc/cefepime/flagyl    #CAD s/p CABG  #HFrEF  - Home cardiac meds held iso shock  - Pt currently RRR. Will monitor on tele    #PAD  #Dry Gangrene L 2nd toe  - Necrosis of 2nd digit i/s/o PAD, concerning for dry gangrene, elevated ESR/CRP. No signs of active infection   - X-ray foot without gas or osseous involvement, limited to soft tissue   - Podiatry and vascular recs appreciated  - CT Angio prelim read with occluded left posterior tibial and peroneal arteries  - f/u Vascular recs regarding future infection   - Pain control w/ Dilaudid 1mg PRN for severe; Oxy 5mg PRN for moderate    RESPIRATORY   #Large R pleural effusion  - Patient w/ R pleural effusion resulting in near-complete atelectasis of RML/RLL. Etiology likely from severe HFrEF  - Diuretics on hold   - Currently satting well on RA. Maintain SpO2>92%    RENAL   #BOB on CKD  - Patient w/ baseline SCr ~1.2 has been uptrending during hospitalization to 3.95  - Mixed etiology d/t shock, cardio-renal iso HFrEF vs possibly ATN vs less likely contrast injury   - Patient currently anureic   - Strict I/O. Ponce placed for monitoring  - Nephro consulted. May need CRRT if patient continues to have gross electrolyte abnormalities or worsening acidosis.   - trend Cr     GI    #Small ascites  - Small ascites seen on CT   - C/t monitor    - Diet: Renal diet    ENDO:    #IDDM  - On home lantus 8U  - Will c/w ISS qac/qhs  - monitor glucose    HEME/ONC  #Normocytic anemia  - Patient w/ Hb 7.5. Baseline appears ~8-9  - Iron studies w/ likely FEDERICO  - Trend H/H  - A/C: HSQ    ID   #Septic Shock   - Patient w/ likely septic shock iso L toe gangrene/wound now w/ hypothermia, leukocytosis and hypotension refractory to IV fluids requiring pressor support  - F/u BCx, UCx. Initial U/a appears positive  - Abx broadened today to Vanc/cefepime/flagyl  - Vanc dose by trough given renal function   - ID consult in AM      LINES/PPX  - peripherals in tact  - DVT PPx: HSQ  - GOC: Full Code    Charlie Vasquez  Internal Medicine, PGY-1  Please Contact via TEAMS      Attending Attestation    Pt with hypotension secondary to cardiogenic shock and possible septic shock state; hx of biventricular HF. Started on levophed drip, BOB on CKD, place ponce, monitor Is and Os, f/u repeat BMP for possible CRRT. large right pleural effusion but pt not hypoxemic, not in respiratory distress in supine position. Hypothermia, f/u blood cultures, urine. start broad spectrum antibiotics., dry gangrene of left 2nd toe, pending amputation but on hold now.     I have personally provided 35 minutes of critical care time concurrently with the resident/fellow and excludes time spent on separate procedures. Medical management as above, review of results/records, discussion with patient and primary team, documentation.

## 2023-01-29 NOTE — PROGRESS NOTE ADULT - ATTENDING COMMENTS
1. BOB/CKD - creatinine increased to 3.95.  Patient transferred to MICU for hypotension, now on Levophed.  Contributing to BOB is CRS, MARTÍNEZ, anemia.  Imaging showed large right pleural effusion.  Will monitor clinical course to determine if RRT required.  Currently oxygenating well on room air despite large right pleural effusion.  Will reassess 1/30 regarding optimization of volume status.  Uric acid elevated at 9.6   2. Hyperkalemia - improved.  3. Hyperphosphatemia - repeat 1/30 and start on binders if remains elevated.   4. Hypotension - responding to pressors.  Unclear if there is additional cardiac compromise.    5. Hyponatremia - repeat with 1/30 labs.

## 2023-01-29 NOTE — PROGRESS NOTE ADULT - SUBJECTIVE AND OBJECTIVE BOX
[facility] DIVISION OF KIDNEY DISEASES AND HYPERTENSION   FOLLOW UP NOTE    --------------------------------------------------------------------------------  HPI: 42 year female with PMH of HTN, HLD, DM, PAD, CHF admitted with left foot toe pain. Pt during the hospital course was noted to have worsening renal function. Pt being seen for BOB on CKD.     Pt with extensive hx of PAD and underwent LLE 3rd toe amputation. Pt also gives hx of CHF and is followed by cardiology. Upon review of labs on [facility]/[place] SCr was elevated at 1.33 on 6/28/21 and was 1.15 on 2/24/22. Pt with long standing hx of DM and gives hx of retinopathy. SCr was 1.2 on 1/26/23. Pt underwent LE angiogram on presentation. Pt also noted to be on diuretics (torsemide, aldactone) that was held given worsening renal functions. UA showed hematuria, proteinuria and pyuria. Scr increased to 2.99 on 1/28/23. UPC significantly elevated 8.1 gms with low albumin. Pt was noted to have low SBP in 60s in AM today (1/29). Rapid response was called and was pt was later transferred to MICU for further care/vasopressors.     Pt seen and examined in MICU today. Pt complaining of LE toe pain and feeling tired. Denies fever, chills, nausea, vomiting, CP and SOB during rounds. Scr increased to 3.95 on repeat labs done today. Pt also noted to have decreased UOP.     PAST HISTORY  --------------------------------------------------------------------------------  No significant changes to PMH, PSH, FHx, SHx, unless otherwise noted    ALLERGIES & MEDICATIONS  --------------------------------------------------------------------------------  Allergies    No Known Allergies    Intolerances    Standing Inpatient Medications  aspirin enteric coated 81 milliGRAM(s) Oral daily  atorvastatin 80 milliGRAM(s) Oral at bedtime  cefepime   IVPB      chlorhexidine 4% Liquid 1 Application(s) Topical <User Schedule>  dextrose 5%. 1000 milliLiter(s) IV Continuous <Continuous>  dextrose 5%. 1000 milliLiter(s) IV Continuous <Continuous>  dextrose 50% Injectable 25 Gram(s) IV Push once  dextrose 50% Injectable 12.5 Gram(s) IV Push once  dextrose 50% Injectable 25 Gram(s) IV Push once  enoxaparin Injectable 40 milliGRAM(s) SubCutaneous every 24 hours  ferrous    sulfate 325 milliGRAM(s) Oral daily  glucagon  Injectable 1 milliGRAM(s) IntraMuscular once  influenza   Vaccine 0.5 milliLiter(s) IntraMuscular once  insulin lispro (ADMELOG) corrective regimen sliding scale   SubCutaneous three times a day before meals  insulin lispro (ADMELOG) corrective regimen sliding scale   SubCutaneous at bedtime  magnesium sulfate  IVPB 2 Gram(s) IV Intermittent once  metroNIDAZOLE  IVPB      metroNIDAZOLE  IVPB 500 milliGRAM(s) IV Intermittent once  metroNIDAZOLE  IVPB 500 milliGRAM(s) IV Intermittent every 8 hours  norepinephrine Infusion 0.05 MICROgram(s)/kG/Min IV Continuous <Continuous>  pantoprazole    Tablet 40 milliGRAM(s) Oral before breakfast    PRN Inpatient Medications  acetaminophen     Tablet .. 650 milliGRAM(s) Oral every 6 hours PRN  dextrose Oral Gel 15 Gram(s) Oral once PRN  HYDROmorphone  Injectable 1 milliGRAM(s) IV Push every 4 hours PRN  ondansetron Injectable 4 milliGRAM(s) IV Push every 8 hours PRN  oxyCODONE    IR 5 milliGRAM(s) Oral every 4 hours PRN      REVIEW OF SYSTEMS  --------------------------------------------------------------------------------  Gen: No fevers/chills, weakness  Head/Eyes/Ears: No HA  Respiratory: No dyspnea, cough  CV: No chest pain  GI: No abdominal pain, diarrhea  : No dysuria, hematuria, see HPI  MSK: LE edema, see HPI  Skin: No rashes, toe gangrene  Heme: No easy bruising or bleeding    All other systems were reviewed and are negative, except as noted.    VITALS/PHYSICAL EXAM  --------------------------------------------------------------------------------  T(C): 35.4 (01-29-23 @ 11:00), Max: 36.8 (01-28-23 @ 18:41)  HR: 63 (01-29-23 @ 11:00) (57 - 76)  BP: 103/59 (01-29-23 @ 11:00) (68/40 - 114/71)  RR: 18 (01-29-23 @ 11:00) (16 - 18)  SpO2: 99% (01-29-23 @ 11:00) (97% - 100%)  Wt(kg): --    01-28-23 @ 07:01 - 01-29-23 @ 07:00  --------------------------------------------------------  IN: 440 mL / OUT: 200 mL / NET: 240 mL    01-29-23 @ 07:01  -  01-29-23 @ 12:11  --------------------------------------------------------  IN: 0 mL / OUT: 0 mL / NET: 0 mL    Physical Exam:  	Gen: middle age, female, resting, NAD  	HEENT: Anicteric  	Pulm: decrease breath sounds right side  	CV: S1S2+  	Abd: Soft, +BS        	Ext: RLE>LLE edema B/L (improved), gangrenous 2nd toe of LLE seen  	Neuro: Awake         	Skin: Warm and dry    LABS/STUDIES  --------------------------------------------------------------------------------              8.1    13.90 >-----------<  583      [01-29-23 @ 09:57]              28.0     131  |  97  |  36  ----------------------------<  125      [01-29-23 @ 09:57]  4.4   |  21  |  3.95        Ca     8.2     [01-29-23 @ 09:57]      Mg     1.90     [01-29-23 @ 09:57]      Phos  6.7     [01-29-23 @ 09:57]    Uric acid 9.6      [01-29-23 @ 06:49]  CK 30      [01-28-23 @ 06:36]    Creatinine Trend:  SCr 3.95 [01-29 @ 09:57]  SCr 3.90 [01-29 @ 06:49]  SCr 2.99 [01-28 @ 06:36]  SCr 2.61 [01-27 @ 22:05]  SCr 2.46 [01-27 @ 16:37]    Urinalysis - [01-28-23 @ 11:01]      Color Yellow / Appearance Slightly Turbid / SG 1.036 / pH 6.0      Gluc Trace / Ketone Trace  / Bili Negative / Urobili <2 mg/dL       Blood Trace / Protein 300 mg/dL / Leuk Est Small / Nitrite Negative      RBC 7 / WBC 34 / Hyaline 7 / Gran 3 / Sq Epi  / Non Sq Epi >27 / Bacteria Moderate    Urine Creatinine 68      [01-28-23 @ 11:01]  Urine Protein 556      [01-28-23 @ 11:01]  Urine Sodium 23      [01-28-23 @ 11:01]  Urine Urea Nitrogen 151.4      [01-28-23 @ 11:01]  Urine Potassium 65.3      [01-28-23 @ 11:01]  Urine Osmolality 325      [01-28-23 @ 11:01]

## 2023-01-29 NOTE — RAPID RESPONSE TEAM SUMMARY - NSMEDICATIONSRRT_GEN_ALL_CORE
5% albumin 250 mg IV  IV vancomycin 1g  Ordered flagyl and cefepime 500 mg ( given BOB)  levo ggt 5% albumin 250 mg IV  IV vancomycin 1g  Ordered flagyl and cefepime 500 mg (given BOB)  levo ggt

## 2023-01-29 NOTE — PROGRESS NOTE ADULT - PROBLEM SELECTOR PLAN 2
Baseline Cr 1.20, now rising to 2.99  DDx ATN, 2/2 contrast from CT angiogram on 1/25 or IV furosemide yesterday. Patient also hypervolemic as well     - hold further diuresis as well as nephrotoxic medications  - Pending renal lytes  - Bladder scan  - Pending abdominal ultrasound to look at kidneys specifically  - Renal to be consulted    #Hyperkalemia  - Improved  -s/p Lokelma, Insulin, D50 Baseline Cr 1.20, now rising to 3.60 today   DDx pre-renal injury due to sepsis or diuresis with IV furosemide vs. ATN. less likely contrast induced from CT angiogram 1/25 given continued uptrend in Cr.      - hold further diuresis as well as nephrotoxic medications  - Pending renal lytes  - Bladder scan  - Pending abdominal ultrasound to look at kidneys specifically  - Renal consulted; further recs appreciated     #Hyperkalemia  - Improved  -s/p Lokelma, Insulin, D50 Necrosis of 2nd digit i/s/o PAD, concerning for dry gangrene, elevated ESR/CRP. No signs of active infection   X-ray foot without gas or osseous involvement, limited to soft tissue   Podiatry and vascular recs appreciated  CT Angio prelim read with occluded left posterior tibial and peroneal arteries  No DVT on venous duplex    - c/w IV unasyn   - pain management with IV 1mg dilaudid PRN for severe pain, oxycodone for moderate pain, acetaminophen for mild pain   - SEBASTIÁN/PVRs bilateral with moderate-severe peripheral vascular disease  - Holding off on angiogram of L leg with vascular team due to worsening renal function   - plans for L transmetatarsal amputation pending further vascular recs on date  - cardiology consulted for medical clearance; >11% risk of postop complication.     s/p IV 40mg furosemide 1/26, but with new BOB with Cr 2.02 from 1.20 day prior, cardiology recs to hold diuresis or IV fluid. Necrosis of 2nd digit i/s/o PAD, concerning for dry gangrene, elevated ESR/CRP. No signs of active infection   X-ray foot without gas or osseous involvement, limited to soft tissue   Podiatry and vascular recs appreciated  CT Angio prelim read with occluded left posterior tibial and peroneal arteries  No DVT on venous duplex    - IV vancomycin, flagyl, cefepime   - pain management with IV 1mg dilaudid PRN for severe pain, oxycodone for moderate pain, acetaminophen for mild pain   - SEBASTIÁN/PVRs bilateral with moderate-severe peripheral vascular disease  - Holding off on angiogram of L leg with vascular team due to worsening renal function   - plans for L transmetatarsal amputation pending further vascular recs on date  - cardiology consulted for medical clearance; >11% risk of postop complication.     s/p IV 40mg furosemide 1/26, but with new BOB with Cr 2.02 from 1.20 day prior, cardiology recs to hold diuresis or IV fluid

## 2023-01-29 NOTE — PROGRESS NOTE ADULT - PROBLEM SELECTOR PLAN 6
On metformin 500mg BID, glimepiride 4mg, lantus 8U at bedtime   A1c 7.1    - started 5U lantus + ROOSEVELT On metformin 500mg BID, glimepiride 4mg, lantus 8U at bedtime   A1c 7.1    - 5U lantus -> ISS only in the setting of worsening BOB Hb today 7.5 from 8.7 on admission, continuing to downtrend  no signs/sx of bleeding, likely related to inflammation   +RBC on urinalysis  pt notes brown urine   trend H/H  outpt f/u with PMD for further w/u and age appropriate cancer screening      - repeat CBC in the afternoon  - transfuse to above 7

## 2023-01-29 NOTE — PROGRESS NOTE ADULT - PROBLEM SELECTOR PLAN 4
CT angio with moderate-marked bilateral lower extremity peripheral vascular disease, calcifications of AAA   Per vascular, no concern for acute limb ischemia, no surgical intervention   unremarkable lipid panel   repeat lactate 1.9 from 2.9    - c/w atorvastatin   - c/w ASA reduced breath sound on R lung field  CXR 1/26: moderate R sided pleural effusion.   on RA, stable, no complaints of sob    could be secondary to CHF     - f/u CT chest non con  - f/u abdominal US reduced breath sound on R lung field  CXR 1/26: moderate R sided pleural effusion.   on RA, stable, no complaints of sob  CXR 1/29: increasing R sized pleural effusion     could be secondary to CHF    - f/u CT chest non con  - nephro regarding diuresis with pressure support

## 2023-01-29 NOTE — PROGRESS NOTE ADULT - PROBLEM SELECTOR PLAN 1
Pt with BOB on CKD in setting of CHF, recent IV contrast use and hypotension. Upon review of labs on [facility]/[place] SCr was elevated at 1.33 on 6/28/21 and was 1.15 on 2/24/22. SCr was 1.2 on 1/26/23. Pt underwent LE angiogram on presentation. Pt also noted to be on diuretics (torsemide, aldactone) that was held given worsening renal functions. CT angio showed no evidence of JAMIL. Echo reviewed. Scr increased to 3.95 on labs done today. Check kidney/bladder US with doppler to rule out renal vein thrombosis given low albumin levels. 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 Recommend ponce catheterization for strict I/O. Discussed with pt need for RRT if renal functions or develops critical acidbase disorder/electrolyte imbalance/volume overload. HD consent obtained from pt and kept on file. Consider trial of diuretics. Strict I/O. Bladder scan prn. Avoid nephrotoxins, Dose meds as per egfr.

## 2023-01-29 NOTE — RAPID RESPONSE TEAM SUMMARY - NSADDTLFINDINGSRRT_GEN_ALL_CORE
On my arrival, the patient was noted to be awake and to be answering questions. However, she stated that she was dizzy. Her blood pressure measured in the 70s systolic (confirmed in both arms on manual and digital readings). Her heart rate measured in the 70s, and a sinus rhythm was noted on bedside telemetry. Her oxygen saturation measured 100%, and her fingerstick glucose measured in the 70s. The patient was noted to be hypothermic to 94 degrees as measured rectally. 
VS: 83/48, HR 59, RR 19, T94.8F rectally, SpO2 97%, . Patient mentating well but extremities were cold on exam. Since receiving the 1L bolus this morning, patient has made no urine and bladder scan showed no urine. Patient appeared volume overload, with distended abdomen and an CXR showed worsening pulmonary edema. Of note EF 21%. Started albumin 5% 250 mg IV and stopped fluids. Ddx septic shock vs mixed with cardiogenic shock. Consulted CICU who agreed patient was volume overloaded but not clear whether this was cardiogenic shock as well. Could get central line as a proxy for mixed venous. Infectious source likely gangrenous left foot vs UTI (U/A could be positive from the last RRT (but many epithelial cells)). Planned to go to OR Monday with podiatry. Nephrology was consulted for CRRT and MICU consulted and agreed w/ plan to start levo ggt. Antibiotics were broadened to vancomycin (started during RRT), cefepime (ordered) and flagyl (ordered). Pt brought to CT for CT c/a/p non-contrast and transferred to the MICU.

## 2023-01-29 NOTE — PROGRESS NOTE ADULT - PROBLEM SELECTOR PLAN 5
Hb today 7.8 from 8.7 on admission   no signs/sx of bleeding, likely related to inflammation   pt notes brown urine   trend H/H  outpt f/u with PMD for further w/u and age appropriate cancer screening    - urinalysis  - repeat CBC in the afternoon Hb today 7.5 from 8.7 on admission, continuing to downtrend  no signs/sx of bleeding, likely related to inflammation   +RBC on urinalysis  pt notes brown urine   trend H/H  outpt f/u with PMD for further w/u and age appropriate cancer screening      - repeat CBC in the afternoon  - transfuse to above 7 CT angio with moderate-marked bilateral lower extremity peripheral vascular disease, calcifications of AAA   Per vascular, no concern for acute limb ischemia, no surgical intervention   unremarkable lipid panel   repeat lactate 1.9 from 2.9    - c/w atorvastatin   - c/w ASA

## 2023-01-29 NOTE — RAPID RESPONSE TEAM SUMMARY - NSSITUATIONBACKGROUNDRRT_GEN_ALL_CORE
The patient is a 42-year-old woman who is followed for coronary artery disease, peripheral arterial disease, and type II diabetes mellitus. She was admitted initially on 1/25 for evaluation and management of an ulcer of the left foot and was started on empiric antibiotic therapy with plans for definitive surgical intervention this coming week. However, a medical rapid response was called this morning because the patient was noted to be hypotensive to the 60s systolic.

## 2023-01-29 NOTE — CONSULT NOTE ADULT - ASSESSMENT
Pt is 42-year-old female past medical history coronary artery disease status post CABG x4 in 2018, CHF s/p ICD placement (interrogated 2022), DM, PAD s/p 3rd toe amputation who presents with left 2nd toe pain concerning for dry gangrene. Seen by podiatry and vascular teams with plans for possible transmetatarsal amputation. Course c/b worsening renal function, and hypotension with hypothermia requiring rapid response called, responded to fluids initially. MICU consulted for additional RTT due to recurrent hypotension .    Recommendations:  - To follow

## 2023-01-29 NOTE — RAPID RESPONSE TEAM SUMMARY - NSOTHERINTERVENTIONSRRT_GEN_ALL_CORE
As the patient was noted to be hypothermic 94 degrees, I think that it is most likely that this patient's hypotension was the result of ongoing sepsis. I suspect that the etiology of sepsis was secondary to poor source control of infection (of the patient's necrotic foot ulcer that is pending definitive surgical intervention). Blood cultures were sent to look for bacteremia (of this or another source). The patient does not have any evident cough, dysuria, headache, abdominal discomfort, or vomiting to suggest alternative infectious source. The primary team can consider broadening the patient's antibiotic coverage from ampicillin/sulbactam to antibiotics that will cover mrsa and pseudomonas. The patient was administered one liter total of fluids (with understandable hesitation in the context of the patient's known hfref). For that reason, the primary team will assess the patient over the course of this morning to ensure that she is not developing any clinical signs of circulatory overload. The patient will be placed on a warming blanket. Her oral carvedilol will be discontinued. The patient was administered intravenous dextrose. Most likely, hypoglycemia occurred in the setting of amish on ckd with resultant limited renal clearance of insulin administered. The primary team will hold insulin today. 
Stopped fluids

## 2023-01-29 NOTE — CONSULT NOTE ADULT - SUBJECTIVE AND OBJECTIVE BOX
Patient is a 42y old  Female who presents with a chief complaint of Toe pain (2023 10:02)      Initial HPI on admission:  HPI:  Pt is 42-year-old female past medical history coronary artery disease status post CABG x4 in 2018, CHF s/p ICD placement (interrogated ), DM, PAD s/p 3rd toe amputation who presents with left 2nd toe pain. Patient reports that she was discharged from [facility] post amputation beginning of 2022. Reports that the trigger was a cart running over her toe, did not have any issues with her 2nd toe at the time. She was put on IV antibiotics for a month post-discharge and completed 1/3. Has not followed up with her podiatrist/vascular doctor since the surgery Patient reports that she has been walking without issues until 5 days ago when she noticed blackening of the toe as well as severe pain. Notices that it is worse when she lays flat or walks around, had been taking tylenol every few hours without relief. Reports feeling chills occasionally but denies fevers at home. Reports poor PO intake due to loss of appetite every since she was discharged. Denies chest pain, palpitations, worsening SOB, diarrhea, dysuria.     ED course:   130/72, HR 72, afebrile, 100% on RA   S/p 1 dose vanc/zosyn  (2023 23:16)      PAST MEDICAL & SURGICAL HISTORY:  MI (myocardial infarction)  2017      Type 2 diabetes mellitus      Hypertension      Hyperlipidemia      Coronary artery disease  had CABG x 4      Cerebrovascular accident (CVA)  residual of right sided weakness      H/O congestive heart disease  ICM/chronic systolic CHF as per Dr. Diana Menjivar      History of cardiomyopathy      Thrombus  left ventricular mural thrombus      S/P       H/O tubal ligation  2016      Coronary artery disease  CABG x 4 in 2018        Allergies    No Known Allergies    Intolerances      FAMILY HISTORY:  Family history of heart disease (Father)        Medications:  acetaminophen     Tablet .. 650 milliGRAM(s) Oral every 6 hours PRN  albumin human  5% IVPB 250 milliLiter(s) IV Intermittent once  aspirin enteric coated 81 milliGRAM(s) Oral daily  atorvastatin 80 milliGRAM(s) Oral at bedtime  cefepime   IVPB      dextrose 5%. 1000 milliLiter(s) IV Continuous <Continuous>  dextrose 5%. 1000 milliLiter(s) IV Continuous <Continuous>  dextrose 50% Injectable 25 Gram(s) IV Push once  dextrose 50% Injectable 12.5 Gram(s) IV Push once  dextrose 50% Injectable 25 Gram(s) IV Push once  dextrose Oral Gel 15 Gram(s) Oral once PRN  enoxaparin Injectable 40 milliGRAM(s) SubCutaneous every 24 hours  ferrous    sulfate 325 milliGRAM(s) Oral daily  glucagon  Injectable 1 milliGRAM(s) IntraMuscular once  HYDROmorphone  Injectable 1 milliGRAM(s) IV Push every 4 hours PRN  influenza   Vaccine 0.5 milliLiter(s) IntraMuscular once  insulin lispro (ADMELOG) corrective regimen sliding scale   SubCutaneous three times a day before meals  insulin lispro (ADMELOG) corrective regimen sliding scale   SubCutaneous at bedtime  magnesium sulfate  IVPB 2 Gram(s) IV Intermittent once  metroNIDAZOLE  IVPB      metroNIDAZOLE  IVPB 500 milliGRAM(s) IV Intermittent once  metroNIDAZOLE  IVPB 500 milliGRAM(s) IV Intermittent every 8 hours  norepinephrine Infusion 0.05 MICROgram(s)/kG/Min IV Continuous <Continuous>  ondansetron Injectable 4 milliGRAM(s) IV Push every 8 hours PRN  oxyCODONE    IR 5 milliGRAM(s) Oral every 4 hours PRN  pantoprazole    Tablet 40 milliGRAM(s) Oral before breakfast  vancomycin  IVPB 1000 milliGRAM(s) IV Intermittent once      vent settings      Vital Signs Last 24 Hrs  T(C): 36.7 (2023 06:10), Max: 36.8 (2023 18:41)  T(F): 98.1 (2023 06:10), Max: 98.2 (2023 18:41)  HR: 70 (2023 06:10) (70 - 76)  BP: 68/40 (2023 06:10) (68/40 - 114/71)  BP(mean): --  RR: 18 (2023 06:10) (18 - 18)  SpO2: 99% (2023 06:10) (99% - 100%)    Parameters below as of 2023 06:10  Patient On (Oxygen Delivery Method): room air               @ 07:01  -   @ 07:00  --------------------------------------------------------  IN: 440 mL / OUT: 200 mL / NET: 240 mL        LABS:                        7.5    12.50 )-----------( 548      ( 2023 06:49 )             26.8         131<L>  |  97<L>  |  36<H>  ----------------------------<  151<H>  4.5   |  22  |  3.90<H>    Ca    8.1<L>      2023 06:49  Phos  6.7       Mg     1.90         TPro  6.1  /  Alb  1.8<L>  /  TBili  0.2  /  DBili  x   /  AST  14  /  ALT  10  /  AlkPhos  110        CARDIAC MARKERS ( 2023 06:36 )  x     / x     / 30 U/L / x     / x          CAPILLARY BLOOD GLUCOSE      POCT Blood Glucose.: 160 mg/dL (2023 09:28)      Urinalysis Basic - ( 2023 11:01 )    Color: Yellow / Appearance: Slightly Turbid / S.036 / pH: x  Gluc: x / Ketone: Trace  / Bili: Negative / Urobili: <2 mg/dL   Blood: x / Protein: 300 mg/dL / Nitrite: Negative   Leuk Esterase: Small / RBC: 7 /HPF / WBC 34 /HPF   Sq Epi: x / Non Sq Epi: >27 /HPF / Bacteria: Moderate      CULTURES:      VITALS:   T(C): 36.7 (23 @ 06:10), Max: 36.8 (23 @ 18:41)  HR: 70 (23 @ 06:10) (70 - 76)  BP: 68/40 (23 @ 06:10) (68/40 - 114/71)  RR: 18 (23 @ 06:10) (18 - 18)  SpO2: 99% (23 @ 06:10) (99% - 100%)    GENERAL: NAD, lying in bed comfortably but appears lethargic Mentating well at the moment   HEAD:  Atraumatic, normocephalic  EYES: EOMI, PERRLA, conjunctiva and sclera clear  ENT: Moist mucous membranes  NECK: Supple, no JVD  HEART: Regular rate and rhythm, no murmurs, rubs, or gallops  LUNGS: Unlabored respirations.  Clear to auscultation bilaterally, no crackles, wheezing, or rhonchi  ABDOMEN: Soft, nontender, nondistended, +BS  EXTREMITIES: 2+ peripheral pulses bilaterally. No clubbing, cyanosis, or edema  NERVOUS SYSTEM:  A&Ox3, no focal deficits   SKIN: 2nd digit dry gangrene, dehisced partial 3rd ray resection to sub-dermis, ischemic changes the dorsal MPJs 1-5, no drainage, no purulence, no acute signs of infection. Right foot no wounds, no acute signs of infection.    Psych: Normal. normal behavior. normal speech    RADIOLOGY :   Imaging reviewed

## 2023-01-29 NOTE — PROGRESS NOTE ADULT - PROBLEM SELECTOR PLAN 3
reduced breath sound on R lung field  CXR 1/26: moderate R sided pleural effusion.   on RA, stable, no complaints of sob    could be secondary to CHF     - f/u CT chest non con  - f/u abdominal US   - possible IR consult next week to tap reduced breath sound on R lung field  CXR 1/26: moderate R sided pleural effusion.   on RA, stable, no complaints of sob    could be secondary to CHF     - f/u CT chest non con  - f/u abdominal US Baseline Cr 1.20, now rising to 3.60 today   DDx pre-renal injury due to sepsis or diuresis with IV furosemide vs. ATN. less likely contrast induced from CT angiogram 1/25 given continued uptrend in Cr.      - hold further diuresis as well as nephrotoxic medications  - Pending renal lytes  - Bladder scan  - Pending abdominal ultrasound to look at kidneys specifically  - Renal consulted; further recs appreciated     #Hyperkalemia  - Improved  -s/p Lokelma, Insulin, D50 Baseline Cr 1.20, now rising to 3.95   DDx pre-renal injury due to sepsis or diuresis with IV furosemide vs. ATN. less likely contrast induced from CT angiogram 1/25 given continued uptrend in Cr.      - hold further diuresis as well as nephrotoxic medications  - Pending renal lytes  - Bladder scan 71 cc -> ponce -> likely will need CRRT with renal on pressor support   - Pending abdominal ultrasound to look at kidneys specifically  - Renal consulted; further recs appreciated     #Hyperkalemia  - Improved  -s/p Lokelma, Insulin, D50

## 2023-01-29 NOTE — RAPID RESPONSE TEAM SUMMARY - NSSITUATIONBACKGROUNDRRT_GEN_ALL_CORE
The patient is a 42-year-old woman who is followed for coronary artery disease, peripheral arterial disease, and type II diabetes mellitus. She was admitted initially on 1/25 for evaluation and management of an ulcer of the left foot and was started on unasyn with plans for definitive surgical intervention this coming week. A medical rapid response was called this morning for hypotension to the 60s systolic. She was given 500cc bolus (given her EF 21%) and then another 500cc over 2 hours. RRT called again for hypotention   The patient is a 42-year-old woman who is followed for coronary artery disease, peripheral arterial disease, and type II diabetes mellitus. She was admitted initially on 1/25 for evaluation and management of an ulcer of the left foot and was started on unasyn with plans for definitive surgical intervention this coming week. A medical rapid response was called this morning for hypotension to the 60s systolic. She was given 500cc bolus (given her EF 21%) and then another 500cc over 2 hours. RRT called again for hypotension.

## 2023-01-29 NOTE — RAPID RESPONSE TEAM SUMMARY - NSMEDICATIONSRRT_GEN_ALL_CORE
The patient was administered 500 ccs of intravenous lactated ringers' solution. An additional 500ccs was to be administered over the three hours subsequent to the rapid response.

## 2023-01-30 LAB
ALBUMIN SERPL ELPH-MCNC: 2 G/DL — LOW (ref 3.3–5)
ALP SERPL-CCNC: 105 U/L — SIGNIFICANT CHANGE UP (ref 40–120)
ALT FLD-CCNC: 8 U/L — SIGNIFICANT CHANGE UP (ref 4–33)
ANION GAP SERPL CALC-SCNC: 15 MMOL/L — HIGH (ref 7–14)
APTT BLD: 32 SEC — SIGNIFICANT CHANGE UP (ref 27–36.3)
AST SERPL-CCNC: 13 U/L — SIGNIFICANT CHANGE UP (ref 4–32)
BASOPHILS # BLD AUTO: 0.06 K/UL — SIGNIFICANT CHANGE UP (ref 0–0.2)
BASOPHILS NFR BLD AUTO: 0.5 % — SIGNIFICANT CHANGE UP (ref 0–2)
BILIRUB SERPL-MCNC: 0.2 MG/DL — SIGNIFICANT CHANGE UP (ref 0.2–1.2)
BUN SERPL-MCNC: 38 MG/DL — HIGH (ref 7–23)
CALCIUM SERPL-MCNC: 8.2 MG/DL — LOW (ref 8.4–10.5)
CHLORIDE SERPL-SCNC: 95 MMOL/L — LOW (ref 98–107)
CO2 SERPL-SCNC: 21 MMOL/L — LOW (ref 22–31)
CREAT SERPL-MCNC: 4.23 MG/DL — HIGH (ref 0.5–1.3)
CULTURE RESULTS: NO GROWTH — SIGNIFICANT CHANGE UP
EGFR: 13 ML/MIN/1.73M2 — LOW
EOSINOPHIL # BLD AUTO: 0.19 K/UL — SIGNIFICANT CHANGE UP (ref 0–0.5)
EOSINOPHIL NFR BLD AUTO: 1.6 % — SIGNIFICANT CHANGE UP (ref 0–6)
GAS PNL BLDV: SIGNIFICANT CHANGE UP
GLUCOSE BLDC GLUCOMTR-MCNC: 133 MG/DL — HIGH (ref 70–99)
GLUCOSE BLDC GLUCOMTR-MCNC: 141 MG/DL — HIGH (ref 70–99)
GLUCOSE BLDC GLUCOMTR-MCNC: 154 MG/DL — HIGH (ref 70–99)
GLUCOSE BLDC GLUCOMTR-MCNC: 81 MG/DL — SIGNIFICANT CHANGE UP (ref 70–99)
GLUCOSE SERPL-MCNC: 88 MG/DL — SIGNIFICANT CHANGE UP (ref 70–99)
HCT VFR BLD CALC: 25.6 % — LOW (ref 34.5–45)
HGB BLD-MCNC: 7.5 G/DL — LOW (ref 11.5–15.5)
IANC: 8.6 K/UL — HIGH (ref 1.8–7.4)
IMM GRANULOCYTES NFR BLD AUTO: 0.4 % — SIGNIFICANT CHANGE UP (ref 0–0.9)
INR BLD: 1.5 RATIO — HIGH (ref 0.88–1.16)
LYMPHOCYTES # BLD AUTO: 1.83 K/UL — SIGNIFICANT CHANGE UP (ref 1–3.3)
LYMPHOCYTES # BLD AUTO: 15.4 % — SIGNIFICANT CHANGE UP (ref 13–44)
MAGNESIUM SERPL-MCNC: 1.8 MG/DL — SIGNIFICANT CHANGE UP (ref 1.6–2.6)
MCHC RBC-ENTMCNC: 24.6 PG — LOW (ref 27–34)
MCHC RBC-ENTMCNC: 29.3 GM/DL — LOW (ref 32–36)
MCV RBC AUTO: 83.9 FL — SIGNIFICANT CHANGE UP (ref 80–100)
MONOCYTES # BLD AUTO: 1.17 K/UL — HIGH (ref 0–0.9)
MONOCYTES NFR BLD AUTO: 9.8 % — SIGNIFICANT CHANGE UP (ref 2–14)
NEUTROPHILS # BLD AUTO: 8.6 K/UL — HIGH (ref 1.8–7.4)
NEUTROPHILS NFR BLD AUTO: 72.3 % — SIGNIFICANT CHANGE UP (ref 43–77)
NRBC # BLD: 0 /100 WBCS — SIGNIFICANT CHANGE UP (ref 0–0)
NRBC # FLD: 0.02 K/UL — HIGH (ref 0–0)
PHOSPHATE 24H UR-MCNC: 16.1 MG/DL — SIGNIFICANT CHANGE UP
PLATELET # BLD AUTO: 603 K/UL — HIGH (ref 150–400)
POTASSIUM SERPL-MCNC: 4.2 MMOL/L — SIGNIFICANT CHANGE UP (ref 3.5–5.3)
POTASSIUM SERPL-SCNC: 4.2 MMOL/L — SIGNIFICANT CHANGE UP (ref 3.5–5.3)
PROT SERPL-MCNC: 6.4 G/DL — SIGNIFICANT CHANGE UP (ref 6–8.3)
PROTHROM AB SERPL-ACNC: 17.5 SEC — HIGH (ref 10.5–13.4)
RBC # BLD: 3.05 M/UL — LOW (ref 3.8–5.2)
RBC # FLD: 17.9 % — HIGH (ref 10.3–14.5)
SODIUM SERPL-SCNC: 131 MMOL/L — LOW (ref 135–145)
SPECIMEN SOURCE: SIGNIFICANT CHANGE UP
TROPONIN T, HIGH SENSITIVITY RESULT: 99 NG/L — CRITICAL HIGH
VANCOMYCIN FLD-MCNC: 14.8 UG/ML — SIGNIFICANT CHANGE UP
WBC # BLD: 11.9 K/UL — HIGH (ref 3.8–10.5)
WBC # FLD AUTO: 11.9 K/UL — HIGH (ref 3.8–10.5)

## 2023-01-30 PROCEDURE — 99254 IP/OBS CNSLTJ NEW/EST MOD 60: CPT | Mod: GC

## 2023-01-30 PROCEDURE — 93308 TTE F-UP OR LMTD: CPT | Mod: 26,GC

## 2023-01-30 PROCEDURE — 76604 US EXAM CHEST: CPT | Mod: 26,GC

## 2023-01-30 PROCEDURE — 99291 CRITICAL CARE FIRST HOUR: CPT | Mod: GC,25

## 2023-01-30 PROCEDURE — 99233 SBSQ HOSP IP/OBS HIGH 50: CPT | Mod: GC

## 2023-01-30 RX ORDER — POLYETHYLENE GLYCOL 3350 17 G/17G
17 POWDER, FOR SOLUTION ORAL DAILY
Refills: 0 | Status: DISCONTINUED | OUTPATIENT
Start: 2023-01-30 | End: 2023-01-31

## 2023-01-30 RX ORDER — HYDROMORPHONE HYDROCHLORIDE 2 MG/ML
0.5 INJECTION INTRAMUSCULAR; INTRAVENOUS; SUBCUTANEOUS ONCE
Refills: 0 | Status: DISCONTINUED | OUTPATIENT
Start: 2023-01-30 | End: 2023-01-30

## 2023-01-30 RX ORDER — MAGNESIUM SULFATE 500 MG/ML
2 VIAL (ML) INJECTION ONCE
Refills: 0 | Status: COMPLETED | OUTPATIENT
Start: 2023-01-30 | End: 2023-01-30

## 2023-01-30 RX ORDER — HEPARIN SODIUM 5000 [USP'U]/ML
5000 INJECTION INTRAVENOUS; SUBCUTANEOUS EVERY 8 HOURS
Refills: 0 | Status: DISCONTINUED | OUTPATIENT
Start: 2023-01-31 | End: 2023-02-22

## 2023-01-30 RX ORDER — PIPERACILLIN AND TAZOBACTAM 4; .5 G/20ML; G/20ML
3.38 INJECTION, POWDER, LYOPHILIZED, FOR SOLUTION INTRAVENOUS ONCE
Refills: 0 | Status: COMPLETED | OUTPATIENT
Start: 2023-01-30 | End: 2023-01-30

## 2023-01-30 RX ORDER — BUMETANIDE 0.25 MG/ML
1 INJECTION INTRAMUSCULAR; INTRAVENOUS
Qty: 20 | Refills: 0 | Status: DISCONTINUED | OUTPATIENT
Start: 2023-01-30 | End: 2023-02-01

## 2023-01-30 RX ORDER — PIPERACILLIN AND TAZOBACTAM 4; .5 G/20ML; G/20ML
3.38 INJECTION, POWDER, LYOPHILIZED, FOR SOLUTION INTRAVENOUS EVERY 12 HOURS
Refills: 0 | Status: DISCONTINUED | OUTPATIENT
Start: 2023-01-31 | End: 2023-02-01

## 2023-01-30 RX ORDER — SENNA PLUS 8.6 MG/1
2 TABLET ORAL AT BEDTIME
Refills: 0 | Status: DISCONTINUED | OUTPATIENT
Start: 2023-01-30 | End: 2023-02-22

## 2023-01-30 RX ORDER — CHLORHEXIDINE GLUCONATE 213 G/1000ML
1 SOLUTION TOPICAL DAILY
Refills: 0 | Status: DISCONTINUED | OUTPATIENT
Start: 2023-01-30 | End: 2023-02-05

## 2023-01-30 RX ORDER — PIPERACILLIN AND TAZOBACTAM 4; .5 G/20ML; G/20ML
3.38 INJECTION, POWDER, LYOPHILIZED, FOR SOLUTION INTRAVENOUS ONCE
Refills: 0 | Status: COMPLETED | OUTPATIENT
Start: 2023-01-31 | End: 2023-01-31

## 2023-01-30 RX ADMIN — CALAMINE AND ZINC OXIDE AND PHENOL 1 APPLICATION(S): 160; 10 LOTION TOPICAL at 21:49

## 2023-01-30 RX ADMIN — HYDROMORPHONE HYDROCHLORIDE 0.5 MILLIGRAM(S): 2 INJECTION INTRAMUSCULAR; INTRAVENOUS; SUBCUTANEOUS at 23:42

## 2023-01-30 RX ADMIN — HYDROMORPHONE HYDROCHLORIDE 1 MILLIGRAM(S): 2 INJECTION INTRAMUSCULAR; INTRAVENOUS; SUBCUTANEOUS at 02:47

## 2023-01-30 RX ADMIN — HYDROMORPHONE HYDROCHLORIDE 1 MILLIGRAM(S): 2 INJECTION INTRAMUSCULAR; INTRAVENOUS; SUBCUTANEOUS at 11:30

## 2023-01-30 RX ADMIN — CALAMINE AND ZINC OXIDE AND PHENOL 1 APPLICATION(S): 160; 10 LOTION TOPICAL at 06:05

## 2023-01-30 RX ADMIN — PIPERACILLIN AND TAZOBACTAM 200 GRAM(S): 4; .5 INJECTION, POWDER, LYOPHILIZED, FOR SOLUTION INTRAVENOUS at 18:17

## 2023-01-30 RX ADMIN — Medication 81 MILLIGRAM(S): at 11:03

## 2023-01-30 RX ADMIN — Medication 650 MILLIGRAM(S): at 21:00

## 2023-01-30 RX ADMIN — HYDROMORPHONE HYDROCHLORIDE 1 MILLIGRAM(S): 2 INJECTION INTRAMUSCULAR; INTRAVENOUS; SUBCUTANEOUS at 17:05

## 2023-01-30 RX ADMIN — CALAMINE AND ZINC OXIDE AND PHENOL 1 APPLICATION(S): 160; 10 LOTION TOPICAL at 13:37

## 2023-01-30 RX ADMIN — Medication 325 MILLIGRAM(S): at 11:03

## 2023-01-30 RX ADMIN — PANTOPRAZOLE SODIUM 40 MILLIGRAM(S): 20 TABLET, DELAYED RELEASE ORAL at 06:17

## 2023-01-30 RX ADMIN — CHLORHEXIDINE GLUCONATE 1 APPLICATION(S): 213 SOLUTION TOPICAL at 17:27

## 2023-01-30 RX ADMIN — OXYCODONE HYDROCHLORIDE 5 MILLIGRAM(S): 5 TABLET ORAL at 21:30

## 2023-01-30 RX ADMIN — OXYCODONE HYDROCHLORIDE 5 MILLIGRAM(S): 5 TABLET ORAL at 22:44

## 2023-01-30 RX ADMIN — Medication 6.71 MICROGRAM(S)/KG/MIN: at 12:07

## 2023-01-30 RX ADMIN — Medication 6.71 MICROGRAM(S)/KG/MIN: at 19:22

## 2023-01-30 RX ADMIN — HYDROMORPHONE HYDROCHLORIDE 1 MILLIGRAM(S): 2 INJECTION INTRAMUSCULAR; INTRAVENOUS; SUBCUTANEOUS at 22:44

## 2023-01-30 RX ADMIN — Medication 25 GRAM(S): at 04:29

## 2023-01-30 RX ADMIN — HYDROMORPHONE HYDROCHLORIDE 1 MILLIGRAM(S): 2 INJECTION INTRAMUSCULAR; INTRAVENOUS; SUBCUTANEOUS at 11:03

## 2023-01-30 RX ADMIN — Medication 100 MILLIGRAM(S): at 13:37

## 2023-01-30 RX ADMIN — BUMETANIDE 5 MG/HR: 0.25 INJECTION INTRAMUSCULAR; INTRAVENOUS at 19:21

## 2023-01-30 RX ADMIN — BUMETANIDE 5 MG/HR: 0.25 INJECTION INTRAMUSCULAR; INTRAVENOUS at 12:07

## 2023-01-30 RX ADMIN — HYDROMORPHONE HYDROCHLORIDE 1 MILLIGRAM(S): 2 INJECTION INTRAMUSCULAR; INTRAVENOUS; SUBCUTANEOUS at 17:25

## 2023-01-30 RX ADMIN — ATORVASTATIN CALCIUM 80 MILLIGRAM(S): 80 TABLET, FILM COATED ORAL at 21:49

## 2023-01-30 RX ADMIN — ENOXAPARIN SODIUM 40 MILLIGRAM(S): 100 INJECTION SUBCUTANEOUS at 06:05

## 2023-01-30 RX ADMIN — Medication 100 MILLIGRAM(S): at 06:05

## 2023-01-30 RX ADMIN — HYDROMORPHONE HYDROCHLORIDE 1 MILLIGRAM(S): 2 INJECTION INTRAMUSCULAR; INTRAVENOUS; SUBCUTANEOUS at 02:32

## 2023-01-30 RX ADMIN — HYDROMORPHONE HYDROCHLORIDE 1 MILLIGRAM(S): 2 INJECTION INTRAMUSCULAR; INTRAVENOUS; SUBCUTANEOUS at 21:10

## 2023-01-30 RX ADMIN — Medication 650 MILLIGRAM(S): at 22:43

## 2023-01-30 NOTE — PROGRESS NOTE ADULT - SUBJECTIVE AND OBJECTIVE BOX
Podiatry pager #: [phone] ([place])/ [phone] ([facility])    Patient is a 42y old  Female who presents with a chief complaint of Toe pain (30 Jan 2023 07:46)       INTERVAL HPI/OVERNIGHT EVENTS:  Patient seen and evaluated at bedside.  Pt is resting comfortable in NAD. Denies N/V/F/C.     Allergies    No Known Allergies    Intolerances        Vital Signs Last 24 Hrs  T(C): 36.2 (30 Jan 2023 12:00), Max: 36.8 (29 Jan 2023 20:00)  T(F): 97.2 (30 Jan 2023 12:00), Max: 98.2 (29 Jan 2023 20:00)  HR: 74 (30 Jan 2023 12:00) (60 - 74)  BP: 104/73 (30 Jan 2023 12:00) (95/60 - 125/59)  BP(mean): 79 (30 Jan 2023 12:00) (63 - 82)  RR: 15 (30 Jan 2023 12:00) (10 - 27)  SpO2: 95% (30 Jan 2023 12:00) (94% - 100%)    Parameters below as of 30 Jan 2023 08:00  Patient On (Oxygen Delivery Method): room air        LABS:                        7.5    11.90 )-----------( 603      ( 30 Jan 2023 02:17 )             25.6     01-30    131<L>  |  95<L>  |  38<H>  ----------------------------<  88  4.2   |  21<L>  |  4.23<H>    Ca    8.2<L>      30 Jan 2023 02:17  Phos  6.7     01-29  Mg     1.80     01-30    TPro  6.4  /  Alb  2.0<L>  /  TBili  0.2  /  DBili  x   /  AST  13  /  ALT  8   /  AlkPhos  105  01-30    PT/INR - ( 30 Jan 2023 02:17 )   PT: 17.5 sec;   INR: 1.50 ratio         PTT - ( 30 Jan 2023 02:17 )  PTT:32.0 sec  Urinalysis Basic - ( 29 Jan 2023 18:45 )    Color: DARK YELLOW / Appearance: Slightly Turbid / SG: >1.030 / pH: x  Gluc: x / Ketone: Trace  / Bili: Small / Urobili: <2 mg/dL   Blood: x / Protein: 300 mg/dL / Nitrite: Negative   Leuk Esterase: Moderate / RBC: 1 /HPF / WBC 6 /HPF   Sq Epi: x / Non Sq Epi: 3 /HPF / Bacteria: Many      CAPILLARY BLOOD GLUCOSE      POCT Blood Glucose.: 133 mg/dL (30 Jan 2023 11:11)  POCT Blood Glucose.: 81 mg/dL (30 Jan 2023 08:16)  POCT Blood Glucose.: 116 mg/dL (29 Jan 2023 21:32)  POCT Blood Glucose.: 137 mg/dL (29 Jan 2023 18:03)  POCT Blood Glucose.: 144 mg/dL (29 Jan 2023 15:10)      Lower Extremity Physical Exam:  Vascular: DP/PT 0/4 left, DT/PT 1/4, CFT <3 seconds B/L, Temperature gradient warm to cold left, warm to cool right.   Neuro: Epicritic sensation decreased to the level of toes, B/L.  Musculoskeletal/Ortho: s/p left foot partial 3rd ray resection.  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 Right foot no wounds, no acute signs of infection.    RADIOLOGY & ADDITIONAL TESTS:

## 2023-01-30 NOTE — CHART NOTE - NSCHARTNOTEFT_GEN_A_CORE
: Sanaz Garcia MD, Guzman Vang MD, Janine De MD     INDICATION: Heart failure patient in unspecified shock     PROCEDURE:  [ X ] LIMITED ECHO  [ X ] LIMITED CHEST  [ ] LIMITED RETROPERITONEAL  [ ] LIMITED ABDOMINAL  [ ] LIMITED DVT  [ ] NEEDLE GUIDANCE VASCULAR  [ ] NEEDLE GUIDANCE THORACENTESIS  [ ] NEEDLE GUIDANCE PARACENTESIS  [ ] NEEDLE GUIDANCE PERICARDIOCENTESIS  [ ] OTHER    FINDINGS: A line predominance with scattered B-lines. Bilateral R>L pleural effusions. Decreased LV systolic function with inferior wall hypokinesis. LV>RV. VTI 12, LVOT diameter 1.8cm diameter. IVC 2.1cm.       INTERPRETATION:  Bilateral small pleural effusions. Decreased LVSF with WMAs. Decreased estimated cardiac output.     Images uploaded on [employer] : Sanaz Garcia MD, Guzman Vang MD, Janine De MD     INDICATION: Heart failure patient in unspecified shock     PROCEDURE:  [ X ] LIMITED ECHO  [ X ] LIMITED CHEST  [ ] LIMITED RETROPERITONEAL  [ ] LIMITED ABDOMINAL  [ ] LIMITED DVT  [ ] NEEDLE GUIDANCE VASCULAR  [ ] NEEDLE GUIDANCE THORACENTESIS  [ ] NEEDLE GUIDANCE PARACENTESIS  [ ] NEEDLE GUIDANCE PERICARDIOCENTESIS  [ ] OTHER    FINDINGS: A line predominance with scattered B-lines. Bilateral R>L pleural effusions. Decreased LV systolic function with inferior wall hypokinesis. LV>RV. VTI 12, LVOT diameter 1.8cm diameter. IVC 2.1cm.       INTERPRETATION:  Bilateral small pleural effusions. Decreased LVSF with WMAs. Decreased estimated cardiac output.     Images uploaded on [employer]    Attending Addendum:  At bedside for procedure and agree with above.  MADI De DO, MUNIRP

## 2023-01-30 NOTE — CONSULT NOTE ADULT - ATTENDING COMMENTS
42F PMH CAD s/p CABG x4 in 2018, CHF s/p ICD placement (interrogated 2022), DM, PAD s/p 3rd toe amputation who presents with left 2nd toe pain admitted for L 2nd toe gangrene and BOB. Course c/b worsening ARF with hyperkalemia, and  shock transferred to MICU 1/29. ID consulted for L 2nd toe gangrene    #Shock-cardiogenic given significantly reduced EF vs septic  hypothermia, tachycardia hypotnesion  HFrEF, bedside TTE 1/30 w/ WMA  1/29 RRT with hypotension and hypothermia  Favor cardiogenic  WBC 14 on 1/29 now downtrending, was 10 on presentation  F/u bcx and Ucx  recent CT with no source   pt with no localizing symptoms except toe pain    #L 2nd toe gangrene: dry gangrene with no signs of superinfection.   Vasculopath  - L toe dry gangrene  L 3rd toe amp in Dec '22 at [facility], DC on abx ,completed beginning of Jan '23 (fill of PO levaquin in late Dec for 10 days)  Vanc/zosyn briefly on adm  Unasyn 1/26-1/29  Cefepime, metronidazole, vanc (by level) 1/29 - xx  Pods following, plans for TMA    #LUE swelling  began after L arrrow placement  monitor, elevate        Recc:  Deesecalate abx to zosyn (renally dosed) , f/u bcx and ucx and if negative then DC abx      Plan discussed with MICU.     Cindy Vance  Please contact through MS Teams   If no response or past 5 pm/weekend call [phone].

## 2023-01-30 NOTE — PROGRESS NOTE ADULT - SUBJECTIVE AND OBJECTIVE BOX
[facility] Division of Kidney Diseases & Hypertension  FOLLOW UP NOTE  [phone]--------------------------------------------------------------------------------  HPI: 42 year female with PMH of HTN, HLD, DM, PAD, CHF admitted with left toe pain. Pt during the hospital course was noted to have worsening renal function. Pt was transferred to the MICU on 1/29/23 for septic shock requiring vasopressor support. Pt being seen for BOB on CKD.      24 hour events/subjective: Pt seen and examined in MICU today. Pt complaining of L toe pain, BL LE edema, and feeling tired. Also reports dizziness upon standing. Denies fever, chills, nausea, vomiting, CP and SOB during rounds.        PAST HISTORY  --------------------------------------------------------------------------------  No significant changes to PMH, PSH, FHx, SHx, unless otherwise noted    ALLERGIES & MEDICATIONS  --------------------------------------------------------------------------------  Allergies    No Known Allergies    Intolerances      Standing Inpatient Medications  aspirin enteric coated 81 milliGRAM(s) Oral daily  atorvastatin 80 milliGRAM(s) Oral at bedtime  calamine/zinc oxide Lotion 1 Application(s) Topical three times a day  cefepime   IVPB      cefepime   IVPB 1000 milliGRAM(s) IV Intermittent every 24 hours  chlorhexidine 4% Liquid 1 Application(s) Topical <User Schedule>  dextrose 5%. 1000 milliLiter(s) IV Continuous <Continuous>  dextrose 5%. 1000 milliLiter(s) IV Continuous <Continuous>  dextrose 50% Injectable 25 Gram(s) IV Push once  dextrose 50% Injectable 12.5 Gram(s) IV Push once  dextrose 50% Injectable 25 Gram(s) IV Push once  enoxaparin Injectable 40 milliGRAM(s) SubCutaneous every 24 hours  ferrous    sulfate 325 milliGRAM(s) Oral daily  glucagon  Injectable 1 milliGRAM(s) IntraMuscular once  influenza   Vaccine 0.5 milliLiter(s) IntraMuscular once  insulin lispro (ADMELOG) corrective regimen sliding scale   SubCutaneous three times a day before meals  insulin lispro (ADMELOG) corrective regimen sliding scale   SubCutaneous at bedtime  metroNIDAZOLE  IVPB 500 milliGRAM(s) IV Intermittent every 8 hours  metroNIDAZOLE  IVPB      norepinephrine Infusion 0.05 MICROgram(s)/kG/Min IV Continuous <Continuous>  pantoprazole    Tablet 40 milliGRAM(s) Oral before breakfast    PRN Inpatient Medications  acetaminophen     Tablet .. 650 milliGRAM(s) Oral every 6 hours PRN  dextrose Oral Gel 15 Gram(s) Oral once PRN  HYDROmorphone  Injectable 1 milliGRAM(s) IV Push every 4 hours PRN  ondansetron Injectable 4 milliGRAM(s) IV Push every 8 hours PRN  oxyCODONE    IR 5 milliGRAM(s) Oral every 4 hours PRN      REVIEW OF SYSTEMS  --------------------------------------------------------------------------------  Gen: +weakness, and dizziness upon standing  Head/Eyes/Ears: No HA  Respiratory: No dyspnea, cough  CV: No chest pain  GI: No abdominal pain, diarrhea  : No dysuria, hematuria, see HPI  MSK: + BL LE edema and L toe pain  Skin: No rashes  Heme: No easy bruising or bleeding    All other systems were reviewed and are negative, except as noted.    VITALS/PHYSICAL EXAM  --------------------------------------------------------------------------------  T(C): 35.7 (01-30-23 @ 05:00), Max: 36.8 (01-29-23 @ 20:00)  HR: 61 (01-30-23 @ 07:00) (57 - 74)  BP: 109/60 (01-30-23 @ 07:00) (79/41 - 125/59)  RR: 12 (01-30-23 @ 07:00) (10 - 27)  SpO2: 97% (01-30-23 @ 07:00) (94% - 100%)  Wt(kg): --        01-29-23 @ 07:01  -  01-30-23 @ 07:00  --------------------------------------------------------  IN: 1140 mL / OUT: 155 mL / NET: 985 mL      Physical Exam:  Gen: middle age, female, resting, NAD  HEENT: Anicteric  Pulm: CTABL  CV: S1S2+  Abd: Soft, +BS    Ext: +BL LE edema, R>L, gangrenous 2nd toe of LLE seen  Neuro: Awake and alert  Skin: Warm and dry    LABS/STUDIES  --------------------------------------------------------------------------------              7.5    11.90 >-----------<  603      [01-30-23 @ 02:17]              25.6     131  |  95  |  38  ----------------------------<  88      [01-30-23 @ 02:17]  4.2   |  21  |  4.23        Ca     8.2     [01-30-23 @ 02:17]      Mg     1.80     [01-30-23 @ 02:17]      Phos  6.7     [01-29-23 @ 09:57]    TPro  6.4  /  Alb  2.0  /  TBili  0.2  /  DBili  x   /  AST  13  /  ALT  8   /  AlkPhos  105  [01-30-23 @ 02:17]    PT/INR: PT 17.5 , INR 1.50       [01-30-23 @ 02:17]  PTT: 32.0       [01-30-23 @ 02:17]    Uric acid 9.6      [01-29-23 @ 06:49]    Creatinine Trend:  SCr 4.23 [01-30 @ 02:17]  SCr 4.05 [01-29 @ 18:00]  SCr 3.95 [01-29 @ 09:57]  SCr 3.90 [01-29 @ 06:49]  SCr 2.99 [01-28 @ 06:36]    Urinalysis - [01-29-23 @ 18:45]      Color DARK YELLOW / Appearance Slightly Turbid / SG >1.030 / pH 5.5      Gluc Negative / Ketone Trace  / Bili Small / Urobili <2 mg/dL       Blood Negative / Protein 300 mg/dL / Leuk Est Moderate / Nitrite Negative      RBC 1 / WBC 6 / Hyaline 1 / Gran  / Sq Epi  / Non Sq Epi 3 / Bacteria Many    Urine Creatinine 68      [01-28-23 @ 11:01]  Urine Protein 556      [01-28-23 @ 11:01]  Urine Sodium 23      [01-28-23 @ 11:01]  Urine Urea Nitrogen 151.4      [01-28-23 @ 11:01]  Urine Potassium 65.3      [01-28-23 @ 11:01]  Urine Phosphorus 16.1      [01-30-23 @ 02:30]  Urine Osmolality 325      [01-28-23 @ 11:01]    Iron 21, TIBC 198, %sat 11      [01-26-23 @ 07:30]  Ferritin 30      [01-26-23 @ 07:30]  Lipid: chol 112, , HDL 20, LDL --      [01-26-23 @ 07:30] [facility] Division of Kidney Diseases & Hypertension  FOLLOW UP NOTE  [phone]--------------------------------------------------------------------------------    HPI: 42-year-female with PMH of HTN, HLD, DM, PAD, CHF admitted with left toe pain. Pt. developed BOB during hospital stay. Pt was transferred to the MICU on 1/29/23 for septic shock requiring vasopressor support. Pt. being seen for BOB.       24 hour events/subjective: Pt seen and examined in MICU today. Pt complaining of L toe pain, B/L LE edema, and feeling tired. Also reports dizziness upon standing. Denies fever, chills, nausea, vomiting, CP and SOB during rounds.    PAST HISTORY  --------------------------------------------------------------------------------  No significant changes to PMH, PSH, FHx, SHx, unless otherwise noted    ALLERGIES & MEDICATIONS  --------------------------------------------------------------------------------  Allergies    No Known Allergies    Intolerances    Standing Inpatient Medications  aspirin enteric coated 81 milliGRAM(s) Oral daily  atorvastatin 80 milliGRAM(s) Oral at bedtime  calamine/zinc oxide Lotion 1 Application(s) Topical three times a day  cefepime   IVPB      cefepime   IVPB 1000 milliGRAM(s) IV Intermittent every 24 hours  chlorhexidine 4% Liquid 1 Application(s) Topical <User Schedule>  dextrose 5%. 1000 milliLiter(s) IV Continuous <Continuous>  dextrose 5%. 1000 milliLiter(s) IV Continuous <Continuous>  dextrose 50% Injectable 25 Gram(s) IV Push once  dextrose 50% Injectable 12.5 Gram(s) IV Push once  dextrose 50% Injectable 25 Gram(s) IV Push once  enoxaparin Injectable 40 milliGRAM(s) SubCutaneous every 24 hours  ferrous    sulfate 325 milliGRAM(s) Oral daily  glucagon  Injectable 1 milliGRAM(s) IntraMuscular once  influenza   Vaccine 0.5 milliLiter(s) IntraMuscular once  insulin lispro (ADMELOG) corrective regimen sliding scale   SubCutaneous three times a day before meals  insulin lispro (ADMELOG) corrective regimen sliding scale   SubCutaneous at bedtime  metroNIDAZOLE  IVPB 500 milliGRAM(s) IV Intermittent every 8 hours  metroNIDAZOLE  IVPB      norepinephrine Infusion 0.05 MICROgram(s)/kG/Min IV Continuous <Continuous>  pantoprazole    Tablet 40 milliGRAM(s) Oral before breakfast    PRN Inpatient Medications  acetaminophen     Tablet .. 650 milliGRAM(s) Oral every 6 hours PRN  dextrose Oral Gel 15 Gram(s) Oral once PRN  HYDROmorphone  Injectable 1 milliGRAM(s) IV Push every 4 hours PRN  ondansetron Injectable 4 milliGRAM(s) IV Push every 8 hours PRN  oxyCODONE    IR 5 milliGRAM(s) Oral every 4 hours PRN    REVIEW OF SYSTEMS  --------------------------------------------------------------------------------  Gen: +weakness, and dizziness upon standing  Head/Eyes/Ears: No HA  Respiratory: No dyspnea  CV: No chest pain  GI: No abdominal pain, diarrhea  : See HPI  MSK: +B/L LE edema and L toe pain  Skin: No rashes  Heme: No bleeding    All other systems were reviewed and are negative, except as noted.    VITALS/PHYSICAL EXAM  --------------------------------------------------------------------------------  T(C): 35.7 (01-30-23 @ 05:00), Max: 36.8 (01-29-23 @ 20:00)  HR: 61 (01-30-23 @ 07:00) (57 - 74)  BP: 109/60 (01-30-23 @ 07:00) (79/41 - 125/59)  RR: 12 (01-30-23 @ 07:00) (10 - 27)  SpO2: 97% (01-30-23 @ 07:00) (94% - 100%)  Wt(kg): --    01-29-23 @ 07:01  -  01-30-23 @ 07:00  --------------------------------------------------------  IN: 1140 mL / OUT: 155 mL / NET: 985 mL    Physical Exam:    Gen: middle age, female, resting, NAD  HEENT: Anicteric  Pulm: CTA B/L  CV: S1S2+  Abd: Soft, +BS    Ext: +B/L LE edema, R>L, gangrenous 2nd toe of LLE seen  Neuro: Awake and alert  Skin: Warm and dry    LABS/STUDIES  --------------------------------------------------------------------------------              7.5    11.90 >-----------<  603      [01-30-23 @ 02:17]              25.6     131  |  95  |  38  ----------------------------<  88      [01-30-23 @ 02:17]  4.2   |  21  |  4.23        Ca     8.2     [01-30-23 @ 02:17]      Mg     1.80     [01-30-23 @ 02:17]      Phos  6.7     [01-29-23 @ 09:57]    TPro  6.4  /  Alb  2.0  /  TBili  0.2  /  DBili  x   /  AST  13  /  ALT  8   /  AlkPhos  105  [01-30-23 @ 02:17]    Uric acid 9.6      [01-29-23 @ 06:49]    Creatinine Trend:  SCr 4.23 [01-30 @ 02:17]  SCr 4.05 [01-29 @ 18:00]  SCr 3.95 [01-29 @ 09:57]  SCr 3.90 [01-29 @ 06:49]  SCr 2.99 [01-28 @ 06:36]    Urinalysis - [01-29-23 @ 18:45]      Color DARK YELLOW / Appearance Slightly Turbid / SG >1.030 / pH 5.5      Gluc Negative / Ketone Trace  / Bili Small / Urobili <2 mg/dL       Blood Negative / Protein 300 mg/dL / Leuk Est Moderate / Nitrite Negative      RBC 1 / WBC 6 / Hyaline 1 / Gran  / Sq Epi  / Non Sq Epi 3 / Bacteria Many    Urine Creatinine 68      [01-28-23 @ 11:01]  Urine Protein 556      [01-28-23 @ 11:01]  Urine Sodium 23      [01-28-23 @ 11:01]  Urine Urea Nitrogen 151.4      [01-28-23 @ 11:01]  Urine Potassium 65.3      [01-28-23 @ 11:01]  Urine Phosphorus 16.1      [01-30-23 @ 02:30]  Urine Osmolality 325      [01-28-23 @ 11:01]

## 2023-01-30 NOTE — PROGRESS NOTE ADULT - ATTENDING COMMENTS
Agree with above. Seen and examined with team on rounds. Critically ill requiring frequent visits for therapy changes and titrations. Severe PVD with gangrenous toe. Reduced LVF. On vasopressors for shock state. Unclear etiology of shock state, likely a combination of distributive shock from sepsis and cardiogenic shock. On broad spectrum abx. upportive care. Agree with above. Seen and examined with team on rounds. Critically ill requiring frequent visits for therapy changes and titrations. Severe PVD with gangrenous toe. Reduced LVF. On vasopressors for shock state. Unclear etiology of shock state, likely a combination of distributive shock from sepsis and cardiogenic shock. On broad spectrum abx. Supportive care.

## 2023-01-30 NOTE — PROGRESS NOTE ADULT - SUBJECTIVE AND OBJECTIVE BOX
SURGERY  Pager: #[phone]    INTERVAL EVENTS/SUBJECTIVE: No acute events overnight.     ______________________________________________  OBJECTIVE:   ICU Vital Signs Last 24 Hrs  T(C): 36.6 (30 Jan 2023 16:00), Max: 36.8 (29 Jan 2023 20:00)  T(F): 97.9 (30 Jan 2023 16:00), Max: 98.2 (29 Jan 2023 20:00)  HR: 78 (30 Jan 2023 17:00) (61 - 78)  BP: 118/60 (30 Jan 2023 17:00) (95/60 - 125/80)  BP(mean): 68 (30 Jan 2023 17:00) (65 - 90)  ABP: --  ABP(mean): --  RR: 18 (30 Jan 2023 17:00) (10 - 27)  SpO2: 97% (30 Jan 2023 17:00) (94% - 100%)    O2 Parameters below as of 30 Jan 2023 08:00  Patient On (Oxygen Delivery Method): room air          I&O's Detail    29 Jan 2023 07:01  -  30 Jan 2023 07:00  --------------------------------------------------------  IN:    IV PiggyBack: 500 mL    Norepinephrine: 420 mL    Oral Fluid: 220 mL  Total IN: 1140 mL    OUT:    Indwelling Catheter - Urethral (mL): 155 mL    Voided (mL): 0 mL  Total OUT: 155 mL    Total NET: 985 mL      30 Jan 2023 07:01  -  30 Jan 2023 18:03  --------------------------------------------------------  IN:    Bumetanide: 35 mL    IV PiggyBack: 100 mL    Norepinephrine: 200 mL  Total IN: 335 mL    OUT:    Indwelling Catheter - Urethral (mL): 570 mL  Total OUT: 570 mL    Total NET: -235 mL          ______________________________________________  LABS:                        7.5    11.90 )-----------( 603      ( 30 Jan 2023 02:17 )             25.6   01-30    131<L>  |  95<L>  |  38<H>  ----------------------------<  88  4.2   |  21<L>  |  4.23<H>    Ca    8.2<L>      30 Jan 2023 02:17  Phos  6.7     01-29  Mg     1.80     01-30    TPro  6.4  /  Alb  2.0<L>  /  TBili  0.2  /  DBili  x   /  AST  13  /  ALT  8   /  AlkPhos  105  01-30      _____________________________________________  RADIOLOGY:

## 2023-01-30 NOTE — PROGRESS NOTE ADULT - ASSESSMENT
42F w PMHx HTN, HLD, CAD w CABG, LV thrombus previously on coumadin now off, CVA w/o residual deficits, CHF, ICD, DM2 who presents with left foot pain. Vascular surgery consulted in the setting of possible limb ischemia. History and physical exam consistent with likely chronic limb ischemia, particularly in the setting of recent non healing toe amputation @ OSH. Coolness to touch is concerning, although AT signal is present and proximal pulses palpable, therefore low suspicion for acute limb ischemia. L 2nd toe appears non viable and will likely require amputation, although await podiatry recs.     Recommendations:   - Continue IV abx   - Appreciate Podiatry recommendations: plan for L foot TMA   - CTA showing patient bilateral aortic, iliac, femoral and popliteal arteries; occluded distal R peroneal artery at the level of the ankle. L proximal PT and peroneal occlusions, with reconstitution at level of ankle.   - SEBASTIÁN/PVRs reviewed  - please reconsult when patient medically cleared for angiogram    C Team Surgery  #[id number]

## 2023-01-30 NOTE — PROGRESS NOTE ADULT - ASSESSMENT
42F presents with left foot 2nd digit dry gangrene and ischemic changes to dorsal MPJs 1-5.  - Pt seen and evaluated.  - Afebrile, WBC 11.9  - Left Foot X-Ray: No gas, no osteomyelitis.  - Left foot 2nd digit dry gangrene, dehisced partial 3rd ray resection to sub-dermis, ischemic changes the dorsal and plantar forefoot, no drainage, no purulence, no acute signs of infection. Right foot no wounds, no acute signs of infection.  - No left foot wound culture taken 2/2 no acute signs of infection.  - continue IV abx.  - SEBASTIÁN/PVR: RABI ncv, RTBI 0.66, LABI 0.58, LTBI flat wvfms, BL wvfms multi-seg art disease.  - CTA: Right distal peroneal artery and left PT/peroneal arteries occluded.  - Vasc plan LLE angio, appreciated.  - Recommend EP consult to interrogate pacemaker.  - Pod Plan: Left foot transmetatarsal amputation with tendoachilles lengthening pending vasc recs.  - Please document medical/cardiac clearance for podiatric procedure under anesthesia.  - discussed with attending.

## 2023-01-30 NOTE — CONSULT NOTE ADULT - ASSESSMENT
42F PMH CAD s/p CABG x4 in 2018, CHF s/p ICD placement (interrogated 2022), DM, PAD s/p 3rd toe amputation who presents with left 2nd toe pain admitted for L 2nd toe gangrene and BOB. Course c/b worsening ARF with hyperkalemia, and septic shock transferred to MICU 1/29. ID consulted for L 2nd toe gangrene    #L 2nd toe gangrene  Vasculopath   Vanc/zosyn briefly on adm  Unasyn 1/26-1/29  Cefepime, metronidazole, vanc (by level) 1/29 - xx   42F PMH CAD s/p CABG x4 in 2018, CHF s/p ICD placement (interrogated 2022), DM, PAD s/p 3rd toe amputation who presents with left 2nd toe pain admitted for L 2nd toe gangrene and BOB. Course c/b worsening ARF with hyperkalemia, and septic shock transferred to MICU 1/29. ID consulted for L 2nd toe gangrene    #L 2nd toe gangrene  Vasculopath   Vanc/zosyn briefly on adm  Unasyn 1/26-1/29  Cefepime, metronidazole, vanc (by level) 1/29 - xx    #LUE swelling     42F PMH CAD s/p CABG x4 in 2018, CHF s/p ICD placement (interrogated 2022), DM, PAD s/p 3rd toe amputation who presents with left 2nd toe pain admitted for L 2nd toe gangrene and BOB. Course c/b worsening ARF with hyperkalemia, and  shock transferred to MICU 1/29. ID consulted for L 2nd toe gangrene    #Shock-cardiogenic given significantly reduced EF vs septic  HFrEF, bedside TTE 1/30 w/ WMA    #L 2nd toe gangrene  Vasculopath  - L toe gangrene  L 3rd toe amp in Dec '22 at [place], DC on abx ,completed beginning of Jan '23 (fill of PO levaquin in late Dec for 10 days)  Vanc/zosyn briefly on adm  Unasyn 1/26-1/29  Cefepime, metronidazole, vanc (by level) 1/29 - xx    #LUE swelling        #ARF 42F PMH CAD s/p CABG x4 in 2018, CHF s/p ICD placement (interrogated 2022), DM, PAD s/p 3rd toe amputation who presents with left 2nd toe pain admitted for L 2nd toe gangrene and BOB. Course c/b worsening ARF with hyperkalemia, and  shock transferred to MICU 1/29. ID consulted for L 2nd toe gangrene    #Shock-cardiogenic given significantly reduced EF vs septic  HFrEF, bedside TTE 1/30 w/ WMA  1/29 RRT with hypotension and hypothermia  Favor cardiogenic  WBC 14 on 1/29 now downtrending, was 10 on presentation  F/u bcx and Ucx    #L 2nd toe gangrene  Vasculopath  - L toe gangrene  L 3rd toe amp in Dec '22 at [place], DC on abx ,completed beginning of Jan '23 (fill of PO levaquin in late Dec for 10 days)  Vanc/zosyn briefly on adm  Unasyn 1/26-1/29  Cefepime, metronidazole, vanc (by level) 1/29 - xx  Pods following, plans for TMA    #LUE swelling  began after L arrrow placement  monitor, elevate    #ARF  on bumex gtt 42F PMH CAD s/p CABG x4 in 2018, CHF s/p ICD placement (interrogated 2022), DM, PAD s/p 3rd toe amputation who presents with left 2nd toe pain admitted for L 2nd toe gangrene and BOB. Course c/b worsening ARF with hyperkalemia, and  shock transferred to MICU 1/29. ID consulted for L 2nd toe gangrene    #Shock-cardiogenic given significantly reduced EF vs septic  HFrEF, bedside TTE 1/30 w/ WMA  1/29 RRT with hypotension and hypothermia  Favor cardiogenic  WBC 14 on 1/29 now downtrending, was 10 on presentation  F/u bcx and Ucx    #L 2nd toe gangrene  Vasculopath  - L toe gangrene  L 3rd toe amp in Dec '22 at [facility], DC on abx ,completed beginning of Jan '23 (fill of PO levaquin in late Dec for 10 days)  Vanc/zosyn briefly on adm  Unasyn 1/26-1/29  Cefepime, metronidazole, vanc (by level) 1/29 - xx  Pods following, plans for TMA    #LUE swelling  began after L arrrow placement  monitor, elevate    #ARF  on bumex gtt    Impression:  Dry gangrene, shock - cardiogenic vs septic    Recc:  Deesecalate abx to zosyn (renally dosed) , f/u bcx and ucx and if negative then DC abx

## 2023-01-30 NOTE — PROGRESS NOTE ADULT - ASSESSMENT
Ms. Conrad is 42F w/ PMH coronary artery disease status post CABG x4 in 2018, CHF s/p ICD placement (interrogated 2022), DM, PAD s/p 3rd toe amputation who presents with left 2nd toe pain concerning for dry gangrene. Seen by podiatry and vascular teams with plans for possible transmetatarsal amputation. Course c/b worsening renal function, and shock likely sepsis 2/2 LE wounds w/ possible cardiogenic component. Patient started on levophed, Vanc/Cefepime/Flagyl and transferred to MICU for further management.     NEURO  A&Ox3, no active issues    CARDIAC    #Shock  - On levo   - Likely mixed septic and cardiogenic w/ sespsis as primary  given hypothermia and increasing leukocytosis  - TTE this admission on 1/26/23 shows severe global LV systolic dysfunction w/ EF ~21%; elevated LV filling pressures; RV enlargement w/ decreases systolic function  - CCU consulted during RRT for possible cardiogenic shock. Per CCU low suspicion for cardiogenic etiology as primary  of shock. Rec checking central sat for CO/CI estimate. Will hold off central line for now  - c/w vanc/cefepime/flagyl    #CAD s/p CABG  #HFrEF  - Home cardiac meds held iso shock  - Pt currently RRR. Will monitor on tele    #PAD  #Dry Gangrene L 2nd toe  - Necrosis of 2nd digit i/s/o PAD, concerning for dry gangrene, elevated ESR/CRP. No signs of active infection   - X-ray foot without gas or osseous involvement, limited to soft tissue   - Podiatry and vascular recs appreciated  - CT Angio prelim read with occluded left posterior tibial and peroneal arteries  - f/u Vascular recs regarding future infection   - Pain control w/ Dilaudid 1mg PRN for severe; Oxy 5mg PRN for moderate    RESPIRATORY   #Large R pleural effusion  - Patient w/ R pleural effusion resulting in near-complete atelectasis of RML/RLL. Etiology likely from severe HFrEF  - Diuretics on hold   - Currently satting well on RA. Maintain SpO2>92%    RENAL   #BOB on CKD  - Patient w/ baseline SCr ~1.2 has been uptrending during hospitalization to 3.95  - Mixed etiology d/t shock, cardio-renal iso HFrEF vs possibly ATN vs less likely contrast injury   - Patient currently anureic   - Strict I/O. Clements placed for monitoring  - Nephro consulted. May need CRRT if patient continues to have gross electrolyte abnormalities or worsening acidosis.   - trend Cr     GI    #Small ascites  - Small ascites seen on CT   - C/t monitor    - Diet: Renal diet    ENDO:    #IDDM  - On home lantus 8U  - Will c/w ISS qac/qhs  - monitor glucose    HEME/ONC  #Normocytic anemia  - Patient w/ Hb 7.5. Baseline appears ~8-9  - Iron studies w/ likely FEDERICO  - Trend H/H  - A/C: HSQ    ID   #Septic Shock   - Patient w/ likely septic shock iso L toe gangrene/wound now w/ hypothermia, leukocytosis and hypotension refractory to IV fluids requiring pressor support  - F/u BCx, UCx. Initial U/a appears positive  - Abx broadened today to Vanc/cefepime/flagyl  - Vanc dose by trough given renal function   - ID consult in AM      LINES/PPX  - peripherals in tact  - DVT PPx: HSQ  - GOC: Full Code     Ms. Conrad is 42F w/ PMH coronary artery disease status post CABG x4 in 2018, CHF s/p ICD placement (interrogated 2022), DM, PAD s/p 3rd toe amputation who presents with left 2nd toe pain concerning for dry gangrene. Seen by podiatry and vascular teams with plans for possible transmetatarsal amputation. Course c/b worsening renal function, and shock likely sepsis 2/2 LE wounds w/ possible cardiogenic component. Patient started on levophed, Vanc/Cefepime/Flagyl and transferred to MICU for further management.     NEURO  A&Ox3, no active issues    CARDIAC    #Shock  - On levo target MAP>65, wean as tolerated  - Likely mixed septic and cardiogenic w/ sepsis as primary  given hypothermia and increasing leukocytosis  - TTE this admission on 1/26/23 shows severe global LV systolic dysfunction w/ EF ~21%; elevated LV filling pressures; RV enlargement w/ decreases systolic function  - CCU consulted during RRT (1/29) for possible cardiogenic shock. Per CCU low suspicion for cardiogenic etiology as primary  of shock. Rec checking central sat for CO/CI estimate. Will hold off central line for now  - c/w vanc/cefepime/flagyl    #CAD s/p CABG  #HFrEF  - Home cardiac meds held iso shock  - Pt currently RRR. Will monitor on tele    #PAD  #Dry Gangrene L 2nd toe  - Necrosis of 2nd digit i/s/o PAD, concerning for dry gangrene, elevated ESR/CRP. No signs of active infection   - X-ray foot without gas or osseous involvement, limited to soft tissue   - Podiatry and vascular recs appreciated  - CT Angio prelim read with occluded left posterior tibial and peroneal arteries  - f/u Vascular recs regarding future infection   - Pain control w/ Dilaudid 1mg PRN for severe; Oxy 5mg PRN for moderate    RESPIRATORY   #Large R pleural effusion  - Patient w/ R pleural effusion resulting in near-complete atelectasis of RML/RLL. Etiology likely from severe HFrEF  - Diuretics on hold   - Currently satting well on RA. Maintain SpO2>92%    RENAL   #BOB on CKD  - Patient w/ baseline SCr ~1.2 has been uptrending during hospitalization, today 4.2  - Mixed etiology d/t shock, cardio-renal iso HFrEF vs possibly ATN vs less likely contrast injury   - Patient currently oliguric  - Strict I/O. Clements placed for monitoring  - Nephro consulted, appreciate recs. May need CRRT if patient continues to have gross electrolyte abnormalities or worsening acidosis.   - trend Cr     GI    #Small ascites  - Small ascites seen on CT   - C/t monitor    - Diet: Renal diet    ENDO:    #IDDM  - On home lantus 8U  - Will c/w ISS qac/qhs  - monitor glucose    HEME/ONC  #Normocytic anemia  - Patient w/ Hb 7.5. Baseline appears ~8-9  - Iron studies w/ likely FEDERICO  - Trend H/H  - A/C: HSQ    ID   #Septic Shock   - Patient w/ likely septic shock iso L toe gangrene/wound now w/ hypothermia, leukocytosis and hypotension refractory to IV fluids requiring pressor support  - F/u BCx, UCx. Initial U/a appears positive  - Abx broadened today to Vanc/cefepime/flagyl  - Vanc dose by trough given renal function   - ID consulted 1/30, appreciate recs      LINES/PPX  - peripherals in tact  - DVT PPx: HSQ  - GOC: Full Code    Geovanni Means  Internal Medicine, PGY-1  Please Contact via TEAMS

## 2023-01-30 NOTE — PROGRESS NOTE ADULT - PROBLEM SELECTOR PLAN 1
Pt with BOB on CKD in setting of CHF, recent IV contrast use and hypotension. Upon review of labs on [facility]/[place] SCr was elevated at 1.33 on 6/28/21 and improved to 1.15 on 2/24/22. SCr was 1.22 on admission (1/25/23). Pt underwent LE angiogram on 1/25/23. Pt also noted to be on diuretics (torsemide, aldactone) that were held given worsening renal function. SCr is elevated further to 4.23 today. Documented urine output is 155 cc over the past 24 hours. UA showing proteinuria. Spot urine TP/Cr is elevated at 8.1. CT abdomen showing no evidence of hydronephrosis. Pt with likely ATN in the setting of septic shock. Check kidney/bladder US with doppler to rule out renal vein thrombosis given low albumin levels. 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 Discussed with pt need for RRT if renal functions or develops critical acidbase disorder/electrolyte imbalance/volume overload. HD consent obtained from pt and kept on file. Consider trial of diuretics. Strict I/O. Bladder scan prn. Avoid nephrotoxins, Dose meds as per egfr.    If you have any questions, please feel free to contact me  Talia Swift  Nephrology Fellow  [phone] / [employer] Teams(Preferred)  (After 5pm or on weekends please page the on-call fellow) Pt with BOB on CKD in setting of CHF, recent IV contrast use and hypotension. Upon review of labs on [facility]/[place] SCr was elevated at 1.33 on 6/28/21 and improved to 1.15 on 2/24/22. SCr was 1.22 on admission (1/25/23). Pt underwent LE angiogram on 1/25/23. Pt also noted to be on diuretics (torsemide, aldactone) that were held given worsening renal function. SCr is elevated further to 4.23 today. Pt is oliguric, documented urine output is 155 cc over the past 24 hours. UA showing proteinuria. Spot urine TP/Cr is elevated at 8.1. CT abdomen showing no evidence of hydronephrosis. Pt with underlying CKD likely due to DM nephropathy, now with superimposed ATN vs CRS. Recommend to start Bumex infusion at 1 mg/hr. Check kidney/bladder US with doppler to rule out renal vein thrombosis given low albumin levels. Discussed with pt need for RRT if renal function worsens. HD consent obtained from pt and kept on file. Strict I/O. Bladder scan prn. Avoid nephrotoxins, Dose meds as per egfr.    If you have any questions, please feel free to contact me  Talia Swift  Nephrology Fellow  [phone] / [employer] Teams(Preferred)  (After 5pm or on weekends please page the on-call fellow) Pt. with oliguric BOB in setting of hypotension, recent IV contrast use and CHF. Upon review of labs on [facility]/[place] Scr was elevated at 1.33 on 6/28/21 and improved to 1.15 on 2/24/22. Scr was 1.22 on admission (1/25/23). Pt underwent LE angiogram on 1/25/23. Pt. was on diuretics (torsemide, aldactone) that were held given worsening renal function. UA showing proteinuria. Spot urine TP/CR is elevated at 8.1. CT abdomen showing no evidence of hydronephrosis. Pt. with ? underlying CKD due to DM nephropathy. Scr worsened to 4.23 today. Pt. is oliguric, documented urine output is 155 cc over the past 24 hours. Recommend to start Bumex infusion at 1 mg/hr. Check kidney/bladder US with doppler to rule out renal vein thrombosis given low albumin levels. Will need to consider HD, if BOB continues to worsen. Pt. agreeable to initiate HD if needed. Monitor labs and urine output. Avoid nephrotoxins, Dose meds as per eGFR.    If you have any questions, please feel free to contact me  Talia Swift  Nephrology Fellow  [phone] / [employer] Teams(Preferred)  (After 5pm or on weekends please page the on-call fellow)

## 2023-01-30 NOTE — CONSULT NOTE ADULT - SUBJECTIVE AND OBJECTIVE BOX
Patient is a 42y old  Female who presents with a chief complaint of Toe pain (2023 12:29)    HPI:  Pt is 42-year-old female past medical history coronary artery disease status post CABG x4 in 2018, CHF s/p ICD placement (interrogated ), DM, PAD s/p 3rd toe amputation who presents with left 2nd toe pain. Patient reports that she was discharged from [facility] post amputation beginning of 2022. Reports that the trigger was a cart running over her toe, did not have any issues with her 2nd toe at the time. She was put on IV antibiotics for a month post-discharge and completed 1/3. Has not followed up with her podiatrist/vascular doctor since the surgery Patient reports that she has been walking without issues until 5 days ago when she noticed blackening of the toe as well as severe pain. Notices that it is worse when she lays flat or walks around, had been taking tylenol every few hours without relief. Reports feeling chills occasionally but denies fevers at home. Reports poor PO intake due to loss of appetite every since she was discharged. Denies chest pain, palpitations, worsening SOB, diarrhea, dysuria.     ED course:   130/72, HR 72, afebrile, 100% on RA   S/p 1 dose vanc/zosyn  (2023 23:16)       Admitted  to [facility] with L 2nd toe gangrene and BOB. Seen by podiatry and plans for OR pending imaging and optimization. CXR with R sided pleff. During hospital course patient was evaluated by podiatry and vascular w/ discussions of possible TMA. Xray of foot w/o gas or osseous involvement. Started on unasyn. CT angio shows marked b/l LE PVD, occluded left posterior tibial and peroneal arteries, Vascular was planning for LLE angio in cath lab which was held due to worsening renal function, which also led to hyperkalemia. RRT x2 on , ultimately resulting in transfer to MICU for pressors for septic shock. ID consulted for toe gangrene. Patient's antibiotics broadened to vanc/cefepime/flagyl.     Vanc/zosyn briefly on adm  Unasyn -  Cefepime, metronidazole, vanc (by level)  - xx    prior hospital charts reviewed [  ]  primary team notes reviewed [  ]  other consultant notes reviewed [  ]    PAST MEDICAL & SURGICAL HISTORY:  MI (myocardial infarction)  2017      Type 2 diabetes mellitus      Hypertension      Hyperlipidemia      Coronary artery disease  had CABG x 4      Cerebrovascular accident (CVA)  residual of right sided weakness      H/O congestive heart disease  ICM/chronic systolic CHF as per Dr. Diana Menjivar      History of cardiomyopathy      Thrombus  left ventricular mural thrombus      S/P       H/O tubal ligation  2016      Coronary artery disease  CABG x 4 in 2018          Allergies  No Known Allergies    ANTIMICROBIALS (past 90 days)  MEDICATIONS  (STANDING):  ampicillin/sulbactam  IVPB   200 mL/Hr IV Intermittent (23 @ 06:10)   200 mL/Hr IV Intermittent (23 @ 23:09)   200 mL/Hr IV Intermittent (23 @ 17:24)   200 mL/Hr IV Intermittent (23 @ 13:04)   200 mL/Hr IV Intermittent (23 @ 05:34)   200 mL/Hr IV Intermittent (23 @ 23:59)   200 mL/Hr IV Intermittent (23 @ 18:15)   200 mL/Hr IV Intermittent (23 @ 12:28)   200 mL/Hr IV Intermittent (23 @ 05:22)   200 mL/Hr IV Intermittent (23 @ 23:48)   200 mL/Hr IV Intermittent (23 @ 17:42)   200 mL/Hr IV Intermittent (23 @ 11:18)   200 mL/Hr IV Intermittent (23 @ 05:05)    cefepime   IVPB   1000 milliGRAM(s) IV Intermittent (23 @ 16:49)    metroNIDAZOLE  IVPB   100 mL/Hr IV Intermittent (23 @ 14:52)    metroNIDAZOLE  IVPB   100 mL/Hr IV Intermittent (23 @ 06:05)   100 mL/Hr IV Intermittent (23 @ 21:44)    piperacillin/tazobactam IVPB...   200 mL/Hr IV Intermittent (23 @ 15:33)    vancomycin  IVPB   250 mL/Hr IV Intermittent (23 @ 10:00)    vancomycin  IVPB.   250 mL/Hr IV Intermittent (23 @ 16:03)        cefepime   IVPB    cefepime   IVPB 1000 every 24 hours  metroNIDAZOLE  IVPB 500 every 8 hours  metroNIDAZOLE  IVPB      MEDICATIONSS: MEDICATIONS  (STANDING):  acetaminophen     Tablet .. 650 every 6 hours PRN  aspirin enteric coated 81 daily  atorvastatin 80 at bedtime  buMETAnide Infusion 1 <Continuous>  dextrose 50% Injectable 25 once  dextrose 50% Injectable 12.5 once  dextrose 50% Injectable 25 once  dextrose Oral Gel 15 once PRN  glucagon  Injectable 1 once  HYDROmorphone  Injectable 1 every 4 hours PRN  influenza   Vaccine 0.5 once  insulin lispro (ADMELOG) corrective regimen sliding scale  three times a day before meals  insulin lispro (ADMELOG) corrective regimen sliding scale  at bedtime  norepinephrine Infusion 0.05 <Continuous>  ondansetron Injectable 4 every 8 hours PRN  oxyCODONE    IR 5 every 4 hours PRN  pantoprazole    Tablet 40 before breakfast    SOCIAL HISTORY:     Patient denies smoking, alcohol use or other illicit drug use. Now lives with mother since her  is out of the country. Does have a home health aid. Before this episode, patient was able to ambulate independently.    FAMILY HISTORY:  Family history of heart disease (Father)      REVIEW OF SYSTEMS  [  ] ROS unobtainable because:    [  ] All other systems negative except as noted below:	    Constitutional:  [ ] fever [ ] chills  [ ] weight loss  [ ] weakness  Skin:  [ ] rash [ ] phlebitis	  Eyes: [ ] icterus [ ] pain  [ ] discharge	  ENMT: [ ] sore throat  [ ] thrush [ ] ulcers [ ] exudates  Respiratory: [ ] dyspnea [ ] hemoptysis [ ] cough [ ] sputum	  Cardiovascular:  [ ] chest pain [ ] palpitations [ ] edema	  Gastrointestinal:  [ ] nausea [ ] vomiting [ ] diarrhea [ ] constipation [ ] pain	  Genitourinary:  [ ] dysuria [ ] frequency [ ] hematuria [ ] discharge [ ] flank pain  [ ] incontinence  Musculoskeletal:  [ ] myalgias [ ] arthralgias [ ] arthritis  [ ] back pain  Neurological:  [ ] headache [ ] seizures  [ ] confusion/altered mental status  Psychiatric:  [ ] anxiety [ ] depression	  Hematology/Lymphatics:  [ ] lymphadenopathy  Endocrine:  [ ] adrenal [ ] thyroid  Allergic/Immunologic:	 [ ] transplant [ ] seasonal    Vital Signs Last 24 Hrs  T(F): 97.2 (23 @ 12:00), Max: 98.5 (23 @ 00:14)  Vital Signs Last 24 Hrs  HR: 74 (23 @ 12:00) (60 - 74)  BP: 104/73 (23 @ 12:00) (95/60 - 125/59)  RR: 15 (23 @ 12:00)  SpO2: 95% (23 @ 12:00) (94% - 100%)  Wt(kg): --    PHYSICAL EXAM:  Constitutional: non-toxic, no distress  HEAD/EYES: anicteric, no conjunctival injection  ENT:  supple, no thrush  Cardiovascular:   normal S1, S2, no murmur, no edema  Respiratory:  clear BS bilaterally, no wheezes, no rales  GI:  soft, non-tender, normal bowel sounds  :  no ponce, no CVA tenderness  Musculoskeletal:  no synovitis, normal ROM  Neurologic: awake and alert, normal strength, no focal findings  Skin:  no rash, no erythema, no phlebitis  Heme/Onc: no lymphadenopathy   Psychiatric:  awake, alert, appropriate mood                            7.5    11.90 )-----------( 603      ( 2023 02:17 )             25.6       131<L>  |  95<L>  |  38<H>  ----------------------------<  88  4.2   |  21<L>  |  4.23<H>    Ca    8.2<L>      2023 02:17  Phos  6.7       Mg     1.80         TPro  6.4  /  Alb  2.0<L>  /  TBili  0.2  /  DBili  x   /  AST  13  /  ALT  8   /  AlkPhos  105      Urinalysis Basic - ( 2023 18:45 )    Color: DARK YELLOW / Appearance: Slightly Turbid / SG: >1.030 / pH: x  Gluc: x / Ketone: Trace  / Bili: Small / Urobili: <2 mg/dL   Blood: x / Protein: 300 mg/dL / Nitrite: Negative   Leuk Esterase: Moderate / RBC: 1 /HPF / WBC 6 /HPF   Sq Epi: x / Non Sq Epi: 3 /HPF / Bacteria: Many    MICROBIOLOGY:Vancomycin Level, Random: 14.8 ( @ 11:10)                RADIOLOGY:  imaging below personally reviewed and agree with findings   Patient is a 42y old  Female who presents with a chief complaint of Toe pain (2023 12:29)    HPI:  Pt is 42-year-old female past medical history coronary artery disease status post CABG x4 in 2018, CHF s/p ICD placement (interrogated ), DM, PAD s/p 3rd toe amputation who presents with left 2nd toe pain. Patient reports that she was discharged from [facility] post amputation beginning of 2022. Reports that the trigger was a cart running over her toe, did not have any issues with her 2nd toe at the time. She was put on IV antibiotics for a month post-discharge and completed 1/3. Has not followed up with her podiatrist/vascular doctor since the surgery Patient reports that she has been walking without issues until 5 days ago when she noticed blackening of the toe as well as severe pain. Notices that it is worse when she lays flat or walks around, had been taking tylenol every few hours without relief. Reports feeling chills occasionally but denies fevers at home. Reports poor PO intake due to loss of appetite every since she was discharged. Denies chest pain, palpitations, worsening SOB, diarrhea, dysuria.     ED course:   130/72, HR 72, afebrile, 100% on RA   S/p 1 dose vanc/zosyn  (2023 23:16)       Admitted  to [facility] with L 2nd toe gangrene and BOB. Seen by podiatry and plans for OR pending imaging and optimization. CXR with R sided pleff. During hospital course patient was evaluated by podiatry and vascular w/ discussions of possible TMA. Xray of foot w/o gas or osseous involvement. Started on unasyn. CT angio shows marked b/l LE PVD, occluded left posterior tibial and peroneal arteries, Vascular was planning for LLE angio in cath lab which was held due to worsening renal function, which also led to hyperkalemia. RRT x2 on , ultimately resulting in transfer to MICU for pressors for septic shock. ID consulted for toe gangrene. Patient's antibiotics broadened to vanc/cefepime/flagyl.  Pt seen in MICU, states felt "out of it" on the floors and feeling slightly better in MICU. Endorses LUE swelling since arrow placement. Reports L foot pain still present but improved. She is not sure the antibiotic she was on at [facility]. 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    Vanc/zosyn briefly on adm  Unasyn -  Cefepime, metronidazole, vanc (by level)  - xx    prior hospital charts reviewed [x  ]  primary team notes reviewed [x  ]  other consultant notes reviewed [x  ]    PAST MEDICAL & SURGICAL HISTORY:  MI (myocardial infarction)  2017      Type 2 diabetes mellitus      Hypertension      Hyperlipidemia      Coronary artery disease  had CABG x 4      Cerebrovascular accident (CVA)  residual of right sided weakness      H/O congestive heart disease  ICM/chronic systolic CHF as per Dr. Diana Menjivar      History of cardiomyopathy      Thrombus  left ventricular mural thrombus      S/P       H/O tubal ligation  2016      Coronary artery disease  CABG x 4 in 2018          Allergies  No Known Allergies    ANTIMICROBIALS (past 90 days)  MEDICATIONS  (STANDING):  ampicillin/sulbactam  IVPB   200 mL/Hr IV Intermittent (23 @ 06:10)   200 mL/Hr IV Intermittent (23 @ 23:09)   200 mL/Hr IV Intermittent (23 @ 17:24)   200 mL/Hr IV Intermittent (23 @ 13:04)   200 mL/Hr IV Intermittent (23 @ 05:34)   200 mL/Hr IV Intermittent (23 @ 23:59)   200 mL/Hr IV Intermittent (23 @ 18:15)   200 mL/Hr IV Intermittent (23 @ 12:28)   200 mL/Hr IV Intermittent (23 @ 05:22)   200 mL/Hr IV Intermittent (23 @ 23:48)   200 mL/Hr IV Intermittent (23 @ 17:42)   200 mL/Hr IV Intermittent (23 @ 11:18)   200 mL/Hr IV Intermittent (23 @ 05:05)    cefepime   IVPB   1000 milliGRAM(s) IV Intermittent (23 @ 16:49)    metroNIDAZOLE  IVPB   100 mL/Hr IV Intermittent (23 @ 14:52)    metroNIDAZOLE  IVPB   100 mL/Hr IV Intermittent (23 @ 06:05)   100 mL/Hr IV Intermittent (23 @ 21:44)    piperacillin/tazobactam IVPB...   200 mL/Hr IV Intermittent (23 @ 15:33)    vancomycin  IVPB   250 mL/Hr IV Intermittent (23 @ 10:00)    vancomycin  IVPB.   250 mL/Hr IV Intermittent (23 @ 16:03)        cefepime   IVPB    cefepime   IVPB 1000 every 24 hours  metroNIDAZOLE  IVPB 500 every 8 hours  metroNIDAZOLE  IVPB      MEDICATIONSS: MEDICATIONS  (STANDING):  acetaminophen     Tablet .. 650 every 6 hours PRN  aspirin enteric coated 81 daily  atorvastatin 80 at bedtime  buMETAnide Infusion 1 <Continuous>  dextrose 50% Injectable 25 once  dextrose 50% Injectable 12.5 once  dextrose 50% Injectable 25 once  dextrose Oral Gel 15 once PRN  glucagon  Injectable 1 once  HYDROmorphone  Injectable 1 every 4 hours PRN  influenza   Vaccine 0.5 once  insulin lispro (ADMELOG) corrective regimen sliding scale  three times a day before meals  insulin lispro (ADMELOG) corrective regimen sliding scale  at bedtime  norepinephrine Infusion 0.05 <Continuous>  ondansetron Injectable 4 every 8 hours PRN  oxyCODONE    IR 5 every 4 hours PRN  pantoprazole    Tablet 40 before breakfast    SOCIAL HISTORY:     Patient denies smoking, alcohol use or other illicit drug use. Now lives with mother since her  is out of the country. Does have a home health aid. Before this episode, patient was able to ambulate independently.    FAMILY HISTORY:  Family history of heart disease (Father)      REVIEW OF SYSTEMS  [  ] ROS unobtainable because:    [  ] All other systems negative except as noted below:	    Constitutional:  [ ] fever [ ] chills  [ ] weight loss  [ ] weakness  Skin:  [ ] rash [ ] phlebitis	  Eyes: [ ] icterus [ ] pain  [ ] discharge	  ENMT: [ ] sore throat  [ ] thrush [ ] ulcers [ ] exudates  Respiratory: [ ] dyspnea [ ] hemoptysis [ ] cough [ ] sputum	  Cardiovascular:  [ ] chest pain [ ] palpitations [ ] edema	  Gastrointestinal:  [ ] nausea [ ] vomiting [ ] diarrhea [ ] constipation [ ] pain	  Genitourinary:  [ ] dysuria [ ] frequency [ ] hematuria [ ] discharge [ ] flank pain  [ ] incontinence  Musculoskeletal:  [ ] myalgias [ ] arthralgias [ ] arthritis  [ ] back pain  Neurological:  [ ] headache [ ] seizures  [ ] confusion/altered mental status  Psychiatric:  [ ] anxiety [ ] depression	  Hematology/Lymphatics:  [ ] lymphadenopathy  Endocrine:  [ ] adrenal [ ] thyroid  Allergic/Immunologic:	 [ ] transplant [ ] seasonal    Vital Signs Last 24 Hrs  T(F): 97.2 (23 @ 12:00), Max: 98.5 (23 @ 00:14)  Vital Signs Last 24 Hrs  HR: 74 (23 @ 12:00) (60 - 74)  BP: 104/73 (23 @ 12:00) (95/60 - 125/59)  RR: 15 (23 @ 12:00)  SpO2: 95% (23 @ 12:00) (94% - 100%)  Wt(kg): --    PHYSICAL EXAM:  Constitutional: non-toxic, no distress, +chronically ill appearing  HEAD/EYES: anicteric, no conjunctival injection  ENT:  supple, no thrush  Cardiovascular:   normal S1, S2, no murmur, no edema  Respiratory:  clear BS bilaterally, no wheezes, no rales  GI:  soft, non-tender, normal bowel sounds  :  + ponce, no CVA tenderness  Musculoskeletal:  no synovitis, normal ROM  Neurologic: awake and alert, normal strength, no focal findings  Skin: + scattered healed small round scars over arms and legs  Heme/Onc: no lymphadenopathy   Psychiatric:  awake, alert, appropriate mood  Lines: +arrow LUE and RUE  Ext: +edematous LUE; +L 3rd toe amp, gangrenous L 2nd toe with erythematous ?ulcer at base of foot                            7.5    11.90 )-----------( 603      ( 2023 02:17 )             25.6       131<L>  |  95<L>  |  38<H>  ----------------------------<  88  4.2   |  21<L>  |  4.23<H>    Ca    8.2<L>      2023 02:17  Phos  6.7       Mg     1.80         TPro  6.4  /  Alb  2.0<L>  /  TBili  0.2  /  DBili  x   /  AST  13  /  ALT  8   /  AlkPhos  105      Urinalysis Basic - ( 2023 18:45 )    Color: DARK YELLOW / Appearance: Slightly Turbid / SG: >1.030 / pH: x  Gluc: x / Ketone: Trace  / Bili: Small / Urobili: <2 mg/dL   Blood: x / Protein: 300 mg/dL / Nitrite: Negative   Leuk Esterase: Moderate / RBC: 1 /HPF / WBC 6 /HPF   Sq Epi: x / Non Sq Epi: 3 /HPF / Bacteria: Many    MICROBIOLOGY:Vancomycin Level, Random: 14.8 ( @ 11:10)                RADIOLOGY:  imaging below personally reviewed and agree with findings   Patient is a 42y old  Female who presents with a chief complaint of Toe pain (2023 12:29)    HPI:  Pt is 42-year-old female past medical history coronary artery disease status post CABG x4 in 2018, CHF s/p ICD placement (interrogated ), DM, PAD s/p 3rd toe amputation who presents with left 2nd toe pain. Patient reports that she was discharged from [facility] post amputation beginning of 2022. Reports that the trigger was a cart running over her toe, did not have any issues with her 2nd toe at the time. She was put on IV antibiotics for a month post-discharge and completed 1/3. Has not followed up with her podiatrist/vascular doctor since the surgery Patient reports that she has been walking without issues until 5 days ago when she noticed blackening of the toe as well as severe pain. Notices that it is worse when she lays flat or walks around, had been taking tylenol every few hours without relief. Reports feeling chills occasionally but denies fevers at home. Reports poor PO intake due to loss of appetite every since she was discharged. Denies chest pain, palpitations, worsening SOB, diarrhea, dysuria.     ED course:   130/72, HR 72, afebrile, 100% on RA   S/p 1 dose vanc/zosyn  (2023 23:16)       Admitted  to [facility] with L 2nd toe gangrene and BOB. Seen by podiatry and plans for OR pending imaging and optimization. CXR with R sided pleff. During hospital course patient was evaluated by podiatry and vascular w/ discussions of possible TMA. Xray of foot w/o gas or osseous involvement. Started on unasyn. CT angio shows marked b/l LE PVD, occluded left posterior tibial and peroneal arteries, Vascular was planning for LLE angio in cath lab which was held due to worsening renal function, which also led to hyperkalemia. RRT x2 on , ultimately resulting in transfer to MICU for pressors for shock state - cardiogenic vs septic. ID consulted for toe gangrene. Patient's antibiotics broadened to vanc/cefepime/flagyl.  Pt seen in MICU, states felt "out of it" on the floors and feeling slightly better in MICU. Endorses LUE swelling since arrow placement. Reports L foot pain still present but improved. She is not sure the antibiotic she was on at [facility]. 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    Vanc/zosyn briefly on adm  Unasyn -  Cefepime, metronidazole, vanc (by level)  - xx    prior hospital charts reviewed [x  ]  primary team notes reviewed [x  ]  other consultant notes reviewed [x  ]    PAST MEDICAL & SURGICAL HISTORY:  MI (myocardial infarction)  2017      Type 2 diabetes mellitus      Hypertension      Hyperlipidemia      Coronary artery disease  had CABG x 4      Cerebrovascular accident (CVA)  residual of right sided weakness      H/O congestive heart disease  ICM/chronic systolic CHF as per Dr. Diana Menjivar      History of cardiomyopathy      Thrombus  left ventricular mural thrombus      S/P       H/O tubal ligation  2016      Coronary artery disease  CABG x 4 in 2018          Allergies  No Known Allergies    ANTIMICROBIALS (past 90 days)  MEDICATIONS  (STANDING):  ampicillin/sulbactam  IVPB   200 mL/Hr IV Intermittent (23 @ 06:10)   200 mL/Hr IV Intermittent (23 @ 23:09)   200 mL/Hr IV Intermittent (23 @ 17:24)   200 mL/Hr IV Intermittent (23 @ 13:04)   200 mL/Hr IV Intermittent (23 @ 05:34)   200 mL/Hr IV Intermittent (23 @ 23:59)   200 mL/Hr IV Intermittent (23 @ 18:15)   200 mL/Hr IV Intermittent (23 @ 12:28)   200 mL/Hr IV Intermittent (23 @ 05:22)   200 mL/Hr IV Intermittent (23 @ 23:48)   200 mL/Hr IV Intermittent (23 @ 17:42)   200 mL/Hr IV Intermittent (23 @ 11:18)   200 mL/Hr IV Intermittent (23 @ 05:05)    cefepime   IVPB   1000 milliGRAM(s) IV Intermittent (23 @ 16:49)    metroNIDAZOLE  IVPB   100 mL/Hr IV Intermittent (23 @ 14:52)    metroNIDAZOLE  IVPB   100 mL/Hr IV Intermittent (23 @ 06:05)   100 mL/Hr IV Intermittent (23 @ 21:44)    piperacillin/tazobactam IVPB...   200 mL/Hr IV Intermittent (23 @ 15:33)    vancomycin  IVPB   250 mL/Hr IV Intermittent (23 @ 10:00)    vancomycin  IVPB.   250 mL/Hr IV Intermittent (23 @ 16:03)        cefepime   IVPB    cefepime   IVPB 1000 every 24 hours  metroNIDAZOLE  IVPB 500 every 8 hours  metroNIDAZOLE  IVPB      MEDICATIONSS: MEDICATIONS  (STANDING):  acetaminophen     Tablet .. 650 every 6 hours PRN  aspirin enteric coated 81 daily  atorvastatin 80 at bedtime  buMETAnide Infusion 1 <Continuous>  dextrose 50% Injectable 25 once  dextrose 50% Injectable 12.5 once  dextrose 50% Injectable 25 once  dextrose Oral Gel 15 once PRN  glucagon  Injectable 1 once  HYDROmorphone  Injectable 1 every 4 hours PRN  influenza   Vaccine 0.5 once  insulin lispro (ADMELOG) corrective regimen sliding scale  three times a day before meals  insulin lispro (ADMELOG) corrective regimen sliding scale  at bedtime  norepinephrine Infusion 0.05 <Continuous>  ondansetron Injectable 4 every 8 hours PRN  oxyCODONE    IR 5 every 4 hours PRN  pantoprazole    Tablet 40 before breakfast    SOCIAL HISTORY:     Patient denies smoking, alcohol use or other illicit drug use. Now lives with mother since her  is out of the country. Does have a home health aid. Before this episode, patient was able to ambulate independently.    FAMILY HISTORY:  Family history of heart disease (Father)      REVIEW OF SYSTEMS  [  ] ROS unobtainable because:    [  ] All other systems negative except as noted below:	    Constitutional:  [ ] fever [ ] chills  [ ] weight loss  [ ] weakness  Skin:  [ ] rash [ ] phlebitis	  Eyes: [ ] icterus [ ] pain  [ ] discharge	  ENMT: [ ] sore throat  [ ] thrush [ ] ulcers [ ] exudates  Respiratory: [ ] dyspnea [ ] hemoptysis [ ] cough [ ] sputum	  Cardiovascular:  [ ] chest pain [ ] palpitations [ ] edema	  Gastrointestinal:  [ ] nausea [ ] vomiting [ ] diarrhea [ ] constipation [ ] pain	  Genitourinary:  [ ] dysuria [ ] frequency [ ] hematuria [ ] discharge [ ] flank pain  [ ] incontinence  Musculoskeletal:  [ ] myalgias [ ] arthralgias [ ] arthritis  [ ] back pain  Neurological:  [ ] headache [ ] seizures  [ ] confusion/altered mental status  Psychiatric:  [ ] anxiety [ ] depression	  Hematology/Lymphatics:  [ ] lymphadenopathy  Endocrine:  [ ] adrenal [ ] thyroid  Allergic/Immunologic:	 [ ] transplant [ ] seasonal    Vital Signs Last 24 Hrs  T(F): 97.2 (23 @ 12:00), Max: 98.5 (23 @ 00:14)  Vital Signs Last 24 Hrs  HR: 74 (23 @ 12:00) (60 - 74)  BP: 104/73 (23 @ 12:00) (95/60 - 125/59)  RR: 15 (23 @ 12:00)  SpO2: 95% (23 @ 12:00) (94% - 100%)  Wt(kg): --    PHYSICAL EXAM:  Constitutional: non-toxic, no distress, +chronically ill appearing  HEAD/EYES: anicteric, no conjunctival injection  ENT:  supple, no thrush  Cardiovascular:   normal S1, S2, no murmur, no edema  Respiratory:  clear BS bilaterally, no wheezes, no rales  GI:  soft, non-tender, normal bowel sounds  :  + ponce, no CVA tenderness  Musculoskeletal:  no synovitis, normal ROM  Neurologic: awake and alert, normal strength, no focal findings  Skin: + scattered healed small round scars over arms and legs  Heme/Onc: no lymphadenopathy   Psychiatric:  awake, alert, appropriate mood  Lines: +arrow LUE and RUE  Ext: +edematous LUE; +L 3rd toe amp, gangrenous L 2nd toe with erythematous ?ulcer at base of foot                            7.5    11.90 )-----------( 603      ( 2023 02:17 )             25.6       131<L>  |  95<L>  |  38<H>  ----------------------------<  88  4.2   |  21<L>  |  4.23<H>    Ca    8.2<L>      2023 02:17  Phos  6.7       Mg     1.80         TPro  6.4  /  Alb  2.0<L>  /  TBili  0.2  /  DBili  x   /  AST  13  /  ALT  8   /  AlkPhos  105      Urinalysis Basic - ( 2023 18:45 )    Color: DARK YELLOW / Appearance: Slightly Turbid / SG: >1.030 / pH: x  Gluc: x / Ketone: Trace  / Bili: Small / Urobili: <2 mg/dL   Blood: x / Protein: 300 mg/dL / Nitrite: Negative   Leuk Esterase: Moderate / RBC: 1 /HPF / WBC 6 /HPF   Sq Epi: x / Non Sq Epi: 3 /HPF / Bacteria: Many    MICROBIOLOGY:Vancomycin Level, Random: 14.8 ( @ 11:10)                RADIOLOGY:  imaging below personally reviewed and agree with findings   Patient is a 42y old  Female who presents with a chief complaint of Toe pain (2023 12:29)    HPI:  Pt is 42-year-old female past medical history coronary artery disease status post CABG x4 in 2018, CHF s/p ICD placement (interrogated ), DM, PAD s/p 3rd toe amputation who presents with left 2nd toe pain. Patient reports that she was discharged from [facility] post amputation beginning of 2022. Reports that the trigger was a cart running over her toe, did not have any issues with her 2nd toe at the time. She was put on IV antibiotics for a month post-discharge and completed 1/3. Has not followed up with her podiatrist/vascular doctor since the surgery Patient reports that she has been walking without issues until 5 days ago when she noticed blackening of the toe as well as severe pain. Notices that it is worse when she lays flat or walks around, had been taking tylenol every few hours without relief. Reports feeling chills occasionally but denies fevers at home. Reports poor PO intake due to loss of appetite every since she was discharged. Denies chest pain, palpitations, worsening SOB, diarrhea, dysuria.     ED course:   130/72, HR 72, afebrile, 100% on RA   S/p 1 dose vanc/zosyn  (2023 23:16)       Admitted  to [facility] with L 2nd toe gangrene and BOB. Seen by podiatry and plans for OR pending imaging and optimization. CXR with R sided pleff. During hospital course patient was evaluated by podiatry and vascular w/ discussions of possible TMA. Xray of foot w/o gas or osseous involvement. Started on unasyn. CT angio shows marked b/l LE PVD, occluded left posterior tibial and peroneal arteries, Vascular was planning for LLE angio in cath lab which was held due to worsening renal function, which also led to hyperkalemia. RRT on , for hypotension (also noted to have T94F rectally), ultimately resulting in transfer to MICU for pressors for shock state - cardiogenic vs septic. ID consulted for toe gangrene. Patient's antibiotics broadened to vanc/cefepime/flagyl.  Pt seen in MICU, states felt "out of it" on the floors and feeling slightly better in MICU. Endorses LUE swelling since arrow placement. Reports L foot pain still present but improved. She is not sure the antibiotic she was on at [facility]. 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    Vanc/zosyn briefly on adm  Unasyn -  Cefepime, metronidazole, vanc (by level)  - xx    prior hospital charts reviewed [x  ]  primary team notes reviewed [x  ]  other consultant notes reviewed [x  ]    PAST MEDICAL & SURGICAL HISTORY:  MI (myocardial infarction)  2017      Type 2 diabetes mellitus      Hypertension      Hyperlipidemia      Coronary artery disease  had CABG x 4      Cerebrovascular accident (CVA)  residual of right sided weakness      H/O congestive heart disease  ICM/chronic systolic CHF as per Dr. Diana Menjivar      History of cardiomyopathy      Thrombus  left ventricular mural thrombus      S/P       H/O tubal ligation  2016      Coronary artery disease  CABG x 4 in 2018          Allergies  No Known Allergies    ANTIMICROBIALS (past 90 days)  MEDICATIONS  (STANDING):  ampicillin/sulbactam  IVPB   200 mL/Hr IV Intermittent (23 @ 06:10)   200 mL/Hr IV Intermittent (23 @ 23:09)   200 mL/Hr IV Intermittent (23 @ 17:24)   200 mL/Hr IV Intermittent (23 @ 13:04)   200 mL/Hr IV Intermittent (23 @ 05:34)   200 mL/Hr IV Intermittent (23 @ 23:59)   200 mL/Hr IV Intermittent (23 @ 18:15)   200 mL/Hr IV Intermittent (23 @ 12:28)   200 mL/Hr IV Intermittent (23 @ 05:22)   200 mL/Hr IV Intermittent (23 @ 23:48)   200 mL/Hr IV Intermittent (23 @ 17:42)   200 mL/Hr IV Intermittent (23 @ 11:18)   200 mL/Hr IV Intermittent (23 @ 05:05)    cefepime   IVPB   1000 milliGRAM(s) IV Intermittent (23 @ 16:49)    metroNIDAZOLE  IVPB   100 mL/Hr IV Intermittent (23 @ 14:52)    metroNIDAZOLE  IVPB   100 mL/Hr IV Intermittent (23 @ 06:05)   100 mL/Hr IV Intermittent (23 @ 21:44)    piperacillin/tazobactam IVPB...   200 mL/Hr IV Intermittent (23 @ 15:33)    vancomycin  IVPB   250 mL/Hr IV Intermittent (23 @ 10:00)    vancomycin  IVPB.   250 mL/Hr IV Intermittent (23 @ 16:03)        cefepime   IVPB    cefepime   IVPB 1000 every 24 hours  metroNIDAZOLE  IVPB 500 every 8 hours  metroNIDAZOLE  IVPB      MEDICATIONSS: MEDICATIONS  (STANDING):  acetaminophen     Tablet .. 650 every 6 hours PRN  aspirin enteric coated 81 daily  atorvastatin 80 at bedtime  buMETAnide Infusion 1 <Continuous>  dextrose 50% Injectable 25 once  dextrose 50% Injectable 12.5 once  dextrose 50% Injectable 25 once  dextrose Oral Gel 15 once PRN  glucagon  Injectable 1 once  HYDROmorphone  Injectable 1 every 4 hours PRN  influenza   Vaccine 0.5 once  insulin lispro (ADMELOG) corrective regimen sliding scale  three times a day before meals  insulin lispro (ADMELOG) corrective regimen sliding scale  at bedtime  norepinephrine Infusion 0.05 <Continuous>  ondansetron Injectable 4 every 8 hours PRN  oxyCODONE    IR 5 every 4 hours PRN  pantoprazole    Tablet 40 before breakfast    SOCIAL HISTORY:     Patient denies smoking, alcohol use or other illicit drug use. Now lives with mother since her  is out of the country. Does have a home health aid. Before this episode, patient was able to ambulate independently.    FAMILY HISTORY:  Family history of heart disease (Father)      REVIEW OF SYSTEMS  [  ] ROS unobtainable because:    [  ] All other systems negative except as noted below:	    Constitutional:  [ ] fever [ ] chills  [ ] weight loss  [ x] weakness   Skin:  [ ] rash [ ] phlebitis	  Eyes: [ ] icterus [ ] pain  [ ] discharge	  ENMT: [ ] sore throat  [ ] thrush [ ] ulcers [ ] exudates  Respiratory: [ ] dyspnea [ ] hemoptysis [ ] cough [ ] sputum	  Cardiovascular:  [ ] chest pain [ ] palpitations [ ] edema	  Gastrointestinal:  [ ] nausea [ ] vomiting [ ] diarrhea [ ] constipation [ ] pain	  Genitourinary:  [ ] dysuria [ ] frequency [ ] hematuria [ ] discharge [ ] flank pain  [ ] incontinence  Musculoskeletal:  [ ] myalgias [ ] arthralgias [ ] arthritis  [ ] back pain  Neurological:  [ ] headache [ ] seizures  [ ] confusion/altered mental status  Psychiatric:  [ ] anxiety [ ] depression	  Hematology/Lymphatics:  [ ] lymphadenopathy  Endocrine:  [ ] adrenal [ ] thyroid  Allergic/Immunologic:	 [ ] transplant [ ] seasonal  Ext +LUE swelling, L foot pain    Vital Signs Last 24 Hrs  T(F): 97.2 (23 @ 12:00), Max: 98.5 (23 @ 00:14)  Vital Signs Last 24 Hrs  HR: 74 (23 @ 12:00) (60 - 74)  BP: 104/73 (23 @ 12:00) (95/60 - 125/59)  RR: 15 (23 @ 12:00)  SpO2: 95% (23 @ 12:00) (94% - 100%)  Wt(kg): --    PHYSICAL EXAM:  Constitutional: non-toxic, no distress, +chronically ill appearing  HEAD/EYES: anicteric, no conjunctival injection  ENT:  supple, no thrush  Cardiovascular:   normal S1, S2, no murmur, no edema  Respiratory:  clear BS bilaterally, no wheezes, no rales  GI:  soft, non-tender, normal bowel sounds  :  + ponce w/ clear yellow urine, no CVA tenderness  Musculoskeletal:  no synovitis, normal ROM  Neurologic: awake and alert, normal strength, no focal findings  Skin: + scattered healed small round scars over arms and legs  Heme/Onc: no lymphadenopathy   Psychiatric:  awake, alert, appropriate mood  Lines: +arrow LUE and RUE  Ext: +edematous LUE; +L 3rd toe amp, gangrenous L 2nd toe with erythematous ?ulcer at base of foot                            7.5    11.90 )-----------( 603      ( 2023 02:17 )             25.6       131<L>  |  95<L>  |  38<H>  ----------------------------<  88  4.2   |  21<L>  |  4.23<H>    Ca    8.2<L>      2023 02:17  Phos  6.7       Mg     1.80         TPro  6.4  /  Alb  2.0<L>  /  TBili  0.2  /  DBili  x   /  AST  13  /  ALT  8   /  AlkPhos  105      Urinalysis Basic - ( 2023 18:45 )    Color: DARK YELLOW / Appearance: Slightly Turbid / SG: >1.030 / pH: x  Gluc: x / Ketone: Trace  / Bili: Small / Urobili: <2 mg/dL   Blood: x / Protein: 300 mg/dL / Nitrite: Negative   Leuk Esterase: Moderate / RBC: 1 /HPF / WBC 6 /HPF   Sq Epi: x / Non Sq Epi: 3 /HPF / Bacteria: Many    MICROBIOLOGY:Vancomycin Level, Random: 14.8 ( @ 11:10)                RADIOLOGY:  imaging below personally reviewed and agree with findings   Patient is a 42y old  Female who presents with a chief complaint of Toe pain (2023 12:29)    HPI:  Pt is 42-year-old female past medical history coronary artery disease status post CABG x4 in 2018, CHF s/p ICD placement (interrogated ), DM, PAD s/p 3rd toe amputation who presents with left 2nd toe pain. Patient reports that she was discharged from [facility] post amputation beginning of 2022. Reports that the trigger was a cart running over her toe, did not have any issues with her 2nd toe at the time. She was put on IV antibiotics for a month post-discharge and completed 1/3. Has not followed up with her podiatrist/vascular doctor since the surgery Patient reports that she has been walking without issues until 5 days ago when she noticed blackening of the toe as well as severe pain. Notices that it is worse when she lays flat or walks around, had been taking tylenol every few hours without relief. Reports feeling chills occasionally but denies fevers at home. Reports poor PO intake due to loss of appetite every since she was discharged. Denies chest pain, palpitations, worsening SOB, diarrhea, dysuria.     ED course:   130/72, HR 72, afebrile, 100% on RA   S/p 1 dose vanc/zosyn  (2023 23:16)       Admitted  to [facility] with L 2nd toe gangrene and BOB. Seen by podiatry and plans for OR pending imaging and optimization. CXR with R sided pleff. During hospital course patient was evaluated by podiatry and vascular w/ discussions of possible TMA. Xray of foot w/o gas or osseous involvement. Started on unasyn. CT angio shows marked b/l LE PVD, occluded left posterior tibial and peroneal arteries, Vascular was planning for LLE angio in cath lab which was held due to worsening renal function, which also led to hyperkalemia. RRT on , for hypotension (also noted to have T94F rectally), ultimately resulting in transfer to MICU for pressors for shock state - cardiogenic vs septic. ID consulted for toe gangrene. Patient's antibiotics broadened to vanc/cefepime/flagyl.  Pt seen in MICU, states felt "out of it" on the floors and feeling slightly better in MICU. Endorses LUE swelling since arrow placement. Reports L foot pain still present but improved. She is not sure the antibiotic she was on at [facility]. 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    Vanc/zosyn briefly on adm  Unasyn -  Cefepime, metronidazole, vanc (by level)  - xx    prior hospital charts reviewed [x  ]  primary team notes reviewed [x  ]  other consultant notes reviewed [x  ]    PAST MEDICAL & SURGICAL HISTORY:  MI (myocardial infarction)  2017      Type 2 diabetes mellitus      Hypertension      Hyperlipidemia      Coronary artery disease  had CABG x 4      Cerebrovascular accident (CVA)  residual of right sided weakness      H/O congestive heart disease  ICM/chronic systolic CHF as per Dr. Diana Menjivar      History of cardiomyopathy      Thrombus  left ventricular mural thrombus      S/P       H/O tubal ligation  2016      Coronary artery disease  CABG x 4 in 2018          Allergies  No Known Allergies      ANTIMICROBIALS (past 90 days)  MEDICATIONS  (STANDING):  ampicillin/sulbactam  IVPB   200 mL/Hr IV Intermittent (23 @ 06:10)   200 mL/Hr IV Intermittent (23 @ 23:09)   200 mL/Hr IV Intermittent (23 @ 17:24)   200 mL/Hr IV Intermittent (23 @ 13:04)   200 mL/Hr IV Intermittent (23 @ 05:34)   200 mL/Hr IV Intermittent (23 @ 23:59)   200 mL/Hr IV Intermittent (23 @ 18:15)   200 mL/Hr IV Intermittent (23 @ 12:28)   200 mL/Hr IV Intermittent (23 @ 05:22)   200 mL/Hr IV Intermittent (23 @ 23:48)   200 mL/Hr IV Intermittent (23 @ 17:42)   200 mL/Hr IV Intermittent (23 @ 11:18)   200 mL/Hr IV Intermittent (23 @ 05:05)    cefepime   IVPB   1000 milliGRAM(s) IV Intermittent (23 @ 16:49)    metroNIDAZOLE  IVPB   100 mL/Hr IV Intermittent (23 @ 14:52)    metroNIDAZOLE  IVPB   100 mL/Hr IV Intermittent (23 @ 06:05)   100 mL/Hr IV Intermittent (23 @ 21:44)    piperacillin/tazobactam IVPB...   200 mL/Hr IV Intermittent (23 @ 15:33)    vancomycin  IVPB   250 mL/Hr IV Intermittent (23 @ 10:00)    vancomycin  IVPB.   250 mL/Hr IV Intermittent (23 @ 16:03)        cefepime   IVPB    cefepime   IVPB 1000 every 24 hours  metroNIDAZOLE  IVPB 500 every 8 hours  metroNIDAZOLE  IVPB      MEDICATIONSS: MEDICATIONS  (STANDING):  acetaminophen     Tablet .. 650 every 6 hours PRN  aspirin enteric coated 81 daily  atorvastatin 80 at bedtime  buMETAnide Infusion 1 <Continuous>  dextrose 50% Injectable 25 once  dextrose 50% Injectable 12.5 once  dextrose 50% Injectable 25 once  dextrose Oral Gel 15 once PRN  glucagon  Injectable 1 once  HYDROmorphone  Injectable 1 every 4 hours PRN  influenza   Vaccine 0.5 once  insulin lispro (ADMELOG) corrective regimen sliding scale  three times a day before meals  insulin lispro (ADMELOG) corrective regimen sliding scale  at bedtime  norepinephrine Infusion 0.05 <Continuous>  ondansetron Injectable 4 every 8 hours PRN  oxyCODONE    IR 5 every 4 hours PRN  pantoprazole    Tablet 40 before breakfast      SOCIAL HISTORY:     Patient denies smoking, alcohol use or other illicit drug use. Now lives with mother since her  is out of the country. Does have a home health aid. Before this episode, patient was able to ambulate independently.        FAMILY HISTORY:  Family history of heart disease (Father)        REVIEW OF SYSTEMS  [  ] ROS unobtainable because:    [x  ] All other systems negative except as noted below:	    Constitutional:  [ ] fever [ ] chills  [ x] weakness   Skin:  [ ] rash 	  Eyes: [ ] icterus   [ ] discharge	  ENMT: [ ] sore throat  [ ] thrush   Respiratory: [ ] dyspnea [ ] cough [ ] sputum	  Cardiovascular:  [ ] chest pain 	  Gastrointestinal:  [ ] nausea [ ] vomiting [ ] diarrhea [ ] constipation [ ] pain	  Genitourinary:  [ ] dysuria [ ] frequency   Musculoskeletal:  [ ] myalgias  [ ] back pain  Neurological:  [ ] headache  [ ] confusion/altered mental status  Psychiatric:  [ ] anxiety [ ] depression	  Endocrine:  [ ] adrenal [ ] thyroid  Allergic/Immunologic:	 [ ] transplant [ ] seasonal  Ext +LUE swelling, L foot pain        Vital Signs Last 24 Hrs  T(F): 97.2 (23 @ 12:00), Max: 98.5 (23 @ 00:14)  Vital Signs Last 24 Hrs  HR: 74 (23 @ 12:00) (60 - 74)  BP: 104/73 (23 @ 12:00) (95/60 - 125/59)  RR: 15 (23 @ 12:00)  SpO2: 95% (23 @ 12:00) (94% - 100%)  Wt(kg): --      PHYSICAL EXAM:  Constitutional: non-toxic, no distress, +chronically ill appearing, on warming blanket.   HEAD/EYES: anicteric, no conjunctival injection  ENT:  supple, no thrush  Cardiovascular:   normal S1, S2, + PPM   Respiratory: + air entrt b/l  GI:  soft, non-tender  :  + ponce w/ clear yellow urine, no CVA tenderness  Musculoskeletal:  no joint swelling   Neurologic: awake and alert, no new focal findings  Skin: + scattered healed small round scars over arms and legs  Psychiatric:  awake, alert, appropriate mood  Lines: +arrow LUE and RUE  Ext: +edematous LUE; +L 3rd toe amp, dry gangrenous L 2nd toe with margination noted at the plantar aspect of foot                            7.5    11.90 )-----------( 603      ( 2023 02:17 )             25.6       131<L>  |  95<L>  |  38<H>  ----------------------------<  88  4.2   |  21<L>  |  4.23<H>    Ca    8.2<L>      2023 02:17  Phos  6.7       Mg     1.80         TPro  6.4  /  Alb  2.0<L>  /  TBili  0.2  /  DBili  x   /  AST  13  /  ALT  8   /  AlkPhos  105      Urinalysis Basic - ( 2023 18:45 )    Color: DARK YELLOW / Appearance: Slightly Turbid / SG: >1.030 / pH: x  Gluc: x / Ketone: Trace  / Bili: Small / Urobili: <2 mg/dL   Blood: x / Protein: 300 mg/dL / Nitrite: Negative   Leuk Esterase: Moderate / RBC: 1 /HPF / WBC 6 /HPF   Sq Epi: x / Non Sq Epi: 3 /HPF / Bacteria: Many    MICROBIOLOGY:Vancomycin Level, Random: 14.8 ( @ 11:10)        RADIOLOGY:  imaging below personally reviewed and agree with findings except U/S     < from: US Abdomen Complete (US Abdomen Complete .) (23 @ 18:38) >  IMPRESSION:  *  No hydronephrosis in either kidney.  *  Right pleural effusion.  *  Gallbladder and spleen not visualized.      < from: CT Abdomen and Pelvis No Cont (23 @ 10:44) >  IMPRESSION:    No source of infection identified in the chest abdomen or pelvis.  Redemonstrated moderate right loculated pleural effusion. Trace left   pleural effusion.  Mosaic attenuation the left lower lung, likely mild pulmonary edema.  Small ascites. Anasarca.      < from: Xray Chest 1 View- PORTABLE-Urgent (Xray Chest 1 View- PORTABLE-Urgent .) (23 @ 10:25) >  Impression:    Progression of right lung opacification with large right pleural effusion   and prominent interstitial pulmonary marking. The left lung demonstrate   prominent interstitial pulmonary marking.Trace left pleural effusion.    AICD/pacemaker in place.    Cardiac silhouette is obscured by adjacent opacities.    Osseous structures are unremarkable for age.      < from: Xray Foot AP + Lateral + Oblique, Left (23 @ 15:18) >  IMPRESSION:  1.  No soft tissue gas or osseous destruction is seen.      < from: CT Angio Abd Aorta w/run-off w/ IV Cont (23 @ 20:40) >  IMPRESSION:  1. Moderate-marked bilateral lower extremity peripheral vascular disease.  2. Multifocal mural irregularity-thrombus and calcification of the   abdominal  aorta consistent with atherosclerotic disease. No evidence abdominal   aortic  aneurysm or dissection.  3. Patent bilateral aortic, iliac, femoral and popliteal arteries.  4. Occluded distal right peroneal artery and left posterior tibial and   peroneal  arteries as described above.  5. Large right pleural effusion.  6. Near complete collapse right lower lobe. Small uterine myoma.  7. Mild-moderate abdominal - pelvic wall and bilateral lower extremity   soft  tissue edema.  8. No evidence of small bowel obstruction.  9. Pacemaker.

## 2023-01-30 NOTE — PROGRESS NOTE ADULT - SUBJECTIVE AND OBJECTIVE BOX
DATE OF SERVICE: 01-30-23 @ 07:47    Patient is a 42y old  Female who presents with a chief complaint of Toe pain (30 Jan 2023 07:18)      SUBJECTIVE / OVERNIGHT EVENTS:  No acute events overnight, patient reports doing well and all questions answered at this time.     Additional Review of Systems:  Denies any other acute sx including f/c, HA, cough, sore throat, eye/ear changes, rhinorrhea, CP, palpitations, SOB, n/v, abdominal pain, back pain, bowel/bladder changes, fatigue, numbness or tingling.    MEDICATIONS  (STANDING):  aspirin enteric coated 81 milliGRAM(s) Oral daily  atorvastatin 80 milliGRAM(s) Oral at bedtime  calamine/zinc oxide Lotion 1 Application(s) Topical three times a day  cefepime   IVPB      cefepime   IVPB 1000 milliGRAM(s) IV Intermittent every 24 hours  chlorhexidine 4% Liquid 1 Application(s) Topical <User Schedule>  dextrose 5%. 1000 milliLiter(s) (100 mL/Hr) IV Continuous <Continuous>  dextrose 5%. 1000 milliLiter(s) (50 mL/Hr) IV Continuous <Continuous>  dextrose 50% Injectable 25 Gram(s) IV Push once  dextrose 50% Injectable 12.5 Gram(s) IV Push once  dextrose 50% Injectable 25 Gram(s) IV Push once  enoxaparin Injectable 40 milliGRAM(s) SubCutaneous every 24 hours  ferrous    sulfate 325 milliGRAM(s) Oral daily  glucagon  Injectable 1 milliGRAM(s) IntraMuscular once  influenza   Vaccine 0.5 milliLiter(s) IntraMuscular once  insulin lispro (ADMELOG) corrective regimen sliding scale   SubCutaneous three times a day before meals  insulin lispro (ADMELOG) corrective regimen sliding scale   SubCutaneous at bedtime  metroNIDAZOLE  IVPB 500 milliGRAM(s) IV Intermittent every 8 hours  metroNIDAZOLE  IVPB      norepinephrine Infusion 0.05 MICROgram(s)/kG/Min (6.71 mL/Hr) IV Continuous <Continuous>  pantoprazole    Tablet 40 milliGRAM(s) Oral before breakfast    MEDICATIONS  (PRN):  acetaminophen     Tablet .. 650 milliGRAM(s) Oral every 6 hours PRN Temp greater or equal to 38C (100.4F), Mild Pain (1 - 3)  dextrose Oral Gel 15 Gram(s) Oral once PRN Blood Glucose LESS THAN 70 milliGRAM(s)/deciliter  HYDROmorphone  Injectable 1 milliGRAM(s) IV Push every 4 hours PRN Severe Pain (7 - 10)  ondansetron Injectable 4 milliGRAM(s) IV Push every 8 hours PRN Nausea and/or Vomiting  oxyCODONE    IR 5 milliGRAM(s) Oral every 4 hours PRN Moderate Pain (4 - 6)      Vital Signs Last 24 Hrs  T(C): 35.7 (30 Jan 2023 05:00), Max: 36.8 (29 Jan 2023 20:00)  T(F): 96.2 (30 Jan 2023 05:00), Max: 98.2 (29 Jan 2023 20:00)  HR: 61 (30 Jan 2023 07:00) (57 - 74)  BP: 109/60 (30 Jan 2023 07:00) (79/41 - 125/59)  BP(mean): 72 (30 Jan 2023 07:00) (63 - 80)  RR: 12 (30 Jan 2023 07:00) (10 - 27)  SpO2: 97% (30 Jan 2023 07:00) (94% - 100%)    Parameters below as of 30 Jan 2023 06:00  Patient On (Oxygen Delivery Method): room air      CAPILLARY BLOOD GLUCOSE      POCT Blood Glucose.: 116 mg/dL (29 Jan 2023 21:32)  POCT Blood Glucose.: 137 mg/dL (29 Jan 2023 18:03)  POCT Blood Glucose.: 144 mg/dL (29 Jan 2023 15:10)  POCT Blood Glucose.: 160 mg/dL (29 Jan 2023 09:28)  POCT Blood Glucose.: 154 mg/dL (29 Jan 2023 08:50)    I&O's Summary    29 Jan 2023 07:01  -  30 Jan 2023 07:00  --------------------------------------------------------  IN: 1140 mL / OUT: 155 mL / NET: 985 mL        PHYSICAL EXAM:  GENERAL: Clinically well-appearing and comfortable  HEAD:  Atraumatic, Normocephalic  EYES: EOMI, PERRLA, conjunctiva and sclera clear  NECK: Supple, No JVD  CHEST/LUNG: Clear to auscultation bilaterally; No wheeze  HEART: Regular rate and rhythm; No murmurs, rubs, or gallops  ABDOMEN: Soft, Nontender, Nondistended; Bowel sounds present  EXTREMITIES:  2+ Peripheral Pulses, No clubbing, cyanosis, or edema  PSYCH: Normal mood, Normal affect  NEUROLOGY: non-focal  SKIN: No rashes or lesions    LABS:                        7.5    11.90 )-----------( 603      ( 30 Jan 2023 02:17 )             25.6     01-30    131<L>  |  95<L>  |  38<H>  ----------------------------<  88  4.2   |  21<L>  |  4.23<H>    Ca    8.2<L>      30 Jan 2023 02:17  Phos  6.7     01-29  Mg     1.80     01-30    TPro  6.4  /  Alb  2.0<L>  /  TBili  0.2  /  DBili  x   /  AST  13  /  ALT  8   /  AlkPhos  105  01-30    PT/INR - ( 30 Jan 2023 02:17 )   PT: 17.5 sec;   INR: 1.50 ratio         PTT - ( 30 Jan 2023 02:17 )  PTT:32.0 sec      Urinalysis Basic - ( 29 Jan 2023 18:45 )    Color: DARK YELLOW / Appearance: Slightly Turbid / SG: >1.030 / pH: x  Gluc: x / Ketone: Trace  / Bili: Small / Urobili: <2 mg/dL   Blood: x / Protein: 300 mg/dL / Nitrite: Negative   Leuk Esterase: Moderate / RBC: 1 /HPF / WBC 6 /HPF   Sq Epi: x / Non Sq Epi: 3 /HPF / Bacteria: Many        RADIOLOGY & ADDITIONAL TESTS:    Imaging Personally Reviewed:    Consultant(s) Notes Reviewed:      Care Discussed with Consultants/Other Providers:   DATE OF SERVICE: 01-30-23 @ 07:47    Patient is a 42y old  Female who presents with a chief complaint of Toe pain (30 Jan 2023 07:18)      SUBJECTIVE / OVERNIGHT EVENTS:  No acute events overnight, patient reports doing well except pain in her left toe and heel, and all questions answered at this time.     Additional Review of Systems:  Denies any other acute sx including f/c, HA, cough, sore throat, eye/ear changes, rhinorrhea, CP, palpitations, SOB, n/v, abdominal pain, back pain, bowel/bladder changes, fatigue, numbness or tingling.    MEDICATIONS  (STANDING):  aspirin enteric coated 81 milliGRAM(s) Oral daily  atorvastatin 80 milliGRAM(s) Oral at bedtime  calamine/zinc oxide Lotion 1 Application(s) Topical three times a day  cefepime   IVPB      cefepime   IVPB 1000 milliGRAM(s) IV Intermittent every 24 hours  chlorhexidine 4% Liquid 1 Application(s) Topical <User Schedule>  dextrose 5%. 1000 milliLiter(s) (100 mL/Hr) IV Continuous <Continuous>  dextrose 5%. 1000 milliLiter(s) (50 mL/Hr) IV Continuous <Continuous>  dextrose 50% Injectable 25 Gram(s) IV Push once  dextrose 50% Injectable 12.5 Gram(s) IV Push once  dextrose 50% Injectable 25 Gram(s) IV Push once  enoxaparin Injectable 40 milliGRAM(s) SubCutaneous every 24 hours  ferrous    sulfate 325 milliGRAM(s) Oral daily  glucagon  Injectable 1 milliGRAM(s) IntraMuscular once  influenza   Vaccine 0.5 milliLiter(s) IntraMuscular once  insulin lispro (ADMELOG) corrective regimen sliding scale   SubCutaneous three times a day before meals  insulin lispro (ADMELOG) corrective regimen sliding scale   SubCutaneous at bedtime  metroNIDAZOLE  IVPB 500 milliGRAM(s) IV Intermittent every 8 hours  metroNIDAZOLE  IVPB      norepinephrine Infusion 0.05 MICROgram(s)/kG/Min (6.71 mL/Hr) IV Continuous <Continuous>  pantoprazole    Tablet 40 milliGRAM(s) Oral before breakfast    MEDICATIONS  (PRN):  acetaminophen     Tablet .. 650 milliGRAM(s) Oral every 6 hours PRN Temp greater or equal to 38C (100.4F), Mild Pain (1 - 3)  dextrose Oral Gel 15 Gram(s) Oral once PRN Blood Glucose LESS THAN 70 milliGRAM(s)/deciliter  HYDROmorphone  Injectable 1 milliGRAM(s) IV Push every 4 hours PRN Severe Pain (7 - 10)  ondansetron Injectable 4 milliGRAM(s) IV Push every 8 hours PRN Nausea and/or Vomiting  oxyCODONE    IR 5 milliGRAM(s) Oral every 4 hours PRN Moderate Pain (4 - 6)      Vital Signs Last 24 Hrs  T(C): 35.7 (30 Jan 2023 05:00), Max: 36.8 (29 Jan 2023 20:00)  T(F): 96.2 (30 Jan 2023 05:00), Max: 98.2 (29 Jan 2023 20:00)  HR: 61 (30 Jan 2023 07:00) (57 - 74)  BP: 109/60 (30 Jan 2023 07:00) (79/41 - 125/59)  BP(mean): 72 (30 Jan 2023 07:00) (63 - 80)  RR: 12 (30 Jan 2023 07:00) (10 - 27)  SpO2: 97% (30 Jan 2023 07:00) (94% - 100%)    Parameters below as of 30 Jan 2023 06:00  Patient On (Oxygen Delivery Method): room air      CAPILLARY BLOOD GLUCOSE      POCT Blood Glucose.: 116 mg/dL (29 Jan 2023 21:32)  POCT Blood Glucose.: 137 mg/dL (29 Jan 2023 18:03)  POCT Blood Glucose.: 144 mg/dL (29 Jan 2023 15:10)  POCT Blood Glucose.: 160 mg/dL (29 Jan 2023 09:28)  POCT Blood Glucose.: 154 mg/dL (29 Jan 2023 08:50)    I&O's Summary    29 Jan 2023 07:01  -  30 Jan 2023 07:00  --------------------------------------------------------  IN: 1140 mL / OUT: 155 mL / NET: 985 mL        PHYSICAL EXAM:  GENERAL: Clinically well-appearing and comfortable  HEAD:  Atraumatic, Normocephalic  EYES: EOMI, PERRLA, conjunctiva and sclera clear  NECK: Supple, No JVD  CHEST/LUNG: Clear to auscultation bilaterally; No wheeze  HEART: Regular rate and rhythm; No murmurs, rubs, or gallops  ABDOMEN: Soft, Nontender, Nondistended; Bowel sounds present  EXTREMITIES:  2+ Peripheral Pulses, No clubbing, cyanosis, or edema. LLE with necrotic changes over 2nd digit, s/p 3rd digit amputation  PSYCH: Normal mood, Normal affect  NEUROLOGY: non-focal  SKIN: No rashes or lesions except over left 2nd toe    LABS:                        7.5    11.90 )-----------( 603      ( 30 Jan 2023 02:17 )             25.6     01-30    131<L>  |  95<L>  |  38<H>  ----------------------------<  88  4.2   |  21<L>  |  4.23<H>    Ca    8.2<L>      30 Jan 2023 02:17  Phos  6.7     01-29  Mg     1.80     01-30    TPro  6.4  /  Alb  2.0<L>  /  TBili  0.2  /  DBili  x   /  AST  13  /  ALT  8   /  AlkPhos  105  01-30    PT/INR - ( 30 Jan 2023 02:17 )   PT: 17.5 sec;   INR: 1.50 ratio         PTT - ( 30 Jan 2023 02:17 )  PTT:32.0 sec      Urinalysis Basic - ( 29 Jan 2023 18:45 )    Color: DARK YELLOW / Appearance: Slightly Turbid / SG: >1.030 / pH: x  Gluc: x / Ketone: Trace  / Bili: Small / Urobili: <2 mg/dL   Blood: x / Protein: 300 mg/dL / Nitrite: Negative   Leuk Esterase: Moderate / RBC: 1 /HPF / WBC 6 /HPF   Sq Epi: x / Non Sq Epi: 3 /HPF / Bacteria: Many        RADIOLOGY & ADDITIONAL TESTS:    Imaging Personally Reviewed:    Consultant(s) Notes Reviewed:      Care Discussed with Consultants/Other Providers:

## 2023-01-30 NOTE — PROGRESS NOTE ADULT - ATTENDING COMMENTS
Pt. with oliguric BOB, likely from underlying ATN. Scr increased to 4.23 today. Assessment and plan for BOB as outlined above. Will need to initiate HD/CRRT, if BOB worsens. Monitor labs and urine output. Avoid any potential nephrotoxins. Dose medications as per eGFR.

## 2023-01-31 LAB
ALBUMIN SERPL ELPH-MCNC: 2.2 G/DL — LOW (ref 3.3–5)
ALP SERPL-CCNC: 110 U/L — SIGNIFICANT CHANGE UP (ref 40–120)
ALT FLD-CCNC: 8 U/L — SIGNIFICANT CHANGE UP (ref 4–33)
ANION GAP SERPL CALC-SCNC: 12 MMOL/L — SIGNIFICANT CHANGE UP (ref 7–14)
ANION GAP SERPL CALC-SCNC: 15 MMOL/L — HIGH (ref 7–14)
ANION GAP SERPL CALC-SCNC: 15 MMOL/L — HIGH (ref 7–14)
APTT BLD: 35.7 SEC — SIGNIFICANT CHANGE UP (ref 27–36.3)
AST SERPL-CCNC: 13 U/L — SIGNIFICANT CHANGE UP (ref 4–32)
B2 GLYCOPROT1 AB SER QL: NEGATIVE — SIGNIFICANT CHANGE UP
BASOPHILS # BLD AUTO: 0.07 K/UL — SIGNIFICANT CHANGE UP (ref 0–0.2)
BASOPHILS NFR BLD AUTO: 0.6 % — SIGNIFICANT CHANGE UP (ref 0–2)
BILIRUB SERPL-MCNC: 0.2 MG/DL — SIGNIFICANT CHANGE UP (ref 0.2–1.2)
BUN SERPL-MCNC: 39 MG/DL — HIGH (ref 7–23)
BUN SERPL-MCNC: 40 MG/DL — HIGH (ref 7–23)
BUN SERPL-MCNC: 41 MG/DL — HIGH (ref 7–23)
CALCIUM SERPL-MCNC: 8.4 MG/DL — SIGNIFICANT CHANGE UP (ref 8.4–10.5)
CALCIUM SERPL-MCNC: 8.5 MG/DL — SIGNIFICANT CHANGE UP (ref 8.4–10.5)
CALCIUM SERPL-MCNC: 8.7 MG/DL — SIGNIFICANT CHANGE UP (ref 8.4–10.5)
CARDIOLIPIN AB SER-ACNC: NEGATIVE — SIGNIFICANT CHANGE UP
CHLORIDE SERPL-SCNC: 96 MMOL/L — LOW (ref 98–107)
CHLORIDE SERPL-SCNC: 97 MMOL/L — LOW (ref 98–107)
CHLORIDE SERPL-SCNC: 98 MMOL/L — SIGNIFICANT CHANGE UP (ref 98–107)
CO2 SERPL-SCNC: 22 MMOL/L — SIGNIFICANT CHANGE UP (ref 22–31)
CO2 SERPL-SCNC: 23 MMOL/L — SIGNIFICANT CHANGE UP (ref 22–31)
CO2 SERPL-SCNC: 26 MMOL/L — SIGNIFICANT CHANGE UP (ref 22–31)
CREAT SERPL-MCNC: 3.48 MG/DL — HIGH (ref 0.5–1.3)
CREAT SERPL-MCNC: 3.68 MG/DL — HIGH (ref 0.5–1.3)
CREAT SERPL-MCNC: 3.99 MG/DL — HIGH (ref 0.5–1.3)
EGFR: 14 ML/MIN/1.73M2 — LOW
EGFR: 15 ML/MIN/1.73M2 — LOW
EGFR: 16 ML/MIN/1.73M2 — LOW
EOSINOPHIL # BLD AUTO: 0.18 K/UL — SIGNIFICANT CHANGE UP (ref 0–0.5)
EOSINOPHIL NFR BLD AUTO: 1.6 % — SIGNIFICANT CHANGE UP (ref 0–6)
GAS PNL BLDV: SIGNIFICANT CHANGE UP
GLUCOSE BLDC GLUCOMTR-MCNC: 150 MG/DL — HIGH (ref 70–99)
GLUCOSE BLDC GLUCOMTR-MCNC: 151 MG/DL — HIGH (ref 70–99)
GLUCOSE BLDC GLUCOMTR-MCNC: 154 MG/DL — HIGH (ref 70–99)
GLUCOSE BLDC GLUCOMTR-MCNC: 214 MG/DL — HIGH (ref 70–99)
GLUCOSE SERPL-MCNC: 157 MG/DL — HIGH (ref 70–99)
GLUCOSE SERPL-MCNC: 157 MG/DL — HIGH (ref 70–99)
GLUCOSE SERPL-MCNC: 160 MG/DL — HIGH (ref 70–99)
HCT VFR BLD CALC: 24.7 % — LOW (ref 34.5–45)
HGB BLD-MCNC: 7.3 G/DL — LOW (ref 11.5–15.5)
IANC: 8.11 K/UL — HIGH (ref 1.8–7.4)
IMM GRANULOCYTES NFR BLD AUTO: 0.4 % — SIGNIFICANT CHANGE UP (ref 0–0.9)
INR BLD: 1.61 RATIO — HIGH (ref 0.88–1.16)
LYMPHOCYTES # BLD AUTO: 1.43 K/UL — SIGNIFICANT CHANGE UP (ref 1–3.3)
LYMPHOCYTES # BLD AUTO: 13 % — SIGNIFICANT CHANGE UP (ref 13–44)
MAGNESIUM SERPL-MCNC: 2.1 MG/DL — SIGNIFICANT CHANGE UP (ref 1.6–2.6)
MAGNESIUM SERPL-MCNC: 2.2 MG/DL — SIGNIFICANT CHANGE UP (ref 1.6–2.6)
MAGNESIUM SERPL-MCNC: 2.3 MG/DL — SIGNIFICANT CHANGE UP (ref 1.6–2.6)
MCHC RBC-ENTMCNC: 24.1 PG — LOW (ref 27–34)
MCHC RBC-ENTMCNC: 29.6 GM/DL — LOW (ref 32–36)
MCV RBC AUTO: 81.5 FL — SIGNIFICANT CHANGE UP (ref 80–100)
MONOCYTES # BLD AUTO: 1.17 K/UL — HIGH (ref 0–0.9)
MONOCYTES NFR BLD AUTO: 10.6 % — SIGNIFICANT CHANGE UP (ref 2–14)
MRSA PCR RESULT.: SIGNIFICANT CHANGE UP
NEUTROPHILS # BLD AUTO: 8.11 K/UL — HIGH (ref 1.8–7.4)
NEUTROPHILS NFR BLD AUTO: 73.8 % — SIGNIFICANT CHANGE UP (ref 43–77)
NRBC # BLD: 0 /100 WBCS — SIGNIFICANT CHANGE UP (ref 0–0)
NRBC # FLD: 0 K/UL — SIGNIFICANT CHANGE UP (ref 0–0)
PHOSPHATE 24H UR-MCNC: 13.7 MG/DL — SIGNIFICANT CHANGE UP
PHOSPHATE SERPL-MCNC: 6.1 MG/DL — HIGH (ref 2.5–4.5)
PHOSPHATE SERPL-MCNC: 6.6 MG/DL — HIGH (ref 2.5–4.5)
PLATELET # BLD AUTO: 602 K/UL — HIGH (ref 150–400)
POTASSIUM SERPL-MCNC: 4 MMOL/L — SIGNIFICANT CHANGE UP (ref 3.5–5.3)
POTASSIUM SERPL-MCNC: 4.3 MMOL/L — SIGNIFICANT CHANGE UP (ref 3.5–5.3)
POTASSIUM SERPL-MCNC: 4.5 MMOL/L — SIGNIFICANT CHANGE UP (ref 3.5–5.3)
POTASSIUM SERPL-SCNC: 4 MMOL/L — SIGNIFICANT CHANGE UP (ref 3.5–5.3)
POTASSIUM SERPL-SCNC: 4.3 MMOL/L — SIGNIFICANT CHANGE UP (ref 3.5–5.3)
POTASSIUM SERPL-SCNC: 4.5 MMOL/L — SIGNIFICANT CHANGE UP (ref 3.5–5.3)
PROT SERPL-MCNC: 6.7 G/DL — SIGNIFICANT CHANGE UP (ref 6–8.3)
PROTHROM AB SERPL-ACNC: 18.8 SEC — HIGH (ref 10.5–13.4)
RBC # BLD: 3.03 M/UL — LOW (ref 3.8–5.2)
RBC # FLD: 18.1 % — HIGH (ref 10.3–14.5)
S AUREUS DNA NOSE QL NAA+PROBE: SIGNIFICANT CHANGE UP
SODIUM SERPL-SCNC: 133 MMOL/L — LOW (ref 135–145)
SODIUM SERPL-SCNC: 135 MMOL/L — SIGNIFICANT CHANGE UP (ref 135–145)
SODIUM SERPL-SCNC: 136 MMOL/L — SIGNIFICANT CHANGE UP (ref 135–145)
TROPONIN T, HIGH SENSITIVITY RESULT: 87 NG/L — CRITICAL HIGH
WBC # BLD: 11 K/UL — HIGH (ref 3.8–10.5)
WBC # FLD AUTO: 11 K/UL — HIGH (ref 3.8–10.5)

## 2023-01-31 PROCEDURE — 99233 SBSQ HOSP IP/OBS HIGH 50: CPT | Mod: GC

## 2023-01-31 PROCEDURE — 93308 TTE F-UP OR LMTD: CPT | Mod: 26,GC

## 2023-01-31 PROCEDURE — 99291 CRITICAL CARE FIRST HOUR: CPT | Mod: GC

## 2023-01-31 PROCEDURE — 99232 SBSQ HOSP IP/OBS MODERATE 35: CPT

## 2023-01-31 PROCEDURE — 76604 US EXAM CHEST: CPT | Mod: 26,GC

## 2023-01-31 RX ORDER — LANOLIN ALCOHOL/MO/W.PET/CERES
10 CREAM (GRAM) TOPICAL AT BEDTIME
Refills: 0 | Status: DISCONTINUED | OUTPATIENT
Start: 2023-01-31 | End: 2023-01-31

## 2023-01-31 RX ORDER — POLYETHYLENE GLYCOL 3350 17 G/17G
17 POWDER, FOR SOLUTION ORAL
Refills: 0 | Status: DISCONTINUED | OUTPATIENT
Start: 2023-01-31 | End: 2023-02-01

## 2023-01-31 RX ORDER — LANOLIN ALCOHOL/MO/W.PET/CERES
9 CREAM (GRAM) TOPICAL AT BEDTIME
Refills: 0 | Status: DISCONTINUED | OUTPATIENT
Start: 2023-01-31 | End: 2023-02-22

## 2023-01-31 RX ADMIN — OXYCODONE HYDROCHLORIDE 5 MILLIGRAM(S): 5 TABLET ORAL at 17:20

## 2023-01-31 RX ADMIN — HYDROMORPHONE HYDROCHLORIDE 1 MILLIGRAM(S): 2 INJECTION INTRAMUSCULAR; INTRAVENOUS; SUBCUTANEOUS at 01:06

## 2023-01-31 RX ADMIN — Medication 2: at 18:00

## 2023-01-31 RX ADMIN — ATORVASTATIN CALCIUM 80 MILLIGRAM(S): 80 TABLET, FILM COATED ORAL at 21:32

## 2023-01-31 RX ADMIN — CALAMINE AND ZINC OXIDE AND PHENOL 1 APPLICATION(S): 160; 10 LOTION TOPICAL at 05:30

## 2023-01-31 RX ADMIN — POLYETHYLENE GLYCOL 3350 17 GRAM(S): 17 POWDER, FOR SOLUTION ORAL at 17:24

## 2023-01-31 RX ADMIN — HYDROMORPHONE HYDROCHLORIDE 1 MILLIGRAM(S): 2 INJECTION INTRAMUSCULAR; INTRAVENOUS; SUBCUTANEOUS at 01:33

## 2023-01-31 RX ADMIN — Medication 9 MILLIGRAM(S): at 01:18

## 2023-01-31 RX ADMIN — SENNA PLUS 2 TABLET(S): 8.6 TABLET ORAL at 21:33

## 2023-01-31 RX ADMIN — PIPERACILLIN AND TAZOBACTAM 25 GRAM(S): 4; .5 INJECTION, POWDER, LYOPHILIZED, FOR SOLUTION INTRAVENOUS at 01:18

## 2023-01-31 RX ADMIN — Medication 6.71 MICROGRAM(S)/KG/MIN: at 19:39

## 2023-01-31 RX ADMIN — Medication 325 MILLIGRAM(S): at 11:58

## 2023-01-31 RX ADMIN — PIPERACILLIN AND TAZOBACTAM 25 GRAM(S): 4; .5 INJECTION, POWDER, LYOPHILIZED, FOR SOLUTION INTRAVENOUS at 23:09

## 2023-01-31 RX ADMIN — HEPARIN SODIUM 5000 UNIT(S): 5000 INJECTION INTRAVENOUS; SUBCUTANEOUS at 21:44

## 2023-01-31 RX ADMIN — Medication 9 MILLIGRAM(S): at 21:32

## 2023-01-31 RX ADMIN — HYDROMORPHONE HYDROCHLORIDE 1 MILLIGRAM(S): 2 INJECTION INTRAMUSCULAR; INTRAVENOUS; SUBCUTANEOUS at 21:27

## 2023-01-31 RX ADMIN — PIPERACILLIN AND TAZOBACTAM 25 GRAM(S): 4; .5 INJECTION, POWDER, LYOPHILIZED, FOR SOLUTION INTRAVENOUS at 11:59

## 2023-01-31 RX ADMIN — PANTOPRAZOLE SODIUM 40 MILLIGRAM(S): 20 TABLET, DELAYED RELEASE ORAL at 05:29

## 2023-01-31 RX ADMIN — OXYCODONE HYDROCHLORIDE 5 MILLIGRAM(S): 5 TABLET ORAL at 16:45

## 2023-01-31 RX ADMIN — Medication 1: at 08:12

## 2023-01-31 RX ADMIN — HYDROMORPHONE HYDROCHLORIDE 1 MILLIGRAM(S): 2 INJECTION INTRAMUSCULAR; INTRAVENOUS; SUBCUTANEOUS at 06:43

## 2023-01-31 RX ADMIN — ONDANSETRON 4 MILLIGRAM(S): 8 TABLET, FILM COATED ORAL at 01:00

## 2023-01-31 RX ADMIN — HYDROMORPHONE HYDROCHLORIDE 1 MILLIGRAM(S): 2 INJECTION INTRAMUSCULAR; INTRAVENOUS; SUBCUTANEOUS at 15:00

## 2023-01-31 RX ADMIN — CHLORHEXIDINE GLUCONATE 1 APPLICATION(S): 213 SOLUTION TOPICAL at 06:26

## 2023-01-31 RX ADMIN — HYDROMORPHONE HYDROCHLORIDE 1 MILLIGRAM(S): 2 INJECTION INTRAMUSCULAR; INTRAVENOUS; SUBCUTANEOUS at 10:37

## 2023-01-31 RX ADMIN — CALAMINE AND ZINC OXIDE AND PHENOL 1 APPLICATION(S): 160; 10 LOTION TOPICAL at 15:05

## 2023-01-31 RX ADMIN — OXYCODONE HYDROCHLORIDE 5 MILLIGRAM(S): 5 TABLET ORAL at 13:00

## 2023-01-31 RX ADMIN — CALAMINE AND ZINC OXIDE AND PHENOL 1 APPLICATION(S): 160; 10 LOTION TOPICAL at 21:55

## 2023-01-31 RX ADMIN — HEPARIN SODIUM 5000 UNIT(S): 5000 INJECTION INTRAVENOUS; SUBCUTANEOUS at 15:08

## 2023-01-31 RX ADMIN — HYDROMORPHONE HYDROCHLORIDE 1 MILLIGRAM(S): 2 INJECTION INTRAMUSCULAR; INTRAVENOUS; SUBCUTANEOUS at 11:05

## 2023-01-31 RX ADMIN — HYDROMORPHONE HYDROCHLORIDE 0.5 MILLIGRAM(S): 2 INJECTION INTRAMUSCULAR; INTRAVENOUS; SUBCUTANEOUS at 00:39

## 2023-01-31 RX ADMIN — CHLORHEXIDINE GLUCONATE 1 APPLICATION(S): 213 SOLUTION TOPICAL at 11:59

## 2023-01-31 RX ADMIN — HYDROMORPHONE HYDROCHLORIDE 1 MILLIGRAM(S): 2 INJECTION INTRAMUSCULAR; INTRAVENOUS; SUBCUTANEOUS at 05:52

## 2023-01-31 RX ADMIN — Medication 1: at 11:57

## 2023-01-31 RX ADMIN — OXYCODONE HYDROCHLORIDE 5 MILLIGRAM(S): 5 TABLET ORAL at 12:08

## 2023-01-31 RX ADMIN — HEPARIN SODIUM 5000 UNIT(S): 5000 INJECTION INTRAVENOUS; SUBCUTANEOUS at 05:28

## 2023-01-31 RX ADMIN — Medication 81 MILLIGRAM(S): at 11:58

## 2023-01-31 RX ADMIN — BUMETANIDE 5 MG/HR: 0.25 INJECTION INTRAMUSCULAR; INTRAVENOUS at 19:40

## 2023-01-31 RX ADMIN — HYDROMORPHONE HYDROCHLORIDE 1 MILLIGRAM(S): 2 INJECTION INTRAMUSCULAR; INTRAVENOUS; SUBCUTANEOUS at 14:48

## 2023-01-31 RX ADMIN — HYDROMORPHONE HYDROCHLORIDE 1 MILLIGRAM(S): 2 INJECTION INTRAMUSCULAR; INTRAVENOUS; SUBCUTANEOUS at 19:49

## 2023-01-31 NOTE — PROGRESS NOTE ADULT - SUBJECTIVE AND OBJECTIVE BOX
42yPatient is a 42y old  Female who presents with a chief complaint of 43yo F presenting with left 2nd toe pain concerning for dry gangrene.  Currently pending left foot TMA.  S/p RRT for hypotension (1/29).  Findings of large right pleural effusion with near-complete atelectasis of RML/RLL likely 2/2 to HFrEF.  Pt. also with BOB on CKD.   (31 Jan 2023 11:33)      Interval history:  Off warming blanket, no new symptoms, decreasing pressor requirements.        Allergies:   No Known Allergies      Antimicrobials:  piperacillin/tazobactam IVPB.. 3.375 Gram(s) IV Intermittent every 12 hours      REVIEW OF SYSTEMS:  No chest pain   No SOB  No abdominal pain      Vital Signs Last 24 Hrs  T(C): 36.3 (01-31-23 @ 12:00), Max: 36.4 (01-31-23 @ 04:00)  T(F): 97.3 (01-31-23 @ 12:00), Max: 97.6 (01-31-23 @ 08:00)  HR: 75 (01-31-23 @ 15:00) (72 - 86)  BP: 102/47 (01-31-23 @ 15:00) (102/47 - 143/95)  BP(mean): 61 (01-31-23 @ 15:00) (61 - 106)  RR: 12 (01-31-23 @ 15:00) (9 - 26)  SpO2: 100% (01-31-23 @ 15:00) (94% - 100%)      PHYSICAL EXAM:  Patient in no acute distress. Alert, awake.   Cardiovascular: S1S2 normal. + PPM   Lungs: + air entry B/L lung fields.  Gastrointestinal: soft, nontender, nondistended.  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   IV sites not inflamed.                             7.3    11.00 )-----------( 602      ( 31 Jan 2023 01:10 )             24.7   01-31    135  |  97<L>  |  41<H>  ----------------------------<  157<H>  4.3   |  23  |  3.68<H>    Ca    8.7      31 Jan 2023 11:45  Phos  6.6     01-31  Mg     2.20     01-31    TPro  6.7  /  Alb  2.2<L>  /  TBili  0.2  /  DBili  x   /  AST  13  /  ALT  8   /  AlkPhos  110  01-31      LIVER FUNCTIONS - ( 31 Jan 2023 01:10 )  Alb: 2.2 g/dL / Pro: 6.7 g/dL / ALK PHOS: 110 U/L / ALT: 8 U/L / AST: 13 U/L / GGT: x               Culture - Urine (collected 29 Jan 2023 21:46)  Source: Catheterized Catheterized  Final Report (30 Jan 2023 20:46):    No growth    Culture - Blood (collected 29 Jan 2023 06:52)  Source: .Blood Blood  Preliminary Report (30 Jan 2023 17:02):    No growth to date.    Culture - Blood (collected 29 Jan 2023 06:52)  Source: .Blood Blood  Preliminary Report (30 Jan 2023 17:02):    No growth to date.

## 2023-01-31 NOTE — PROGRESS NOTE ADULT - SUBJECTIVE AND OBJECTIVE BOX
DATE OF SERVICE: 01-31-23 @ 06:51    Patient is a 42y old  Female who presents with a chief complaint of Toe pain (30 Jan 2023 18:02)      SUBJECTIVE / OVERNIGHT EVENTS:  No acute events overnight, patient reports doing well and all questions answered at this time.     Additional Review of Systems:  Denies any other acute sx including f/c, HA, cough, sore throat, eye/ear changes, rhinorrhea, CP, palpitations, SOB, n/v, abdominal pain, back pain, bowel/bladder changes, fatigue, numbness or tingling.    MEDICATIONS  (STANDING):  aspirin enteric coated 81 milliGRAM(s) Oral daily  atorvastatin 80 milliGRAM(s) Oral at bedtime  buMETAnide Infusion 1 mG/Hr (5 mL/Hr) IV Continuous <Continuous>  calamine/zinc oxide Lotion 1 Application(s) Topical three times a day  chlorhexidine 2% Cloths 1 Application(s) Topical daily  chlorhexidine 4% Liquid 1 Application(s) Topical <User Schedule>  dextrose 5%. 1000 milliLiter(s) (100 mL/Hr) IV Continuous <Continuous>  dextrose 5%. 1000 milliLiter(s) (50 mL/Hr) IV Continuous <Continuous>  dextrose 50% Injectable 25 Gram(s) IV Push once  dextrose 50% Injectable 12.5 Gram(s) IV Push once  dextrose 50% Injectable 25 Gram(s) IV Push once  ferrous    sulfate 325 milliGRAM(s) Oral daily  glucagon  Injectable 1 milliGRAM(s) IntraMuscular once  heparin   Injectable 5000 Unit(s) SubCutaneous every 8 hours  influenza   Vaccine 0.5 milliLiter(s) IntraMuscular once  insulin lispro (ADMELOG) corrective regimen sliding scale   SubCutaneous three times a day before meals  insulin lispro (ADMELOG) corrective regimen sliding scale   SubCutaneous at bedtime  melatonin 9 milliGRAM(s) Oral at bedtime  norepinephrine Infusion 0.05 MICROgram(s)/kG/Min (6.71 mL/Hr) IV Continuous <Continuous>  pantoprazole    Tablet 40 milliGRAM(s) Oral before breakfast  piperacillin/tazobactam IVPB.. 3.375 Gram(s) IV Intermittent every 12 hours  polyethylene glycol 3350 17 Gram(s) Oral daily  senna 2 Tablet(s) Oral at bedtime    MEDICATIONS  (PRN):  acetaminophen     Tablet .. 650 milliGRAM(s) Oral every 6 hours PRN Temp greater or equal to 38C (100.4F), Mild Pain (1 - 3)  dextrose Oral Gel 15 Gram(s) Oral once PRN Blood Glucose LESS THAN 70 milliGRAM(s)/deciliter  HYDROmorphone  Injectable 1 milliGRAM(s) IV Push every 4 hours PRN Severe Pain (7 - 10)  ondansetron Injectable 4 milliGRAM(s) IV Push every 8 hours PRN Nausea and/or Vomiting  oxyCODONE    IR 5 milliGRAM(s) Oral every 4 hours PRN Moderate Pain (4 - 6)      Vital Signs Last 24 Hrs  T(C): 36.2 (31 Jan 2023 00:00), Max: 36.6 (30 Jan 2023 16:00)  T(F): 97.2 (31 Jan 2023 00:00), Max: 97.9 (30 Jan 2023 16:00)  HR: 80 (31 Jan 2023 02:00) (61 - 86)  BP: 115/52 (31 Jan 2023 02:00) (95/60 - 138/62)  BP(mean): 68 (31 Jan 2023 02:00) (68 - 90)  RR: 12 (31 Jan 2023 02:00) (10 - 26)  SpO2: 95% (31 Jan 2023 02:00) (94% - 100%)    Parameters below as of 30 Jan 2023 19:00  Patient On (Oxygen Delivery Method): room air      CAPILLARY BLOOD GLUCOSE      POCT Blood Glucose.: 154 mg/dL (30 Jan 2023 21:15)  POCT Blood Glucose.: 141 mg/dL (30 Jan 2023 18:31)  POCT Blood Glucose.: 133 mg/dL (30 Jan 2023 11:11)  POCT Blood Glucose.: 81 mg/dL (30 Jan 2023 08:16)    I&O's Summary    29 Jan 2023 07:01  -  30 Jan 2023 07:00  --------------------------------------------------------  IN: 1140 mL / OUT: 155 mL / NET: 985 mL    30 Jan 2023 07:01  -  31 Jan 2023 06:51  --------------------------------------------------------  IN: 848.5 mL / OUT: 2735 mL / NET: -1886.5 mL        PHYSICAL EXAM:  GENERAL: Clinically well-appearing and comfortable  HEAD:  Atraumatic, Normocephalic  EYES: EOMI, PERRLA, conjunctiva and sclera clear  NECK: Supple, No JVD  CHEST/LUNG: Clear to auscultation bilaterally; No wheeze  HEART: Regular rate and rhythm; No murmurs, rubs, or gallops  ABDOMEN: Soft, Nontender, Nondistended; Bowel sounds present  EXTREMITIES:  2+ Peripheral Pulses, No clubbing, cyanosis, or edema  PSYCH: Normal mood, Normal affect  NEUROLOGY: non-focal  SKIN: No rashes or lesions    LABS:                        7.3    11.00 )-----------( 602      ( 31 Jan 2023 01:10 )             24.7     01-31    133<L>  |  96<L>  |  40<H>  ----------------------------<  157<H>  4.5   |  22  |  3.99<H>    Ca    8.4      31 Jan 2023 01:10  Phos  6.7     01-29  Mg     2.30     01-31    TPro  6.7  /  Alb  2.2<L>  /  TBili  0.2  /  DBili  x   /  AST  13  /  ALT  8   /  AlkPhos  110  01-31    PT/INR - ( 31 Jan 2023 01:10 )   PT: 18.8 sec;   INR: 1.61 ratio         PTT - ( 31 Jan 2023 01:10 )  PTT:35.7 sec      Urinalysis Basic - ( 29 Jan 2023 18:45 )    Color: DARK YELLOW / Appearance: Slightly Turbid / SG: >1.030 / pH: x  Gluc: x / Ketone: Trace  / Bili: Small / Urobili: <2 mg/dL   Blood: x / Protein: 300 mg/dL / Nitrite: Negative   Leuk Esterase: Moderate / RBC: 1 /HPF / WBC 6 /HPF   Sq Epi: x / Non Sq Epi: 3 /HPF / Bacteria: Many        RADIOLOGY & ADDITIONAL TESTS:    Imaging Personally Reviewed:    Consultant(s) Notes Reviewed:      Care Discussed with Consultants/Other Providers:   DATE OF SERVICE: 01-31-23 @ 06:51    Patient is a 42y old  Female who presents with a chief complaint of Toe pain (30 Jan 2023 18:02)      SUBJECTIVE / OVERNIGHT EVENTS:  No acute events overnight, patient reports left heel pain is adequately controlled, got 0.5 mg additional for breakthrough, and all questions answered at this time. Reports some mild neck pain. Has not had a BM but with poor PO and still passing flatus.    Additional Review of Systems:  Denies any other acute sx including f/c, HA, cough, sore throat, eye/ear changes, rhinorrhea, CP, palpitations, SOB, n/v, abdominal pain, back pain, bladder changes, numbness or tingling.    MEDICATIONS  (STANDING):  aspirin enteric coated 81 milliGRAM(s) Oral daily  atorvastatin 80 milliGRAM(s) Oral at bedtime  buMETAnide Infusion 1 mG/Hr (5 mL/Hr) IV Continuous <Continuous>  calamine/zinc oxide Lotion 1 Application(s) Topical three times a day  chlorhexidine 2% Cloths 1 Application(s) Topical daily  chlorhexidine 4% Liquid 1 Application(s) Topical <User Schedule>  dextrose 5%. 1000 milliLiter(s) (100 mL/Hr) IV Continuous <Continuous>  dextrose 5%. 1000 milliLiter(s) (50 mL/Hr) IV Continuous <Continuous>  dextrose 50% Injectable 25 Gram(s) IV Push once  dextrose 50% Injectable 12.5 Gram(s) IV Push once  dextrose 50% Injectable 25 Gram(s) IV Push once  ferrous    sulfate 325 milliGRAM(s) Oral daily  glucagon  Injectable 1 milliGRAM(s) IntraMuscular once  heparin   Injectable 5000 Unit(s) SubCutaneous every 8 hours  influenza   Vaccine 0.5 milliLiter(s) IntraMuscular once  insulin lispro (ADMELOG) corrective regimen sliding scale   SubCutaneous three times a day before meals  insulin lispro (ADMELOG) corrective regimen sliding scale   SubCutaneous at bedtime  melatonin 9 milliGRAM(s) Oral at bedtime  norepinephrine Infusion 0.05 MICROgram(s)/kG/Min (6.71 mL/Hr) IV Continuous <Continuous>  pantoprazole    Tablet 40 milliGRAM(s) Oral before breakfast  piperacillin/tazobactam IVPB.. 3.375 Gram(s) IV Intermittent every 12 hours  polyethylene glycol 3350 17 Gram(s) Oral daily  senna 2 Tablet(s) Oral at bedtime    MEDICATIONS  (PRN):  acetaminophen     Tablet .. 650 milliGRAM(s) Oral every 6 hours PRN Temp greater or equal to 38C (100.4F), Mild Pain (1 - 3)  dextrose Oral Gel 15 Gram(s) Oral once PRN Blood Glucose LESS THAN 70 milliGRAM(s)/deciliter  HYDROmorphone  Injectable 1 milliGRAM(s) IV Push every 4 hours PRN Severe Pain (7 - 10)  ondansetron Injectable 4 milliGRAM(s) IV Push every 8 hours PRN Nausea and/or Vomiting  oxyCODONE    IR 5 milliGRAM(s) Oral every 4 hours PRN Moderate Pain (4 - 6)      Vital Signs Last 24 Hrs  T(C): 36.2 (31 Jan 2023 00:00), Max: 36.6 (30 Jan 2023 16:00)  T(F): 97.2 (31 Jan 2023 00:00), Max: 97.9 (30 Jan 2023 16:00)  HR: 80 (31 Jan 2023 02:00) (61 - 86)  BP: 115/52 (31 Jan 2023 02:00) (95/60 - 138/62)  BP(mean): 68 (31 Jan 2023 02:00) (68 - 90)  RR: 12 (31 Jan 2023 02:00) (10 - 26)  SpO2: 95% (31 Jan 2023 02:00) (94% - 100%)    Parameters below as of 30 Jan 2023 19:00  Patient On (Oxygen Delivery Method): room air      CAPILLARY BLOOD GLUCOSE      POCT Blood Glucose.: 154 mg/dL (30 Jan 2023 21:15)  POCT Blood Glucose.: 141 mg/dL (30 Jan 2023 18:31)  POCT Blood Glucose.: 133 mg/dL (30 Jan 2023 11:11)  POCT Blood Glucose.: 81 mg/dL (30 Jan 2023 08:16)    I&O's Summary    29 Jan 2023 07:01  -  30 Jan 2023 07:00  --------------------------------------------------------  IN: 1140 mL / OUT: 155 mL / NET: 985 mL    30 Jan 2023 07:01  -  31 Jan 2023 06:51  --------------------------------------------------------  IN: 848.5 mL / OUT: 2735 mL / NET: -1886.5 mL        PHYSICAL EXAM:  GENERAL: Clinically well-appearing and comfortable  HEAD:  Atraumatic, Normocephalic  EYES: EOMI, PERRLA, conjunctiva and sclera clear  NECK: Supple, No JVD  CHEST/LUNG: Clear to auscultation bilaterally; No wheeze  HEART: Regular rate and rhythm; No murmurs, rubs, or gallops  ABDOMEN: Soft, Nontender, Nondistended; Bowel sounds present. Clements draining large amount clear urine.  EXTREMITIES:  2+ Peripheral Pulses, No clubbing, cyanosis, or edema  PSYCH: Normal mood, Normal affect  NEUROLOGY: non-focal  SKIN: No rashes or lesions    LABS:                        7.3    11.00 )-----------( 602      ( 31 Jan 2023 01:10 )             24.7     01-31    133<L>  |  96<L>  |  40<H>  ----------------------------<  157<H>  4.5   |  22  |  3.99<H>    Ca    8.4      31 Jan 2023 01:10  Phos  6.7     01-29  Mg     2.30     01-31    TPro  6.7  /  Alb  2.2<L>  /  TBili  0.2  /  DBili  x   /  AST  13  /  ALT  8   /  AlkPhos  110  01-31    PT/INR - ( 31 Jan 2023 01:10 )   PT: 18.8 sec;   INR: 1.61 ratio         PTT - ( 31 Jan 2023 01:10 )  PTT:35.7 sec      Urinalysis Basic - ( 29 Jan 2023 18:45 )    Color: DARK YELLOW / Appearance: Slightly Turbid / SG: >1.030 / pH: x  Gluc: x / Ketone: Trace  / Bili: Small / Urobili: <2 mg/dL   Blood: x / Protein: 300 mg/dL / Nitrite: Negative   Leuk Esterase: Moderate / RBC: 1 /HPF / WBC 6 /HPF   Sq Epi: x / Non Sq Epi: 3 /HPF / Bacteria: Many        RADIOLOGY & ADDITIONAL TESTS:    Imaging Personally Reviewed:    Consultant(s) Notes Reviewed:      Care Discussed with Consultants/Other Providers:

## 2023-01-31 NOTE — DIETITIAN INITIAL EVALUATION ADULT - OTHER INFO
Multiple attempts to meet with Pt.  Does endorse poor appetite.  Pt. replies no when asked if she has ever been previously educated re: renal diet modifications.  Pt. very somnolent at time of nutrition assessment and limited nutrition Hx obtained.  Pt. amenable to printed diet education/information.

## 2023-01-31 NOTE — PROGRESS NOTE ADULT - ASSESSMENT
42F PMH CAD s/p CABG x4 in 2018, CHF s/p ICD placement (interrogated 2022), DM, PAD s/p 3rd toe amputation who presents with left 2nd toe pain admitted for L 2nd toe gangrene and BOB. Course c/b worsening ARF with hyperkalemia, and  shock transferred to MICU 1/29. ID consulted for L 2nd toe gangrene    #Shock-cardiogenic given significantly reduced EF vs septic  hypothermia, tachycardia hypotnesion  HFrEF, bedside TTE 1/30 w/ WMA  1/29 RRT with hypotension and hypothermia  Favor cardiogenic  WBC 14 on 1/29 now downtrending, was 10 on presentation  F/u bcx and Ucx  recent CT with no source   pt with no localizing symptoms except toe pain    #L 2nd toe gangrene: dry gangrene with no signs of superinfection.   Vasculopath  - L toe dry gangrene  L 3rd toe amp in Dec '22 at [place], DC on abx ,completed beginning of Jan '23 (fill of PO levaquin in late Dec for 10 days)  Vanc/zosyn briefly on adm  Unasyn 1/26-1/29  Cefepime, metronidazole, vanc (by level) 1/29 - xx  Pods following, plans for TMA    #LUE swelling  began after L arrrow placement  monitor, elevate        Recc:  On zosyn (renally dosed) ,   All cultures negative to date.   recommend stopping abx and monitor off   trend cbc, leucocytosis minimal.   needs surgery for dry gangrene and not abx.   podiatry on board, c/w wound care.     Will sign off, please call with questions.     Cindy Vance  Please contact through MS Teams   If no response or past 5 pm/weekend call [phone].

## 2023-01-31 NOTE — PROGRESS NOTE ADULT - ASSESSMENT
Ms. Conrad is 42F w/ PMH coronary artery disease status post CABG x4 in 2018, CHF s/p ICD placement (interrogated 2022), DM, PAD s/p 3rd toe amputation who presents with left 2nd toe pain concerning for dry gangrene. Seen by podiatry and vascular teams with plans for possible transmetatarsal amputation. Course c/b worsening renal function, and shock likely sepsis 2/2 LE wounds w/ possible cardiogenic component. Patient started on levophed, Vanc/Cefepime/Flagyl and transferred to MICU for further management.     NEURO  A&Ox3, no active issues    CARDIAC    #Shock  - On levo target MAP>65, wean as tolerated  - Likely mixed septic and cardiogenic w/ sepsis as primary  given hypothermia and increasing leukocytosis  - TTE this admission on 1/26/23 shows severe global LV systolic dysfunction w/ EF ~21%; elevated LV filling pressures; RV enlargement w/ decreases systolic function  - CCU consulted during RRT (1/29) for possible cardiogenic shock. Per CCU low suspicion for cardiogenic etiology as primary  of shock. Rec checking central sat for CO/CI estimate. Will hold off central line for now  - c/w vanc/cefepime/flagyl    #CAD s/p CABG  #HFrEF  - Home cardiac meds held iso shock  - Pt currently RRR. Will monitor on tele    #PAD  #Dry Gangrene L 2nd toe  - Necrosis of 2nd digit i/s/o PAD, concerning for dry gangrene, elevated ESR/CRP. No signs of active infection   - X-ray foot without gas or osseous involvement, limited to soft tissue   - Podiatry and vascular recs appreciated  - CT Angio prelim read with occluded left posterior tibial and peroneal arteries  - f/u Vascular recs regarding future infection   - Pain control w/ Dilaudid 1mg PRN for severe; Oxy 5mg PRN for moderate    RESPIRATORY   #Large R pleural effusion  - Patient w/ R pleural effusion resulting in near-complete atelectasis of RML/RLL. Etiology likely from severe HFrEF  - Diuretics on hold   - Currently satting well on RA. Maintain SpO2>92%    RENAL   #BOB on CKD  - Patient w/ baseline SCr ~1.2 has been uptrending during hospitalization, today 4.2  - Mixed etiology d/t shock, cardio-renal iso HFrEF vs possibly ATN vs less likely contrast injury   - Patient currently oliguric  - Strict I/O. Clements placed for monitoring  - Nephro consulted, appreciate recs. May need CRRT if patient continues to have gross electrolyte abnormalities or worsening acidosis.   - trend Cr     GI    #Small ascites  - Small ascites seen on CT   - C/t monitor    - Diet: Renal diet    ENDO:    #IDDM  - On home lantus 8U  - Will c/w ISS qac/qhs  - monitor glucose    HEME/ONC  #Normocytic anemia  - Patient w/ Hb 7.5. Baseline appears ~8-9  - Iron studies w/ likely FEDERICO  - Trend H/H  - A/C: HSQ    ID   #Septic Shock   - Patient w/ likely septic shock iso L toe gangrene/wound now w/ hypothermia, leukocytosis and hypotension refractory to IV fluids requiring pressor support  - F/u BCx, UCx. Initial U/a appears positive  - Abx broadened today to Vanc/cefepime/flagyl  - Vanc dose by trough given renal function   - ID consulted 1/30, appreciate recs      LINES/PPX  - peripherals in tact  - DVT PPx: HSQ  - GOC: Full Code    Geovanni Means  Internal Medicine, PGY-1  Please Contact via TEAMS

## 2023-01-31 NOTE — PROGRESS NOTE ADULT - SUBJECTIVE AND OBJECTIVE BOX
[facility] Division of Kidney Diseases & Hypertension  FOLLOW UP NOTE  [phone]--------------------------------------------------------------------------------  HPI: 42-year-female with PMH of HTN, HLD, DM, PAD, CHF admitted with left toe pain. Pt. developed BOB during hospital stay. Pt was transferred to the MICU on 1/29/23 for septic shock requiring vasopressor support. Pt. being seen for BOB.       24 hour events/subjective: Pt seen and examined in MICU today. Pt complaining of weakness, L toe pain, and neck pain. Reports that LE edema has improved. Denies fever, chills, nausea, vomiting, CP and SOB during rounds.        PAST HISTORY  --------------------------------------------------------------------------------  No significant changes to PMH, PSH, FHx, SHx, unless otherwise noted    ALLERGIES & MEDICATIONS  --------------------------------------------------------------------------------  Allergies    No Known Allergies    Intolerances      Standing Inpatient Medications  aspirin enteric coated 81 milliGRAM(s) Oral daily  atorvastatin 80 milliGRAM(s) Oral at bedtime  buMETAnide Infusion 1 mG/Hr IV Continuous <Continuous>  calamine/zinc oxide Lotion 1 Application(s) Topical three times a day  chlorhexidine 2% Cloths 1 Application(s) Topical daily  chlorhexidine 4% Liquid 1 Application(s) Topical <User Schedule>  dextrose 5%. 1000 milliLiter(s) IV Continuous <Continuous>  dextrose 5%. 1000 milliLiter(s) IV Continuous <Continuous>  dextrose 50% Injectable 25 Gram(s) IV Push once  dextrose 50% Injectable 12.5 Gram(s) IV Push once  dextrose 50% Injectable 25 Gram(s) IV Push once  ferrous    sulfate 325 milliGRAM(s) Oral daily  glucagon  Injectable 1 milliGRAM(s) IntraMuscular once  heparin   Injectable 5000 Unit(s) SubCutaneous every 8 hours  influenza   Vaccine 0.5 milliLiter(s) IntraMuscular once  insulin lispro (ADMELOG) corrective regimen sliding scale   SubCutaneous three times a day before meals  insulin lispro (ADMELOG) corrective regimen sliding scale   SubCutaneous at bedtime  melatonin 9 milliGRAM(s) Oral at bedtime  norepinephrine Infusion 0.05 MICROgram(s)/kG/Min IV Continuous <Continuous>  pantoprazole    Tablet 40 milliGRAM(s) Oral before breakfast  piperacillin/tazobactam IVPB.. 3.375 Gram(s) IV Intermittent every 12 hours  polyethylene glycol 3350 17 Gram(s) Oral daily  senna 2 Tablet(s) Oral at bedtime    PRN Inpatient Medications  acetaminophen     Tablet .. 650 milliGRAM(s) Oral every 6 hours PRN  dextrose Oral Gel 15 Gram(s) Oral once PRN  HYDROmorphone  Injectable 1 milliGRAM(s) IV Push every 4 hours PRN  ondansetron Injectable 4 milliGRAM(s) IV Push every 8 hours PRN  oxyCODONE    IR 5 milliGRAM(s) Oral every 4 hours PRN      REVIEW OF SYSTEMS  --------------------------------------------------------------------------------  Gen: +weakness  Head/Eyes/Ears: No HA  Respiratory: No dyspnea  CV: No chest pain  GI: No abdominal pain, diarrhea  : See HPI  MSK: +B/L LE edema (improving) and L toe pain  Skin: No rashes  Heme: No bleeding    All other systems were reviewed and are negative, except as noted.    VITALS/PHYSICAL EXAM  --------------------------------------------------------------------------------  T(C): 36.4 (01-31-23 @ 04:00), Max: 36.6 (01-30-23 @ 16:00)  HR: 75 (01-31-23 @ 07:00) (63 - 86)  BP: 107/62 (01-31-23 @ 07:00) (95/60 - 138/62)  RR: 12 (01-31-23 @ 07:00) (10 - 26)  SpO2: 100% (01-31-23 @ 07:00) (94% - 100%)  Wt(kg): --        01-30-23 @ 07:01  -  01-31-23 @ 07:00  --------------------------------------------------------  IN: 975.5 mL / OUT: 3585 mL / NET: -2609.5 mL      Physical Exam:  Gen: middle age, female, resting, NAD  HEENT: Anicteric  Pulm: CTA B/L  CV: S1S2+  Abd: Soft, +BS    Ext: +B/L LE edema, R>L, gangrenous 2nd toe of LLE seen  Neuro: Awake and alert  Skin: Warm and dry    LABS/STUDIES  --------------------------------------------------------------------------------              7.3    11.00 >-----------<  602      [01-31-23 @ 01:10]              24.7     133  |  96  |  40  ----------------------------<  157      [01-31-23 @ 01:10]  4.5   |  22  |  3.99        Ca     8.4     [01-31-23 @ 01:10]      Mg     2.30     [01-31-23 @ 01:10]      Phos  6.7     [01-29-23 @ 09:57]    TPro  6.7  /  Alb  2.2  /  TBili  0.2  /  DBili  x   /  AST  13  /  ALT  8   /  AlkPhos  110  [01-31-23 @ 01:10]    PT/INR: PT 18.8 , INR 1.61       [01-31-23 @ 01:10]  PTT: 35.7       [01-31-23 @ 01:10]    Creatinine Trend:  SCr 3.99 [01-31 @ 01:10]  SCr 4.23 [01-30 @ 02:17]  SCr 4.05 [01-29 @ 18:00]  SCr 3.95 [01-29 @ 09:57]  SCr 3.90 [01-29 @ 06:49]    Urinalysis - [01-29-23 @ 18:45]      Color DARK YELLOW / Appearance Slightly Turbid / SG >1.030 / pH 5.5      Gluc Negative / Ketone Trace  / Bili Small / Urobili <2 mg/dL       Blood Negative / Protein 300 mg/dL / Leuk Est Moderate / Nitrite Negative      RBC 1 / WBC 6 / Hyaline 1 / Gran  / Sq Epi  / Non Sq Epi 3 / Bacteria Many    Urine Creatinine 68      [01-28-23 @ 11:01]  Urine Protein 556      [01-28-23 @ 11:01]  Urine Sodium 23      [01-28-23 @ 11:01]  Urine Urea Nitrogen 151.4      [01-28-23 @ 11:01]  Urine Potassium 65.3      [01-28-23 @ 11:01]  Urine Phosphorus 13.7      [01-31-23 @ 01:10]  Urine Osmolality 325      [01-28-23 @ 11:01]    Iron 21, TIBC 198, %sat 11      [01-26-23 @ 07:30]  Ferritin 30      [01-26-23 @ 07:30]  Lipid: chol 112, , HDL 20, LDL --      [01-26-23 @ 07:30]   [facility] Division of Kidney Diseases & Hypertension  FOLLOW UP NOTE  [phone]--------------------------------------------------------------------------------  HPI: 42-year-female with PMH of HTN, HLD, DM, PAD, CHF admitted with left toe pain. Pt. developed BOB during hospital stay. Pt was transferred to the MICU on 1/29/23 for septic shock requiring vasopressor support. Pt. being seen for BOB.       24 hour events/subjective: Pt seen and examined in MICU today. Pt complaining of weakness, L toe pain, and neck pain. Reports that LE edema has improved. Denies fever, chills, nausea, vomiting, CP and SOB during rounds.        PAST HISTORY  --------------------------------------------------------------------------------  No significant changes to PMH, PSH, FHx, SHx, unless otherwise noted    ALLERGIES & MEDICATIONS  --------------------------------------------------------------------------------  Allergies    No Known Allergies    Intolerances      Standing Inpatient Medications  aspirin enteric coated 81 milliGRAM(s) Oral daily  atorvastatin 80 milliGRAM(s) Oral at bedtime  buMETAnide Infusion 1 mG/Hr IV Continuous <Continuous>  calamine/zinc oxide Lotion 1 Application(s) Topical three times a day  chlorhexidine 2% Cloths 1 Application(s) Topical daily  chlorhexidine 4% Liquid 1 Application(s) Topical <User Schedule>  dextrose 5%. 1000 milliLiter(s) IV Continuous <Continuous>  dextrose 5%. 1000 milliLiter(s) IV Continuous <Continuous>  dextrose 50% Injectable 25 Gram(s) IV Push once  dextrose 50% Injectable 12.5 Gram(s) IV Push once  dextrose 50% Injectable 25 Gram(s) IV Push once  ferrous    sulfate 325 milliGRAM(s) Oral daily  glucagon  Injectable 1 milliGRAM(s) IntraMuscular once  heparin   Injectable 5000 Unit(s) SubCutaneous every 8 hours  influenza   Vaccine 0.5 milliLiter(s) IntraMuscular once  insulin lispro (ADMELOG) corrective regimen sliding scale   SubCutaneous three times a day before meals  insulin lispro (ADMELOG) corrective regimen sliding scale   SubCutaneous at bedtime  melatonin 9 milliGRAM(s) Oral at bedtime  norepinephrine Infusion 0.05 MICROgram(s)/kG/Min IV Continuous <Continuous>  pantoprazole    Tablet 40 milliGRAM(s) Oral before breakfast  piperacillin/tazobactam IVPB.. 3.375 Gram(s) IV Intermittent every 12 hours  polyethylene glycol 3350 17 Gram(s) Oral daily  senna 2 Tablet(s) Oral at bedtime    PRN Inpatient Medications  acetaminophen     Tablet .. 650 milliGRAM(s) Oral every 6 hours PRN  dextrose Oral Gel 15 Gram(s) Oral once PRN  HYDROmorphone  Injectable 1 milliGRAM(s) IV Push every 4 hours PRN  ondansetron Injectable 4 milliGRAM(s) IV Push every 8 hours PRN  oxyCODONE    IR 5 milliGRAM(s) Oral every 4 hours PRN      REVIEW OF SYSTEMS  --------------------------------------------------------------------------------  Gen: +weakness  Head/Eyes/Ears: No HA  Respiratory: No dyspnea  CV: No chest pain  GI: No abdominal pain, diarrhea  : See HPI  MSK: +B/L LE edema (improving) and L toe pain  Skin: No rashes  Heme: No bleeding    All other systems were reviewed and are negative, except as noted.    VITALS/PHYSICAL EXAM  --------------------------------------------------------------------------------  T(C): 36.4 (01-31-23 @ 04:00), Max: 36.6 (01-30-23 @ 16:00)  HR: 75 (01-31-23 @ 07:00) (63 - 86)  BP: 107/62 (01-31-23 @ 07:00) (95/60 - 138/62)  RR: 12 (01-31-23 @ 07:00) (10 - 26)  SpO2: 100% (01-31-23 @ 07:00) (94% - 100%)  Wt(kg): --        01-30-23 @ 07:01  -  01-31-23 @ 07:00  --------------------------------------------------------  IN: 975.5 mL / OUT: 3585 mL / NET: -2609.5 mL      Physical Exam:  Gen: middle age, female, resting, NAD  HEENT: Anicteric  Pulm: CTA B/L  CV: S1S2+  Abd: Soft, +BS    Ext: +trace B/L LE edema, R>L, gangrenous 2nd toe of LLE seen  Neuro: Awake and alert  Skin: Warm and dry    LABS/STUDIES  --------------------------------------------------------------------------------              7.3    11.00 >-----------<  602      [01-31-23 @ 01:10]              24.7     133  |  96  |  40  ----------------------------<  157      [01-31-23 @ 01:10]  4.5   |  22  |  3.99        Ca     8.4     [01-31-23 @ 01:10]      Mg     2.30     [01-31-23 @ 01:10]      Phos  6.7     [01-29-23 @ 09:57]    TPro  6.7  /  Alb  2.2  /  TBili  0.2  /  DBili  x   /  AST  13  /  ALT  8   /  AlkPhos  110  [01-31-23 @ 01:10]    PT/INR: PT 18.8 , INR 1.61       [01-31-23 @ 01:10]  PTT: 35.7       [01-31-23 @ 01:10]    Creatinine Trend:  SCr 3.99 [01-31 @ 01:10]  SCr 4.23 [01-30 @ 02:17]  SCr 4.05 [01-29 @ 18:00]  SCr 3.95 [01-29 @ 09:57]  SCr 3.90 [01-29 @ 06:49]    Urinalysis - [01-29-23 @ 18:45]      Color DARK YELLOW / Appearance Slightly Turbid / SG >1.030 / pH 5.5      Gluc Negative / Ketone Trace  / Bili Small / Urobili <2 mg/dL       Blood Negative / Protein 300 mg/dL / Leuk Est Moderate / Nitrite Negative      RBC 1 / WBC 6 / Hyaline 1 / Gran  / Sq Epi  / Non Sq Epi 3 / Bacteria Many    Urine Creatinine 68      [01-28-23 @ 11:01]  Urine Protein 556      [01-28-23 @ 11:01]  Urine Sodium 23      [01-28-23 @ 11:01]  Urine Urea Nitrogen 151.4      [01-28-23 @ 11:01]  Urine Potassium 65.3      [01-28-23 @ 11:01]  Urine Phosphorus 13.7      [01-31-23 @ 01:10]  Urine Osmolality 325      [01-28-23 @ 11:01]    Iron 21, TIBC 198, %sat 11      [01-26-23 @ 07:30]  Ferritin 30      [01-26-23 @ 07:30]  Lipid: chol 112, , HDL 20, LDL --      [01-26-23 @ 07:30]   [facility] Division of Kidney Diseases & Hypertension  FOLLOW UP NOTE  [phone]--------------------------------------------------------------------------------    HPI: 42-year-female with PMH of HTN, HLD, DM, PAD, CHF admitted with left toe pain. Pt. developed BOB during hospital stay. Pt was transferred to the MICU on 1/29/23 for septic shock requiring vasopressor support. Pt. being seen for BOB.       24 hour events/subjective: Pt seen and examined in MICU today. Pt complaining of weakness, L toe pain, and neck pain. Reports that LE edema has improved. Denies fever, chills, nausea, vomiting, CP and SOB during rounds.    PAST HISTORY  --------------------------------------------------------------------------------  No significant changes to PMH, PSH, FHx, SHx, unless otherwise noted    ALLERGIES & MEDICATIONS  --------------------------------------------------------------------------------  Allergies    No Known Allergies    Intolerances    Standing Inpatient Medications  aspirin enteric coated 81 milliGRAM(s) Oral daily  atorvastatin 80 milliGRAM(s) Oral at bedtime  buMETAnide Infusion 1 mG/Hr IV Continuous <Continuous>  calamine/zinc oxide Lotion 1 Application(s) Topical three times a day  chlorhexidine 2% Cloths 1 Application(s) Topical daily  chlorhexidine 4% Liquid 1 Application(s) Topical <User Schedule>  dextrose 5%. 1000 milliLiter(s) IV Continuous <Continuous>  dextrose 5%. 1000 milliLiter(s) IV Continuous <Continuous>  dextrose 50% Injectable 25 Gram(s) IV Push once  dextrose 50% Injectable 12.5 Gram(s) IV Push once  dextrose 50% Injectable 25 Gram(s) IV Push once  ferrous    sulfate 325 milliGRAM(s) Oral daily  glucagon  Injectable 1 milliGRAM(s) IntraMuscular once  heparin   Injectable 5000 Unit(s) SubCutaneous every 8 hours  influenza   Vaccine 0.5 milliLiter(s) IntraMuscular once  insulin lispro (ADMELOG) corrective regimen sliding scale   SubCutaneous three times a day before meals  insulin lispro (ADMELOG) corrective regimen sliding scale   SubCutaneous at bedtime  melatonin 9 milliGRAM(s) Oral at bedtime  norepinephrine Infusion 0.05 MICROgram(s)/kG/Min IV Continuous <Continuous>  pantoprazole    Tablet 40 milliGRAM(s) Oral before breakfast  piperacillin/tazobactam IVPB.. 3.375 Gram(s) IV Intermittent every 12 hours  polyethylene glycol 3350 17 Gram(s) Oral daily  senna 2 Tablet(s) Oral at bedtime    PRN Inpatient Medications  acetaminophen     Tablet .. 650 milliGRAM(s) Oral every 6 hours PRN  dextrose Oral Gel 15 Gram(s) Oral once PRN  HYDROmorphone  Injectable 1 milliGRAM(s) IV Push every 4 hours PRN  ondansetron Injectable 4 milliGRAM(s) IV Push every 8 hours PRN  oxyCODONE    IR 5 milliGRAM(s) Oral every 4 hours PRN      REVIEW OF SYSTEMS  --------------------------------------------------------------------------------  Gen: +weakness  Head/Eyes/Ears: No HA  Respiratory: No dyspnea  CV: No chest pain  GI: No abdominal pain, diarrhea  : See HPI  MSK: +B/L LE edema (improving) and L toe pain  Skin: No rashes  Heme: No bleeding    All other systems were reviewed and are negative, except as noted.    VITALS/PHYSICAL EXAM  --------------------------------------------------------------------------------  T(C): 36.4 (01-31-23 @ 04:00), Max: 36.6 (01-30-23 @ 16:00)  HR: 75 (01-31-23 @ 07:00) (63 - 86)  BP: 107/62 (01-31-23 @ 07:00) (95/60 - 138/62)  RR: 12 (01-31-23 @ 07:00) (10 - 26)  SpO2: 100% (01-31-23 @ 07:00) (94% - 100%)  Wt(kg): --    01-30-23 @ 07:01  -  01-31-23 @ 07:00  --------------------------------------------------------  IN: 975.5 mL / OUT: 3585 mL / NET: -2609.5 mL    Physical Exam:  Gen: resting, NAD  HEENT: Anicteric  Pulm: CTA B/L  CV: S1S2+  Abd: Soft, +BS    Ext: +trace B/L LE edema, R>L, gangrenous 2nd toe of LLE seen  Neuro: Awake and alert  Skin: Warm and dry    LABS/STUDIES  --------------------------------------------------------------------------------              7.3    11.00 >-----------<  602      [01-31-23 @ 01:10]              24.7     133  |  96  |  40  ----------------------------<  157      [01-31-23 @ 01:10]  4.5   |  22  |  3.99        Ca     8.4     [01-31-23 @ 01:10]      Mg     2.30     [01-31-23 @ 01:10]      Phos  6.7     [01-29-23 @ 09:57]    TPro  6.7  /  Alb  2.2  /  TBili  0.2  /  DBili  x   /  AST  13  /  ALT  8   /  AlkPhos  110  [01-31-23 @ 01:10]    Creatinine Trend:  SCr 3.99 [01-31 @ 01:10]  SCr 4.23 [01-30 @ 02:17]  SCr 4.05 [01-29 @ 18:00]  SCr 3.95 [01-29 @ 09:57]  SCr 3.90 [01-29 @ 06:49]    Urinalysis - [01-29-23 @ 18:45]      Color DARK YELLOW / Appearance Slightly Turbid / SG >1.030 / pH 5.5      Gluc Negative / Ketone Trace  / Bili Small / Urobili <2 mg/dL       Blood Negative / Protein 300 mg/dL / Leuk Est Moderate / Nitrite Negative      RBC 1 / WBC 6 / Hyaline 1 / Gran  / Sq Epi  / Non Sq Epi 3 / Bacteria Many    Urine Creatinine 68      [01-28-23 @ 11:01]  Urine Protein 556      [01-28-23 @ 11:01]  Urine Sodium 23      [01-28-23 @ 11:01]  Urine Urea Nitrogen 151.4      [01-28-23 @ 11:01]  Urine Potassium 65.3      [01-28-23 @ 11:01]  Urine Phosphorus 13.7      [01-31-23 @ 01:10]  Urine Osmolality 325      [01-28-23 @ 11:01]

## 2023-01-31 NOTE — DIETITIAN INITIAL EVALUATION ADULT - PERTINENT MEDS FT
MEDICATIONS  (STANDING):  aspirin enteric coated 81 milliGRAM(s) Oral daily  atorvastatin 80 milliGRAM(s) Oral at bedtime  buMETAnide Infusion 1 mG/Hr (5 mL/Hr) IV Continuous <Continuous>  calamine/zinc oxide Lotion 1 Application(s) Topical three times a day  chlorhexidine 2% Cloths 1 Application(s) Topical daily  chlorhexidine 4% Liquid 1 Application(s) Topical <User Schedule>  dextrose 5%. 1000 milliLiter(s) (100 mL/Hr) IV Continuous <Continuous>  dextrose 5%. 1000 milliLiter(s) (50 mL/Hr) IV Continuous <Continuous>  dextrose 50% Injectable 25 Gram(s) IV Push once  dextrose 50% Injectable 12.5 Gram(s) IV Push once  dextrose 50% Injectable 25 Gram(s) IV Push once  ferrous    sulfate 325 milliGRAM(s) Oral daily  glucagon  Injectable 1 milliGRAM(s) IntraMuscular once  heparin   Injectable 5000 Unit(s) SubCutaneous every 8 hours  influenza   Vaccine 0.5 milliLiter(s) IntraMuscular once  insulin lispro (ADMELOG) corrective regimen sliding scale   SubCutaneous three times a day before meals  insulin lispro (ADMELOG) corrective regimen sliding scale   SubCutaneous at bedtime  melatonin 9 milliGRAM(s) Oral at bedtime  norepinephrine Infusion 0.05 MICROgram(s)/kG/Min (6.71 mL/Hr) IV Continuous <Continuous>  pantoprazole    Tablet 40 milliGRAM(s) Oral before breakfast  piperacillin/tazobactam IVPB.. 3.375 Gram(s) IV Intermittent every 12 hours  polyethylene glycol 3350 17 Gram(s) Oral two times a day  senna 2 Tablet(s) Oral at bedtime    MEDICATIONS  (PRN):  acetaminophen     Tablet .. 650 milliGRAM(s) Oral every 6 hours PRN Temp greater or equal to 38C (100.4F), Mild Pain (1 - 3)  dextrose Oral Gel 15 Gram(s) Oral once PRN Blood Glucose LESS THAN 70 milliGRAM(s)/deciliter  HYDROmorphone  Injectable 1 milliGRAM(s) IV Push every 4 hours PRN Severe Pain (7 - 10)  ondansetron Injectable 4 milliGRAM(s) IV Push every 8 hours PRN Nausea and/or Vomiting  oxyCODONE    IR 5 milliGRAM(s) Oral every 4 hours PRN Moderate Pain (4 - 6)

## 2023-01-31 NOTE — DIETITIAN INITIAL EVALUATION ADULT - NS FNS DIET ORDER
Diet, DASH/TLC:   Sodium & Cholesterol Restricted  Consistent Carbohydrate {No Snacks} (CSTCHO)  For patients receiving Renal Replacement - No Protein Restr, No Conc K, No Conc Phos, Low Sodium (RENAL)  Supplement Feeding Modality:  Oral  Nepro Cans or Servings Per Day:  3       Frequency:  Daily (01-30-23 @ 14:09)

## 2023-01-31 NOTE — DIETITIAN INITIAL EVALUATION ADULT - PERTINENT LABORATORY DATA
01-31    133<L>  |  96<L>  |  40<H>  ----------------------------<  157<H>  4.5   |  22  |  3.99<H>    Ca    8.4      31 Jan 2023 01:10  Mg     2.30     01-31    TPro  6.7  /  Alb  2.2<L>  /  TBili  0.2  /  DBili  x   /  AST  13  /  ALT  8   /  AlkPhos  110  01-31    CAPILLARY BLOOD GLUCOSE      POCT Blood Glucose.: 151 mg/dL (31 Jan 2023 08:10)  POCT Blood Glucose.: 154 mg/dL (30 Jan 2023 21:15)  POCT Blood Glucose.: 141 mg/dL (30 Jan 2023 18:31)        A1C with Estimated Average Glucose Result: 7.1 % (01-26-23 @ 07:30)

## 2023-01-31 NOTE — PROGRESS NOTE ADULT - ATTENDING COMMENTS
Agree with above. Seen and examined with team on rounds. Critically ill requiring frequent bedside visits for titration and therapy changes. Severe PVD with gangrenous toe and sepsis of unclear etiology. On empiric abx. Improved renal function with diuretics. Titrate down vasopressors. Supportive care.

## 2023-01-31 NOTE — DIETITIAN INITIAL EVALUATION ADULT - LITERATURE/VIDEOS GIVEN
Potassium Content of Foods, Phosphorus Content of Foods, Heart Healthy Consistent Carbohydrate Nutrition Therapy

## 2023-01-31 NOTE — DIETITIAN NUTRITION RISK NOTIFICATION - TREATMENT: THE FOLLOWING DIET HAS BEEN RECOMMENDED
Diet, DASH/TLC:   Sodium & Cholesterol Restricted  Consistent Carbohydrate {No Snacks} (CSTCHO)  For patients receiving Renal Replacement - No Protein Restr, No Conc K, No Conc Phos, Low Sodium (RENAL)  Supplement Feeding Modality:  Oral  Nepro Cans or Servings Per Day:  3       Frequency:  Daily (01-30-23 @ 14:09) [Active]

## 2023-01-31 NOTE — PROGRESS NOTE ADULT - PROBLEM SELECTOR PLAN 1
Pt. with oliguric BOB in setting of hypotension, recent IV contrast use and CHF. Upon review of labs on [facility] HIE/[place] Scr was elevated at 1.33 on 6/28/21 and improved to 1.15 on 2/24/22. Scr was 1.22 on admission (1/25/23). Pt underwent LE angiogram on 1/25/23. Pt. was on diuretics (torsemide, aldactone) that were held given worsening renal function. UA showing proteinuria. Spot urine TP/CR is elevated at 8.1. CT abdomen showing no evidence of hydronephrosis. Pt. with ? underlying CKD due to DM nephropathy. Scr worsened to 4.23 today. Pt. is oliguric, documented urine output is 155 cc over the past 24 hours. Recommend to start Bumex infusion at 1 mg/hr. Check kidney/bladder US with doppler to rule out renal vein thrombosis given low albumin levels. Will need to consider HD, if BOB continues to worsen. Pt. agreeable to initiate HD if needed. Monitor labs and urine output. Avoid nephrotoxins, Dose meds as per eGFR.    If you have any questions, please feel free to contact me  Talia Swift  Nephrology Fellow  [phone] / [employer] Teams(Preferred)  (After 5pm or on weekends please page the on-call fellow) Pt. with oliguric BOB in setting of hypotension, recent IV contrast use and CHF. Upon review of labs on [facility] HIE/[place] Scr was elevated at 1.33 on 6/28/21 and improved to 1.15 on 2/24/22. Scr was 1.22 on admission (1/25/23). Pt underwent LE angiogram on 1/25/23. Pt. was on diuretics (torsemide, aldactone) that were held given worsening renal function. UA showing proteinuria. Spot urine TP/CR is elevated at 8.1. CT abdomen showing no evidence of hydronephrosis. Pt. with ? underlying CKD due to DM nephropathy. Scr improved to 3.99 today. Pt. is non-oliguric, documented urine output is ~3.6 L over the past 24 hours on Bumex infusion. Check kidney/bladder US with doppler to rule out renal vein thrombosis given low albumin levels. Will need to consider HD, if BOB continues to worsen. Pt. agreeable to initiate HD if needed. Monitor labs and urine output. Avoid nephrotoxins, Dose meds as per eGFR.    If you have any questions, please feel free to contact me  Talia Swift  Nephrology Fellow  [phone] / [employer] Teams(Preferred)  (After 5pm or on weekends please page the on-call fellow) Pt. with oliguric BOB in setting of hypotension, recent IV contrast use and CHF. Upon review of labs on [facility] HIE/[place] Scr was elevated at 1.33 on 6/28/21 and improved to 1.15 on 2/24/22. Scr was 1.22 on admission (1/25/23). Pt underwent LE angiogram on 1/25/23. Pt. was on diuretics (torsemide, aldactone) that were held given worsening renal function. UA showing proteinuria. Spot urine TP/CR is elevated at 8.1. CT abdomen showing no evidence of hydronephrosis. Pt. with ? underlying CKD due to DM nephropathy. Scr improved to 3.99 today. Pt. is non-oliguric, documented urine output is ~3.6 L over the past 24 hours on Bumex infusion. Recommend to continue with Bumex infusion. Check kidney/bladder US with doppler to rule out renal vein thrombosis given low albumin levels. Will need to consider HD, if BOB continues to worsen. Pt. agreeable to initiate HD if needed. Monitor labs and urine output. Avoid nephrotoxins, Dose meds as per eGFR.    If you have any questions, please feel free to contact me  Talia Swift  Nephrology Fellow  [phone] / [employer] Teams(Preferred)  (After 5pm or on weekends please page the on-call fellow) Pt. with BOB in setting of hypotension, recent IV contrast use and CHF. Upon review of labs on [facility] HIE/[place], Scr was elevated at 1.33 on 6/28/21 and improved to 1.15 on 2/24/22. Scr was 1.22 on admission (1/25/23). Pt. underwent LE angiogram on 1/25/23. Scr worsened to 4.23 on 1/30/23. No hydronephrosis seen on CT scan study. Pt. was oliguric, initiated on IV Bumex infusion on 1/30/23. Pt. with increased UOP (~3.6 L) over last 24 hrs. Scr elevated/stable at 3.99 today. Continue with Bumex infusion. Check kidney/bladder US with doppler to rule out renal vein thrombosis given low albumin levels. Will need to consider HD, if BOB worsens. Pt. agreeable to initiate HD if needed. Monitor labs and urine output. Avoid nephrotoxins, Dose meds as per eGFR.    If you have any questions, please feel free to contact me  Talia Swift  Nephrology Fellow  [phone] / [employer] Teams(Preferred)  (After 5pm or on weekends please page the on-call fellow)

## 2023-01-31 NOTE — CHART NOTE - NSCHARTNOTEFT_GEN_A_CORE
: Sanaz Garcia MD    INDICATION: Shock of unspecified etiology     PROCEDURE:  [ ] LIMITED ECHO  [ X ] LIMITED CHEST  [ ] LIMITED RETROPERITONEAL  [ ] LIMITED ABDOMINAL  [ ] LIMITED DVT  [ ] NEEDLE GUIDANCE VASCULAR  [ ] NEEDLE GUIDANCE THORACENTESIS  [ ] NEEDLE GUIDANCE PARACENTESIS  [ ] NEEDLE GUIDANCE PERICARDIOCENTESIS  [ ] OTHER    FINDINGS:  A line predominance with scattered B-lines. Bilateral R>L pleural effusions.     INTERPRETATION:  Bilateral small pleural effusions.    Images uploaded on [employer] [facility] : Sanaz Garcia MD    INDICATION: Shock of unspecified etiology     PROCEDURE:  [ ] LIMITED ECHO  [ X ] LIMITED CHEST  [ ] LIMITED RETROPERITONEAL  [ ] LIMITED ABDOMINAL  [ ] LIMITED DVT  [ ] NEEDLE GUIDANCE VASCULAR  [ ] NEEDLE GUIDANCE THORACENTESIS  [ ] NEEDLE GUIDANCE PARACENTESIS  [ ] NEEDLE GUIDANCE PERICARDIOCENTESIS  [ ] OTHER    FINDINGS:  A line predominance with scattered B-lines. Bilateral R>L pleural effusions.     INTERPRETATION:  Bilateral small pleural effusions.    Images uploaded on [employer] [facility]    Attending addendum:  At bedside for procedure and agree with above.  MADI De DO, FCCP

## 2023-01-31 NOTE — PROGRESS NOTE ADULT - ATTENDING COMMENTS
Pt. with BOB, likely from underlying ATN. Pt. with increased UOP over last 24 hrs, on IV Bumex infusion. Scr elevated/stable at 3.99 today. Assessment and plan for BOB as outlined above. Monitor labs and urine output. Avoid any potential nephrotoxins. Dose medications as per eGFR.

## 2023-01-31 NOTE — DIETITIAN INITIAL EVALUATION ADULT - REASON FOR ADMISSION
41yo F presenting with left 2nd toe pain concerning for dry gangrene.  Currently pending left foot TMA.  S/p RRT for hypotension (1/29).  Findings of large right pleural effusion with near-complete atelectasis of RML/RLL likely 2/2 to HFrEF.  Pt. also with BOB on CKD.

## 2023-02-01 LAB
ANION GAP SERPL CALC-SCNC: 11 MMOL/L — SIGNIFICANT CHANGE UP (ref 7–14)
ANION GAP SERPL CALC-SCNC: 12 MMOL/L — SIGNIFICANT CHANGE UP (ref 7–14)
APTT BLD: 37.9 SEC — HIGH (ref 27–36.3)
BASOPHILS # BLD AUTO: 0.08 K/UL — SIGNIFICANT CHANGE UP (ref 0–0.2)
BASOPHILS NFR BLD AUTO: 0.6 % — SIGNIFICANT CHANGE UP (ref 0–2)
BUN SERPL-MCNC: 37 MG/DL — HIGH (ref 7–23)
BUN SERPL-MCNC: 40 MG/DL — HIGH (ref 7–23)
CALCIUM SERPL-MCNC: 8.7 MG/DL — SIGNIFICANT CHANGE UP (ref 8.4–10.5)
CALCIUM SERPL-MCNC: 8.8 MG/DL — SIGNIFICANT CHANGE UP (ref 8.4–10.5)
CHLORIDE SERPL-SCNC: 95 MMOL/L — LOW (ref 98–107)
CHLORIDE SERPL-SCNC: 96 MMOL/L — LOW (ref 98–107)
CO2 SERPL-SCNC: 27 MMOL/L — SIGNIFICANT CHANGE UP (ref 22–31)
CO2 SERPL-SCNC: 28 MMOL/L — SIGNIFICANT CHANGE UP (ref 22–31)
CREAT SERPL-MCNC: 2.98 MG/DL — HIGH (ref 0.5–1.3)
CREAT SERPL-MCNC: 3.35 MG/DL — HIGH (ref 0.5–1.3)
EGFR: 17 ML/MIN/1.73M2 — LOW
EGFR: 19 ML/MIN/1.73M2 — LOW
EOSINOPHIL # BLD AUTO: 0.26 K/UL — SIGNIFICANT CHANGE UP (ref 0–0.5)
EOSINOPHIL NFR BLD AUTO: 2.1 % — SIGNIFICANT CHANGE UP (ref 0–6)
GAS PNL BLDV: SIGNIFICANT CHANGE UP
GLUCOSE BLDC GLUCOMTR-MCNC: 220 MG/DL — HIGH (ref 70–99)
GLUCOSE BLDC GLUCOMTR-MCNC: 232 MG/DL — HIGH (ref 70–99)
GLUCOSE BLDC GLUCOMTR-MCNC: 71 MG/DL — SIGNIFICANT CHANGE UP (ref 70–99)
GLUCOSE BLDC GLUCOMTR-MCNC: 79 MG/DL — SIGNIFICANT CHANGE UP (ref 70–99)
GLUCOSE BLDC GLUCOMTR-MCNC: 95 MG/DL — SIGNIFICANT CHANGE UP (ref 70–99)
GLUCOSE SERPL-MCNC: 107 MG/DL — HIGH (ref 70–99)
GLUCOSE SERPL-MCNC: 235 MG/DL — HIGH (ref 70–99)
HCT VFR BLD CALC: 24.4 % — LOW (ref 34.5–45)
HGB BLD-MCNC: 7.3 G/DL — LOW (ref 11.5–15.5)
IANC: 9.12 K/UL — HIGH (ref 1.8–7.4)
IMM GRANULOCYTES NFR BLD AUTO: 0.3 % — SIGNIFICANT CHANGE UP (ref 0–0.9)
INR BLD: 1.84 RATIO — HIGH (ref 0.88–1.16)
LYMPHOCYTES # BLD AUTO: 1.66 K/UL — SIGNIFICANT CHANGE UP (ref 1–3.3)
LYMPHOCYTES # BLD AUTO: 13.4 % — SIGNIFICANT CHANGE UP (ref 13–44)
MAGNESIUM SERPL-MCNC: 1.8 MG/DL — SIGNIFICANT CHANGE UP (ref 1.6–2.6)
MAGNESIUM SERPL-MCNC: 2 MG/DL — SIGNIFICANT CHANGE UP (ref 1.6–2.6)
MCHC RBC-ENTMCNC: 24.3 PG — LOW (ref 27–34)
MCHC RBC-ENTMCNC: 29.9 GM/DL — LOW (ref 32–36)
MCV RBC AUTO: 81.1 FL — SIGNIFICANT CHANGE UP (ref 80–100)
MONOCYTES # BLD AUTO: 1.21 K/UL — HIGH (ref 0–0.9)
MONOCYTES NFR BLD AUTO: 9.8 % — SIGNIFICANT CHANGE UP (ref 2–14)
NEUTROPHILS # BLD AUTO: 9.12 K/UL — HIGH (ref 1.8–7.4)
NEUTROPHILS NFR BLD AUTO: 73.8 % — SIGNIFICANT CHANGE UP (ref 43–77)
NRBC # BLD: 0 /100 WBCS — SIGNIFICANT CHANGE UP (ref 0–0)
NRBC # FLD: 0 K/UL — SIGNIFICANT CHANGE UP (ref 0–0)
PHOSPHATE 24H UR-MCNC: 13.7 MG/DL — SIGNIFICANT CHANGE UP
PHOSPHATE SERPL-MCNC: 4.9 MG/DL — HIGH (ref 2.5–4.5)
PHOSPHATE SERPL-MCNC: 5.8 MG/DL — HIGH (ref 2.5–4.5)
PLATELET # BLD AUTO: 587 K/UL — HIGH (ref 150–400)
POTASSIUM SERPL-MCNC: 4 MMOL/L — SIGNIFICANT CHANGE UP (ref 3.5–5.3)
POTASSIUM SERPL-MCNC: 4.1 MMOL/L — SIGNIFICANT CHANGE UP (ref 3.5–5.3)
POTASSIUM SERPL-SCNC: 4 MMOL/L — SIGNIFICANT CHANGE UP (ref 3.5–5.3)
POTASSIUM SERPL-SCNC: 4.1 MMOL/L — SIGNIFICANT CHANGE UP (ref 3.5–5.3)
PROTHROM AB SERPL-ACNC: 21.5 SEC — HIGH (ref 10.5–13.4)
RBC # BLD: 3.01 M/UL — LOW (ref 3.8–5.2)
RBC # FLD: 18.3 % — HIGH (ref 10.3–14.5)
SODIUM SERPL-SCNC: 134 MMOL/L — LOW (ref 135–145)
SODIUM SERPL-SCNC: 135 MMOL/L — SIGNIFICANT CHANGE UP (ref 135–145)
TROPONIN T, HIGH SENSITIVITY RESULT: 66 NG/L — CRITICAL HIGH
WBC # BLD: 12.37 K/UL — HIGH (ref 3.8–10.5)
WBC # FLD AUTO: 12.37 K/UL — HIGH (ref 3.8–10.5)

## 2023-02-01 PROCEDURE — 99233 SBSQ HOSP IP/OBS HIGH 50: CPT | Mod: GC

## 2023-02-01 PROCEDURE — 99291 CRITICAL CARE FIRST HOUR: CPT | Mod: GC

## 2023-02-01 PROCEDURE — 93283 PRGRMG EVAL IMPLANTABLE DFB: CPT | Mod: 26

## 2023-02-01 RX ORDER — BUMETANIDE 0.25 MG/ML
2 INJECTION INTRAMUSCULAR; INTRAVENOUS EVERY 8 HOURS
Refills: 0 | Status: DISCONTINUED | OUTPATIENT
Start: 2023-02-02 | End: 2023-02-03

## 2023-02-01 RX ORDER — MAGNESIUM SULFATE 500 MG/ML
4 VIAL (ML) INJECTION ONCE
Refills: 0 | Status: DISCONTINUED | OUTPATIENT
Start: 2023-02-01 | End: 2023-02-01

## 2023-02-01 RX ORDER — POLYETHYLENE GLYCOL 3350 17 G/17G
17 POWDER, FOR SOLUTION ORAL
Refills: 0 | Status: DISCONTINUED | OUTPATIENT
Start: 2023-02-01 | End: 2023-02-03

## 2023-02-01 RX ORDER — BUMETANIDE 0.25 MG/ML
1 INJECTION INTRAMUSCULAR; INTRAVENOUS
Qty: 20 | Refills: 0 | Status: DISCONTINUED | OUTPATIENT
Start: 2023-02-01 | End: 2023-02-01

## 2023-02-01 RX ORDER — MAGNESIUM SULFATE 500 MG/ML
2 VIAL (ML) INJECTION ONCE
Refills: 0 | Status: COMPLETED | OUTPATIENT
Start: 2023-02-01 | End: 2023-02-01

## 2023-02-01 RX ADMIN — HYDROMORPHONE HYDROCHLORIDE 1 MILLIGRAM(S): 2 INJECTION INTRAMUSCULAR; INTRAVENOUS; SUBCUTANEOUS at 21:44

## 2023-02-01 RX ADMIN — HEPARIN SODIUM 5000 UNIT(S): 5000 INJECTION INTRAVENOUS; SUBCUTANEOUS at 13:43

## 2023-02-01 RX ADMIN — CALAMINE AND ZINC OXIDE AND PHENOL 1 APPLICATION(S): 160; 10 LOTION TOPICAL at 13:43

## 2023-02-01 RX ADMIN — HYDROMORPHONE HYDROCHLORIDE 1 MILLIGRAM(S): 2 INJECTION INTRAMUSCULAR; INTRAVENOUS; SUBCUTANEOUS at 06:40

## 2023-02-01 RX ADMIN — HYDROMORPHONE HYDROCHLORIDE 1 MILLIGRAM(S): 2 INJECTION INTRAMUSCULAR; INTRAVENOUS; SUBCUTANEOUS at 10:50

## 2023-02-01 RX ADMIN — POLYETHYLENE GLYCOL 3350 17 GRAM(S): 17 POWDER, FOR SOLUTION ORAL at 13:44

## 2023-02-01 RX ADMIN — OXYCODONE HYDROCHLORIDE 5 MILLIGRAM(S): 5 TABLET ORAL at 14:30

## 2023-02-01 RX ADMIN — HYDROMORPHONE HYDROCHLORIDE 1 MILLIGRAM(S): 2 INJECTION INTRAMUSCULAR; INTRAVENOUS; SUBCUTANEOUS at 06:27

## 2023-02-01 RX ADMIN — HEPARIN SODIUM 5000 UNIT(S): 5000 INJECTION INTRAVENOUS; SUBCUTANEOUS at 06:01

## 2023-02-01 RX ADMIN — Medication 2: at 18:14

## 2023-02-01 RX ADMIN — CALAMINE AND ZINC OXIDE AND PHENOL 1 APPLICATION(S): 160; 10 LOTION TOPICAL at 06:01

## 2023-02-01 RX ADMIN — HYDROMORPHONE HYDROCHLORIDE 1 MILLIGRAM(S): 2 INJECTION INTRAMUSCULAR; INTRAVENOUS; SUBCUTANEOUS at 10:25

## 2023-02-01 RX ADMIN — CHLORHEXIDINE GLUCONATE 1 APPLICATION(S): 213 SOLUTION TOPICAL at 06:02

## 2023-02-01 RX ADMIN — CALAMINE AND ZINC OXIDE AND PHENOL 1 APPLICATION(S): 160; 10 LOTION TOPICAL at 21:23

## 2023-02-01 RX ADMIN — OXYCODONE HYDROCHLORIDE 5 MILLIGRAM(S): 5 TABLET ORAL at 19:24

## 2023-02-01 RX ADMIN — HEPARIN SODIUM 5000 UNIT(S): 5000 INJECTION INTRAVENOUS; SUBCUTANEOUS at 21:14

## 2023-02-01 RX ADMIN — POLYETHYLENE GLYCOL 3350 17 GRAM(S): 17 POWDER, FOR SOLUTION ORAL at 06:01

## 2023-02-01 RX ADMIN — CHLORHEXIDINE GLUCONATE 1 APPLICATION(S): 213 SOLUTION TOPICAL at 11:21

## 2023-02-01 RX ADMIN — Medication 9 MILLIGRAM(S): at 21:17

## 2023-02-01 RX ADMIN — HYDROMORPHONE HYDROCHLORIDE 1 MILLIGRAM(S): 2 INJECTION INTRAMUSCULAR; INTRAVENOUS; SUBCUTANEOUS at 01:49

## 2023-02-01 RX ADMIN — OXYCODONE HYDROCHLORIDE 5 MILLIGRAM(S): 5 TABLET ORAL at 13:47

## 2023-02-01 RX ADMIN — PANTOPRAZOLE SODIUM 40 MILLIGRAM(S): 20 TABLET, DELAYED RELEASE ORAL at 06:01

## 2023-02-01 RX ADMIN — ATORVASTATIN CALCIUM 80 MILLIGRAM(S): 80 TABLET, FILM COATED ORAL at 21:16

## 2023-02-01 RX ADMIN — HYDROMORPHONE HYDROCHLORIDE 1 MILLIGRAM(S): 2 INJECTION INTRAMUSCULAR; INTRAVENOUS; SUBCUTANEOUS at 03:12

## 2023-02-01 RX ADMIN — Medication 25 GRAM(S): at 21:13

## 2023-02-01 RX ADMIN — Medication 81 MILLIGRAM(S): at 13:43

## 2023-02-01 RX ADMIN — HYDROMORPHONE HYDROCHLORIDE 1 MILLIGRAM(S): 2 INJECTION INTRAMUSCULAR; INTRAVENOUS; SUBCUTANEOUS at 21:14

## 2023-02-01 RX ADMIN — Medication 325 MILLIGRAM(S): at 13:43

## 2023-02-01 RX ADMIN — HYDROMORPHONE HYDROCHLORIDE 1 MILLIGRAM(S): 2 INJECTION INTRAMUSCULAR; INTRAVENOUS; SUBCUTANEOUS at 16:11

## 2023-02-01 RX ADMIN — HYDROMORPHONE HYDROCHLORIDE 1 MILLIGRAM(S): 2 INJECTION INTRAMUSCULAR; INTRAVENOUS; SUBCUTANEOUS at 16:30

## 2023-02-01 RX ADMIN — OXYCODONE HYDROCHLORIDE 5 MILLIGRAM(S): 5 TABLET ORAL at 19:54

## 2023-02-01 NOTE — PROGRESS NOTE ADULT - PROBLEM SELECTOR PLAN 1
Pt. with BOB in setting of hypotension, recent IV contrast use and CHF. Upon review of labs on [facility] HIE/[place], Scr was elevated at 1.33 on 6/28/21 and improved to 1.15 on 2/24/22. Scr was 1.22 on admission (1/25/23). Pt. underwent LE angiogram on 1/25/23. Scr worsened to 4.23 on 1/30/23. No hydronephrosis seen on CT scan study. Pt. was oliguric, initiated on IV Bumex infusion on 1/30/23. Pt. with increased UOP (~5.6 L) over the last 24 hrs. Scr elevated/improved at 3.35 today. Check kidney/bladder US with doppler to rule out renal vein thrombosis given low albumin levels. Will need to consider HD, if BOB worsens. Pt. agreeable to initiate HD if needed. Monitor labs and urine output. Avoid nephrotoxins, Dose meds as per eGFR.    If you have any questions, please feel free to contact me  Talia Swift  Nephrology Fellow  [phone] / [employer] Teams(Preferred)  (After 5pm or on weekends please page the on-call fellow) Pt. with BOB in setting of hypotension, recent IV contrast use and CHF. Upon review of labs on [facility] HIE/[address], Scr was elevated at 1.33 on 6/28/21 and improved to 1.15 on 2/24/22. Scr was 1.22 on admission (1/25/23). Pt. underwent LE angiogram on 1/25/23. Scr worsened to 4.23 on 1/30/23. No hydronephrosis seen on CT scan study. Pt. was oliguric, initiated on IV Bumex infusion on 1/30/23. Pt. with increased UOP (~5.6 L) over the last 24 hrs. Scr elevated/improved at 3.35 today. Recommend to continue with IV Bumex infusion. Check kidney/bladder US with doppler to rule out renal vein thrombosis given low albumin levels. Will need to consider HD, if BOB worsens. Pt. agreeable to initiate HD if needed. Monitor labs and urine output. Avoid nephrotoxins, Dose meds as per eGFR.    If you have any questions, please feel free to contact me  Talia Swift  Nephrology Fellow  [phone] / [employer] Teams(Preferred)  (After 5pm or on weekends please page the on-call fellow) Pt. with BOB in setting of hypotension, recent IV contrast use and CHF. Upon review of labs on [facility] HIE/[place], Scr was elevated at 1.33 on 6/28/21 and improved to 1.15 on 2/24/22. Scr was 1.22 on admission (1/25/23). Pt. underwent LE angiogram on 1/25/23. Scr worsened to 4.23 on 1/30/23. No hydronephrosis seen on CT scan study. Pt. was oliguric, initiated on IV Bumex infusion on 1/30/23. Pt. with increased UOP (~5.6 L) over the last 24 hrs. Scr decreased to 3.35 today. Continue with IV diuretic therapy. Check kidney/bladder US with doppler to rule out renal vein thrombosis given low albumin levels. Monitor labs and urine output. Avoid nephrotoxins, Dose meds as per eGFR.    If you have any questions, please feel free to contact me  Talia Swift  Nephrology Fellow  [phone] / [employer] Teams(Preferred)  (After 5pm or on weekends please page the on-call fellow)

## 2023-02-01 NOTE — CHART NOTE - NSCHARTNOTEFT_GEN_A_CORE
42F w chronic limb ischemia, particularly in the setting of recent non healing toe amputation @ OSH. L 2nd toe appears non viable and will likely require amputation, in the setting of acute kidney injury    Recommendations:   - Continue IV abx   - Appreciate Podiatry recommendations: plan for L foot TMA   - Cr still elevated, not candidate for angiogram at this time, please re-consult once cleared from nephrology for contrast load    C Team Surgery  #[id number]

## 2023-02-01 NOTE — PROGRESS NOTE ADULT - ATTENDING COMMENTS
42 F with CABG, HFrEF,  ICD, PAD here with septic shock due to dry gangrene vs other source, BOB, likely acute on chronic decompensated systolic HF    mentating well, pain controlled  c/w vasopressors for septic shock  c/w diuresis for BOB and ADHF, keep net negative  f/u EP interrogation, f/u podiatry reccs for amputation; patient on room air so she is medically optimized for amputation  monitor off abx as per ID    lvsf severely reduced  on dvt ppx  full code    Critically ill patient requiring frequent bedside visits with therapy changes.

## 2023-02-01 NOTE — PROGRESS NOTE ADULT - ASSESSMENT
42F presents with left foot 2nd digit dry gangrene and ischemic changes to dorsal MPJs 1-5.  - Pt seen and evaluated.  - Afebrile, WBC 12.37  - Left Foot X-Ray: No gas, no osteomyelitis.  - Left foot 2nd digit dry gangrene, dehisced partial 3rd ray resection to sub-dermis, ischemic changes the dorsal and plantar forefoot, no drainage, no purulence, no acute signs of infection. Right foot no wounds, no acute signs of infection.  - No left foot wound culture taken 2/2 no acute signs of infection.  - ID recommendations appreciated - monitor off abx   - SEBASTIÁN/PVR: RABI ncv, RTBI 0.66, LABI 0.58, LTBI flat wvfms, BL wvfms multi-seg art disease.  - CTA: Right distal peroneal artery and left PT/peroneal arteries occluded.  - [facility], appreciated.  - Recommend EP consult to interrogate pacemaker prior to OR  - Pod Plan: Left foot transmetatarsal amputation with tendoachilles lengthening pending vasc recs.  - Please document medical/cardiac clearance for podiatric procedure under anesthesia when optimized  - discussed with attending.

## 2023-02-01 NOTE — PROCEDURE NOTE - ADDITIONAL PROCEDURE DETAILS
Critically ill pt requiring PIV access for medical management and/or pressor support. Under US guidance identified Lt UE vein, and successfully placed 20g x 6cm arrow extend dwell angiocath into vessel.  Placement confirmed s/p with ultrasound and catheter determined to be in patent lumen of vein. Pt tolerated well w/o complication.
Patient is not pacer dependent,  PPM is MRI conditional   No indication of Magnet application for foot surgery (minimal GEORGE)  Possible OptiVol fluid accumulation from Dec 14, 2022-ongoing
Critically ill pt requiring PIV access for medical management and/or pressor support. Under US guidance identified Rt UE vein, and successfully placed 20g x 6cm arrow extend dwell angiocath into vessel.  Placement confirmed s/p with ultrasound and catheter determined to be in patent lumen of vein. Pt tolerated well w/o complication.

## 2023-02-01 NOTE — PROGRESS NOTE ADULT - ATTENDING COMMENTS
Pt. with BOB, likely from underlying ATN. Pt. with increased UOP over last 24 hrs, on IV Bumex infusion. Scr decreased to 3.35 today. Assessment and plan for BOB as outlined above. Monitor labs and urine output. Avoid any potential nephrotoxins. Dose medications as per eGFR.

## 2023-02-01 NOTE — PROGRESS NOTE ADULT - ASSESSMENT
Ms. Conrad is 42F w/ PMH coronary artery disease status post CABG x4 in 2018, CHF s/p ICD placement (interrogated 2022), DM, PAD s/p 3rd toe amputation who presents with left 2nd toe pain concerning for dry gangrene. Seen by podiatry and vascular teams with plans for possible transmetatarsal amputation. Course c/b worsening renal function, and shock likely sepsis 2/2 LE wounds w/ possible cardiogenic component. Patient started on levophed, Vanc/Cefepime/Flagyl and transferred to MICU for further management.     NEURO  A&Ox3, no active issues    CARDIAC    #Shock  - On levo target MAP>65, wean as tolerated  - Likely mixed septic and cardiogenic w/ sepsis as primary  given hypothermia and increasing leukocytosis  - TTE this admission on 1/26/23 shows severe global LV systolic dysfunction w/ EF ~21%; elevated LV filling pressures; RV enlargement w/ decreases systolic function  - CCU consulted during RRT (1/29) for possible cardiogenic shock. Per CCU low suspicion for cardiogenic etiology as primary  of shock. Rec checking central sat for CO/CI estimate. Will hold off central line for now  - c/w vanc/cefepime/flagyl    #CAD s/p CABG  #HFrEF  - Home cardiac meds held iso shock  - Pt currently RRR. Will monitor on tele    #PAD  #Dry Gangrene L 2nd toe  - Necrosis of 2nd digit i/s/o PAD, concerning for dry gangrene, elevated ESR/CRP. No signs of active infection   - X-ray foot without gas or osseous involvement, limited to soft tissue   - Podiatry and vascular recs appreciated  - CT Angio prelim read with occluded left posterior tibial and peroneal arteries  - f/u Vascular recs regarding future infection   - Pain control w/ Dilaudid 1mg PRN for severe; Oxy 5mg PRN for moderate    RESPIRATORY   #Large R pleural effusion  - Patient w/ R pleural effusion resulting in near-complete atelectasis of RML/RLL. Etiology likely from severe HFrEF  - Diuretics on hold   - Currently satting well on RA. Maintain SpO2>92%    RENAL   #BOB on CKD  - Patient w/ baseline SCr ~1.2 has been uptrending during hospitalization, today 4.2  - Mixed etiology d/t shock, cardio-renal iso HFrEF vs possibly ATN vs less likely contrast injury   - Patient currently oliguric  - Strict I/O. Clements placed for monitoring  - Nephro consulted, appreciate recs. May need CRRT if patient continues to have gross electrolyte abnormalities or worsening acidosis.   - trend Cr     GI    #Small ascites  - Small ascites seen on CT   - C/t monitor    - Diet: Renal diet    ENDO:    #IDDM  - On home lantus 8U  - Will c/w ISS qac/qhs  - monitor glucose    HEME/ONC  #Normocytic anemia  - Patient w/ Hb 7.5. Baseline appears ~8-9  - Iron studies w/ likely FEDERICO  - Trend H/H  - A/C: HSQ    ID   #Septic Shock   - Patient w/ likely septic shock iso L toe gangrene/wound now w/ hypothermia, leukocytosis and hypotension refractory to IV fluids requiring pressor support  - F/u BCx, UCx. Initial U/a appears positive  - Abx broadened today to Vanc/cefepime/flagyl  - Vanc dose by trough given renal function   - ID consulted 1/30, appreciate recs      LINES/PPX  - peripherals in tact  - DVT PPx: HSQ  - GOC: Full Code    Geovanni Means  Internal Medicine, PGY-1  Please Contact via TEAMS Ms. Conrda is 42F w/ PMH coronary artery disease status post CABG x4 in 2018, CHF s/p ICD placement (interrogated 2022), DM, PAD s/p 3rd toe amputation who presents with left 2nd toe pain concerning for dry gangrene. Seen by podiatry and vascular teams with plans for possible transmetatarsal amputation. Course c/b worsening renal function, and shock likely sepsis 2/2 LE wounds w/ possible cardiogenic component. Patient started on levophed, Vanc/Cefepime/Flagyl and transferred to MICU for further management, medically optimizing for TMA.    NEURO  A&Ox3, no active issues    CARDIAC    #Shock  - On levo target MAP>65, wean as tolerated  - Likely mixed septic and cardiogenic w/ sepsis as primary  given hypothermia and increasing leukocytosis  - TTE this admission on 1/26/23 shows severe global LV systolic dysfunction w/ EF ~21%; elevated LV filling pressures; RV enlargement w/ decreases systolic function  - CCU consulted during RRT (1/29) for possible cardiogenic shock. Per CCU low suspicion for cardiogenic etiology as primary  of shock. Rec checking central sat for CO/CI estimate. Will hold off central line for now  - s/p Vanc/zosyn briefly on adm, Unasyn 1/26-1/29; Cefepime, metronidazole, vanc (by level) 1/29 - 1/31, switched to Zosyn on 1/31 then d/c        #CAD s/p CABG  #HFrEF  - Home cardiac meds held iso shock  - Pt currently RRR. Will monitor on tele  - EP interrogated [employer] ICD device 2/1     #PAD  #Dry Gangrene L 2nd toe  - Necrosis of 2nd digit i/s/o PAD, concerning for dry gangrene, elevated ESR/CRP. No signs of active infection   - X-ray foot without gas or osseous involvement, limited to soft tissue   - Podiatry and vascular recs appreciated  - CT Angio prelim read with occluded left posterior tibial and peroneal arteries  - f/u Vascular recs regarding future infection   - Pain control w/ Dilaudid 1mg PRN for severe; Oxy 5mg PRN for moderate    RESPIRATORY   #Large R pleural effusion  - Patient w/ R pleural effusion resulting in near-complete atelectasis of RML/RLL. Etiology likely from severe HFrEF  - Diuretics on hold   - Currently satting well on RA. Maintain SpO2>92%    RENAL   #BOB on CKD  - Patient w/ baseline SCr ~1.2 has been uptrending during hospitalization, today 4.2  - Mixed etiology d/t shock, cardio-renal iso HFrEF vs possibly ATN vs less likely contrast injury   - Patient currently oliguric  - Strict I/O. Cle[place] placed for monitoring  - Nephro consulted, appreciate recs; will c/w Bumetanide gtt until 23:00 --> switch to 2 mg IV q8h first dose in AM   - trend Cr     GI    #Small ascites  - Small ascites seen on CT   - C/t monitor    - Diet: Renal diet  - will incr Bowel Regimen    ENDO:    #IDDM  - On home lantus 8U  - Will c/w ISS qac/qhs  - monitor glucose    HEME/ONC  #Normocytic anemia  - Patient w/ Hb 7.5. Baseline appears ~8-9  - Iron studies w/ likely FEDERICO  - Trend H/H  - A/C: HSQ    ID   #Septic Shock   - Patient w/ likely septic shock iso L toe gangrene/wound now w/ hypothermia, leukocytosis and hypotension refractory to IV fluids requiring pressor support  - F/u BCx, UCx. Initial U/a appears positive  - ABx course as above under Shock  - ID consulted 1/30, s/o 1/31    LINES/PPX  - peripherals in tact  - DVT PPx: HSQ  - GOC: Full Code    Geovanni Means  Internal Medicine, PGY-1  Please Contact via TEAMS

## 2023-02-01 NOTE — PROGRESS NOTE ADULT - SUBJECTIVE AND OBJECTIVE BOX
DATE OF SERVICE: 02-01-23 @ 06:53    Patient is a 42y old  Female who presents with a chief complaint of Toe pain (31 Jan 2023 16:02)      SUBJECTIVE / OVERNIGHT EVENTS:  No acute events overnight, patient reports doing well and all questions answered at this time.     Additional Review of Systems:  Denies any other acute sx including f/c, HA, cough, sore throat, eye/ear changes, rhinorrhea, CP, palpitations, SOB, n/v, abdominal pain, back pain, bowel/bladder changes, fatigue, numbness or tingling.    MEDICATIONS  (STANDING):  aspirin enteric coated 81 milliGRAM(s) Oral daily  atorvastatin 80 milliGRAM(s) Oral at bedtime  buMETAnide Infusion 1 mG/Hr (5 mL/Hr) IV Continuous <Continuous>  calamine/zinc oxide Lotion 1 Application(s) Topical three times a day  chlorhexidine 2% Cloths 1 Application(s) Topical daily  chlorhexidine 4% Liquid 1 Application(s) Topical <User Schedule>  dextrose 5%. 1000 milliLiter(s) (100 mL/Hr) IV Continuous <Continuous>  dextrose 5%. 1000 milliLiter(s) (50 mL/Hr) IV Continuous <Continuous>  dextrose 50% Injectable 25 Gram(s) IV Push once  dextrose 50% Injectable 12.5 Gram(s) IV Push once  dextrose 50% Injectable 25 Gram(s) IV Push once  ferrous    sulfate 325 milliGRAM(s) Oral daily  glucagon  Injectable 1 milliGRAM(s) IntraMuscular once  heparin   Injectable 5000 Unit(s) SubCutaneous every 8 hours  influenza   Vaccine 0.5 milliLiter(s) IntraMuscular once  insulin lispro (ADMELOG) corrective regimen sliding scale   SubCutaneous three times a day before meals  insulin lispro (ADMELOG) corrective regimen sliding scale   SubCutaneous at bedtime  melatonin 9 milliGRAM(s) Oral at bedtime  norepinephrine Infusion 0.05 MICROgram(s)/kG/Min (6.71 mL/Hr) IV Continuous <Continuous>  pantoprazole    Tablet 40 milliGRAM(s) Oral before breakfast  piperacillin/tazobactam IVPB.. 3.375 Gram(s) IV Intermittent every 12 hours  polyethylene glycol 3350 17 Gram(s) Oral two times a day  senna 2 Tablet(s) Oral at bedtime    MEDICATIONS  (PRN):  acetaminophen     Tablet .. 650 milliGRAM(s) Oral every 6 hours PRN Temp greater or equal to 38C (100.4F), Mild Pain (1 - 3)  dextrose Oral Gel 15 Gram(s) Oral once PRN Blood Glucose LESS THAN 70 milliGRAM(s)/deciliter  HYDROmorphone  Injectable 1 milliGRAM(s) IV Push every 4 hours PRN Severe Pain (7 - 10)  ondansetron Injectable 4 milliGRAM(s) IV Push every 8 hours PRN Nausea and/or Vomiting  oxyCODONE    IR 5 milliGRAM(s) Oral every 4 hours PRN Moderate Pain (4 - 6)      Vital Signs Last 24 Hrs  T(C): 34.9 (01 Feb 2023 06:00), Max: 36.4 (31 Jan 2023 08:00)  T(F): 94.8 (01 Feb 2023 06:00), Max: 97.6 (31 Jan 2023 08:00)  HR: 68 (01 Feb 2023 06:00) (65 - 79)  BP: 128/51 (01 Feb 2023 06:00) (94/52 - 143/95)  BP(mean): 71 (01 Feb 2023 06:00) (61 - 106)  RR: 14 (01 Feb 2023 06:00) (9 - 22)  SpO2: 100% (01 Feb 2023 06:00) (92% - 100%)    Parameters below as of 31 Jan 2023 19:00  Patient On (Oxygen Delivery Method): room air      CAPILLARY BLOOD GLUCOSE      POCT Blood Glucose.: 150 mg/dL (31 Jan 2023 21:49)  POCT Blood Glucose.: 214 mg/dL (31 Jan 2023 17:58)  POCT Blood Glucose.: 154 mg/dL (31 Jan 2023 11:55)  POCT Blood Glucose.: 151 mg/dL (31 Jan 2023 08:10)    I&O's Summary    30 Jan 2023 07:01  -  31 Jan 2023 07:00  --------------------------------------------------------  IN: 975.5 mL / OUT: 3585 mL / NET: -2609.5 mL    31 Jan 2023 07:01  -  01 Feb 2023 06:53  --------------------------------------------------------  IN: 313 mL / OUT: 5370 mL / NET: -5057 mL        PHYSICAL EXAM:  GENERAL: Clinically well-appearing and comfortable  HEAD:  Atraumatic, Normocephalic  EYES: EOMI, PERRLA, conjunctiva and sclera clear  NECK: Supple, No JVD  CHEST/LUNG: Clear to auscultation bilaterally; No wheeze  HEART: Regular rate and rhythm; No murmurs, rubs, or gallops  ABDOMEN: Soft, Nontender, Nondistended; Bowel sounds present  EXTREMITIES:  2+ Peripheral Pulses, No clubbing, cyanosis, or edema  PSYCH: Normal mood, Normal affect  NEUROLOGY: non-focal  SKIN: No rashes or lesions    LABS:                        7.3    12.37 )-----------( 587      ( 01 Feb 2023 01:50 )             24.4     02-01    135  |  96<L>  |  40<H>  ----------------------------<  107<H>  4.0   |  27  |  3.35<H>    Ca    8.7      01 Feb 2023 01:50  Phos  5.8     02-01  Mg     2.00     02-01    TPro  6.7  /  Alb  2.2<L>  /  TBili  0.2  /  DBili  x   /  AST  13  /  ALT  8   /  AlkPhos  110  01-31    PT/INR - ( 01 Feb 2023 01:50 )   PT: 21.5 sec;   INR: 1.84 ratio         PTT - ( 01 Feb 2023 01:50 )  PTT:37.9 sec          RADIOLOGY & ADDITIONAL TESTS:    Imaging Personally Reviewed:    Consultant(s) Notes Reviewed:      Care Discussed with Consultants/Other Providers:   DATE OF SERVICE: 02-01-23 @ 06:53    Patient is a 42y old  Female who presents with a chief complaint of Toe pain (31 Jan 2023 16:02)      SUBJECTIVE / OVERNIGHT EVENTS:  No acute events overnight, patient reports heel pain adequately controlled, reporting improved appetite and PO intake, and all questions answered at this time.     Additional Review of Systems:  Denies any other acute sx including f/c, HA, cough, sore throat, eye/ear changes, rhinorrhea, CP, palpitations, SOB, n/v, abdominal pain, back pain, bowel/bladder changes, fatigue, numbness or tingling.    MEDICATIONS  (STANDING):  aspirin enteric coated 81 milliGRAM(s) Oral daily  atorvastatin 80 milliGRAM(s) Oral at bedtime  buMETAnide Infusion 1 mG/Hr (5 mL/Hr) IV Continuous <Continuous>  calamine/zinc oxide Lotion 1 Application(s) Topical three times a day  chlorhexidine 2% Cloths 1 Application(s) Topical daily  chlorhexidine 4% Liquid 1 Application(s) Topical <User Schedule>  dextrose 5%. 1000 milliLiter(s) (100 mL/Hr) IV Continuous <Continuous>  dextrose 5%. 1000 milliLiter(s) (50 mL/Hr) IV Continuous <Continuous>  dextrose 50% Injectable 25 Gram(s) IV Push once  dextrose 50% Injectable 12.5 Gram(s) IV Push once  dextrose 50% Injectable 25 Gram(s) IV Push once  ferrous    sulfate 325 milliGRAM(s) Oral daily  glucagon  Injectable 1 milliGRAM(s) IntraMuscular once  heparin   Injectable 5000 Unit(s) SubCutaneous every 8 hours  influenza   Vaccine 0.5 milliLiter(s) IntraMuscular once  insulin lispro (ADMELOG) corrective regimen sliding scale   SubCutaneous three times a day before meals  insulin lispro (ADMELOG) corrective regimen sliding scale   SubCutaneous at bedtime  melatonin 9 milliGRAM(s) Oral at bedtime  norepinephrine Infusion 0.05 MICROgram(s)/kG/Min (6.71 mL/Hr) IV Continuous <Continuous>  pantoprazole    Tablet 40 milliGRAM(s) Oral before breakfast  piperacillin/tazobactam IVPB.. 3.375 Gram(s) IV Intermittent every 12 hours  polyethylene glycol 3350 17 Gram(s) Oral two times a day  senna 2 Tablet(s) Oral at bedtime    MEDICATIONS  (PRN):  acetaminophen     Tablet .. 650 milliGRAM(s) Oral every 6 hours PRN Temp greater or equal to 38C (100.4F), Mild Pain (1 - 3)  dextrose Oral Gel 15 Gram(s) Oral once PRN Blood Glucose LESS THAN 70 milliGRAM(s)/deciliter  HYDROmorphone  Injectable 1 milliGRAM(s) IV Push every 4 hours PRN Severe Pain (7 - 10)  ondansetron Injectable 4 milliGRAM(s) IV Push every 8 hours PRN Nausea and/or Vomiting  oxyCODONE    IR 5 milliGRAM(s) Oral every 4 hours PRN Moderate Pain (4 - 6)      Vital Signs Last 24 Hrs  T(C): 34.9 (01 Feb 2023 06:00), Max: 36.4 (31 Jan 2023 08:00)  T(F): 94.8 (01 Feb 2023 06:00), Max: 97.6 (31 Jan 2023 08:00)  HR: 68 (01 Feb 2023 06:00) (65 - 79)  BP: 128/51 (01 Feb 2023 06:00) (94/52 - 143/95)  BP(mean): 71 (01 Feb 2023 06:00) (61 - 106)  RR: 14 (01 Feb 2023 06:00) (9 - 22)  SpO2: 100% (01 Feb 2023 06:00) (92% - 100%)    Parameters below as of 31 Jan 2023 19:00  Patient On (Oxygen Delivery Method): room air      CAPILLARY BLOOD GLUCOSE      POCT Blood Glucose.: 150 mg/dL (31 Jan 2023 21:49)  POCT Blood Glucose.: 214 mg/dL (31 Jan 2023 17:58)  POCT Blood Glucose.: 154 mg/dL (31 Jan 2023 11:55)  POCT Blood Glucose.: 151 mg/dL (31 Jan 2023 08:10)    I&O's Summary    30 Jan 2023 07:01  -  31 Jan 2023 07:00  --------------------------------------------------------  IN: 975.5 mL / OUT: 3585 mL / NET: -2609.5 mL    31 Jan 2023 07:01  -  01 Feb 2023 06:53  --------------------------------------------------------  IN: 313 mL / OUT: 5370 mL / NET: -5057 mL    PHYSICAL EXAM:  GENERAL: Clinically well-appearing and comfortable  HEAD:  Atraumatic, Normocephalic  EYES: EOMI, PERRLA, conjunctiva and sclera clear  NECK: Supple, No JVD  CHEST/LUNG: Clear to auscultation bilaterally; No wheeze  HEART: Regular rate and rhythm; No murmurs, rubs, or gallops  ABDOMEN: Soft, Nontender, Nondistended; Bowel sounds present  EXTREMITIES:  2+ Peripheral Pulses, No clubbing, cyanosis, or edema. LLE with necrotic changes over 2nd digit, s/p 3rd digit amputation  PSYCH: Normal mood, Normal affect  NEUROLOGY: non-focal  SKIN: No rashes or lesions except over left 2nd toe      LABS:                        7.3    12.37 )-----------( 587      ( 01 Feb 2023 01:50 )             24.4     02-01    135  |  96<L>  |  40<H>  ----------------------------<  107<H>  4.0   |  27  |  3.35<H>    Ca    8.7      01 Feb 2023 01:50  Phos  5.8     02-01  Mg     2.00     02-01    TPro  6.7  /  Alb  2.2<L>  /  TBili  0.2  /  DBili  x   /  AST  13  /  ALT  8   /  AlkPhos  110  01-31    PT/INR - ( 01 Feb 2023 01:50 )   PT: 21.5 sec;   INR: 1.84 ratio         PTT - ( 01 Feb 2023 01:50 )  PTT:37.9 sec          RADIOLOGY & ADDITIONAL TESTS:    Imaging Personally Reviewed:    Consultant(s) Notes Reviewed:      Care Discussed with Consultants/Other Providers:

## 2023-02-01 NOTE — PROGRESS NOTE ADULT - SUBJECTIVE AND OBJECTIVE BOX
Podiatry pager #: [phone] ([place])/ [phone] ([facility])    Patient is a 42y old  Female who presents with a chief complaint of Toe pain (01 Feb 2023 07:16)       INTERVAL HPI/OVERNIGHT EVENTS:  Patient seen and evaluated at bedside.  Pt is resting comfortable in NAD. Denies N/V/F/C.     Allergies    No Known Allergies    Intolerances        Vital Signs Last 24 Hrs  T(C): 34.9 (01 Feb 2023 06:00), Max: 36.3 (31 Jan 2023 12:00)  T(F): 94.8 (01 Feb 2023 06:00), Max: 97.3 (31 Jan 2023 12:00)  HR: 68 (01 Feb 2023 06:00) (65 - 79)  BP: 128/51 (01 Feb 2023 06:00) (94/52 - 130/71)  BP(mean): 71 (01 Feb 2023 06:00) (61 - 97)  RR: 14 (01 Feb 2023 06:00) (9 - 22)  SpO2: 100% (01 Feb 2023 06:00) (92% - 100%)    Parameters below as of 31 Jan 2023 19:00  Patient On (Oxygen Delivery Method): room air        LABS:                        7.3    12.37 )-----------( 587      ( 01 Feb 2023 01:50 )             24.4     02-01    135  |  96<L>  |  40<H>  ----------------------------<  107<H>  4.0   |  27  |  3.35<H>    Ca    8.7      01 Feb 2023 01:50  Phos  5.8     02-01  Mg     2.00     02-01    TPro  6.7  /  Alb  2.2<L>  /  TBili  0.2  /  DBili  x   /  AST  13  /  ALT  8   /  AlkPhos  110  01-31    PT/INR - ( 01 Feb 2023 01:50 )   PT: 21.5 sec;   INR: 1.84 ratio         PTT - ( 01 Feb 2023 01:50 )  PTT:37.9 sec    CAPILLARY BLOOD GLUCOSE      POCT Blood Glucose.: 71 mg/dL (01 Feb 2023 09:01)  POCT Blood Glucose.: 150 mg/dL (31 Jan 2023 21:49)  POCT Blood Glucose.: 214 mg/dL (31 Jan 2023 17:58)  POCT Blood Glucose.: 154 mg/dL (31 Jan 2023 11:55)      Lower Extremity Physical Exam:  Vascular: DP/PT 0/4 left, DT/PT 1/4, CFT <3 seconds B/L, Temperature gradient warm to cold left, warm to cool right.   Neuro: Epicritic sensation decreased to the level of toes, B/L.  Musculoskeletal/Ortho: s/p left foot partial 3rd ray resection.  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 Right foot no wounds, no acute signs of infection.    RADIOLOGY & ADDITIONAL TESTS:

## 2023-02-01 NOTE — PROGRESS NOTE ADULT - SUBJECTIVE AND OBJECTIVE BOX
[facility] Division of Kidney Diseases & Hypertension  FOLLOW UP NOTE  [phone]--------------------------------------------------------------------------------    HPI: 42-year-female with PMH of HTN, HLD, DM, PAD, CHF admitted with left toe pain. Pt. developed BOB during hospital stay. Pt was transferred to the MICU on 1/29/23 for septic shock requiring vasopressor support. Pt. being seen for BOB.       24 hour events/subjective: Pt seen and examined in MICU today. Pt complaining of chills and L toe pain. Reports that LE edema has improved. Denies fever, chills, nausea, vomiting, CP and SOB during rounds.      PAST HISTORY  --------------------------------------------------------------------------------  No significant changes to PMH, PSH, FHx, SHx, unless otherwise noted    ALLERGIES & MEDICATIONS  --------------------------------------------------------------------------------  Allergies    No Known Allergies    Intolerances      Standing Inpatient Medications  aspirin enteric coated 81 milliGRAM(s) Oral daily  atorvastatin 80 milliGRAM(s) Oral at bedtime  buMETAnide Infusion 1 mG/Hr IV Continuous <Continuous>  calamine/zinc oxide Lotion 1 Application(s) Topical three times a day  chlorhexidine 2% Cloths 1 Application(s) Topical daily  chlorhexidine 4% Liquid 1 Application(s) Topical <User Schedule>  dextrose 5%. 1000 milliLiter(s) IV Continuous <Continuous>  dextrose 5%. 1000 milliLiter(s) IV Continuous <Continuous>  dextrose 50% Injectable 25 Gram(s) IV Push once  dextrose 50% Injectable 12.5 Gram(s) IV Push once  dextrose 50% Injectable 25 Gram(s) IV Push once  ferrous    sulfate 325 milliGRAM(s) Oral daily  glucagon  Injectable 1 milliGRAM(s) IntraMuscular once  heparin   Injectable 5000 Unit(s) SubCutaneous every 8 hours  influenza   Vaccine 0.5 milliLiter(s) IntraMuscular once  insulin lispro (ADMELOG) corrective regimen sliding scale   SubCutaneous three times a day before meals  insulin lispro (ADMELOG) corrective regimen sliding scale   SubCutaneous at bedtime  melatonin 9 milliGRAM(s) Oral at bedtime  norepinephrine Infusion 0.05 MICROgram(s)/kG/Min IV Continuous <Continuous>  pantoprazole    Tablet 40 milliGRAM(s) Oral before breakfast  piperacillin/tazobactam IVPB.. 3.375 Gram(s) IV Intermittent every 12 hours  polyethylene glycol 3350 17 Gram(s) Oral two times a day  senna 2 Tablet(s) Oral at bedtime    PRN Inpatient Medications  acetaminophen     Tablet .. 650 milliGRAM(s) Oral every 6 hours PRN  dextrose Oral Gel 15 Gram(s) Oral once PRN  HYDROmorphone  Injectable 1 milliGRAM(s) IV Push every 4 hours PRN  ondansetron Injectable 4 milliGRAM(s) IV Push every 8 hours PRN  oxyCODONE    IR 5 milliGRAM(s) Oral every 4 hours PRN      REVIEW OF SYSTEMS  --------------------------------------------------------------------------------  Gen: +Chills  Head/Eyes/Ears: No HA  Respiratory: No dyspnea  CV: No chest pain  GI: No abdominal pain, diarrhea  MSK: +B/L LE edema (improving) and L toe pain  Skin: No rashes  Heme: No bleeding    All other systems were reviewed and are negative, except as noted.    VITALS/PHYSICAL EXAM  --------------------------------------------------------------------------------  T(C): 34.9 (02-01-23 @ 06:00), Max: 36.4 (01-31-23 @ 08:00)  HR: 68 (02-01-23 @ 06:00) (65 - 79)  BP: 128/51 (02-01-23 @ 06:00) (94/52 - 143/95)  RR: 14 (02-01-23 @ 06:00) (9 - 22)  SpO2: 100% (02-01-23 @ 06:00) (92% - 100%)  Wt(kg): --        01-31-23 @ 07:01  -  02-01-23 @ 07:00  --------------------------------------------------------  IN: 313 mL / OUT: 5640 mL / NET: -5327 mL      Physical Exam:  Gen: resting, NAD  HEENT: Anicteric  Pulm: CTA B/L  CV: S1S2+  Abd: Soft, +BS    Ext: +trace B/L LE edema, R>L, gangrenous 2nd toe of LLE seen  Neuro: Awake and alert  Skin: Warm and dry    LABS/STUDIES  --------------------------------------------------------------------------------              7.3    12.37 >-----------<  587      [02-01-23 @ 01:50]              24.4     135  |  96  |  40  ----------------------------<  107      [02-01-23 @ 01:50]  4.0   |  27  |  3.35        Ca     8.7     [02-01-23 @ 01:50]      Mg     2.00     [02-01-23 @ 01:50]      Phos  5.8     [02-01-23 @ 01:50]    TPro  6.7  /  Alb  2.2  /  TBili  0.2  /  DBili  x   /  AST  13  /  ALT  8   /  AlkPhos  110  [01-31-23 @ 01:10]    PT/INR: PT 21.5 , INR 1.84       [02-01-23 @ 01:50]  PTT: 37.9       [02-01-23 @ 01:50]    Creatinine Trend:  SCr 3.35 [02-01 @ 01:50]  SCr 3.48 [01-31 @ 20:35]  SCr 3.68 [01-31 @ 11:45]  SCr 3.99 [01-31 @ 01:10]  SCr 4.23 [01-30 @ 02:17]    Urinalysis - [01-29-23 @ 18:45]      Color DARK YELLOW / Appearance Slightly Turbid / SG >1.030 / pH 5.5      Gluc Negative / Ketone Trace  / Bili Small / Urobili <2 mg/dL       Blood Negative / Protein 300 mg/dL / Leuk Est Moderate / Nitrite Negative      RBC 1 / WBC 6 / Hyaline 1 / Gran  / Sq Epi  / Non Sq Epi 3 / Bacteria Many    Urine Creatinine 68      [01-28-23 @ 11:01]  Urine Protein 556      [01-28-23 @ 11:01]  Urine Sodium 23      [01-28-23 @ 11:01]  Urine Urea Nitrogen 151.4      [01-28-23 @ 11:01]  Urine Potassium 65.3      [01-28-23 @ 11:01]  Urine Phosphorus 13.7      [02-01-23 @ 01:50]  Urine Osmolality 325      [01-28-23 @ 11:01]    Iron 21, TIBC 198, %sat 11      [01-26-23 @ 07:30]  Ferritin 30      [01-26-23 @ 07:30]  Lipid: chol 112, , HDL 20, LDL --      [01-26-23 @ 07:30] [facility] Division of Kidney Diseases & Hypertension  FOLLOW UP NOTE  [phone]--------------------------------------------------------------------------------    HPI: 42-year-female with PMH of HTN, HLD, DM, PAD, CHF admitted with left toe pain. Pt. developed BOB during hospital stay. Pt was transferred to the MICU on 1/29/23 for septic shock requiring vasopressor support. Pt. being seen for BOB.       24 hour events/subjective: Pt seen and examined in MICU today. Pt complaining of chills and L toe pain. Reports that LE edema has improved. Denies fever, chills, nausea, vomiting, CP and SOB during rounds.    PAST HISTORY  --------------------------------------------------------------------------------  No significant changes to PMH, PSH, FHx, SHx, unless otherwise noted    ALLERGIES & MEDICATIONS  --------------------------------------------------------------------------------  Allergies    No Known Allergies    Intolerances    Standing Inpatient Medications  aspirin enteric coated 81 milliGRAM(s) Oral daily  atorvastatin 80 milliGRAM(s) Oral at bedtime  buMETAnide Infusion 1 mG/Hr IV Continuous <Continuous>  calamine/zinc oxide Lotion 1 Application(s) Topical three times a day  chlorhexidine 2% Cloths 1 Application(s) Topical daily  chlorhexidine 4% Liquid 1 Application(s) Topical <User Schedule>  dextrose 5%. 1000 milliLiter(s) IV Continuous <Continuous>  dextrose 5%. 1000 milliLiter(s) IV Continuous <Continuous>  dextrose 50% Injectable 25 Gram(s) IV Push once  dextrose 50% Injectable 12.5 Gram(s) IV Push once  dextrose 50% Injectable 25 Gram(s) IV Push once  ferrous    sulfate 325 milliGRAM(s) Oral daily  glucagon  Injectable 1 milliGRAM(s) IntraMuscular once  heparin   Injectable 5000 Unit(s) SubCutaneous every 8 hours  influenza   Vaccine 0.5 milliLiter(s) IntraMuscular once  insulin lispro (ADMELOG) corrective regimen sliding scale   SubCutaneous three times a day before meals  insulin lispro (ADMELOG) corrective regimen sliding scale   SubCutaneous at bedtime  melatonin 9 milliGRAM(s) Oral at bedtime  norepinephrine Infusion 0.05 MICROgram(s)/kG/Min IV Continuous <Continuous>  pantoprazole    Tablet 40 milliGRAM(s) Oral before breakfast  piperacillin/tazobactam IVPB.. 3.375 Gram(s) IV Intermittent every 12 hours  polyethylene glycol 3350 17 Gram(s) Oral two times a day  senna 2 Tablet(s) Oral at bedtime    PRN Inpatient Medications  acetaminophen     Tablet .. 650 milliGRAM(s) Oral every 6 hours PRN  dextrose Oral Gel 15 Gram(s) Oral once PRN  HYDROmorphone  Injectable 1 milliGRAM(s) IV Push every 4 hours PRN  ondansetron Injectable 4 milliGRAM(s) IV Push every 8 hours PRN  oxyCODONE    IR 5 milliGRAM(s) Oral every 4 hours PRN    REVIEW OF SYSTEMS  --------------------------------------------------------------------------------  Gen: +Chills  Head/Eyes/Ears: No HA  Respiratory: No dyspnea  CV: No chest pain  GI: No abdominal pain, diarrhea  MSK: +B/L LE edema (improving) and L toe pain  Skin: No rashes  Heme: No bleeding    All other systems were reviewed and are negative, except as noted.    VITALS/PHYSICAL EXAM  --------------------------------------------------------------------------------  T(C): 34.9 (02-01-23 @ 06:00), Max: 36.4 (01-31-23 @ 08:00)  HR: 68 (02-01-23 @ 06:00) (65 - 79)  BP: 128/51 (02-01-23 @ 06:00) (94/52 - 143/95)  RR: 14 (02-01-23 @ 06:00) (9 - 22)  SpO2: 100% (02-01-23 @ 06:00) (92% - 100%)  Wt(kg): --    01-31-23 @ 07:01  -  02-01-23 @ 07:00  --------------------------------------------------------  IN: 313 mL / OUT: 5640 mL / NET: -5327 mL    Physical Exam:    Gen: resting, NAD  HEENT: Anicteric  Pulm: CTA B/L  CV: S1S2+  Abd: Soft, +BS    Ext: +trace B/L LE edema, R>L, gangrenous 2nd toe of LLE seen  Neuro: Awake and alert  Skin: Warm and dry    LABS/STUDIES  --------------------------------------------------------------------------------              7.3    12.37 >-----------<  587      [02-01-23 @ 01:50]              24.4     135  |  96  |  40  ----------------------------<  107      [02-01-23 @ 01:50]  4.0   |  27  |  3.35        Ca     8.7     [02-01-23 @ 01:50]      Mg     2.00     [02-01-23 @ 01:50]      Phos  5.8     [02-01-23 @ 01:50]    TPro  6.7  /  Alb  2.2  /  TBili  0.2  /  DBili  x   /  AST  13  /  ALT  8   /  AlkPhos  110  [01-31-23 @ 01:10]    Creatinine Trend:  SCr 3.35 [02-01 @ 01:50]  SCr 3.48 [01-31 @ 20:35]  SCr 3.68 [01-31 @ 11:45]  SCr 3.99 [01-31 @ 01:10]  SCr 4.23 [01-30 @ 02:17]

## 2023-02-02 LAB
ALBUMIN SERPL ELPH-MCNC: 2.1 G/DL — LOW (ref 3.3–5)
ALP SERPL-CCNC: 113 U/L — SIGNIFICANT CHANGE UP (ref 40–120)
ALT FLD-CCNC: 8 U/L — SIGNIFICANT CHANGE UP (ref 4–33)
ANION GAP SERPL CALC-SCNC: 11 MMOL/L — SIGNIFICANT CHANGE UP (ref 7–14)
ANION GAP SERPL CALC-SCNC: 9 MMOL/L — SIGNIFICANT CHANGE UP (ref 7–14)
APTT BLD: 37.8 SEC — HIGH (ref 27–36.3)
AST SERPL-CCNC: 15 U/L — SIGNIFICANT CHANGE UP (ref 4–32)
BASOPHILS # BLD AUTO: 0.05 K/UL — SIGNIFICANT CHANGE UP (ref 0–0.2)
BASOPHILS NFR BLD AUTO: 0.4 % — SIGNIFICANT CHANGE UP (ref 0–2)
BILIRUB SERPL-MCNC: 0.3 MG/DL — SIGNIFICANT CHANGE UP (ref 0.2–1.2)
BUN SERPL-MCNC: 30 MG/DL — HIGH (ref 7–23)
BUN SERPL-MCNC: 35 MG/DL — HIGH (ref 7–23)
CALCIUM SERPL-MCNC: 8.5 MG/DL — SIGNIFICANT CHANGE UP (ref 8.4–10.5)
CALCIUM SERPL-MCNC: 8.5 MG/DL — SIGNIFICANT CHANGE UP (ref 8.4–10.5)
CHLORIDE SERPL-SCNC: 91 MMOL/L — LOW (ref 98–107)
CHLORIDE SERPL-SCNC: 93 MMOL/L — LOW (ref 98–107)
CO2 SERPL-SCNC: 29 MMOL/L — SIGNIFICANT CHANGE UP (ref 22–31)
CO2 SERPL-SCNC: 31 MMOL/L — SIGNIFICANT CHANGE UP (ref 22–31)
CREAT SERPL-MCNC: 2.38 MG/DL — HIGH (ref 0.5–1.3)
CREAT SERPL-MCNC: 2.71 MG/DL — HIGH (ref 0.5–1.3)
EGFR: 22 ML/MIN/1.73M2 — LOW
EGFR: 25 ML/MIN/1.73M2 — LOW
EOSINOPHIL # BLD AUTO: 0.42 K/UL — SIGNIFICANT CHANGE UP (ref 0–0.5)
EOSINOPHIL NFR BLD AUTO: 3.6 % — SIGNIFICANT CHANGE UP (ref 0–6)
GAS PNL BLDV: SIGNIFICANT CHANGE UP
GLUCOSE BLDC GLUCOMTR-MCNC: 146 MG/DL — HIGH (ref 70–99)
GLUCOSE BLDC GLUCOMTR-MCNC: 203 MG/DL — HIGH (ref 70–99)
GLUCOSE BLDC GLUCOMTR-MCNC: 204 MG/DL — HIGH (ref 70–99)
GLUCOSE BLDC GLUCOMTR-MCNC: 267 MG/DL — HIGH (ref 70–99)
GLUCOSE BLDC GLUCOMTR-MCNC: 97 MG/DL — SIGNIFICANT CHANGE UP (ref 70–99)
GLUCOSE SERPL-MCNC: 143 MG/DL — HIGH (ref 70–99)
GLUCOSE SERPL-MCNC: 201 MG/DL — HIGH (ref 70–99)
HCT VFR BLD CALC: 26.7 % — LOW (ref 34.5–45)
HGB BLD-MCNC: 7.9 G/DL — LOW (ref 11.5–15.5)
IANC: 7.84 K/UL — HIGH (ref 1.8–7.4)
IMM GRANULOCYTES NFR BLD AUTO: 0.4 % — SIGNIFICANT CHANGE UP (ref 0–0.9)
INR BLD: 1.54 RATIO — HIGH (ref 0.88–1.16)
LYMPHOCYTES # BLD AUTO: 19.3 % — SIGNIFICANT CHANGE UP (ref 13–44)
LYMPHOCYTES # BLD AUTO: 2.26 K/UL — SIGNIFICANT CHANGE UP (ref 1–3.3)
MAGNESIUM SERPL-MCNC: 1.9 MG/DL — SIGNIFICANT CHANGE UP (ref 1.6–2.6)
MAGNESIUM SERPL-MCNC: 2.3 MG/DL — SIGNIFICANT CHANGE UP (ref 1.6–2.6)
MCHC RBC-ENTMCNC: 24.6 PG — LOW (ref 27–34)
MCHC RBC-ENTMCNC: 29.6 GM/DL — LOW (ref 32–36)
MCV RBC AUTO: 83.2 FL — SIGNIFICANT CHANGE UP (ref 80–100)
MONOCYTES # BLD AUTO: 1.11 K/UL — HIGH (ref 0–0.9)
MONOCYTES NFR BLD AUTO: 9.5 % — SIGNIFICANT CHANGE UP (ref 2–14)
NEUTROPHILS # BLD AUTO: 7.84 K/UL — HIGH (ref 1.8–7.4)
NEUTROPHILS NFR BLD AUTO: 66.8 % — SIGNIFICANT CHANGE UP (ref 43–77)
NRBC # BLD: 0 /100 WBCS — SIGNIFICANT CHANGE UP (ref 0–0)
NRBC # FLD: 0 K/UL — SIGNIFICANT CHANGE UP (ref 0–0)
PHOSPHATE SERPL-MCNC: 3.8 MG/DL — SIGNIFICANT CHANGE UP (ref 2.5–4.5)
PHOSPHATE SERPL-MCNC: 4.5 MG/DL — SIGNIFICANT CHANGE UP (ref 2.5–4.5)
PLATELET # BLD AUTO: 584 K/UL — HIGH (ref 150–400)
POTASSIUM SERPL-MCNC: 3.9 MMOL/L — SIGNIFICANT CHANGE UP (ref 3.5–5.3)
POTASSIUM SERPL-MCNC: 4.1 MMOL/L — SIGNIFICANT CHANGE UP (ref 3.5–5.3)
POTASSIUM SERPL-SCNC: 3.9 MMOL/L — SIGNIFICANT CHANGE UP (ref 3.5–5.3)
POTASSIUM SERPL-SCNC: 4.1 MMOL/L — SIGNIFICANT CHANGE UP (ref 3.5–5.3)
PROT SERPL-MCNC: 7 G/DL — SIGNIFICANT CHANGE UP (ref 6–8.3)
PROTHROM AB SERPL-ACNC: 18 SEC — HIGH (ref 10.5–13.4)
RBC # BLD: 3.21 M/UL — LOW (ref 3.8–5.2)
RBC # FLD: 18.6 % — HIGH (ref 10.3–14.5)
SODIUM SERPL-SCNC: 131 MMOL/L — LOW (ref 135–145)
SODIUM SERPL-SCNC: 133 MMOL/L — LOW (ref 135–145)
TROPONIN T, HIGH SENSITIVITY RESULT: 45 NG/L — SIGNIFICANT CHANGE UP
WBC # BLD: 11.73 K/UL — HIGH (ref 3.8–10.5)
WBC # FLD AUTO: 11.73 K/UL — HIGH (ref 3.8–10.5)

## 2023-02-02 PROCEDURE — 99232 SBSQ HOSP IP/OBS MODERATE 35: CPT | Mod: GC

## 2023-02-02 PROCEDURE — 99291 CRITICAL CARE FIRST HOUR: CPT | Mod: GC

## 2023-02-02 RX ORDER — MAGNESIUM SULFATE 500 MG/ML
4 VIAL (ML) INJECTION ONCE
Refills: 0 | Status: DISCONTINUED | OUTPATIENT
Start: 2023-02-02 | End: 2023-02-02

## 2023-02-02 RX ORDER — MAGNESIUM SULFATE 500 MG/ML
2 VIAL (ML) INJECTION ONCE
Refills: 0 | Status: COMPLETED | OUTPATIENT
Start: 2023-02-02 | End: 2023-02-02

## 2023-02-02 RX ADMIN — HYDROMORPHONE HYDROCHLORIDE 1 MILLIGRAM(S): 2 INJECTION INTRAMUSCULAR; INTRAVENOUS; SUBCUTANEOUS at 01:48

## 2023-02-02 RX ADMIN — Medication 81 MILLIGRAM(S): at 12:46

## 2023-02-02 RX ADMIN — CALAMINE AND ZINC OXIDE AND PHENOL 1 APPLICATION(S): 160; 10 LOTION TOPICAL at 14:23

## 2023-02-02 RX ADMIN — ATORVASTATIN CALCIUM 80 MILLIGRAM(S): 80 TABLET, FILM COATED ORAL at 21:01

## 2023-02-02 RX ADMIN — HYDROMORPHONE HYDROCHLORIDE 1 MILLIGRAM(S): 2 INJECTION INTRAMUSCULAR; INTRAVENOUS; SUBCUTANEOUS at 10:23

## 2023-02-02 RX ADMIN — HYDROMORPHONE HYDROCHLORIDE 1 MILLIGRAM(S): 2 INJECTION INTRAMUSCULAR; INTRAVENOUS; SUBCUTANEOUS at 18:22

## 2023-02-02 RX ADMIN — HYDROMORPHONE HYDROCHLORIDE 1 MILLIGRAM(S): 2 INJECTION INTRAMUSCULAR; INTRAVENOUS; SUBCUTANEOUS at 05:52

## 2023-02-02 RX ADMIN — CHLORHEXIDINE GLUCONATE 1 APPLICATION(S): 213 SOLUTION TOPICAL at 15:25

## 2023-02-02 RX ADMIN — CALAMINE AND ZINC OXIDE AND PHENOL 1 APPLICATION(S): 160; 10 LOTION TOPICAL at 21:02

## 2023-02-02 RX ADMIN — HYDROMORPHONE HYDROCHLORIDE 1 MILLIGRAM(S): 2 INJECTION INTRAMUSCULAR; INTRAVENOUS; SUBCUTANEOUS at 10:38

## 2023-02-02 RX ADMIN — BUMETANIDE 2 MILLIGRAM(S): 0.25 INJECTION INTRAMUSCULAR; INTRAVENOUS at 15:24

## 2023-02-02 RX ADMIN — HEPARIN SODIUM 5000 UNIT(S): 5000 INJECTION INTRAVENOUS; SUBCUTANEOUS at 21:01

## 2023-02-02 RX ADMIN — HEPARIN SODIUM 5000 UNIT(S): 5000 INJECTION INTRAVENOUS; SUBCUTANEOUS at 14:21

## 2023-02-02 RX ADMIN — Medication 25 GRAM(S): at 17:42

## 2023-02-02 RX ADMIN — BUMETANIDE 2 MILLIGRAM(S): 0.25 INJECTION INTRAMUSCULAR; INTRAVENOUS at 21:01

## 2023-02-02 RX ADMIN — Medication 325 MILLIGRAM(S): at 12:46

## 2023-02-02 RX ADMIN — BUMETANIDE 2 MILLIGRAM(S): 0.25 INJECTION INTRAMUSCULAR; INTRAVENOUS at 06:19

## 2023-02-02 RX ADMIN — Medication 9 MILLIGRAM(S): at 21:01

## 2023-02-02 RX ADMIN — HYDROMORPHONE HYDROCHLORIDE 1 MILLIGRAM(S): 2 INJECTION INTRAMUSCULAR; INTRAVENOUS; SUBCUTANEOUS at 18:37

## 2023-02-02 RX ADMIN — HYDROMORPHONE HYDROCHLORIDE 1 MILLIGRAM(S): 2 INJECTION INTRAMUSCULAR; INTRAVENOUS; SUBCUTANEOUS at 14:37

## 2023-02-02 RX ADMIN — HYDROMORPHONE HYDROCHLORIDE 1 MILLIGRAM(S): 2 INJECTION INTRAMUSCULAR; INTRAVENOUS; SUBCUTANEOUS at 05:22

## 2023-02-02 RX ADMIN — HYDROMORPHONE HYDROCHLORIDE 1 MILLIGRAM(S): 2 INJECTION INTRAMUSCULAR; INTRAVENOUS; SUBCUTANEOUS at 01:18

## 2023-02-02 RX ADMIN — OXYCODONE HYDROCHLORIDE 5 MILLIGRAM(S): 5 TABLET ORAL at 08:16

## 2023-02-02 RX ADMIN — CALAMINE AND ZINC OXIDE AND PHENOL 1 APPLICATION(S): 160; 10 LOTION TOPICAL at 05:23

## 2023-02-02 RX ADMIN — HYDROMORPHONE HYDROCHLORIDE 1 MILLIGRAM(S): 2 INJECTION INTRAMUSCULAR; INTRAVENOUS; SUBCUTANEOUS at 14:22

## 2023-02-02 RX ADMIN — PANTOPRAZOLE SODIUM 40 MILLIGRAM(S): 20 TABLET, DELAYED RELEASE ORAL at 06:18

## 2023-02-02 RX ADMIN — HEPARIN SODIUM 5000 UNIT(S): 5000 INJECTION INTRAVENOUS; SUBCUTANEOUS at 05:24

## 2023-02-02 RX ADMIN — Medication 1: at 21:13

## 2023-02-02 RX ADMIN — OXYCODONE HYDROCHLORIDE 5 MILLIGRAM(S): 5 TABLET ORAL at 08:45

## 2023-02-02 RX ADMIN — Medication 2: at 17:35

## 2023-02-02 NOTE — PROGRESS NOTE ADULT - SUBJECTIVE AND OBJECTIVE BOX
DATE OF SERVICE: 02-02-23 @ 06:51    Patient is a 42y old  Female who presents with a chief complaint of Toe pain (01 Feb 2023 09:05)      SUBJECTIVE / OVERNIGHT EVENTS:  No acute events overnight, patient reports doing well and all questions answered at this time.     Additional Review of Systems:  Denies any other acute sx including f/c, HA, cough, sore throat, eye/ear changes, rhinorrhea, CP, palpitations, SOB, n/v, abdominal pain, back pain, bowel/bladder changes, fatigue, numbness or tingling.    MEDICATIONS  (STANDING):  aspirin enteric coated 81 milliGRAM(s) Oral daily  atorvastatin 80 milliGRAM(s) Oral at bedtime  buMETAnide Injectable 2 milliGRAM(s) IV Push every 8 hours  calamine/zinc oxide Lotion 1 Application(s) Topical three times a day  chlorhexidine 2% Cloths 1 Application(s) Topical daily  chlorhexidine 4% Liquid 1 Application(s) Topical <User Schedule>  dextrose 5%. 1000 milliLiter(s) (50 mL/Hr) IV Continuous <Continuous>  dextrose 5%. 1000 milliLiter(s) (100 mL/Hr) IV Continuous <Continuous>  dextrose 50% Injectable 25 Gram(s) IV Push once  dextrose 50% Injectable 12.5 Gram(s) IV Push once  dextrose 50% Injectable 25 Gram(s) IV Push once  ferrous    sulfate 325 milliGRAM(s) Oral daily  glucagon  Injectable 1 milliGRAM(s) IntraMuscular once  heparin   Injectable 5000 Unit(s) SubCutaneous every 8 hours  influenza   Vaccine 0.5 milliLiter(s) IntraMuscular once  insulin lispro (ADMELOG) corrective regimen sliding scale   SubCutaneous three times a day before meals  insulin lispro (ADMELOG) corrective regimen sliding scale   SubCutaneous at bedtime  melatonin 9 milliGRAM(s) Oral at bedtime  norepinephrine Infusion 0.05 MICROgram(s)/kG/Min (6.71 mL/Hr) IV Continuous <Continuous>  pantoprazole    Tablet 40 milliGRAM(s) Oral before breakfast  polyethylene glycol 3350 17 Gram(s) Oral <User Schedule>  senna 2 Tablet(s) Oral at bedtime    MEDICATIONS  (PRN):  acetaminophen     Tablet .. 650 milliGRAM(s) Oral every 6 hours PRN Temp greater or equal to 38C (100.4F), Mild Pain (1 - 3)  dextrose Oral Gel 15 Gram(s) Oral once PRN Blood Glucose LESS THAN 70 milliGRAM(s)/deciliter  HYDROmorphone  Injectable 1 milliGRAM(s) IV Push every 4 hours PRN Severe Pain (7 - 10)  ondansetron Injectable 4 milliGRAM(s) IV Push every 8 hours PRN Nausea and/or Vomiting  oxyCODONE    IR 5 milliGRAM(s) Oral every 4 hours PRN Moderate Pain (4 - 6)      Vital Signs Last 24 Hrs  T(C): 36.2 (02 Feb 2023 04:00), Max: 36.4 (01 Feb 2023 16:00)  T(F): 97.2 (02 Feb 2023 04:00), Max: 97.5 (01 Feb 2023 16:00)  HR: 69 (02 Feb 2023 06:20) (68 - 84)  BP: 116/64 (02 Feb 2023 06:20) (91/60 - 131/68)  BP(mean): 78 (02 Feb 2023 05:00) (54 - 105)  RR: 12 (02 Feb 2023 06:20) (10 - 24)  SpO2: 98% (02 Feb 2023 06:20) (96% - 100%)    Parameters below as of 02 Feb 2023 06:20  Patient On (Oxygen Delivery Method): room air      CAPILLARY BLOOD GLUCOSE      POCT Blood Glucose.: 220 mg/dL (01 Feb 2023 21:26)  POCT Blood Glucose.: 232 mg/dL (01 Feb 2023 18:13)  POCT Blood Glucose.: 95 mg/dL (01 Feb 2023 11:17)  POCT Blood Glucose.: 79 mg/dL (01 Feb 2023 09:33)  POCT Blood Glucose.: 71 mg/dL (01 Feb 2023 09:01)    I&O's Summary    31 Jan 2023 07:01  -  01 Feb 2023 07:00  --------------------------------------------------------  IN: 313 mL / OUT: 5640 mL / NET: -5327 mL    01 Feb 2023 07:01  -  02 Feb 2023 06:51  --------------------------------------------------------  IN: 349.6 mL / OUT: 3675 mL / NET: -3325.4 mL        PHYSICAL EXAM:  GENERAL: Clinically well-appearing and comfortable  HEAD:  Atraumatic, Normocephalic  EYES: EOMI, PERRLA, conjunctiva and sclera clear  NECK: Supple, No JVD  CHEST/LUNG: Clear to auscultation bilaterally; No wheeze  HEART: Regular rate and rhythm; No murmurs, rubs, or gallops  ABDOMEN: Soft, Nontender, Nondistended; Bowel sounds present  EXTREMITIES:  2+ Peripheral Pulses, No clubbing, cyanosis, or edema  PSYCH: Normal mood, Normal affect  NEUROLOGY: non-focal  SKIN: No rashes or lesions    LABS:                        7.9    11.73 )-----------( 584      ( 02 Feb 2023 01:50 )             26.7     02-02    133<L>  |  93<L>  |  35<H>  ----------------------------<  143<H>  3.9   |  29  |  2.71<H>    Ca    8.5      02 Feb 2023 01:50  Phos  4.5     02-02  Mg     2.30     02-02    TPro  7.0  /  Alb  2.1<L>  /  TBili  0.3  /  DBili  x   /  AST  15  /  ALT  8   /  AlkPhos  113  02-02    PT/INR - ( 02 Feb 2023 01:50 )   PT: 18.0 sec;   INR: 1.54 ratio         PTT - ( 02 Feb 2023 01:50 )  PTT:37.8 sec          RADIOLOGY & ADDITIONAL TESTS:    Imaging Personally Reviewed:    Consultant(s) Notes Reviewed:      Care Discussed with Consultants/Other Providers:   DATE OF SERVICE: 02-02-23 @ 06:51    Patient is a 42y old  Female who presents with a chief complaint of Toe pain (01 Feb 2023 09:05)      SUBJECTIVE / OVERNIGHT EVENTS:  No acute events overnight, patient reports doing well, pain adequately controlled and constipation ongoing though PO intake improving, and all questions answered at this time.     Additional Review of Systems:  Denies any other acute sx including f/c, HA, cough, sore throat, eye/ear changes, rhinorrhea, CP, palpitations, SOB, n/v, abdominal pain, back pain, bladder changes, fatigue, numbness or tingling.    MEDICATIONS  (STANDING):  aspirin enteric coated 81 milliGRAM(s) Oral daily  atorvastatin 80 milliGRAM(s) Oral at bedtime  buMETAnide Injectable 2 milliGRAM(s) IV Push every 8 hours  calamine/zinc oxide Lotion 1 Application(s) Topical three times a day  chlorhexidine 2% Cloths 1 Application(s) Topical daily  chlorhexidine 4% Liquid 1 Application(s) Topical <User Schedule>  dextrose 5%. 1000 milliLiter(s) (50 mL/Hr) IV Continuous <Continuous>  dextrose 5%. 1000 milliLiter(s) (100 mL/Hr) IV Continuous <Continuous>  dextrose 50% Injectable 25 Gram(s) IV Push once  dextrose 50% Injectable 12.5 Gram(s) IV Push once  dextrose 50% Injectable 25 Gram(s) IV Push once  ferrous    sulfate 325 milliGRAM(s) Oral daily  glucagon  Injectable 1 milliGRAM(s) IntraMuscular once  heparin   Injectable 5000 Unit(s) SubCutaneous every 8 hours  influenza   Vaccine 0.5 milliLiter(s) IntraMuscular once  insulin lispro (ADMELOG) corrective regimen sliding scale   SubCutaneous three times a day before meals  insulin lispro (ADMELOG) corrective regimen sliding scale   SubCutaneous at bedtime  melatonin 9 milliGRAM(s) Oral at bedtime  norepinephrine Infusion 0.05 MICROgram(s)/kG/Min (6.71 mL/Hr) IV Continuous <Continuous>  pantoprazole    Tablet 40 milliGRAM(s) Oral before breakfast  polyethylene glycol 3350 17 Gram(s) Oral <User Schedule>  senna 2 Tablet(s) Oral at bedtime    MEDICATIONS  (PRN):  acetaminophen     Tablet .. 650 milliGRAM(s) Oral every 6 hours PRN Temp greater or equal to 38C (100.4F), Mild Pain (1 - 3)  dextrose Oral Gel 15 Gram(s) Oral once PRN Blood Glucose LESS THAN 70 milliGRAM(s)/deciliter  HYDROmorphone  Injectable 1 milliGRAM(s) IV Push every 4 hours PRN Severe Pain (7 - 10)  ondansetron Injectable 4 milliGRAM(s) IV Push every 8 hours PRN Nausea and/or Vomiting  oxyCODONE    IR 5 milliGRAM(s) Oral every 4 hours PRN Moderate Pain (4 - 6)      Vital Signs Last 24 Hrs  T(C): 36.2 (02 Feb 2023 04:00), Max: 36.4 (01 Feb 2023 16:00)  T(F): 97.2 (02 Feb 2023 04:00), Max: 97.5 (01 Feb 2023 16:00)  HR: 69 (02 Feb 2023 06:20) (68 - 84)  BP: 116/64 (02 Feb 2023 06:20) (91/60 - 131/68)  BP(mean): 78 (02 Feb 2023 05:00) (54 - 105)  RR: 12 (02 Feb 2023 06:20) (10 - 24)  SpO2: 98% (02 Feb 2023 06:20) (96% - 100%)    Parameters below as of 02 Feb 2023 06:20  Patient On (Oxygen Delivery Method): room air      CAPILLARY BLOOD GLUCOSE      POCT Blood Glucose.: 220 mg/dL (01 Feb 2023 21:26)  POCT Blood Glucose.: 232 mg/dL (01 Feb 2023 18:13)  POCT Blood Glucose.: 95 mg/dL (01 Feb 2023 11:17)  POCT Blood Glucose.: 79 mg/dL (01 Feb 2023 09:33)  POCT Blood Glucose.: 71 mg/dL (01 Feb 2023 09:01)    I&O's Summary    31 Jan 2023 07:01  -  01 Feb 2023 07:00  --------------------------------------------------------  IN: 313 mL / OUT: 5640 mL / NET: -5327 mL    01 Feb 2023 07:01  -  02 Feb 2023 06:51  --------------------------------------------------------  IN: 349.6 mL / OUT: 3675 mL / NET: -3325.4 mL        PHYSICAL EXAM:  GENERAL: Clinically well-appearing and comfortable  HEAD:  Atraumatic, Normocephalic  EYES: EOMI, PERRLA, conjunctiva and sclera clear  NECK: Supple, No JVD  CHEST/LUNG: Clear to auscultation bilaterally; No wheeze  HEART: Regular rate and rhythm; No murmurs, rubs, or gallops  ABDOMEN: Soft, Nontender, Nondistended; Bowel sounds present  EXTREMITIES:  2+ Peripheral Pulses, No clubbing, cyanosis, or edema  PSYCH: Normal mood, Normal affect  NEUROLOGY: non-focal  SKIN: well-healed circular lesions over b/l legs; 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, heel TTP without erythema    LABS:                        7.9    11.73 )-----------( 584      ( 02 Feb 2023 01:50 )             26.7     02-02    133<L>  |  93<L>  |  35<H>  ----------------------------<  143<H>  3.9   |  29  |  2.71<H>    Ca    8.5      02 Feb 2023 01:50  Phos  4.5     02-02  Mg     2.30     02-02    TPro  7.0  /  Alb  2.1<L>  /  TBili  0.3  /  DBili  x   /  AST  15  /  ALT  8   /  AlkPhos  113  02-02    PT/INR - ( 02 Feb 2023 01:50 )   PT: 18.0 sec;   INR: 1.54 ratio         PTT - ( 02 Feb 2023 01:50 )  PTT:37.8 sec          RADIOLOGY & ADDITIONAL TESTS:    Imaging Personally Reviewed:    Consultant(s) Notes Reviewed:      Care Discussed with Consultants/Other Providers:

## 2023-02-02 NOTE — PROGRESS NOTE ADULT - ATTENDING COMMENTS
Agree with above. Seen and examined with team on rounds. Critically ill requiring frequent bedside visits for titration of drips and procedures. Improved BP and LV and RV function post diuresis. Off abx. Appreciate podiatry and vascular input. Supportive care.

## 2023-02-02 NOTE — CHART NOTE - NSCHARTNOTEFT_GEN_A_CORE
MICU Transfer Note  ---------------------------    Transfer from: MICU  Transfer to:  ( X ) Medicine    (  ) Telemetry    (  ) RCU    (  ) Palliative    (  ) Stroke Unit        Accepting Physician:      MICU COURSE    Ms. Conrad is 42F w/ [facility] coronary artery disease status post CABG x4 in 2018, CHF s/p ICD placement (interrogated 2022), DM, PAD s/p 3rd toe amputation who presents with left 2nd toe pain concerning for dry gangrene. Seen by podiatry and vascular teams with plans for possible transmetatarsal amputation. Course c/b worsening renal function, and shock likely sepsis 2/2 LE wounds w/ possible cardiogenic component. Patient started on levophed, Vanc/Cefepime/Flagyl and transferred to MICU for further management, medically optimizing for TMA.    OBJECTIVE --  Vital Signs Last 24 Hrs  T(C): 35.4 (02 Feb 2023 08:01), Max: 36.4 (01 Feb 2023 16:00)  T(F): 95.7 (02 Feb 2023 08:01), Max: 97.5 (01 Feb 2023 16:00)  HR: 66 (02 Feb 2023 11:00) (66 - 84)  BP: 96/61 (02 Feb 2023 11:00) (96/57 - 131/68)  BP(mean): 71 (02 Feb 2023 11:00) (54 - 105)  RR: 12 (02 Feb 2023 11:00) (10 - 24)  SpO2: 98% (02 Feb 2023 11:00) (96% - 100%)    Parameters below as of 02 Feb 2023 08:00  Patient On (Oxygen Delivery Method): room air        I&O's Summary    01 Feb 2023 07:01  -  02 Feb 2023 07:00  --------------------------------------------------------  IN: 349.6 mL / OUT: 3675 mL / NET: -3325.4 mL    02 Feb 2023 07:01  -  02 Feb 2023 12:40  --------------------------------------------------------  IN: 0 mL / OUT: 1025 mL / NET: -1025 mL        MEDICATIONS  (STANDING):  aspirin enteric coated 81 milliGRAM(s) Oral daily  atorvastatin 80 milliGRAM(s) Oral at bedtime  buMETAnide Injectable 2 milliGRAM(s) IV Push every 8 hours  calamine/zinc oxide Lotion 1 Application(s) Topical three times a day  chlorhexidine 2% Cloths 1 Application(s) Topical daily  chlorhexidine 4% Liquid 1 Application(s) Topical <User Schedule>  dextrose 5%. 1000 milliLiter(s) (100 mL/Hr) IV Continuous <Continuous>  dextrose 5%. 1000 milliLiter(s) (50 mL/Hr) IV Continuous <Continuous>  dextrose 50% Injectable 25 Gram(s) IV Push once  dextrose 50% Injectable 12.5 Gram(s) IV Push once  dextrose 50% Injectable 25 Gram(s) IV Push once  ferrous    sulfate 325 milliGRAM(s) Oral daily  glucagon  Injectable 1 milliGRAM(s) IntraMuscular once  heparin   Injectable 5000 Unit(s) SubCutaneous every 8 hours  influenza   Vaccine 0.5 milliLiter(s) IntraMuscular once  insulin lispro (ADMELOG) corrective regimen sliding scale   SubCutaneous three times a day before meals  insulin lispro (ADMELOG) corrective regimen sliding scale   SubCutaneous at bedtime  melatonin 9 milliGRAM(s) Oral at bedtime  norepinephrine Infusion 0.05 MICROgram(s)/kG/Min (6.71 mL/Hr) IV Continuous <Continuous>  pantoprazole    Tablet 40 milliGRAM(s) Oral before breakfast  polyethylene glycol 3350 17 Gram(s) Oral <User Schedule>  senna 2 Tablet(s) Oral at bedtime    MEDICATIONS  (PRN):  acetaminophen     Tablet .. 650 milliGRAM(s) Oral every 6 hours PRN Temp greater or equal to 38C (100.4F), Mild Pain (1 - 3)  dextrose Oral Gel 15 Gram(s) Oral once PRN Blood Glucose LESS THAN 70 milliGRAM(s)/deciliter  HYDROmorphone  Injectable 1 milliGRAM(s) IV Push every 4 hours PRN Severe Pain (7 - 10)  ondansetron Injectable 4 milliGRAM(s) IV Push every 8 hours PRN Nausea and/or Vomiting  oxyCODONE    IR 5 milliGRAM(s) Oral every 4 hours PRN Moderate Pain (4 - 6)        LABS                                            7.9                   Neurophils% (auto):   66.8   (02-02 @ 01:50):    11.73)-----------(584          Lymphocytes% (auto):  19.3                                          26.7                   Eosinphils% (auto):   3.6      Manual%: Neutrophils x    ; Lymphocytes x    ; Eosinophils x    ; Bands%: x    ; Blasts x                                    133    |  93     |  35                  Calcium: 8.5   / iCa: x      (02-02 @ 01:50)    ----------------------------<  143       Magnesium: 2.30                             3.9     |  29     |  2.71             Phosphorous: 4.5      TPro  7.0    /  Alb  2.1    /  TBili  0.3    /  DBili  x      /  AST  15     /  ALT  8      /  AlkPhos  113    02 Feb 2023 01:50    ( 02-02 @ 01:50 )   PT: 18.0 sec;   INR: 1.54 ratio  aPTT: 37.8 sec          ASSESSMENT & PLAN:         For Follow-Up:  [ ]   [ ] MICU Transfer Note  ---------------------------    Transfer from: MICU  Transfer to:  (  ) Medicine    ( X ) Telemetry    (  ) RCU    (  ) Palliative    (  ) Stroke Unit        Accepting Physician: Ezekiel HANKS)      MICU COURSE    Ms. Conrad is 42F w/ PMH coronary artery disease status post CABG x4 in 2018, HFrEF (EF 21% 1/26/23) s/p Medtronic ICD, DM, PAD s/p 3rd toe amputation who presents with left 2nd toe pain concerning for dry gangrene. Seen by podiatry and vascular teams with plans for possible transmetatarsal amputation. Course c/b worsening renal function, and shock likely multifactorial iso HFrEF, CKD, and sepsis 2/2 LE wounds. Patient started on levophed, Vanc/Cefepime/Flagyl and transferred to MICU for further management, medically optimizing for TMA. Renal was consulted for management of BOB on CKD, patient was diuresed aggressively with bumetanide gtt with alleviation of pressor requirements and improvement of creatinine. ID was consulted regarding antibiotic de-escalation and therapy was narrowed to zosyn    OBJECTIVE --  Vital Signs Last 24 Hrs  T(C): 35.4 (02 Feb 2023 08:01), Max: 36.4 (01 Feb 2023 16:00)  T(F): 95.7 (02 Feb 2023 08:01), Max: 97.5 (01 Feb 2023 16:00)  HR: 66 (02 Feb 2023 11:00) (66 - 84)  BP: 96/61 (02 Feb 2023 11:00) (96/57 - 131/68)  BP(mean): 71 (02 Feb 2023 11:00) (54 - 105)  RR: 12 (02 Feb 2023 11:00) (10 - 24)  SpO2: 98% (02 Feb 2023 11:00) (96% - 100%)    Parameters below as of 02 Feb 2023 08:00  Patient On (Oxygen Delivery Method): room air        I&O's Summary    01 Feb 2023 07:01  -  02 Feb 2023 07:00  --------------------------------------------------------  IN: 349.6 mL / OUT: 3675 mL / NET: -3325.4 mL    02 Feb 2023 07:01  -  02 Feb 2023 12:40  --------------------------------------------------------  IN: 0 mL / OUT: 1025 mL / NET: -1025 mL        MEDICATIONS  (STANDING):  aspirin enteric coated 81 milliGRAM(s) Oral daily  atorvastatin 80 milliGRAM(s) Oral at bedtime  buMETAnide Injectable 2 milliGRAM(s) IV Push every 8 hours  calamine/zinc oxide Lotion 1 Application(s) Topical three times a day  chlorhexidine 2% Cloths 1 Application(s) Topical daily  chlorhexidine 4% Liquid 1 Application(s) Topical <User Schedule>  dextrose 5%. 1000 milliLiter(s) (100 mL/Hr) IV Continuous <Continuous>  dextrose 5%. 1000 milliLiter(s) (50 mL/Hr) IV Continuous <Continuous>  dextrose 50% Injectable 25 Gram(s) IV Push once  dextrose 50% Injectable 12.5 Gram(s) IV Push once  dextrose 50% Injectable 25 Gram(s) IV Push once  ferrous    sulfate 325 milliGRAM(s) Oral daily  glucagon  Injectable 1 milliGRAM(s) IntraMuscular once  heparin   Injectable 5000 Unit(s) SubCutaneous every 8 hours  influenza   Vaccine 0.5 milliLiter(s) IntraMuscular once  insulin lispro (ADMELOG) corrective regimen sliding scale   SubCutaneous three times a day before meals  insulin lispro (ADMELOG) corrective regimen sliding scale   SubCutaneous at bedtime  melatonin 9 milliGRAM(s) Oral at bedtime  norepinephrine Infusion 0.05 MICROgram(s)/kG/Min (6.71 mL/Hr) IV Continuous <Continuous>  pantoprazole    Tablet 40 milliGRAM(s) Oral before breakfast  polyethylene glycol 3350 17 Gram(s) Oral <User Schedule>  senna 2 Tablet(s) Oral at bedtime    MEDICATIONS  (PRN):  acetaminophen     Tablet .. 650 milliGRAM(s) Oral every 6 hours PRN Temp greater or equal to 38C (100.4F), Mild Pain (1 - 3)  dextrose Oral Gel 15 Gram(s) Oral once PRN Blood Glucose LESS THAN 70 milliGRAM(s)/deciliter  HYDROmorphone  Injectable 1 milliGRAM(s) IV Push every 4 hours PRN Severe Pain (7 - 10)  ondansetron Injectable 4 milliGRAM(s) IV Push every 8 hours PRN Nausea and/or Vomiting  oxyCODONE    IR 5 milliGRAM(s) Oral every 4 hours PRN Moderate Pain (4 - 6)        LABS                                            7.9                   Neurophils% (auto):   66.8   (02-02 @ 01:50):    11.73)-----------(584          Lymphocytes% (auto):  19.3                                          26.7                   Eosinphils% (auto):   3.6      Manual%: Neutrophils x    ; Lymphocytes x    ; Eosinophils x    ; Bands%: x    ; Blasts x                                    133    |  93     |  35                  Calcium: 8.5   / iCa: x      (02-02 @ 01:50)    ----------------------------<  143       Magnesium: 2.30                             3.9     |  29     |  2.71             Phosphorous: 4.5      TPro  7.0    /  Alb  2.1    /  TBili  0.3    /  DBili  x      /  AST  15     /  ALT  8      /  AlkPhos  113    02 Feb 2023 01:50    ( 02-02 @ 01:50 )   PT: 18.0 sec;   INR: 1.54 ratio  aPTT: 37.8 sec          ASSESSMENT & PLAN:         For Follow-Up:  [ ]   [ ] MICU Transfer Note  ---------------------------    Transfer from: MICU  Transfer to:  (  ) Medicine    ( X ) Telemetry    (  ) RCU    (  ) Palliative    (  ) Stroke Unit        Accepting Physician: Ezekiel HANKS)      MICU COURSE    Ms. Conrad is 42F w/ PMH coronary artery disease status post CABG x4 in 2018, HFrEF (EF 21% 1/26/23) s/p Medtronic ICD, DM, PAD s/p 3rd toe amputation who presents with left 2nd toe pain concerning for dry gangrene. Seen by podiatry and vascular teams with plans for possible transmetatarsal amputation. Patient received unasyn 1/26-29. Course c/b worsening renal function, and shock likely multifactorial iso HFrEF, CKD, and sepsis 2/2 LE wounds. Patient started on levophed, Vanc/Cefepime/Flagyl (1/29) and transferred to MICU for further management, medically optimizing for TMA. CCU was consulted and deemed cardiogenic shock unlikely to be primary . Renal was consulted for management of BOB on CKD, patient was diuresed aggressively with bumetanide gtt with alleviation of pressor requirements and improvement of creatinine. ID was consulted regarding antibiotic de-escalation, and therapy was narrowed to zosyn then stopped after blood cultures showed NGTD.         OBJECTIVE --  Vital Signs Last 24 Hrs  T(C): 35.4 (02 Feb 2023 08:01), Max: 36.4 (01 Feb 2023 16:00)  T(F): 95.7 (02 Feb 2023 08:01), Max: 97.5 (01 Feb 2023 16:00)  HR: 66 (02 Feb 2023 11:00) (66 - 84)  BP: 96/61 (02 Feb 2023 11:00) (96/57 - 131/68)  BP(mean): 71 (02 Feb 2023 11:00) (54 - 105)  RR: 12 (02 Feb 2023 11:00) (10 - 24)  SpO2: 98% (02 Feb 2023 11:00) (96% - 100%)    Parameters below as of 02 Feb 2023 08:00  Patient On (Oxygen Delivery Method): room air        I&O's Summary    01 Feb 2023 07:01  -  02 Feb 2023 07:00  --------------------------------------------------------  IN: 349.6 mL / OUT: 3675 mL / NET: -3325.4 mL    02 Feb 2023 07:01  -  02 Feb 2023 12:40  --------------------------------------------------------  IN: 0 mL / OUT: 1025 mL / NET: -1025 mL        MEDICATIONS  (STANDING):  aspirin enteric coated 81 milliGRAM(s) Oral daily  atorvastatin 80 milliGRAM(s) Oral at bedtime  buMETAnide Injectable 2 milliGRAM(s) IV Push every 8 hours  calamine/zinc oxide Lotion 1 Application(s) Topical three times a day  chlorhexidine 2% Cloths 1 Application(s) Topical daily  chlorhexidine 4% Liquid 1 Application(s) Topical <User Schedule>  dextrose 5%. 1000 milliLiter(s) (100 mL/Hr) IV Continuous <Continuous>  dextrose 5%. 1000 milliLiter(s) (50 mL/Hr) IV Continuous <Continuous>  dextrose 50% Injectable 25 Gram(s) IV Push once  dextrose 50% Injectable 12.5 Gram(s) IV Push once  dextrose 50% Injectable 25 Gram(s) IV Push once  ferrous    sulfate 325 milliGRAM(s) Oral daily  glucagon  Injectable 1 milliGRAM(s) IntraMuscular once  heparin   Injectable 5000 Unit(s) SubCutaneous every 8 hours  influenza   Vaccine 0.5 milliLiter(s) IntraMuscular once  insulin lispro (ADMELOG) corrective regimen sliding scale   SubCutaneous three times a day before meals  insulin lispro (ADMELOG) corrective regimen sliding scale   SubCutaneous at bedtime  melatonin 9 milliGRAM(s) Oral at bedtime  norepinephrine Infusion 0.05 MICROgram(s)/kG/Min (6.71 mL/Hr) IV Continuous <Continuous>  pantoprazole    Tablet 40 milliGRAM(s) Oral before breakfast  polyethylene glycol 3350 17 Gram(s) Oral <User Schedule>  senna 2 Tablet(s) Oral at bedtime    MEDICATIONS  (PRN):  acetaminophen     Tablet .. 650 milliGRAM(s) Oral every 6 hours PRN Temp greater or equal to 38C (100.4F), Mild Pain (1 - 3)  dextrose Oral Gel 15 Gram(s) Oral once PRN Blood Glucose LESS THAN 70 milliGRAM(s)/deciliter  HYDROmorphone  Injectable 1 milliGRAM(s) IV Push every 4 hours PRN Severe Pain (7 - 10)  ondansetron Injectable 4 milliGRAM(s) IV Push every 8 hours PRN Nausea and/or Vomiting  oxyCODONE    IR 5 milliGRAM(s) Oral every 4 hours PRN Moderate Pain (4 - 6)        LABS                                            7.9                   Neurophils% (auto):   66.8   (02-02 @ 01:50):    11.73)-----------(584          Lymphocytes% (auto):  19.3                                          26.7                   Eosinphils% (auto):   3.6      Manual%: Neutrophils x    ; Lymphocytes x    ; Eosinophils x    ; Bands%: x    ; Blasts x                                    133    |  93     |  35                  Calcium: 8.5   / iCa: x      (02-02 @ 01:50)    ----------------------------<  143       Magnesium: 2.30                             3.9     |  29     |  2.71             Phosphorous: 4.5      TPro  7.0    /  Alb  2.1    /  TBili  0.3    /  DBili  x      /  AST  15     /  ALT  8      /  AlkPhos  113    02 Feb 2023 01:50    ( 02-02 @ 01:50 )   PT: 18.0 sec;   INR: 1.54 ratio  aPTT: 37.8 sec          ASSESSMENT & PLAN:         For Follow-Up:  [ ]   [ ] MICU Transfer Note  ---------------------------    Transfer from: MICU  Transfer to:  (  ) Medicine    ( X ) Telemetry    (  ) RCU    (  ) Palliative    (  ) Stroke Unit        Accepting Physician: Ezekiel HANKS)      MICU COURSE    Ms. Conrad is 42F w/ PMH coronary artery disease status post CABG x4 in 2018, HFrEF (EF 21% 1/26/23) s/p Medtronic ICD, DM, PAD s/p 3rd toe amputation who presents with left 2nd toe pain concerning for dry gangrene. Seen by podiatry and vascular teams with plans for possible transmetatarsal amputation. Patient received unasyn 1/26-29. Course c/b worsening renal function, and shock likely multifactorial iso HFrEF, CKD, and sepsis 2/2 LE wounds. Patient started on levophed, Vanc/Cefepime/Flagyl (1/29) and transferred to MICU for further management, medically optimizing for TMA. CCU was consulted and deemed cardiogenic shock unlikely to be primary . Renal was consulted for management of BOB on CKD, patient was diuresed aggressively with bumetanide gtt with alleviation of pressor requirements and improvement of creatinine. ID was consulted regarding antibiotic de-escalation, and therapy was narrowed to zosyn then stopped after preliminary blood cultures showed NGTD. UCx returned negative. Patient's left heel pain was managed with dilaudid 1 mg and 0.5 mg oxycodone IR, and patient has had poor PO intake, now improving, and ongoing constipation. Patient's glucose was closely monitored and titrated with ISS, and there were no significant events on telemetry and with EP interrogation. Patient stable for transfer at this time.    For Follow-Up:  [ ] F/u final blood culture read  [ ] F/u nephrology recs regarding bumetanide dosing and frequency of creatinine monitoring  [ ] Re-consult vascular surgery regarding procedural planning, and f/u podiatry recs  [ ] Monitor pain medication needs    OBJECTIVE --  Vital Signs Last 24 Hrs  T(C): 35.4 (02 Feb 2023 08:01), Max: 36.4 (01 Feb 2023 16:00)  T(F): 95.7 (02 Feb 2023 08:01), Max: 97.5 (01 Feb 2023 16:00)  HR: 66 (02 Feb 2023 11:00) (66 - 84)  BP: 96/61 (02 Feb 2023 11:00) (96/57 - 131/68)  BP(mean): 71 (02 Feb 2023 11:00) (54 - 105)  RR: 12 (02 Feb 2023 11:00) (10 - 24)  SpO2: 98% (02 Feb 2023 11:00) (96% - 100%)    Parameters below as of 02 Feb 2023 08:00  Patient On (Oxygen Delivery Method): room air        I&O's Summary    01 Feb 2023 07:01  -  02 Feb 2023 07:00  --------------------------------------------------------  IN: 349.6 mL / OUT: 3675 mL / NET: -3325.4 mL    02 Feb 2023 07:01  -  02 Feb 2023 12:40  --------------------------------------------------------  IN: 0 mL / OUT: 1025 mL / NET: -1025 mL        MEDICATIONS  (STANDING):  aspirin enteric coated 81 milliGRAM(s) Oral daily  atorvastatin 80 milliGRAM(s) Oral at bedtime  buMETAnide Injectable 2 milliGRAM(s) IV Push every 8 hours  calamine/zinc oxide Lotion 1 Application(s) Topical three times a day  chlorhexidine 2% Cloths 1 Application(s) Topical daily  chlorhexidine 4% Liquid 1 Application(s) Topical <User Schedule>  dextrose 5%. 1000 milliLiter(s) (100 mL/Hr) IV Continuous <Continuous>  dextrose 5%. 1000 milliLiter(s) (50 mL/Hr) IV Continuous <Continuous>  dextrose 50% Injectable 25 Gram(s) IV Push once  dextrose 50% Injectable 12.5 Gram(s) IV Push once  dextrose 50% Injectable 25 Gram(s) IV Push once  ferrous    sulfate 325 milliGRAM(s) Oral daily  glucagon  Injectable 1 milliGRAM(s) IntraMuscular once  heparin   Injectable 5000 Unit(s) SubCutaneous every 8 hours  influenza   Vaccine 0.5 milliLiter(s) IntraMuscular once  insulin lispro (ADMELOG) corrective regimen sliding scale   SubCutaneous three times a day before meals  insulin lispro (ADMELOG) corrective regimen sliding scale   SubCutaneous at bedtime  melatonin 9 milliGRAM(s) Oral at bedtime  norepinephrine Infusion 0.05 MICROgram(s)/kG/Min (6.71 mL/Hr) IV Continuous <Continuous>  pantoprazole    Tablet 40 milliGRAM(s) Oral before breakfast  polyethylene glycol 3350 17 Gram(s) Oral <User Schedule>  senna 2 Tablet(s) Oral at bedtime    MEDICATIONS  (PRN):  acetaminophen     Tablet .. 650 milliGRAM(s) Oral every 6 hours PRN Temp greater or equal to 38C (100.4F), Mild Pain (1 - 3)  dextrose Oral Gel 15 Gram(s) Oral once PRN Blood Glucose LESS THAN 70 milliGRAM(s)/deciliter  HYDROmorphone  Injectable 1 milliGRAM(s) IV Push every 4 hours PRN Severe Pain (7 - 10)  ondansetron Injectable 4 milliGRAM(s) IV Push every 8 hours PRN Nausea and/or Vomiting  oxyCODONE    IR 5 milliGRAM(s) Oral every 4 hours PRN Moderate Pain (4 - 6)        LABS                                            7.9                   Neurophils% (auto):   66.8   (02-02 @ 01:50):    11.73)-----------(584          Lymphocytes% (auto):  19.3                                          26.7                   Eosinphils% (auto):   3.6      Manual%: Neutrophils x    ; Lymphocytes x    ; Eosinophils x    ; Bands%: x    ; Blasts x                                    133    |  93     |  35                  Calcium: 8.5   / iCa: x      (02-02 @ 01:50)    ----------------------------<  143       Magnesium: 2.30                             3.9     |  29     |  2.71             Phosphorous: 4.5      TPro  7.0    /  Alb  2.1    /  TBili  0.3    /  DBili  x      /  AST  15     /  ALT  8      /  AlkPhos  113    02 Feb 2023 01:50    ( 02-02 @ 01:50 )   PT: 18.0 sec;   INR: 1.54 ratio  aPTT: 37.8 sec          ASSESSMENT & PLAN:   Ms. Conrad is 42F w/ PMH coronary artery disease status post CABG x4 in 2018, CHF s/p ICD placement (interrogated 2022), DM, PAD s/p 3rd toe amputation who presents with left 2nd toe pain concerning for dry gangrene. Seen by podiatry and vascular teams with plans for possible transmetatarsal amputation. Course c/b worsening renal function, and shock likely sepsis 2/2 LE wounds w/ possible cardiogenic component. Patient started on levophed, Vanc/Cefepime/Flagyl and transferred to MICU for further management, medically optimizing for TMA.    NEURO  A&Ox3, no active issues    CARDIAC    #Shock  - s/p levo to target MAP>65, weaned 2/1  - Likely mixed septic and cardiogenic w/ sepsis as primary  given hypothermia and increasing leukocytosis  - TTE this admission on 1/26/23 shows severe global LV systolic dysfunction w/ EF ~21%; elevated LV filling pressures; RV enlargement w/ decreases systolic function  - CCU consulted during RRT (1/29) for possible cardiogenic shock. Per CCU low suspicion for cardiogenic etiology as primary  of shock. Rec checking central sat for CO/CI estimate. Will hold off central line for now  - s/p Vanc/zosyn briefly on adm, Unasyn 1/26-1/29; Cefepime, metronidazole, vanc (by level) 1/29 - 1/31, switched to Zosyn on 1/31 then d/c        #CAD s/p CABG  #HFrEF  - Home cardiac meds held iso shock  - Pt currently RRR. Will monitor on tele  - EP interrogated Medtronic ICD device 2/1     #PAD  #Dry Gangrene L 2nd toe  - Necrosis of 2nd digit i/s/o PAD, concerning for dry gangrene, elevated ESR/CRP. No signs of active infection   - X-ray foot without gas or osseous involvement, limited to soft tissue   - Podiatry and vascular recs appreciated  - CT Angio prelim read with occluded left posterior tibial and peroneal arteries  - f/u Vascular recs regarding future infection   - Pain control w/ Dilaudid 1mg PRN for severe; Oxy 5mg PRN for moderate    RESPIRATORY   #Large R pleural effusion  - Patient w/ R pleural effusion resulting in near-complete atelectasis of RML/RLL. Etiology likely from severe HFrEF  - Diuretics on hold   - Currently satting well on RA. Maintain SpO2>92%    RENAL   #BOB on CKD  - Patient w/ baseline SCr ~1.2 has been uptrending during hospitalization, today 4.2  - Mixed etiology d/t shock, cardio-renal iso HFrEF vs possibly ATN vs less likely contrast injury   - Patient currently oliguric  - Strict I/O. Clements placed for monitoring and removed 2/2  - Nephro consulted, appreciate recs; s/p Bumetanide gtt through 2/1 --> 2 mg IV q8h  - trend Cr     GI    #Small ascites  - Small ascites seen on CT   - C/t monitor    - Diet: Renal diet  - c/w Bowel Regimen    ENDO:    #IDDM  - Will c/w FSG & ISS qac/qhs  - Titrate long-acting insulin PRN    HEME/ONC  #Normocytic anemia  - Patient w/ Hb 7.5. Baseline appears ~8-9  - Iron studies w/ likely FEDERICO  - Trend H/H  - A/C: HSQ    ID   #Septic Shock   - Patient w/ likely septic shock iso L toe gangrene/wound now w/ hypothermia, leukocytosis and hypotension refractory to IV fluids requiring pressor support  - F/u BCx to final read, UCx negative.  - ABx course as above under Shock  - ID consulted 1/30, s/o 1/31    LINES/PPX  - peripherals in tact  - DVT PPx: HSQ  - GOC: Full Code MICU Transfer Note  ---------------------------    Transfer from: MICU  Transfer to:  (  ) Medicine    ( X ) Telemetry    (  ) RCU    (  ) Palliative    (  ) Stroke Unit        Accepting Physician: Ezekiel HANKS)      MICU COURSE    Ms. Conrda is 42F w/ PMH coronary artery disease status post CABG x4 in 2018, HFrEF (EF 21% 1/26/23) s/p Medtronic ICD, DM, PAD s/p 3rd toe amputation who presents with left 2nd toe pain concerning for dry gangrene. Seen by podiatry and vascular teams with plans for possible transmetatarsal amputation. Patient received unasyn 1/26-29. Course c/b worsening renal function, and shock likely multifactorial iso HFrEF, CKD, and sepsis 2/2 LE wounds. Patient started on levophed, Vanc/Cefepime/Flagyl (1/29) and transferred to MICU for further management, medically optimizing for TMA. CCU was consulted and deemed cardiogenic shock unlikely to be primary . Renal was consulted for management of BOB on CKD, patient was diuresed aggressively with bumetanide gtt with alleviation of pressor requirements and improvement of creatinine. ID was consulted regarding antibiotic de-escalation, and therapy was narrowed to zosyn then stopped after preliminary blood cultures showed NGTD. UCx returned negative. Patient's left heel pain was managed with dilaudid 1 mg and 0.5 mg oxycodone IR, and patient has had poor PO intake, now improving, and ongoing constipation. Patient's glucose was closely monitored and titrated with ISS, and there were no significant events on telemetry and with EP interrogation. Patient stable for transfer at this time.    For Follow-Up:  [ ] F/u final blood culture read  [ ] F/u nephrology recs regarding bumetanide dosing and frequency of creatinine monitoring  [ ] Re-consult vascular surgery regarding procedural planning, and f/u podiatry recs  [ ] Monitor pain medication needs  [ ] Restart and Uptitrate GDMT as tolerated    OBJECTIVE --  Vital Signs Last 24 Hrs  T(C): 35.4 (02 Feb 2023 08:01), Max: 36.4 (01 Feb 2023 16:00)  T(F): 95.7 (02 Feb 2023 08:01), Max: 97.5 (01 Feb 2023 16:00)  HR: 66 (02 Feb 2023 11:00) (66 - 84)  BP: 96/61 (02 Feb 2023 11:00) (96/57 - 131/68)  BP(mean): 71 (02 Feb 2023 11:00) (54 - 105)  RR: 12 (02 Feb 2023 11:00) (10 - 24)  SpO2: 98% (02 Feb 2023 11:00) (96% - 100%)    Parameters below as of 02 Feb 2023 08:00  Patient On (Oxygen Delivery Method): room air        I&O's Summary    01 Feb 2023 07:01  -  02 Feb 2023 07:00  --------------------------------------------------------  IN: 349.6 mL / OUT: 3675 mL / NET: -3325.4 mL    02 Feb 2023 07:01  -  02 Feb 2023 12:40  --------------------------------------------------------  IN: 0 mL / OUT: 1025 mL / NET: -1025 mL        MEDICATIONS  (STANDING):  aspirin enteric coated 81 milliGRAM(s) Oral daily  atorvastatin 80 milliGRAM(s) Oral at bedtime  buMETAnide Injectable 2 milliGRAM(s) IV Push every 8 hours  calamine/zinc oxide Lotion 1 Application(s) Topical three times a day  chlorhexidine 2% Cloths 1 Application(s) Topical daily  chlorhexidine 4% Liquid 1 Application(s) Topical <User Schedule>  dextrose 5%. 1000 milliLiter(s) (100 mL/Hr) IV Continuous <Continuous>  dextrose 5%. 1000 milliLiter(s) (50 mL/Hr) IV Continuous <Continuous>  dextrose 50% Injectable 25 Gram(s) IV Push once  dextrose 50% Injectable 12.5 Gram(s) IV Push once  dextrose 50% Injectable 25 Gram(s) IV Push once  ferrous    sulfate 325 milliGRAM(s) Oral daily  glucagon  Injectable 1 milliGRAM(s) IntraMuscular once  heparin   Injectable 5000 Unit(s) SubCutaneous every 8 hours  influenza   Vaccine 0.5 milliLiter(s) IntraMuscular once  insulin lispro (ADMELOG) corrective regimen sliding scale   SubCutaneous three times a day before meals  insulin lispro (ADMELOG) corrective regimen sliding scale   SubCutaneous at bedtime  melatonin 9 milliGRAM(s) Oral at bedtime  norepinephrine Infusion 0.05 MICROgram(s)/kG/Min (6.71 mL/Hr) IV Continuous <Continuous>  pantoprazole    Tablet 40 milliGRAM(s) Oral before breakfast  polyethylene glycol 3350 17 Gram(s) Oral <User Schedule>  senna 2 Tablet(s) Oral at bedtime    MEDICATIONS  (PRN):  acetaminophen     Tablet .. 650 milliGRAM(s) Oral every 6 hours PRN Temp greater or equal to 38C (100.4F), Mild Pain (1 - 3)  dextrose Oral Gel 15 Gram(s) Oral once PRN Blood Glucose LESS THAN 70 milliGRAM(s)/deciliter  HYDROmorphone  Injectable 1 milliGRAM(s) IV Push every 4 hours PRN Severe Pain (7 - 10)  ondansetron Injectable 4 milliGRAM(s) IV Push every 8 hours PRN Nausea and/or Vomiting  oxyCODONE    IR 5 milliGRAM(s) Oral every 4 hours PRN Moderate Pain (4 - 6)        LABS                                            7.9                   Neurophils% (auto):   66.8   (02-02 @ 01:50):    11.73)-----------(584          Lymphocytes% (auto):  19.3                                          26.7                   Eosinphils% (auto):   3.6      Manual%: Neutrophils x    ; Lymphocytes x    ; Eosinophils x    ; Bands%: x    ; Blasts x                                    133    |  93     |  35                  Calcium: 8.5   / iCa: x      (02-02 @ 01:50)    ----------------------------<  143       Magnesium: 2.30                             3.9     |  29     |  2.71             Phosphorous: 4.5      TPro  7.0    /  Alb  2.1    /  TBili  0.3    /  DBili  x      /  AST  15     /  ALT  8      /  AlkPhos  113    02 Feb 2023 01:50    ( 02-02 @ 01:50 )   PT: 18.0 sec;   INR: 1.54 ratio  aPTT: 37.8 sec          ASSESSMENT & PLAN:   Ms. Conrad is 42F w/ PMH coronary artery disease status post CABG x4 in 2018, CHF s/p ICD placement (interrogated 2022), DM, PAD s/p 3rd toe amputation who presents with left 2nd toe pain concerning for dry gangrene. Seen by podiatry and vascular teams with plans for possible transmetatarsal amputation. Course c/b worsening renal function, and shock likely sepsis 2/2 LE wounds w/ possible cardiogenic component. Patient started on levophed, Vanc/Cefepime/Flagyl and transferred to MICU for further management, medically optimizing for TMA.    NEURO  A&Ox3, no active issues    CARDIAC    #Shock  - s/p levo to target MAP>65, weaned 2/1  - Likely mixed septic and cardiogenic w/ sepsis as primary  given hypothermia and increasing leukocytosis  - TTE this admission on 1/26/23 shows severe global LV systolic dysfunction w/ EF ~21%; elevated LV filling pressures; RV enlargement w/ decreases systolic function  - CCU consulted during RRT (1/29) for possible cardiogenic shock. Per CCU low suspicion for cardiogenic etiology as primary  of shock. Rec checking central sat for CO/CI estimate. Will hold off central line for now  - s/p Vanc/zosyn briefly on adm, Unasyn 1/26-1/29; Cefepime, metronidazole, vanc (by level) 1/29 - 1/31, switched to Zosyn on 1/31 then d/c        #CAD s/p CABG  #HFrEF  - Home cardiac meds held iso shock  - Pt currently RRR. Will monitor on tele  - EP interrogated Medtronic ICD device 2/1     #PAD  #Dry Gangrene L 2nd toe  - Necrosis of 2nd digit i/s/o PAD, concerning for dry gangrene, elevated ESR/CRP. No signs of active infection   - X-ray foot without gas or osseous involvement, limited to soft tissue   - Podiatry and vascular recs appreciated  - CT Angio prelim read with occluded left posterior tibial and peroneal arteries  - f/u Vascular recs regarding future infection   - Pain control w/ Dilaudid 1mg PRN for severe; Oxy 5mg PRN for moderate    RESPIRATORY   #Large R pleural effusion  - Patient w/ R pleural effusion resulting in near-complete atelectasis of RML/RLL. Etiology likely from severe HFrEF  - Diuretics on hold   - Currently satting well on RA. Maintain SpO2>92%    RENAL   #BOB on CKD  - Patient w/ baseline SCr ~1.2 has been uptrending during hospitalization, today 4.2  - Mixed etiology d/t shock, cardio-renal iso HFrEF vs possibly ATN vs less likely contrast injury   - Patient currently oliguric  - Strict I/O. Clements placed for monitoring and removed 2/2  - Nephro consulted, appreciate recs; s/p Bumetanide gtt through 2/1 --> 2 mg IV q8h  - trend Cr     GI    #Small ascites  - Small ascites seen on CT   - C/t monitor    - Diet: Renal diet  - c/w Bowel Regimen    ENDO:    #IDDM  - Will c/w FSG & ISS qac/qhs  - Titrate long-acting insulin PRN    HEME/ONC  #Normocytic anemia  - Patient w/ Hb 7.5. Baseline appears ~8-9  - Iron studies w/ likely FEDERICO  - Trend H/H  - A/C: HSQ    ID   #Septic Shock   - Patient w/ likely septic shock iso L toe gangrene/wound now w/ hypothermia, leukocytosis and hypotension refractory to IV fluids requiring pressor support  - F/u BCx to final read, UCx negative.  - ABx course as above under Shock  - ID consulted 1/30, s/o 1/31    LINES/PPX  - peripherals in tact  - DVT PPx: HSQ  - GOC: Full Code MICU Transfer Note  ---------------------------    Transfer from: MICU  Transfer to:  (  ) Medicine    ( X ) Telemetry    (  ) RCU    (  ) Palliative    (  ) Stroke Unit        Accepting Physician: Ezekiel HANKS)      MICU COURSE    Ms. Conrad is 42F w/ PMH coronary artery disease status post CABG x4 in 2018, HFrEF (EF 21% 1/26/23) s/p Medtronic ICD, DM, PAD s/p 3rd toe amputation who presents with left 2nd toe pain concerning for dry gangrene. Seen by podiatry and vascular teams with plans for possible transmetatarsal amputation. Patient received unasyn 1/26-29. Course c/b worsening renal function, and shock likely multifactorial iso HFrEF, CKD, and sepsis 2/2 LE wounds. Patient started on levophed, Vanc/Cefepime/Flagyl (1/29) and transferred to MICU for further management, medically optimizing for TMA. CCU was consulted and deemed cardiogenic shock unlikely to be primary . Renal was consulted for management of BOB on CKD, patient was diuresed aggressively with bumetanide gtt with alleviation of pressor requirements and improvement of creatinine. ID was consulted regarding antibiotic de-escalation, and therapy was narrowed to zosyn then stopped after blood and urine cultures showed NGTD. UCx returned negative. Patient's left heel pain was managed with dilaudid 1 mg and 0.5 mg oxycodone IR, and patient has had poor PO intake, now improving, and ongoing constipation. Patient's glucose was closely monitored and titrated with ISS, and there were no significant events on telemetry and with EP interrogation. Pain Management consult called for med titration monica-procedurally. Patient stable for transfer at this time.    For Follow-Up:  [ ] F/u nephrology recs regarding bumetanide dosing and frequency of creatinine monitoring  [ ] Re-consult vascular surgery regarding procedural planning, and f/u podiatry recs  [ ] F/u Pain Management recs  [ ] Restart and Uptitrate GDMT as tolerated    OBJECTIVE --  Vital Signs Last 24 Hrs  T(C): 35.4 (02 Feb 2023 08:01), Max: 36.4 (01 Feb 2023 16:00)  T(F): 95.7 (02 Feb 2023 08:01), Max: 97.5 (01 Feb 2023 16:00)  HR: 66 (02 Feb 2023 11:00) (66 - 84)  BP: 96/61 (02 Feb 2023 11:00) (96/57 - 131/68)  BP(mean): 71 (02 Feb 2023 11:00) (54 - 105)  RR: 12 (02 Feb 2023 11:00) (10 - 24)  SpO2: 98% (02 Feb 2023 11:00) (96% - 100%)    Parameters below as of 02 Feb 2023 08:00  Patient On (Oxygen Delivery Method): room air        I&O's Summary    01 Feb 2023 07:01  -  02 Feb 2023 07:00  --------------------------------------------------------  IN: 349.6 mL / OUT: 3675 mL / NET: -3325.4 mL    02 Feb 2023 07:01  -  02 Feb 2023 12:40  --------------------------------------------------------  IN: 0 mL / OUT: 1025 mL / NET: -1025 mL        MEDICATIONS  (STANDING):  aspirin enteric coated 81 milliGRAM(s) Oral daily  atorvastatin 80 milliGRAM(s) Oral at bedtime  buMETAnide Injectable 2 milliGRAM(s) IV Push every 8 hours  calamine/zinc oxide Lotion 1 Application(s) Topical three times a day  chlorhexidine 2% Cloths 1 Application(s) Topical daily  chlorhexidine 4% Liquid 1 Application(s) Topical <User Schedule>  dextrose 5%. 1000 milliLiter(s) (100 mL/Hr) IV Continuous <Continuous>  dextrose 5%. 1000 milliLiter(s) (50 mL/Hr) IV Continuous <Continuous>  dextrose 50% Injectable 25 Gram(s) IV Push once  dextrose 50% Injectable 12.5 Gram(s) IV Push once  dextrose 50% Injectable 25 Gram(s) IV Push once  ferrous    sulfate 325 milliGRAM(s) Oral daily  glucagon  Injectable 1 milliGRAM(s) IntraMuscular once  heparin   Injectable 5000 Unit(s) SubCutaneous every 8 hours  influenza   Vaccine 0.5 milliLiter(s) IntraMuscular once  insulin lispro (ADMELOG) corrective regimen sliding scale   SubCutaneous three times a day before meals  insulin lispro (ADMELOG) corrective regimen sliding scale   SubCutaneous at bedtime  melatonin 9 milliGRAM(s) Oral at bedtime  norepinephrine Infusion 0.05 MICROgram(s)/kG/Min (6.71 mL/Hr) IV Continuous <Continuous>  pantoprazole    Tablet 40 milliGRAM(s) Oral before breakfast  polyethylene glycol 3350 17 Gram(s) Oral <User Schedule>  senna 2 Tablet(s) Oral at bedtime    MEDICATIONS  (PRN):  acetaminophen     Tablet .. 650 milliGRAM(s) Oral every 6 hours PRN Temp greater or equal to 38C (100.4F), Mild Pain (1 - 3)  dextrose Oral Gel 15 Gram(s) Oral once PRN Blood Glucose LESS THAN 70 milliGRAM(s)/deciliter  HYDROmorphone  Injectable 1 milliGRAM(s) IV Push every 4 hours PRN Severe Pain (7 - 10)  ondansetron Injectable 4 milliGRAM(s) IV Push every 8 hours PRN Nausea and/or Vomiting  oxyCODONE    IR 5 milliGRAM(s) Oral every 4 hours PRN Moderate Pain (4 - 6)        LABS                                            7.9                   Neurophils% (auto):   66.8   (02-02 @ 01:50):    11.73)-----------(584          Lymphocytes% (auto):  19.3                                          26.7                   Eosinphils% (auto):   3.6      Manual%: Neutrophils x    ; Lymphocytes x    ; Eosinophils x    ; Bands%: x    ; Blasts x                                    133    |  93     |  35                  Calcium: 8.5   / iCa: x      (02-02 @ 01:50)    ----------------------------<  143       Magnesium: 2.30                             3.9     |  29     |  2.71             Phosphorous: 4.5      TPro  7.0    /  Alb  2.1    /  TBili  0.3    /  DBili  x      /  AST  15     /  ALT  8      /  AlkPhos  113    02 Feb 2023 01:50    ( 02-02 @ 01:50 )   PT: 18.0 sec;   INR: 1.54 ratio  aPTT: 37.8 sec          ASSESSMENT & PLAN:   Ms. Conrad is 42F w/ PMH coronary artery disease status post CABG x4 in 2018, CHF s/p ICD placement (interrogated 2022), DM, PAD s/p 3rd toe amputation who presents with left 2nd toe pain concerning for dry gangrene. Seen by podiatry and vascular teams with plans for possible transmetatarsal amputation. Course c/b worsening renal function, and shock likely sepsis 2/2 LE wounds w/ possible cardiogenic component. Patient started on levophed, Vanc/Cefepime/Flagyl and transferred to MICU for further management, medically optimizing for TMA.    NEURO  A&Ox3, no active issues    CARDIAC    #Shock  - s/p levo to target MAP>65, weaned 2/1  - Likely mixed septic and cardiogenic w/ sepsis as primary  given hypothermia and increasing leukocytosis  - TTE this admission on 1/26/23 shows severe global LV systolic dysfunction w/ EF ~21%; elevated LV filling pressures; RV enlargement w/ decreases systolic function  - CCU consulted during RRT (1/29) for possible cardiogenic shock. Per CCU low suspicion for cardiogenic etiology as primary  of shock. Rec checking central sat for CO/CI estimate. Will hold off central line for now  - s/p Vanc/zosyn briefly on adm, Unasyn 1/26-1/29; Cefepime, metronidazole, vanc (by level) 1/29 - 1/31, switched to Zosyn on 1/31 then d/c        #CAD s/p CABG  #HFrEF  - Home cardiac meds held iso shock  - Pt currently RRR. Will monitor on tele  - EP interrogated Medtronic ICD device 2/1     #PAD  #Dry Gangrene L 2nd toe  - Necrosis of 2nd digit i/s/o PAD, concerning for dry gangrene, elevated ESR/CRP. No signs of active infection   - X-ray foot without gas or osseous involvement, limited to soft tissue   - Podiatry and vascular recs appreciated  - CT Angio prelim read with occluded left posterior tibial and peroneal arteries  - f/u Vascular recs regarding future infection   - Pain control w/ Dilaudid 1mg PRN for severe; Oxy 5mg PRN for moderate    RESPIRATORY   #Large R pleural effusion  - Patient w/ R pleural effusion resulting in near-complete atelectasis of RML/RLL. Etiology likely from severe HFrEF  - Diuretics on hold   - Currently satting well on RA. Maintain SpO2>92%    RENAL   #BOB on CKD  - Patient w/ baseline SCr ~1.2 has been uptrending during hospitalization, today 4.2  - Mixed etiology d/t shock, cardio-renal iso HFrEF vs possibly ATN vs less likely contrast injury   - Patient currently oliguric  - Strict I/O. Clements placed for monitoring and removed 2/2  - Nephro consulted, appreciate recs; s/p Bumetanide gtt through 2/1 --> 2 mg IV q8h  - trend Cr     GI    #Small ascites  - Small ascites seen on CT   - C/t monitor    - Diet: Renal diet  - c/w Bowel Regimen    ENDO:    #IDDM  - Will c/w FSG & ISS qac/qhs  - Titrate long-acting insulin PRN    HEME/ONC  #Normocytic anemia  - Patient w/ Hb 7.5. Baseline appears ~8-9  - Iron studies w/ likely FEDERICO  - Trend H/H  - A/C: HSQ    ID   #Septic Shock   - Patient w/ likely septic shock iso L toe gangrene/wound now w/ hypothermia, leukocytosis and hypotension refractory to IV fluids requiring pressor support  - F/u BCx to final read, UCx negative.  - ABx course as above under Shock  - ID consulted 1/30, s/o 1/31    LINES/PPX  - peripherals in tact  - DVT PPx: HSQ  - GOC: Full Code

## 2023-02-02 NOTE — PROGRESS NOTE ADULT - ATTENDING COMMENTS
Pt. with BOB, likely from underlying ATN. Pt. non-oliguric, on IV Bumex therapy. Scr decreased to 2.71 today. Assessment and plan for BOB as outlined above. Monitor labs and urine output. Avoid any potential nephrotoxins. Dose medications as per eGFR.

## 2023-02-02 NOTE — PROGRESS NOTE ADULT - PROBLEM SELECTOR PLAN 1
Pt. with BOB in setting of hypotension, recent IV contrast use and CHF. Upon review of labs on [facility] HIE/[place], Scr was elevated at 1.33 on 6/28/21 and improved to 1.15 on 2/24/22. Scr was 1.22 on admission (1/25/23). Pt. underwent LE angiogram on 1/25/23. Scr worsened to 4.23 on 1/30/23. No hydronephrosis seen on CT scan study. Pt. was oliguric, initiated on IV Bumex infusion on 1/30/23, and transitioned to intermittent IV Bumex today (2/2/23). Pt. with increased UOP (~3.7 L) over the last 24 hrs. Scr decreased to 2.71 today (2/2/23). Continue with IV diuretic therapy. Check kidney/bladder US with doppler to rule out renal vein thrombosis given low albumin levels. Monitor labs and urine output. Avoid nephrotoxins, Dose meds as per eGFR.    If you have any questions, please feel free to contact me  Talia Swift  Nephrology Fellow  [phone] / [employer] Teams(Preferred)  (After 5pm or on weekends please page the on-call fellow) Pt. with BOB in setting of hypotension, recent IV contrast use and CHF. Upon review of labs on [facility] HIE/[place], Scr was elevated at 1.33 on 6/28/21 and improved to 1.15 on 2/24/22. Scr was 1.22 on admission (1/25/23). Pt. underwent LE angiogram on 1/25/23. Scr worsened to 4.23 on 1/30/23. No hydronephrosis seen on CT scan study. Pt. was oliguric, initiated on IV Bumex infusion on 1/30/23, and transitioned to intermittent IV Bumex today (2/2/23). Pt. with increased UOP (~3.7 L) over the last 24 hrs. BOB resolving at present. Scr decreased to 2.71 today (2/2/23). Continue with IV diuretic therapy. Monitor labs and urine output. Avoid nephrotoxins, Dose meds as per eGFR.    If you have any questions, please feel free to contact me  Talia Swift  Nephrology Fellow  [phone] / [employer] Teams(Preferred)  (After 5pm or on weekends please page the on-call fellow)

## 2023-02-02 NOTE — PROGRESS NOTE ADULT - ASSESSMENT
Ms. Conrad is 42F w/ PMH coronary artery disease status post CABG x4 in 2018, CHF s/p ICD placement (interrogated 2022), DM, PAD s/p 3rd toe amputation who presents with left 2nd toe pain concerning for dry gangrene. Seen by podiatry and vascular teams with plans for possible transmetatarsal amputation. Course c/b worsening renal function, and shock likely sepsis 2/2 LE wounds w/ possible cardiogenic component. Patient started on levophed, Vanc/Cefepime/Flagyl and transferred to MICU for further management, medically optimizing for TMA.    NEURO  A&Ox3, no active issues    CARDIAC    #Shock  - On levo target MAP>65, wean as tolerated  - Likely mixed septic and cardiogenic w/ sepsis as primary  given hypothermia and increasing leukocytosis  - TTE this admission on 1/26/23 shows severe global LV systolic dysfunction w/ EF ~21%; elevated LV filling pressures; RV enlargement w/ decreases systolic function  - CCU consulted during RRT (1/29) for possible cardiogenic shock. Per CCU low suspicion for cardiogenic etiology as primary  of shock. Rec checking central sat for CO/CI estimate. Will hold off central line for now  - s/p Vanc/zosyn briefly on adm, Unasyn 1/26-1/29; Cefepime, metronidazole, vanc (by level) 1/29 - 1/31, switched to Zosyn on 1/31 then d/c        #CAD s/p CABG  #HFrEF  - Home cardiac meds held iso shock  - Pt currently RRR. Will monitor on tele  - EP interrogated Medtronic ICD device 2/1     #PAD  #Dry Gangrene L 2nd toe  - Necrosis of 2nd digit i/s/o PAD, concerning for dry gangrene, elevated ESR/CRP. No signs of active infection   - X-ray foot without gas or osseous involvement, limited to soft tissue   - Podiatry and vascular recs appreciated  - CT Angio prelim read with occluded left posterior tibial and peroneal arteries  - f/u Vascular recs regarding future infection   - Pain control w/ Dilaudid 1mg PRN for severe; Oxy 5mg PRN for moderate    RESPIRATORY   #Large R pleural effusion  - Patient w/ R pleural effusion resulting in near-complete atelectasis of RML/RLL. Etiology likely from severe HFrEF  - Diuretics on hold   - Currently satting well on RA. Maintain SpO2>92%    RENAL   #BOB on CKD  - Patient w/ baseline SCr ~1.2 has been uptrending during hospitalization, today 4.2  - Mixed etiology d/t shock, cardio-renal iso HFrEF vs possibly ATN vs less likely contrast injury   - Patient currently oliguric  - Strict I/O. Clements placed for monitoring  - Nephro consulted, appreciate recs; will c/w Bumetanide gtt until 23:00 --> switch to 2 mg IV q8h first dose in AM   - trend Cr     GI    #Small ascites  - Small ascites seen on CT   - C/t monitor    - Diet: Renal diet  - will incr Bowel Regimen    ENDO:    #IDDM  - On home lantus 8U  - Will c/w ISS qac/qhs  - monitor glucose    HEME/ONC  #Normocytic anemia  - Patient w/ Hb 7.5. Baseline appears ~8-9  - Iron studies w/ likely FEDERICO  - Trend H/H  - A/C: HSQ    ID   #Septic Shock   - Patient w/ likely septic shock iso L toe gangrene/wound now w/ hypothermia, leukocytosis and hypotension refractory to IV fluids requiring pressor support  - F/u BCx, UCx. Initial U/a appears positive  - ABx course as above under Shock  - ID consulted 1/30, s/o 1/31    LINES/PPX  - peripherals in tact  - DVT PPx: HSQ  - GOC: Full Code    Geovanni Means  Internal Medicine, PGY-1  Please Contact via TEAMS Ms. Conrad is 42F w/ PMH coronary artery disease status post CABG x4 in 2018, CHF s/p ICD placement (interrogated 2022), DM, PAD s/p 3rd toe amputation who presents with left 2nd toe pain concerning for dry gangrene. Seen by podiatry and vascular teams with plans for possible transmetatarsal amputation. Course c/b worsening renal function, and shock likely sepsis 2/2 LE wounds w/ possible cardiogenic component. Patient started on levophed, Vanc/Cefepime/Flagyl and transferred to MICU for further management, medically optimizing for TMA.    NEURO  A&Ox3, no active issues    CARDIAC    #Shock  - s/p levo to target MAP>65, weaned 2/1  - Likely mixed septic and cardiogenic w/ sepsis as primary  given hypothermia and increasing leukocytosis  - TTE this admission on 1/26/23 shows severe global LV systolic dysfunction w/ EF ~21%; elevated LV filling pressures; RV enlargement w/ decreases systolic function  - CCU consulted during RRT (1/29) for possible cardiogenic shock. Per CCU low suspicion for cardiogenic etiology as primary  of shock. Rec checking central sat for CO/CI estimate. Will hold off central line for now  - s/p Vanc/zosyn briefly on adm, Unasyn 1/26-1/29; Cefepime, metronidazole, vanc (by level) 1/29 - 1/31, switched to Zosyn on 1/31 then d/c        #CAD s/p CABG  #HFrEF  - Home cardiac meds held iso shock  - Pt currently RRR. Will monitor on tele  - EP interrogated Medtronic ICD device 2/1     #PAD  #Dry Gangrene L 2nd toe  - Necrosis of 2nd digit i/s/o PAD, concerning for dry gangrene, elevated ESR/CRP. No signs of active infection   - X-ray foot without gas or osseous involvement, limited to soft tissue   - Podiatry and vascular recs appreciated  - CT Angio prelim read with occluded left posterior tibial and peroneal arteries  - f/u Vascular recs regarding future infection   - Pain control w/ Dilaudid 1mg PRN for severe; Oxy 5mg PRN for moderate    RESPIRATORY   #Large R pleural effusion  - Patient w/ R pleural effusion resulting in near-complete atelectasis of RML/RLL. Etiology likely from severe HFrEF  - Diuretics on hold   - Currently satting well on RA. Maintain SpO2>92%    RENAL   #BOB on CKD  - Patient w/ baseline SCr ~1.2 has been uptrending during hospitalization, today 4.2  - Mixed etiology d/t shock, cardio-renal iso HFrEF vs possibly ATN vs less likely contrast injury   - Patient currently oliguric  - Strict I/O. Clements placed for monitoring and removed 2/2  - Nephro consulted, appreciate recs; s/p Bumetanide gtt through 2/1 --> 2 mg IV q8h  - trend Cr     GI    #Small ascites  - Small ascites seen on CT   - C/t monitor    - Diet: Renal diet  - c/w Bowel Regimen    ENDO:    #IDDM  - Will c/w FSG & ISS qac/qhs  - Titrate long-acting insulin PRN    HEME/ONC  #Normocytic anemia  - Patient w/ Hb 7.5. Baseline appears ~8-9  - Iron studies w/ likely FEDERICO  - Trend H/H  - A/C: HSQ    ID   #Septic Shock   - Patient w/ likely septic shock iso L toe gangrene/wound now w/ hypothermia, leukocytosis and hypotension refractory to IV fluids requiring pressor support  - F/u BCx to final read, UCx negative.  - ABx course as above under Shock  - ID consulted 1/30, s/o 1/31    LINES/PPX  - peripherals in tact  - DVT PPx: HSQ  - GOC: Full Code    Geovanni Means  Internal Medicine, PGY-1  Please Contact via TEAMS

## 2023-02-02 NOTE — PROGRESS NOTE ADULT - SUBJECTIVE AND OBJECTIVE BOX
[facility] Division of Kidney Diseases & Hypertension  FOLLOW UP NOTE  [phone]--------------------------------------------------------------------------------  HPI: 42-year-female with PMH of HTN, HLD, DM, PAD, CHF admitted with left toe pain. Pt. developed BOB during hospital stay. Pt was transferred to the MICU on 1/29/23 for septic shock requiring vasopressor support. Pt. being seen for BOB.       24 hour events/subjective: Pt seen and examined in MICU today. Pt complaining of L toe pain. Reports that LE edema has improved. Denies fever, chills, nausea, vomiting, CP and SOB during rounds.      PAST HISTORY  --------------------------------------------------------------------------------  No significant changes to PMH, PSH, FHx, SHx, unless otherwise noted    ALLERGIES & MEDICATIONS  --------------------------------------------------------------------------------  Allergies    No Known Allergies    Intolerances      Standing Inpatient Medications  aspirin enteric coated 81 milliGRAM(s) Oral daily  atorvastatin 80 milliGRAM(s) Oral at bedtime  buMETAnide Injectable 2 milliGRAM(s) IV Push every 8 hours  calamine/zinc oxide Lotion 1 Application(s) Topical three times a day  chlorhexidine 2% Cloths 1 Application(s) Topical daily  chlorhexidine 4% Liquid 1 Application(s) Topical <User Schedule>  dextrose 5%. 1000 milliLiter(s) IV Continuous <Continuous>  dextrose 5%. 1000 milliLiter(s) IV Continuous <Continuous>  dextrose 50% Injectable 25 Gram(s) IV Push once  dextrose 50% Injectable 12.5 Gram(s) IV Push once  dextrose 50% Injectable 25 Gram(s) IV Push once  ferrous    sulfate 325 milliGRAM(s) Oral daily  glucagon  Injectable 1 milliGRAM(s) IntraMuscular once  heparin   Injectable 5000 Unit(s) SubCutaneous every 8 hours  influenza   Vaccine 0.5 milliLiter(s) IntraMuscular once  insulin lispro (ADMELOG) corrective regimen sliding scale   SubCutaneous three times a day before meals  insulin lispro (ADMELOG) corrective regimen sliding scale   SubCutaneous at bedtime  melatonin 9 milliGRAM(s) Oral at bedtime  norepinephrine Infusion 0.05 MICROgram(s)/kG/Min IV Continuous <Continuous>  pantoprazole    Tablet 40 milliGRAM(s) Oral before breakfast  polyethylene glycol 3350 17 Gram(s) Oral <User Schedule>  senna 2 Tablet(s) Oral at bedtime    PRN Inpatient Medications  acetaminophen     Tablet .. 650 milliGRAM(s) Oral every 6 hours PRN  dextrose Oral Gel 15 Gram(s) Oral once PRN  HYDROmorphone  Injectable 1 milliGRAM(s) IV Push every 4 hours PRN  ondansetron Injectable 4 milliGRAM(s) IV Push every 8 hours PRN  oxyCODONE    IR 5 milliGRAM(s) Oral every 4 hours PRN      REVIEW OF SYSTEMS  --------------------------------------------------------------------------------  Gen: No fevers/chills  Head/Eyes/Ears: No HA  Respiratory: No dyspnea  CV: No chest pain  GI: No abdominal pain, diarrhea  MSK: +B/L LE edema (improving) and L toe pain  Skin: No rashes  Heme: No bleeding    All other systems were reviewed and are negative, except as noted.    VITALS/PHYSICAL EXAM  --------------------------------------------------------------------------------  T(C): 36.2 (02-02-23 @ 04:00), Max: 36.4 (02-01-23 @ 16:00)  HR: 69 (02-02-23 @ 06:20) (68 - 84)  BP: 116/64 (02-02-23 @ 06:20) (91/60 - 131/68)  RR: 12 (02-02-23 @ 06:20) (10 - 24)  SpO2: 98% (02-02-23 @ 06:20) (96% - 100%)  Wt(kg): --        02-01-23 @ 07:01  -  02-02-23 @ 07:00  --------------------------------------------------------  IN: 349.6 mL / OUT: 3675 mL / NET: -3325.4 mL      Physical Exam:  Gen: resting, NAD  HEENT: Anicteric  Pulm: CTA B/L  CV: S1S2+  Abd: Soft, +BS    Ext: +trace B/L LE edema, R>L, gangrenous 2nd toe of LLE seen  Neuro: Awake and alert  Skin: Warm and dry    LABS/STUDIES  --------------------------------------------------------------------------------              7.9    11.73 >-----------<  584      [02-02-23 @ 01:50]              26.7     133  |  93  |  35  ----------------------------<  143      [02-02-23 @ 01:50]  3.9   |  29  |  2.71        Ca     8.5     [02-02-23 @ 01:50]      Mg     2.30     [02-02-23 @ 01:50]      Phos  4.5     [02-02-23 @ 01:50]    TPro  7.0  /  Alb  2.1  /  TBili  0.3  /  DBili  x   /  AST  15  /  ALT  8   /  AlkPhos  113  [02-02-23 @ 01:50]    PT/INR: PT 18.0 , INR 1.54       [02-02-23 @ 01:50]  PTT: 37.8       [02-02-23 @ 01:50]      Creatinine Trend:  SCr 2.71 [02-02 @ 01:50]  SCr 2.98 [02-01 @ 16:25]  SCr 3.35 [02-01 @ 01:50]  SCr 3.48 [01-31 @ 20:35]  SCr 3.68 [01-31 @ 11:45]    Urinalysis - [01-29-23 @ 18:45]      Color DARK YELLOW / Appearance Slightly Turbid / SG >1.030 / pH 5.5      Gluc Negative / Ketone Trace  / Bili Small / Urobili <2 mg/dL       Blood Negative / Protein 300 mg/dL / Leuk Est Moderate / Nitrite Negative      RBC 1 / WBC 6 / Hyaline 1 / Gran  / Sq Epi  / Non Sq Epi 3 / Bacteria Many    Urine Creatinine 68      [01-28-23 @ 11:01]  Urine Protein 556      [01-28-23 @ 11:01]  Urine Sodium 23      [01-28-23 @ 11:01]  Urine Urea Nitrogen 151.4      [01-28-23 @ 11:01]  Urine Potassium 65.3      [01-28-23 @ 11:01]  Urine Phosphorus 13.7      [02-01-23 @ 01:50]  Urine Osmolality 325      [01-28-23 @ 11:01]    Iron 21, TIBC 198, %sat 11      [01-26-23 @ 07:30]  Ferritin 30      [01-26-23 @ 07:30]  Lipid: chol 112, , HDL 20, LDL --      [01-26-23 @ 07:30] [facility] Division of Kidney Diseases & Hypertension  FOLLOW UP NOTE  [phone]--------------------------------------------------------------------------------    HPI: 42-year-female with PMH of HTN, HLD, DM, PAD, CHF admitted with left toe pain. Pt. developed BOB during hospital stay. Pt was transferred to the MICU on 1/29/23 for septic shock requiring vasopressor support. Pt. being seen for BOB.       24 hour events/subjective: Pt seen and examined in MICU today. Pt continues to have L foot pain. Reports that LE edema has improved. Denies fever, chills, nausea, vomiting, CP and SOB during rounds.      PAST HISTORY  --------------------------------------------------------------------------------  No significant changes to PMH, PSH, FHx, SHx, unless otherwise noted    ALLERGIES & MEDICATIONS  --------------------------------------------------------------------------------  Allergies    No Known Allergies    Intolerances    Standing Inpatient Medications  aspirin enteric coated 81 milliGRAM(s) Oral daily  atorvastatin 80 milliGRAM(s) Oral at bedtime  buMETAnide Injectable 2 milliGRAM(s) IV Push every 8 hours  calamine/zinc oxide Lotion 1 Application(s) Topical three times a day  chlorhexidine 2% Cloths 1 Application(s) Topical daily  chlorhexidine 4% Liquid 1 Application(s) Topical <User Schedule>  dextrose 5%. 1000 milliLiter(s) IV Continuous <Continuous>  dextrose 5%. 1000 milliLiter(s) IV Continuous <Continuous>  dextrose 50% Injectable 25 Gram(s) IV Push once  dextrose 50% Injectable 12.5 Gram(s) IV Push once  dextrose 50% Injectable 25 Gram(s) IV Push once  ferrous    sulfate 325 milliGRAM(s) Oral daily  glucagon  Injectable 1 milliGRAM(s) IntraMuscular once  heparin   Injectable 5000 Unit(s) SubCutaneous every 8 hours  influenza   Vaccine 0.5 milliLiter(s) IntraMuscular once  insulin lispro (ADMELOG) corrective regimen sliding scale   SubCutaneous three times a day before meals  insulin lispro (ADMELOG) corrective regimen sliding scale   SubCutaneous at bedtime  melatonin 9 milliGRAM(s) Oral at bedtime  norepinephrine Infusion 0.05 MICROgram(s)/kG/Min IV Continuous <Continuous>  pantoprazole    Tablet 40 milliGRAM(s) Oral before breakfast  polyethylene glycol 3350 17 Gram(s) Oral <User Schedule>  senna 2 Tablet(s) Oral at bedtime    PRN Inpatient Medications  acetaminophen     Tablet .. 650 milliGRAM(s) Oral every 6 hours PRN  dextrose Oral Gel 15 Gram(s) Oral once PRN  HYDROmorphone  Injectable 1 milliGRAM(s) IV Push every 4 hours PRN  ondansetron Injectable 4 milliGRAM(s) IV Push every 8 hours PRN  oxyCODONE    IR 5 milliGRAM(s) Oral every 4 hours PRN    REVIEW OF SYSTEMS  --------------------------------------------------------------------------------  Gen: No fevers/chills  Head/Eyes/Ears: No HA  Respiratory: No dyspnea  CV: No chest pain  GI: No abdominal pain, diarrhea  MSK: +B/L LE edema (improving) and L foot pain  Skin: No rashes  Heme: No bleeding    All other systems were reviewed and are negative, except as noted.    VITALS/PHYSICAL EXAM  --------------------------------------------------------------------------------  T(C): 36.2 (02-02-23 @ 04:00), Max: 36.4 (02-01-23 @ 16:00)  HR: 69 (02-02-23 @ 06:20) (68 - 84)  BP: 116/64 (02-02-23 @ 06:20) (91/60 - 131/68)  RR: 12 (02-02-23 @ 06:20) (10 - 24)  SpO2: 98% (02-02-23 @ 06:20) (96% - 100%)  Wt(kg): --    02-01-23 @ 07:01  -  02-02-23 @ 07:00  --------------------------------------------------------  IN: 349.6 mL / OUT: 3675 mL / NET: -3325.4 mL    Physical Exam:    Gen: resting, NAD  HEENT: Anicteric  Pulm: CTA B/L  CV: S1S2+  Abd: Soft, +BS    Ext: +trace B/L LE edema, R>L, left foot: gangrenous changes seen  Neuro: Awake and alert  Skin: Warm and dry    LABS/STUDIES  --------------------------------------------------------------------------------              7.9    11.73 >-----------<  584      [02-02-23 @ 01:50]              26.7     133  |  93  |  35  ----------------------------<  143      [02-02-23 @ 01:50]  3.9   |  29  |  2.71        Ca     8.5     [02-02-23 @ 01:50]      Mg     2.30     [02-02-23 @ 01:50]      Phos  4.5     [02-02-23 @ 01:50]    TPro  7.0  /  Alb  2.1  /  TBili  0.3  /  DBili  x   /  AST  15  /  ALT  8   /  AlkPhos  113  [02-02-23 @ 01:50]    Creatinine Trend:  SCr 2.71 [02-02 @ 01:50]  SCr 2.98 [02-01 @ 16:25]  SCr 3.35 [02-01 @ 01:50]  SCr 3.48 [01-31 @ 20:35]  SCr 3.68 [01-31 @ 11:45]

## 2023-02-03 LAB
ALBUMIN SERPL ELPH-MCNC: 2.1 G/DL — LOW (ref 3.3–5)
ALP SERPL-CCNC: 111 U/L — SIGNIFICANT CHANGE UP (ref 40–120)
ALT FLD-CCNC: 8 U/L — SIGNIFICANT CHANGE UP (ref 4–33)
ANION GAP SERPL CALC-SCNC: 10 MMOL/L — SIGNIFICANT CHANGE UP (ref 7–14)
APTT BLD: 37.3 SEC — HIGH (ref 27–36.3)
AST SERPL-CCNC: 13 U/L — SIGNIFICANT CHANGE UP (ref 4–32)
BASOPHILS # BLD AUTO: 0.05 K/UL — SIGNIFICANT CHANGE UP (ref 0–0.2)
BASOPHILS NFR BLD AUTO: 0.4 % — SIGNIFICANT CHANGE UP (ref 0–2)
BILIRUB SERPL-MCNC: 0.3 MG/DL — SIGNIFICANT CHANGE UP (ref 0.2–1.2)
BUN SERPL-MCNC: 30 MG/DL — HIGH (ref 7–23)
CALCIUM SERPL-MCNC: 8.2 MG/DL — LOW (ref 8.4–10.5)
CHLORIDE SERPL-SCNC: 91 MMOL/L — LOW (ref 98–107)
CO2 SERPL-SCNC: 30 MMOL/L — SIGNIFICANT CHANGE UP (ref 22–31)
CREAT SERPL-MCNC: 2.07 MG/DL — HIGH (ref 0.5–1.3)
CULTURE RESULTS: SIGNIFICANT CHANGE UP
CULTURE RESULTS: SIGNIFICANT CHANGE UP
EGFR: 30 ML/MIN/1.73M2 — LOW
EOSINOPHIL # BLD AUTO: 0.31 K/UL — SIGNIFICANT CHANGE UP (ref 0–0.5)
EOSINOPHIL NFR BLD AUTO: 2.7 % — SIGNIFICANT CHANGE UP (ref 0–6)
GLUCOSE BLDC GLUCOMTR-MCNC: 142 MG/DL — HIGH (ref 70–99)
GLUCOSE BLDC GLUCOMTR-MCNC: 164 MG/DL — HIGH (ref 70–99)
GLUCOSE BLDC GLUCOMTR-MCNC: 195 MG/DL — HIGH (ref 70–99)
GLUCOSE BLDC GLUCOMTR-MCNC: 286 MG/DL — HIGH (ref 70–99)
GLUCOSE BLDC GLUCOMTR-MCNC: 316 MG/DL — HIGH (ref 70–99)
GLUCOSE SERPL-MCNC: 188 MG/DL — HIGH (ref 70–99)
HCT VFR BLD CALC: 25.8 % — LOW (ref 34.5–45)
HGB BLD-MCNC: 7.6 G/DL — LOW (ref 11.5–15.5)
IANC: 7.86 K/UL — HIGH (ref 1.8–7.4)
IMM GRANULOCYTES NFR BLD AUTO: 0.3 % — SIGNIFICANT CHANGE UP (ref 0–0.9)
INR BLD: 1.36 RATIO — HIGH (ref 0.88–1.16)
LYMPHOCYTES # BLD AUTO: 1.99 K/UL — SIGNIFICANT CHANGE UP (ref 1–3.3)
LYMPHOCYTES # BLD AUTO: 17.3 % — SIGNIFICANT CHANGE UP (ref 13–44)
MAGNESIUM SERPL-MCNC: 2.4 MG/DL — SIGNIFICANT CHANGE UP (ref 1.6–2.6)
MCHC RBC-ENTMCNC: 24.4 PG — LOW (ref 27–34)
MCHC RBC-ENTMCNC: 29.5 GM/DL — LOW (ref 32–36)
MCV RBC AUTO: 82.7 FL — SIGNIFICANT CHANGE UP (ref 80–100)
MONOCYTES # BLD AUTO: 1.24 K/UL — HIGH (ref 0–0.9)
MONOCYTES NFR BLD AUTO: 10.8 % — SIGNIFICANT CHANGE UP (ref 2–14)
NEUTROPHILS # BLD AUTO: 7.86 K/UL — HIGH (ref 1.8–7.4)
NEUTROPHILS NFR BLD AUTO: 68.5 % — SIGNIFICANT CHANGE UP (ref 43–77)
NRBC # BLD: 0 /100 WBCS — SIGNIFICANT CHANGE UP (ref 0–0)
NRBC # FLD: 0 K/UL — SIGNIFICANT CHANGE UP (ref 0–0)
PHOSPHATE SERPL-MCNC: 3.6 MG/DL — SIGNIFICANT CHANGE UP (ref 2.5–4.5)
PLATELET # BLD AUTO: 535 K/UL — HIGH (ref 150–400)
POTASSIUM SERPL-MCNC: 3.7 MMOL/L — SIGNIFICANT CHANGE UP (ref 3.5–5.3)
POTASSIUM SERPL-SCNC: 3.7 MMOL/L — SIGNIFICANT CHANGE UP (ref 3.5–5.3)
PROT SERPL-MCNC: 6.9 G/DL — SIGNIFICANT CHANGE UP (ref 6–8.3)
PROTHROM AB SERPL-ACNC: 15.8 SEC — HIGH (ref 10.5–13.4)
RBC # BLD: 3.12 M/UL — LOW (ref 3.8–5.2)
RBC # FLD: 18.9 % — HIGH (ref 10.3–14.5)
SODIUM SERPL-SCNC: 131 MMOL/L — LOW (ref 135–145)
SPECIMEN SOURCE: SIGNIFICANT CHANGE UP
SPECIMEN SOURCE: SIGNIFICANT CHANGE UP
WBC # BLD: 11.49 K/UL — HIGH (ref 3.8–10.5)
WBC # FLD AUTO: 11.49 K/UL — HIGH (ref 3.8–10.5)

## 2023-02-03 PROCEDURE — 99232 SBSQ HOSP IP/OBS MODERATE 35: CPT | Mod: GC

## 2023-02-03 PROCEDURE — 99291 CRITICAL CARE FIRST HOUR: CPT | Mod: GC

## 2023-02-03 RX ORDER — BUMETANIDE 0.25 MG/ML
1 INJECTION INTRAMUSCULAR; INTRAVENOUS EVERY 12 HOURS
Refills: 0 | Status: DISCONTINUED | OUTPATIENT
Start: 2023-02-03 | End: 2023-02-07

## 2023-02-03 RX ORDER — BUMETANIDE 0.25 MG/ML
1 INJECTION INTRAMUSCULAR; INTRAVENOUS ONCE
Refills: 0 | Status: COMPLETED | OUTPATIENT
Start: 2023-02-03 | End: 2023-02-03

## 2023-02-03 RX ORDER — POLYETHYLENE GLYCOL 3350 17 G/17G
17 POWDER, FOR SOLUTION ORAL
Refills: 0 | Status: DISCONTINUED | OUTPATIENT
Start: 2023-02-03 | End: 2023-02-22

## 2023-02-03 RX ORDER — NALOXEGOL OXALATE 12.5 MG/1
12.5 TABLET, FILM COATED ORAL DAILY
Refills: 0 | Status: DISCONTINUED | OUTPATIENT
Start: 2023-02-03 | End: 2023-02-22

## 2023-02-03 RX ORDER — BUMETANIDE 0.25 MG/ML
2 INJECTION INTRAMUSCULAR; INTRAVENOUS DAILY
Refills: 0 | Status: DISCONTINUED | OUTPATIENT
Start: 2023-02-03 | End: 2023-02-03

## 2023-02-03 RX ORDER — POTASSIUM CHLORIDE 20 MEQ
40 PACKET (EA) ORAL ONCE
Refills: 0 | Status: COMPLETED | OUTPATIENT
Start: 2023-02-03 | End: 2023-02-03

## 2023-02-03 RX ADMIN — PANTOPRAZOLE SODIUM 40 MILLIGRAM(S): 20 TABLET, DELAYED RELEASE ORAL at 06:23

## 2023-02-03 RX ADMIN — NALOXEGOL OXALATE 12.5 MILLIGRAM(S): 12.5 TABLET, FILM COATED ORAL at 14:50

## 2023-02-03 RX ADMIN — SENNA PLUS 2 TABLET(S): 8.6 TABLET ORAL at 23:01

## 2023-02-03 RX ADMIN — Medication 9 MILLIGRAM(S): at 23:01

## 2023-02-03 RX ADMIN — HYDROMORPHONE HYDROCHLORIDE 1 MILLIGRAM(S): 2 INJECTION INTRAMUSCULAR; INTRAVENOUS; SUBCUTANEOUS at 05:18

## 2023-02-03 RX ADMIN — HYDROMORPHONE HYDROCHLORIDE 1 MILLIGRAM(S): 2 INJECTION INTRAMUSCULAR; INTRAVENOUS; SUBCUTANEOUS at 04:49

## 2023-02-03 RX ADMIN — CALAMINE AND ZINC OXIDE AND PHENOL 1 APPLICATION(S): 160; 10 LOTION TOPICAL at 22:13

## 2023-02-03 RX ADMIN — BUMETANIDE 1 MILLIGRAM(S): 0.25 INJECTION INTRAMUSCULAR; INTRAVENOUS at 10:31

## 2023-02-03 RX ADMIN — BUMETANIDE 1 MILLIGRAM(S): 0.25 INJECTION INTRAMUSCULAR; INTRAVENOUS at 23:00

## 2023-02-03 RX ADMIN — POLYETHYLENE GLYCOL 3350 17 GRAM(S): 17 POWDER, FOR SOLUTION ORAL at 10:31

## 2023-02-03 RX ADMIN — OXYCODONE HYDROCHLORIDE 5 MILLIGRAM(S): 5 TABLET ORAL at 23:36

## 2023-02-03 RX ADMIN — CALAMINE AND ZINC OXIDE AND PHENOL 1 APPLICATION(S): 160; 10 LOTION TOPICAL at 14:49

## 2023-02-03 RX ADMIN — HEPARIN SODIUM 5000 UNIT(S): 5000 INJECTION INTRAVENOUS; SUBCUTANEOUS at 23:00

## 2023-02-03 RX ADMIN — POLYETHYLENE GLYCOL 3350 17 GRAM(S): 17 POWDER, FOR SOLUTION ORAL at 23:01

## 2023-02-03 RX ADMIN — Medication 81 MILLIGRAM(S): at 11:55

## 2023-02-03 RX ADMIN — Medication 1: at 12:10

## 2023-02-03 RX ADMIN — Medication 325 MILLIGRAM(S): at 11:55

## 2023-02-03 RX ADMIN — CHLORHEXIDINE GLUCONATE 1 APPLICATION(S): 213 SOLUTION TOPICAL at 14:48

## 2023-02-03 RX ADMIN — HYDROMORPHONE HYDROCHLORIDE 1 MILLIGRAM(S): 2 INJECTION INTRAMUSCULAR; INTRAVENOUS; SUBCUTANEOUS at 21:27

## 2023-02-03 RX ADMIN — HYDROMORPHONE HYDROCHLORIDE 1 MILLIGRAM(S): 2 INJECTION INTRAMUSCULAR; INTRAVENOUS; SUBCUTANEOUS at 00:58

## 2023-02-03 RX ADMIN — HYDROMORPHONE HYDROCHLORIDE 1 MILLIGRAM(S): 2 INJECTION INTRAMUSCULAR; INTRAVENOUS; SUBCUTANEOUS at 15:03

## 2023-02-03 RX ADMIN — HEPARIN SODIUM 5000 UNIT(S): 5000 INJECTION INTRAVENOUS; SUBCUTANEOUS at 05:05

## 2023-02-03 RX ADMIN — HYDROMORPHONE HYDROCHLORIDE 1 MILLIGRAM(S): 2 INJECTION INTRAMUSCULAR; INTRAVENOUS; SUBCUTANEOUS at 21:12

## 2023-02-03 RX ADMIN — HYDROMORPHONE HYDROCHLORIDE 1 MILLIGRAM(S): 2 INJECTION INTRAMUSCULAR; INTRAVENOUS; SUBCUTANEOUS at 09:28

## 2023-02-03 RX ADMIN — HYDROMORPHONE HYDROCHLORIDE 1 MILLIGRAM(S): 2 INJECTION INTRAMUSCULAR; INTRAVENOUS; SUBCUTANEOUS at 00:29

## 2023-02-03 RX ADMIN — Medication 2: at 23:08

## 2023-02-03 RX ADMIN — OXYCODONE HYDROCHLORIDE 5 MILLIGRAM(S): 5 TABLET ORAL at 11:57

## 2023-02-03 RX ADMIN — OXYCODONE HYDROCHLORIDE 5 MILLIGRAM(S): 5 TABLET ORAL at 12:20

## 2023-02-03 RX ADMIN — ATORVASTATIN CALCIUM 80 MILLIGRAM(S): 80 TABLET, FILM COATED ORAL at 23:00

## 2023-02-03 RX ADMIN — Medication 1: at 08:30

## 2023-02-03 RX ADMIN — Medication 40 MILLIEQUIVALENT(S): at 10:31

## 2023-02-03 RX ADMIN — CALAMINE AND ZINC OXIDE AND PHENOL 1 APPLICATION(S): 160; 10 LOTION TOPICAL at 05:04

## 2023-02-03 RX ADMIN — BUMETANIDE 1 MILLIGRAM(S): 0.25 INJECTION INTRAMUSCULAR; INTRAVENOUS at 16:09

## 2023-02-03 RX ADMIN — HYDROMORPHONE HYDROCHLORIDE 1 MILLIGRAM(S): 2 INJECTION INTRAMUSCULAR; INTRAVENOUS; SUBCUTANEOUS at 09:13

## 2023-02-03 RX ADMIN — HEPARIN SODIUM 5000 UNIT(S): 5000 INJECTION INTRAVENOUS; SUBCUTANEOUS at 14:49

## 2023-02-03 RX ADMIN — HYDROMORPHONE HYDROCHLORIDE 1 MILLIGRAM(S): 2 INJECTION INTRAMUSCULAR; INTRAVENOUS; SUBCUTANEOUS at 14:48

## 2023-02-03 NOTE — PROGRESS NOTE ADULT - SUBJECTIVE AND OBJECTIVE BOX
[facility] Division of Kidney Diseases & Hypertension  FOLLOW UP NOTE  [phone]--------------------------------------------------------------------------------  HPI: 42-year-female with PMH of HTN, HLD, DM, PAD, CHF admitted with left toe pain. Pt. developed BOB during hospital stay. Pt was transferred to the MICU on 1/29/23 for septic shock requiring vasopressor support. Pt. being seen for BOB.       24 hour events/subjective: Pt seen and examined in MICU today. Pt continues to have L foot pain. Reports that LE edema has improved. Denies fever, chills, nausea, vomiting, CP and SOB during rounds.      PAST HISTORY  --------------------------------------------------------------------------------  No significant changes to PMH, PSH, FHx, SHx, unless otherwise noted    ALLERGIES & MEDICATIONS  --------------------------------------------------------------------------------  Allergies    No Known Allergies    Intolerances      Standing Inpatient Medications  aspirin enteric coated 81 milliGRAM(s) Oral daily  atorvastatin 80 milliGRAM(s) Oral at bedtime  calamine/zinc oxide Lotion 1 Application(s) Topical three times a day  chlorhexidine 2% Cloths 1 Application(s) Topical daily  chlorhexidine 4% Liquid 1 Application(s) Topical <User Schedule>  dextrose 5%. 1000 milliLiter(s) IV Continuous <Continuous>  dextrose 5%. 1000 milliLiter(s) IV Continuous <Continuous>  dextrose 50% Injectable 25 Gram(s) IV Push once  dextrose 50% Injectable 12.5 Gram(s) IV Push once  dextrose 50% Injectable 25 Gram(s) IV Push once  ferrous    sulfate 325 milliGRAM(s) Oral daily  glucagon  Injectable 1 milliGRAM(s) IntraMuscular once  heparin   Injectable 5000 Unit(s) SubCutaneous every 8 hours  influenza   Vaccine 0.5 milliLiter(s) IntraMuscular once  insulin lispro (ADMELOG) corrective regimen sliding scale   SubCutaneous three times a day before meals  insulin lispro (ADMELOG) corrective regimen sliding scale   SubCutaneous at bedtime  melatonin 9 milliGRAM(s) Oral at bedtime  pantoprazole    Tablet 40 milliGRAM(s) Oral before breakfast  polyethylene glycol 3350 17 Gram(s) Oral <User Schedule>  senna 2 Tablet(s) Oral at bedtime    PRN Inpatient Medications  acetaminophen     Tablet .. 650 milliGRAM(s) Oral every 6 hours PRN  dextrose Oral Gel 15 Gram(s) Oral once PRN  HYDROmorphone  Injectable 1 milliGRAM(s) IV Push every 4 hours PRN  ondansetron Injectable 4 milliGRAM(s) IV Push every 8 hours PRN  oxyCODONE    IR 5 milliGRAM(s) Oral every 4 hours PRN      REVIEW OF SYSTEMS  --------------------------------------------------------------------------------  Gen: No fevers/chills  Head/Eyes/Ears: No HA  Respiratory: No dyspnea  CV: No chest pain  GI: No abdominal pain, diarrhea  MSK: +B/L LE edema (improving) and L foot pain  Skin: No rashes  Heme: No bleeding    All other systems were reviewed and are negative, except as noted.    VITALS/PHYSICAL EXAM  --------------------------------------------------------------------------------  T(C): 36.2 (02-03-23 @ 04:00), Max: 36.2 (02-03-23 @ 04:00)  HR: 76 (02-03-23 @ 04:00) (64 - 81)  BP: 107/64 (02-03-23 @ 04:00) (96/61 - 144/76)  RR: 11 (02-03-23 @ 04:00) (11 - 18)  SpO2: 98% (02-03-23 @ 04:00) (97% - 100%)  Wt(kg): --    02-02-23 @ 07:01  -  02-03-23 @ 07:00  --------------------------------------------------------  IN: 50 mL / OUT: 2700 mL / NET: -2650 mL      Physical Exam:  Gen: resting, NAD  HEENT: Anicteric  Pulm: CTA B/L  CV: S1S2+  Abd: Soft, +BS    Ext: +trace B/L LE edema, R>L, left foot: gangrenous changes seen  Neuro: Awake and alert  Skin: Warm and dry    LABS/STUDIES  --------------------------------------------------------------------------------              7.6    11.49 >-----------<  535      [02-03-23 @ 03:20]              25.8     131  |  91  |  30  ----------------------------<  188      [02-03-23 @ 03:20]  3.7   |  30  |  2.07        Ca     8.2     [02-03-23 @ 03:20]      Mg     2.40     [02-03-23 @ 03:20]      Phos  3.8     [02-02-23 @ 15:30]    TPro  6.9  /  Alb  2.1  /  TBili  0.3  /  DBili  x   /  AST  13  /  ALT  8   /  AlkPhos  111  [02-03-23 @ 03:20]    PT/INR: PT 15.8 , INR 1.36       [02-03-23 @ 03:20]  PTT: 37.3       [02-03-23 @ 03:20]    Creatinine Trend:  SCr 2.07 [02-03 @ 03:20]  SCr 2.38 [02-02 @ 15:30]  SCr 2.71 [02-02 @ 01:50]  SCr 2.98 [02-01 @ 16:25]  SCr 3.35 [02-01 @ 01:50]    Urinalysis - [01-29-23 @ 18:45]      Color DARK YELLOW / Appearance Slightly Turbid / SG >1.030 / pH 5.5      Gluc Negative / Ketone Trace  / Bili Small / Urobili <2 mg/dL       Blood Negative / Protein 300 mg/dL / Leuk Est Moderate / Nitrite Negative      RBC 1 / WBC 6 / Hyaline 1 / Gran  / Sq Epi  / Non Sq Epi 3 / Bacteria Many    Urine Creatinine 68      [01-28-23 @ 11:01]  Urine Protein 556      [01-28-23 @ 11:01]  Urine Sodium 23      [01-28-23 @ 11:01]  Urine Urea Nitrogen 151.4      [01-28-23 @ 11:01]  Urine Potassium 65.3      [01-28-23 @ 11:01]  Urine Phosphorus 13.7      [02-01-23 @ 01:50]  Urine Osmolality 325      [01-28-23 @ 11:01] [facility] Division of Kidney Diseases & Hypertension  FOLLOW UP NOTE  [phone]--------------------------------------------------------------------------------    HPI: 42-year-female with PMH of HTN, HLD, DM, PAD, CHF admitted with left toe pain. Pt. developed BOB during hospital stay. Pt. was transferred to the MICU on 1/29/23 for septic shock requiring vasopressor support. Pt. being seen for BOB.       24 hour events/subjective: Pt. seen and examined in MICU today. Pt. continues to have L foot pain. Reports that LE edema has improved. Denies fever, chills, nausea, vomiting, CP and SOB during rounds.    PAST HISTORY  --------------------------------------------------------------------------------  No significant changes to PMH, PSH, FHx, SHx, unless otherwise noted    ALLERGIES & MEDICATIONS  --------------------------------------------------------------------------------  Allergies    No Known Allergies    Intolerances    Standing Inpatient Medications  aspirin enteric coated 81 milliGRAM(s) Oral daily  atorvastatin 80 milliGRAM(s) Oral at bedtime  calamine/zinc oxide Lotion 1 Application(s) Topical three times a day  chlorhexidine 2% Cloths 1 Application(s) Topical daily  chlorhexidine 4% Liquid 1 Application(s) Topical <User Schedule>  dextrose 5%. 1000 milliLiter(s) IV Continuous <Continuous>  dextrose 5%. 1000 milliLiter(s) IV Continuous <Continuous>  dextrose 50% Injectable 25 Gram(s) IV Push once  dextrose 50% Injectable 12.5 Gram(s) IV Push once  dextrose 50% Injectable 25 Gram(s) IV Push once  ferrous    sulfate 325 milliGRAM(s) Oral daily  glucagon  Injectable 1 milliGRAM(s) IntraMuscular once  heparin   Injectable 5000 Unit(s) SubCutaneous every 8 hours  influenza   Vaccine 0.5 milliLiter(s) IntraMuscular once  insulin lispro (ADMELOG) corrective regimen sliding scale   SubCutaneous three times a day before meals  insulin lispro (ADMELOG) corrective regimen sliding scale   SubCutaneous at bedtime  melatonin 9 milliGRAM(s) Oral at bedtime  pantoprazole    Tablet 40 milliGRAM(s) Oral before breakfast  polyethylene glycol 3350 17 Gram(s) Oral <User Schedule>  senna 2 Tablet(s) Oral at bedtime    PRN Inpatient Medications  acetaminophen     Tablet .. 650 milliGRAM(s) Oral every 6 hours PRN  dextrose Oral Gel 15 Gram(s) Oral once PRN  HYDROmorphone  Injectable 1 milliGRAM(s) IV Push every 4 hours PRN  ondansetron Injectable 4 milliGRAM(s) IV Push every 8 hours PRN  oxyCODONE    IR 5 milliGRAM(s) Oral every 4 hours PRN    REVIEW OF SYSTEMS  --------------------------------------------------------------------------------  Gen: No fevers/chills  Head/Eyes/Ears: No HA  Respiratory: No dyspnea  CV: No chest pain  GI: No abdominal pain, diarrhea  MSK: +B/L LE edema (improving) and L foot pain  Skin: No rashes  Heme: No bleeding    All other systems were reviewed and are negative, except as noted.    VITALS/PHYSICAL EXAM  --------------------------------------------------------------------------------  SIMBA): 36.2 (02-03-23 @ 04:00), Max: 36.2 (02-03-23 @ 04:00)  HR: 76 (02-03-23 @ 04:00) (64 - 81)  BP: 107/64 (02-03-23 @ 04:00) (96/61 - 144/76)  RR: 11 (02-03-23 @ 04:00) (11 - 18)  SpO2: 98% (02-03-23 @ 04:00) (97% - 100%)  Wt(kg): --    02-02-23 @ 07:01  -  02-03-23 @ 07:00  --------------------------------------------------------  IN: 50 mL / OUT: 2700 mL / NET: -2650 mL    Physical Exam:    Gen: resting, NAD  HEENT: Anicteric  Pulm: CTA B/L  CV: S1S2+  Abd: Soft, +BS    Ext: +trace B/L LE edema, R>L, left foot: gangrenous changes seen  Neuro: Awake and alert  Skin: Warm and dry    LABS/STUDIES  --------------------------------------------------------------------------------              7.6    11.49 >-----------<  535      [02-03-23 @ 03:20]              25.8     131  |  91  |  30  ----------------------------<  188      [02-03-23 @ 03:20]  3.7   |  30  |  2.07        Ca     8.2     [02-03-23 @ 03:20]      Mg     2.40     [02-03-23 @ 03:20]      Phos  3.8     [02-02-23 @ 15:30]    TPro  6.9  /  Alb  2.1  /  TBili  0.3  /  DBili  x   /  AST  13  /  ALT  8   /  AlkPhos  111  [02-03-23 @ 03:20]    Creatinine Trend:  SCr 2.07 [02-03 @ 03:20]  SCr 2.38 [02-02 @ 15:30]  SCr 2.71 [02-02 @ 01:50]  SCr 2.98 [02-01 @ 16:25]  SCr 3.35 [02-01 @ 01:50]

## 2023-02-03 NOTE — PROGRESS NOTE ADULT - ATTENDING COMMENTS
42 F with CABG, HFrEF,  ICD, PAD here with septic shock due to dry gangrene vs other source, BOB, likely acute on chronic decompensated systolic HF    mentating well, pain controlled  wean vasopressors for septic shock  c/w diuresis for BOB and ADHF, keep net negative  monitor off abx as per ID    lvsf severely reduced  on dvt ppx  full code    Critically ill patient requiring frequent bedside visits with therapy changes.

## 2023-02-03 NOTE — CHART NOTE - NSCHARTNOTEFT_GEN_A_CORE
MAR Accept Note  Transfer to:  tele  Accepting Attending Physician:  Ezekiel    Labs and data reviewed.   No findings precluding transfer of service.       HPI/MICU COURSE:   Please refer to MICU transfer note for full details. Briefly, this is a 42F w/ PMH coronary artery disease status post CABG x4 in 2018, HFrEF (EF 21% 1/26/23) s/p Medtronic ICD, DM, PAD s/p 3rd toe amputation who presents with left 2nd toe pain concerning for dry gangrene. Seen by podiatry and vascular teams with plans for possible transmetatarsal amputation. Patient received unasyn 1/26-29. Course c/b worsening renal function, and shock likely multifactorial iso HFrEF, CKD, and sepsis 2/2 LE wounds. Patient started on levophed, Vanc/Cefepime/Flagyl (1/29) and transferred to MICU for further management, medically optimizing for TMA. CCU was consulted and deemed cardiogenic shock unlikely to be primary . Renal was consulted for management of BOB on CKD, patient was diuresed aggressively with bumetanide gtt with alleviation of pressor requirements and improvement of creatinine. ID was consulted regarding antibiotic de-escalation, and therapy was narrowed to zosyn then stopped after blood and urine cultures showed NGTD. UCx returned negative. Patient's left heel pain was managed with dilaudid 1 mg and 0.5 mg oxycodone IR, and patient has had poor PO intake, now improving, and ongoing constipation. Patient's glucose was closely monitored and titrated with ISS, and there were no significant events on telemetry and with EP interrogation. Pain Management consult called for med titration monica-procedurally. Patient stable for transfer at this time.    For Follow-Up:  [ ] F/u nephrology recs regarding bumetanide dosing and frequency of creatinine monitoring  [ ] Re-consult vascular surgery regarding procedural planning, and f/u podiatry recs  [ ] F/u Pain Management recs  [ ] Restart and Uptitrate GDMT as tolerated      Mel Farias MD  Internal Medicine PGY-3

## 2023-02-03 NOTE — PROGRESS NOTE ADULT - PROBLEM SELECTOR PLAN 1
Pt. with BOB in setting of hypotension, recent IV contrast use and CHF. Upon review of labs on [facility] HIE/[place], Scr was elevated at 1.33 on 6/28/21 and improved to 1.15 on 2/24/22. Scr was 1.22 on admission (1/25/23). Pt. underwent LE angiogram on 1/25/23. Scr worsened to 4.23 on 1/30/23. No hydronephrosis seen on CT scan study. Pt. was oliguric, initiated on IV Bumex infusion on 1/30/23, and transitioned to intermittent IV Bumex yesterday (2/2/23). Pt. is non-oliguric, documented urine output is ~2.7L over the past 24 hours. BOB resolving at present. Scr decreased to 2.07 today (2/3/23). Continue with IV diuretic therapy. Monitor labs and urine output. Avoid nephrotoxins, Dose meds as per eGFR.    If you have any questions, please feel free to contact me  Talia Swift  Nephrology Fellow  [phone] / [employer] Teams(Preferred)  (After 5pm or on weekends please page the on-call fellow) Pt. with BOB in setting of hypotension, recent IV contrast use and CHF. Upon review of labs on [facility] HIE/[place], Scr was elevated at 1.33 on 6/28/21 and improved to 1.15 on 2/24/22. Scr was 1.22 on admission (1/25/23). Pt. underwent LE angiogram on 1/25/23. Scr worsened to 4.23 on 1/30/23. No hydronephrosis seen on CT scan study. Pt. was oliguric, initiated on IV Bumex infusion on 1/30/23, and transitioned to intermittent IV Bumex yesterday (2/2/23). Pt. is non-oliguric, documented urine output is ~2.7L over the past 24 hours. BOB resolving at present. Scr decreased to 2.07 today (2/3/23). Recommend to discontinue IV Bumex and transition to Bumex 1 mg PO bid. Monitor labs and urine output. Avoid nephrotoxins, Dose meds as per eGFR.    If you have any questions, please feel free to contact me  Talia Swift  Nephrology Fellow  [phone] / [employer] Teams(Preferred)  (After 5pm or on weekends please page the on-call fellow)

## 2023-02-03 NOTE — PROGRESS NOTE ADULT - ASSESSMENT
42F presents with left foot 2nd digit dry gangrene and ischemic changes to dorsal MPJs 1-5.  - Pt seen and evaluated.  - Afebrile, WBC 12.37  - Left Foot X-Ray: No gas, no osteomyelitis.  - Left foot 2nd digit dry gangrene, dehisced partial 3rd ray resection to sub-dermis, ischemic changes the dorsal and plantar forefoot, no drainage, no purulence, no acute signs of infection. Right foot no wounds, no acute signs of infection.  - No left foot wound culture taken 2/2 no acute signs of infection.  - ID recommendations appreciated - monitor off abx   - SEBASTIÁN/PVR: RABI ncv, RTBI 0.66, LABI 0.58, LTBI flat wvfms, BL wvfms multi-seg art disease.  - CTA: Right distal peroneal artery and left PT/peroneal arteries occluded.  - Vasc plan LLE angio once medically optimized, appreciated.  - Pod Plan Left foot transmetatarsal amputation with tendoachilles lengthening pending vasc optimization local wound care for now.   - Please document medical/cardiac clearance for podiatric procedure under anesthesia when optimized  - Seen with attending.   42F presents with left foot 2nd digit dry gangrene and ischemic changes to dorsal MPJs 1-5.  - Pt seen and evaluated.  - Afebrile, WBC 12.37  - Left Foot X-Ray: No gas, no osteomyelitis.  - Left foot 2nd digit dry gangrene, dehisced partial 3rd ray resection to sub-dermis, ischemic changes the dorsal and plantar forefoot, no drainage, no purulence, no acute signs of infection. Right foot no wounds, no acute signs of infection.  - No left foot wound culture taken 2/2 no acute signs of infection.  - ID recommendations appreciated - monitor off abx   - SEBASTIÁN/PVR: RABI ncv, RTBI 0.66, LABI 0.58, LTBI flat wvfms, BL wvfms multi-seg art disease.  - CTA: Right distal peroneal artery and left PT/peroneal arteries occluded.  - Vasc plan LLE angio once medically optimized, appreciated.  - Pod Plan Left foot transmetatarsal amputation with tendoachilles lengthening pending vasc optimization local wound care for now.   - Seen with attending.

## 2023-02-03 NOTE — PROGRESS NOTE ADULT - ATTENDING COMMENTS
Pt. with resolving BOB. Scr decreased to 2.07 today. Assessment and plan for BOB as outlined above. Monitor labs and urine output. Avoid any potential nephrotoxins. Dose medications as per eGFR.

## 2023-02-03 NOTE — PROGRESS NOTE ADULT - ASSESSMENT
Ms. Conrad is 42F w/ PMH coronary artery disease status post CABG x4 in 2018, CHF s/p ICD placement (interrogated 2022), DM, PAD s/p 3rd toe amputation who presents with left 2nd toe pain concerning for dry gangrene. Seen by podiatry and vascular teams with plans for possible transmetatarsal amputation. Course c/b worsening renal function, and shock likely sepsis 2/2 LE wounds w/ possible cardiogenic component. Patient started on levophed, Vanc/Cefepime/Flagyl and transferred to MICU for further management, medically optimizing for TMA.    NEURO  A&Ox3, no active issues    CARDIAC    #Shock  - s/p levo to target MAP>65, weaned 2/1  - Likely mixed septic and cardiogenic w/ sepsis as primary  given hypothermia and increasing leukocytosis  - TTE this admission on 1/26/23 shows severe global LV systolic dysfunction w/ EF ~21%; elevated LV filling pressures; RV enlargement w/ decreases systolic function  - CCU consulted during RRT (1/29) for possible cardiogenic shock. Per CCU low suspicion for cardiogenic etiology as primary  of shock. Rec checking central sat for CO/CI estimate. Will hold off central line for now  - s/p Vanc/zosyn briefly on adm, Unasyn 1/26-1/29; Cefepime, metronidazole, vanc (by level) 1/29 - 1/31, switched to Zosyn on 1/31 then d/c        #CAD s/p CABG  #HFrEF  - Home cardiac meds held iso shock  - Pt currently RRR. Will monitor on tele  - EP interrogated Medtronic ICD device 2/1     #PAD  #Dry Gangrene L 2nd toe  - Necrosis of 2nd digit i/s/o PAD, concerning for dry gangrene, elevated ESR/CRP. No signs of active infection   - X-ray foot without gas or osseous involvement, limited to soft tissue   - Podiatry and vascular recs appreciated  - CT Angio prelim read with occluded left posterior tibial and peroneal arteries  - f/u Vascular recs regarding future infection   - Pain control w/ Dilaudid 1mg PRN for severe; Oxy 5mg PRN for moderate    RESPIRATORY   #Large R pleural effusion  - Patient w/ R pleural effusion resulting in near-complete atelectasis of RML/RLL. Etiology likely from severe HFrEF  - Diuretics on hold   - Currently satting well on RA. Maintain SpO2>92%    RENAL   #BOB on CKD  - Patient w/ baseline SCr ~1.2 has been uptrending during hospitalization, today 4.2  - Mixed etiology d/t shock, cardio-renal iso HFrEF vs possibly ATN vs less likely contrast injury   - Patient currently oliguric  - Strict I/O. Clements placed for monitoring and removed 2/2  - Nephro consulted, appreciate recs; s/p Bumetanide gtt through 2/1 --> 2 mg IV q8h  - trend Cr     GI    #Small ascites  - Small ascites seen on CT   - C/t monitor    - Diet: Renal diet  - c/w Bowel Regimen    ENDO:    #IDDM  - Will c/w FSG & ISS qac/qhs  - Titrate long-acting insulin PRN    HEME/ONC  #Normocytic anemia  - Patient w/ Hb 7.5. Baseline appears ~8-9  - Iron studies w/ likely FEDERICO  - Trend H/H  - A/C: HSQ    ID   #Septic Shock   - Patient w/ likely septic shock iso L toe gangrene/wound now w/ hypothermia, leukocytosis and hypotension refractory to IV fluids requiring pressor support  - F/u BCx to final read, UCx negative.  - ABx course as above under Shock  - ID consulted 1/30, s/o 1/31    LINES/PPX  - peripherals in tact  - DVT PPx: HSQ  - GOC: Full Code    Geovanni Means  Internal Medicine, PGY-1  Please Contact via TEAMS Ms. Conrad is 42F w/ PMH coronary artery disease status post CABG x4 in 2018, CHF s/p ICD placement (interrogated 2022), DM, PAD s/p 3rd toe amputation who presents with left 2nd toe pain concerning for dry gangrene. Seen by podiatry and vascular teams with plans for possible transmetatarsal amputation. Course c/b worsening renal function, and shock likely sepsis 2/2 LE wounds w/ possible cardiogenic component. Patient s/p levophed, Vanc/Cefepime/Flagyl, medically optimizing for TMA.    NEURO  A&Ox3  Pain management as below, will c/s Pain Management for med titration    CARDIAC    #Shock  - s/p levo to target MAP>65, weaned 2/1  - Likely mixed septic and cardiogenic w/ sepsis as primary  given hypothermia and increasing leukocytosis  - TTE this admission on 1/26/23 shows severe global LV systolic dysfunction w/ EF ~21%; elevated LV filling pressures; RV enlargement w/ decreases systolic function  - CCU consulted during RRT (1/29) for possible cardiogenic shock. Per CCU low suspicion for cardiogenic etiology as primary  of shock. Rec checking central sat for CO/CI estimate. Will hold off central line for now  - s/p Vanc/zosyn briefly on adm, Unasyn 1/26-1/29; Cefepime, metronidazole, vanc (by level) 1/29 - 1/31, switched to Zosyn on 1/31 then d/c      #CAD s/p CABG  #HFrEF  - Home cardiac meds held iso shock  - Pt currently RRR. Will monitor on tele  - EP interrogated Medtronic ICD device 2/1     #PAD  #Dry Gangrene L 2nd toe  - Necrosis of 2nd digit i/s/o PAD, concerning for dry gangrene, elevated ESR/CRP. No signs of active infection   - X-ray foot without gas or osseous involvement, limited to soft tissue   - Podiatry and vascular recs appreciated  - CT Angio prelim read with occluded left posterior tibial and peroneal arteries  - Pain control w/ Dilaudid 1mg PRN for severe; Oxy 5mg PRN for moderate  - PT/OT consulted    RESPIRATORY   #Large R pleural effusion  - Patient w/ R pleural effusion resulting in near-complete atelectasis of RML/RLL. Etiology likely from severe HFrEF  - Diuretics on hold   - Currently satting well on RA. Maintain SpO2>92%    RENAL   #BOB on CKD  - Patient w/ baseline SCr ~1.2 has been uptrending during hospitalization, today 4.2  - Mixed etiology d/t shock, cardio-renal iso HFrEF vs possibly ATN vs less likely contrast injury   - Patient currently oliguric  - Strict I/O. Clements placed for monitoring and removed 2/2  - Nephro consulted, appreciate recs; s/p Bumetanide gtt through 2/1 --> 2 mg IV q8h, will likely transition to PO 2/4  - trend Cr     GI    #Small ascites  - Small ascites seen on CT   - C/t monitor    - Diet: Renal diet  - c/w Bowel Regimen    ENDO:    #IDDM  - Will c/w FSG & ISS qac/qhs  - Titrate long-acting insulin PRN    HEME/ONC  #Normocytic anemia  - Patient w/ Hb 7.5. Baseline appears ~8-9  - Iron studies w/ likely FEDERICO  - Trend H/H  - A/C: HSQ    ID   #Septic Shock   - Patient w/ likely septic shock iso L toe gangrene/wound now w/ hypothermia, leukocytosis and hypotension refractory to IV fluids requiring pressor support  - F/u BCx to final read, UCx negative.  - ABx course as above under Shock  - ID consulted 1/30, s/o 1/31    LINES/PPX  - peripherals in tact  - DVT PPx: HSQ  - GOC: Full Code    Geovanni Means  Internal Medicine, PGY-1  Please Contact via TEAMS

## 2023-02-03 NOTE — CHART NOTE - NSCHARTNOTEFT_GEN_A_CORE
NUTRITION FOLLOW-UP  43yo F w Hx of CVA, CAD s/p CABG x4, CHF, HLD, HTN, MI, DM2 presenting w left 2nd toe pain concerning for dry gangrene.  Currently pending left foot TMA.  S/p RRT for hypotension (1/29).  Findings of large right pleural effusion with near-complete atelectasis of RML/RLL likely 2/2 to HFrEF.  Pt. also with BOB on CKD.p Currently pending left foot TMA.  S/p RRT for hypotension (1/29).  Findings of large right pleural effusion with near-complete atelectasis of RML/RLL likely 2/2 to HFrEF.  Pt. also with BOB on CKD & lyndsey ascites.  + Constipation.     Persistent weight decrease.  Somewhat fluid related, considering diuresis and edema.  However inadequate energy intake also likely contributory.  Fluid accumulation possibly masking further weight loss.      Spoke w RN & met with Pt.  Pt. has been refusing Miralax, which has been increased.  Pt. denies nausea/vomiting or issues chewing/swallowing. Continues to have poor PO intake (<25% of meals).  Mother occasionally brings in food from outside hospital.  Pt. teaches back awareness of dietary sodium restriction.  Drinks 1/2 container Nepro in the morning and the other half in the evening.  Would decrease Nepro to 1x daily as multiple unopened containers observed at bedside.  Encouraged PO intake and consumption of supplement.  Attempted to obtain food preferences.  Advise liberalizing renal diet restriction as electrolytes have been WALs.  Reviewed importance of consistent carbohydrate intake to aid with glycemic control.  Pt. verbalizes general understanding of nutrition education @ this time.            _________________Diet___________________  Diet, DASH/TLC:   Sodium & Cholesterol Restricted  Consistent Carbohydrate {No Snacks} (CSTCHO)  For patients receiving Renal Replacement - No Protein Restr, No Conc K, No Conc Phos, Low Sodium (RENAL)  Supplement Feeding Modality:  Oral  Nepro Cans or Servings Per Day:  3       Frequency:  Daily (01-30-23 @ 14:09) [Active]      Weight:                    Height:  64"                   Ideal Body Weight: 120lbs /54.5kg   64.4kg (2/3)  69.9kg (1/31)  71.6kg (1/26)        Edema: 2+ dependent, b/l arms/legs  Skin: No pressure injuries.  Gangrene L 2nd toe        ________________________Pertinent Medications____________   MEDICATIONS  (STANDING):  aspirin enteric coated 81 milliGRAM(s) Oral daily  atorvastatin 80 milliGRAM(s) Oral at bedtime  buMETAnide 1 milliGRAM(s) Oral every 12 hours  calamine/zinc oxide Lotion 1 Application(s) Topical three times a day  chlorhexidine 2% Cloths 1 Application(s) Topical daily  chlorhexidine 4% Liquid 1 Application(s) Topical <User Schedule>  dextrose 5%. 1000 milliLiter(s) (50 mL/Hr) IV Continuous <Continuous>  dextrose 5%. 1000 milliLiter(s) (100 mL/Hr) IV Continuous <Continuous>  dextrose 50% Injectable 25 Gram(s) IV Push once  dextrose 50% Injectable 12.5 Gram(s) IV Push once  dextrose 50% Injectable 25 Gram(s) IV Push once  ferrous    sulfate 325 milliGRAM(s) Oral daily  glucagon  Injectable 1 milliGRAM(s) IntraMuscular once  heparin   Injectable 5000 Unit(s) SubCutaneous every 8 hours  influenza   Vaccine 0.5 milliLiter(s) IntraMuscular once  insulin lispro (ADMELOG) corrective regimen sliding scale   SubCutaneous three times a day before meals  insulin lispro (ADMELOG) corrective regimen sliding scale   SubCutaneous at bedtime  melatonin 9 milliGRAM(s) Oral at bedtime  naloxegol 12.5 milliGRAM(s) Oral daily  pantoprazole    Tablet 40 milliGRAM(s) Oral before breakfast  polyethylene glycol 3350 17 Gram(s) Oral two times a day  senna 2 Tablet(s) Oral at bedtime    MEDICATIONS  (PRN):  acetaminophen     Tablet .. 650 milliGRAM(s) Oral every 6 hours PRN Temp greater or equal to 38C (100.4F), Mild Pain (1 - 3)  dextrose Oral Gel 15 Gram(s) Oral once PRN Blood Glucose LESS THAN 70 milliGRAM(s)/deciliter  HYDROmorphone  Injectable 1 milliGRAM(s) IV Push every 4 hours PRN Severe Pain (7 - 10)  ondansetron Injectable 4 milliGRAM(s) IV Push every 8 hours PRN Nausea and/or Vomiting  oxyCODONE    IR 5 milliGRAM(s) Oral every 4 hours PRN Moderate Pain (4 - 6)          _________________________Pertinent Labs____________________     02-03    131<L>  |  91<L>  |  30<H>  ----------------------------<  188<H>  3.7   |  30  |  2.07<H>    Ca    8.2<L>      03 Feb 2023 03:20  Phos  3.6     02-03  Mg     2.40     02-03    TPro  6.9  /  Alb  2.1<L>  /  TBili  0.3  /  DBili  x   /  AST  13  /  ALT  8   /  AlkPhos  111  02-03                                                                   7.6    11.49 )-----------( 535      ( 03 Feb 2023 03:20 )             25.8         CAPILLARY BLOOD GLUCOSE  267 (02 Feb 2023 21:00)      POCT Blood Glucose.: 195 mg/dL (03 Feb 2023 11:53)    POCT Blood Glucose.: 195 mg/dL (02-03-23 @ 11:53)  POCT Blood Glucose.: 164 mg/dL (02-03-23 @ 08:05)  POCT Blood Glucose.: 267 mg/dL (02-02-23 @ 21:12)  POCT Blood Glucose.: 203 mg/dL (02-02-23 @ 17:19)  POCT Blood Glucose.: 204 mg/dL (02-02-23 @ 17:17)  POCT Blood Glucose.: 146 mg/dL (02-02-23 @ 12:38)    ________NUTRITION Dx_________    Pt. remains severely malnourished           PLAN/RECOMMENDATIONS:    1) Change/liberalize diet to... DASH/TLC (cholesterol & Na restricted), consistent carbohydrate + Nepro 8oz PO 1x daily.   2) Obtain daily weights  3) Order Nephro-amy  4) Monitor GI status, electrolytes, glucose    RDN remains available and will f/u PRN.          Charla Sullivan, RDN, CDN pager [phone]

## 2023-02-03 NOTE — PROGRESS NOTE ADULT - SUBJECTIVE AND OBJECTIVE BOX
Patient is a 42y old  Female who presents with a chief complaint of Toe pain (03 Feb 2023 07:29)       INTERVAL HPI/OVERNIGHT EVENTS:  Patient seen and evaluated at bedside.  Pt is resting comfortable in NAD. Denies N/V/F/C.    Allergies    No Known Allergies    Intolerances        Vital Signs Last 24 Hrs  T(C): 36.1 (03 Feb 2023 12:00), Max: 36.2 (03 Feb 2023 04:00)  T(F): 96.9 (03 Feb 2023 12:00), Max: 97.2 (03 Feb 2023 04:00)  HR: 80 (03 Feb 2023 12:00) (68 - 81)  BP: 120/68 (03 Feb 2023 12:00) (104/55 - 144/76)  BP(mean): 80 (03 Feb 2023 12:00) (68 - 93)  RR: 16 (03 Feb 2023 12:00) (11 - 18)  SpO2: 100% (03 Feb 2023 12:00) (98% - 100%)    Parameters below as of 03 Feb 2023 08:00  Patient On (Oxygen Delivery Method): room air        LABS:                        7.6    11.49 )-----------( 535      ( 03 Feb 2023 03:20 )             25.8     02-03    131<L>  |  91<L>  |  30<H>  ----------------------------<  188<H>  3.7   |  30  |  2.07<H>    Ca    8.2<L>      03 Feb 2023 03:20  Phos  3.6     02-03  Mg     2.40     02-03    TPro  6.9  /  Alb  2.1<L>  /  TBili  0.3  /  DBili  x   /  AST  13  /  ALT  8   /  AlkPhos  111  02-03    PT/INR - ( 03 Feb 2023 03:20 )   PT: 15.8 sec;   INR: 1.36 ratio         PTT - ( 03 Feb 2023 03:20 )  PTT:37.3 sec    CAPILLARY BLOOD GLUCOSE  267 (02 Feb 2023 21:00)      POCT Blood Glucose.: 195 mg/dL (03 Feb 2023 11:53)  POCT Blood Glucose.: 164 mg/dL (03 Feb 2023 08:05)  POCT Blood Glucose.: 267 mg/dL (02 Feb 2023 21:12)  POCT Blood Glucose.: 203 mg/dL (02 Feb 2023 17:19)  POCT Blood Glucose.: 204 mg/dL (02 Feb 2023 17:17)  POCT Blood Glucose.: 146 mg/dL (02 Feb 2023 12:38)      Lower Extremity Physical Exam:    Vascular: DP/PT 0/4 left, DT/PT 1/4, CFT <3 seconds B/L, Temperature gradient warm to cold left, warm to cool right.   Neuro: Epicritic sensation decreased to the level of toes, B/L.  Musculoskeletal/Ortho: s/p left foot partial 3rd ray resection.  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 Right foot no wounds, no acute signs of infection.      RADIOLOGY & ADDITIONAL TESTS:

## 2023-02-03 NOTE — PROGRESS NOTE ADULT - SUBJECTIVE AND OBJECTIVE BOX
DATE OF SERVICE: 02-03-23 @ 07:30    Patient is a 42y old  Female who presents with a chief complaint of Toe pain (03 Feb 2023 07:17)      SUBJECTIVE / OVERNIGHT EVENTS:  No acute events overnight, patient reports doing well and all questions answered at this time.     Additional Review of Systems:  Denies any other acute sx including f/c, HA, cough, sore throat, eye/ear changes, rhinorrhea, CP, palpitations, SOB, n/v, abdominal pain, back pain, bowel/bladder changes, fatigue, numbness or tingling.    MEDICATIONS  (STANDING):  aspirin enteric coated 81 milliGRAM(s) Oral daily  atorvastatin 80 milliGRAM(s) Oral at bedtime  calamine/zinc oxide Lotion 1 Application(s) Topical three times a day  chlorhexidine 2% Cloths 1 Application(s) Topical daily  chlorhexidine 4% Liquid 1 Application(s) Topical <User Schedule>  dextrose 5%. 1000 milliLiter(s) (50 mL/Hr) IV Continuous <Continuous>  dextrose 5%. 1000 milliLiter(s) (100 mL/Hr) IV Continuous <Continuous>  dextrose 50% Injectable 25 Gram(s) IV Push once  dextrose 50% Injectable 12.5 Gram(s) IV Push once  dextrose 50% Injectable 25 Gram(s) IV Push once  ferrous    sulfate 325 milliGRAM(s) Oral daily  glucagon  Injectable 1 milliGRAM(s) IntraMuscular once  heparin   Injectable 5000 Unit(s) SubCutaneous every 8 hours  influenza   Vaccine 0.5 milliLiter(s) IntraMuscular once  insulin lispro (ADMELOG) corrective regimen sliding scale   SubCutaneous three times a day before meals  insulin lispro (ADMELOG) corrective regimen sliding scale   SubCutaneous at bedtime  melatonin 9 milliGRAM(s) Oral at bedtime  pantoprazole    Tablet 40 milliGRAM(s) Oral before breakfast  polyethylene glycol 3350 17 Gram(s) Oral <User Schedule>  senna 2 Tablet(s) Oral at bedtime    MEDICATIONS  (PRN):  acetaminophen     Tablet .. 650 milliGRAM(s) Oral every 6 hours PRN Temp greater or equal to 38C (100.4F), Mild Pain (1 - 3)  dextrose Oral Gel 15 Gram(s) Oral once PRN Blood Glucose LESS THAN 70 milliGRAM(s)/deciliter  HYDROmorphone  Injectable 1 milliGRAM(s) IV Push every 4 hours PRN Severe Pain (7 - 10)  ondansetron Injectable 4 milliGRAM(s) IV Push every 8 hours PRN Nausea and/or Vomiting  oxyCODONE    IR 5 milliGRAM(s) Oral every 4 hours PRN Moderate Pain (4 - 6)      Vital Signs Last 24 Hrs  T(C): 36.2 (03 Feb 2023 04:00), Max: 36.2 (03 Feb 2023 04:00)  T(F): 97.2 (03 Feb 2023 04:00), Max: 97.2 (03 Feb 2023 04:00)  HR: 76 (03 Feb 2023 04:00) (64 - 81)  BP: 107/64 (03 Feb 2023 04:00) (96/61 - 144/76)  BP(mean): 73 (03 Feb 2023 04:00) (68 - 93)  RR: 11 (03 Feb 2023 04:00) (11 - 18)  SpO2: 98% (03 Feb 2023 04:00) (97% - 100%)    Parameters below as of 03 Feb 2023 04:00  Patient On (Oxygen Delivery Method): room air      CAPILLARY BLOOD GLUCOSE  267 (02 Feb 2023 21:00)      POCT Blood Glucose.: 267 mg/dL (02 Feb 2023 21:12)  POCT Blood Glucose.: 203 mg/dL (02 Feb 2023 17:19)  POCT Blood Glucose.: 204 mg/dL (02 Feb 2023 17:17)  POCT Blood Glucose.: 146 mg/dL (02 Feb 2023 12:38)  POCT Blood Glucose.: 97 mg/dL (02 Feb 2023 08:35)    I&O's Summary    02 Feb 2023 07:01  -  03 Feb 2023 07:00  --------------------------------------------------------  IN: 50 mL / OUT: 2700 mL / NET: -2650 mL        PHYSICAL EXAM:  GENERAL: Clinically well-appearing and comfortable  HEAD:  Atraumatic, Normocephalic  EYES: EOMI, PERRLA, conjunctiva and sclera clear  NECK: Supple, No JVD  CHEST/LUNG: Clear to auscultation bilaterally; No wheeze  HEART: Regular rate and rhythm; No murmurs, rubs, or gallops  ABDOMEN: Soft, Nontender, Nondistended; Bowel sounds present  EXTREMITIES:  2+ Peripheral Pulses, No clubbing, cyanosis, or edema  PSYCH: Normal mood, Normal affect  NEUROLOGY: non-focal  SKIN: No rashes or lesions    LABS:                        7.6    11.49 )-----------( 535      ( 03 Feb 2023 03:20 )             25.8     02-03    131<L>  |  91<L>  |  30<H>  ----------------------------<  188<H>  3.7   |  30  |  2.07<H>    Ca    8.2<L>      03 Feb 2023 03:20  Phos  3.8     02-02  Mg     2.40     02-03    TPro  6.9  /  Alb  2.1<L>  /  TBili  0.3  /  DBili  x   /  AST  13  /  ALT  8   /  AlkPhos  111  02-03    PT/INR - ( 03 Feb 2023 03:20 )   PT: 15.8 sec;   INR: 1.36 ratio         PTT - ( 03 Feb 2023 03:20 )  PTT:37.3 sec          RADIOLOGY & ADDITIONAL TESTS:    Imaging Personally Reviewed:    Consultant(s) Notes Reviewed:      Care Discussed with Consultants/Other Providers:   DATE OF SERVICE: 02-03-23 @ 07:30    Patient is a 42y old  Female who presents with a chief complaint of Toe pain (03 Feb 2023 07:17)      SUBJECTIVE / OVERNIGHT EVENTS:  No acute events overnight, patient reports doing well except constipation, and all questions answered at this time.     Additional Review of Systems:  Denies any other acute sx including f/c, HA, cough, sore throat, eye/ear changes, rhinorrhea, CP, palpitations, SOB, n/v, abdominal pain, back pain, bladder changes, fatigue, numbness or tingling.    MEDICATIONS  (STANDING):  aspirin enteric coated 81 milliGRAM(s) Oral daily  atorvastatin 80 milliGRAM(s) Oral at bedtime  calamine/zinc oxide Lotion 1 Application(s) Topical three times a day  chlorhexidine 2% Cloths 1 Application(s) Topical daily  chlorhexidine 4% Liquid 1 Application(s) Topical <User Schedule>  dextrose 5%. 1000 milliLiter(s) (50 mL/Hr) IV Continuous <Continuous>  dextrose 5%. 1000 milliLiter(s) (100 mL/Hr) IV Continuous <Continuous>  dextrose 50% Injectable 25 Gram(s) IV Push once  dextrose 50% Injectable 12.5 Gram(s) IV Push once  dextrose 50% Injectable 25 Gram(s) IV Push once  ferrous    sulfate 325 milliGRAM(s) Oral daily  glucagon  Injectable 1 milliGRAM(s) IntraMuscular once  heparin   Injectable 5000 Unit(s) SubCutaneous every 8 hours  influenza   Vaccine 0.5 milliLiter(s) IntraMuscular once  insulin lispro (ADMELOG) corrective regimen sliding scale   SubCutaneous three times a day before meals  insulin lispro (ADMELOG) corrective regimen sliding scale   SubCutaneous at bedtime  melatonin 9 milliGRAM(s) Oral at bedtime  pantoprazole    Tablet 40 milliGRAM(s) Oral before breakfast  polyethylene glycol 3350 17 Gram(s) Oral <User Schedule>  senna 2 Tablet(s) Oral at bedtime    MEDICATIONS  (PRN):  acetaminophen     Tablet .. 650 milliGRAM(s) Oral every 6 hours PRN Temp greater or equal to 38C (100.4F), Mild Pain (1 - 3)  dextrose Oral Gel 15 Gram(s) Oral once PRN Blood Glucose LESS THAN 70 milliGRAM(s)/deciliter  HYDROmorphone  Injectable 1 milliGRAM(s) IV Push every 4 hours PRN Severe Pain (7 - 10)  ondansetron Injectable 4 milliGRAM(s) IV Push every 8 hours PRN Nausea and/or Vomiting  oxyCODONE    IR 5 milliGRAM(s) Oral every 4 hours PRN Moderate Pain (4 - 6)      Vital Signs Last 24 Hrs  T(C): 36.2 (03 Feb 2023 04:00), Max: 36.2 (03 Feb 2023 04:00)  T(F): 97.2 (03 Feb 2023 04:00), Max: 97.2 (03 Feb 2023 04:00)  HR: 76 (03 Feb 2023 04:00) (64 - 81)  BP: 107/64 (03 Feb 2023 04:00) (96/61 - 144/76)  BP(mean): 73 (03 Feb 2023 04:00) (68 - 93)  RR: 11 (03 Feb 2023 04:00) (11 - 18)  SpO2: 98% (03 Feb 2023 04:00) (97% - 100%)    Parameters below as of 03 Feb 2023 04:00  Patient On (Oxygen Delivery Method): room air      CAPILLARY BLOOD GLUCOSE  267 (02 Feb 2023 21:00)      POCT Blood Glucose.: 267 mg/dL (02 Feb 2023 21:12)  POCT Blood Glucose.: 203 mg/dL (02 Feb 2023 17:19)  POCT Blood Glucose.: 204 mg/dL (02 Feb 2023 17:17)  POCT Blood Glucose.: 146 mg/dL (02 Feb 2023 12:38)  POCT Blood Glucose.: 97 mg/dL (02 Feb 2023 08:35)    I&O's Summary    02 Feb 2023 07:01  -  03 Feb 2023 07:00  --------------------------------------------------------  IN: 50 mL / OUT: 2700 mL / NET: -2650 mL        PHYSICAL EXAM:  GENERAL: Clinically well-appearing and comfortable  HEAD:  Atraumatic, Normocephalic  EYES: EOMI, PERRLA, conjunctiva and sclera clear  NECK: Supple, No JVD  CHEST/LUNG: Clear to auscultation bilaterally; No wheeze  HEART: Regular rate and rhythm; No murmurs, rubs, or gallops  ABDOMEN: Soft, Nontender, Nondistended; Bowel sounds present  EXTREMITIES:  2+ Peripheral Pulses, No clubbing, cyanosis, or edema  PSYCH: Normal mood, Normal affect  NEUROLOGY: non-focal  SKIN: No rashes or lesions    LABS:                        7.6    11.49 )-----------( 535      ( 03 Feb 2023 03:20 )             25.8     02-03    131<L>  |  91<L>  |  30<H>  ----------------------------<  188<H>  3.7   |  30  |  2.07<H>    Ca    8.2<L>      03 Feb 2023 03:20  Phos  3.8     02-02  Mg     2.40     02-03    TPro  6.9  /  Alb  2.1<L>  /  TBili  0.3  /  DBili  x   /  AST  13  /  ALT  8   /  AlkPhos  111  02-03    PT/INR - ( 03 Feb 2023 03:20 )   PT: 15.8 sec;   INR: 1.36 ratio         PTT - ( 03 Feb 2023 03:20 )  PTT:37.3 sec          RADIOLOGY & ADDITIONAL TESTS:    Imaging Personally Reviewed:    Consultant(s) Notes Reviewed:      Care Discussed with Consultants/Other Providers:   DATE OF SERVICE: 02-03-23 @ 07:30    Patient is a 42y old  Female who presents with a chief complaint of Toe pain (03 Feb 2023 07:17)      SUBJECTIVE / OVERNIGHT EVENTS:  No acute events overnight, patient reports doing well except constipation, and all questions answered at this time.     Additional Review of Systems:  Denies any other acute sx including f/c, HA, cough, sore throat, eye/ear changes, rhinorrhea, CP, palpitations, SOB, n/v, abdominal pain, back pain, bladder changes, fatigue, numbness or tingling.    MEDICATIONS  (STANDING):  aspirin enteric coated 81 milliGRAM(s) Oral daily  atorvastatin 80 milliGRAM(s) Oral at bedtime  calamine/zinc oxide Lotion 1 Application(s) Topical three times a day  chlorhexidine 2% Cloths 1 Application(s) Topical daily  chlorhexidine 4% Liquid 1 Application(s) Topical <User Schedule>  dextrose 5%. 1000 milliLiter(s) (50 mL/Hr) IV Continuous <Continuous>  dextrose 5%. 1000 milliLiter(s) (100 mL/Hr) IV Continuous <Continuous>  dextrose 50% Injectable 25 Gram(s) IV Push once  dextrose 50% Injectable 12.5 Gram(s) IV Push once  dextrose 50% Injectable 25 Gram(s) IV Push once  ferrous    sulfate 325 milliGRAM(s) Oral daily  glucagon  Injectable 1 milliGRAM(s) IntraMuscular once  heparin   Injectable 5000 Unit(s) SubCutaneous every 8 hours  influenza   Vaccine 0.5 milliLiter(s) IntraMuscular once  insulin lispro (ADMELOG) corrective regimen sliding scale   SubCutaneous three times a day before meals  insulin lispro (ADMELOG) corrective regimen sliding scale   SubCutaneous at bedtime  melatonin 9 milliGRAM(s) Oral at bedtime  pantoprazole    Tablet 40 milliGRAM(s) Oral before breakfast  polyethylene glycol 3350 17 Gram(s) Oral <User Schedule>  senna 2 Tablet(s) Oral at bedtime    MEDICATIONS  (PRN):  acetaminophen     Tablet .. 650 milliGRAM(s) Oral every 6 hours PRN Temp greater or equal to 38C (100.4F), Mild Pain (1 - 3)  dextrose Oral Gel 15 Gram(s) Oral once PRN Blood Glucose LESS THAN 70 milliGRAM(s)/deciliter  HYDROmorphone  Injectable 1 milliGRAM(s) IV Push every 4 hours PRN Severe Pain (7 - 10)  ondansetron Injectable 4 milliGRAM(s) IV Push every 8 hours PRN Nausea and/or Vomiting  oxyCODONE    IR 5 milliGRAM(s) Oral every 4 hours PRN Moderate Pain (4 - 6)      Vital Signs Last 24 Hrs  T(C): 36.2 (03 Feb 2023 04:00), Max: 36.2 (03 Feb 2023 04:00)  T(F): 97.2 (03 Feb 2023 04:00), Max: 97.2 (03 Feb 2023 04:00)  HR: 76 (03 Feb 2023 04:00) (64 - 81)  BP: 107/64 (03 Feb 2023 04:00) (96/61 - 144/76)  BP(mean): 73 (03 Feb 2023 04:00) (68 - 93)  RR: 11 (03 Feb 2023 04:00) (11 - 18)  SpO2: 98% (03 Feb 2023 04:00) (97% - 100%)    Parameters below as of 03 Feb 2023 04:00  Patient On (Oxygen Delivery Method): room air      CAPILLARY BLOOD GLUCOSE  267 (02 Feb 2023 21:00)      POCT Blood Glucose.: 267 mg/dL (02 Feb 2023 21:12)  POCT Blood Glucose.: 203 mg/dL (02 Feb 2023 17:19)  POCT Blood Glucose.: 204 mg/dL (02 Feb 2023 17:17)  POCT Blood Glucose.: 146 mg/dL (02 Feb 2023 12:38)  POCT Blood Glucose.: 97 mg/dL (02 Feb 2023 08:35)    I&O's Summary    02 Feb 2023 07:01  -  03 Feb 2023 07:00  --------------------------------------------------------  IN: 50 mL / OUT: 2700 mL / NET: -2650 mL      PHYSICAL EXAM:  GENERAL: Clinically well-appearing and comfortable  HEAD:  Atraumatic, Normocephalic  EYES: EOMI, PERRLA, conjunctiva and sclera clear  NECK: Supple, No JVD  CHEST/LUNG: Clear to auscultation bilaterally; No wheeze  HEART: Regular rate and rhythm; No murmurs, rubs, or gallops  ABDOMEN: Soft, Nontender, Nondistended; Bowel sounds present  EXTREMITIES:  2+ Peripheral Pulses, No clubbing, cyanosis, or edema  PSYCH: Normal mood, Normal affect  NEUROLOGY: non-focal  SKIN: well-healed circular lesions over b/l legs; 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, heel TTP without erythema    LABS:                        7.6    11.49 )-----------( 535      ( 03 Feb 2023 03:20 )             25.8     02-03    131<L>  |  91<L>  |  30<H>  ----------------------------<  188<H>  3.7   |  30  |  2.07<H>    Ca    8.2<L>      03 Feb 2023 03:20  Phos  3.8     02-02  Mg     2.40     02-03    TPro  6.9  /  Alb  2.1<L>  /  TBili  0.3  /  DBili  x   /  AST  13  /  ALT  8   /  AlkPhos  111  02-03    PT/INR - ( 03 Feb 2023 03:20 )   PT: 15.8 sec;   INR: 1.36 ratio         PTT - ( 03 Feb 2023 03:20 )  PTT:37.3 sec          RADIOLOGY & ADDITIONAL TESTS:    Imaging Personally Reviewed:    Consultant(s) Notes Reviewed:      Care Discussed with Consultants/Other Providers:

## 2023-02-04 LAB
ANION GAP SERPL CALC-SCNC: 13 MMOL/L — SIGNIFICANT CHANGE UP (ref 7–14)
BASOPHILS # BLD AUTO: 0.03 K/UL — SIGNIFICANT CHANGE UP (ref 0–0.2)
BASOPHILS NFR BLD AUTO: 0.2 % — SIGNIFICANT CHANGE UP (ref 0–2)
BLD GP AB SCN SERPL QL: NEGATIVE — SIGNIFICANT CHANGE UP
BUN SERPL-MCNC: 25 MG/DL — HIGH (ref 7–23)
CALCIUM SERPL-MCNC: 8.8 MG/DL — SIGNIFICANT CHANGE UP (ref 8.4–10.5)
CHLORIDE SERPL-SCNC: 92 MMOL/L — LOW (ref 98–107)
CO2 SERPL-SCNC: 26 MMOL/L — SIGNIFICANT CHANGE UP (ref 22–31)
CREAT SERPL-MCNC: 1.66 MG/DL — HIGH (ref 0.5–1.3)
EGFR: 39 ML/MIN/1.73M2 — LOW
EOSINOPHIL # BLD AUTO: 0.15 K/UL — SIGNIFICANT CHANGE UP (ref 0–0.5)
EOSINOPHIL NFR BLD AUTO: 1.2 % — SIGNIFICANT CHANGE UP (ref 0–6)
GLUCOSE BLDC GLUCOMTR-MCNC: 151 MG/DL — HIGH (ref 70–99)
GLUCOSE BLDC GLUCOMTR-MCNC: 199 MG/DL — HIGH (ref 70–99)
GLUCOSE BLDC GLUCOMTR-MCNC: 241 MG/DL — HIGH (ref 70–99)
GLUCOSE BLDC GLUCOMTR-MCNC: 242 MG/DL — HIGH (ref 70–99)
GLUCOSE SERPL-MCNC: 194 MG/DL — HIGH (ref 70–99)
HCT VFR BLD CALC: 29.9 % — LOW (ref 34.5–45)
HGB BLD-MCNC: 8.6 G/DL — LOW (ref 11.5–15.5)
IANC: 8.65 K/UL — HIGH (ref 1.8–7.4)
IMM GRANULOCYTES NFR BLD AUTO: 0.4 % — SIGNIFICANT CHANGE UP (ref 0–0.9)
LYMPHOCYTES # BLD AUTO: 16.7 % — SIGNIFICANT CHANGE UP (ref 13–44)
LYMPHOCYTES # BLD AUTO: 2.07 K/UL — SIGNIFICANT CHANGE UP (ref 1–3.3)
MAGNESIUM SERPL-MCNC: 2 MG/DL — SIGNIFICANT CHANGE UP (ref 1.6–2.6)
MCHC RBC-ENTMCNC: 24.5 PG — LOW (ref 27–34)
MCHC RBC-ENTMCNC: 28.8 GM/DL — LOW (ref 32–36)
MCV RBC AUTO: 85.2 FL — SIGNIFICANT CHANGE UP (ref 80–100)
MONOCYTES # BLD AUTO: 1.45 K/UL — HIGH (ref 0–0.9)
MONOCYTES NFR BLD AUTO: 11.7 % — SIGNIFICANT CHANGE UP (ref 2–14)
NEUTROPHILS # BLD AUTO: 8.65 K/UL — HIGH (ref 1.8–7.4)
NEUTROPHILS NFR BLD AUTO: 69.8 % — SIGNIFICANT CHANGE UP (ref 43–77)
NRBC # BLD: 0 /100 WBCS — SIGNIFICANT CHANGE UP (ref 0–0)
NRBC # FLD: 0 K/UL — SIGNIFICANT CHANGE UP (ref 0–0)
PHOSPHATE SERPL-MCNC: 2.6 MG/DL — SIGNIFICANT CHANGE UP (ref 2.5–4.5)
PLATELET # BLD AUTO: 618 K/UL — HIGH (ref 150–400)
POTASSIUM SERPL-MCNC: 4.3 MMOL/L — SIGNIFICANT CHANGE UP (ref 3.5–5.3)
POTASSIUM SERPL-SCNC: 4.3 MMOL/L — SIGNIFICANT CHANGE UP (ref 3.5–5.3)
RBC # BLD: 3.51 M/UL — LOW (ref 3.8–5.2)
RBC # FLD: 19.7 % — HIGH (ref 10.3–14.5)
RH IG SCN BLD-IMP: POSITIVE — SIGNIFICANT CHANGE UP
SODIUM SERPL-SCNC: 131 MMOL/L — LOW (ref 135–145)
WBC # BLD: 12.4 K/UL — HIGH (ref 3.8–10.5)
WBC # FLD AUTO: 12.4 K/UL — HIGH (ref 3.8–10.5)

## 2023-02-04 PROCEDURE — 99221 1ST HOSP IP/OBS SF/LOW 40: CPT

## 2023-02-04 PROCEDURE — 99233 SBSQ HOSP IP/OBS HIGH 50: CPT

## 2023-02-04 PROCEDURE — 99253 IP/OBS CNSLTJ NEW/EST LOW 45: CPT | Mod: GC

## 2023-02-04 RX ORDER — HYDROMORPHONE HYDROCHLORIDE 2 MG/ML
2 INJECTION INTRAMUSCULAR; INTRAVENOUS; SUBCUTANEOUS EVERY 4 HOURS
Refills: 0 | Status: DISCONTINUED | OUTPATIENT
Start: 2023-02-04 | End: 2023-02-05

## 2023-02-04 RX ORDER — HYDROMORPHONE HYDROCHLORIDE 2 MG/ML
1 INJECTION INTRAMUSCULAR; INTRAVENOUS; SUBCUTANEOUS ONCE
Refills: 0 | Status: DISCONTINUED | OUTPATIENT
Start: 2023-02-04 | End: 2023-02-04

## 2023-02-04 RX ADMIN — Medication 2: at 13:32

## 2023-02-04 RX ADMIN — HYDROMORPHONE HYDROCHLORIDE 2 MILLIGRAM(S): 2 INJECTION INTRAMUSCULAR; INTRAVENOUS; SUBCUTANEOUS at 20:28

## 2023-02-04 RX ADMIN — Medication 1: at 18:15

## 2023-02-04 RX ADMIN — CALAMINE AND ZINC OXIDE AND PHENOL 1 APPLICATION(S): 160; 10 LOTION TOPICAL at 05:47

## 2023-02-04 RX ADMIN — HYDROMORPHONE HYDROCHLORIDE 2 MILLIGRAM(S): 2 INJECTION INTRAMUSCULAR; INTRAVENOUS; SUBCUTANEOUS at 12:40

## 2023-02-04 RX ADMIN — HYDROMORPHONE HYDROCHLORIDE 1 MILLIGRAM(S): 2 INJECTION INTRAMUSCULAR; INTRAVENOUS; SUBCUTANEOUS at 06:04

## 2023-02-04 RX ADMIN — POLYETHYLENE GLYCOL 3350 17 GRAM(S): 17 POWDER, FOR SOLUTION ORAL at 22:23

## 2023-02-04 RX ADMIN — HYDROMORPHONE HYDROCHLORIDE 1 MILLIGRAM(S): 2 INJECTION INTRAMUSCULAR; INTRAVENOUS; SUBCUTANEOUS at 02:00

## 2023-02-04 RX ADMIN — HEPARIN SODIUM 5000 UNIT(S): 5000 INJECTION INTRAVENOUS; SUBCUTANEOUS at 22:19

## 2023-02-04 RX ADMIN — Medication 9 MILLIGRAM(S): at 22:22

## 2023-02-04 RX ADMIN — PANTOPRAZOLE SODIUM 40 MILLIGRAM(S): 20 TABLET, DELAYED RELEASE ORAL at 05:48

## 2023-02-04 RX ADMIN — BUMETANIDE 1 MILLIGRAM(S): 0.25 INJECTION INTRAMUSCULAR; INTRAVENOUS at 22:22

## 2023-02-04 RX ADMIN — HYDROMORPHONE HYDROCHLORIDE 2 MILLIGRAM(S): 2 INJECTION INTRAMUSCULAR; INTRAVENOUS; SUBCUTANEOUS at 16:40

## 2023-02-04 RX ADMIN — BUMETANIDE 1 MILLIGRAM(S): 0.25 INJECTION INTRAMUSCULAR; INTRAVENOUS at 09:57

## 2023-02-04 RX ADMIN — HYDROMORPHONE HYDROCHLORIDE 1 MILLIGRAM(S): 2 INJECTION INTRAMUSCULAR; INTRAVENOUS; SUBCUTANEOUS at 01:45

## 2023-02-04 RX ADMIN — HYDROMORPHONE HYDROCHLORIDE 2 MILLIGRAM(S): 2 INJECTION INTRAMUSCULAR; INTRAVENOUS; SUBCUTANEOUS at 20:43

## 2023-02-04 RX ADMIN — OXYCODONE HYDROCHLORIDE 5 MILLIGRAM(S): 5 TABLET ORAL at 23:23

## 2023-02-04 RX ADMIN — HYDROMORPHONE HYDROCHLORIDE 2 MILLIGRAM(S): 2 INJECTION INTRAMUSCULAR; INTRAVENOUS; SUBCUTANEOUS at 12:25

## 2023-02-04 RX ADMIN — SENNA PLUS 2 TABLET(S): 8.6 TABLET ORAL at 22:23

## 2023-02-04 RX ADMIN — CALAMINE AND ZINC OXIDE AND PHENOL 1 APPLICATION(S): 160; 10 LOTION TOPICAL at 13:33

## 2023-02-04 RX ADMIN — CALAMINE AND ZINC OXIDE AND PHENOL 1 APPLICATION(S): 160; 10 LOTION TOPICAL at 22:18

## 2023-02-04 RX ADMIN — HYDROMORPHONE HYDROCHLORIDE 1 MILLIGRAM(S): 2 INJECTION INTRAMUSCULAR; INTRAVENOUS; SUBCUTANEOUS at 08:20

## 2023-02-04 RX ADMIN — Medication 81 MILLIGRAM(S): at 11:57

## 2023-02-04 RX ADMIN — HYDROMORPHONE HYDROCHLORIDE 2 MILLIGRAM(S): 2 INJECTION INTRAMUSCULAR; INTRAVENOUS; SUBCUTANEOUS at 16:25

## 2023-02-04 RX ADMIN — HYDROMORPHONE HYDROCHLORIDE 1 MILLIGRAM(S): 2 INJECTION INTRAMUSCULAR; INTRAVENOUS; SUBCUTANEOUS at 05:49

## 2023-02-04 RX ADMIN — HYDROMORPHONE HYDROCHLORIDE 1 MILLIGRAM(S): 2 INJECTION INTRAMUSCULAR; INTRAVENOUS; SUBCUTANEOUS at 09:20

## 2023-02-04 RX ADMIN — NALOXEGOL OXALATE 12.5 MILLIGRAM(S): 12.5 TABLET, FILM COATED ORAL at 18:17

## 2023-02-04 RX ADMIN — HEPARIN SODIUM 5000 UNIT(S): 5000 INJECTION INTRAVENOUS; SUBCUTANEOUS at 05:48

## 2023-02-04 RX ADMIN — HEPARIN SODIUM 5000 UNIT(S): 5000 INJECTION INTRAVENOUS; SUBCUTANEOUS at 13:34

## 2023-02-04 RX ADMIN — OXYCODONE HYDROCHLORIDE 5 MILLIGRAM(S): 5 TABLET ORAL at 22:23

## 2023-02-04 RX ADMIN — OXYCODONE HYDROCHLORIDE 5 MILLIGRAM(S): 5 TABLET ORAL at 00:26

## 2023-02-04 RX ADMIN — ATORVASTATIN CALCIUM 80 MILLIGRAM(S): 80 TABLET, FILM COATED ORAL at 22:21

## 2023-02-04 RX ADMIN — CHLORHEXIDINE GLUCONATE 1 APPLICATION(S): 213 SOLUTION TOPICAL at 11:57

## 2023-02-04 RX ADMIN — Medication 325 MILLIGRAM(S): at 11:57

## 2023-02-04 RX ADMIN — Medication 1: at 09:17

## 2023-02-04 NOTE — PROGRESS NOTE ADULT - ASSESSMENT
42F w/ PMH coronary artery disease status post CABG x4 in 2018, CHF s/p ICD placement (interrogated 2022), DM, PAD s/p 3rd toe amputation who presents with left 2nd toe pain concerning for dry gangrene. Seen by podiatry and vascular teams with plans for possible transmetatarsal amputation. Course c/b worsening renal function, and shock likely sepsis 2/2 LE wounds w/ possible cardiogenic component. Patient s/p levophed, Vanc/Cefepime/Flagyl, medically optimizing for TMA.

## 2023-02-04 NOTE — OCCUPATIONAL THERAPY INITIAL EVALUATION ADULT - GENERAL OBSERVATIONS, REHAB EVAL
Patient received semisupine in bed in NAD; agreeable to participate in OT evaluation. +tele. +Clements.

## 2023-02-04 NOTE — PROGRESS NOTE ADULT - PROBLEM SELECTOR PLAN 7
Hb today 7.5 from 8.7 on admission, continuing to downtrend  no signs/sx of bleeding, likely related to inflammation   +RBC on urinalysis  pt notes brown urine   trend H/H  outpt f/u with PMD for further w/u and age appropriate cancer Patient with concern for anemia with iron studies showing evidence of iron deficiency with a component of anemia of chronic disease.  - no signs/sx of bleeding, likely related to inflammation   - continue to trend H/H  - active type and screen  - transfuse < 7 or <8 if symptomatic iso of heart disease

## 2023-02-04 NOTE — CONSULT NOTE ADULT - SUBJECTIVE AND OBJECTIVE BOX
HPI:  42F w/ PMH coronary artery disease status post CABG x4 in 2018, CHF s/p ICD placement (interrogated ), DM, PAD s/p 3rd toe amputation who presents with left 2nd toe pain concerning for dry gangrene. Seen by podiatry and vascular teams with plans for possible transmetatarsal amputation. Course c/b worsening renal function, and shock likely sepsis 2/2 LE wounds w/ possible cardiogenic component. Patient s/p levophed, Vanc/Cefepime/Flagyl, medically optimizing for TMA.    Derm team was consulted due to rash on upper extremities. Patient refers she has had these spots for 3 weeks, and that they were more red and raised before, and have gone down in size and itch gradually. Reports calamine lotion helps with some ongoing itch.     PAST MEDICAL & SURGICAL HISTORY:  MI (myocardial infarction)  2017      Type 2 diabetes mellitus      Hypertension      Hyperlipidemia      Coronary artery disease  had CABG x 4      Cerebrovascular accident (CVA)  residual of right sided weakness      H/O congestive heart disease  ICM/chronic systolic CHF as per Dr. Diana Menjivar      History of cardiomyopathy      Thrombus  left ventricular mural thrombus      S/P       H/O tubal ligation  2016      Coronary artery disease  CABG x 4 in 2018          Review of Systems:  REVIEW OF SYSTEMS      General: no fevers/chills, no lethary	    Skin/Breast: see HPI  	  Ophthalmologic: no eye pain or change in vision  	  ENMT: no dysphagia or change in hearing    Respiratory and Thorax: no SOB or cough  	  Cardiovascular: no palpitations or chest pain    Gastrointestinal: no abdomenal pain or blood in stool     Genitourinary: no dysuria or frequency    Musculoskeletal: no joint pains or weakness	    Neurological:no weakness, numbness , or tingling    MEDICATIONS  (STANDING):  aspirin enteric coated 81 milliGRAM(s) Oral daily  atorvastatin 80 milliGRAM(s) Oral at bedtime  buMETAnide 1 milliGRAM(s) Oral every 12 hours  calamine/zinc oxide Lotion 1 Application(s) Topical three times a day  chlorhexidine 2% Cloths 1 Application(s) Topical daily  dextrose 5%. 1000 milliLiter(s) IV Continuous <Continuous>  dextrose 5%. 1000 milliLiter(s) IV Continuous <Continuous>  dextrose 50% Injectable 25 Gram(s) IV Push once  dextrose 50% Injectable 12.5 Gram(s) IV Push once  dextrose 50% Injectable 25 Gram(s) IV Push once  ferrous    sulfate 325 milliGRAM(s) Oral daily  glucagon  Injectable 1 milliGRAM(s) IntraMuscular once  heparin   Injectable 5000 Unit(s) SubCutaneous every 8 hours  influenza   Vaccine 0.5 milliLiter(s) IntraMuscular once  insulin lispro (ADMELOG) corrective regimen sliding scale   SubCutaneous three times a day before meals  insulin lispro (ADMELOG) corrective regimen sliding scale   SubCutaneous at bedtime  melatonin 9 milliGRAM(s) Oral at bedtime  naloxegol 12.5 milliGRAM(s) Oral daily  pantoprazole    Tablet 40 milliGRAM(s) Oral before breakfast  polyethylene glycol 3350 17 Gram(s) Oral two times a day  senna 2 Tablet(s) Oral at bedtime    ALLERGIES: No Known Allergies        SOCIAL HISTORY:  ____________________________________  Social History:  Patient denies smoking, alcohol use or other illicit drug use. Now lives with mother since [place]  [place] of [place]. Does have a home health aid. Before this episode, patient was able to ambulate independently.  FAMILY HISTORY:  Family history of heart disease (Father)          VITAL SIGNS LAST 24 HOURS:  T(F): 98.6 (02-04 @ 16:40), Max: 98.6 (02-04 @ 16:25)  HR: 87 (02-04 @ 16:40) (80 - 98)  BP: 114/61 (02-04 @ 16:40) (114/61 - 138/78)  RR: 18 (02-04 @ 16:40) (18 - 19)    PHYSICAL EXAM:     The patient was alert and oriented X 3, well nourished, and in no  apparent distress.  OP showed no ulcerations  There was no visible lymphadenopathy.  Conjunctiva were non injected  There was no clubbing or edema of extremities.  The scalp, hair, face, eyebrows, lips, OP, neck, chest, back,   extremities X 4, nails were examined.  There was no hyperhidrosis or bromhidrosis.    Of note on skin exam:     linearly arranged of dry brown papules on the b/l upper arms, scattered small brown macules and papules on b/l lower extremities. Background xerosis.    LABS:                        8.6    12.40 )-----------( 618      ( 2023 07:20 )             29.9     02-04    131<L>  |  92<L>  |  25<H>  ----------------------------<  194<H>  4.3   |  26  |  1.66<H>    Ca    8.8      2023 07:20  Phos  2.6     02-04  Mg     2.00     02-04    TPro  6.9  /  Alb  2.1<L>  /  TBili  0.3  /  DBili  x   /  AST  13  /  ALT  8   /  AlkPhos  111  02-03    PT/INR - ( 2023 03:20 )   PT: 15.8 sec;   INR: 1.36 ratio         PTT - ( 2023 03:20 )  PTT:37.3 sec

## 2023-02-04 NOTE — PROGRESS NOTE ADULT - PROBLEM SELECTOR PLAN 2
Patient w/ baseline SCr ~1.2 has been uptrending during hospitalization to Cr. 4.2. Likely mixed etiology d/t shock, cardio-renal iso HFrEF vs possibly ATN vs less likely contrast injury   - briefly oliguric iso elevated Cr  - ponce placed for strict I&O and monitoring; currently net negative.   - s/p Bumetanide gtt through 2/1 --> 2 mg IV q8h, now transitioned to bumex 1 PO BID  - will need to optimize kidney function prior to LLE angiogram; likely to hold diuresis on day of contrast load +/- hydration  - trend Cr   - appreciate nephrology recommendation

## 2023-02-04 NOTE — PROGRESS NOTE ADULT - PROBLEM SELECTOR PLAN 6
On metformin 500mg BID, glimepiride 4mg, lantus 8U at bedtime   - A1c 7.1   - c/w ISS only in the setting of BOB   - can readdress addition of lantus 5U (used on admission) after watching FSG trend and improvement in BOB

## 2023-02-04 NOTE — PROGRESS NOTE ADULT - SUBJECTIVE AND OBJECTIVE BOX
Daysi Flores  PGY-1 Resident Physician   Pager [phone]/ [phone]    Patient is a 42y old  Female who presents with a chief complaint of Toe pain (03 Feb 2023 12:21)      SUBJECTIVE / OVERNIGHT EVENTS:  Patient seen and evaluated at bedside.    Denies any fevers, chills, CP, or SOB.    Vital Signs Last 24 Hrs  T(C): 36.6 (04 Feb 2023 05:47), Max: 36.9 (04 Feb 2023 04:03)  T(F): 97.9 (04 Feb 2023 05:47), Max: 98.4 (04 Feb 2023 04:03)  HR: 80 (04 Feb 2023 05:47) (74 - 93)  BP: 132/85 (04 Feb 2023 05:47) (118/68 - 138/78)  BP(mean): 80 (03 Feb 2023 20:00) (79 - 82)  RR: 18 (04 Feb 2023 05:47) (13 - 20)  SpO2: 100% (04 Feb 2023 05:47) (99% - 100%)    Parameters below as of 04 Feb 2023 05:47  Patient On (Oxygen Delivery Method): room air        PHYSICAL EXAM:  GENERAL: NAD, well-developed  CHEST/LUNG: Clear to auscultation bilaterally; No wheeze  HEART: Regular rate and rhythm; Normal S1 S2, No murmurs, rubs, or gallops  ABDOMEN: Soft, Nontender, Nondistended; Bowel sounds present  EXTREMITIES:  2+ Peripheral Pulses, No clubbing, cyanosis, or edema  PSYCH: AAOx3    LABS:                        7.6    11.49 )-----------( 535      ( 03 Feb 2023 03:20 )             25.8     Hgb Trend: 7.6<--, 7.9<--, 7.3<--, 7.3<--, 7.5<--  02-03    131<L>  |  91<L>  |  30<H>  ----------------------------<  188<H>  3.7   |  30  |  2.07<H>    Ca    8.2<L>      03 Feb 2023 03:20  Phos  3.6     02-03  Mg     2.40     02-03    TPro  6.9  /  Alb  2.1<L>  /  TBili  0.3  /  DBili  x   /  AST  13  /  ALT  8   /  AlkPhos  111  02-03    Creatinine Trend: 2.07<--, 2.38<--, 2.71<--, 2.98<--, 3.35<--, 3.48<--  LIVER FUNCTIONS - ( 03 Feb 2023 03:20 )  Alb: 2.1 g/dL / Pro: 6.9 g/dL / ALK PHOS: 111 U/L / ALT: 8 U/L / AST: 13 U/L / GGT: x           PT/INR - ( 03 Feb 2023 03:20 )   PT: 15.8 sec;   INR: 1.36 ratio         PTT - ( 03 Feb 2023 03:20 )  PTT:37.3 sec       Daysi Flores  PGY-1 Resident Physician   Pager [phone]/ [phone]    Patient is a 42y old  Female who presents with a chief complaint of Toe pain (03 Feb 2023 12:21)      SUBJECTIVE / OVERNIGHT EVENTS:  Patient seen and evaluated at bedside.  This AM, patient reports she is in a lot of pain with her L foot and that the pain medication she received a couple hours ago are no longer effective. Tearful iso of pain. Understands surgical plan.  Denies any fevers or chills. States she is not having CP or SOB. Only complaint is pain from foot    Vital Signs Last 24 Hrs  T(C): 36.6 (04 Feb 2023 05:47), Max: 36.9 (04 Feb 2023 04:03)  T(F): 97.9 (04 Feb 2023 05:47), Max: 98.4 (04 Feb 2023 04:03)  HR: 80 (04 Feb 2023 05:47) (74 - 93)  BP: 132/85 (04 Feb 2023 05:47) (118/68 - 138/78)  BP(mean): 80 (03 Feb 2023 20:00) (79 - 82)  RR: 18 (04 Feb 2023 05:47) (13 - 20)  SpO2: 100% (04 Feb 2023 05:47) (99% - 100%)    Parameters below as of 04 Feb 2023 05:47  Patient On (Oxygen Delivery Method): room air        PHYSICAL EXAM:  GENERAL: laying in bed, appears to be in pain and uncomfortabl  CHEST/LUNG: Mildly decreased lung sounds on R lower lobe   HEART: Regular rate and rhythm; Normal S1 S2, No murmurs, rubs, or gallops  ABDOMEN: Soft, Nontender, Nondistended; Bowel sounds present  EXTREMITIES:  LLE exam pain limited but patient with spontaneous movement of leg. R 2nd and th toe appear necrotic with some ?extension to mid foot. tender to touch. RLE without limitation   SKIN: flat circular skin lesions on RLE. small skin lesion in circular pattern on RUE  PSYCH: AAOx3    LABS:                        7.6    11.49 )-----------( 535      ( 03 Feb 2023 03:20 )             25.8     Hgb Trend: 7.6<--, 7.9<--, 7.3<--, 7.3<--, 7.5<--  02-03    131<L>  |  91<L>  |  30<H>  ----------------------------<  188<H>  3.7   |  30  |  2.07<H>    Ca    8.2<L>      03 Feb 2023 03:20  Phos  3.6     02-03  Mg     2.40     02-03    TPro  6.9  /  Alb  2.1<L>  /  TBili  0.3  /  DBili  x   /  AST  13  /  ALT  8   /  AlkPhos  111  02-03    Creatinine Trend: 2.07<--, 2.38<--, 2.71<--, 2.98<--, 3.35<--, 3.48<--  LIVER FUNCTIONS - ( 03 Feb 2023 03:20 )  Alb: 2.1 g/dL / Pro: 6.9 g/dL / ALK PHOS: 111 U/L / ALT: 8 U/L / AST: 13 U/L / GGT: x           PT/INR - ( 03 Feb 2023 03:20 )   PT: 15.8 sec;   INR: 1.36 ratio         PTT - ( 03 Feb 2023 03:20 )  PTT:37.3 sec       Daysi Flores  PGY-1 Resident Physician   Pager [phone]/ [phone]    Patient is a 42y old  Female who presents with a chief complaint of Toe pain (03 Feb 2023 12:21)      SUBJECTIVE / OVERNIGHT EVENTS:  Patient seen and evaluated at bedside.  This AM, patient reports she is in a lot of pain with her L foot and that the pain medication she received a couple hours ago are no longer effective. Tearful iso of pain. Understands surgical plan.  Denies any fevers or chills. States she is not having CP or SOB. Only complaint is pain from foot.    Vital Signs Last 24 Hrs  T(C): 36.6 (04 Feb 2023 05:47), Max: 36.9 (04 Feb 2023 04:03)  T(F): 97.9 (04 Feb 2023 05:47), Max: 98.4 (04 Feb 2023 04:03)  HR: 80 (04 Feb 2023 05:47) (74 - 93)  BP: 132/85 (04 Feb 2023 05:47) (118/68 - 138/78)  BP(mean): 80 (03 Feb 2023 20:00) (79 - 82)  RR: 18 (04 Feb 2023 05:47) (13 - 20)  SpO2: 100% (04 Feb 2023 05:47) (99% - 100%)    Parameters below as of 04 Feb 2023 05:47  Patient On (Oxygen Delivery Method): room air        PHYSICAL EXAM:  GENERAL: laying in bed, appears to be in pain and uncomfortabl  CHEST/LUNG: Mildly decreased lung sounds on R lower lobe   HEART: Regular rate and rhythm; Normal S1 S2, No murmurs, rubs, or gallops  ABDOMEN: Soft, Nontender, Nondistended; Bowel sounds present  EXTREMITIES:  LLE exam pain limited but patient with spontaneous movement of leg. R 2nd and th toe appear necrotic with some ?extension to mid foot. tender to touch. RLE without limitation   SKIN: flat circular skin lesions on RLE. small skin lesion in circular pattern on RUE  PSYCH: AAOx3    LABS:                        7.6    11.49 )-----------( 535      ( 03 Feb 2023 03:20 )             25.8     Hgb Trend: 7.6<--, 7.9<--, 7.3<--, 7.3<--, 7.5<--  02-03    131<L>  |  91<L>  |  30<H>  ----------------------------<  188<H>  3.7   |  30  |  2.07<H>    Ca    8.2<L>      03 Feb 2023 03:20  Phos  3.6     02-03  Mg     2.40     02-03    TPro  6.9  /  Alb  2.1<L>  /  TBili  0.3  /  DBili  x   /  AST  13  /  ALT  8   /  AlkPhos  111  02-03    Creatinine Trend: 2.07<--, 2.38<--, 2.71<--, 2.98<--, 3.35<--, 3.48<--  LIVER FUNCTIONS - ( 03 Feb 2023 03:20 )  Alb: 2.1 g/dL / Pro: 6.9 g/dL / ALK PHOS: 111 U/L / ALT: 8 U/L / AST: 13 U/L / GGT: x           PT/INR - ( 03 Feb 2023 03:20 )   PT: 15.8 sec;   INR: 1.36 ratio         PTT - ( 03 Feb 2023 03:20 )  PTT:37.3 sec

## 2023-02-04 NOTE — PROGRESS NOTE ADULT - ATTENDING COMMENTS
42F PMHx CAD s/p CABG x 4 (2018), HFrEF with ICD, PAD s/p 3rd toe amputation presenting here with 2nd toe pain concerning for dry gangrene course complicated by shock likely with septic shock due to dry gangrene and BOB likely due to acute on chronic decompensated HF requiring MICU admission for pressors, s/p antibiotics. Patient s/p downgrade from MICU to floors.    #PAD/Left toe gangrene  - Hx of PAD with previous LE operative procedure with new digit pain. Vasc surgery evaluated with SEBASTIÁN mild decreased on R (0.66), moderate decreased on L (0.58), tentative plan for angiogram before potential L TMA  - Clinically stable, will call Vascular Surgery back to discuss planning for angiogram/TMA    #Septic shock  - Hospital course complicated by hypotension due to gangrene requiring MICU for pressor support  - All cultures negative, s/p antibiotic course  - Monitor hemodynamics    #Acute on chronic heart failure  - Most recent BNP 93758  - TTE 1/26 demonstrating EF 21% with severe global LVSF and elevated LV filling pressures, RV enlargement with decreased RVSF  - C/w bumex 1 BID. Monitor I&Os, daily weights. Holding other GDMT due to BOB    #BOB  - Likely due to acute heart failure  - Currently undergoing diuresis, Cr improving (2.07 --> 1.66)  - Monitor UOP. Renally dose all medications, avoid nephrotoxins    #Pleural effusion  - Stable respiratory status  - Diuresis as above  - Monitor oxygen saturation 42F PMHx CAD s/p CABG x 4 (2018), HFrEF with ICD, PAD s/p 3rd toe amputation presenting here with 2nd toe pain concerning for dry gangrene course complicated by shock likely with septic shock due to dry gangrene and BOB likely due to acute on chronic decompensated HF requiring MICU admission for pressors, s/p antibiotics. Patient s/p downgrade from MICU to floors.    #PAD/Left toe gangrene  - Hx of PAD with previous LE operative procedure with new digit pain. Vasc surgery evaluated with SEBASTIÁN mild decreased on R (0.66), moderate decreased on L (0.58), tentative plan for angiogram before potential L TMA  - Clinically stable, will call Vascular Surgery back to discuss planning for angiogram/TMA    #Septic shock  - Hospital course complicated by hypotension due to gangrene requiring MICU for pressor support  - All cultures negative, s/p antibiotic course  - Monitor hemodynamics    #Acute on chronic heart failure  - Most recent BNP 03540  - TTE 1/26 demonstrating EF 21% with severe global LVSF and elevated LV filling pressures, RV enlargement with decreased RVSF  - C/w bumex 1 BID. Monitor I&Os, daily weights. Holding other GDMT due to BOB    #BOB  - Likely due to acute heart failure  - Currently undergoing diuresis, Cr improving (2.07 --> 1.66)  - Monitor UOP. Renally dose all medications, avoid nephrotoxins    #Pleural effusion  - Stable respiratory status  - Diuresis as above  - Monitor oxygen saturation    #LUE lesions  - Dermatology evaluation

## 2023-02-04 NOTE — OCCUPATIONAL THERAPY INITIAL EVALUATION ADULT - NSOTDISCHREC_GEN_A_CORE
Anticipating home with home OT services. Patient currently pending left TMA, will continue to follow.

## 2023-02-04 NOTE — OCCUPATIONAL THERAPY INITIAL EVALUATION ADULT - RANGE OF MOTION EXAMINATION, UPPER EXTREMITY
Pt arrives via walk in c/o bilateral leg swelling.  Pt stated that his 9/10 pain from leg swelling started 1500 today.  Limping gait to triage joseph.   bilateral UE Active ROM was WFL  (within functional limits)

## 2023-02-04 NOTE — PHYSICAL THERAPY INITIAL EVALUATION ADULT - LEVEL OF INDEPENDENCE: SIT/STAND, REHAB EVAL
pt deferring further functional mobility at this time secondary to reports of pain, Nursing staff made aware and acknowledged, pt wishing to remain in bed at this time will attempt functional mobility as feasible

## 2023-02-04 NOTE — CONSULT NOTE ADULT - ASSESSMENT
A/P:   - DDX reactive perforating collagenosis vs prurigo nodularis   - upper extremity lesions appear to be improving since they were first noticed by patient.  - recommend frequent emollient/ moisturizer use.   - at this time the lesions do not appear clinically c/f VZV  - if lesions persist after patient is discharged, recommend outpatient follow up with dermatology.     For routine outpatient follow up after discharge:  Please call to schedule appointment with any adult dermatologist at the address below.   [facility] at [place] [id number] [phone]    The patient's chart was reviewed in addition to photos of the rash taken by the primary team with the permission of the patient.  Patient was seen at bedside  with the dermatology attending Dr. Bustillo  Recommendations were communicated with the primary team.  Please page [phone] for further related questions.    Jaun Little MD  Resident Physician, PGY2  [facility]  Pager: [phone]

## 2023-02-04 NOTE — PROGRESS NOTE ADULT - ASSESSMENT
42F w/ PMH coronary artery disease status post CABG x4 in 2018, CHF s/p ICD placement (interrogated 2022), DM, PAD s/p 3rd toe amputation who presents with left 2nd toe pain concerning for dry gangrene.    Plan:  - Will plan for angiogram with Dr. Calderon  - above discussed with vascular surgery fellow Dr. Ga 42F w/ PMH coronary artery disease status post CABG x4 in 2018, CHF s/p ICD placement (interrogated 2022), DM, PAD s/p 3rd toe amputation who presents with left 2nd toe pain concerning for dry gangrene.    Plan:  - Will plan for angiogram with Dr. Calderon  - rest of care per primary team  - above discussed with vascular surgery fellow Dr. Ga

## 2023-02-04 NOTE — PHYSICAL THERAPY INITIAL EVALUATION ADULT - PERTINENT HX OF CURRENT PROBLEM, REHAB EVAL
Pt is 42-year-old female past medical history coronary artery disease status post CABG x4 in 2018, CHF s/p ICD placement (interrogated 2022), DM, PAD s/p 3rd toe amputation who presents with left 2nd toe pain

## 2023-02-04 NOTE — PROGRESS NOTE ADULT - PROBLEM SELECTOR PLAN 1
Patient presenting in setting of L 2nd toe pain concerning for dry gangrene with history of 3rd toe amputation iso PAD. Initially with elevated ESR/CRP without active signs of infection however later with concern for infection and septic shock.   - X-ray foot without gas or osseous involvement, limited to soft tissue   - CT Angio prelim read with occluded left posterior tibial and peroneal arteries  - Pain control w/ Dilaudid 1mg PRN for severe; Oxy 5mg PRN for moderate  - pending podiatry surgical intervention with TMA   - pending vascular intervention with LLE angiogram once optimized from standpoint of BOB  - s/p antibiotic course as listed in SIRS problem   - PT/OT consulted Patient presenting in setting of L 2nd toe pain concerning for dry gangrene with history of 3rd toe amputation iso PAD. Initially with elevated ESR/CRP without active signs of infection however later with concern for infection and septic shock.   - X-ray foot without gas or osseous involvement, limited to soft tissue   - CT Angio prelim read with occluded left posterior tibial and peroneal arteries  - pain control w/ Dilaudid 2mg PRN for severe; Oxy 5mg PRN for moderate  - pending podiatry surgical intervention with TMA   - pending vascular intervention with LLE angiogram once optimized from standpoint of BOB  - s/p antibiotic course as listed in SIRS problem   - PT/OT consulted

## 2023-02-04 NOTE — OCCUPATIONAL THERAPY INITIAL EVALUATION ADULT - PERTINENT HX OF CURRENT PROBLEM, REHAB EVAL
42 year old female with history of coronary artery disease status post CABG x4 in 2018, CHF s/p ICD placement (interrogated 2022), DM, PAD s/p 3rd toe amputation who presents with left 2nd toe pain concerning for dry gangrene. Seen by podiatry and vascular teams with plans for possible transmetatarsal amputation. Course c/b worsening renal function, and shock likely sepsis 2/2 LE wounds w/ possible cardiogenic component.

## 2023-02-04 NOTE — PROGRESS NOTE ADULT - PROBLEM SELECTOR PLAN 8
Initially presented with reduced breath sound on R lung field.   - CXR 1/26: moderate R sided pleural effusion.   - CXR 1/29: increasing R sized pleural effusion   - s/p diuresis with bumex IV to PO with mildly reduced breath sounds but overall asymptomatic

## 2023-02-04 NOTE — PROGRESS NOTE ADULT - SUBJECTIVE AND OBJECTIVE BOX
Subjective:  Pt seen and examined by vascular this PM.  Having some L foot pain, relieved with pain medication.  Denies shortness of breath, chest pain, nausea, vomiting, diarrhea.    Vital Signs Last 24 Hrs  T(C): 37 (04 Feb 2023 16:40), Max: 37 (04 Feb 2023 16:25)  T(F): 98.6 (04 Feb 2023 16:40), Max: 98.6 (04 Feb 2023 16:25)  HR: 87 (04 Feb 2023 16:40) (80 - 98)  BP: 114/61 (04 Feb 2023 16:40) (114/61 - 138/78)  BP(mean): 80 (03 Feb 2023 20:00) (80 - 80)  RR: 18 (04 Feb 2023 16:40) (18 - 20)  SpO2: 100% (04 Feb 2023 16:40) (99% - 100%)    Parameters below as of 04 Feb 2023 16:40  Patient On (Oxygen Delivery Method): room air        I&O's Detail    03 Feb 2023 07:01  -  04 Feb 2023 07:00  --------------------------------------------------------  IN:    Oral Fluid: 325 mL  Total IN: 325 mL    OUT:    Indwelling Catheter - Urethral (mL): 1500 mL  Total OUT: 1500 mL    Total NET: -1175 mL      04 Feb 2023 07:01  -  04 Feb 2023 17:15  --------------------------------------------------------  IN:    Oral Fluid: 500 mL  Total IN: 500 mL    OUT:    Indwelling Catheter - Urethral (mL): 550 mL  Total OUT: 550 mL    Total NET: -50 mL      MEDICATIONS  (STANDING):  aspirin enteric coated 81 milliGRAM(s) Oral daily  atorvastatin 80 milliGRAM(s) Oral at bedtime  buMETAnide 1 milliGRAM(s) Oral every 12 hours  calamine/zinc oxide Lotion 1 Application(s) Topical three times a day  chlorhexidine 2% Cloths 1 Application(s) Topical daily  dextrose 5%. 1000 milliLiter(s) (100 mL/Hr) IV Continuous <Continuous>  dextrose 5%. 1000 milliLiter(s) (50 mL/Hr) IV Continuous <Continuous>  dextrose 50% Injectable 25 Gram(s) IV Push once  dextrose 50% Injectable 12.5 Gram(s) IV Push once  dextrose 50% Injectable 25 Gram(s) IV Push once  ferrous    sulfate 325 milliGRAM(s) Oral daily  glucagon  Injectable 1 milliGRAM(s) IntraMuscular once  heparin   Injectable 5000 Unit(s) SubCutaneous every 8 hours  influenza   Vaccine 0.5 milliLiter(s) IntraMuscular once  insulin lispro (ADMELOG) corrective regimen sliding scale   SubCutaneous three times a day before meals  insulin lispro (ADMELOG) corrective regimen sliding scale   SubCutaneous at bedtime  melatonin 9 milliGRAM(s) Oral at bedtime  naloxegol 12.5 milliGRAM(s) Oral daily  pantoprazole    Tablet 40 milliGRAM(s) Oral before breakfast  polyethylene glycol 3350 17 Gram(s) Oral two times a day  senna 2 Tablet(s) Oral at bedtime    MEDICATIONS  (PRN):  acetaminophen     Tablet .. 650 milliGRAM(s) Oral every 6 hours PRN Temp greater or equal to 38C (100.4F), Mild Pain (1 - 3)  dextrose Oral Gel 15 Gram(s) Oral once PRN Blood Glucose LESS THAN 70 milliGRAM(s)/deciliter  HYDROmorphone  Injectable 2 milliGRAM(s) IV Push every 4 hours PRN Severe Pain (7 - 10)  ondansetron Injectable 4 milliGRAM(s) IV Push every 8 hours PRN Nausea and/or Vomiting  oxyCODONE    IR 5 milliGRAM(s) Oral every 4 hours PRN Moderate Pain (4 - 6)      LABS:                        8.6    12.40 )-----------( 618      ( 04 Feb 2023 07:20 )             29.9     02-04    131<L>  |  92<L>  |  25<H>  ----------------------------<  194<H>  4.3   |  26  |  1.66<H>    Ca    8.8      04 Feb 2023 07:20  Phos  2.6     02-04  Mg     2.00     02-04    TPro  6.9  /  Alb  2.1<L>  /  TBili  0.3  /  DBili  x   /  AST  13  /  ALT  8   /  AlkPhos  111  02-03    PT/INR - ( 03 Feb 2023 03:20 )   PT: 15.8 sec;   INR: 1.36 ratio         PTT - ( 03 Feb 2023 03:20 )  PTT:37.3 sec  LIVER FUNCTIONS - ( 03 Feb 2023 03:20 )  Alb: 2.1 g/dL / Pro: 6.9 g/dL / ALK PHOS: 111 U/L / ALT: 8 U/L / AST: 13 U/L / GGT: x             ABO Interpretation: B (02-04-23 @ 07:29)    PHYSICAL EXAM  General: NAD, resting comfortably  Pulmonary: non-labored breathing on room air  Extremities: Left DP/PT signals, dry gangrene on 2nd L toe

## 2023-02-04 NOTE — PROGRESS NOTE ADULT - PROBLEM SELECTOR PLAN 3
Patient w/ RRT for hypotension likely septic shock iso L toe gangrene/wound complicated by hypothermia, leukocytosis and hypotension refractory to IV fluids requiring pressor support.   - F/u BCx to final read, UCx negative.  - s/p vanc/zosyn briefly on adm, Unasyn 1/26-1/29; Cefepime, metronidazole, vanc (by level) 1/29 - 1/31, switched to Zosyn on 1/31 then d/c  - currently monitoring off antibiotics pending podiatry intervention

## 2023-02-04 NOTE — CONSULT NOTE ADULT - ATTENDING COMMENTS
I have personally evaluated this patient and agree with plan- if any vesicles arise- please swab for HSV/VZV if any concern for infectious etiology.

## 2023-02-04 NOTE — PROGRESS NOTE ADULT - PROBLEM SELECTOR PLAN 4
Does not appear to be acutely decompensated on exam. Last TTE from 3/21 with EF 27%, s/p ICD placement and interrogated in 2022   - Repeat TTE: 21%. Severe LV systolic dysfunction. decreased RV function   - was on home coreg 25 BID  -> hold given hypotension   - was on home entresto 24/26  -> hold given BOB   - was on home spironolactone 25mg -> hold given BOB   - d/c home torsemide -> IV lasix per cardiology preop -> hold diuresis given BOB and hypotension. Does not appear to be acutely decompensated on exam. Last TTE from 3/21 with EF 27%, s/p ICD placement and interrogated in 2022   - Repeat TTE: 21%. Severe LV systolic dysfunction. decreased RV function   - was on home coreg 25 BID  -> hold given hypotension; will try to initiate 12.5mg BID and uptitrate with improvement in kidney function   - was on home entresto 24/26  -> hold given BOB   - was on home spironolactone 25mg -> hold given BOB   - d/c home torsemide -> IV lasix per cardiology preop -> hold diuresis given BOB and hypotension.

## 2023-02-04 NOTE — PROGRESS NOTE ADULT - PROBLEM SELECTOR PLAN 5
CT angio with moderate-marked bilateral lower extremity peripheral vascular disease, calcifications of AAA   Per vascular, no concern for acute limb ischemia, no surgical intervention   - c/w atorvastatin   - c/w ASA

## 2023-02-04 NOTE — PROGRESS NOTE ADULT - PROBLEM SELECTOR PLAN 9
- Fluids: NA  - Electrolytes: Will replete to maintain K>4, Phos>3, and Mag>2  - Nutrition: DASH  - Activity: OOB as tolerated  - DVT Prophylaxis: heparin sub Q  - Stress Ulcer/GI Prophylaxis: NA  - Disposition: pending medical management

## 2023-02-04 NOTE — OCCUPATIONAL THERAPY INITIAL EVALUATION ADULT - DIAGNOSIS, OT EVAL
s/p left toe dry gangrene, s/p sepsis, s/p BOB; decreased functional mobility, decreased ADL performance

## 2023-02-05 LAB
ANION GAP SERPL CALC-SCNC: 10 MMOL/L — SIGNIFICANT CHANGE UP (ref 7–14)
BUN SERPL-MCNC: 22 MG/DL — SIGNIFICANT CHANGE UP (ref 7–23)
CALCIUM SERPL-MCNC: 9.1 MG/DL — SIGNIFICANT CHANGE UP (ref 8.4–10.5)
CHLORIDE SERPL-SCNC: 92 MMOL/L — LOW (ref 98–107)
CO2 SERPL-SCNC: 28 MMOL/L — SIGNIFICANT CHANGE UP (ref 22–31)
CREAT SERPL-MCNC: 1.61 MG/DL — HIGH (ref 0.5–1.3)
EGFR: 40 ML/MIN/1.73M2 — LOW
GLUCOSE BLDC GLUCOMTR-MCNC: 153 MG/DL — HIGH (ref 70–99)
GLUCOSE BLDC GLUCOMTR-MCNC: 162 MG/DL — HIGH (ref 70–99)
GLUCOSE BLDC GLUCOMTR-MCNC: 206 MG/DL — HIGH (ref 70–99)
GLUCOSE BLDC GLUCOMTR-MCNC: 229 MG/DL — HIGH (ref 70–99)
GLUCOSE SERPL-MCNC: 191 MG/DL — HIGH (ref 70–99)
HCT VFR BLD CALC: 28.9 % — LOW (ref 34.5–45)
HGB BLD-MCNC: 8.4 G/DL — LOW (ref 11.5–15.5)
MAGNESIUM SERPL-MCNC: 1.7 MG/DL — SIGNIFICANT CHANGE UP (ref 1.6–2.6)
MCHC RBC-ENTMCNC: 24.3 PG — LOW (ref 27–34)
MCHC RBC-ENTMCNC: 29.1 GM/DL — LOW (ref 32–36)
MCV RBC AUTO: 83.8 FL — SIGNIFICANT CHANGE UP (ref 80–100)
NRBC # BLD: 0 /100 WBCS — SIGNIFICANT CHANGE UP (ref 0–0)
NRBC # FLD: 0 K/UL — SIGNIFICANT CHANGE UP (ref 0–0)
PHOSPHATE SERPL-MCNC: 2.8 MG/DL — SIGNIFICANT CHANGE UP (ref 2.5–4.5)
PLATELET # BLD AUTO: 613 K/UL — HIGH (ref 150–400)
POTASSIUM SERPL-MCNC: 4.3 MMOL/L — SIGNIFICANT CHANGE UP (ref 3.5–5.3)
POTASSIUM SERPL-SCNC: 4.3 MMOL/L — SIGNIFICANT CHANGE UP (ref 3.5–5.3)
RBC # BLD: 3.45 M/UL — LOW (ref 3.8–5.2)
RBC # FLD: 19.7 % — HIGH (ref 10.3–14.5)
SODIUM SERPL-SCNC: 130 MMOL/L — LOW (ref 135–145)
WBC # BLD: 15.1 K/UL — HIGH (ref 3.8–10.5)
WBC # FLD AUTO: 15.1 K/UL — HIGH (ref 3.8–10.5)

## 2023-02-05 PROCEDURE — 99233 SBSQ HOSP IP/OBS HIGH 50: CPT

## 2023-02-05 PROCEDURE — 99232 SBSQ HOSP IP/OBS MODERATE 35: CPT | Mod: 1L,GC

## 2023-02-05 RX ORDER — HYDROMORPHONE HYDROCHLORIDE 2 MG/ML
2 INJECTION INTRAMUSCULAR; INTRAVENOUS; SUBCUTANEOUS
Refills: 0 | Status: DISCONTINUED | OUTPATIENT
Start: 2023-02-05 | End: 2023-02-05

## 2023-02-05 RX ORDER — INSULIN GLARGINE 100 [IU]/ML
3 INJECTION, SOLUTION SUBCUTANEOUS AT BEDTIME
Refills: 0 | Status: DISCONTINUED | OUTPATIENT
Start: 2023-02-05 | End: 2023-02-11

## 2023-02-05 RX ORDER — ACETAMINOPHEN 500 MG
975 TABLET ORAL EVERY 6 HOURS
Refills: 0 | Status: DISCONTINUED | OUTPATIENT
Start: 2023-02-05 | End: 2023-02-06

## 2023-02-05 RX ORDER — HYDROMORPHONE HYDROCHLORIDE 2 MG/ML
2 INJECTION INTRAMUSCULAR; INTRAVENOUS; SUBCUTANEOUS EVERY 4 HOURS
Refills: 0 | Status: DISCONTINUED | OUTPATIENT
Start: 2023-02-05 | End: 2023-02-09

## 2023-02-05 RX ORDER — CARVEDILOL PHOSPHATE 80 MG/1
12.5 CAPSULE, EXTENDED RELEASE ORAL EVERY 12 HOURS
Refills: 0 | Status: DISCONTINUED | OUTPATIENT
Start: 2023-02-05 | End: 2023-02-06

## 2023-02-05 RX ORDER — HYDROMORPHONE HYDROCHLORIDE 2 MG/ML
1 INJECTION INTRAMUSCULAR; INTRAVENOUS; SUBCUTANEOUS EVERY 4 HOURS
Refills: 0 | Status: DISCONTINUED | OUTPATIENT
Start: 2023-02-05 | End: 2023-02-12

## 2023-02-05 RX ORDER — HYDROMORPHONE HYDROCHLORIDE 2 MG/ML
1 INJECTION INTRAMUSCULAR; INTRAVENOUS; SUBCUTANEOUS
Refills: 0 | Status: DISCONTINUED | OUTPATIENT
Start: 2023-02-05 | End: 2023-02-05

## 2023-02-05 RX ADMIN — HYDROMORPHONE HYDROCHLORIDE 2 MILLIGRAM(S): 2 INJECTION INTRAMUSCULAR; INTRAVENOUS; SUBCUTANEOUS at 16:31

## 2023-02-05 RX ADMIN — Medication 325 MILLIGRAM(S): at 11:47

## 2023-02-05 RX ADMIN — HYDROMORPHONE HYDROCHLORIDE 1 MILLIGRAM(S): 2 INJECTION INTRAMUSCULAR; INTRAVENOUS; SUBCUTANEOUS at 08:49

## 2023-02-05 RX ADMIN — HEPARIN SODIUM 5000 UNIT(S): 5000 INJECTION INTRAVENOUS; SUBCUTANEOUS at 05:19

## 2023-02-05 RX ADMIN — Medication 2: at 09:13

## 2023-02-05 RX ADMIN — HYDROMORPHONE HYDROCHLORIDE 2 MILLIGRAM(S): 2 INJECTION INTRAMUSCULAR; INTRAVENOUS; SUBCUTANEOUS at 11:39

## 2023-02-05 RX ADMIN — HYDROMORPHONE HYDROCHLORIDE 2 MILLIGRAM(S): 2 INJECTION INTRAMUSCULAR; INTRAVENOUS; SUBCUTANEOUS at 02:51

## 2023-02-05 RX ADMIN — HYDROMORPHONE HYDROCHLORIDE 2 MILLIGRAM(S): 2 INJECTION INTRAMUSCULAR; INTRAVENOUS; SUBCUTANEOUS at 11:54

## 2023-02-05 RX ADMIN — CALAMINE AND ZINC OXIDE AND PHENOL 1 APPLICATION(S): 160; 10 LOTION TOPICAL at 05:19

## 2023-02-05 RX ADMIN — HYDROMORPHONE HYDROCHLORIDE 1 MILLIGRAM(S): 2 INJECTION INTRAMUSCULAR; INTRAVENOUS; SUBCUTANEOUS at 13:33

## 2023-02-05 RX ADMIN — INSULIN GLARGINE 3 UNIT(S): 100 INJECTION, SOLUTION SUBCUTANEOUS at 22:35

## 2023-02-05 RX ADMIN — HYDROMORPHONE HYDROCHLORIDE 1 MILLIGRAM(S): 2 INJECTION INTRAMUSCULAR; INTRAVENOUS; SUBCUTANEOUS at 18:54

## 2023-02-05 RX ADMIN — HYDROMORPHONE HYDROCHLORIDE 1 MILLIGRAM(S): 2 INJECTION INTRAMUSCULAR; INTRAVENOUS; SUBCUTANEOUS at 22:36

## 2023-02-05 RX ADMIN — Medication 2: at 18:26

## 2023-02-05 RX ADMIN — Medication 1: at 13:00

## 2023-02-05 RX ADMIN — HYDROMORPHONE HYDROCHLORIDE 1 MILLIGRAM(S): 2 INJECTION INTRAMUSCULAR; INTRAVENOUS; SUBCUTANEOUS at 13:48

## 2023-02-05 RX ADMIN — HYDROMORPHONE HYDROCHLORIDE 1 MILLIGRAM(S): 2 INJECTION INTRAMUSCULAR; INTRAVENOUS; SUBCUTANEOUS at 22:56

## 2023-02-05 RX ADMIN — HYDROMORPHONE HYDROCHLORIDE 1 MILLIGRAM(S): 2 INJECTION INTRAMUSCULAR; INTRAVENOUS; SUBCUTANEOUS at 09:04

## 2023-02-05 RX ADMIN — Medication 9 MILLIGRAM(S): at 23:46

## 2023-02-05 RX ADMIN — HYDROMORPHONE HYDROCHLORIDE 2 MILLIGRAM(S): 2 INJECTION INTRAMUSCULAR; INTRAVENOUS; SUBCUTANEOUS at 05:46

## 2023-02-05 RX ADMIN — HYDROMORPHONE HYDROCHLORIDE 2 MILLIGRAM(S): 2 INJECTION INTRAMUSCULAR; INTRAVENOUS; SUBCUTANEOUS at 02:36

## 2023-02-05 RX ADMIN — HYDROMORPHONE HYDROCHLORIDE 2 MILLIGRAM(S): 2 INJECTION INTRAMUSCULAR; INTRAVENOUS; SUBCUTANEOUS at 16:16

## 2023-02-05 RX ADMIN — CARVEDILOL PHOSPHATE 12.5 MILLIGRAM(S): 80 CAPSULE, EXTENDED RELEASE ORAL at 17:48

## 2023-02-05 RX ADMIN — HEPARIN SODIUM 5000 UNIT(S): 5000 INJECTION INTRAVENOUS; SUBCUTANEOUS at 13:36

## 2023-02-05 RX ADMIN — NALOXEGOL OXALATE 12.5 MILLIGRAM(S): 12.5 TABLET, FILM COATED ORAL at 11:47

## 2023-02-05 RX ADMIN — Medication 81 MILLIGRAM(S): at 11:47

## 2023-02-05 RX ADMIN — HYDROMORPHONE HYDROCHLORIDE 2 MILLIGRAM(S): 2 INJECTION INTRAMUSCULAR; INTRAVENOUS; SUBCUTANEOUS at 05:31

## 2023-02-05 RX ADMIN — CALAMINE AND ZINC OXIDE AND PHENOL 1 APPLICATION(S): 160; 10 LOTION TOPICAL at 22:36

## 2023-02-05 RX ADMIN — HEPARIN SODIUM 5000 UNIT(S): 5000 INJECTION INTRAVENOUS; SUBCUTANEOUS at 22:36

## 2023-02-05 RX ADMIN — ATORVASTATIN CALCIUM 80 MILLIGRAM(S): 80 TABLET, FILM COATED ORAL at 22:36

## 2023-02-05 RX ADMIN — PANTOPRAZOLE SODIUM 40 MILLIGRAM(S): 20 TABLET, DELAYED RELEASE ORAL at 05:19

## 2023-02-05 RX ADMIN — CHLORHEXIDINE GLUCONATE 1 APPLICATION(S): 213 SOLUTION TOPICAL at 11:48

## 2023-02-05 RX ADMIN — BUMETANIDE 1 MILLIGRAM(S): 0.25 INJECTION INTRAMUSCULAR; INTRAVENOUS at 09:18

## 2023-02-05 RX ADMIN — HYDROMORPHONE HYDROCHLORIDE 1 MILLIGRAM(S): 2 INJECTION INTRAMUSCULAR; INTRAVENOUS; SUBCUTANEOUS at 18:39

## 2023-02-05 RX ADMIN — CALAMINE AND ZINC OXIDE AND PHENOL 1 APPLICATION(S): 160; 10 LOTION TOPICAL at 13:33

## 2023-02-05 NOTE — PROGRESS NOTE ADULT - PROBLEM SELECTOR PLAN 4
Does not appear to be acutely decompensated on exam. Last TTE from 3/21 with EF 27%, s/p ICD placement and interrogated in 2022   - Repeat TTE: 21%. Severe LV systolic dysfunction. decreased RV function   - was on home coreg 25 BID  -> hold given hypotension; will try to initiate 12.5mg BID and uptitrate with improvement in kidney function   - was on home entresto 24/26  -> hold given BOB   - was on home spironolactone 25mg -> hold given BOB   - d/c home torsemide -> IV lasix per cardiology preop -> hold diuresis given BOB and hypotension. Does not appear to be acutely decompensated on exam. Last TTE from 3/21 with EF 27%, s/p ICD placement and interrogated in 2022   - Repeat TTE: 21%. Severe LV systolic dysfunction. decreased RV function   - was on home coreg 25 BID  -> will resume 12.5mg BID and uptitrate    - was on home entresto 24/26  -> hold given BOB   - was on home spironolactone 25mg -> hold given BOB   - d/c home torsemide ->continuing with bumex 1 PO BID for diuresis

## 2023-02-05 NOTE — PROGRESS NOTE ADULT - PROBLEM SELECTOR PLAN 7
Patient with concern for anemia with iron studies showing evidence of iron deficiency with a component of anemia of chronic disease.  - no signs/sx of bleeding, likely related to inflammation   - continue to trend H/H  - active type and screen  - transfuse < 7 or <8 if symptomatic iso of heart disease

## 2023-02-05 NOTE — PROGRESS NOTE ADULT - PROBLEM SELECTOR PLAN 1
Patient presenting in setting of L 2nd toe pain concerning for dry gangrene with history of 3rd toe amputation iso PAD. Initially with elevated ESR/CRP without active signs of infection however later with concern for infection and septic shock.   - X-ray foot without gas or osseous involvement, limited to soft tissue   - CT Angio prelim read with occluded left posterior tibial and peroneal arteries  - pain control w/ Dilaudid 2mg PRN for severe; Oxy 5mg PRN for moderate  - pending podiatry surgical intervention with TMA   - pending vascular intervention with LLE angiogram once optimized from standpoint of BOB  - s/p antibiotic course as listed in SIRS problem   - PT/OT consulted Patient presenting in setting of L 2nd toe pain concerning for dry gangrene with history of 3rd toe amputation iso PAD. Initially with elevated ESR/CRP without active signs of infection however later with concern for infection and septic shock.   - X-ray foot without gas or osseous involvement, limited to soft tissue   - CT Angio prelim read with occluded left posterior tibial and peroneal arteries  - pain control w/ Dilaudid 2mg PRN for severe; Dilaudid 1mg PRN for moderate  - pending podiatry surgical intervention with TMA   - pending vascular intervention with LLE angiogram once optimized from standpoint of BOB  - s/p antibiotic course as listed in SIRS problem   - PT/OT consulted

## 2023-02-05 NOTE — PROGRESS NOTE ADULT - SUBJECTIVE AND OBJECTIVE BOX
Daysi Flores  PGY-1 Resident Physician   Pager [phone]/ [phone]    Patient is a 42y old  Female who presents with a chief complaint of Toe pain (04 Feb 2023 17:15)      SUBJECTIVE / OVERNIGHT EVENTS:  Patient seen and evaluated at bedside.    Denies any fevers, chills, CP, or SOB.    Vital Signs Last 24 Hrs  T(C): 36.3 (05 Feb 2023 04:00), Max: 37.4 (04 Feb 2023 20:00)  T(F): 97.3 (05 Feb 2023 04:00), Max: 99.3 (04 Feb 2023 20:00)  HR: 100 (05 Feb 2023 04:00) (87 - 100)  BP: 135/70 (05 Feb 2023 04:00) (114/61 - 136/79)  BP(mean): --  RR: 17 (05 Feb 2023 04:00) (17 - 18)  SpO2: 100% (05 Feb 2023 04:00) (99% - 100%)    Parameters below as of 05 Feb 2023 04:00  Patient On (Oxygen Delivery Method): room air        PHYSICAL EXAM:  GENERAL: laying in bed, appears to be in pain and uncomfortable  CHEST/LUNG: Mildly decreased lung sounds on R lower lobe   HEART: Regular rate and rhythm; Normal S1 S2, No murmurs, rubs, or gallops  ABDOMEN: Soft, Nontender, Nondistended; Bowel sounds present  EXTREMITIES:  LLE exam pain limited but patient with spontaneous movement of leg. R 2nd and th toe appear necrotic with some ?extension to mid foot. tender to touch. RLE without limitation   SKIN: flat circular skin lesions on RLE. small skin lesion in circular pattern on RUE improving   PSYCH: AAOx3    LABS:                        8.6    12.40 )-----------( 618      ( 04 Feb 2023 07:20 )             29.9     Hgb Trend: 8.6<--, 7.6<--, 7.9<--, 7.3<--, 7.3<--  02-04    131<L>  |  92<L>  |  25<H>  ----------------------------<  194<H>  4.3   |  26  |  1.66<H>    Ca    8.8      04 Feb 2023 07:20  Phos  2.6     02-04  Mg     2.00     02-04      Creatinine Trend: 1.66<--, 2.07<--, 2.38<--, 2.71<--, 2.98<--, 3.35<--           Daysi Flores  PGY-1 Resident Physician   Pager [phone]/ [phone]    Patient is a 42y old  Female who presents with a chief complaint of Toe pain (04 Feb 2023 17:15)      SUBJECTIVE / OVERNIGHT EVENTS:  Patient seen and evaluated at bedside.  This AM, patient reports her pain is improved since dose increase of pain medications yesterday. However, states that effect of medication wears off before her next PRN dose.   Not having any shortness of breath at this time. No CP or cough. States that she feels liker her swelling in her legs has improved since admission.     Vital Signs Last 24 Hrs  T(C): 36.3 (05 Feb 2023 04:00), Max: 37.4 (04 Feb 2023 20:00)  T(F): 97.3 (05 Feb 2023 04:00), Max: 99.3 (04 Feb 2023 20:00)  HR: 100 (05 Feb 2023 04:00) (87 - 100)  BP: 135/70 (05 Feb 2023 04:00) (114/61 - 136/79)  BP(mean): --  RR: 17 (05 Feb 2023 04:00) (17 - 18)  SpO2: 100% (05 Feb 2023 04:00) (99% - 100%)    Parameters below as of 05 Feb 2023 04:00  Patient On (Oxygen Delivery Method): room air        PHYSICAL EXAM:  GENERAL: laying in bed, appears to be in pain and uncomfortable  CHEST/LUNG: Mildly decreased lung sounds on R lower lobe   HEART: Regular rate and rhythm; Normal S1 S2, No murmurs, rubs, or gallops  ABDOMEN: Soft, Nontender, Nondistended; Bowel sounds present. ponce draining clear urine  EXTREMITIES:  LLE exam pain limited but patient with spontaneous movement of leg. R 2nd and th toe appear necrotic with some ?extension to mid foot. tender to touch. RLE without limitation   SKIN: flat circular skin lesions on RLE. small skin lesion in circular pattern on RUE improving   PSYCH: AAOx3    LABS:                        8.6    12.40 )-----------( 618      ( 04 Feb 2023 07:20 )             29.9     Hgb Trend: 8.6<--, 7.6<--, 7.9<--, 7.3<--, 7.3<--  02-04    131<L>  |  92<L>  |  25<H>  ----------------------------<  194<H>  4.3   |  26  |  1.66<H>    Ca    8.8      04 Feb 2023 07:20  Phos  2.6     02-04  Mg     2.00     02-04      Creatinine Trend: 1.66<--, 2.07<--, 2.38<--, 2.71<--, 2.98<--, 3.35<--

## 2023-02-05 NOTE — PROGRESS NOTE ADULT - SUBJECTIVE AND OBJECTIVE BOX
[facility] DIVISION OF KIDNEY DISEASES AND HYPERTENSION -- FOLLOW UP NOTE  --------------------------------------------------------------------------------  HPI: 42-year-female with PMH of HTN, HLD, DM, PAD, CHF admitted with left toe pain. Pt. developed BOB during hospital stay. Pt. was transferred to the MICU on 1/29/23 for septic shock requiring vasopressor support. Pt was transferred to [facility] on 2/3/23. Pt. being seen for BOB.       24 hour events/subjective: Pt. seen and examined at bedside. Pt. continues to have L foot pain. Reports that LE edema has improved. Denies fever, chills, nausea, vomiting, CP and SOB during rounds.    PAST HISTORY  --------------------------------------------------------------------------------  No significant changes to PMH, PSH, FHx, SHx, unless otherwise noted    ALLERGIES & MEDICATIONS  --------------------------------------------------------------------------------  Allergies    No Known Allergies    Intolerances    Standing Inpatient Medications  aspirin enteric coated 81 milliGRAM(s) Oral daily  atorvastatin 80 milliGRAM(s) Oral at bedtime  buMETAnide 1 milliGRAM(s) Oral every 12 hours  calamine/zinc oxide Lotion 1 Application(s) Topical three times a day  chlorhexidine 2% Cloths 1 Application(s) Topical daily  dextrose 5%. 1000 milliLiter(s) IV Continuous <Continuous>  dextrose 5%. 1000 milliLiter(s) IV Continuous <Continuous>  dextrose 50% Injectable 25 Gram(s) IV Push once  dextrose 50% Injectable 12.5 Gram(s) IV Push once  dextrose 50% Injectable 25 Gram(s) IV Push once  ferrous    sulfate 325 milliGRAM(s) Oral daily  glucagon  Injectable 1 milliGRAM(s) IntraMuscular once  heparin   Injectable 5000 Unit(s) SubCutaneous every 8 hours  influenza   Vaccine 0.5 milliLiter(s) IntraMuscular once  insulin lispro (ADMELOG) corrective regimen sliding scale   SubCutaneous three times a day before meals  insulin lispro (ADMELOG) corrective regimen sliding scale   SubCutaneous at bedtime  melatonin 9 milliGRAM(s) Oral at bedtime  naloxegol 12.5 milliGRAM(s) Oral daily  pantoprazole    Tablet 40 milliGRAM(s) Oral before breakfast  polyethylene glycol 3350 17 Gram(s) Oral two times a day  senna 2 Tablet(s) Oral at bedtime    PRN Inpatient Medications  acetaminophen     Tablet .. 975 milliGRAM(s) Oral every 6 hours PRN  acetaminophen     Tablet .. 650 milliGRAM(s) Oral every 6 hours PRN  dextrose Oral Gel 15 Gram(s) Oral once PRN  HYDROmorphone  Injectable 2 milliGRAM(s) IV Push every 3 hours PRN  HYDROmorphone  Injectable 1 milliGRAM(s) IV Push every 3 hours PRN  HYDROmorphone  Injectable 1 milliGRAM(s) IV Push every 3 hours PRN  ondansetron Injectable 4 milliGRAM(s) IV Push every 8 hours PRN    REVIEW OF SYSTEMS  --------------------------------------------------------------------------------  Gen: No fevers/chills  Head/Eyes/Ears: No HA  Respiratory: No dyspnea  CV: No chest pain  GI: No abdominal pain, diarrhea  MSK: +B/L LE edema (improving) and L foot pain  Skin: No rashes  Heme: No bleeding  All other systems were reviewed and are negative, except as noted.    VITALS/PHYSICAL EXAM  --------------------------------------------------------------------------------  T(C): 36.8 (02-05-23 @ 08:00), Max: 37.4 (02-04-23 @ 20:00)  HR: 98 (02-05-23 @ 09:10) (87 - 100)  BP: 123/65 (02-05-23 @ 09:10) (102/77 - 135/70)  RR: 18 (02-05-23 @ 08:00) (17 - 18)  SpO2: 100% (02-05-23 @ 08:00) (99% - 100%)  Wt(kg): --    02-04-23 @ 07:01  -  02-05-23 @ 07:00  --------------------------------------------------------  IN: 1125 mL / OUT: 1950 mL / NET: -825 mL    Physical Exam:  Gen: resting, NAD  HEENT: Anicteric  Pulm: CTA B/L  CV: S1S2+  Abd: Soft, +BS    Ext: +trace B/L LE edema, R>L, left foot: gangrenous changes seen  Neuro: Awake and alert  Skin: Warm and dry	    LABS/STUDIES  --------------------------------------------------------------------------------              8.4    15.10 >-----------<  613      [02-05-23 @ 05:25]              28.9     130  |  92  |  22  ----------------------------<  191      [02-05-23 @ 05:25]  4.3   |  28  |  1.61        Ca     9.1     [02-05-23 @ 05:25]      Mg     1.70     [02-05-23 @ 05:25]      Phos  2.8     [02-05-23 @ 05:25]    Creatinine Trend:  SCr 1.61 [02-05 @ 05:25]  SCr 1.66 [02-04 @ 07:20]  SCr 2.07 [02-03 @ 03:20]  SCr 2.38 [02-02 @ 15:30]  SCr 2.71 [02-02 @ 01:50]    Urinalysis - [01-29-23 @ 18:45]      Color DARK YELLOW / Appearance Slightly Turbid / SG >1.030 / pH 5.5      Gluc Negative / Ketone Trace  / Bili Small / Urobili <2 mg/dL       Blood Negative / Protein 300 mg/dL / Leuk Est Moderate / Nitrite Negative      RBC 1 / WBC 6 / Hyaline 1 / Gran  / Sq Epi  / Non Sq Epi 3 / Bacteria Many    Urine Phosphorus 13.7      [02-01-23 @ 01:50]    Iron 21, TIBC 198, %sat 11      [01-26-23 @ 07:30]  Ferritin 30      [01-26-23 @ 07:30]  HbA1c <4.2      [09-07-18 @ 10:47]  Lipid: chol 112, , HDL 20, LDL --      [01-26-23 @ 07:30]

## 2023-02-05 NOTE — PROGRESS NOTE ADULT - ATTENDING COMMENTS
42F PMHx CAD s/p CABG x 4 (2018), HFrEF with ICD, PAD s/p 3rd toe amputation presenting here with 2nd toe pain concerning for dry gangrene course complicated by shock likely with septic shock due to dry gangrene and BOB likely due to acute on chronic decompensated HF requiring MICU admission for pressors, s/p antibiotics. Patient s/p downgrade from MICU to floors.    #PAD/Left toe gangrene  - Hx of PAD with previous LE operative procedure with new digit pain. Vasc surgery evaluated with SEBASTIÁN mild decreased on R (0.66), moderate decreased on L (0.58), tentative plan for angiogram before potential L TMA  - Clinically stable, Vascular Surgery to establish timing for angiogram/TMA  - Pain control with Dilaudid    #Septic shock  - Hospital course complicated by hypotension due to gangrene requiring MICU for pressor support  - All cultures negative, s/p antibiotic course  - Monitor hemodynamics    #Acute on chronic heart failure  - Most recent BNP 70065  - TTE 1/26 demonstrating EF 21% with severe global LVSF and elevated LV filling pressures, RV enlargement with decreased RVSF  - C/w bumex 1 BID. Monitor I&Os, daily weights. Holding other GDMT due to BOB    #BOB  - Likely due to acute heart failure  - Currently undergoing diuresis as above, Cr improving, stable in the 1.6 range  - Monitor UOP. Renally dose all medications, avoid nephrotoxins    #Pleural effusion  - Stable respiratory status  - Diuresis as above  - Monitor oxygen saturation    #LUE lesions  - Dermatology evaluation - lesions not consistent with VZV, recommend emollient/moisturizer, OP follow-up if persistent

## 2023-02-05 NOTE — PROGRESS NOTE ADULT - PROBLEM SELECTOR PLAN 1
Pt. with BOB in setting of hypotension, recent IV contrast use and CHF. Upon review of labs on [facility] HIE/[place], Scr was elevated at 1.33 on 6/28/21 and improved to 1.15 on 2/24/22. Scr was 1.22 on admission (1/25/23). Pt. underwent LE angiogram on 1/25/23. Scr worsened to 4.23 on 1/30/23. No hydronephrosis seen on CT scan study. Pt. was oliguric, initiated on IV Bumex infusion on 1/30/23. Currently on PO Bumex 1 mg BID. Pt. is non-oliguric, documented urine output is ~1.9L over the past 24 hours. BOB resolving at present. Scr decreased to 1.61 today (2/5/23). Monitor labs and urine output. Avoid nephrotoxins, Dose meds as per eGFR.    If you have any questions, please feel free to contact me  Antoine Osorio  Nephrology Fellow  [phone]/ [employer] Teams(Preferred)  (After 5pm or on weekends please page the on-call fellow).

## 2023-02-05 NOTE — PROGRESS NOTE ADULT - PROBLEM SELECTOR PLAN 6
On metformin 500mg BID, glimepiride 4mg, lantus 8U at bedtime   - A1c 7.1   - c/w ISS only in the setting of BOB   - can readdress addition of lantus 5U (used on admission) after watching FSG trend and improvement in BOB On metformin 500mg BID, glimepiride 4mg, lantus 8U at bedtime   - A1c 7.1  - will start 3U lantus at bedtime given elevated sugars (was on 5U on admission) and titrate slowly    - c/w ISS

## 2023-02-06 ENCOUNTER — TRANSCRIPTION ENCOUNTER (OUTPATIENT)
Age: 43
End: 2023-02-06

## 2023-02-06 LAB
ANION GAP SERPL CALC-SCNC: 10 MMOL/L — SIGNIFICANT CHANGE UP (ref 7–14)
APTT BLD: 34.5 SEC — SIGNIFICANT CHANGE UP (ref 27–36.3)
BLD GP AB SCN SERPL QL: NEGATIVE — SIGNIFICANT CHANGE UP
BUN SERPL-MCNC: 25 MG/DL — HIGH (ref 7–23)
CALCIUM SERPL-MCNC: 8.7 MG/DL — SIGNIFICANT CHANGE UP (ref 8.4–10.5)
CHLORIDE SERPL-SCNC: 93 MMOL/L — LOW (ref 98–107)
CO2 SERPL-SCNC: 28 MMOL/L — SIGNIFICANT CHANGE UP (ref 22–31)
CORTICOSTEROID BINDING GLOBULIN RESULT: 1.5 MG/DL — LOW
CORTIS F/TOTAL MFR SERPL: 39 % — SIGNIFICANT CHANGE UP
CORTIS SERPL-MCNC: 16 UG/DL — SIGNIFICANT CHANGE UP
CORTISOL, FREE RESULT: 6.2 UG/DL — HIGH
CREAT SERPL-MCNC: 1.7 MG/DL — HIGH (ref 0.5–1.3)
EGFR: 38 ML/MIN/1.73M2 — LOW
GLUCOSE BLDC GLUCOMTR-MCNC: 223 MG/DL — HIGH (ref 70–99)
GLUCOSE SERPL-MCNC: 209 MG/DL — HIGH (ref 70–99)
HCT VFR BLD CALC: 23 % — LOW (ref 34.5–45)
HCT VFR BLD CALC: 23.9 % — LOW (ref 34.5–45)
HGB BLD-MCNC: 6.8 G/DL — CRITICAL LOW (ref 11.5–15.5)
HGB BLD-MCNC: 7.1 G/DL — LOW (ref 11.5–15.5)
INR BLD: 1.42 RATIO — HIGH (ref 0.88–1.16)
MAGNESIUM SERPL-MCNC: 1.5 MG/DL — LOW (ref 1.6–2.6)
MCHC RBC-ENTMCNC: 24.7 PG — LOW (ref 27–34)
MCHC RBC-ENTMCNC: 24.8 PG — LOW (ref 27–34)
MCHC RBC-ENTMCNC: 29.6 GM/DL — LOW (ref 32–36)
MCHC RBC-ENTMCNC: 29.7 GM/DL — LOW (ref 32–36)
MCV RBC AUTO: 83.6 FL — SIGNIFICANT CHANGE UP (ref 80–100)
MCV RBC AUTO: 83.6 FL — SIGNIFICANT CHANGE UP (ref 80–100)
NRBC # BLD: 0 /100 WBCS — SIGNIFICANT CHANGE UP (ref 0–0)
NRBC # BLD: 0 /100 WBCS — SIGNIFICANT CHANGE UP (ref 0–0)
NRBC # FLD: 0 K/UL — SIGNIFICANT CHANGE UP (ref 0–0)
NRBC # FLD: 0 K/UL — SIGNIFICANT CHANGE UP (ref 0–0)
PHOSPHATE SERPL-MCNC: 2.8 MG/DL — SIGNIFICANT CHANGE UP (ref 2.5–4.5)
PLATELET # BLD AUTO: 516 K/UL — HIGH (ref 150–400)
PLATELET # BLD AUTO: 517 K/UL — HIGH (ref 150–400)
POTASSIUM SERPL-MCNC: 4.1 MMOL/L — SIGNIFICANT CHANGE UP (ref 3.5–5.3)
POTASSIUM SERPL-SCNC: 4.1 MMOL/L — SIGNIFICANT CHANGE UP (ref 3.5–5.3)
PROTHROM AB SERPL-ACNC: 16.5 SEC — HIGH (ref 10.5–13.4)
RBC # BLD: 2.75 M/UL — LOW (ref 3.8–5.2)
RBC # BLD: 2.86 M/UL — LOW (ref 3.8–5.2)
RBC # FLD: 19.9 % — HIGH (ref 10.3–14.5)
RBC # FLD: 19.9 % — HIGH (ref 10.3–14.5)
RH IG SCN BLD-IMP: POSITIVE — SIGNIFICANT CHANGE UP
SODIUM SERPL-SCNC: 131 MMOL/L — LOW (ref 135–145)
WBC # BLD: 14.19 K/UL — HIGH (ref 3.8–10.5)
WBC # BLD: 14.51 K/UL — HIGH (ref 3.8–10.5)
WBC # FLD AUTO: 14.19 K/UL — HIGH (ref 3.8–10.5)
WBC # FLD AUTO: 14.51 K/UL — HIGH (ref 3.8–10.5)

## 2023-02-06 PROCEDURE — 99233 SBSQ HOSP IP/OBS HIGH 50: CPT | Mod: GC

## 2023-02-06 RX ORDER — ACETAMINOPHEN 500 MG
975 TABLET ORAL EVERY 6 HOURS
Refills: 0 | Status: DISCONTINUED | OUTPATIENT
Start: 2023-02-06 | End: 2023-02-22

## 2023-02-06 RX ORDER — HYDROMORPHONE HYDROCHLORIDE 2 MG/ML
1 INJECTION INTRAMUSCULAR; INTRAVENOUS; SUBCUTANEOUS ONCE
Refills: 0 | Status: DISCONTINUED | OUTPATIENT
Start: 2023-02-06 | End: 2023-02-06

## 2023-02-06 RX ORDER — MAGNESIUM SULFATE 500 MG/ML
2 VIAL (ML) INJECTION ONCE
Refills: 0 | Status: COMPLETED | OUTPATIENT
Start: 2023-02-06 | End: 2023-02-06

## 2023-02-06 RX ORDER — CARVEDILOL PHOSPHATE 80 MG/1
6.25 CAPSULE, EXTENDED RELEASE ORAL EVERY 12 HOURS
Refills: 0 | Status: DISCONTINUED | OUTPATIENT
Start: 2023-02-06 | End: 2023-02-22

## 2023-02-06 RX ADMIN — HEPARIN SODIUM 5000 UNIT(S): 5000 INJECTION INTRAVENOUS; SUBCUTANEOUS at 13:17

## 2023-02-06 RX ADMIN — HYDROMORPHONE HYDROCHLORIDE 2 MILLIGRAM(S): 2 INJECTION INTRAMUSCULAR; INTRAVENOUS; SUBCUTANEOUS at 17:45

## 2023-02-06 RX ADMIN — Medication 2: at 09:04

## 2023-02-06 RX ADMIN — Medication 975 MILLIGRAM(S): at 18:51

## 2023-02-06 RX ADMIN — HYDROMORPHONE HYDROCHLORIDE 2 MILLIGRAM(S): 2 INJECTION INTRAMUSCULAR; INTRAVENOUS; SUBCUTANEOUS at 17:30

## 2023-02-06 RX ADMIN — CALAMINE AND ZINC OXIDE AND PHENOL 1 APPLICATION(S): 160; 10 LOTION TOPICAL at 21:32

## 2023-02-06 RX ADMIN — ATORVASTATIN CALCIUM 80 MILLIGRAM(S): 80 TABLET, FILM COATED ORAL at 21:47

## 2023-02-06 RX ADMIN — HYDROMORPHONE HYDROCHLORIDE 1 MILLIGRAM(S): 2 INJECTION INTRAMUSCULAR; INTRAVENOUS; SUBCUTANEOUS at 08:25

## 2023-02-06 RX ADMIN — Medication 1: at 21:32

## 2023-02-06 RX ADMIN — HYDROMORPHONE HYDROCHLORIDE 1 MILLIGRAM(S): 2 INJECTION INTRAMUSCULAR; INTRAVENOUS; SUBCUTANEOUS at 03:02

## 2023-02-06 RX ADMIN — Medication 9 MILLIGRAM(S): at 22:39

## 2023-02-06 RX ADMIN — Medication 3: at 17:49

## 2023-02-06 RX ADMIN — HYDROMORPHONE HYDROCHLORIDE 2 MILLIGRAM(S): 2 INJECTION INTRAMUSCULAR; INTRAVENOUS; SUBCUTANEOUS at 11:44

## 2023-02-06 RX ADMIN — INSULIN GLARGINE 3 UNIT(S): 100 INJECTION, SOLUTION SUBCUTANEOUS at 21:28

## 2023-02-06 RX ADMIN — Medication 975 MILLIGRAM(S): at 12:32

## 2023-02-06 RX ADMIN — HYDROMORPHONE HYDROCHLORIDE 1 MILLIGRAM(S): 2 INJECTION INTRAMUSCULAR; INTRAVENOUS; SUBCUTANEOUS at 13:35

## 2023-02-06 RX ADMIN — HYDROMORPHONE HYDROCHLORIDE 1 MILLIGRAM(S): 2 INJECTION INTRAMUSCULAR; INTRAVENOUS; SUBCUTANEOUS at 03:22

## 2023-02-06 RX ADMIN — BUMETANIDE 1 MILLIGRAM(S): 0.25 INJECTION INTRAMUSCULAR; INTRAVENOUS at 09:07

## 2023-02-06 RX ADMIN — Medication 975 MILLIGRAM(S): at 11:44

## 2023-02-06 RX ADMIN — CALAMINE AND ZINC OXIDE AND PHENOL 1 APPLICATION(S): 160; 10 LOTION TOPICAL at 13:17

## 2023-02-06 RX ADMIN — PANTOPRAZOLE SODIUM 40 MILLIGRAM(S): 20 TABLET, DELAYED RELEASE ORAL at 06:01

## 2023-02-06 RX ADMIN — Medication 325 MILLIGRAM(S): at 11:45

## 2023-02-06 RX ADMIN — Medication 1: at 13:18

## 2023-02-06 RX ADMIN — Medication 25 GRAM(S): at 09:23

## 2023-02-06 RX ADMIN — HYDROMORPHONE HYDROCHLORIDE 1 MILLIGRAM(S): 2 INJECTION INTRAMUSCULAR; INTRAVENOUS; SUBCUTANEOUS at 22:00

## 2023-02-06 RX ADMIN — Medication 975 MILLIGRAM(S): at 23:32

## 2023-02-06 RX ADMIN — HYDROMORPHONE HYDROCHLORIDE 1 MILLIGRAM(S): 2 INJECTION INTRAMUSCULAR; INTRAVENOUS; SUBCUTANEOUS at 21:48

## 2023-02-06 RX ADMIN — NALOXEGOL OXALATE 12.5 MILLIGRAM(S): 12.5 TABLET, FILM COATED ORAL at 11:45

## 2023-02-06 RX ADMIN — HEPARIN SODIUM 5000 UNIT(S): 5000 INJECTION INTRAVENOUS; SUBCUTANEOUS at 21:47

## 2023-02-06 RX ADMIN — HYDROMORPHONE HYDROCHLORIDE 1 MILLIGRAM(S): 2 INJECTION INTRAMUSCULAR; INTRAVENOUS; SUBCUTANEOUS at 07:17

## 2023-02-06 RX ADMIN — HYDROMORPHONE HYDROCHLORIDE 1 MILLIGRAM(S): 2 INJECTION INTRAMUSCULAR; INTRAVENOUS; SUBCUTANEOUS at 07:02

## 2023-02-06 RX ADMIN — Medication 975 MILLIGRAM(S): at 17:51

## 2023-02-06 RX ADMIN — HYDROMORPHONE HYDROCHLORIDE 1 MILLIGRAM(S): 2 INJECTION INTRAMUSCULAR; INTRAVENOUS; SUBCUTANEOUS at 13:50

## 2023-02-06 RX ADMIN — Medication 81 MILLIGRAM(S): at 11:45

## 2023-02-06 RX ADMIN — HEPARIN SODIUM 5000 UNIT(S): 5000 INJECTION INTRAVENOUS; SUBCUTANEOUS at 06:01

## 2023-02-06 RX ADMIN — HYDROMORPHONE HYDROCHLORIDE 2 MILLIGRAM(S): 2 INJECTION INTRAMUSCULAR; INTRAVENOUS; SUBCUTANEOUS at 11:59

## 2023-02-06 RX ADMIN — HYDROMORPHONE HYDROCHLORIDE 1 MILLIGRAM(S): 2 INJECTION INTRAMUSCULAR; INTRAVENOUS; SUBCUTANEOUS at 08:10

## 2023-02-06 RX ADMIN — BUMETANIDE 1 MILLIGRAM(S): 0.25 INJECTION INTRAMUSCULAR; INTRAVENOUS at 21:47

## 2023-02-06 NOTE — DISCHARGE NOTE PROVIDER - NSFOLLOWUPCLINICS_GEN_ALL_ED_FT
[facility] - [place]  Dermatology  [address], [address]  [place] [address]  Phone: [phone]  Fax: [phone]

## 2023-02-06 NOTE — DISCHARGE NOTE PROVIDER - NSDCCPCAREPLAN_GEN_ALL_CORE_FT
PRINCIPAL DISCHARGE DIAGNOSIS  Diagnosis: Gangrene of toe  Assessment and Plan of Treatment: You came into the hospital with complaints of blackening of your toe in your left foot which was concerning for gangrene. You were evaluated by the podiatry and vascular surgery team to determine your surgical plan for amputation. After performing an angiogram of your leg, you were noted to have significant decrease in blood flow in your peripheral arteries and the surgical plan for you was to perform a ---. You recovered well after your surgery without any additional complications. On discharge, please follow up with the podiatry and vascular surgery team.      SECONDARY DISCHARGE DIAGNOSES  Diagnosis: Acute kidney injury  Assessment and Plan of Treatment: During your admission to the hospital, you developed worsening kidney function. You also were producing very minimal amounts of urine during that time concerning for kidney failure. You received an IV medication to help your body produce urine and you were noted to have an improvement in your kidney function and subsequently producing more urine.    Diagnosis: Systemic inflammatory response syndrome (SIRS)  Assessment and Plan of Treatment: During your admission to the hospital, you developed septic shock     PRINCIPAL DISCHARGE DIAGNOSIS  Diagnosis: Gangrene of toe  Assessment and Plan of Treatment: You came into the hospital with complaints of blackening of your toe in your left foot which was concerning for gangrene. You were evaluated by the podiatry and vascular surgery team to determine your surgical plan for amputation. After performing an angiogram of your leg, you were noted to have significant decrease in blood flow in your peripheral arteries and the surgical plan for you was to perform a ---. You recovered well after your surgery without any additional complications. On discharge, please follow up with the podiatry and vascular surgery team.      SECONDARY DISCHARGE DIAGNOSES  Diagnosis: Acute kidney injury  Assessment and Plan of Treatment: During your admission to the hospital, you developed worsening kidney function. You also were producing very minimal amounts of urine during that time concerning for kidney failure. You received an IV medication to help your body produce urine and you were noted to have an improvement in your kidney function and subsequently producing more urine.    Diagnosis: Systemic inflammatory response syndrome (SIRS)  Assessment and Plan of Treatment: During your admission to the hospital, you developed septic shock in the setting of your foot infection. You were in the MICU and required medications for blood pressure support as well as an increase in your antibiotic regimen. You showed improvement after being treated and were transferred back to the general medicine floors and continued to respond well.     PRINCIPAL DISCHARGE DIAGNOSIS  Diagnosis: Gangrene of toe  Assessment and Plan of Treatment: You came into the hospital with complaints of blackening of your toe in your left foot which was concerning for gangrene. You were evaluated by the podiatry and vascular surgery team to determine your surgical plan for amputation. You got a Left below the knee amputation. You recovered well after your surgery without any additional complications. On discharge, please follow up with the podiatry and vascular surgery team.      SECONDARY DISCHARGE DIAGNOSES  Diagnosis: Acute kidney injury  Assessment and Plan of Treatment: During your admission to the hospital, you developed worsening kidney function. You also were producing very minimal amounts of urine during that time concerning for kidney failure. You received an IV medication to help your body produce urine and you were noted to have an improvement in your kidney function and subsequently producing more urine.    Diagnosis: Septic shock  Assessment and Plan of Treatment: During your admission to the hospital, you developed septic shock in the setting of your foot infection. You were in the MICU and required medications for blood pressure support as well as an increase in your antibiotic regimen. You showed improvement after being treated and were transferred back to the general medicine floors and continued to respond well.    Diagnosis: Chronic systolic heart failure  Assessment and Plan of Treatment: continue with bumex, stop torsemide for now. Hold sprinolactone and follow up with Cardiologist reagrding timing of resuming. Follow up with Cardiology within 2 weeks of discharge    Diagnosis: Type 2 diabetes mellitus  Assessment and Plan of Treatment: continue with Insulin. Follow up with primary care doctor 1-2 weeks after dishcarge

## 2023-02-06 NOTE — PROGRESS NOTE ADULT - PROBLEM SELECTOR PLAN 1
Patient presenting in setting of L 2nd toe pain concerning for dry gangrene with history of 3rd toe amputation iso PAD. Initially with elevated ESR/CRP without active signs of infection however later with concern for infection and septic shock.   - X-ray foot without gas or osseous involvement, limited to soft tissue   - CT Angio prelim read with occluded left posterior tibial and peroneal arteries  - pain control w/ Dilaudid 2mg PRN for severe; Dilaudid 1mg PRN for moderate  - pending podiatry surgical intervention with TMA   - pending vascular intervention with LLE angiogram once optimized from standpoint of BBO  - s/p antibiotic course as listed in SIRS problem   - PT/OT consulted Patient presenting in setting of L 2nd toe pain concerning for dry gangrene with history of 3rd toe amputation iso PAD. Initially with elevated ESR/CRP without active signs of infection however later with concern for infection and septic shock.   - X-ray foot without gas or osseous involvement, limited to soft tissue   - CT Angio prelim read with occluded left posterior tibial and peroneal arteries  - pain control w/ Dilaudid 2mg PRN for severe; Dilaudid 1mg PRN for moderate, ATC tylenol  - pending podiatry surgical intervention with TMA   - pending vascular intervention with LLE angiogram once optimized from standpoint of BOB  - s/p antibiotic course as listed in SIRS problem   - PT/OT consulted

## 2023-02-06 NOTE — PROGRESS NOTE ADULT - PROBLEM SELECTOR PLAN 6
On metformin 500mg BID, glimepiride 4mg, lantus 8U at bedtime   - A1c 7.1  - will start 3U lantus at bedtime given elevated sugars (was on 5U on admission) and titrate slowly    - c/w ISS

## 2023-02-06 NOTE — DISCHARGE NOTE PROVIDER - HOSPITAL COURSE
42F w/ PMH coronary artery disease status post CABG x4 in 2018, CHF s/p ICD placement (interrogated 2022), DM, PAD s/p 3rd toe amputation who presents with left 2nd toe pain concerning for dry gangrene. Seen by podiatry and vascular teams with plans for possible transmetatarsal amputation. Had CTA abdominal aorta w run off with evidence of moderate to severe peripheral vascular disease. Course c/b worsening renal function likely iso of contrast load with oliguria. Treated with IV bumex with eventual transition to PO bumex and improving kidney function. Also with RRT x 2 with shock likely sepsis 2/2 LE wounds while on IV unasyn w/ possible cardiogenic component (although less likely). Admitted briefly to MICU for septic shock and treatment with vancomycin, cefepime and flagyl and patient also requiring levophed. Transferred back to medicine floors for continued care pending LLE angiogram prior to podiatry intervention for TMA. LLE angio with evidence of -- so surgical plan changed from TMA to ---. 42F w/ PMH coronary artery disease status post CABG x4 in 2018, CHF s/p ICD placement (interrogated 2022), DM, PAD s/p 3rd toe amputation who presents with left 2nd toe pain concerning for dry gangrene. Seen by podiatry and vascular teams with plans for possible transmetatarsal amputation. Had CTA abdominal aorta w run off with evidence of moderate to severe peripheral vascular disease. Course c/b worsening renal function likely iso of contrast load with oliguria. Treated with IV bumex with eventual transition to PO bumex and improving kidney function. Also with RRT x 2 with shock likely sepsis 2/2 LE wounds while on IV unasyn w/ possible cardiogenic component (although less likely). Admitted briefly to MICU for septic shock and treatment with vancomycin, cefepime and flagyl and patient also requiring levophed. Transferred back to medicine floors for continued care pending LLE angiogram prior to podiatry intervention for TMA. LLE angio with evidence of -- so surgical plan was --- 42F w/ PMH coronary artery disease status post CABG x4 in 2018, CHF s/p ICD placement (interrogated 2022), DM, PAD s/p 3rd toe amputation who presents with left 2nd toe pain concerning for dry gangrene. Seen by podiatry and vascular teams with plans for possible transmetatarsal amputation. Had CTA abdominal aorta w run off with evidence of moderate to severe peripheral vascular disease. Course c/b worsening renal function likely iso of contrast load with oliguria. Treated with IV bumex with eventual transition to PO bumex and improving kidney function. Also with RRT x 2 with shock likely sepsis 2/2 LE wounds while on IV unasyn w/ possible cardiogenic component (although less likely). Admitted briefly to MICU for septic shock and treatment with vancomycin, cefepime and flagyl and patient also requiring levophed. Transferred back to medicine floors for continued care pending LLE angiogram prior to podiatry intervention for TMA. LLE angio with evidence of severe occlusion, hence BKA was done.     Problem/Plan - 1:  Gangrene of toe.   Leading to septic shock s/p pressors in the MICU and Abx  s/p vascular intervention with CO2 LLE angiogram 2/10. now s/p L BKA   pain control w/ Dilaudid PRN for pain, ATC tylenol,   PT/OT: Plan for rehab     Problem/Plan - 2:  Acute kidney injury.   Patient w/ baseline SCr ~1.2 has been uptrending during hospitalization to Cr. 4.2, now improving but increasing likely dt recent angiogram.   continues on bumex 1 PO BID     Problem/Plan - 3:  ·  Problem: HFrEF (heart failure with reduced ejection fraction). Chronic sytolic HF  ·  Plan: chronic systolic HF, Does not appear to be acutely decompensated on exam. Last TTE from 3/21 with EF 27%, s/p ICD placement and interrogated in 2022   - Repeat TTE: 21%. Severe LV systolic dysfunction. decreased RV function   -started on coreg 6.25 BID; eventually uptitrate as able  resume o entresto 24/26    - was on home spironolactone 25mg -> hold pendign Cardiology follow up  - bumex for diuresus     Problem/Plan - 4:  Patient w/ RRT for hypotension likely septic shock iso L toe gangrene/wound complicated by hypothermia, leukocytosis and hypotension refractory to IV fluids requiring pressor support.        Problem/Plan - 5:  Peripheral artery disease.   CT angio with moderate-marked bilateral lower extremity peripheral vascular disease, calcifications of AAA, Occluded distal right peroneal artery and left posterior tibial and   peroneal arteries.  - s/p LLE angio 2/10  - c/w atorvastatin   - c/w ASA  - s/p BKA.     Problem/Plan - 6:  ·  Problem: Type 2 diabetes mellitus.   ·  Plan: On metformin 500mg BID, glimepiride 4mg,   - Resume Lantus given hyperglycemia, c/w ISS   - monitor poc glucose.         Problem/Plan - 8:  ·  Problem: Pleural effusion on right.   ·  Plan: Initially presented with reduced breath sound on R lung field. Hx of chronic effusion 2/2 HF with prior thoracentesis.    - s/p diuresis, cont with po diuresis as above  - monitor pulse ox   - if worsening respiratory symptoms, obtain CT Chest.     Problem/Plan - 9:  ·  Problem: Hyponatremia.   ·  Plan: Mild hyponatremia Na 129. Possibly SIADH in s.o pain.  - monitor BMP and continue with pain control and fluid restriction.

## 2023-02-06 NOTE — DISCHARGE NOTE PROVIDER - NSDCFUADDINST_GEN_ALL_CORE_FT
- Continue daily dressing changes (xeroform, gauze, kerlix, and ace wrap for comfort)   - Continue knee immobilizer

## 2023-02-06 NOTE — PROGRESS NOTE ADULT - ASSESSMENT
A/P:   - DDX reactive perforating collagenosis vs prurigo nodularis   - upper extremity lesions appear to be improving since they were first noticed by patient.  - recommend frequent emollient/ moisturizer use.   - at this time the lesions do not appear clinically c/f VZV  - if lesions persist after patient is discharged, recommend outpatient follow up with dermatology.     For routine outpatient follow up after discharge:  Please call to schedule appointment with any adult dermatologist at the address below.   [facility] D[facility] at [place] [id number] [phone]    The patient's chart was reviewed in addition to photos of the rash taken by the primary team with the permission of the patient.  Patient was seen at bedside  with the dermatology attending Dr. Bustillo  Recommendations were communicated with the primary team.  Please page [phone] for further related questions.    Jaun Little MD  Resident Physician, PGY2  [facility] Dermatology  Pager: [phone] #Papules - prurigo nodularis (favored) Ddx includes porokeratosis. Sometimes the circular scars can be seen with drug use.  - For the body: start triamcinolone 0.1% ointment BID for 2 weeks on / 1 week off.   - recommend outpatient follow up with Dr Mccoy in our patient dermatology clinic as below     For routine outpatient follow up after discharge:  [facility] Dermatology at [place]  [address] [place] [address]  [phone]    The patient's chart was reviewed and patient was seen at bedside  with the dermatology attending Dr. Mccoy  Recommendations were communicated with the primary team.  Please page [phone] for further related questions.    Shala Blandon MD  Resident Physician, PGY3  [facility] Dermatology  Pager: [phone] #Papules - prurigo nodularis (favored) Ddx includes porokeratosis. Sometimes the circular scars can be seen with drug use.  - For the body: start triamcinolone 0.1% ointment BID for 2 weeks on / 1 week off.   - recommend outpatient follow up with Dr Mccoy in our patient dermatology clinic as below     For routine outpatient follow up after discharge:  [facility] Dermatology at [place]  [address] [place] [address]  [phone]    The patient's chart was reviewed and patient was seen at bedside  with the dermatology attending Dr. Mccoy  Recommendations were communicated with the primary team.  Dermatology will sign off now. Please page [phone] for further related questions.    Shala Blandon MD  Resident Physician, PGY3  [facility] Dermatology  Pager: [phone] #Primary excoriations and prurigo nodularis.   - For the body: start triamcinolone 0.1% ointment BID for 2 weeks on / 1 week off.   - recommend outpatient follow up with Dr Mccoy in our patient dermatology clinic as below     For routine outpatient follow up after discharge:  [facility] Dermatology at [place] [id number]  [phone]    The patient's chart was reviewed and patient was seen at bedside  with the dermatology attending Dr. Mccoy  Recommendations were communicated with the primary team.  Dermatology will sign off now. Please page [phone] for further related questions.    Shala Blandon MD  Resident Physician, PGY3  [facility] Dermatology  Pager: [phone]

## 2023-02-06 NOTE — DISCHARGE NOTE PROVIDER - PROVIDER TOKENS
PROVIDER:[TOKEN:[[id number]],ESTABLISHEDPATIENT:[T]] PROVIDER:[TOKEN:[[id number]],ESTABLISHEDPATIENT:[T]],PROVIDER:[TOKEN:[[id number]]]

## 2023-02-06 NOTE — CHART NOTE - NSCHARTNOTEFT_GEN_A_CORE
Cardiovascular Risk Stratification - RCRI =  3  1. History of ischemic heart disease: 1  2. History of congestive heart failure: 1  3. History of cerebrovascular disease (stroke or transient ischemic attack): 0  4. History of diabetes requiring preoperative insulin use: 1  5. Chronic kidney disease (creatinine > 2 mg/dL): 0  6. Undergoing suprainguinal vascular, intraperitoneal, or intrathoracic surgery: 0  0 predictors = 0.4%, 1 predictor = 0.9%, 2 predictors = 6.6%, =3 predictors = >11%    - Overall this patient is as >11% risk (for cardiac death, nonfatal myocardial infarction, and nonfatal cardiac arrest perioperatively for this moderate risk procedure).    Also with METS >4. With previously presentation of R lower lobe effusion s/p diuresis with IV bumex now transitioned to PO bumex with overall improvement in symptoms.     Patient is presently medically optimized for proposed LLE angiogram procedure and subsequent podiatry/orthopedic intervention. No further cardiac workup required at this time.      Please refer to cardiology note from 1/27/2023 for cardiology pre-op risk stratification.

## 2023-02-06 NOTE — CONSULT NOTE ADULT - SUBJECTIVE AND OBJECTIVE BOX
Chief Complaint:  unrelieved left foot pain    HPI:  Pt is 42-year-old female past medical history coronary artery disease status post CABG x4 in 2018, CHF s/p ICD placement (interrogated ), DM, PAD s/p 3rd toe amputation who presents with left 2nd toe pain. Patient reports that she was discharged from [facility] post amputation beginning of 2022. Reports that the trigger was a cart running over her toe, did not have any issues with her 2nd toe at the time. She was put on IV antibiotics for a month post-discharge and completed 1/3. Has not followed up with her podiatrist/vascular doctor since the surgery Patient reports that she has been walking without issues until 5 days ago when she noticed blackening of the toe as well as severe pain. Notices that it is worse when she lays flat or walks around, had been taking tylenol every few hours without relief. Reports feeling chills occasionally but denies fevers at home. Reports poor PO intake due to loss of appetite every since she was discharged. Denies chest pain, palpitations, worsening SOB, diarrhea, dysuria.     ED course:   130/72, HR 72, afebrile, 100% on RA   S/p 1 dose vanc/zosyn  (2023 23:16)    Called by team to help manage pain.  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  Eventually, the left toe did not heal and the patient ended up having a partial ray amputation of the 3rd metatarsal of her left foot.  About 6 days later, the patient started having blackening of her 1st, 2nd and 4th ray of her left foot, with no pain.  More recently, the patient has have been having worsening pain of her left remaining toes and  that is aggravated by ambulation and weight.  The patient describes her 5/10 intermittent, remaining left toe's pain as a needle pinching her skin, with throbbing like a heart beat and then it disappears.  The patient's right foot has no blacking of her toes and is free of pain.   The patient admits that the IV Dilaudid PRN does help, but was unsure how long it lasts.  The patient used to drink 5-6 beers heavily about 4 days of the week, but she stopped in , and she smoked >1 pack a day of cigarettes for 13 years, quit in .  As per patient, she is going for a stent with Vascular before having a left foot transmetatarsal amputation with tendon achilles lengthening.  As per Vascular, they have no date of surgery as of yet, awaiting clearance.               PAST MEDICAL & SURGICAL HISTORY:  MI (myocardial infarction)  2017    Type 2 diabetes mellitus  Hypertension  Hyperlipidemia  Coronary artery disease  had CABG x 4  erebrovascular accident (CVA)  residual of right sided weakness  O congestive heart disease  ICM/chronic systolic CHF as per Dr. Diana Menjivar  History of cardiomyopathy  Thrombus  left ventricular mural thrombus  S/P   H/O tubal ligation  2016  Corary artery disease  CABG x 4 in 2018    FAMILY HISTORY:  Family history of heart disease (Father)    SOCIAL HISTORY:  [ x] Denies Smoking, Alcohol, or Drug Use    Allergies  No Known Allergies or  Intolerances    PAIN MEDICATIONS:  acetaminophen     Tablet .. 975 milliGRAM(s) Oral every 6 hours  acetaminophen     Tablet .. 650 milliGRAM(s) Oral every 6 hours PRN  HYDROmorphone  Injectable 2 milliGRAM(s) IV Push every 4 hours PRN  HYDROmorphone  Injectable 1 milliGRAM(s) IV Push every 4 hours PRN  melatonin 9 milliGRAM(s) Oral at bedtime  ondansetron Injectable 4 milliGRAM(s) IV Push every 8 hours PRN    Heme:  aspirin enteric coated 81 milliGRAM(s) Oral daily  heparin   Injectable 5000 Unit(s) SubCutaneous every 8 hours    Cardiovascular:  buMETAnide 1 milliGRAM(s) Oral every 12 hours  carvedilol 6.25 milliGRAM(s) Oral every 12 hours    GI:  naloxegol 12.5 milliGRAM(s) Oral daily  pantoprazole    Tablet 40 milliGRAM(s) Oral before breakfast  polyethylene glycol 3350 17 Gram(s) Oral two times a day  senna 2 Tablet(s) Oral at bedtime    Endocrine:  atorvastatin 80 milliGRAM(s) Oral at bedtime  dextrose 50% Injectable 25 Gram(s) IV Push once  dextrose 50% Injectable 12.5 Gram(s) IV Push once  dextrose 50% Injectable 25 Gram(s) IV Push once  dextrose Oral Gel 15 Gram(s) Oral once PRN  glucagon  Injectable 1 milliGRAM(s) IntraMuscular once  insulin glargine Injectable (LANTUS) 3 Unit(s) SubCutaneous at bedtime  insulin lispro (ADMELOG) corrective regimen sliding scale   SubCutaneous three times a day before meals  insulin lispro (ADMELOG) corrective regimen sliding scale   SubCutaneous at bedtime    All Other Medications:  calamine/zinc oxide Lotion 1 Application(s) Topical three times a day  dextrose 5%. 1000 milliLiter(s) IV Continuous <Continuous>  dextrose 5%. 1000 milliLiter(s) IV Continuous <Continuous>  ferrous    sulfate 325 milliGRAM(s) Oral daily  influenza   Vaccine 0.5 milliLiter(s) IntraMuscular once      Vital Signs Last 24 Hrs  T(C): 37.2 (2023 09:00), Max: 37.2 (2023 22:29)  T(F): 98.9 (2023 09:00), Max: 98.9 (2023 22:29)  HR: 88 (2023 09:00) (88 - 99)  BP: 108/56 (2023 09:00) (100/61 - 123/72)  BP(mean): --  RR: 18 (2023 09:00) (17 - 18)  SpO2: 100% (2023 09:00) (97% - 100%)    Parameters below as of 2023 09:00  Patient On (Oxygen Delivery Method): room air        PAIN SCORE:   5/10      SCALE USED: (1-10 VNRS)           LABS:                          6.8    14.19 )-----------( 516      ( 2023 07:47 )             23.0     02-06    131<L>  |  93<L>  |  25<H>  ----------------------------<  209<H>  4.1   |  28  |  1.70<H>    Ca    8.7      2023 07:47  Phos  2.8     02-06  Mg     1.50     02-06      PT/INR - ( 2023 07:47 )   PT: 16.5 sec;   INR: 1.42 ratio         PTT - ( 2023 07:47 )  PTT:34.5 sec    [ x]  [place]  Reviewed no medications found Reference # [id number]    PHYSICAL EXAM:  GENERAL: Alert & Oriented x 3 in NAD, well-groomed, well-developed     Impression/Plan: Requested by Medicine team to help manage pain.     Recommendations:  Patient refuses Gabapentin or any nerve pain medication a t this time.    -  Consider discontinuing current IV Dilaudid orders and instead order:  po Oxycodone 5mg every 4 hours PRN for moderate pain.  Hold for oversedation.  Not to be given within 1 hour of any other immediate acting opioid.   po Oxycodone 10 mg every 4 hours PRN for severe pain.  Hold for oversedation.  Not to be given within 1 hour of any other immediate acting opioid.   -  Continue po Tylenol 650mg q 6 hours standing x2 days, then PRN for pain.  -  Consider ordering po Flexeril 5 mg q 8 hours PRN for muscle spasm.  -  Recommend maintaining continuous pulse oximetry.  -  Recommend follow up with Podiatry and Vascular team regarding left foot surgery.  -  Recommend follow up with Chronic Pain doctor when discharged. If patient does not have a Chronic Pain doctor, may acquire one through patient's personal insurance carrier.  Discussed patient with Chronic Pain Attending on call, Dr. MAC Ritter, who agrees with the above recommendations.

## 2023-02-06 NOTE — PROGRESS NOTE ADULT - SUBJECTIVE AND OBJECTIVE BOX
Daysi Flores  PGY-1 Resident Physician   Pager [phone]/ [phone]    Patient is a 42y old  Female who presents with a chief complaint of Toe pain (05 Feb 2023 09:29)      SUBJECTIVE / OVERNIGHT EVENTS:  Patient seen and evaluated at bedside.    Denies any fevers, chills, CP, or SOB.    Vital Signs Last 24 Hrs  T(C): 36.9 (06 Feb 2023 06:06), Max: 37.2 (05 Feb 2023 22:29)  T(F): 98.4 (06 Feb 2023 06:06), Max: 98.9 (05 Feb 2023 22:29)  HR: 92 (06 Feb 2023 06:06) (90 - 99)  BP: 103/59 (06 Feb 2023 06:06) (100/61 - 123/72)  BP(mean): --  RR: 18 (06 Feb 2023 06:06) (17 - 18)  SpO2: 100% (06 Feb 2023 06:06) (97% - 100%)    Parameters below as of 06 Feb 2023 06:06  Patient On (Oxygen Delivery Method): room air        PHYSICAL EXAM:  GENERAL: laying in bed, appears to be in pain and uncomfortable  CHEST/LUNG: Mildly decreased lung sounds on R lower lobe   HEART: Regular rate and rhythm; Normal S1 S2, No murmurs, rubs, or gallops  ABDOMEN: Soft, Nontender, Nondistended; Bowel sounds present. ponce removed overnight  EXTREMITIES:  LLE exam pain limited but patient with spontaneous movement of leg. R 2nd and th toe appear necrotic with some ?extension to mid foot. tender to touch. RLE without limitation   SKIN: flat circular skin lesions on RLE. small skin lesion in circular pattern on RUE improving   PSYCH: AAOx3    LABS:                        8.4    15.10 )-----------( 613      ( 05 Feb 2023 05:25 )             28.9     Hgb Trend: 8.4<--, 8.6<--, 7.6<--, 7.9<--, 7.3<--  02-05    130<L>  |  92<L>  |  22  ----------------------------<  191<H>  4.3   |  28  |  1.61<H>    Ca    9.1      05 Feb 2023 05:25  Phos  2.8     02-05  Mg     1.70     02-05      Creatinine Trend: 1.61<--, 1.66<--, 2.07<--, 2.38<--, 2.71<--, 2.98<--           Daysi Flores  PGY-1 Resident Physician   Pager [phone]/ [phone]    Patient is a 42y old  Female who presents with a chief complaint of Toe pain (05 Feb 2023 09:29)      SUBJECTIVE / OVERNIGHT EVENTS:  Patient seen and evaluated at bedside.  This AM, patient reports she is still in pain despite having gotten 1 mg dilaudid 45 min prior. Asking for something for pain relief now given severity of pain symptoms.  Otherwise, not having any symptoms of fevers or chills at this time.     Vital Signs Last 24 Hrs  T(C): 36.9 (06 Feb 2023 06:06), Max: 37.2 (05 Feb 2023 22:29)  T(F): 98.4 (06 Feb 2023 06:06), Max: 98.9 (05 Feb 2023 22:29)  HR: 92 (06 Feb 2023 06:06) (90 - 99)  BP: 103/59 (06 Feb 2023 06:06) (100/61 - 123/72)  BP(mean): --  RR: 18 (06 Feb 2023 06:06) (17 - 18)  SpO2: 100% (06 Feb 2023 06:06) (97% - 100%)    Parameters below as of 06 Feb 2023 06:06  Patient On (Oxygen Delivery Method): room air        PHYSICAL EXAM:  GENERAL: laying in bed, appears to be in pain and uncomfortable  CHEST/LUNG: Mildly decreased lung sounds on R lower lobe   HEART: Regular rate and rhythm; Normal S1 S2, No murmurs, rubs, or gallops  ABDOMEN: Soft, Nontender, Nondistended; Bowel sounds present. ponce removed overnight  EXTREMITIES:  LLE exam pain limited but patient with spontaneous movement of leg. R 2nd and th toe appear necrotic with some ?extension to mid foot. tender to touch. RLE without limitation   SKIN: flat circular skin lesions on RLE. small skin lesion in circular pattern on RUE improving   PSYCH: AAOx3    LABS:                        8.4    15.10 )-----------( 613      ( 05 Feb 2023 05:25 )             28.9     Hgb Trend: 8.4<--, 8.6<--, 7.6<--, 7.9<--, 7.3<--  02-05    130<L>  |  92<L>  |  22  ----------------------------<  191<H>  4.3   |  28  |  1.61<H>    Ca    9.1      05 Feb 2023 05:25  Phos  2.8     02-05  Mg     1.70     02-05      Creatinine Trend: 1.61<--, 1.66<--, 2.07<--, 2.38<--, 2.71<--, 2.98<--

## 2023-02-06 NOTE — DISCHARGE NOTE PROVIDER - NSDCFUADDAPPT_GEN_ALL_CORE_FT
Follow up with vascular surgery upon discharge   Please follow with primary care doctor upon discharge   Follow up with vascular surgery Dr. Winslow 2-4 weeks after discharge  Please follow with primary care doctor upon discharge

## 2023-02-06 NOTE — PROGRESS NOTE ADULT - PROBLEM SELECTOR PLAN 4
Does not appear to be acutely decompensated on exam. Last TTE from 3/21 with EF 27%, s/p ICD placement and interrogated in 2022   - Repeat TTE: 21%. Severe LV systolic dysfunction. decreased RV function   - was on home coreg 25 BID  -> will resume 12.5mg BID and uptitrate    - was on home entresto 24/26  -> hold given BOB   - was on home spironolactone 25mg -> hold given BOB   - d/c home torsemide ->continuing with bumex 1 PO BID for diuresis Does not appear to be acutely decompensated on exam. Last TTE from 3/21 with EF 27%, s/p ICD placement and interrogated in 2022   - Repeat TTE: 21%. Severe LV systolic dysfunction. decreased RV function   - was on home coreg 25 BID  -> resumed 12.5mg BID yesterday but low BP so will continue to hold for now  - was on home entresto 24/26  -> hold given BOB   - was on home spironolactone 25mg -> hold given BOB   - d/c home torsemide ->continuing with bumex 1 PO BID for diuresis Does not appear to be acutely decompensated on exam. Last TTE from 3/21 with EF 27%, s/p ICD placement and interrogated in 2022   - Repeat TTE: 21%. Severe LV systolic dysfunction. decreased RV function   - was on home coreg 25 BID  -> resumed 12.5mg BID yesterday but low BP so will reduce further to 6.25 BID and uptitrate  - was on home entresto 24/26  -> hold given BOB   - was on home spironolactone 25mg -> hold given BOB   - d/c home torsemide ->continuing with bumex 1 PO BID for diuresis

## 2023-02-06 NOTE — DISCHARGE NOTE PROVIDER - CARE PROVIDER_API CALL
Bruno Gayle)  Family Medicine  [address] [place] [address]  Phone: [phone]  Fax: [phone]  Established Patient  Follow Up Time:    Bruno Gayle)  Family Medicine  [address] [place] [address]  Phone: [phone]  Fax: [phone]  Established Patient  Follow Up Time:     Bannazadeh, Mohsen (MD)  Surgery; Vascular Surgery  [address] [place] [address]  Phone: [phone]  Fax: [phone]  Follow Up Time:

## 2023-02-06 NOTE — PROGRESS NOTE ADULT - SUBJECTIVE AND OBJECTIVE BOX
INTERVAL HPI/OVERNIGHT EVENTS:  S: Patient is a 42y old  Female who presents with a chief complaint of Toe pain (06 Feb 2023 15:01)  No acute overnight events. Skin symptoms stable.    MEDICATIONS  (STANDING):  acetaminophen     Tablet .. 975 milliGRAM(s) Oral every 6 hours  aspirin enteric coated 81 milliGRAM(s) Oral daily  atorvastatin 80 milliGRAM(s) Oral at bedtime  buMETAnide 1 milliGRAM(s) Oral every 12 hours  calamine/zinc oxide Lotion 1 Application(s) Topical three times a day  carvedilol 6.25 milliGRAM(s) Oral every 12 hours  dextrose 5%. 1000 milliLiter(s) (100 mL/Hr) IV Continuous <Continuous>  dextrose 5%. 1000 milliLiter(s) (50 mL/Hr) IV Continuous <Continuous>  dextrose 50% Injectable 25 Gram(s) IV Push once  dextrose 50% Injectable 12.5 Gram(s) IV Push once  dextrose 50% Injectable 25 Gram(s) IV Push once  ferrous    sulfate 325 milliGRAM(s) Oral daily  glucagon  Injectable 1 milliGRAM(s) IntraMuscular once  heparin   Injectable 5000 Unit(s) SubCutaneous every 8 hours  influenza   Vaccine 0.5 milliLiter(s) IntraMuscular once  insulin glargine Injectable (LANTUS) 3 Unit(s) SubCutaneous at bedtime  insulin lispro (ADMELOG) corrective regimen sliding scale   SubCutaneous three times a day before meals  insulin lispro (ADMELOG) corrective regimen sliding scale   SubCutaneous at bedtime  melatonin 9 milliGRAM(s) Oral at bedtime  naloxegol 12.5 milliGRAM(s) Oral daily  pantoprazole    Tablet 40 milliGRAM(s) Oral before breakfast  polyethylene glycol 3350 17 Gram(s) Oral two times a day  senna 2 Tablet(s) Oral at bedtime    MEDICATIONS  (PRN):  acetaminophen     Tablet .. 650 milliGRAM(s) Oral every 6 hours PRN Temp greater or equal to 38C (100.4F), Mild Pain (1 - 3)  dextrose Oral Gel 15 Gram(s) Oral once PRN Blood Glucose LESS THAN 70 milliGRAM(s)/deciliter  HYDROmorphone  Injectable 2 milliGRAM(s) IV Push every 4 hours PRN Severe Pain (7 - 10)  HYDROmorphone  Injectable 1 milliGRAM(s) IV Push every 4 hours PRN Moderate Pain (4 - 6)  ondansetron Injectable 4 milliGRAM(s) IV Push every 8 hours PRN Nausea and/or Vomiting      Allergies    No Known Allergies    Intolerances        REVIEW OF SYSTEMS      General: no fevers/chills, no NS	    Skin: see HPI  	  Ophthalmologic: no eye pain or change in vision    Genitourinary: no dysuria or hematuria    Musculoskeletal: no joint pains or weakness	    Neurological:no weakness or tingling          Vital Signs Last 24 Hrs  T(C): 36.7 (06 Feb 2023 17:30), Max: 37.2 (05 Feb 2023 22:29)  T(F): 98.1 (06 Feb 2023 17:30), Max: 98.9 (05 Feb 2023 22:29)  HR: 92 (06 Feb 2023 17:30) (88 - 93)  BP: 105/75 (06 Feb 2023 17:30) (100/61 - 122/73)  BP(mean): --  RR: 18 (06 Feb 2023 17:30) (17 - 18)  SpO2: 99% (06 Feb 2023 17:30) (98% - 100%)    Parameters below as of 06 Feb 2023 17:30  Patient On (Oxygen Delivery Method): room air        PHYSICAL EXAM:  The patient was alert and oriented X 3, well nourished, and in no apparent distress.  There was no visible lymphadenopathy.  Conjunctiva were non injected  There was no clubbing or edema of extremities.    Of note on skin exam:  - necrotic plaques on left second and fourth toes  - papules scattered on trunk and extremities some in a linear pattern    LABS:                        7.1    14.51 )-----------( 517      ( 06 Feb 2023 10:51 )             23.9     02-06    131<L>  |  93<L>  |  25<H>  ----------------------------<  209<H>  4.1   |  28  |  1.70<H>    Ca    8.7      06 Feb 2023 07:47  Phos  2.8     02-06  Mg     1.50     02-06      PT/INR - ( 06 Feb 2023 07:47 )   PT: 16.5 sec;   INR: 1.42 ratio         PTT - ( 06 Feb 2023 07:47 )  PTT:34.5 sec      RADIOLOGY & ADDITIONAL TESTS:   INTERVAL HPI/OVERNIGHT EVENTS:  S: Patient is a 42y old  Female who presents with a chief complaint of Toe pain (06 Feb 2023 15:01)  No acute overnight events. Skin symptoms stable.    MEDICATIONS  (STANDING):  acetaminophen     Tablet .. 975 milliGRAM(s) Oral every 6 hours  aspirin enteric coated 81 milliGRAM(s) Oral daily  atorvastatin 80 milliGRAM(s) Oral at bedtime  buMETAnide 1 milliGRAM(s) Oral every 12 hours  calamine/zinc oxide Lotion 1 Application(s) Topical three times a day  carvedilol 6.25 milliGRAM(s) Oral every 12 hours  dextrose 5%. 1000 milliLiter(s) (100 mL/Hr) IV Continuous <Continuous>  dextrose 5%. 1000 milliLiter(s) (50 mL/Hr) IV Continuous <Continuous>  dextrose 50% Injectable 25 Gram(s) IV Push once  dextrose 50% Injectable 12.5 Gram(s) IV Push once  dextrose 50% Injectable 25 Gram(s) IV Push once  ferrous    sulfate 325 milliGRAM(s) Oral daily  glucagon  Injectable 1 milliGRAM(s) IntraMuscular once  heparin   Injectable 5000 Unit(s) SubCutaneous every 8 hours  influenza   Vaccine 0.5 milliLiter(s) IntraMuscular once  insulin glargine Injectable (LANTUS) 3 Unit(s) SubCutaneous at bedtime  insulin lispro (ADMELOG) corrective regimen sliding scale   SubCutaneous three times a day before meals  insulin lispro (ADMELOG) corrective regimen sliding scale   SubCutaneous at bedtime  melatonin 9 milliGRAM(s) Oral at bedtime  naloxegol 12.5 milliGRAM(s) Oral daily  pantoprazole    Tablet 40 milliGRAM(s) Oral before breakfast  polyethylene glycol 3350 17 Gram(s) Oral two times a day  senna 2 Tablet(s) Oral at bedtime    MEDICATIONS  (PRN):  acetaminophen     Tablet .. 650 milliGRAM(s) Oral every 6 hours PRN Temp greater or equal to 38C (100.4F), Mild Pain (1 - 3)  dextrose Oral Gel 15 Gram(s) Oral once PRN Blood Glucose LESS THAN 70 milliGRAM(s)/deciliter  HYDROmorphone  Injectable 2 milliGRAM(s) IV Push every 4 hours PRN Severe Pain (7 - 10)  HYDROmorphone  Injectable 1 milliGRAM(s) IV Push every 4 hours PRN Moderate Pain (4 - 6)  ondansetron Injectable 4 milliGRAM(s) IV Push every 8 hours PRN Nausea and/or Vomiting      Allergies    No Known Allergies    Intolerances        REVIEW OF SYSTEMS      General: no fevers/chills, no NS	    Skin: see HPI  	  Ophthalmologic: no eye pain or change in vision    Genitourinary: no dysuria or hematuria    Musculoskeletal: no joint pains or weakness	    Neurological:no weakness or tingling          Vital Signs Last 24 Hrs  T(C): 36.7 (06 Feb 2023 17:30), Max: 37.2 (05 Feb 2023 22:29)  T(F): 98.1 (06 Feb 2023 17:30), Max: 98.9 (05 Feb 2023 22:29)  HR: 92 (06 Feb 2023 17:30) (88 - 93)  BP: 105/75 (06 Feb 2023 17:30) (100/61 - 122/73)  BP(mean): --  RR: 18 (06 Feb 2023 17:30) (17 - 18)  SpO2: 99% (06 Feb 2023 17:30) (98% - 100%)    Parameters below as of 06 Feb 2023 17:30  Patient On (Oxygen Delivery Method): room air        PHYSICAL EXAM:  The patient was alert and oriented X 3, well nourished, and in no apparent distress.  There was no visible lymphadenopathy.  Conjunctiva were non injected  There was no clubbing or edema of extremities.    Of note on skin exam:  - necrotic plaques on left second and fourth toes  - papules, nodules, and excoriations scattered on trunk and extremities some in a linear pattern    LABS:                        7.1    14.51 )-----------( 517      ( 06 Feb 2023 10:51 )             23.9     02-06    131<L>  |  93<L>  |  25<H>  ----------------------------<  209<H>  4.1   |  28  |  1.70<H>    Ca    8.7      06 Feb 2023 07:47  Phos  2.8     02-06  Mg     1.50     02-06      PT/INR - ( 06 Feb 2023 07:47 )   PT: 16.5 sec;   INR: 1.42 ratio         PTT - ( 06 Feb 2023 07:47 )  PTT:34.5 sec      RADIOLOGY & ADDITIONAL TESTS:  no additional relevant tests

## 2023-02-06 NOTE — DISCHARGE NOTE PROVIDER - NSDCMRMEDTOKEN_GEN_ALL_CORE_FT
aspirin 81 mg oral tablet: 1 tab(s) orally once a day  atorvastatin 80 mg oral tablet: 1 tab(s) orally once a day (at bedtime)  carvedilol 25 mg oral tablet: 1 tab(s) orally 2 times a day  Entresto 24 mg-26 mg oral tablet: 1 tab(s) orally once a day  glimepiride 4 mg oral tablet: 1 tab(s) orally once a day  Lantus 100 units/mL subcutaneous solution: 8 unit(s) subcutaneous once a day (at bedtime)  metFORMIN 500 mg oral tablet: 1 tab(s) orally 2 times a day  pantoprazole 40 mg oral delayed release tablet: 1 tab(s) orally once a day  spironolactone 25 mg oral tablet: 1 tab(s) orally once a day  torsemide 20 mg oral tablet: 1 tab(s) orally once a day   aspirin 81 mg oral tablet: 1 tab(s) orally once a day  atorvastatin 80 mg oral tablet: 1 tab(s) orally once a day (at bedtime)  bumetanide 2 mg oral tablet: 1 tab(s) orally 2 times a day  carvedilol 6.25 mg oral tablet: 1 tab(s) orally every 12 hours  cyclobenzaprine 5 mg oral tablet: 1 tab(s) orally every 8 hours, As Needed  Entresto 24 mg-26 mg oral tablet: 1 tab(s) orally once a day  glimepiride 4 mg oral tablet: 1 tab(s) orally once a day  HYDROmorphone 2 mg oral tablet: 1 tab(s) orally every 6 hours, As needed, Moderate - Severe Pain (4 - 10)  Lantus 100 units/mL subcutaneous solution: 8 unit(s) subcutaneous once a day (at bedtime)  metFORMIN 500 mg oral tablet: 1 tab(s) orally 2 times a day  pantoprazole 40 mg oral delayed release tablet: 1 tab(s) orally once a day  polyethylene glycol 3350 oral powder for reconstitution: 17 gram(s) orally 2 times a day  senna leaf extract oral tablet: 2 tab(s) orally once a day (at bedtime)   aspirin 81 mg oral tablet: 1 tab(s) orally once a day  atorvastatin 80 mg oral tablet: 1 tab(s) orally once a day (at bedtime)  bumetanide 2 mg oral tablet: 1 tab(s) orally 2 times a day  calamine topical lotion: 1 application topically 3 times a day  carvedilol 6.25 mg oral tablet: 1 tab(s) orally every 12 hours  cyclobenzaprine 5 mg oral tablet: 1 tab(s) orally every 8 hours, As Needed  Entresto 24 mg-26 mg oral tablet: 1 tab(s) orally once a day  glimepiride 4 mg oral tablet: 1 tab(s) orally once a day  HYDROmorphone 2 mg oral tablet: 1 tab(s) orally every 6 hours, As needed, Moderate - Severe Pain (4 - 10)  Lantus 100 units/mL subcutaneous solution: 8 unit(s) subcutaneous once a day (at bedtime)  metFORMIN 500 mg oral tablet: 1 tab(s) orally 2 times a day  pantoprazole 40 mg oral delayed release tablet: 1 tab(s) orally once a day  polyethylene glycol 3350 oral powder for reconstitution: 17 gram(s) orally 2 times a day  senna leaf extract oral tablet: 2 tab(s) orally once a day (at bedtime)

## 2023-02-06 NOTE — PROGRESS NOTE ADULT - ATTENDING COMMENTS
42F CAD/CABG, sCHF s/p AICD, DM2 c/b PAD s/p 3rd toe amputation p/w Lt 2nd toe gangrene c/b septic shock - MICU for pressor support, BOB  - awaiting VSx eval for angiogram and potential amputation for gangrene  - Cardiac risk optimization note in the chart  - Continue Dilaudid IV PRN for pain control  - - continue Coreg and Bumex  - Hep SC for VTE ppx.

## 2023-02-06 NOTE — PROGRESS NOTE ADULT - SUBJECTIVE AND OBJECTIVE BOX
SURGERY  Pager: #[phone]    INTERVAL EVENTS/SUBJECTIVE: No acute events overnight.     ______________________________________________  OBJECTIVE:   T(C): 36.6 (02-06-23 @ 11:40), Max: 37.2 (02-05-23 @ 22:29)  HR: 91 (02-06-23 @ 13:35) (88 - 99)  BP: 120/71 (02-06-23 @ 13:35) (100/61 - 123/72)  RR: 18 (02-06-23 @ 13:35) (17 - 18)  SpO2: 100% (02-06-23 @ 13:35) (97% - 100%)  Wt(kg): --  CAPILLARY BLOOD GLUCOSE      POCT Blood Glucose.: 176 mg/dL (06 Feb 2023 13:15)  POCT Blood Glucose.: 223 mg/dL (06 Feb 2023 09:02)  POCT Blood Glucose.: 162 mg/dL (05 Feb 2023 22:15)  POCT Blood Glucose.: 229 mg/dL (05 Feb 2023 17:47)    I&O's Detail    05 Feb 2023 07:01  -  06 Feb 2023 07:00  --------------------------------------------------------  IN:    Oral Fluid: 1010 mL  Total IN: 1010 mL    OUT:    Indwelling Catheter - Urethral (mL): 1075 mL    Voided (mL): 180 mL  Total OUT: 1255 mL    Total NET: -245 mL      06 Feb 2023 07:01  -  06 Feb 2023 15:01  --------------------------------------------------------  IN:    Oral Fluid: 170 mL  Total IN: 170 mL    OUT:    Voided (mL): 300 mL  Total OUT: 300 mL    Total NET: -130 mL        PHYSICAL EXAM  General: NAD, resting comfortably  Pulmonary: non-labored breathing on room air  Extremities: Left DP/PT signals, dry gangrene on 2nd L toe  ______________________________________________  LABS:  CBC Full  -  ( 06 Feb 2023 10:51 )  WBC Count : 14.51 K/uL  RBC Count : 2.86 M/uL  Hemoglobin : 7.1 g/dL  Hematocrit : 23.9 %  Platelet Count - Automated : 517 K/uL  Mean Cell Volume : 83.6 fL  Mean Cell Hemoglobin : 24.8 pg  Mean Cell Hemoglobin Concentration : 29.7 gm/dL  Auto Neutrophil # : x  Auto Lymphocyte # : x  Auto Monocyte # : x  Auto Eosinophil # : x  Auto Basophil # : x  Auto Neutrophil % : x  Auto Lymphocyte % : x  Auto Monocyte % : x  Auto Eosinophil % : x  Auto Basophil % : x    02-06    131<L>  |  93<L>  |  25<H>  ----------------------------<  209<H>  4.1   |  28  |  1.70<H>    Ca    8.7      06 Feb 2023 07:47  Phos  2.8     02-06  Mg     1.50     02-06      _____________________________________________  RADIOLOGY:

## 2023-02-06 NOTE — PROGRESS NOTE ADULT - ATTENDING COMMENTS
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 Describe an intense sensation on the skin. There are no primary inflammatory lesions to indicate a potential source of an inflammatory dermatosis. There may be a component of generalized pruritus and/or skin picking. Similar scars may also be seen in skin popping. Will treat for generalized itch with the initiation of topical corticosteroids as above. Would benefit from outpatient follow-up as phototherapy, NAC, and/or dupilumab could be considerations.    Tanner Mccoy MD, PharmD, MPH  Co-Director, Inpatient Dermatology Consultation Service, [facility]

## 2023-02-06 NOTE — PROGRESS NOTE ADULT - ASSESSMENT
42F w/ PMH coronary artery disease status post CABG x4 in 2018, CHF s/p ICD placement (interrogated 2022), DM, PAD s/p 3rd toe amputation who presents with left 2nd toe pain concerning for dry gangrene.    Plan:  - Will plan for angiogram with Dr. Calderon - timing TBD   - Documented cardiac optimization noted 1/27, medicine 2/6; appreciate nephrology optimization    C Team Surgery  #[id number] 42F w/ PMH coronary artery disease status post CABG x4 in 2018, CHF s/p ICD placement (interrogated 2022), DM, PAD s/p 3rd toe amputation who presents with left 2nd toe pain concerning for dry gangrene.    Plan:  - Will plan for angiogram with Dr. Calderon - timing TBD   - Documented cardiac optimization noted 1/27 - appreciate re-evaluation after MICU stay, medicine optimization 2/6; appreciate nephrology optimization    C Team Surgery  #[id number]

## 2023-02-06 NOTE — DISCHARGE NOTE PROVIDER - NSDCFUSCHEDAPPT_GEN_ALL_CORE_FT
[facility] Physician [facility]  Cardio Electro [phone]  Scheduled Appointment: 02/23/2023

## 2023-02-06 NOTE — DISCHARGE NOTE PROVIDER - DETAILS OF MALNUTRITION DIAGNOSIS/DIAGNOSES
This patient has been assessed with a concern for Malnutrition and was treated during this hospitalization for the following Nutrition diagnosis/diagnoses:     -  01/31/2023: Severe protein-calorie malnutrition

## 2023-02-06 NOTE — PROGRESS NOTE ADULT - PROBLEM SELECTOR PLAN 7
Patient with concern for anemia with iron studies showing evidence of iron deficiency with a component of anemia of chronic disease.  - no signs/sx of bleeding, likely related to inflammation   - continue to trend H/H  - active type and screen  - transfuse < 7 or <8 if symptomatic iso of heart disease Patient with concern for anemia with iron studies showing evidence of iron deficiency with a component of anemia of chronic disease.  - no signs/sx of bleeding, likely related to inflammation   - this AM with Hgb 6.8 but on repeat 7.1; will hold on transfusion as patient remains asymptomatic at this time   - continue to trend H/H  - active type and screen  - transfuse < 7 or <8 if symptomatic iso of heart disease but would transfuse in 1/2 units and dose additional bumex iso HFrEF

## 2023-02-07 LAB
ANION GAP SERPL CALC-SCNC: 13 MMOL/L — SIGNIFICANT CHANGE UP (ref 7–14)
APTT BLD: 33.9 SEC — SIGNIFICANT CHANGE UP (ref 27–36.3)
BUN SERPL-MCNC: 31 MG/DL — HIGH (ref 7–23)
CALCIUM SERPL-MCNC: 8.8 MG/DL — SIGNIFICANT CHANGE UP (ref 8.4–10.5)
CHLORIDE SERPL-SCNC: 91 MMOL/L — LOW (ref 98–107)
CO2 SERPL-SCNC: 24 MMOL/L — SIGNIFICANT CHANGE UP (ref 22–31)
CREAT SERPL-MCNC: 1.85 MG/DL — HIGH (ref 0.5–1.3)
EGFR: 34 ML/MIN/1.73M2 — LOW
GLUCOSE SERPL-MCNC: 271 MG/DL — HIGH (ref 70–99)
HCT VFR BLD CALC: 23.4 % — LOW (ref 34.5–45)
HCT VFR BLD CALC: 23.9 % — LOW (ref 34.5–45)
HCT VFR BLD CALC: 26.6 % — LOW (ref 34.5–45)
HGB BLD-MCNC: 6.7 G/DL — CRITICAL LOW (ref 11.5–15.5)
HGB BLD-MCNC: 6.9 G/DL — CRITICAL LOW (ref 11.5–15.5)
HGB BLD-MCNC: 8.2 G/DL — LOW (ref 11.5–15.5)
INR BLD: 1.38 RATIO — HIGH (ref 0.88–1.16)
MAGNESIUM SERPL-MCNC: 2 MG/DL — SIGNIFICANT CHANGE UP (ref 1.6–2.6)
MCHC RBC-ENTMCNC: 24.3 PG — LOW (ref 27–34)
MCHC RBC-ENTMCNC: 24.9 PG — LOW (ref 27–34)
MCHC RBC-ENTMCNC: 25.3 PG — LOW (ref 27–34)
MCHC RBC-ENTMCNC: 28 GM/DL — LOW (ref 32–36)
MCHC RBC-ENTMCNC: 29.5 GM/DL — LOW (ref 32–36)
MCHC RBC-ENTMCNC: 30.8 GM/DL — LOW (ref 32–36)
MCV RBC AUTO: 82.1 FL — SIGNIFICANT CHANGE UP (ref 80–100)
MCV RBC AUTO: 84.5 FL — SIGNIFICANT CHANGE UP (ref 80–100)
MCV RBC AUTO: 86.6 FL — SIGNIFICANT CHANGE UP (ref 80–100)
NRBC # BLD: 0 /100 WBCS — SIGNIFICANT CHANGE UP (ref 0–0)
NRBC # FLD: 0 K/UL — SIGNIFICANT CHANGE UP (ref 0–0)
OSMOLALITY SERPL: 287 MOSM/KG — SIGNIFICANT CHANGE UP (ref 275–295)
PHOSPHATE SERPL-MCNC: 3.9 MG/DL — SIGNIFICANT CHANGE UP (ref 2.5–4.5)
PLATELET # BLD AUTO: 470 K/UL — HIGH (ref 150–400)
PLATELET # BLD AUTO: 473 K/UL — HIGH (ref 150–400)
PLATELET # BLD AUTO: 497 K/UL — HIGH (ref 150–400)
POTASSIUM SERPL-MCNC: 3.8 MMOL/L — SIGNIFICANT CHANGE UP (ref 3.5–5.3)
POTASSIUM SERPL-SCNC: 3.8 MMOL/L — SIGNIFICANT CHANGE UP (ref 3.5–5.3)
PROTHROM AB SERPL-ACNC: 16.1 SEC — HIGH (ref 10.5–13.4)
RBC # BLD: 2.76 M/UL — LOW (ref 3.8–5.2)
RBC # BLD: 2.77 M/UL — LOW (ref 3.8–5.2)
RBC # BLD: 3.24 M/UL — LOW (ref 3.8–5.2)
RBC # FLD: 18.4 % — HIGH (ref 10.3–14.5)
RBC # FLD: 19.8 % — HIGH (ref 10.3–14.5)
RBC # FLD: 19.9 % — HIGH (ref 10.3–14.5)
SODIUM SERPL-SCNC: 128 MMOL/L — LOW (ref 135–145)
WBC # BLD: 11.51 K/UL — HIGH (ref 3.8–10.5)
WBC # BLD: 11.69 K/UL — HIGH (ref 3.8–10.5)
WBC # BLD: 12.43 K/UL — HIGH (ref 3.8–10.5)
WBC # FLD AUTO: 11.51 K/UL — HIGH (ref 3.8–10.5)
WBC # FLD AUTO: 11.69 K/UL — HIGH (ref 3.8–10.5)
WBC # FLD AUTO: 12.43 K/UL — HIGH (ref 3.8–10.5)

## 2023-02-07 PROCEDURE — 99233 SBSQ HOSP IP/OBS HIGH 50: CPT | Mod: GC

## 2023-02-07 PROCEDURE — 99232 SBSQ HOSP IP/OBS MODERATE 35: CPT

## 2023-02-07 PROCEDURE — 99232 SBSQ HOSP IP/OBS MODERATE 35: CPT | Mod: GC

## 2023-02-07 RX ORDER — BUMETANIDE 0.25 MG/ML
1 INJECTION INTRAMUSCULAR; INTRAVENOUS
Refills: 0 | Status: DISCONTINUED | OUTPATIENT
Start: 2023-02-07 | End: 2023-02-21

## 2023-02-07 RX ORDER — BUMETANIDE 0.25 MG/ML
1 INJECTION INTRAMUSCULAR; INTRAVENOUS ONCE
Refills: 0 | Status: COMPLETED | OUTPATIENT
Start: 2023-02-07 | End: 2023-02-07

## 2023-02-07 RX ORDER — INSULIN LISPRO 100/ML
2 VIAL (ML) SUBCUTANEOUS
Refills: 0 | Status: DISCONTINUED | OUTPATIENT
Start: 2023-02-07 | End: 2023-02-11

## 2023-02-07 RX ORDER — SODIUM CHLORIDE 9 MG/ML
1000 INJECTION INTRAMUSCULAR; INTRAVENOUS; SUBCUTANEOUS
Refills: 0 | Status: DISCONTINUED | OUTPATIENT
Start: 2023-02-08 | End: 2023-02-08

## 2023-02-07 RX ADMIN — HYDROMORPHONE HYDROCHLORIDE 2 MILLIGRAM(S): 2 INJECTION INTRAMUSCULAR; INTRAVENOUS; SUBCUTANEOUS at 14:15

## 2023-02-07 RX ADMIN — HYDROMORPHONE HYDROCHLORIDE 1 MILLIGRAM(S): 2 INJECTION INTRAMUSCULAR; INTRAVENOUS; SUBCUTANEOUS at 16:37

## 2023-02-07 RX ADMIN — PANTOPRAZOLE SODIUM 40 MILLIGRAM(S): 20 TABLET, DELAYED RELEASE ORAL at 05:22

## 2023-02-07 RX ADMIN — Medication 9 MILLIGRAM(S): at 21:35

## 2023-02-07 RX ADMIN — CALAMINE AND ZINC OXIDE AND PHENOL 1 APPLICATION(S): 160; 10 LOTION TOPICAL at 05:20

## 2023-02-07 RX ADMIN — HYDROMORPHONE HYDROCHLORIDE 1 MILLIGRAM(S): 2 INJECTION INTRAMUSCULAR; INTRAVENOUS; SUBCUTANEOUS at 11:48

## 2023-02-07 RX ADMIN — HYDROMORPHONE HYDROCHLORIDE 2 MILLIGRAM(S): 2 INJECTION INTRAMUSCULAR; INTRAVENOUS; SUBCUTANEOUS at 08:15

## 2023-02-07 RX ADMIN — Medication 975 MILLIGRAM(S): at 13:02

## 2023-02-07 RX ADMIN — HEPARIN SODIUM 5000 UNIT(S): 5000 INJECTION INTRAVENOUS; SUBCUTANEOUS at 21:38

## 2023-02-07 RX ADMIN — BUMETANIDE 1 MILLIGRAM(S): 0.25 INJECTION INTRAMUSCULAR; INTRAVENOUS at 10:02

## 2023-02-07 RX ADMIN — Medication 2 UNIT(S): at 18:08

## 2023-02-07 RX ADMIN — Medication 975 MILLIGRAM(S): at 13:41

## 2023-02-07 RX ADMIN — ATORVASTATIN CALCIUM 80 MILLIGRAM(S): 80 TABLET, FILM COATED ORAL at 21:34

## 2023-02-07 RX ADMIN — Medication 2 UNIT(S): at 13:05

## 2023-02-07 RX ADMIN — Medication 1: at 13:04

## 2023-02-07 RX ADMIN — HYDROMORPHONE HYDROCHLORIDE 2 MILLIGRAM(S): 2 INJECTION INTRAMUSCULAR; INTRAVENOUS; SUBCUTANEOUS at 19:51

## 2023-02-07 RX ADMIN — HYDROMORPHONE HYDROCHLORIDE 1 MILLIGRAM(S): 2 INJECTION INTRAMUSCULAR; INTRAVENOUS; SUBCUTANEOUS at 03:24

## 2023-02-07 RX ADMIN — Medication 3: at 09:05

## 2023-02-07 RX ADMIN — Medication 325 MILLIGRAM(S): at 13:04

## 2023-02-07 RX ADMIN — HEPARIN SODIUM 5000 UNIT(S): 5000 INJECTION INTRAVENOUS; SUBCUTANEOUS at 05:21

## 2023-02-07 RX ADMIN — HEPARIN SODIUM 5000 UNIT(S): 5000 INJECTION INTRAVENOUS; SUBCUTANEOUS at 13:04

## 2023-02-07 RX ADMIN — HYDROMORPHONE HYDROCHLORIDE 2 MILLIGRAM(S): 2 INJECTION INTRAMUSCULAR; INTRAVENOUS; SUBCUTANEOUS at 08:00

## 2023-02-07 RX ADMIN — Medication 81 MILLIGRAM(S): at 13:03

## 2023-02-07 RX ADMIN — BUMETANIDE 1 MILLIGRAM(S): 0.25 INJECTION INTRAMUSCULAR; INTRAVENOUS at 18:10

## 2023-02-07 RX ADMIN — HYDROMORPHONE HYDROCHLORIDE 2 MILLIGRAM(S): 2 INJECTION INTRAMUSCULAR; INTRAVENOUS; SUBCUTANEOUS at 00:43

## 2023-02-07 RX ADMIN — NALOXEGOL OXALATE 12.5 MILLIGRAM(S): 12.5 TABLET, FILM COATED ORAL at 13:03

## 2023-02-07 RX ADMIN — Medication 975 MILLIGRAM(S): at 06:21

## 2023-02-07 RX ADMIN — HYDROMORPHONE HYDROCHLORIDE 2 MILLIGRAM(S): 2 INJECTION INTRAMUSCULAR; INTRAVENOUS; SUBCUTANEOUS at 18:29

## 2023-02-07 RX ADMIN — HYDROMORPHONE HYDROCHLORIDE 1 MILLIGRAM(S): 2 INJECTION INTRAMUSCULAR; INTRAVENOUS; SUBCUTANEOUS at 22:38

## 2023-02-07 RX ADMIN — Medication 975 MILLIGRAM(S): at 00:02

## 2023-02-07 RX ADMIN — Medication 975 MILLIGRAM(S): at 05:21

## 2023-02-07 RX ADMIN — HYDROMORPHONE HYDROCHLORIDE 1 MILLIGRAM(S): 2 INJECTION INTRAMUSCULAR; INTRAVENOUS; SUBCUTANEOUS at 17:12

## 2023-02-07 RX ADMIN — CALAMINE AND ZINC OXIDE AND PHENOL 1 APPLICATION(S): 160; 10 LOTION TOPICAL at 21:35

## 2023-02-07 RX ADMIN — HYDROMORPHONE HYDROCHLORIDE 2 MILLIGRAM(S): 2 INJECTION INTRAMUSCULAR; INTRAVENOUS; SUBCUTANEOUS at 01:50

## 2023-02-07 RX ADMIN — HYDROMORPHONE HYDROCHLORIDE 2 MILLIGRAM(S): 2 INJECTION INTRAMUSCULAR; INTRAVENOUS; SUBCUTANEOUS at 14:30

## 2023-02-07 RX ADMIN — CARVEDILOL PHOSPHATE 6.25 MILLIGRAM(S): 80 CAPSULE, EXTENDED RELEASE ORAL at 18:11

## 2023-02-07 RX ADMIN — HYDROMORPHONE HYDROCHLORIDE 1 MILLIGRAM(S): 2 INJECTION INTRAMUSCULAR; INTRAVENOUS; SUBCUTANEOUS at 12:03

## 2023-02-07 RX ADMIN — HYDROMORPHONE HYDROCHLORIDE 1 MILLIGRAM(S): 2 INJECTION INTRAMUSCULAR; INTRAVENOUS; SUBCUTANEOUS at 21:38

## 2023-02-07 RX ADMIN — INSULIN GLARGINE 3 UNIT(S): 100 INJECTION, SOLUTION SUBCUTANEOUS at 21:39

## 2023-02-07 NOTE — PROGRESS NOTE ADULT - PROBLEM SELECTOR PLAN 1
Patient presenting in setting of L 2nd toe pain concerning for dry gangrene with history of 3rd toe amputation iso PAD. Initially with elevated ESR/CRP without active signs of infection however later with concern for infection and septic shock.   - X-ray foot without gas or osseous involvement, limited to soft tissue   - CT Angio prelim read with occluded left posterior tibial and peroneal arteries  - pain control w/ Dilaudid 2mg PRN for severe; Dilaudid 1mg PRN for moderate, ATC tylenol  - pending podiatry surgical intervention with TMA   - pending vascular intervention with LLE angiogram once optimized from standpoint of BOB  - s/p antibiotic course as listed in SIRS problem   - PT/OT consulted Patient presenting in setting of L 2nd toe pain concerning for dry gangrene with history of 3rd toe amputation iso PAD. Initially with elevated ESR/CRP without active signs of infection however later with concern for infection and septic shock.   - X-ray foot without gas or osseous involvement, limited to soft tissue   - CT Angio prelim read with occluded left posterior tibial and peroneal arteries  - pain control w/ Dilaudid 2mg PRN for severe; Dilaudid 1mg PRN for moderate, ATC tylenol  - pending podiatry surgical intervention with TMA   - pending vascular intervention with LLE angiogram   - s/p antibiotic course as listed in SIRS problem   - PT/OT consulted

## 2023-02-07 NOTE — PROGRESS NOTE ADULT - SUBJECTIVE AND OBJECTIVE BOX
Patient seen and examined at bedside.    Overnight Events:     No AEON     Denies any chest pain, SOB, orthopnea, palpitations     Reporting L toe pain     Review Of Systems: No chest pain, shortness of breath, or palpitations            Current Meds:  acetaminophen     Tablet .. 975 milliGRAM(s) Oral every 6 hours  acetaminophen     Tablet .. 650 milliGRAM(s) Oral every 6 hours PRN  aspirin enteric coated 81 milliGRAM(s) Oral daily  atorvastatin 80 milliGRAM(s) Oral at bedtime  buMETAnide 1 milliGRAM(s) Oral every 12 hours  calamine/zinc oxide Lotion 1 Application(s) Topical three times a day  carvedilol 6.25 milliGRAM(s) Oral every 12 hours  dextrose 5%. 1000 milliLiter(s) IV Continuous <Continuous>  dextrose 5%. 1000 milliLiter(s) IV Continuous <Continuous>  dextrose 50% Injectable 25 Gram(s) IV Push once  dextrose 50% Injectable 12.5 Gram(s) IV Push once  dextrose 50% Injectable 25 Gram(s) IV Push once  dextrose Oral Gel 15 Gram(s) Oral once PRN  ferrous    sulfate 325 milliGRAM(s) Oral daily  glucagon  Injectable 1 milliGRAM(s) IntraMuscular once  heparin   Injectable 5000 Unit(s) SubCutaneous every 8 hours  HYDROmorphone  Injectable 2 milliGRAM(s) IV Push every 4 hours PRN  HYDROmorphone  Injectable 1 milliGRAM(s) IV Push every 4 hours PRN  influenza   Vaccine 0.5 milliLiter(s) IntraMuscular once  insulin glargine Injectable (LANTUS) 3 Unit(s) SubCutaneous at bedtime  insulin lispro (ADMELOG) corrective regimen sliding scale   SubCutaneous three times a day before meals  insulin lispro (ADMELOG) corrective regimen sliding scale   SubCutaneous at bedtime  insulin lispro Injectable (ADMELOG) 2 Unit(s) SubCutaneous three times a day before meals  melatonin 9 milliGRAM(s) Oral at bedtime  naloxegol 12.5 milliGRAM(s) Oral daily  ondansetron Injectable 4 milliGRAM(s) IV Push every 8 hours PRN  pantoprazole    Tablet 40 milliGRAM(s) Oral before breakfast  polyethylene glycol 3350 17 Gram(s) Oral two times a day  senna 2 Tablet(s) Oral at bedtime      Vitals:  T(F): 98 (02-07), Max: 98.9 (02-06)  HR: 77 (02-07) (72 - 92)  BP: 105/59 (02-07) (100/53 - 122/73)  RR: 17 (02-07)  SpO2: 100% (02-07)  I&O's Summary    06 Feb 2023 07:01  -  07 Feb 2023 07:00  --------------------------------------------------------  IN: 560 mL / OUT: 400 mL / NET: 160 mL        Physical Exam:  Appearance: No acute distress   Cardiovascular: RRR, S1, S2, no murmurs, rubs, or gallops; no edema; no JVD  Respiratory: Clear to auscultation bilaterally  Musculoskeletal: No clubbing; no joint deformity, L two gangrenous toes noted   Neurologic: Non-focal  Psychiatry: AAOx3, mood & affect appropriate  Skin: No rashes, ecchymoses, or cyanosis                          7.1    14.51 )-----------( 517      ( 06 Feb 2023 10:51 )             23.9     02-06    131<L>  |  93<L>  |  25<H>  ----------------------------<  209<H>  4.1   |  28  |  1.70<H>    Ca    8.7      06 Feb 2023 07:47  Phos  2.8     02-06  Mg     1.50     02-06      PT/INR - ( 07 Feb 2023 07:00 )   PT: 16.1 sec;   INR: 1.38 ratio         PTT - ( 07 Feb 2023 07:00 )  PTT:33.9 sec  CARDIAC MARKERS ( 02 Feb 2023 01:50 )  45 ng/L / x     / x     / x     / x     / x      CARDIAC MARKERS ( 01 Feb 2023 01:50 )  66 ng/L / x     / x     / x     / x     / x                  New ECG(s):     Echo:    Stress Testing:     Cath:    Imaging:    Interpretation of Telemetry:

## 2023-02-07 NOTE — PROGRESS NOTE ADULT - ASSESSMENT
42F presents with left foot 2nd digit dry gangrene and ischemic changes to dorsal MPJs 1-5.  - Pt seen and evaluated.  - Afebrile, WBC 14.5  - Left Foot X-Ray: No gas, no osteomyelitis.  - Left foot 2nd digit dry gangrene, dehisced partial 3rd ray resection to sub-dermis, ischemic changes the dorsal and plantar forefoot, no drainage, no purulence, no acute signs of infection. Right foot no wounds, no acute signs of infection.  - No left foot wound culture taken 2/2 no acute signs of infection.  - ID recommendations appreciated - monitor off abx   - SEBASTIÁN/PVR: RABI ncv, RTBI 0.66, LABI 0.58, LTBI flat wvfms, BL wvfms multi-seg art disease.  - CTA: Right distal peroneal artery and left PT/peroneal arteries occluded.  - Vasc plan for LLE angio once medically optimized, appreciated.  - Pod Plan Left foot transmetatarsal amputation with tendoachilles lengthening pending vasc optimization   - Discussed with attending.

## 2023-02-07 NOTE — PROGRESS NOTE ADULT - PROBLEM SELECTOR PLAN 4
Does not appear to be acutely decompensated on exam. Last TTE from 3/21 with EF 27%, s/p ICD placement and interrogated in 2022   - Repeat TTE: 21%. Severe LV systolic dysfunction. decreased RV function   - was on home coreg 25 BID  -> resumed 12.5mg BID yesterday but low BP so will reduce further to 6.25 BID and uptitrate  - was on home entresto 24/26  -> hold given BOB   - was on home spironolactone 25mg -> hold given BOB   - d/c home torsemide ->continuing with bumex 1 PO BID for diuresis Does not appear to be acutely decompensated on exam. Last TTE from 3/21 with EF 27%, s/p ICD placement and interrogated in 2022   - Repeat TTE: 21%. Severe LV systolic dysfunction. decreased RV function   - was on home coreg 25 BID  -> started on coreg 6.25 BID; will uptitrate   - was on home entresto 24/26  -> hold given BOB   - was on home spironolactone 25mg -> hold given BOB   - d/c home torsemide ->continuing with bumex 1 PO BID for diuresis

## 2023-02-07 NOTE — PROGRESS NOTE ADULT - SUBJECTIVE AND OBJECTIVE BOX
Patient is a 42y old  Female who presents with a chief complaint of Toe pain (07 Feb 2023 08:41)       INTERVAL HPI/OVERNIGHT EVENTS:  Patient seen and evaluated at bedside.  Pt is resting comfortable in NAD. Denies N/V/F/C.    Allergies    No Known Allergies    Intolerances        Vital Signs Last 24 Hrs  T(C): 36.7 (07 Feb 2023 07:43), Max: 37.2 (06 Feb 2023 09:00)  T(F): 98 (07 Feb 2023 07:43), Max: 98.9 (06 Feb 2023 09:00)  HR: 77 (07 Feb 2023 07:43) (72 - 92)  BP: 105/59 (07 Feb 2023 07:43) (100/53 - 122/73)  BP(mean): --  RR: 17 (07 Feb 2023 07:43) (17 - 18)  SpO2: 100% (07 Feb 2023 07:43) (99% - 100%)    Parameters below as of 07 Feb 2023 07:43  Patient On (Oxygen Delivery Method): room air        LABS:                        7.1    14.51 )-----------( 517      ( 06 Feb 2023 10:51 )             23.9     02-06    131<L>  |  93<L>  |  25<H>  ----------------------------<  209<H>  4.1   |  28  |  1.70<H>    Ca    8.7      06 Feb 2023 07:47  Phos  2.8     02-06  Mg     1.50     02-06      PT/INR - ( 07 Feb 2023 07:00 )   PT: 16.1 sec;   INR: 1.38 ratio         PTT - ( 07 Feb 2023 07:00 )  PTT:33.9 sec    CAPILLARY BLOOD GLUCOSE      POCT Blood Glucose.: 280 mg/dL (06 Feb 2023 21:16)  POCT Blood Glucose.: 262 mg/dL (06 Feb 2023 17:48)  POCT Blood Glucose.: 176 mg/dL (06 Feb 2023 13:15)  POCT Blood Glucose.: 223 mg/dL (06 Feb 2023 09:02)      Lower Extremity Physical Exam:  Vascular: DP/PT 0/4 left, DT/PT 1/4, CFT <3 seconds B/L, Temperature gradient warm to cold left, warm to cool right.   Neuro: Epicritic sensation decreased to the level of toes, B/L.  Musculoskeletal/Ortho: s/p left foot partial 3rd ray resection.  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 Right foot no wounds, no acute signs of infection.      RADIOLOGY & ADDITIONAL TESTS:

## 2023-02-07 NOTE — PROGRESS NOTE ADULT - SUBJECTIVE AND OBJECTIVE BOX
[facility] Division of Kidney Diseases & Hypertension  FOLLOW UP NOTE  [phone]--------------------------------------------------------------------------------  HPI: 42-year-female with PMH of HTN, HLD, DM, PAD, CHF admitted with left toe pain. Pt. developed BOB during hospital stay. Pt. was transferred to the MICU on 1/29/23 for septic shock requiring vasopressor support. Pt was transferred to [facility] on 2/3/23. Pt. being seen for BOB.       24 hour events/subjective: Pt. seen and examined at bedside. Pt. continues to have L foot pain. Reports that LE edema has resolved. Denies fever, chills, nausea, vomiting, CP and SOB during rounds.      PAST HISTORY  --------------------------------------------------------------------------------  No significant changes to PMH, PSH, FHx, SHx, unless otherwise noted    ALLERGIES & MEDICATIONS  --------------------------------------------------------------------------------  Allergies    No Known Allergies    Intolerances      Standing Inpatient Medications  acetaminophen     Tablet .. 975 milliGRAM(s) Oral every 6 hours  aspirin enteric coated 81 milliGRAM(s) Oral daily  atorvastatin 80 milliGRAM(s) Oral at bedtime  calamine/zinc oxide Lotion 1 Application(s) Topical three times a day  carvedilol 6.25 milliGRAM(s) Oral every 12 hours  dextrose 5%. 1000 milliLiter(s) IV Continuous <Continuous>  dextrose 5%. 1000 milliLiter(s) IV Continuous <Continuous>  dextrose 50% Injectable 25 Gram(s) IV Push once  dextrose 50% Injectable 12.5 Gram(s) IV Push once  dextrose 50% Injectable 25 Gram(s) IV Push once  ferrous    sulfate 325 milliGRAM(s) Oral daily  glucagon  Injectable 1 milliGRAM(s) IntraMuscular once  heparin   Injectable 5000 Unit(s) SubCutaneous every 8 hours  influenza   Vaccine 0.5 milliLiter(s) IntraMuscular once  insulin glargine Injectable (LANTUS) 3 Unit(s) SubCutaneous at bedtime  insulin lispro (ADMELOG) corrective regimen sliding scale   SubCutaneous three times a day before meals  insulin lispro (ADMELOG) corrective regimen sliding scale   SubCutaneous at bedtime  insulin lispro Injectable (ADMELOG) 2 Unit(s) SubCutaneous three times a day before meals  melatonin 9 milliGRAM(s) Oral at bedtime  naloxegol 12.5 milliGRAM(s) Oral daily  pantoprazole    Tablet 40 milliGRAM(s) Oral before breakfast  polyethylene glycol 3350 17 Gram(s) Oral two times a day  senna 2 Tablet(s) Oral at bedtime    PRN Inpatient Medications  acetaminophen     Tablet .. 650 milliGRAM(s) Oral every 6 hours PRN  dextrose Oral Gel 15 Gram(s) Oral once PRN  HYDROmorphone  Injectable 2 milliGRAM(s) IV Push every 4 hours PRN  HYDROmorphone  Injectable 1 milliGRAM(s) IV Push every 4 hours PRN  ondansetron Injectable 4 milliGRAM(s) IV Push every 8 hours PRN      REVIEW OF SYSTEMS  --------------------------------------------------------------------------------  Gen: No fevers/chills  Head/Eyes/Ears: No HA  Respiratory: No dyspnea  CV: No chest pain  GI: No abdominal pain, diarrhea  MSK: +B/L LE edema (improved) and L foot pain  Skin: No rashes  Heme: No bleeding    All other systems were reviewed and are negative, except as noted.    VITALS/PHYSICAL EXAM  --------------------------------------------------------------------------------  T(C): 36.7 (02-07-23 @ 07:43), Max: 36.7 (02-06-23 @ 17:30)  HR: 73 (02-07-23 @ 10:00) (72 - 92)  BP: 109/63 (02-07-23 @ 10:00) (100/53 - 122/73)  RR: 17 (02-07-23 @ 07:43) (17 - 18)  SpO2: 100% (02-07-23 @ 07:43) (99% - 100%)  Wt(kg): --      02-06-23 @ 07:01  -  02-07-23 @ 07:00  --------------------------------------------------------  IN: 560 mL / OUT: 400 mL / NET: 160 mL    02-07-23 @ 07:01  -  02-07-23 @ 11:05  --------------------------------------------------------  IN: 250 mL / OUT: 0 mL / NET: 250 mL    Physical Exam:  Gen: resting, NAD  HEENT: Anicteric  Pulm: CTA B/L  CV: S1S2+  Abd: Soft, +BS    Ext: BL LE edema resolved, left foot: gangrenous changes seen  Neuro: Awake and alert  Skin: Warm and dry    LABS/STUDIES  --------------------------------------------------------------------------------              6.9    11.51 >-----------<  473      [02-07-23 @ 09:27]              23.4     128  |  91  |  31  ----------------------------<  271      [02-07-23 @ 07:00]  3.8   |  24  |  1.85        Ca     8.8     [02-07-23 @ 07:00]      Mg     2.00     [02-07-23 @ 07:00]      Phos  3.9     [02-07-23 @ 07:00]      PT/INR: PT 16.1 , INR 1.38       [02-07-23 @ 07:00]  PTT: 33.9       [02-07-23 @ 07:00]    Serum Osmolality 287      [02-07-23 @ 09:27]    Creatinine Trend:  SCr 1.85 [02-07 @ 07:00]  SCr 1.70 [02-06 @ 07:47]  SCr 1.61 [02-05 @ 05:25]  SCr 1.66 [02-04 @ 07:20]  SCr 2.07 [02-03 @ 03:20]    Urine Phosphorus 13.7      [02-01-23 @ 01:50] [facility] Division of Kidney Diseases & Hypertension  FOLLOW UP NOTE  [phone]--------------------------------------------------------------------------------    HPI: 42-year-female with PMH of HTN, HLD, DM, PAD, CHF admitted with left toe pain. Pt. developed BOB during hospital stay. Pt. was transferred to the MICU on 1/29/23 for septic shock requiring vasopressor support. Pt. clinically improved and was transferred to medical floors on 2/3/23. Pt. being seen for BOB.       24 hour events/subjective: Pt. seen and examined at bedside. Pt. continues to have L foot pain. Reports that LE edema has resolved. Denies fever, chills, nausea, vomiting, CP and SOB during rounds.      PAST HISTORY  --------------------------------------------------------------------------------  No significant changes to PMH, PSH, FHx, SHx, unless otherwise noted    ALLERGIES & MEDICATIONS  --------------------------------------------------------------------------------  Allergies    No Known Allergies    Intolerances    Standing Inpatient Medications  acetaminophen     Tablet .. 975 milliGRAM(s) Oral every 6 hours  aspirin enteric coated 81 milliGRAM(s) Oral daily  atorvastatin 80 milliGRAM(s) Oral at bedtime  calamine/zinc oxide Lotion 1 Application(s) Topical three times a day  carvedilol 6.25 milliGRAM(s) Oral every 12 hours  dextrose 5%. 1000 milliLiter(s) IV Continuous <Continuous>  dextrose 5%. 1000 milliLiter(s) IV Continuous <Continuous>  dextrose 50% Injectable 25 Gram(s) IV Push once  dextrose 50% Injectable 12.5 Gram(s) IV Push once  dextrose 50% Injectable 25 Gram(s) IV Push once  ferrous    sulfate 325 milliGRAM(s) Oral daily  glucagon  Injectable 1 milliGRAM(s) IntraMuscular once  heparin   Injectable 5000 Unit(s) SubCutaneous every 8 hours  influenza   Vaccine 0.5 milliLiter(s) IntraMuscular once  insulin glargine Injectable (LANTUS) 3 Unit(s) SubCutaneous at bedtime  insulin lispro (ADMELOG) corrective regimen sliding scale   SubCutaneous three times a day before meals  insulin lispro (ADMELOG) corrective regimen sliding scale   SubCutaneous at bedtime  insulin lispro Injectable (ADMELOG) 2 Unit(s) SubCutaneous three times a day before meals  melatonin 9 milliGRAM(s) Oral at bedtime  naloxegol 12.5 milliGRAM(s) Oral daily  pantoprazole    Tablet 40 milliGRAM(s) Oral before breakfast  polyethylene glycol 3350 17 Gram(s) Oral two times a day  senna 2 Tablet(s) Oral at bedtime    PRN Inpatient Medications  acetaminophen     Tablet .. 650 milliGRAM(s) Oral every 6 hours PRN  dextrose Oral Gel 15 Gram(s) Oral once PRN  HYDROmorphone  Injectable 2 milliGRAM(s) IV Push every 4 hours PRN  HYDROmorphone  Injectable 1 milliGRAM(s) IV Push every 4 hours PRN  ondansetron Injectable 4 milliGRAM(s) IV Push every 8 hours PRN    REVIEW OF SYSTEMS  --------------------------------------------------------------------------------  Gen: No fevers/chills  Head/Eyes/Ears: No HA  Respiratory: No dyspnea  CV: No chest pain  GI: No abdominal pain, diarrhea  MSK: +Decreased B/L LE edema and L foot pain  Skin: No rashes  Heme: No bleeding    All other systems were reviewed and are negative, except as noted.    VITALS/PHYSICAL EXAM  --------------------------------------------------------------------------------  T(C): 36.7 (02-07-23 @ 07:43), Max: 36.7 (02-06-23 @ 17:30)  HR: 73 (02-07-23 @ 10:00) (72 - 92)  BP: 109/63 (02-07-23 @ 10:00) (100/53 - 122/73)  RR: 17 (02-07-23 @ 07:43) (17 - 18)  SpO2: 100% (02-07-23 @ 07:43) (99% - 100%)  Wt(kg): --    02-06-23 @ 07:01  -  02-07-23 @ 07:00  --------------------------------------------------------  IN: 560 mL / OUT: 400 mL / NET: 160 mL    02-07-23 @ 07:01  -  02-07-23 @ 11:05  --------------------------------------------------------  IN: 250 mL / OUT: 0 mL / NET: 250 mL    Physical Exam:    Gen: resting, NAD  HEENT: Anicteric  Pulm: CTA B/L  CV: S1S2+  Abd: Soft, +BS    Ext: B/L LE edema resolved, left foot: gangrenous changes seen  Neuro: Awake and alert  Skin: Warm and dry    LABS/STUDIES  --------------------------------------------------------------------------------              6.9    11.51 >-----------<  473      [02-07-23 @ 09:27]              23.4     128  |  91  |  31  ----------------------------<  271      [02-07-23 @ 07:00]  3.8   |  24  |  1.85        Ca     8.8     [02-07-23 @ 07:00]      Mg     2.00     [02-07-23 @ 07:00]      Phos  3.9     [02-07-23 @ 07:00]    Serum Osmolality 287      [02-07-23 @ 09:27]    Creatinine Trend:  SCr 1.85 [02-07 @ 07:00]  SCr 1.70 [02-06 @ 07:47]  SCr 1.61 [02-05 @ 05:25]  SCr 1.66 [02-04 @ 07:20]  SCr 2.07 [02-03 @ 03:20]

## 2023-02-07 NOTE — PROGRESS NOTE ADULT - PROBLEM SELECTOR PLAN 1
Pt. with BOB in setting of hypotension, recent IV contrast use and CHF. Upon review of labs on [facility] HIE/[place], Scr was elevated at 1.33 on 6/28/21 and improved to 1.15 on 2/24/22. Scr was 1.22 on admission (1/25/23). Pt. underwent LE angiogram on 1/25/23. Scr worsened to 4.23 on 1/30/23. No hydronephrosis seen on CT scan study. Pt. was oliguric, initiated on IV Bumex infusion on 1/30/23. Currently on PO Bumex 1 mg BID. SCr is elevated at 1.85 today. Pt. is non-oliguric, documented urine output is 400 cc over the past 24 hours. Vascular surgery note from today reviewed, plan for LLE angiogram. Recommend to hold diuretics the day prior and the day of procedure. Recommend IV normal saline at 75 cc/hr starting 6 hours prior to procedure, and up until 6 hours following procedure. Monitor labs and urine output. Avoid nephrotoxins, Dose meds as per eGFR.    If you have any questions, please feel free to contact me  Talia Swift  Nephrology Fellow  [phone] / [employer] Teams(Preferred)  (After 5pm or on weekends please page the on-call fellow) Pt. with BOB in setting of hypotension, recent IV contrast use and CHF. Upon review of labs on [facility] HIE/[place], Scr was elevated at 1.33 on 6/28/21 and improved to 1.15 on 2/24/22. Scr was 1.22 on admission (1/25/23). Pt. underwent LE angiogram on 1/25/23. Scr worsened to 4.23 on 1/30/23. No hydronephrosis seen on CT scan study. Pt. was oliguric, initiated on IV Bumex infusion in MICU on 1/30/23. Pt. responded well to IV diuretic therapy and was then switched to oral diuretic therapy. Currently on PO Bumex 1 mg BID. Scr elevated at 1.85 today. Documented urine output is ~400 cc over the past 24 hours. Vascular surgery note from today reviewed, plan for LLE angiogram. Recommend to hold diuretics the day prior and the day of procedure. Recommend IV NS at 75 cc/hr starting 6 hours prior to procedure, during and for 6 hours following procedure. Monitor labs and urine output. Avoid nephrotoxins, Dose meds as per eGFR.    If you have any questions, please feel free to contact me  Talia Swift  Nephrology Fellow  [phone] / [employer] Teams(Preferred)  (After 5pm or on weekends please page the on-call fellow)

## 2023-02-07 NOTE — PROGRESS NOTE ADULT - ATTENDING COMMENTS
Pt. with BOB. Scr elevated 1.85 today. Assessment and plan for BOB as outlined above. Monitor labs and urine output. Avoid any potential nephrotoxins. Dose medications as per eGFR.

## 2023-02-07 NOTE — PROGRESS NOTE ADULT - PROBLEM SELECTOR PLAN 6
On metformin 500mg BID, glimepiride 4mg, lantus 8U at bedtime   - A1c 7.1  - will start 3U lantus at bedtime given elevated sugars (was on 5U on admission) and titrate slowly    - c/w ISS On metformin 500mg BID, glimepiride 4mg, lantus 8U at bedtime   - A1c 7.1  - started on  3U lantus at bedtime given elevated sugars (was on 5U on admission) and titrate slowly    - c/w ISS and resumed premeal insulin

## 2023-02-07 NOTE — PROGRESS NOTE ADULT - ASSESSMENT
43 yo F w/ PMH CAD s/p CABG, HFrEF, ICD, T2DM, PAD who presents with left toe pain found to have left toe gangrene plan for LLE angiogram. Cardiology consulted for pre-op risk stratification. Course was complicated by septic shock now off pressors/abx.     #LLE aniogram   Cardiovascular Risk Stratification - RCRI =  3  1. History of ischemic heart disease: 1  2. History of congestive heart failure: 1  3. History of cerebrovascular disease (stroke or transient ischemic attack):   4. History of diabetes requiring preoperative insulin use: 1  5. Chronic kidney disease (creatinine > 2 mg/dL):   6. Undergoing suprainguinal vascular, intraperitoneal, or intrathoracic surgery:   0 predictors = 0.4%, 1 predictor = 0.9%, 2 predictors = 6.6%, =3 predictors = >11%    - Overall this patient is as >11% risk (for cardiac death, nonfatal myocardial infarction, and nonfatal cardiac arrest perioperatively for this low risk procedure).    No need for further cardiac testing, METS >4. Patient denies any orthopnea, SOB, not clinically in HF exacerbation.     #CAD  #Abd aortic plaque   -C/w aspirin, statin     #HFrEF   -c/w bumex   -C/w carvedilol   -Please restart entresto when BOB resolves per primary team

## 2023-02-07 NOTE — PROGRESS NOTE ADULT - SUBJECTIVE AND OBJECTIVE BOX
SURGERY  Pager: #[phone]    INTERVAL EVENTS/SUBJECTIVE: Pt. with hemoglobin drop yesterday, no transfusion.     ______________________________________________  OBJECTIVE:   T(C): 36.7 (02-07-23 @ 07:43), Max: 37.2 (02-06-23 @ 09:00)  HR: 77 (02-07-23 @ 07:43) (72 - 92)  BP: 105/59 (02-07-23 @ 07:43) (100/53 - 122/73)  RR: 17 (02-07-23 @ 07:43) (17 - 18)  SpO2: 100% (02-07-23 @ 07:43) (99% - 100%)  Wt(kg): --  CAPILLARY BLOOD GLUCOSE      POCT Blood Glucose.: 280 mg/dL (06 Feb 2023 21:16)  POCT Blood Glucose.: 262 mg/dL (06 Feb 2023 17:48)  POCT Blood Glucose.: 176 mg/dL (06 Feb 2023 13:15)  POCT Blood Glucose.: 223 mg/dL (06 Feb 2023 09:02)    I&O's Detail    06 Feb 2023 07:01  -  07 Feb 2023 07:00  --------------------------------------------------------  IN:    Oral Fluid: 560 mL  Total IN: 560 mL    OUT:    Voided (mL): 400 mL  Total OUT: 400 mL    Total NET: 160 mL            PHYSICAL EXAM  General: NAD, resting comfortably  Pulmonary: non-labored breathing on room air  Extremities: Left DP/PT signals, dry gangrene on 2nd L toe  ______________________________________________  LABS:  CBC Full  -  ( 06 Feb 2023 10:51 )  WBC Count : 14.51 K/uL  RBC Count : 2.86 M/uL  Hemoglobin : 7.1 g/dL  Hematocrit : 23.9 %  Platelet Count - Automated : 517 K/uL  Mean Cell Volume : 83.6 fL  Mean Cell Hemoglobin : 24.8 pg  Mean Cell Hemoglobin Concentration : 29.7 gm/dL  Auto Neutrophil # : x  Auto Lymphocyte # : x  Auto Monocyte # : x  Auto Eosinophil # : x  Auto Basophil # : x  Auto Neutrophil % : x  Auto Lymphocyte % : x  Auto Monocyte % : x  Auto Eosinophil % : x  Auto Basophil % : x    02-06    131<L>  |  93<L>  |  25<H>  ----------------------------<  209<H>  4.1   |  28  |  1.70<H>    Ca    8.7      06 Feb 2023 07:47  Phos  2.8     02-06  Mg     1.50     02-06      _____________________________________________  RADIOLOGY:

## 2023-02-07 NOTE — PROGRESS NOTE ADULT - ATTENDING COMMENTS
42F CAD/CABG, sCHF s/p AICD, DM2 c/b PAD s/p 3rd toe amputation p/w Lt 2nd toe gangrene c/b septic shock - MICU for pressor support, BOB, acute on chronic anemia  - no obvious blood loss - anemia likely due to chronic medical condition and CKD  - 1u PRBC transfusion today with Bumex diuresis after the unit  - awaiting date for angiogram and potential amputation for gangrene  - Cardiac, pulm, and nephrology risk optimization note in the chart  - Continue Dilaudid IV PRN for pain control  - continue Coreg  - transition to Torsemide  - Hep SC for VTE ppx.

## 2023-02-07 NOTE — PROGRESS NOTE ADULT - PROBLEM SELECTOR PLAN 7
Patient with concern for anemia with iron studies showing evidence of iron deficiency with a component of anemia of chronic disease.  - no signs/sx of bleeding, likely related to inflammation   - this AM with Hgb 6.8 but on repeat 7.1; will hold on transfusion as patient remains asymptomatic at this time   - continue to trend H/H  - active type and screen  - transfuse < 7 or <8 if symptomatic iso of heart disease but would transfuse in 1/2 units and dose additional bumex iso HFrEF

## 2023-02-07 NOTE — PROGRESS NOTE ADULT - ASSESSMENT
Refilled per medication protocol.    42F w/ PMH coronary artery disease status post CABG x4 in 2018, CHF s/p ICD placement (interrogated 2022), DM, PAD s/p 3rd toe amputation who presents with left 2nd toe pain concerning for dry gangrene.    Plan:  - Will plan for angiogram with Dr. Calderon - timing TBD, likely next week   - Documented cardiac optimization noted 1/27 - appreciate re-evaluation after MICU stay, medicine optimization 2/6; appreciate nephrology optimization  - May need transfusion prior to angio     C Team Surgery  #[id number]

## 2023-02-07 NOTE — PROGRESS NOTE ADULT - SUBJECTIVE AND OBJECTIVE BOX
Daysi Flores  PGY-1 Resident Physician   Pager [phone]/ [phone]    Patient is a 42y old  Female who presents with a chief complaint of Toe pain (06 Feb 2023 15:01)      SUBJECTIVE / OVERNIGHT EVENTS:  Patient seen and evaluated at bedside.    Denies any fevers, chills, CP, or SOB.    Vital Signs Last 24 Hrs  T(C): 36.5 (07 Feb 2023 05:20), Max: 37.2 (06 Feb 2023 09:00)  T(F): 97.7 (07 Feb 2023 05:20), Max: 98.9 (06 Feb 2023 09:00)  HR: 77 (07 Feb 2023 05:20) (72 - 92)  BP: 104/66 (07 Feb 2023 05:20) (100/53 - 122/73)  BP(mean): --  RR: 18 (07 Feb 2023 05:20) (18 - 18)  SpO2: 100% (07 Feb 2023 05:20) (99% - 100%)    Parameters below as of 07 Feb 2023 05:20  Patient On (Oxygen Delivery Method): room air        PHYSICAL EXAM:  GENERAL: laying in bed, appears to be in pain and uncomfortable  CHEST/LUNG: Mildly decreased lung sounds on R lower lobe   HEART: Regular rate and rhythm; Normal S1 S2, No murmurs, rubs, or gallops  ABDOMEN: Soft, Nontender, Nondistended; Bowel sounds present. ponce removed overnight  EXTREMITIES:  LLE exam pain limited but patient with spontaneous movement of leg. R 2nd and th toe appear necrotic with some ?extension to mid foot. tender to touch. RLE without limitation   SKIN: flat circular skin lesions on RLE. small skin lesion in circular pattern on RUE improving   PSYCH: AAOx3    LABS:                        7.1    14.51 )-----------( 517      ( 06 Feb 2023 10:51 )             23.9     Hgb Trend: 7.1<--, 6.8<--, 8.4<--, 8.6<--, 7.6<--  02-06    131<L>  |  93<L>  |  25<H>  ----------------------------<  209<H>  4.1   |  28  |  1.70<H>    Ca    8.7      06 Feb 2023 07:47  Phos  2.8     02-06  Mg     1.50     02-06      Creatinine Trend: 1.70<--, 1.61<--, 1.66<--, 2.07<--, 2.38<--, 2.71<--    PT/INR - ( 06 Feb 2023 07:47 )   PT: 16.5 sec;   INR: 1.42 ratio         PTT - ( 06 Feb 2023 07:47 )  PTT:34.5 sec       Daysi Flores  PGY-1 Resident Physician   Pager [phone]/ [phone]    Patient is a 42y old  Female who presents with a chief complaint of Toe pain (06 Feb 2023 15:01)      SUBJECTIVE / OVERNIGHT EVENTS:  Patient seen and evaluated at bedside.  Patient this AM endorsing continued pain in her leg; appears tearful. Otherwise has no concerns. Not having any hematuria or melena. Has been urinating since TOV. No fevers or chills.     Vital Signs Last 24 Hrs  T(C): 36.5 (07 Feb 2023 05:20), Max: 37.2 (06 Feb 2023 09:00)  T(F): 97.7 (07 Feb 2023 05:20), Max: 98.9 (06 Feb 2023 09:00)  HR: 77 (07 Feb 2023 05:20) (72 - 92)  BP: 104/66 (07 Feb 2023 05:20) (100/53 - 122/73)  BP(mean): --  RR: 18 (07 Feb 2023 05:20) (18 - 18)  SpO2: 100% (07 Feb 2023 05:20) (99% - 100%)    Parameters below as of 07 Feb 2023 05:20  Patient On (Oxygen Delivery Method): room air        PHYSICAL EXAM:  GENERAL: laying in bed, appears to be in pain and uncomfortable  CHEST/LUNG: Mildly decreased lung sounds on R lower lobe   HEART: Regular rate and rhythm; Normal S1 S2, No murmurs, rubs, or gallops  ABDOMEN: Soft, Nontender, Nondistended; Bowel sounds present. ponce removed overnight  EXTREMITIES:  LLE exam pain limited but patient with spontaneous movement of leg. R 2nd and th toe appear necrotic with some ?extension to mid foot. tender to touch. RLE without limitation   SKIN: flat circular skin lesions on RLE. small skin lesion in circular pattern on RUE improving   PSYCH: AAOx3    LABS:                        7.1    14.51 )-----------( 517      ( 06 Feb 2023 10:51 )             23.9     Hgb Trend: 7.1<--, 6.8<--, 8.4<--, 8.6<--, 7.6<--  02-06    131<L>  |  93<L>  |  25<H>  ----------------------------<  209<H>  4.1   |  28  |  1.70<H>    Ca    8.7      06 Feb 2023 07:47  Phos  2.8     02-06  Mg     1.50     02-06      Creatinine Trend: 1.70<--, 1.61<--, 1.66<--, 2.07<--, 2.38<--, 2.71<--    PT/INR - ( 06 Feb 2023 07:47 )   PT: 16.5 sec;   INR: 1.42 ratio         PTT - ( 06 Feb 2023 07:47 )  PTT:34.5 sec

## 2023-02-08 LAB
ANION GAP SERPL CALC-SCNC: 12 MMOL/L — SIGNIFICANT CHANGE UP (ref 7–14)
ANION GAP SERPL CALC-SCNC: 12 MMOL/L — SIGNIFICANT CHANGE UP (ref 7–14)
APTT BLD: 35.2 SEC — SIGNIFICANT CHANGE UP (ref 27–36.3)
BLD GP AB SCN SERPL QL: NEGATIVE — SIGNIFICANT CHANGE UP
BUN SERPL-MCNC: 32 MG/DL — HIGH (ref 7–23)
BUN SERPL-MCNC: 33 MG/DL — HIGH (ref 7–23)
CALCIUM SERPL-MCNC: 8.7 MG/DL — SIGNIFICANT CHANGE UP (ref 8.4–10.5)
CALCIUM SERPL-MCNC: 9.1 MG/DL — SIGNIFICANT CHANGE UP (ref 8.4–10.5)
CHLORIDE SERPL-SCNC: 92 MMOL/L — LOW (ref 98–107)
CHLORIDE SERPL-SCNC: 96 MMOL/L — LOW (ref 98–107)
CK MB BLD-MCNC: 3.7 % — HIGH (ref 0–2.5)
CK MB CFR SERPL CALC: 1.9 NG/ML — SIGNIFICANT CHANGE UP
CK SERPL-CCNC: 51 U/L — SIGNIFICANT CHANGE UP (ref 25–170)
CO2 SERPL-SCNC: 24 MMOL/L — SIGNIFICANT CHANGE UP (ref 22–31)
CO2 SERPL-SCNC: 24 MMOL/L — SIGNIFICANT CHANGE UP (ref 22–31)
CREAT SERPL-MCNC: 1.68 MG/DL — HIGH (ref 0.5–1.3)
CREAT SERPL-MCNC: 1.85 MG/DL — HIGH (ref 0.5–1.3)
EGFR: 34 ML/MIN/1.73M2 — LOW
EGFR: 38 ML/MIN/1.73M2 — LOW
GLUCOSE BLDC GLUCOMTR-MCNC: 121 MG/DL — HIGH (ref 70–99)
GLUCOSE BLDC GLUCOMTR-MCNC: 128 MG/DL — HIGH (ref 70–99)
GLUCOSE BLDC GLUCOMTR-MCNC: 148 MG/DL — HIGH (ref 70–99)
GLUCOSE SERPL-MCNC: 106 MG/DL — HIGH (ref 70–99)
GLUCOSE SERPL-MCNC: 89 MG/DL — SIGNIFICANT CHANGE UP (ref 70–99)
HCG SERPL-ACNC: <5 MIU/ML — SIGNIFICANT CHANGE UP
HCT VFR BLD CALC: 27.4 % — LOW (ref 34.5–45)
HCT VFR BLD CALC: 31.4 % — LOW (ref 34.5–45)
HGB BLD-MCNC: 8.1 G/DL — LOW (ref 11.5–15.5)
HGB BLD-MCNC: 9.5 G/DL — LOW (ref 11.5–15.5)
INR BLD: 1.37 RATIO — HIGH (ref 0.88–1.16)
MAGNESIUM SERPL-MCNC: 1.7 MG/DL — SIGNIFICANT CHANGE UP (ref 1.6–2.6)
MAGNESIUM SERPL-MCNC: 2.2 MG/DL — SIGNIFICANT CHANGE UP (ref 1.6–2.6)
MCHC RBC-ENTMCNC: 24.5 PG — LOW (ref 27–34)
MCHC RBC-ENTMCNC: 24.9 PG — LOW (ref 27–34)
MCHC RBC-ENTMCNC: 29.6 GM/DL — LOW (ref 32–36)
MCHC RBC-ENTMCNC: 30.3 GM/DL — LOW (ref 32–36)
MCV RBC AUTO: 82.2 FL — SIGNIFICANT CHANGE UP (ref 80–100)
MCV RBC AUTO: 82.8 FL — SIGNIFICANT CHANGE UP (ref 80–100)
NRBC # BLD: 0 /100 WBCS — SIGNIFICANT CHANGE UP (ref 0–0)
NRBC # BLD: 0 /100 WBCS — SIGNIFICANT CHANGE UP (ref 0–0)
NRBC # FLD: 0 K/UL — SIGNIFICANT CHANGE UP (ref 0–0)
NRBC # FLD: 0 K/UL — SIGNIFICANT CHANGE UP (ref 0–0)
NT-PROBNP SERPL-SCNC: HIGH PG/ML
PHOSPHATE SERPL-MCNC: 3.9 MG/DL — SIGNIFICANT CHANGE UP (ref 2.5–4.5)
PHOSPHATE SERPL-MCNC: 4.2 MG/DL — SIGNIFICANT CHANGE UP (ref 2.5–4.5)
PLATELET # BLD AUTO: 482 K/UL — HIGH (ref 150–400)
PLATELET # BLD AUTO: 560 K/UL — HIGH (ref 150–400)
POTASSIUM SERPL-MCNC: 4.3 MMOL/L — SIGNIFICANT CHANGE UP (ref 3.5–5.3)
POTASSIUM SERPL-MCNC: 4.4 MMOL/L — SIGNIFICANT CHANGE UP (ref 3.5–5.3)
POTASSIUM SERPL-SCNC: 4.3 MMOL/L — SIGNIFICANT CHANGE UP (ref 3.5–5.3)
POTASSIUM SERPL-SCNC: 4.4 MMOL/L — SIGNIFICANT CHANGE UP (ref 3.5–5.3)
PROTHROM AB SERPL-ACNC: 15.9 SEC — HIGH (ref 10.5–13.4)
RBC # BLD: 3.31 M/UL — LOW (ref 3.8–5.2)
RBC # BLD: 3.82 M/UL — SIGNIFICANT CHANGE UP (ref 3.8–5.2)
RBC # FLD: 19.1 % — HIGH (ref 10.3–14.5)
RBC # FLD: 19.3 % — HIGH (ref 10.3–14.5)
RH IG SCN BLD-IMP: POSITIVE — SIGNIFICANT CHANGE UP
SARS-COV-2 RNA SPEC QL NAA+PROBE: SIGNIFICANT CHANGE UP
SODIUM SERPL-SCNC: 128 MMOL/L — LOW (ref 135–145)
SODIUM SERPL-SCNC: 132 MMOL/L — LOW (ref 135–145)
TROPONIN T, HIGH SENSITIVITY RESULT: 66 NG/L — CRITICAL HIGH
WBC # BLD: 11.77 K/UL — HIGH (ref 3.8–10.5)
WBC # BLD: 11.9 K/UL — HIGH (ref 3.8–10.5)
WBC # FLD AUTO: 11.77 K/UL — HIGH (ref 3.8–10.5)
WBC # FLD AUTO: 11.9 K/UL — HIGH (ref 3.8–10.5)

## 2023-02-08 PROCEDURE — 71045 X-RAY EXAM CHEST 1 VIEW: CPT | Mod: 26

## 2023-02-08 PROCEDURE — 99233 SBSQ HOSP IP/OBS HIGH 50: CPT

## 2023-02-08 PROCEDURE — 93010 ELECTROCARDIOGRAM REPORT: CPT | Mod: 76

## 2023-02-08 RX ORDER — MAGNESIUM SULFATE 500 MG/ML
2 VIAL (ML) INJECTION ONCE
Refills: 0 | Status: COMPLETED | OUTPATIENT
Start: 2023-02-08 | End: 2023-02-08

## 2023-02-08 RX ADMIN — CARVEDILOL PHOSPHATE 6.25 MILLIGRAM(S): 80 CAPSULE, EXTENDED RELEASE ORAL at 18:39

## 2023-02-08 RX ADMIN — HYDROMORPHONE HYDROCHLORIDE 2 MILLIGRAM(S): 2 INJECTION INTRAMUSCULAR; INTRAVENOUS; SUBCUTANEOUS at 16:25

## 2023-02-08 RX ADMIN — BUMETANIDE 1 MILLIGRAM(S): 0.25 INJECTION INTRAMUSCULAR; INTRAVENOUS at 13:18

## 2023-02-08 RX ADMIN — Medication 975 MILLIGRAM(S): at 00:05

## 2023-02-08 RX ADMIN — Medication 975 MILLIGRAM(S): at 01:00

## 2023-02-08 RX ADMIN — HYDROMORPHONE HYDROCHLORIDE 1 MILLIGRAM(S): 2 INJECTION INTRAMUSCULAR; INTRAVENOUS; SUBCUTANEOUS at 19:35

## 2023-02-08 RX ADMIN — Medication 2 UNIT(S): at 18:35

## 2023-02-08 RX ADMIN — ATORVASTATIN CALCIUM 80 MILLIGRAM(S): 80 TABLET, FILM COATED ORAL at 21:43

## 2023-02-08 RX ADMIN — CALAMINE AND ZINC OXIDE AND PHENOL 1 APPLICATION(S): 160; 10 LOTION TOPICAL at 21:43

## 2023-02-08 RX ADMIN — HYDROMORPHONE HYDROCHLORIDE 2 MILLIGRAM(S): 2 INJECTION INTRAMUSCULAR; INTRAVENOUS; SUBCUTANEOUS at 00:23

## 2023-02-08 RX ADMIN — SODIUM CHLORIDE 75 MILLILITER(S): 9 INJECTION INTRAMUSCULAR; INTRAVENOUS; SUBCUTANEOUS at 04:31

## 2023-02-08 RX ADMIN — HYDROMORPHONE HYDROCHLORIDE 2 MILLIGRAM(S): 2 INJECTION INTRAMUSCULAR; INTRAVENOUS; SUBCUTANEOUS at 22:53

## 2023-02-08 RX ADMIN — Medication 325 MILLIGRAM(S): at 13:16

## 2023-02-08 RX ADMIN — HYDROMORPHONE HYDROCHLORIDE 2 MILLIGRAM(S): 2 INJECTION INTRAMUSCULAR; INTRAVENOUS; SUBCUTANEOUS at 11:35

## 2023-02-08 RX ADMIN — NALOXEGOL OXALATE 12.5 MILLIGRAM(S): 12.5 TABLET, FILM COATED ORAL at 13:16

## 2023-02-08 RX ADMIN — Medication 975 MILLIGRAM(S): at 06:51

## 2023-02-08 RX ADMIN — Medication 975 MILLIGRAM(S): at 19:35

## 2023-02-08 RX ADMIN — HYDROMORPHONE HYDROCHLORIDE 1 MILLIGRAM(S): 2 INJECTION INTRAMUSCULAR; INTRAVENOUS; SUBCUTANEOUS at 14:08

## 2023-02-08 RX ADMIN — PANTOPRAZOLE SODIUM 40 MILLIGRAM(S): 20 TABLET, DELAYED RELEASE ORAL at 05:50

## 2023-02-08 RX ADMIN — HYDROMORPHONE HYDROCHLORIDE 2 MILLIGRAM(S): 2 INJECTION INTRAMUSCULAR; INTRAVENOUS; SUBCUTANEOUS at 17:01

## 2023-02-08 RX ADMIN — Medication 9 MILLIGRAM(S): at 21:43

## 2023-02-08 RX ADMIN — HYDROMORPHONE HYDROCHLORIDE 2 MILLIGRAM(S): 2 INJECTION INTRAMUSCULAR; INTRAVENOUS; SUBCUTANEOUS at 12:21

## 2023-02-08 RX ADMIN — Medication 25 GRAM(S): at 04:43

## 2023-02-08 RX ADMIN — CALAMINE AND ZINC OXIDE AND PHENOL 1 APPLICATION(S): 160; 10 LOTION TOPICAL at 13:17

## 2023-02-08 RX ADMIN — HYDROMORPHONE HYDROCHLORIDE 2 MILLIGRAM(S): 2 INJECTION INTRAMUSCULAR; INTRAVENOUS; SUBCUTANEOUS at 01:23

## 2023-02-08 RX ADMIN — Medication 975 MILLIGRAM(S): at 18:38

## 2023-02-08 RX ADMIN — HYDROMORPHONE HYDROCHLORIDE 2 MILLIGRAM(S): 2 INJECTION INTRAMUSCULAR; INTRAVENOUS; SUBCUTANEOUS at 04:28

## 2023-02-08 RX ADMIN — HEPARIN SODIUM 5000 UNIT(S): 5000 INJECTION INTRAVENOUS; SUBCUTANEOUS at 21:43

## 2023-02-08 RX ADMIN — INSULIN GLARGINE 3 UNIT(S): 100 INJECTION, SOLUTION SUBCUTANEOUS at 21:44

## 2023-02-08 RX ADMIN — HYDROMORPHONE HYDROCHLORIDE 2 MILLIGRAM(S): 2 INJECTION INTRAMUSCULAR; INTRAVENOUS; SUBCUTANEOUS at 23:08

## 2023-02-08 RX ADMIN — HEPARIN SODIUM 5000 UNIT(S): 5000 INJECTION INTRAVENOUS; SUBCUTANEOUS at 13:18

## 2023-02-08 RX ADMIN — Medication 81 MILLIGRAM(S): at 13:15

## 2023-02-08 RX ADMIN — Medication 975 MILLIGRAM(S): at 14:14

## 2023-02-08 RX ADMIN — CALAMINE AND ZINC OXIDE AND PHENOL 1 APPLICATION(S): 160; 10 LOTION TOPICAL at 05:22

## 2023-02-08 RX ADMIN — Medication 975 MILLIGRAM(S): at 05:51

## 2023-02-08 RX ADMIN — HYDROMORPHONE HYDROCHLORIDE 1 MILLIGRAM(S): 2 INJECTION INTRAMUSCULAR; INTRAVENOUS; SUBCUTANEOUS at 19:50

## 2023-02-08 RX ADMIN — Medication 975 MILLIGRAM(S): at 13:14

## 2023-02-08 RX ADMIN — Medication 2 UNIT(S): at 13:16

## 2023-02-08 RX ADMIN — HYDROMORPHONE HYDROCHLORIDE 1 MILLIGRAM(S): 2 INJECTION INTRAMUSCULAR; INTRAVENOUS; SUBCUTANEOUS at 14:35

## 2023-02-08 NOTE — PROGRESS NOTE ADULT - PROBLEM SELECTOR PLAN 8
Initially presented with reduced breath sound on R lung field.   - CXR 1/26: moderate R sided pleural effusion.   - CXR 1/29: increasing R sized pleural effusion   - s/p diuresis  - f/u repeat CXR

## 2023-02-08 NOTE — PROGRESS NOTE ADULT - PROBLEM SELECTOR PLAN 3
Patient w/ RRT for hypotension likely septic shock iso L toe gangrene/wound complicated by hypothermia, leukocytosis and hypotension refractory to IV fluids requiring pressor support.   - BCx/ UCx negative.  - s/p vanc/zosyn briefly on adm, Unasyn 1/26-1/29; Cefepime, metronidazole, vanc (by level) 1/29 - 1/31, switched to Zosyn on 1/31 then d/c  - currently monitoring off antibiotics pending podiatry intervention

## 2023-02-08 NOTE — PROGRESS NOTE ADULT - ASSESSMENT
42F w/ PMH coronary artery disease status post CABG x4 in 2018, CHF s/p ICD placement (interrogated 2022), DM, PAD s/p 3rd toe amputation who presents with left 2nd toe pain concerning for dry gangrene.    Plan:  - Optimization from medicine, cardiology, nephrology noted, appreciated  - Planning for Friday CO2 angio of LLE    C Team Surgery  #[id number]

## 2023-02-08 NOTE — PROGRESS NOTE ADULT - SUBJECTIVE AND OBJECTIVE BOX
Patient is a 42y old  Female who presents with a chief complaint of Toe pain (07 Feb 2023 11:04)    SUBJECTIVE / OVERNIGHT EVENTS: No acute events. Continued toe pain, denies other pain or discomfort. No chest pain, shortness of breath, fever, chills. Awaiting angiogram per vascular.     MEDICATIONS  (STANDING):  acetaminophen     Tablet .. 975 milliGRAM(s) Oral every 6 hours  aspirin enteric coated 81 milliGRAM(s) Oral daily  atorvastatin 80 milliGRAM(s) Oral at bedtime  buMETAnide 1 milliGRAM(s) Oral two times a day  calamine/zinc oxide Lotion 1 Application(s) Topical three times a day  carvedilol 6.25 milliGRAM(s) Oral every 12 hours  dextrose 5%. 1000 milliLiter(s) (100 mL/Hr) IV Continuous <Continuous>  dextrose 5%. 1000 milliLiter(s) (50 mL/Hr) IV Continuous <Continuous>  dextrose 50% Injectable 25 Gram(s) IV Push once  dextrose 50% Injectable 12.5 Gram(s) IV Push once  dextrose 50% Injectable 25 Gram(s) IV Push once  ferrous    sulfate 325 milliGRAM(s) Oral daily  glucagon  Injectable 1 milliGRAM(s) IntraMuscular once  heparin   Injectable 5000 Unit(s) SubCutaneous every 8 hours  influenza   Vaccine 0.5 milliLiter(s) IntraMuscular once  insulin glargine Injectable (LANTUS) 3 Unit(s) SubCutaneous at bedtime  insulin lispro (ADMELOG) corrective regimen sliding scale   SubCutaneous three times a day before meals  insulin lispro (ADMELOG) corrective regimen sliding scale   SubCutaneous at bedtime  insulin lispro Injectable (ADMELOG) 2 Unit(s) SubCutaneous three times a day before meals  melatonin 9 milliGRAM(s) Oral at bedtime  naloxegol 12.5 milliGRAM(s) Oral daily  pantoprazole    Tablet 40 milliGRAM(s) Oral before breakfast  polyethylene glycol 3350 17 Gram(s) Oral two times a day  senna 2 Tablet(s) Oral at bedtime    MEDICATIONS  (PRN):  acetaminophen     Tablet .. 650 milliGRAM(s) Oral every 6 hours PRN Temp greater or equal to 38C (100.4F), Mild Pain (1 - 3)  dextrose Oral Gel 15 Gram(s) Oral once PRN Blood Glucose LESS THAN 70 milliGRAM(s)/deciliter  HYDROmorphone  Injectable 2 milliGRAM(s) IV Push every 4 hours PRN Severe Pain (7 - 10)  HYDROmorphone  Injectable 1 milliGRAM(s) IV Push every 4 hours PRN Moderate Pain (4 - 6)  ondansetron Injectable 4 milliGRAM(s) IV Push every 8 hours PRN Nausea and/or Vomiting    CAPILLARY BLOOD GLUCOSE    POCT Blood Glucose.: 128 mg/dL (08 Feb 2023 12:41)  POCT Blood Glucose.: 142 mg/dL (08 Feb 2023 09:05)  POCT Blood Glucose.: 144 mg/dL (07 Feb 2023 21:38)  POCT Blood Glucose.: 111 mg/dL (07 Feb 2023 18:02)    I&O's Summary    07 Feb 2023 07:01  -  08 Feb 2023 07:00  --------------------------------------------------------  IN: 645 mL / OUT: 650 mL / NET: -5 mL    08 Feb 2023 07:01  -  08 Feb 2023 14:31  --------------------------------------------------------  IN: 0 mL / OUT: 400 mL / NET: -400 mL    PHYSICAL EXAM:  Vital Signs Last 24 Hrs  T(C): 36.8 (08 Feb 2023 12:00), Max: 36.8 (08 Feb 2023 12:00)  T(F): 98.3 (08 Feb 2023 12:00), Max: 98.3 (08 Feb 2023 12:00)  HR: 68 (08 Feb 2023 12:00) (68 - 72)  BP: 125/59 (08 Feb 2023 12:00) (108/55 - 125/59)  BP(mean): --  RR: 18 (08 Feb 2023 12:00) (18 - 18)  SpO2: 100% (08 Feb 2023 12:00) (100% - 100%)    Parameters below as of 08 Feb 2023 12:00  Patient On (Oxygen Delivery Method): room air    CONSTITUTIONAL: NAD, laying in bed   EYES: conjunctiva and sclera clear  ENMT: Moist oral mucosa  NECK: Supple  RESPIRATORY: Normal respiratory effort; lungs are clear to auscultation bilaterally  CARDIOVASCULAR: Regular rate and rhythm, normal S1 and S2, No lower extremity edema  ABDOMEN: Nontender to palpation, normoactive bowel sounds, no rebound/guarding  NEUROLOGY: moving all extremities  EXT: necrotic appearing L 2nd toe    LABS:                        8.1    11.77 )-----------( 482      ( 08 Feb 2023 02:30 )             27.4     02-08    128<L>  |  92<L>  |  33<H>  ----------------------------<  106<H>  4.4   |  24  |  1.85<H>    Ca    8.7      08 Feb 2023 02:30  Phos  3.9     02-08  Mg     1.70     02-08    PT/INR - ( 08 Feb 2023 02:30 )   PT: 15.9 sec;   INR: 1.37 ratio      PTT - ( 08 Feb 2023 02:30 )  PTT:35.2 sec    RADIOLOGY & ADDITIONAL TESTS: Reviewed    COORDINATION OF CARE:  Care Discussed with Consultants/Other Providers [Y- Medicine ACP]

## 2023-02-08 NOTE — PROGRESS NOTE ADULT - SUBJECTIVE AND OBJECTIVE BOX
SURGERY  Pager: #[phone]    INTERVAL EVENTS/SUBJECTIVE: No acute events overnight.     ______________________________________________  OBJECTIVE:   T(C): 36.8 (02-08-23 @ 12:00), Max: 36.8 (02-08-23 @ 12:00)  HR: 68 (02-08-23 @ 12:00) (68 - 72)  BP: 125/59 (02-08-23 @ 12:00) (108/55 - 125/59)  RR: 18 (02-08-23 @ 12:00) (18 - 18)  SpO2: 100% (02-08-23 @ 12:00) (100% - 100%)  Wt(kg): --  CAPILLARY BLOOD GLUCOSE      POCT Blood Glucose.: 128 mg/dL (08 Feb 2023 12:41)  POCT Blood Glucose.: 142 mg/dL (08 Feb 2023 09:05)  POCT Blood Glucose.: 144 mg/dL (07 Feb 2023 21:38)  POCT Blood Glucose.: 111 mg/dL (07 Feb 2023 18:02)    I&O's Detail    07 Feb 2023 07:01  -  08 Feb 2023 07:00  --------------------------------------------------------  IN:    Oral Fluid: 645 mL  Total IN: 645 mL    OUT:    Voided (mL): 650 mL  Total OUT: 650 mL    Total NET: -5 mL      08 Feb 2023 07:01  -  08 Feb 2023 16:13  --------------------------------------------------------  IN:  Total IN: 0 mL    OUT:    Voided (mL): 400 mL  Total OUT: 400 mL    Total NET: -400 mL          PHYSICAL EXAM  General: NAD, resting comfortably  Pulmonary: non-labored breathing on room air  Extremities: Left DP/PT signals, dry gangrene on 2nd L toe  ______________________________________________  LABS:  CBC Full  -  ( 08 Feb 2023 02:30 )  WBC Count : 11.77 K/uL  RBC Count : 3.31 M/uL  Hemoglobin : 8.1 g/dL  Hematocrit : 27.4 %  Platelet Count - Automated : 482 K/uL  Mean Cell Volume : 82.8 fL  Mean Cell Hemoglobin : 24.5 pg  Mean Cell Hemoglobin Concentration : 29.6 gm/dL  Auto Neutrophil # : x  Auto Lymphocyte # : x  Auto Monocyte # : x  Auto Eosinophil # : x  Auto Basophil # : x  Auto Neutrophil % : x  Auto Lymphocyte % : x  Auto Monocyte % : x  Auto Eosinophil % : x  Auto Basophil % : x    02-08    128<L>  |  92<L>  |  33<H>  ----------------------------<  106<H>  4.4   |  24  |  1.85<H>    Ca    8.7      08 Feb 2023 02:30  Phos  3.9     02-08  Mg     1.70     02-08      _____________________________________________  RADIOLOGY:

## 2023-02-08 NOTE — PROVIDER CONTACT NOTE (CRITICAL VALUE NOTIFICATION) - SITUATION
Hemoglobin 6.8
Troponin 66. Upon arrival to shift pt was complaining of chest "stiffness" , stat labs and repeat ekg completed prior to pt explaining that it had disappeared

## 2023-02-08 NOTE — PROGRESS NOTE ADULT - PROBLEM SELECTOR PLAN 1
Patient presenting in setting of L 2nd toe pain concerning for dry gangrene with history of prior toe amputation iso PAD. Initially with elevated ESR/CRP without active signs of infection however later with concern for infection and septic shock.   - pain control w/ Dilaudid 2mg PRN for severe; Dilaudid 1mg PRN for moderate, ATC tylenol  - pending podiatry surgical intervention with TMA   - pending vascular intervention with LLE angiogram, awaiting timing per vascular  - completed abx course as below, not currently on abx  - PT/OT

## 2023-02-08 NOTE — PROGRESS NOTE ADULT - PROBLEM SELECTOR PLAN 6
On metformin 500mg BID, glimepiride 4mg, lantus 8U at bedtime   - A1c 7.1  - started on  3U lantus at bedtime given elevated sugars (was on 5U on admission) and titrate slowly    - c/w ISS and resumed premeal insulin

## 2023-02-08 NOTE — PROVIDER CONTACT NOTE (CRITICAL VALUE NOTIFICATION) - BACKGROUND
Admitting dx: Pain of left toe. Dx: SIRS, r pleural effusion, BOB, normocytic anemia, DM
Pt admitted for pain in left toe

## 2023-02-08 NOTE — PROVIDER CONTACT NOTE (CRITICAL VALUE NOTIFICATION) - ACTION/TREATMENT ORDERED:
No orders at this time. Continuous monitoring remains in place
As per MD, STAT CBC and type/screen ordered and drawn. Will continue to monitor for results.

## 2023-02-08 NOTE — PROGRESS NOTE ADULT - PROBLEM SELECTOR PLAN 2
Patient w/ baseline SCr ~1.2 has been uptrending during hospitalization to Cr. 4.2.   - continues on bumex 1 PO BID  - Cr stable at 1.85, continue to monitor closely  - appreciate nephrology recommendations

## 2023-02-08 NOTE — PROGRESS NOTE ADULT - PROBLEM SELECTOR PLAN 9
- Fluids: NA  - Electrolytes: replete PRN  - Nutrition: DASH  - Activity: OOB as tolerated  - DVT Prophylaxis: heparin sub Q  - Disposition: pending medical management

## 2023-02-08 NOTE — PROGRESS NOTE ADULT - PROBLEM SELECTOR PLAN 4
Does not appear to be acutely decompensated on exam. Last TTE from 3/21 with EF 27%, s/p ICD placement and interrogated in 2022   - Repeat TTE: 21%. Severe LV systolic dysfunction. decreased RV function   - was on home coreg 25 BID  -> started on coreg 6.25 BID; eventually uptitrate as able  - was on home entresto 24/26  -> hold given BOB   - was on home spironolactone 25mg -> hold given BOB   - d/c home torsemide ->continuing with bumex 1 PO BID for diuresis

## 2023-02-08 NOTE — PROGRESS NOTE ADULT - PROBLEM SELECTOR PLAN 5
CT angio with moderate-marked bilateral lower extremity peripheral vascular disease, calcifications of AAA   Per vascular, no concern for acute limb ischemia, no surgical intervention   - c/w atorvastatin   - c/w ASA  - f/u vascular and angio

## 2023-02-08 NOTE — PROGRESS NOTE ADULT - NSPROGADDITIONALINFOA_GEN_ALL_CORE
Offered to update family member on patient's behalf, patient declined at this time
Full consult note as above; discussed with surgery resident and vascular fellow.  She has dry gangrene in 2-3 toes of her left foot.  Severe tibial artery PAD was seen on a CT angiogram.  She subsequently developed sepsis and an acute kidney injury.  She has recovered from the sepsis and the BOB is improving.  We will do an angiogram as soon as she can get contrast.  The results of the angiogram will determine what can be done.  If nothing is possible she might need a below-knee amputation. An angioplasty may however be possible.

## 2023-02-08 NOTE — PROVIDER CONTACT NOTE (CRITICAL VALUE NOTIFICATION) - ASSESSMENT
On assessment, pt. is asymptomatic. Denies SOB and/or chest pain at this time. No s/s of bleeding noted or voiced.
Pt no longer complaining of chest "stiffness".

## 2023-02-08 NOTE — CHART NOTE - NSCHARTNOTEFT_GEN_A_CORE
-Called by the Primary team for patient c/o CP ~6:30pm which self resolved, but troponin 66 and EKG with ? dynamic changes.    43 yo F w/ PMH CAD (2016), s/p CABG (2018), HFrEF(EF21%), ICD, T2DM, PAD who presents with left toe pain found to have left toe gangrene plan for LLE angiogram. Cardiology following for pre-op risk stratification. Course was complicated by septic shock now off pressors/abx.     #Stable Angina-At the time of my encounter, patient denies any CP or discomfort. She endorses she had a severe left foot pain earlier which made her cry, and "frustrated", subsequently she felt mid chest "stiffness", non-radiating, self resolving, lasted few minutes. EKG shows SR VR 73, pvcs, TWI in II, III, V4-6. Troponin 66, CK/CKMB 51/1.9.    -c/w tele monitoring  -repeat EKG for CP prn  -trend CE to peak  -case discussed with Dr. Demarcus Foote, cardiology fellow  -House cardiology will follow in am, call [phone] for questions.

## 2023-02-08 NOTE — PROVIDER CONTACT NOTE (CRITICAL VALUE NOTIFICATION) - RECOMMENDATIONS
ACP Dave Gonzalez aware
As per MD, STAT CBC and type/screen ordered and drawn. Will continue to monitor for results.

## 2023-02-08 NOTE — PROGRESS NOTE ADULT - PROBLEM SELECTOR PLAN 7
- hg 6.7 yesterday, 6.9 on repeat, s/p 1unit pRBC with rise to 8.1 today  - no signs/sx of bleeding  - continue to trend H/H  - active type and screen

## 2023-02-09 LAB
ANION GAP SERPL CALC-SCNC: 14 MMOL/L — SIGNIFICANT CHANGE UP (ref 7–14)
BUN SERPL-MCNC: 37 MG/DL — HIGH (ref 7–23)
CALCIUM SERPL-MCNC: 8.7 MG/DL — SIGNIFICANT CHANGE UP (ref 8.4–10.5)
CHLORIDE SERPL-SCNC: 96 MMOL/L — LOW (ref 98–107)
CK MB BLD-MCNC: 3.5 % — HIGH (ref 0–2.5)
CK MB BLD-MCNC: 3.5 % — HIGH (ref 0–2.5)
CK MB CFR SERPL CALC: 1.7 NG/ML — SIGNIFICANT CHANGE UP
CK MB CFR SERPL CALC: 1.7 NG/ML — SIGNIFICANT CHANGE UP
CK SERPL-CCNC: 48 U/L — SIGNIFICANT CHANGE UP (ref 25–170)
CK SERPL-CCNC: 48 U/L — SIGNIFICANT CHANGE UP (ref 25–170)
CO2 SERPL-SCNC: 22 MMOL/L — SIGNIFICANT CHANGE UP (ref 22–31)
CREAT SERPL-MCNC: 1.86 MG/DL — HIGH (ref 0.5–1.3)
EGFR: 34 ML/MIN/1.73M2 — LOW
GLUCOSE BLDC GLUCOMTR-MCNC: 145 MG/DL — HIGH (ref 70–99)
GLUCOSE BLDC GLUCOMTR-MCNC: 152 MG/DL — HIGH (ref 70–99)
GLUCOSE BLDC GLUCOMTR-MCNC: 246 MG/DL — HIGH (ref 70–99)
GLUCOSE BLDC GLUCOMTR-MCNC: 253 MG/DL — HIGH (ref 70–99)
GLUCOSE BLDC GLUCOMTR-MCNC: 88 MG/DL — SIGNIFICANT CHANGE UP (ref 70–99)
GLUCOSE SERPL-MCNC: 147 MG/DL — HIGH (ref 70–99)
HCT VFR BLD CALC: 29.6 % — LOW (ref 34.5–45)
HGB BLD-MCNC: 8.9 G/DL — LOW (ref 11.5–15.5)
HSV DNA1: SIGNIFICANT CHANGE UP
HSV DNA2: SIGNIFICANT CHANGE UP
HSV1 DNA BLD QL NAA+PROBE: SIGNIFICANT CHANGE UP
HSV2 DNA BLD QL NAA+PROBE: SIGNIFICANT CHANGE UP
MAGNESIUM SERPL-MCNC: 2.1 MG/DL — SIGNIFICANT CHANGE UP (ref 1.6–2.6)
MCHC RBC-ENTMCNC: 25.2 PG — LOW (ref 27–34)
MCHC RBC-ENTMCNC: 30.1 GM/DL — LOW (ref 32–36)
MCV RBC AUTO: 83.9 FL — SIGNIFICANT CHANGE UP (ref 80–100)
NRBC # BLD: 0 /100 WBCS — SIGNIFICANT CHANGE UP (ref 0–0)
NRBC # FLD: 0 K/UL — SIGNIFICANT CHANGE UP (ref 0–0)
PHOSPHATE SERPL-MCNC: 4.4 MG/DL — SIGNIFICANT CHANGE UP (ref 2.5–4.5)
PLATELET # BLD AUTO: 507 K/UL — HIGH (ref 150–400)
POTASSIUM SERPL-MCNC: 4.5 MMOL/L — SIGNIFICANT CHANGE UP (ref 3.5–5.3)
POTASSIUM SERPL-SCNC: 4.5 MMOL/L — SIGNIFICANT CHANGE UP (ref 3.5–5.3)
RBC # BLD: 3.53 M/UL — LOW (ref 3.8–5.2)
RBC # FLD: 19.2 % — HIGH (ref 10.3–14.5)
SODIUM SERPL-SCNC: 132 MMOL/L — LOW (ref 135–145)
TROPONIN T, HIGH SENSITIVITY RESULT: 58 NG/L — CRITICAL HIGH
TROPONIN T, HIGH SENSITIVITY RESULT: 58 NG/L — CRITICAL HIGH
WBC # BLD: 13.54 K/UL — HIGH (ref 3.8–10.5)
WBC # FLD AUTO: 13.54 K/UL — HIGH (ref 3.8–10.5)

## 2023-02-09 PROCEDURE — 99233 SBSQ HOSP IP/OBS HIGH 50: CPT

## 2023-02-09 PROCEDURE — 71045 X-RAY EXAM CHEST 1 VIEW: CPT | Mod: 26

## 2023-02-09 PROCEDURE — 99232 SBSQ HOSP IP/OBS MODERATE 35: CPT | Mod: 1L

## 2023-02-09 RX ORDER — SODIUM CHLORIDE 9 MG/ML
1000 INJECTION INTRAMUSCULAR; INTRAVENOUS; SUBCUTANEOUS
Refills: 0 | Status: DISCONTINUED | OUTPATIENT
Start: 2023-02-10 | End: 2023-02-10

## 2023-02-09 RX ORDER — HYDROMORPHONE HYDROCHLORIDE 2 MG/ML
0.5 INJECTION INTRAMUSCULAR; INTRAVENOUS; SUBCUTANEOUS EVERY 4 HOURS
Refills: 0 | Status: DISCONTINUED | OUTPATIENT
Start: 2023-02-09 | End: 2023-02-16

## 2023-02-09 RX ORDER — HYDROMORPHONE HYDROCHLORIDE 2 MG/ML
2 INJECTION INTRAMUSCULAR; INTRAVENOUS; SUBCUTANEOUS
Refills: 0 | Status: DISCONTINUED | OUTPATIENT
Start: 2023-02-09 | End: 2023-02-10

## 2023-02-09 RX ADMIN — HYDROMORPHONE HYDROCHLORIDE 2 MILLIGRAM(S): 2 INJECTION INTRAMUSCULAR; INTRAVENOUS; SUBCUTANEOUS at 10:56

## 2023-02-09 RX ADMIN — ATORVASTATIN CALCIUM 80 MILLIGRAM(S): 80 TABLET, FILM COATED ORAL at 21:11

## 2023-02-09 RX ADMIN — HYDROMORPHONE HYDROCHLORIDE 1 MILLIGRAM(S): 2 INJECTION INTRAMUSCULAR; INTRAVENOUS; SUBCUTANEOUS at 06:33

## 2023-02-09 RX ADMIN — Medication 325 MILLIGRAM(S): at 11:45

## 2023-02-09 RX ADMIN — CALAMINE AND ZINC OXIDE AND PHENOL 1 APPLICATION(S): 160; 10 LOTION TOPICAL at 06:34

## 2023-02-09 RX ADMIN — Medication 975 MILLIGRAM(S): at 06:48

## 2023-02-09 RX ADMIN — BUMETANIDE 1 MILLIGRAM(S): 0.25 INJECTION INTRAMUSCULAR; INTRAVENOUS at 13:41

## 2023-02-09 RX ADMIN — HYDROMORPHONE HYDROCHLORIDE 1 MILLIGRAM(S): 2 INJECTION INTRAMUSCULAR; INTRAVENOUS; SUBCUTANEOUS at 13:40

## 2023-02-09 RX ADMIN — HYDROMORPHONE HYDROCHLORIDE 0.5 MILLIGRAM(S): 2 INJECTION INTRAMUSCULAR; INTRAVENOUS; SUBCUTANEOUS at 15:45

## 2023-02-09 RX ADMIN — HEPARIN SODIUM 5000 UNIT(S): 5000 INJECTION INTRAVENOUS; SUBCUTANEOUS at 06:34

## 2023-02-09 RX ADMIN — HYDROMORPHONE HYDROCHLORIDE 1 MILLIGRAM(S): 2 INJECTION INTRAMUSCULAR; INTRAVENOUS; SUBCUTANEOUS at 22:49

## 2023-02-09 RX ADMIN — Medication 975 MILLIGRAM(S): at 00:11

## 2023-02-09 RX ADMIN — Medication 975 MILLIGRAM(S): at 11:43

## 2023-02-09 RX ADMIN — HYDROMORPHONE HYDROCHLORIDE 2 MILLIGRAM(S): 2 INJECTION INTRAMUSCULAR; INTRAVENOUS; SUBCUTANEOUS at 03:49

## 2023-02-09 RX ADMIN — HYDROMORPHONE HYDROCHLORIDE 2 MILLIGRAM(S): 2 INJECTION INTRAMUSCULAR; INTRAVENOUS; SUBCUTANEOUS at 19:54

## 2023-02-09 RX ADMIN — HYDROMORPHONE HYDROCHLORIDE 1 MILLIGRAM(S): 2 INJECTION INTRAMUSCULAR; INTRAVENOUS; SUBCUTANEOUS at 13:55

## 2023-02-09 RX ADMIN — BUMETANIDE 1 MILLIGRAM(S): 0.25 INJECTION INTRAMUSCULAR; INTRAVENOUS at 06:34

## 2023-02-09 RX ADMIN — HYDROMORPHONE HYDROCHLORIDE 2 MILLIGRAM(S): 2 INJECTION INTRAMUSCULAR; INTRAVENOUS; SUBCUTANEOUS at 03:34

## 2023-02-09 RX ADMIN — NALOXEGOL OXALATE 12.5 MILLIGRAM(S): 12.5 TABLET, FILM COATED ORAL at 11:44

## 2023-02-09 RX ADMIN — HEPARIN SODIUM 5000 UNIT(S): 5000 INJECTION INTRAVENOUS; SUBCUTANEOUS at 21:10

## 2023-02-09 RX ADMIN — Medication 975 MILLIGRAM(S): at 01:11

## 2023-02-09 RX ADMIN — PANTOPRAZOLE SODIUM 40 MILLIGRAM(S): 20 TABLET, DELAYED RELEASE ORAL at 06:34

## 2023-02-09 RX ADMIN — Medication 2 UNIT(S): at 18:14

## 2023-02-09 RX ADMIN — CALAMINE AND ZINC OXIDE AND PHENOL 1 APPLICATION(S): 160; 10 LOTION TOPICAL at 13:41

## 2023-02-09 RX ADMIN — HYDROMORPHONE HYDROCHLORIDE 1 MILLIGRAM(S): 2 INJECTION INTRAMUSCULAR; INTRAVENOUS; SUBCUTANEOUS at 06:48

## 2023-02-09 RX ADMIN — Medication 3: at 18:14

## 2023-02-09 RX ADMIN — Medication 9 MILLIGRAM(S): at 21:11

## 2023-02-09 RX ADMIN — Medication 2 UNIT(S): at 08:57

## 2023-02-09 RX ADMIN — Medication 81 MILLIGRAM(S): at 11:45

## 2023-02-09 RX ADMIN — HYDROMORPHONE HYDROCHLORIDE 1 MILLIGRAM(S): 2 INJECTION INTRAMUSCULAR; INTRAVENOUS; SUBCUTANEOUS at 22:34

## 2023-02-09 RX ADMIN — HEPARIN SODIUM 5000 UNIT(S): 5000 INJECTION INTRAVENOUS; SUBCUTANEOUS at 13:41

## 2023-02-09 RX ADMIN — Medication 975 MILLIGRAM(S): at 17:11

## 2023-02-09 RX ADMIN — CARVEDILOL PHOSPHATE 6.25 MILLIGRAM(S): 80 CAPSULE, EXTENDED RELEASE ORAL at 06:34

## 2023-02-09 RX ADMIN — POLYETHYLENE GLYCOL 3350 17 GRAM(S): 17 POWDER, FOR SOLUTION ORAL at 09:00

## 2023-02-09 RX ADMIN — Medication 1: at 08:58

## 2023-02-09 RX ADMIN — Medication 975 MILLIGRAM(S): at 23:28

## 2023-02-09 RX ADMIN — HYDROMORPHONE HYDROCHLORIDE 0.5 MILLIGRAM(S): 2 INJECTION INTRAMUSCULAR; INTRAVENOUS; SUBCUTANEOUS at 16:00

## 2023-02-09 RX ADMIN — HYDROMORPHONE HYDROCHLORIDE 2 MILLIGRAM(S): 2 INJECTION INTRAMUSCULAR; INTRAVENOUS; SUBCUTANEOUS at 20:09

## 2023-02-09 RX ADMIN — CALAMINE AND ZINC OXIDE AND PHENOL 1 APPLICATION(S): 160; 10 LOTION TOPICAL at 21:09

## 2023-02-09 RX ADMIN — INSULIN GLARGINE 3 UNIT(S): 100 INJECTION, SOLUTION SUBCUTANEOUS at 21:15

## 2023-02-09 RX ADMIN — Medication 975 MILLIGRAM(S): at 06:33

## 2023-02-09 RX ADMIN — HYDROMORPHONE HYDROCHLORIDE 2 MILLIGRAM(S): 2 INJECTION INTRAMUSCULAR; INTRAVENOUS; SUBCUTANEOUS at 10:26

## 2023-02-09 RX ADMIN — Medication 975 MILLIGRAM(S): at 23:58

## 2023-02-09 NOTE — CHART NOTE - NSCHARTNOTEFT_GEN_A_CORE
Source: Patient [x ]    Family [ ]     other [x ] Chart Review    Current Diet : Diet, NPO after Midnight:      NPO Start Date: 09-Feb-2023,   NPO Start Time: 23:59 (02-09-23 @ 06:59) [Active]  Diet, DASH/TLC:   Sodium & Cholesterol Restricted  Consistent Carbohydrate {No Snacks} (CSTCHO)  Supplement Feeding Modality:  Oral  Nepro Cans or Servings Per Day:  1       Frequency:  Daily (02-03-23 @ 13:14) [Active]    PO intake:  % [x ]    Height (cm): 167.6 (26 Jan 2023 01:00)  Weight (kg): 64.4kg (2/3), 71.6 (1/26), no updated weight to assess  BMI (kg/m2): 22.9 (2/3/2023)    Nutrition Note:   42F w/ PMH coronary artery disease status post CABG x4 in 2018, CHF s/p ICD placement (interrogated 2022), DM, PAD s/p 3rd toe amputation who presents with left 2nd toe pain concerning for dry gangrene. Seen by podiatry and vascular teams with plans for possible transmetatarsal amputation. Course c/b worsening renal function, and shock likely sepsis 2/2 LE wounds w/ possible cardiogenic component. Patient s/p levophed, Vanc/Cefepime/Flagyl, medically optimizing for TMA, per chart.   Patient reports fair appetite. As per RN flow sheet, meal consumption varies between %, occasionally gets food from home. Patient reports consuming half Nepro in the morning and half in the evening. Patient did not voice out any food preferences at this time. Adequate po intake encouraged. Patient is scheduled for angio tomorrow, NPO @ midnight. Patient denies any difficulty chewing/swallowing, any nausea, vomiting, diarrhea, constipation during visit. Reports last bowel movement 2/7/2023. On bowel regimen. On ferrous sulfate, for micronutrient support. As per flow sheet, no edema, no pressure injury noted at this time.       __________________ Pertinent Medications__________________   MEDICATIONS  (STANDING):  acetaminophen     Tablet .. 975 milliGRAM(s) Oral every 6 hours  aspirin enteric coated 81 milliGRAM(s) Oral daily  atorvastatin 80 milliGRAM(s) Oral at bedtime  buMETAnide 1 milliGRAM(s) Oral two times a day  calamine/zinc oxide Lotion 1 Application(s) Topical three times a day  carvedilol 6.25 milliGRAM(s) Oral every 12 hours  dextrose 5%. 1000 milliLiter(s) (100 mL/Hr) IV Continuous <Continuous>  dextrose 5%. 1000 milliLiter(s) (50 mL/Hr) IV Continuous <Continuous>  dextrose 50% Injectable 25 Gram(s) IV Push once  dextrose 50% Injectable 12.5 Gram(s) IV Push once  dextrose 50% Injectable 25 Gram(s) IV Push once  ferrous    sulfate 325 milliGRAM(s) Oral daily  glucagon  Injectable 1 milliGRAM(s) IntraMuscular once  heparin   Injectable 5000 Unit(s) SubCutaneous every 8 hours  influenza   Vaccine 0.5 milliLiter(s) IntraMuscular once  insulin glargine Injectable (LANTUS) 3 Unit(s) SubCutaneous at bedtime  insulin lispro (ADMELOG) corrective regimen sliding scale   SubCutaneous three times a day before meals  insulin lispro (ADMELOG) corrective regimen sliding scale   SubCutaneous at bedtime  insulin lispro Injectable (ADMELOG) 2 Unit(s) SubCutaneous three times a day before meals  melatonin 9 milliGRAM(s) Oral at bedtime  naloxegol 12.5 milliGRAM(s) Oral daily  pantoprazole    Tablet 40 milliGRAM(s) Oral before breakfast  polyethylene glycol 3350 17 Gram(s) Oral two times a day  senna 2 Tablet(s) Oral at bedtime    MEDICATIONS  (PRN):  acetaminophen     Tablet .. 650 milliGRAM(s) Oral every 6 hours PRN Temp greater or equal to 38C (100.4F), Mild Pain (1 - 3)  dextrose Oral Gel 15 Gram(s) Oral once PRN Blood Glucose LESS THAN 70 milliGRAM(s)/deciliter  HYDROmorphone  Injectable 2 milliGRAM(s) IV Push every 4 hours PRN Severe Pain (7 - 10)  HYDROmorphone  Injectable 1 milliGRAM(s) IV Push every 4 hours PRN Moderate Pain (4 - 6)  HYDROmorphone  Injectable 0.5 milliGRAM(s) IV Push every 4 hours PRN breakthrough pain  ondansetron Injectable 4 milliGRAM(s) IV Push every 8 hours PRN Nausea and/or Vomiting      __________________ Pertinent Labs__________________   02-09 Na132 mmol/L<L> Glu 147 mg/dL<H> K+ 4.5 mmol/L Cr  1.86 mg/dL<H> BUN 37 mg/dL<H> 02-09 Phos 4.4 mg/dL 02-03 Alb 2.1 g/dL<L> 01-26 Chol 112 mg/dL LDL --    HDL 20 mg/dL<L> Trig 116 mg/dL      Estimated Needs:   [x ] no change since previous assessment    Previous Nutrition Diagnosis:   [x] Malnutrition   Nutrition Diagnosis is [x ] ongoing  New Nutrition Diagnosis: [x ] not applicable      Interventions:   Recommend  [x ] Continue with current diet, DASH, consistent carb.   [x ] Continue to provide Nepro 8oz 1x/day (420kcal, 19gm protein) for nutrient support.   [x] Obtain weekly weight, to monitor weight trend.   [x ] Encourage PO intake and honor food preferences as able.     Monitoring and Evaluation:   [x ] PO intake [x ] Tolerance to diet prescription [x ] weights [x ] follow up per protocol  [x ] other: bowel movement, skin integrity, labs.

## 2023-02-09 NOTE — PROGRESS NOTE ADULT - SUBJECTIVE AND OBJECTIVE BOX
Patient is a 42y old  Female who presents with a chief complaint of Toe pain (09 Feb 2023 09:16)    SUBJECTIVE / OVERNIGHT EVENTS: Chest pain overnight, unremarkable EKG, low suspicion for ACS per Cardiology. Improved by time of encounter this morning. Continued toe pain but improves with PRN pain medication. No fever, vomiting, shortness of breath, bleeding, diarrhea. Awaiting angio tentatively tomorrow.     MEDICATIONS  (STANDING):  acetaminophen     Tablet .. 975 milliGRAM(s) Oral every 6 hours  aspirin enteric coated 81 milliGRAM(s) Oral daily  atorvastatin 80 milliGRAM(s) Oral at bedtime  buMETAnide 1 milliGRAM(s) Oral two times a day  calamine/zinc oxide Lotion 1 Application(s) Topical three times a day  carvedilol 6.25 milliGRAM(s) Oral every 12 hours  dextrose 5%. 1000 milliLiter(s) (100 mL/Hr) IV Continuous <Continuous>  dextrose 5%. 1000 milliLiter(s) (50 mL/Hr) IV Continuous <Continuous>  dextrose 50% Injectable 25 Gram(s) IV Push once  dextrose 50% Injectable 12.5 Gram(s) IV Push once  dextrose 50% Injectable 25 Gram(s) IV Push once  ferrous    sulfate 325 milliGRAM(s) Oral daily  glucagon  Injectable 1 milliGRAM(s) IntraMuscular once  heparin   Injectable 5000 Unit(s) SubCutaneous every 8 hours  influenza   Vaccine 0.5 milliLiter(s) IntraMuscular once  insulin glargine Injectable (LANTUS) 3 Unit(s) SubCutaneous at bedtime  insulin lispro (ADMELOG) corrective regimen sliding scale   SubCutaneous three times a day before meals  insulin lispro (ADMELOG) corrective regimen sliding scale   SubCutaneous at bedtime  insulin lispro Injectable (ADMELOG) 2 Unit(s) SubCutaneous three times a day before meals  melatonin 9 milliGRAM(s) Oral at bedtime  naloxegol 12.5 milliGRAM(s) Oral daily  pantoprazole    Tablet 40 milliGRAM(s) Oral before breakfast  polyethylene glycol 3350 17 Gram(s) Oral two times a day  senna 2 Tablet(s) Oral at bedtime    MEDICATIONS  (PRN):  acetaminophen     Tablet .. 650 milliGRAM(s) Oral every 6 hours PRN Temp greater or equal to 38C (100.4F), Mild Pain (1 - 3)  dextrose Oral Gel 15 Gram(s) Oral once PRN Blood Glucose LESS THAN 70 milliGRAM(s)/deciliter  HYDROmorphone  Injectable 2 milliGRAM(s) IV Push every 4 hours PRN Severe Pain (7 - 10)  HYDROmorphone  Injectable 1 milliGRAM(s) IV Push every 4 hours PRN Moderate Pain (4 - 6)  HYDROmorphone  Injectable 0.5 milliGRAM(s) IV Push every 4 hours PRN breakthrough pain  ondansetron Injectable 4 milliGRAM(s) IV Push every 8 hours PRN Nausea and/or Vomiting    CAPILLARY BLOOD GLUCOSE    POCT Blood Glucose.: 88 mg/dL (09 Feb 2023 12:36)  POCT Blood Glucose.: 152 mg/dL (09 Feb 2023 08:34)  POCT Blood Glucose.: 148 mg/dL (08 Feb 2023 21:22)  POCT Blood Glucose.: 121 mg/dL (08 Feb 2023 18:14)    I&O's Summary    08 Feb 2023 07:01  -  09 Feb 2023 07:00  --------------------------------------------------------  IN: 845 mL / OUT: 600 mL / NET: 245 mL    09 Feb 2023 07:01  -  09 Feb 2023 14:39  --------------------------------------------------------  IN: 550 mL / OUT: 0 mL / NET: 550 mL    PHYSICAL EXAM:  Vital Signs Last 24 Hrs  T(C): 36.6 (09 Feb 2023 13:45), Max: 36.7 (08 Feb 2023 18:42)  T(F): 97.9 (09 Feb 2023 13:45), Max: 98.1 (08 Feb 2023 18:42)  HR: 78 (09 Feb 2023 13:45) (69 - 78)  BP: 110/72 (09 Feb 2023 13:45) (97/56 - 130/61)  BP(mean): --  RR: 18 (09 Feb 2023 13:45) (17 - 18)  SpO2: 100% (09 Feb 2023 13:45) (97% - 100%)    Parameters below as of 09 Feb 2023 10:26  Patient On (Oxygen Delivery Method): room air    CONSTITUTIONAL: NAD, sitting up in bed   EYES: conjunctiva and sclera clear  ENMT: Moist oral mucosa  NECK: Supple  RESPIRATORY: Normal respiratory effort; lungs are clear to auscultation bilaterally  CARDIOVASCULAR: Regular rate and rhythm, normal S1 and S2, No lower extremity edema  ABDOMEN: Nontender to palpation, normoactive bowel sounds, no rebound/guarding  NEUROLOGY: moving all extremities  EXT: necrotic appearing L 2nd toe    LABS:                        8.9    13.54 )-----------( 507      ( 09 Feb 2023 06:41 )             29.6     02-09    132<L>  |  96<L>  |  37<H>  ----------------------------<  147<H>  4.5   |  22  |  1.86<H>    Ca    8.7      09 Feb 2023 06:41  Phos  4.4     02-09  Mg     2.10     02-09    PT/INR - ( 08 Feb 2023 02:30 )   PT: 15.9 sec;   INR: 1.37 ratio         PTT - ( 08 Feb 2023 02:30 )  PTT:35.2 sec  CARDIAC MARKERS ( 09 Feb 2023 06:41 )  x     / x     / 48 U/L / x     / 1.7 ng/mL  CARDIAC MARKERS ( 09 Feb 2023 00:00 )  x     / x     / 48 U/L / x     / 1.7 ng/mL  CARDIAC MARKERS ( 08 Feb 2023 19:57 )  x     / x     / 51 U/L / x     / 1.9 ng/mL    RADIOLOGY & ADDITIONAL TESTS: Reviewed    COORDINATION OF CARE:  Care Discussed with Consultants/Other Providers [Y- Medicine ACP]

## 2023-02-09 NOTE — PROGRESS NOTE ADULT - ASSESSMENT
41 yo F w/ PMH CAD s/p CABG, HFrEF, ICD, T2DM, PAD who presents with left toe pain found to have left toe gangrene plan for LLE angiogram. Cardiology consulted for pre-op risk stratification. Course was complicated by septic shock now off pressors/abx.     #Chest pain   Elevated troponin but in setting of CKD. EKG unremarkable. Very low suspicion for ACS. No need for further workup     #CAD  #Abd aortic plaque   -C/w aspirin, statin     #HFrEF   -c/w bumex   -C/w carvedilol   -Please restart entresto when BOB resolves per primary team    41 yo F w/ PMH CAD s/p CABG, HFrEF, ICD, T2DM, PAD who presents with left toe pain found to have left toe gangrene plan for LLE angiogram. Cardiology consulted for pre-op risk stratification. Course was complicated by septic shock now off pressors/abx.     #Chest pain   Elevated troponin but in setting of CKD. EKG unremarkable. Very low suspicion for ACS. No need for further workup     #CAD  #Abd aortic plaque   -C/w aspirin, statin     #HFrEF   -c/w bumex   -C/w carvedilol   -Please restart entresto when BOB resolves per primary team     Will sign off, please call back w/ any questions

## 2023-02-09 NOTE — PROGRESS NOTE ADULT - SUBJECTIVE AND OBJECTIVE BOX
Patient seen and examined at bedside.    Overnight Events:     Overnight, patient had notable chest pain, said it was related to the pain in her left toes, became very anxious and teary, currently denies any chest pain     Review Of Systems:         Current Meds:  acetaminophen     Tablet .. 975 milliGRAM(s) Oral every 6 hours  acetaminophen     Tablet .. 650 milliGRAM(s) Oral every 6 hours PRN  aspirin enteric coated 81 milliGRAM(s) Oral daily  atorvastatin 80 milliGRAM(s) Oral at bedtime  buMETAnide 1 milliGRAM(s) Oral two times a day  calamine/zinc oxide Lotion 1 Application(s) Topical three times a day  carvedilol 6.25 milliGRAM(s) Oral every 12 hours  dextrose 5%. 1000 milliLiter(s) IV Continuous <Continuous>  dextrose 5%. 1000 milliLiter(s) IV Continuous <Continuous>  dextrose 50% Injectable 25 Gram(s) IV Push once  dextrose 50% Injectable 12.5 Gram(s) IV Push once  dextrose 50% Injectable 25 Gram(s) IV Push once  dextrose Oral Gel 15 Gram(s) Oral once PRN  ferrous    sulfate 325 milliGRAM(s) Oral daily  glucagon  Injectable 1 milliGRAM(s) IntraMuscular once  heparin   Injectable 5000 Unit(s) SubCutaneous every 8 hours  HYDROmorphone  Injectable 2 milliGRAM(s) IV Push every 4 hours PRN  HYDROmorphone  Injectable 1 milliGRAM(s) IV Push every 4 hours PRN  influenza   Vaccine 0.5 milliLiter(s) IntraMuscular once  insulin glargine Injectable (LANTUS) 3 Unit(s) SubCutaneous at bedtime  insulin lispro (ADMELOG) corrective regimen sliding scale   SubCutaneous three times a day before meals  insulin lispro (ADMELOG) corrective regimen sliding scale   SubCutaneous at bedtime  insulin lispro Injectable (ADMELOG) 2 Unit(s) SubCutaneous three times a day before meals  melatonin 9 milliGRAM(s) Oral at bedtime  naloxegol 12.5 milliGRAM(s) Oral daily  ondansetron Injectable 4 milliGRAM(s) IV Push every 8 hours PRN  pantoprazole    Tablet 40 milliGRAM(s) Oral before breakfast  polyethylene glycol 3350 17 Gram(s) Oral two times a day  senna 2 Tablet(s) Oral at bedtime      Vitals:  T(F): 97.9 (02-09), Max: 98.3 (02-08)  HR: 76 (02-09) (68 - 76)  BP: 130/61 (02-09) (106/58 - 130/61)  RR: 17 (02-09)  SpO2: 99% (02-09)  I&O's Summary    08 Feb 2023 07:01  -  09 Feb 2023 07:00  --------------------------------------------------------  IN: 845 mL / OUT: 600 mL / NET: 245 mL        Physical Exam:  Appearance: No acute distress   Cardiovascular: RRR, S1, S2, no murmurs, rubs, or gallops; no edema; no JVD  Respiratory: Clear to auscultation bilaterally  Musculoskeletal: No clubbing; no joint deformity, L two gangrenous toes noted   Neurologic: Non-focal  Psychiatry: AAOx3, mood & affect appropriate  Skin: No rashes, ecchymoses, or cyanosis                          8.9    13.54 )-----------( 507      ( 09 Feb 2023 06:41 )             29.6     02-08    132<L>  |  96<L>  |  32<H>  ----------------------------<  89  4.3   |  24  |  1.68<H>    Ca    9.1      08 Feb 2023 19:57  Phos  4.2     02-08  Mg     2.20     02-08      PT/INR - ( 08 Feb 2023 02:30 )   PT: 15.9 sec;   INR: 1.37 ratio         PTT - ( 08 Feb 2023 02:30 )  PTT:35.2 sec  CARDIAC MARKERS ( 09 Feb 2023 00:00 )  58 ng/L / x     / x     / 48 U/L / x     / 1.7 ng/mL  CARDIAC MARKERS ( 08 Feb 2023 19:57 )  66 ng/L / x     / x     / 51 U/L / x     / 1.9 ng/mL      Serum Pro-Brain Natriuretic Peptide: 69832 pg/mL (02-08 @ 19:57)          New ECG(s):   NSR, PVCs noted, ST-T wave changes in V4-V6 unchanged from previous     Echo:    Stress Testing:     Cath:    Imaging:    Interpretation of Telemetry:

## 2023-02-09 NOTE — CHART NOTE - NSCHARTNOTEFT_GEN_A_CORE
Notified patient c/o chest pain     Chart reviewed, pt assessed at bedside, NAD, AOx4. Pt reports 5/10 substernal chest tightness w/o radiation to shoulder or jaw. Says chest tightness brought on by pain in lower extremity 2/2 gangrene (unchanged) and emotional stress. Patient denies fevers chills palpitations SOB cough wheezing nausea vomiting abdominal pain. Shortly after initial assessment chest tightness resolved w/o intervention.    Vital Signs Last 24 Hrs  T(C): 36.7 (08 Feb 2023 21:40), Max: 36.8 (08 Feb 2023 12:00)  T(F): 98 (08 Feb 2023 21:40), Max: 98.3 (08 Feb 2023 12:00)  HR: 69 (08 Feb 2023 21:40) (68 - 74)  BP: 106/58 (08 Feb 2023 21:40) (106/58 - 125/59)  BP(mean): --  RR: 18 (08 Feb 2023 21:40) (18 - 18)  SpO2: 100% (08 Feb 2023 21:40) (97% - 100%)    Parameters below as of 08 Feb 2023 21:40  Patient On (Oxygen Delivery Method): room air    Physical Exam  Gen: NAD, AOx4  Heart: S1 S2 no MRC  Lungs: Bibasilar crackles on ascultation no increased WOB or accessory muscle use no wheezing or rhonchi appreciated   Abdomen: soft, nontender, nondistended, normal active bowel sounds   Lower extremities: no pitting edema swelling or TTP warm toes black on LLE unchanged.     STAT EKG: Sinus rhythm rate 81 new RsR complex in v5-v6 w/ widened QRS, PVCs in bigeminy. EKG repeated after chest tightness resolved RsR complexes still present in v5-v6 however now w/ interpolated PVCs otherwise no significant changes between the two EKGs    Troponin T, High Sensitivity Result: 66  CKMB Mass Assay (02.08.23 @ 19:57)    CKMB Units: 1.9 ng/mL    CPK Mass Assay %: 3.7 %    Creatine Kinase, Serum (02.08.23 @ 19:57)    Creatine Kinase, Serum: 51 U/L    Basic Metabolic Panel w/Mg &amp; Inorg Phos (02.08.23 @ 19:57)    eGFR: 38  creatinine secretion. mL/min/1.73m2    Sodium, Serum: 132 mmol/L    Potassium, Serum: 4.3 mmol/L    Chloride, Serum: 96 mmol/L    Carbon Dioxide, Serum: 24 mmol/L    Anion Gap, Serum: 12 mmol/L    Blood Urea Nitrogen, Serum: 32 mg/dL    Creatinine, Serum: 1.68 mg/dL    Glucose, Serum: 89 mg/dL    Calcium, Total Serum: 9.1 mg/dL    Magnesium, Serum: 2.20 mg/dL    Phosphorus Level, Serum: 4.2 mg/dL     Serum Pro-Brain Natriuretic Peptide (02.08.23 @ 19:57)    Serum Pro-Brain Natriuretic Peptide: [id number]    ASSESSMENT:   Pt a/w sepsis 2/2 gangrene. Endorsing moderate substernal chest tightness precipitated by lower extremity pain and emotional stress. VSS, PE significant for crackles on auscultation otherwise no other significant changes. EKG w/ dynamic changes no RsR complexes or PVCs on previous EKG. Troponins 60s, appears to be her baseline, potentially collection given Hx of BOB. electrolytes stable, CK and CKMB WNL, BNP grossly elevated however little diagnostic significance i/s/o elevated troponins. House cardiology consulted recommending troponin trending to peak, repeat CE and EKG if additional episodes of chest pain and will follow up in AM.    PLAN:  -Dilaudid 1mg IVP available as given   -Repeat set of cardiac enzymes at midnight   -f/u cardiology in AM   -f/u CXr   -Cardiology recs appreciated   -Will continue to monitor     Dave Hernandez PA-C  Department of Internal Medicine  In-House beeper number [phone]

## 2023-02-09 NOTE — PROGRESS NOTE ADULT - SUBJECTIVE AND OBJECTIVE BOX
SURGERY  Pager: #[phone]    INTERVAL EVENTS/SUBJECTIVE: No acute events overnight.     ______________________________________________  OBJECTIVE:   Vital Signs Last 24 Hrs  T(C): 36.6 (09 Feb 2023 06:30), Max: 36.8 (08 Feb 2023 12:00)  T(F): 97.9 (09 Feb 2023 06:30), Max: 98.3 (08 Feb 2023 12:00)  HR: 76 (09 Feb 2023 06:30) (68 - 76)  BP: 130/61 (09 Feb 2023 06:30) (106/58 - 130/61)  RR: 17 (09 Feb 2023 06:30) (17 - 18)  SpO2: 99% (09 Feb 2023 06:30) (97% - 100%)  Parameters below as of 09 Feb 2023 06:30  Patient On (Oxygen Delivery Method): room air    CAPILLARY BLOOD GLUCOSE    POCT Blood Glucose.: 152 mg/dL (09 Feb 2023 08:34)  POCT Blood Glucose.: 148 mg/dL (08 Feb 2023 21:22)  POCT Blood Glucose.: 121 mg/dL (08 Feb 2023 18:14)  POCT Blood Glucose.: 128 mg/dL (08 Feb 2023 12:41)      I&O's Detail    08 Feb 2023 07:01  -  09 Feb 2023 07:00  --------------------------------------------------------  IN:    Oral Fluid: 845 mL  Total IN: 845 mL    OUT:    Voided (mL): 600 mL  Total OUT: 600 mL    Total NET: 245 mL              PHYSICAL EXAM  General: NAD, resting comfortably  Pulmonary: non-labored breathing on room air  Extremities: Left DP/PT signals, dry gangrene on 2nd L toe  ______________________________________________  LABS:                          8.9    13.54 )-----------( 507      ( 09 Feb 2023 06:41 )             29.6     02-09    132<L>  |  96<L>  |  37<H>  ----------------------------<  147<H>  4.5   |  22  |  1.86<H>    Ca    8.7      09 Feb 2023 06:41  Phos  4.4     02-09  Mg     2.10     02-09

## 2023-02-09 NOTE — PROGRESS NOTE ADULT - ASSESSMENT
42F w/ PMH coronary artery disease status post CABG x4 in 2018, CHF s/p ICD placement (interrogated 2022), DM, PAD s/p 3rd toe amputation who presents with left 2nd toe pain concerning for dry gangrene.    Plan:  - Optimization from medicine, cardiology, nephrology noted, appreciated  - Planning for Friday CO2 angio of LLE  - primary team notified  - NPO @ midnight, AM Labs, Active T+S, recent COVID    C Team Surgery  #[id number]

## 2023-02-09 NOTE — PROGRESS NOTE ADULT - PROBLEM SELECTOR PLAN 7
- recent transfusion, hg now stable 8-9s  - no signs/sx of bleeding  - continue to trend H/H  - active type and screen

## 2023-02-09 NOTE — PROGRESS NOTE ADULT - PROBLEM SELECTOR PLAN 8
Initially presented with reduced breath sound on R lung field.   - CXR 1/26: moderate R sided pleural effusion.   - CXR 1/29: increasing R sized pleural effusion   - CXR 1/8  - Moderate to large right effusion unchanged.   - CXR 1/9 - not significantly changed pleural effusion  - s/p diuresis

## 2023-02-09 NOTE — PROGRESS NOTE ADULT - PROBLEM SELECTOR PLAN 9
- Fluids: NA  - Electrolytes: replete PRN  - Nutrition: DASH  - Activity: OOB as tolerated  - DVT Prophylaxis: heparin sub Q  - Disposition: pending medical management hard copy, drawn during this pregnancy

## 2023-02-09 NOTE — PROGRESS NOTE ADULT - PROBLEM SELECTOR PLAN 1
Patient presenting in setting of L 2nd toe pain concerning for dry gangrene with history of prior toe amputation iso PAD. Initially with elevated ESR/CRP without active signs of infection however later with concern for infection and septic shock.   - pain control w/ Dilaudid 2mg PRN for severe; Dilaudid 1mg PRN for moderate, ATC tylenol, dilaudid 0.5mg PRN added for breakthrough given increased reported pain  - pending podiatry surgical intervention with TMA   - pending vascular intervention with CO2 LLE angiogram, tentatively planned for tomorrow  - completed abx course as below, not currently on abx  - PT/OT

## 2023-02-09 NOTE — PROGRESS NOTE ADULT - PROBLEM SELECTOR PLAN 2
Patient w/ baseline SCr ~1.2 has been uptrending during hospitalization to Cr. 4.2.   - continues on bumex 1 PO BID  - Cr stable in 1.8s, continue to monitor closely  - appreciate nephrology recommendations

## 2023-02-09 NOTE — PROGRESS NOTE ADULT - ASSESSMENT
42F w/ PMH coronary artery disease status post CABG x4 in 2018, CHF s/p ICD placement (interrogated 2022), DM, PAD s/p 3rd toe amputation who presents with left 2nd toe pain concerning for dry gangrene. Seen by podiatry and vascular teams with plans for possible transmetatarsal amputation. Course c/b worsening renal function, and shock likely sepsis 2/2 LE wounds w/ possible cardiogenic component. Patient s/p levophed, Vanc/Cefepime/Flagyl, awaiting angio and likely podiatric procedure.

## 2023-02-10 DIAGNOSIS — E87.1 HYPO-OSMOLALITY AND HYPONATREMIA: ICD-10-CM

## 2023-02-10 LAB
ANION GAP SERPL CALC-SCNC: 13 MMOL/L — SIGNIFICANT CHANGE UP (ref 7–14)
APTT BLD: 35.5 SEC — SIGNIFICANT CHANGE UP (ref 27–36.3)
BLD GP AB SCN SERPL QL: NEGATIVE — SIGNIFICANT CHANGE UP
BUN SERPL-MCNC: 36 MG/DL — HIGH (ref 7–23)
CALCIUM SERPL-MCNC: 8.8 MG/DL — SIGNIFICANT CHANGE UP (ref 8.4–10.5)
CHLORIDE SERPL-SCNC: 95 MMOL/L — LOW (ref 98–107)
CO2 SERPL-SCNC: 21 MMOL/L — LOW (ref 22–31)
CREAT SERPL-MCNC: 1.72 MG/DL — HIGH (ref 0.5–1.3)
EGFR: 37 ML/MIN/1.73M2 — LOW
GLUCOSE BLDC GLUCOMTR-MCNC: 105 MG/DL — HIGH (ref 70–99)
GLUCOSE BLDC GLUCOMTR-MCNC: 109 MG/DL — HIGH (ref 70–99)
GLUCOSE BLDC GLUCOMTR-MCNC: 119 MG/DL — HIGH (ref 70–99)
GLUCOSE BLDC GLUCOMTR-MCNC: 135 MG/DL — HIGH (ref 70–99)
GLUCOSE BLDC GLUCOMTR-MCNC: 92 MG/DL — SIGNIFICANT CHANGE UP (ref 70–99)
GLUCOSE SERPL-MCNC: 124 MG/DL — HIGH (ref 70–99)
HCG UR QL: NEGATIVE — SIGNIFICANT CHANGE UP
HCT VFR BLD CALC: 33 % — LOW (ref 34.5–45)
HGB BLD-MCNC: 9.8 G/DL — LOW (ref 11.5–15.5)
INR BLD: 1.37 RATIO — HIGH (ref 0.88–1.16)
MAGNESIUM SERPL-MCNC: 1.9 MG/DL — SIGNIFICANT CHANGE UP (ref 1.6–2.6)
MCHC RBC-ENTMCNC: 25.1 PG — LOW (ref 27–34)
MCHC RBC-ENTMCNC: 29.7 GM/DL — LOW (ref 32–36)
MCV RBC AUTO: 84.6 FL — SIGNIFICANT CHANGE UP (ref 80–100)
NRBC # BLD: 0 /100 WBCS — SIGNIFICANT CHANGE UP (ref 0–0)
NRBC # FLD: 0 K/UL — SIGNIFICANT CHANGE UP (ref 0–0)
PHOSPHATE SERPL-MCNC: 4.4 MG/DL — SIGNIFICANT CHANGE UP (ref 2.5–4.5)
PLATELET # BLD AUTO: 523 K/UL — HIGH (ref 150–400)
POTASSIUM SERPL-MCNC: 4.2 MMOL/L — SIGNIFICANT CHANGE UP (ref 3.5–5.3)
POTASSIUM SERPL-SCNC: 4.2 MMOL/L — SIGNIFICANT CHANGE UP (ref 3.5–5.3)
PROTHROM AB SERPL-ACNC: 16 SEC — HIGH (ref 10.5–13.4)
RBC # BLD: 3.9 M/UL — SIGNIFICANT CHANGE UP (ref 3.8–5.2)
RBC # FLD: 19.3 % — HIGH (ref 10.3–14.5)
RH IG SCN BLD-IMP: POSITIVE — SIGNIFICANT CHANGE UP
SODIUM SERPL-SCNC: 129 MMOL/L — LOW (ref 135–145)
WBC # BLD: 15.09 K/UL — HIGH (ref 3.8–10.5)
WBC # FLD AUTO: 15.09 K/UL — HIGH (ref 3.8–10.5)

## 2023-02-10 PROCEDURE — 76937 US GUIDE VASCULAR ACCESS: CPT | Mod: 26

## 2023-02-10 PROCEDURE — 99233 SBSQ HOSP IP/OBS HIGH 50: CPT

## 2023-02-10 PROCEDURE — 75710 ARTERY X-RAYS ARM/LEG: CPT | Mod: 26

## 2023-02-10 PROCEDURE — 99232 SBSQ HOSP IP/OBS MODERATE 35: CPT | Mod: GC

## 2023-02-10 PROCEDURE — 36246 INS CATH ABD/L-EXT ART 2ND: CPT

## 2023-02-10 RX ORDER — HYDROMORPHONE HYDROCHLORIDE 2 MG/ML
2 INJECTION INTRAMUSCULAR; INTRAVENOUS; SUBCUTANEOUS
Refills: 0 | Status: DISCONTINUED | OUTPATIENT
Start: 2023-02-10 | End: 2023-02-16

## 2023-02-10 RX ORDER — SODIUM CHLORIDE 9 MG/ML
500 INJECTION INTRAMUSCULAR; INTRAVENOUS; SUBCUTANEOUS
Refills: 0 | Status: DISCONTINUED | OUTPATIENT
Start: 2023-02-10 | End: 2023-02-10

## 2023-02-10 RX ADMIN — CARVEDILOL PHOSPHATE 6.25 MILLIGRAM(S): 80 CAPSULE, EXTENDED RELEASE ORAL at 06:13

## 2023-02-10 RX ADMIN — HYDROMORPHONE HYDROCHLORIDE 1 MILLIGRAM(S): 2 INJECTION INTRAMUSCULAR; INTRAVENOUS; SUBCUTANEOUS at 02:51

## 2023-02-10 RX ADMIN — INSULIN GLARGINE 3 UNIT(S): 100 INJECTION, SOLUTION SUBCUTANEOUS at 21:26

## 2023-02-10 RX ADMIN — PANTOPRAZOLE SODIUM 40 MILLIGRAM(S): 20 TABLET, DELAYED RELEASE ORAL at 06:12

## 2023-02-10 RX ADMIN — HEPARIN SODIUM 5000 UNIT(S): 5000 INJECTION INTRAVENOUS; SUBCUTANEOUS at 06:13

## 2023-02-10 RX ADMIN — ATORVASTATIN CALCIUM 80 MILLIGRAM(S): 80 TABLET, FILM COATED ORAL at 21:26

## 2023-02-10 RX ADMIN — HYDROMORPHONE HYDROCHLORIDE 2 MILLIGRAM(S): 2 INJECTION INTRAMUSCULAR; INTRAVENOUS; SUBCUTANEOUS at 22:15

## 2023-02-10 RX ADMIN — HYDROMORPHONE HYDROCHLORIDE 1 MILLIGRAM(S): 2 INJECTION INTRAMUSCULAR; INTRAVENOUS; SUBCUTANEOUS at 02:36

## 2023-02-10 RX ADMIN — Medication 975 MILLIGRAM(S): at 23:39

## 2023-02-10 RX ADMIN — HYDROMORPHONE HYDROCHLORIDE 2 MILLIGRAM(S): 2 INJECTION INTRAMUSCULAR; INTRAVENOUS; SUBCUTANEOUS at 22:00

## 2023-02-10 RX ADMIN — Medication 81 MILLIGRAM(S): at 13:07

## 2023-02-10 RX ADMIN — Medication 975 MILLIGRAM(S): at 19:30

## 2023-02-10 RX ADMIN — HYDROMORPHONE HYDROCHLORIDE 2 MILLIGRAM(S): 2 INJECTION INTRAMUSCULAR; INTRAVENOUS; SUBCUTANEOUS at 15:30

## 2023-02-10 RX ADMIN — HYDROMORPHONE HYDROCHLORIDE 2 MILLIGRAM(S): 2 INJECTION INTRAMUSCULAR; INTRAVENOUS; SUBCUTANEOUS at 18:45

## 2023-02-10 RX ADMIN — HYDROMORPHONE HYDROCHLORIDE 2 MILLIGRAM(S): 2 INJECTION INTRAMUSCULAR; INTRAVENOUS; SUBCUTANEOUS at 06:29

## 2023-02-10 RX ADMIN — Medication 975 MILLIGRAM(S): at 13:39

## 2023-02-10 RX ADMIN — Medication 975 MILLIGRAM(S): at 06:29

## 2023-02-10 RX ADMIN — HYDROMORPHONE HYDROCHLORIDE 0.5 MILLIGRAM(S): 2 INJECTION INTRAMUSCULAR; INTRAVENOUS; SUBCUTANEOUS at 13:04

## 2023-02-10 RX ADMIN — Medication 975 MILLIGRAM(S): at 06:12

## 2023-02-10 RX ADMIN — HYDROMORPHONE HYDROCHLORIDE 2 MILLIGRAM(S): 2 INJECTION INTRAMUSCULAR; INTRAVENOUS; SUBCUTANEOUS at 10:32

## 2023-02-10 RX ADMIN — SODIUM CHLORIDE 50 MILLILITER(S): 9 INJECTION INTRAMUSCULAR; INTRAVENOUS; SUBCUTANEOUS at 13:23

## 2023-02-10 RX ADMIN — CALAMINE AND ZINC OXIDE AND PHENOL 1 APPLICATION(S): 160; 10 LOTION TOPICAL at 21:25

## 2023-02-10 RX ADMIN — Medication 9 MILLIGRAM(S): at 21:26

## 2023-02-10 RX ADMIN — Medication 975 MILLIGRAM(S): at 18:30

## 2023-02-10 RX ADMIN — Medication 975 MILLIGRAM(S): at 23:09

## 2023-02-10 RX ADMIN — BUMETANIDE 1 MILLIGRAM(S): 0.25 INJECTION INTRAMUSCULAR; INTRAVENOUS at 06:13

## 2023-02-10 RX ADMIN — HYDROMORPHONE HYDROCHLORIDE 2 MILLIGRAM(S): 2 INJECTION INTRAMUSCULAR; INTRAVENOUS; SUBCUTANEOUS at 00:37

## 2023-02-10 RX ADMIN — Medication 975 MILLIGRAM(S): at 13:07

## 2023-02-10 RX ADMIN — HYDROMORPHONE HYDROCHLORIDE 2 MILLIGRAM(S): 2 INJECTION INTRAMUSCULAR; INTRAVENOUS; SUBCUTANEOUS at 18:30

## 2023-02-10 RX ADMIN — SODIUM CHLORIDE 75 MILLILITER(S): 9 INJECTION INTRAMUSCULAR; INTRAVENOUS; SUBCUTANEOUS at 08:01

## 2023-02-10 RX ADMIN — CALAMINE AND ZINC OXIDE AND PHENOL 1 APPLICATION(S): 160; 10 LOTION TOPICAL at 06:12

## 2023-02-10 RX ADMIN — Medication 2 UNIT(S): at 18:35

## 2023-02-10 RX ADMIN — HYDROMORPHONE HYDROCHLORIDE 2 MILLIGRAM(S): 2 INJECTION INTRAMUSCULAR; INTRAVENOUS; SUBCUTANEOUS at 06:14

## 2023-02-10 RX ADMIN — HYDROMORPHONE HYDROCHLORIDE 2 MILLIGRAM(S): 2 INJECTION INTRAMUSCULAR; INTRAVENOUS; SUBCUTANEOUS at 15:45

## 2023-02-10 RX ADMIN — HYDROMORPHONE HYDROCHLORIDE 2 MILLIGRAM(S): 2 INJECTION INTRAMUSCULAR; INTRAVENOUS; SUBCUTANEOUS at 00:52

## 2023-02-10 RX ADMIN — CALAMINE AND ZINC OXIDE AND PHENOL 1 APPLICATION(S): 160; 10 LOTION TOPICAL at 18:27

## 2023-02-10 NOTE — PROGRESS NOTE ADULT - PROBLEM SELECTOR PLAN 1
Patient presenting in setting of L 2nd toe pain concerning for dry gangrene with history of prior toe amputation iso PAD. Initially with elevated ESR/CRP without active signs of infection however later with concern for infection and septic shock.   - pain control w/ Dilaudid 2mg q3hr PRN for severe; Dilaudid 1mg q4hr PRN for moderate, ATC tylenol, dilaudid 0.5mg q4hr PRN added for breakthrough given increased reported pain  - planned for vascular intervention with CO2 LLE angiogram today  - pending podiatry surgical intervention with TMA   - completed abx course as below, not currently on abx  - PT/OT

## 2023-02-10 NOTE — PROGRESS NOTE ADULT - ASSESSMENT
42F w/ PMH coronary artery disease status post CABG x4 in 2018, CHF s/p ICD placement (interrogated 2022), DM, PAD s/p 3rd toe amputation who presents with left 2nd toe pain concerning for dry gangrene.    Plan:  - Optimization from medicine, cardiology, nephrology noted, appreciated  - Plan for LLE angio today     C Team Surgery  #[id number] 42F w/ PMH coronary artery disease status post CABG x4 in 2018, CHF s/p ICD placement (interrogated 2022), DM, PAD s/p 3rd toe amputation who presents with left 2nd toe pain concerning for dry gangrene.    Plan:  - Optimization from medicine, cardiology, nephrology noted, appreciated  - Plan for LE angio today     C Team Surgery  #[id number]

## 2023-02-10 NOTE — PROGRESS NOTE ADULT - PROBLEM SELECTOR PLAN 9
- Fluids: NA  - Electrolytes: replete PRN  - Nutrition: npo/DASH  - Activity: OOB as tolerated  - DVT Prophylaxis: heparin sub Q  - Disposition: pending medical management Mild hyponatremia Na 129. Possibly SIADH in s.o pain.  - fu urine studies  - monitor BMP and continue with pain control and fluid restriction

## 2023-02-10 NOTE — PROGRESS NOTE ADULT - SUBJECTIVE AND OBJECTIVE BOX
[facility] Division of Kidney Diseases & Hypertension  FOLLOW UP NOTE  [phone]--------------------------------------------------------------------------------    HPI: 42-year-female with PMH of HTN, HLD, DM, PAD, CHF admitted with left toe pain. Pt. developed BOB during hospital stay. Pt. was transferred to the MICU on 1/29/23 for septic shock requiring vasopressor support. Pt. clinically improved and was transferred to medical floors on 2/3/23. Pt. being seen for BOB.       24 hour events/subjective: Pt. seen and examined at bedside today. Pt. continues to have L foot pain. Reports that LE edema has resolved. Denies fever, chills, nausea, vomiting, CP and SOB during rounds.    PAST HISTORY  --------------------------------------------------------------------------------  No significant changes to PMH, PSH, FHx, SHx, unless otherwise noted    ALLERGIES & MEDICATIONS  --------------------------------------------------------------------------------  Allergies    No Known Allergies    Intolerances      Standing Inpatient Medications  acetaminophen     Tablet .. 975 milliGRAM(s) Oral every 6 hours  aspirin enteric coated 81 milliGRAM(s) Oral daily  atorvastatin 80 milliGRAM(s) Oral at bedtime  buMETAnide 1 milliGRAM(s) Oral two times a day  calamine/zinc oxide Lotion 1 Application(s) Topical three times a day  carvedilol 6.25 milliGRAM(s) Oral every 12 hours  dextrose 5%. 1000 milliLiter(s) IV Continuous <Continuous>  dextrose 5%. 1000 milliLiter(s) IV Continuous <Continuous>  dextrose 50% Injectable 25 Gram(s) IV Push once  dextrose 50% Injectable 12.5 Gram(s) IV Push once  dextrose 50% Injectable 25 Gram(s) IV Push once  ferrous    sulfate 325 milliGRAM(s) Oral daily  glucagon  Injectable 1 milliGRAM(s) IntraMuscular once  heparin   Injectable 5000 Unit(s) SubCutaneous every 8 hours  influenza   Vaccine 0.5 milliLiter(s) IntraMuscular once  insulin glargine Injectable (LANTUS) 3 Unit(s) SubCutaneous at bedtime  insulin lispro (ADMELOG) corrective regimen sliding scale   SubCutaneous three times a day before meals  insulin lispro (ADMELOG) corrective regimen sliding scale   SubCutaneous at bedtime  insulin lispro Injectable (ADMELOG) 2 Unit(s) SubCutaneous three times a day before meals  melatonin 9 milliGRAM(s) Oral at bedtime  naloxegol 12.5 milliGRAM(s) Oral daily  pantoprazole    Tablet 40 milliGRAM(s) Oral before breakfast  polyethylene glycol 3350 17 Gram(s) Oral two times a day  senna 2 Tablet(s) Oral at bedtime  sodium chloride 0.9%. 1000 milliLiter(s) IV Continuous <Continuous>    PRN Inpatient Medications  acetaminophen     Tablet .. 650 milliGRAM(s) Oral every 6 hours PRN  dextrose Oral Gel 15 Gram(s) Oral once PRN  HYDROmorphone  Injectable 1 milliGRAM(s) IV Push every 4 hours PRN  HYDROmorphone  Injectable 0.5 milliGRAM(s) IV Push every 4 hours PRN  HYDROmorphone  Injectable 2 milliGRAM(s) IV Push every 3 hours PRN  ondansetron Injectable 4 milliGRAM(s) IV Push every 8 hours PRN      REVIEW OF SYSTEMS  --------------------------------------------------------------------------------  Gen: No fevers/chills  Head/Eyes/Ears: No HA  Respiratory: No dyspnea  CV: No chest pain  GI: No abdominal pain, diarrhea  MSK: +Decreased B/L LE edema and L foot pain  Skin: No rashes  Heme: No bleeding    All other systems were reviewed and are negative, except as noted.    VITALS/PHYSICAL EXAM  --------------------------------------------------------------------------------  T(C): 36.9 (02-10-23 @ 06:12), Max: 36.9 (02-10-23 @ 06:12)  HR: 76 (02-10-23 @ 06:12) (67 - 79)  BP: 122/82 (02-10-23 @ 06:12) (103/60 - 122/82)  RR: 19 (02-10-23 @ 06:12) (18 - 19)  SpO2: 100% (02-10-23 @ 06:12) (99% - 100%)  Wt(kg): --    02-09-23 @ 07:01  -  02-10-23 @ 07:00  --------------------------------------------------------  IN: 795 mL / OUT: 100 mL / NET: 695 mL    02-10-23 @ 07:01  -  02-10-23 @ 13:05  --------------------------------------------------------  IN: 175 mL / OUT: 200 mL / NET: -25 mL    Physical Exam:  Gen: resting, NAD  HEENT: Anicteric  Pulm: CTA B/L  CV: S1S2+  Abd: Soft, +BS    Ext: B/L LE edema resolved, left foot: gangrenous changes seen  Neuro: Awake and alert  Skin: Warm and dry    LABS/STUDIES  --------------------------------------------------------------------------------              9.8    15.09 >-----------<  523      [02-10-23 @ 02:41]              33.0     129  |  95  |  36  ----------------------------<  124      [02-10-23 @ 02:41]  4.2   |  21  |  1.72        Ca     8.8     [02-10-23 @ 02:41]      Mg     1.90     [02-10-23 @ 02:41]      Phos  4.4     [02-10-23 @ 02:41]      PT/INR: PT 16.0 , INR 1.37       [02-10-23 @ 02:41]  PTT: 35.5       [02-10-23 @ 02:41]    CK 48      [02-09-23 @ 06:41]    Creatinine Trend:  SCr 1.72 [02-10 @ 02:41]  SCr 1.86 [02-09 @ 06:41]  SCr 1.68 [02-08 @ 19:57]  SCr 1.85 [02-08 @ 02:30]  SCr 1.85 [02-07 @ 07:00] [facility] Division of Kidney Diseases & Hypertension  FOLLOW UP NOTE  [phone]--------------------------------------------------------------------------------    HPI: 42-year-female with PMH of HTN, HLD, DM, PAD, CHF admitted with left toe pain. Pt. developed BOB during hospital stay. Pt. was transferred to the MICU on 1/29/23 for septic shock requiring vasopressor support. Pt. clinically improved and was transferred to medical floors on 2/3/23. Pt. being seen for BOB.       24 hour events/subjective: Pt. seen and examined at bedside today. Pt. continues to have L foot pain. Reports that LE edema has resolved. Denies fever, chills, nausea, vomiting, CP and SOB during rounds.    PAST HISTORY  --------------------------------------------------------------------------------  No significant changes to PMH, PSH, FHx, SHx, unless otherwise noted    ALLERGIES & MEDICATIONS  --------------------------------------------------------------------------------  Allergies    No Known Allergies    Intolerances    Standing Inpatient Medications  acetaminophen     Tablet .. 975 milliGRAM(s) Oral every 6 hours  aspirin enteric coated 81 milliGRAM(s) Oral daily  atorvastatin 80 milliGRAM(s) Oral at bedtime  buMETAnide 1 milliGRAM(s) Oral two times a day  calamine/zinc oxide Lotion 1 Application(s) Topical three times a day  carvedilol 6.25 milliGRAM(s) Oral every 12 hours  dextrose 5%. 1000 milliLiter(s) IV Continuous <Continuous>  dextrose 5%. 1000 milliLiter(s) IV Continuous <Continuous>  dextrose 50% Injectable 25 Gram(s) IV Push once  dextrose 50% Injectable 12.5 Gram(s) IV Push once  dextrose 50% Injectable 25 Gram(s) IV Push once  ferrous    sulfate 325 milliGRAM(s) Oral daily  glucagon  Injectable 1 milliGRAM(s) IntraMuscular once  heparin   Injectable 5000 Unit(s) SubCutaneous every 8 hours  influenza   Vaccine 0.5 milliLiter(s) IntraMuscular once  insulin glargine Injectable (LANTUS) 3 Unit(s) SubCutaneous at bedtime  insulin lispro (ADMELOG) corrective regimen sliding scale   SubCutaneous three times a day before meals  insulin lispro (ADMELOG) corrective regimen sliding scale   SubCutaneous at bedtime  insulin lispro Injectable (ADMELOG) 2 Unit(s) SubCutaneous three times a day before meals  melatonin 9 milliGRAM(s) Oral at bedtime  naloxegol 12.5 milliGRAM(s) Oral daily  pantoprazole    Tablet 40 milliGRAM(s) Oral before breakfast  polyethylene glycol 3350 17 Gram(s) Oral two times a day  senna 2 Tablet(s) Oral at bedtime  sodium chloride 0.9%. 1000 milliLiter(s) IV Continuous <Continuous>    PRN Inpatient Medications  acetaminophen     Tablet .. 650 milliGRAM(s) Oral every 6 hours PRN  dextrose Oral Gel 15 Gram(s) Oral once PRN  HYDROmorphone  Injectable 1 milliGRAM(s) IV Push every 4 hours PRN  HYDROmorphone  Injectable 0.5 milliGRAM(s) IV Push every 4 hours PRN  HYDROmorphone  Injectable 2 milliGRAM(s) IV Push every 3 hours PRN  ondansetron Injectable 4 milliGRAM(s) IV Push every 8 hours PRN    REVIEW OF SYSTEMS  --------------------------------------------------------------------------------  Gen: No fevers/chills  Head/Eyes/Ears: No HA  Respiratory: No dyspnea  CV: No chest pain  GI: No abdominal pain, diarrhea  MSK: +Decreased B/L LE edema and L foot pain  Skin: Left foot gangrene+  Heme: No bleeding    All other systems were reviewed and are negative, except as noted.    VITALS/PHYSICAL EXAM  --------------------------------------------------------------------------------  T(C): 36.9 (02-10-23 @ 06:12), Max: 36.9 (02-10-23 @ 06:12)  HR: 76 (02-10-23 @ 06:12) (67 - 79)  BP: 122/82 (02-10-23 @ 06:12) (103/60 - 122/82)  RR: 19 (02-10-23 @ 06:12) (18 - 19)  SpO2: 100% (02-10-23 @ 06:12) (99% - 100%)  Wt(kg): --    02-09-23 @ 07:01  -  02-10-23 @ 07:00  --------------------------------------------------------  IN: 795 mL / OUT: 100 mL / NET: 695 mL    02-10-23 @ 07:01  -  02-10-23 @ 13:05  --------------------------------------------------------  IN: 175 mL / OUT: 200 mL / NET: -25 mL    Physical Exam:    Gen: resting, NAD  HEENT: Anicteric  Pulm: CTA B/L  CV: S1S2+  Abd: Soft, +BS    Ext: B/L LE edema resolved, left foot: gangrenous changes seen  Neuro: Awake and alert  Skin: Warm and dry    LABS/STUDIES  --------------------------------------------------------------------------------              9.8    15.09 >-----------<  523      [02-10-23 @ 02:41]              33.0     129  |  95  |  36  ----------------------------<  124      [02-10-23 @ 02:41]  4.2   |  21  |  1.72        Ca     8.8     [02-10-23 @ 02:41]      Mg     1.90     [02-10-23 @ 02:41]      Phos  4.4     [02-10-23 @ 02:41]    Creatinine Trend:  SCr 1.72 [02-10 @ 02:41]  SCr 1.86 [02-09 @ 06:41]  SCr 1.68 [02-08 @ 19:57]  SCr 1.85 [02-08 @ 02:30]  SCr 1.85 [02-07 @ 07:00]

## 2023-02-10 NOTE — PROGRESS NOTE ADULT - PROBLEM SELECTOR PLAN 6
Myrbetriq Pending    Insurance response  Prescription Drug Insurance: [employer]   Notes: Prior authorization submitted - will update provider when decision has been made by insurance.            On metformin 500mg BID, glimepiride 4mg, lantus 8U at bedtime   - A1c 7.1  - started on  3U lantus at bedtime given elevated sugars (was on 5U on admission) and titrate slowly    - c/w ISS and resumed premeal insulin (when not npo)

## 2023-02-10 NOTE — PROGRESS NOTE ADULT - PROBLEM SELECTOR PLAN 8
Initially presented with reduced breath sound on R lung field.   - CXR 1/26: moderate R sided pleural effusion.   - CXR 1/29: increasing R sized pleural effusion   - CXR 2/8  - Moderate to large right effusion unchanged.   - CXR 2/9 - not significantly changed pleural effusion  - s/p diuresis

## 2023-02-10 NOTE — PROGRESS NOTE ADULT - ATTENDING COMMENTS
Pt. with BOB. Scr elevated/stable at 1.72 today. Assessment and plan for BOB as outlined above. Monitor labs and urine output. Avoid any potential nephrotoxins. Dose medications as per eGFR.

## 2023-02-10 NOTE — PROGRESS NOTE ADULT - PROBLEM SELECTOR PLAN 1
Pt. with BOB in setting of hypotension, recent IV contrast use and CHF. Upon review of labs on [facility] HIE/[place], Scr was elevated at 1.33 on 6/28/21 and improved to 1.15 on 2/24/22. Scr was 1.22 on admission (1/25/23). Pt. underwent LE angiogram on 1/25/23. Scr worsened to 4.23 on 1/30/23. No hydronephrosis seen on CT scan study. Pt. was oliguric, initiated on IV Bumex infusion in MICU on 1/30/23. Pt. responded well to IV diuretic therapy and was then switched to oral diuretic therapy. Currently on PO Bumex 1 mg BID. Scr elevated/improved at 1.72 today. Documented urine output is 100 cc over the past 24 hours. Vascular surgery note from today reviewed, plan for LLE angiogram today. Recommend to hold diuretics the day prior and the day of procedure. Recommend IV NS at 75 cc/hr starting 6 hours prior to procedure, during, and for 6 hours following procedure. Monitor labs and urine output. Avoid nephrotoxins, Dose meds as per eGFR.    If you have any questions, please feel free to contact me  Talia Swift  Nephrology Fellow  [phone] / [employer] Teams(Preferred)  (After 5pm or on weekends please page the on-call fellow) Pt. with BOB in setting of hypotension, IV contrast use and CHF. Upon review of labs on [facility] HIE/[place], Scr was elevated at 1.33 on 6/28/21 and improved to 1.15 on 2/24/22. Scr was 1.22 on admission (1/25/23). Pt. underwent LE angiogram on 1/25/23. Scr worsened to 4.23 on 1/30/23. No hydronephrosis seen on CT scan study. Pt. was oliguric, initiated on IV Bumex infusion in MICU on 1/30/23. Pt. responded well to IV diuretic therapy and was then switched to oral diuretic therapy. Currently on PO Bumex 1 mg BID. Scr elevated/stable at 1.72 today. Documented urine output is 100 cc over the past 24 hours. Vascular surgery note from today reviewed, plan for LLE angiogram today. Recommend to hold diuretics today. Recommend IV NS at 75 cc/hr starting 6 hours prior to procedure, during, and for 6 hours following procedure. Monitor labs and urine output. Avoid nephrotoxins, Dose meds as per eGFR.    If you have any questions, please feel free to contact me  Talia Swift  Nephrology Fellow  [phone] / [employer] Teams(Preferred)  (After 5pm or on weekends please page the on-call fellow)

## 2023-02-10 NOTE — PROGRESS NOTE ADULT - SUBJECTIVE AND OBJECTIVE BOX
Patient is a 42y old  Female who presents with a chief complaint of Toe pain.    SUBJECTIVE / OVERNIGHT EVENTS: NAEO. Patient is waiting for LLE angiogram today. Patient states her toe pain is well controlled with dilaudid. the pain meds effect lasts upto 3 hrs and is able to sleep well. Denies any nausea/vomiting, fever/chills.      MEDICATIONS  (STANDING):  acetaminophen     Tablet .. 975 milliGRAM(s) Oral every 6 hours  aspirin enteric coated 81 milliGRAM(s) Oral daily  atorvastatin 80 milliGRAM(s) Oral at bedtime  buMETAnide 1 milliGRAM(s) Oral two times a day  calamine/zinc oxide Lotion 1 Application(s) Topical three times a day  carvedilol 6.25 milliGRAM(s) Oral every 12 hours  dextrose 5%. 1000 milliLiter(s) (100 mL/Hr) IV Continuous <Continuous>  dextrose 5%. 1000 milliLiter(s) (50 mL/Hr) IV Continuous <Continuous>  dextrose 50% Injectable 25 Gram(s) IV Push once  dextrose 50% Injectable 12.5 Gram(s) IV Push once  dextrose 50% Injectable 25 Gram(s) IV Push once  ferrous    sulfate 325 milliGRAM(s) Oral daily  glucagon  Injectable 1 milliGRAM(s) IntraMuscular once  heparin   Injectable 5000 Unit(s) SubCutaneous every 8 hours  influenza   Vaccine 0.5 milliLiter(s) IntraMuscular once  insulin glargine Injectable (LANTUS) 3 Unit(s) SubCutaneous at bedtime  insulin lispro (ADMELOG) corrective regimen sliding scale   SubCutaneous three times a day before meals  insulin lispro (ADMELOG) corrective regimen sliding scale   SubCutaneous at bedtime  insulin lispro Injectable (ADMELOG) 2 Unit(s) SubCutaneous three times a day before meals  melatonin 9 milliGRAM(s) Oral at bedtime  naloxegol 12.5 milliGRAM(s) Oral daily  pantoprazole    Tablet 40 milliGRAM(s) Oral before breakfast  polyethylene glycol 3350 17 Gram(s) Oral two times a day  senna 2 Tablet(s) Oral at bedtime  sodium chloride 0.9%. 1000 milliLiter(s) (75 mL/Hr) IV Continuous <Continuous>    MEDICATIONS  (PRN):  acetaminophen     Tablet .. 650 milliGRAM(s) Oral every 6 hours PRN Temp greater or equal to 38C (100.4F), Mild Pain (1 - 3)  dextrose Oral Gel 15 Gram(s) Oral once PRN Blood Glucose LESS THAN 70 milliGRAM(s)/deciliter  HYDROmorphone  Injectable 1 milliGRAM(s) IV Push every 4 hours PRN Moderate Pain (4 - 6)  HYDROmorphone  Injectable 0.5 milliGRAM(s) IV Push every 4 hours PRN breakthrough pain  HYDROmorphone  Injectable 2 milliGRAM(s) IV Push every 3 hours PRN Severe Pain (7 - 10)  ondansetron Injectable 4 milliGRAM(s) IV Push every 8 hours PRN Nausea and/or Vomiting    CAPILLARY BLOOD GLUCOSE  POCT Blood Glucose.: 109 mg/dL (10 Feb 2023 07:54)  POCT Blood Glucose.: 135 mg/dL (10 Feb 2023 02:32)  POCT Blood Glucose.: 145 mg/dL (09 Feb 2023 21:08)  POCT Blood Glucose.: 253 mg/dL (09 Feb 2023 18:12)  POCT Blood Glucose.: 246 mg/dL (09 Feb 2023 17:08)  POCT Blood Glucose.: 88 mg/dL (09 Feb 2023 12:36)    PHYSICAL EXAM:  Vital Signs Last 24 Hrs  T(C): 36.9 (10 Feb 2023 06:12), Max: 36.9 (10 Feb 2023 06:12)  T(F): 98.4 (10 Feb 2023 06:12), Max: 98.4 (10 Feb 2023 06:12)  HR: 76 (10 Feb 2023 06:12) (67 - 79)  BP: 122/82 (10 Feb 2023 06:12) (103/60 - 122/82)  BP(mean): --  RR: 19 (10 Feb 2023 06:12) (18 - 19)  SpO2: 100% (10 Feb 2023 06:12) (99% - 100%)    Parameters below as of 10 Feb 2023 06:12  Patient On (Oxygen Delivery Method): room air    CONSTITUTIONAL: NAD, sitting up in bed   EYES: conjunctiva and sclera clear  ENMT: Moist oral mucosa  NECK: Supple  RESPIRATORY: Normal respiratory effort; lungs are clear to auscultation bilaterally  CARDIOVASCULAR: Regular rate and rhythm, normal S1 and S2, No lower extremity edema  ABDOMEN: Nontender to palpation, normoactive bowel sounds, no rebound/guarding  NEUROLOGY: moving all extremities  EXT: necrotic appearing L 2nd toe    LABS:             9.8    15.09 )-----------( 523      ( 10 Feb 2023 02:41 )             33.0     02-10    129<L>  |  95<L>  |  36<H>  ----------------------------<  124<H>  4.2   |  21<L>  |  1.72<H>    Ca    8.8      10 Feb 2023 02:41  Phos  4.4     02-10  Mg     1.90     02-10      PT/INR - ( 10 Feb 2023 02:41 )   PT: 16.0 sec;   INR: 1.37 ratio         PTT - ( 10 Feb 2023 02:41 )  PTT:35.5 sec    CAPILLARY BLOOD GLUCOSE      POCT Blood Glucose.: 109 mg/dL (10 Feb 2023 07:54)      RADIOLOGY & ADDITIONAL TESTS: Reviewed    COORDINATION OF CARE:  Care Discussed with Consultants/Other Providers [Y- Medicine ACP]

## 2023-02-10 NOTE — PROGRESS NOTE ADULT - ASSESSMENT
42F presents with left foot 2nd digit dry gangrene and ischemic changes to dorsal MPJs 1-5.  - Pt seen and evaluated.  - Afebrile, WBC 15.09  - Left Foot X-Ray: No gas, no osteomyelitis.  - Left foot 2nd and 4th digit dry gangrene,, dehisced partial 3rd ray resection to sub-dermis, ischemic changes the dorsal and plantar forefoot, no drainage, no purulence, no acute signs of infection. Right foot no wounds, no acute signs of infection.  - No left foot wound culture taken 2/2 no acute signs of infection.  - ID recommendations appreciated - monitor off abx   - SEBASTIÁN/PVR: RABI ncv, RTBI 0.66, LABI 0.58, LTBI flat wvfms, BL wvfms multi-seg art disease.  - CTA: Right distal peroneal artery and left PT/peroneal arteries occluded.  - Vasc plan for LLE angio today   - Pod Plan Left foot transmetatarsal amputation with tendoachilles lengthening pending vasc optimization   - Discussed with attending.     42F presents with left foot 2nd digit dry gangrene and ischemic changes to dorsal MPJs 1-5.  - Pt seen and evaluated.  - Afebrile, WBC 15.09  - Left Foot X-Ray: No gas, no osteomyelitis.  - Left foot 2nd and 4th digit dry gangrene,, dehisced partial 3rd ray resection to sub-dermis, ischemic changes the dorsal and plantar forefoot, cold to touch, no drainage, no purulence, no acute signs of infection. Right foot no wounds, no acute signs of infection.  - No left foot wound culture taken 2/2 no acute signs of infection.  - ID recommendations appreciated - monitor off abx   - SEBASTIÁN/PVR: RABI ncv, RTBI 0.66, LABI 0.58, LTBI flat wvfms, BL wvfms multi-seg art disease.  - CTA: Right distal peroneal artery and left PT/peroneal arteries occluded.  - Vasc plan for LLE angio today   - Pod Plan Left foot transmetatarsal amputation with tendoachilles lengthening pending vasc optimization   - Discussed with attending.

## 2023-02-10 NOTE — PROGRESS NOTE ADULT - SUBJECTIVE AND OBJECTIVE BOX
Podiatry pager #: [phone] ([place])/ [phone] ([facility])    Patient is a 42y old  Female who presents with a chief complaint of Toe pain (10 Feb 2023 07:52)       INTERVAL HPI/OVERNIGHT EVENTS:  Patient seen and evaluated at bedside.  Pt is resting comfortable in NAD. Denies N/V/F/C.     Allergies    No Known Allergies    Intolerances        Vital Signs Last 24 Hrs  T(C): 36.9 (10 Feb 2023 06:12), Max: 36.9 (10 Feb 2023 06:12)  T(F): 98.4 (10 Feb 2023 06:12), Max: 98.4 (10 Feb 2023 06:12)  HR: 76 (10 Feb 2023 06:12) (67 - 79)  BP: 122/82 (10 Feb 2023 06:12) (97/56 - 122/82)  BP(mean): --  RR: 19 (10 Feb 2023 06:12) (18 - 19)  SpO2: 100% (10 Feb 2023 06:12) (99% - 100%)    Parameters below as of 10 Feb 2023 06:12  Patient On (Oxygen Delivery Method): room air        LABS:                        9.8    15.09 )-----------( 523      ( 10 Feb 2023 02:41 )             33.0     02-10    129<L>  |  95<L>  |  36<H>  ----------------------------<  124<H>  4.2   |  21<L>  |  1.72<H>    Ca    8.8      10 Feb 2023 02:41  Phos  4.4     02-10  Mg     1.90     02-10      PT/INR - ( 10 Feb 2023 02:41 )   PT: 16.0 sec;   INR: 1.37 ratio         PTT - ( 10 Feb 2023 02:41 )  PTT:35.5 sec    CAPILLARY BLOOD GLUCOSE      POCT Blood Glucose.: 109 mg/dL (10 Feb 2023 07:54)  POCT Blood Glucose.: 135 mg/dL (10 Feb 2023 02:32)  POCT Blood Glucose.: 145 mg/dL (09 Feb 2023 21:08)  POCT Blood Glucose.: 253 mg/dL (09 Feb 2023 18:12)  POCT Blood Glucose.: 246 mg/dL (09 Feb 2023 17:08)  POCT Blood Glucose.: 88 mg/dL (09 Feb 2023 12:36)      Lower Extremity Physical Exam:  Vascular: DP/PT 0/4 left, DT/PT 1/4, CFT <3 seconds B/L, Temperature gradient warm to cold left, warm to cool right.   Neuro: Epicritic sensation decreased to the level of toes, B/L.  Musculoskeletal/Ortho: s/p left foot partial 3rd ray resection.  Skin: Left foot 2nd and 4thdigit dry gangrene, dehisced partial 3rd ray resection to sub-dermis, ischemic changes the dorsal and plantar forefoot, no drainage, no purulence, no acute signs of infection. Right foot no wounds, no acute signs of infection.  RADIOLOGY & ADDITIONAL TESTS:   Podiatry pager #: [phone] ([place])/ [phone] ([facility])    Patient is a 42y old  Female who presents with a chief complaint of Toe pain (10 Feb 2023 07:52)       INTERVAL HPI/OVERNIGHT EVENTS:  Patient seen and evaluated at bedside.  Pt is resting comfortable in NAD. Denies N/V/F/C.     Allergies    No Known Allergies    Intolerances        Vital Signs Last 24 Hrs  T(C): 36.9 (10 Feb 2023 06:12), Max: 36.9 (10 Feb 2023 06:12)  T(F): 98.4 (10 Feb 2023 06:12), Max: 98.4 (10 Feb 2023 06:12)  HR: 76 (10 Feb 2023 06:12) (67 - 79)  BP: 122/82 (10 Feb 2023 06:12) (97/56 - 122/82)  BP(mean): --  RR: 19 (10 Feb 2023 06:12) (18 - 19)  SpO2: 100% (10 Feb 2023 06:12) (99% - 100%)    Parameters below as of 10 Feb 2023 06:12  Patient On (Oxygen Delivery Method): room air        LABS:                        9.8    15.09 )-----------( 523      ( 10 Feb 2023 02:41 )             33.0     02-10    129<L>  |  95<L>  |  36<H>  ----------------------------<  124<H>  4.2   |  21<L>  |  1.72<H>    Ca    8.8      10 Feb 2023 02:41  Phos  4.4     02-10  Mg     1.90     02-10      PT/INR - ( 10 Feb 2023 02:41 )   PT: 16.0 sec;   INR: 1.37 ratio         PTT - ( 10 Feb 2023 02:41 )  PTT:35.5 sec    CAPILLARY BLOOD GLUCOSE      POCT Blood Glucose.: 109 mg/dL (10 Feb 2023 07:54)  POCT Blood Glucose.: 135 mg/dL (10 Feb 2023 02:32)  POCT Blood Glucose.: 145 mg/dL (09 Feb 2023 21:08)  POCT Blood Glucose.: 253 mg/dL (09 Feb 2023 18:12)  POCT Blood Glucose.: 246 mg/dL (09 Feb 2023 17:08)  POCT Blood Glucose.: 88 mg/dL (09 Feb 2023 12:36)      Lower Extremity Physical Exam:  Vascular: DP/PT 0/4 left, DT/PT 1/4, CFT <3 seconds B/L, Temperature gradient warm to cold left, warm to cool right.   Neuro: Epicritic sensation decreased to the level of toes, B/L.  Musculoskeletal/Ortho: s/p left foot partial 3rd ray resection.  Skin: Left foot 2nd and 4thdigit dry gangrene, dehisced partial 3rd ray resection to sub-dermis, ischemic changes the dorsal and plantar forefoot, cold to touch, no drainage, no purulence, no acute signs of infection. Right foot no wounds, no acute signs of infection.  RADIOLOGY & ADDITIONAL TESTS:

## 2023-02-10 NOTE — PROGRESS NOTE ADULT - SUBJECTIVE AND OBJECTIVE BOX
SURGERY  Pager: #[phone]    INTERVAL EVENTS/SUBJECTIVE: No acute events overnight.     ______________________________________________  OBJECTIVE:   T(C): 36.9 (02-10-23 @ 06:12), Max: 36.9 (02-10-23 @ 06:12)  HR: 76 (02-10-23 @ 06:12) (67 - 79)  BP: 122/82 (02-10-23 @ 06:12) (97/56 - 122/82)  RR: 19 (02-10-23 @ 06:12) (18 - 19)  SpO2: 100% (02-10-23 @ 06:12) (99% - 100%)  Wt(kg): --  CAPILLARY BLOOD GLUCOSE      POCT Blood Glucose.: 135 mg/dL (10 Feb 2023 02:32)  POCT Blood Glucose.: 145 mg/dL (09 Feb 2023 21:08)  POCT Blood Glucose.: 253 mg/dL (09 Feb 2023 18:12)  POCT Blood Glucose.: 246 mg/dL (09 Feb 2023 17:08)  POCT Blood Glucose.: 88 mg/dL (09 Feb 2023 12:36)  POCT Blood Glucose.: 152 mg/dL (09 Feb 2023 08:34)    I&O's Detail    09 Feb 2023 07:01  -  10 Feb 2023 07:00  --------------------------------------------------------  IN:    Oral Fluid: 795 mL  Total IN: 795 mL    OUT:    IV PiggyBack: 0 mL    Voided (mL): 100 mL  Total OUT: 100 mL    Total NET: 695 mL              PHYSICAL EXAM  General: NAD, resting comfortably  Pulmonary: non-labored breathing on room air  Extremities: Left DP/PT signals, dry gangrene on 2nd L toe  ______________________________________________  LABS:  CBC Full  -  ( 10 Feb 2023 02:41 )  WBC Count : 15.09 K/uL  RBC Count : 3.90 M/uL  Hemoglobin : 9.8 g/dL  Hematocrit : 33.0 %  Platelet Count - Automated : 523 K/uL  Mean Cell Volume : 84.6 fL  Mean Cell Hemoglobin : 25.1 pg  Mean Cell Hemoglobin Concentration : 29.7 gm/dL  Auto Neutrophil # : x  Auto Lymphocyte # : x  Auto Monocyte # : x  Auto Eosinophil # : x  Auto Basophil # : x  Auto Neutrophil % : x  Auto Lymphocyte % : x  Auto Monocyte % : x  Auto Eosinophil % : x  Auto Basophil % : x    02-10    129<L>  |  95<L>  |  36<H>  ----------------------------<  124<H>  4.2   |  21<L>  |  1.72<H>    Ca    8.8      10 Feb 2023 02:41  Phos  4.4     02-10  Mg     1.90     02-10      _____________________________________________  RADIOLOGY:

## 2023-02-10 NOTE — CHART NOTE - NSCHARTNOTEFT_GEN_A_CORE
Patient is s/p LLE angiogram today with L femoral access. L femoral site with +femoral pulse and no hematoma. Weak L pedal pulse (known as patient with PAD). Will continue to monitor. VSS.

## 2023-02-10 NOTE — CHART NOTE - NSCHARTNOTEFT_GEN_A_CORE
Post Operative Note  Patient: AMRIT COELLO 42y (1980) Female   MRN: [id number]  Location: [address]  Visit: 01-25-23 Inpatient  Date: 02-10-23 @ 16:41    Procedure: S/P LLE angiogram    Subjective: Patient seen and examined post operatively. Reports pain as controlled. Denies nausea, vomiting, fever, chills, chest pain, SOB, cough.      Objective:  Vitals: T(F): 98.2 (02-10-23 @ 14:25), Max: 98.4 (02-10-23 @ 06:12)  HR: 70 (02-10-23 @ 14:25)  BP: 118/70 (02-10-23 @ 14:25) (103/60 - 122/82)  RR: 16 (02-10-23 @ 14:25)  SpO2: 100% (02-10-23 @ 14:25)  Vent Settings:     In:   02-09-23 @ 07:01  -  02-10-23 @ 07:00  --------------------------------------------------------  IN: 795 mL    02-10-23 @ 07:01  -  02-10-23 @ 16:41  --------------------------------------------------------  IN: 175 mL      IV Fluids: dextrose 5%. 1000 milliLiter(s) (100 mL/Hr) IV Continuous <Continuous>  dextrose 5%. 1000 milliLiter(s) (50 mL/Hr) IV Continuous <Continuous>  ferrous    sulfate 325 milliGRAM(s) Oral daily  sodium chloride 0.9%. 1000 milliLiter(s) (75 mL/Hr) IV Continuous <Continuous>  sodium chloride 0.9%. 500 milliLiter(s) (50 mL/Hr) IV Continuous <Continuous>      Out:   02-09-23 @ 07:01  -  02-10-23 @ 07:00  --------------------------------------------------------  OUT: 100 mL    02-10-23 @ 07:01  -  02-10-23 @ 16:41  --------------------------------------------------------  OUT: 200 mL      EBL:     Voided Urine:   02-09-23 @ 07:01  -  02-10-23 @ 07:00  --------------------------------------------------------  OUT: 100 mL    02-10-23 @ 07:01  -  02-10-23 @ 16:41  --------------------------------------------------------  OUT: 200 mL            Physical Examination:  General: NAD, resting comfortably in bed  HEENT: Normocephalic atraumatic  Respiratory: Nonlabored respirations, normal CW expansion.  Cardio: S1S2, regular rate and rhythm.  Abdomen: SNTND. Groins access site is CDI without hematoma  Vascular: extremities are warm and well perfused. Gangrenous toes. DP signal     Imaging:  No post-op imaging studies    Assessment:  42yFemale patient S/P LLE Angiogram for gangrenous toes and PAD.     Plan:    - Pain control PRN  - Diet: DASH  - IVF NS 75/hr can d/c   - DVT ppx: SQH, ASA  - Follow up plan for further intervention    Date/Time: 02-10-23 @ 16:41

## 2023-02-10 NOTE — PROGRESS NOTE ADULT - PROBLEM SELECTOR PLAN 5
CT angio with moderate-marked bilateral lower extremity peripheral vascular disease, calcifications of AAA, Occluded distal right peroneal artery and left posterior tibial and   peroneal arteries.  - Planned for LLE angio today  - c/w atorvastatin   - c/w ASA  - f/u vascular recs

## 2023-02-10 NOTE — PROGRESS NOTE ADULT - PROBLEM SELECTOR PLAN 2
Patient w/ baseline SCr ~1.2 has been uptrending during hospitalization to Cr. 4.2, now stable at 1.72 today.   - continues on bumex 1 PO BID  - continue to monitor closely  - appreciate nephrology recommendations

## 2023-02-11 LAB
ANION GAP SERPL CALC-SCNC: 14 MMOL/L — SIGNIFICANT CHANGE UP (ref 7–14)
BUN SERPL-MCNC: 39 MG/DL — HIGH (ref 7–23)
CALCIUM SERPL-MCNC: 8.8 MG/DL — SIGNIFICANT CHANGE UP (ref 8.4–10.5)
CHLORIDE SERPL-SCNC: 96 MMOL/L — LOW (ref 98–107)
CO2 SERPL-SCNC: 19 MMOL/L — LOW (ref 22–31)
CREAT SERPL-MCNC: 1.76 MG/DL — HIGH (ref 0.5–1.3)
EGFR: 36 ML/MIN/1.73M2 — LOW
GLUCOSE BLDC GLUCOMTR-MCNC: 101 MG/DL — HIGH (ref 70–99)
GLUCOSE BLDC GLUCOMTR-MCNC: 177 MG/DL — HIGH (ref 70–99)
GLUCOSE BLDC GLUCOMTR-MCNC: 214 MG/DL — HIGH (ref 70–99)
GLUCOSE BLDC GLUCOMTR-MCNC: 71 MG/DL — SIGNIFICANT CHANGE UP (ref 70–99)
GLUCOSE SERPL-MCNC: 102 MG/DL — HIGH (ref 70–99)
HCT VFR BLD CALC: 29.8 % — LOW (ref 34.5–45)
HGB BLD-MCNC: 8.7 G/DL — LOW (ref 11.5–15.5)
MAGNESIUM SERPL-MCNC: 1.9 MG/DL — SIGNIFICANT CHANGE UP (ref 1.6–2.6)
MCHC RBC-ENTMCNC: 25.1 PG — LOW (ref 27–34)
MCHC RBC-ENTMCNC: 29.2 GM/DL — LOW (ref 32–36)
MCV RBC AUTO: 86.1 FL — SIGNIFICANT CHANGE UP (ref 80–100)
NRBC # BLD: 0 /100 WBCS — SIGNIFICANT CHANGE UP (ref 0–0)
NRBC # FLD: 0.02 K/UL — HIGH (ref 0–0)
PHOSPHATE SERPL-MCNC: 5.3 MG/DL — HIGH (ref 2.5–4.5)
PLATELET # BLD AUTO: 524 K/UL — HIGH (ref 150–400)
POTASSIUM SERPL-MCNC: 4.7 MMOL/L — SIGNIFICANT CHANGE UP (ref 3.5–5.3)
POTASSIUM SERPL-SCNC: 4.7 MMOL/L — SIGNIFICANT CHANGE UP (ref 3.5–5.3)
RBC # BLD: 3.46 M/UL — LOW (ref 3.8–5.2)
RBC # FLD: 19.2 % — HIGH (ref 10.3–14.5)
SODIUM SERPL-SCNC: 129 MMOL/L — LOW (ref 135–145)
WBC # BLD: 15.36 K/UL — HIGH (ref 3.8–10.5)
WBC # FLD AUTO: 15.36 K/UL — HIGH (ref 3.8–10.5)

## 2023-02-11 PROCEDURE — 99233 SBSQ HOSP IP/OBS HIGH 50: CPT

## 2023-02-11 PROCEDURE — 99232 SBSQ HOSP IP/OBS MODERATE 35: CPT

## 2023-02-11 RX ADMIN — HYDROMORPHONE HYDROCHLORIDE 1 MILLIGRAM(S): 2 INJECTION INTRAMUSCULAR; INTRAVENOUS; SUBCUTANEOUS at 00:14

## 2023-02-11 RX ADMIN — CALAMINE AND ZINC OXIDE AND PHENOL 1 APPLICATION(S): 160; 10 LOTION TOPICAL at 22:07

## 2023-02-11 RX ADMIN — HYDROMORPHONE HYDROCHLORIDE 2 MILLIGRAM(S): 2 INJECTION INTRAMUSCULAR; INTRAVENOUS; SUBCUTANEOUS at 22:54

## 2023-02-11 RX ADMIN — HEPARIN SODIUM 5000 UNIT(S): 5000 INJECTION INTRAVENOUS; SUBCUTANEOUS at 22:06

## 2023-02-11 RX ADMIN — ATORVASTATIN CALCIUM 80 MILLIGRAM(S): 80 TABLET, FILM COATED ORAL at 22:06

## 2023-02-11 RX ADMIN — HYDROMORPHONE HYDROCHLORIDE 2 MILLIGRAM(S): 2 INJECTION INTRAMUSCULAR; INTRAVENOUS; SUBCUTANEOUS at 23:10

## 2023-02-11 RX ADMIN — POLYETHYLENE GLYCOL 3350 17 GRAM(S): 17 POWDER, FOR SOLUTION ORAL at 22:07

## 2023-02-11 RX ADMIN — Medication 975 MILLIGRAM(S): at 05:33

## 2023-02-11 RX ADMIN — CARVEDILOL PHOSPHATE 6.25 MILLIGRAM(S): 80 CAPSULE, EXTENDED RELEASE ORAL at 17:22

## 2023-02-11 RX ADMIN — HYDROMORPHONE HYDROCHLORIDE 2 MILLIGRAM(S): 2 INJECTION INTRAMUSCULAR; INTRAVENOUS; SUBCUTANEOUS at 09:39

## 2023-02-11 RX ADMIN — HYDROMORPHONE HYDROCHLORIDE 2 MILLIGRAM(S): 2 INJECTION INTRAMUSCULAR; INTRAVENOUS; SUBCUTANEOUS at 16:35

## 2023-02-11 RX ADMIN — HEPARIN SODIUM 5000 UNIT(S): 5000 INJECTION INTRAVENOUS; SUBCUTANEOUS at 05:33

## 2023-02-11 RX ADMIN — HYDROMORPHONE HYDROCHLORIDE 1 MILLIGRAM(S): 2 INJECTION INTRAMUSCULAR; INTRAVENOUS; SUBCUTANEOUS at 17:24

## 2023-02-11 RX ADMIN — Medication 81 MILLIGRAM(S): at 12:32

## 2023-02-11 RX ADMIN — PANTOPRAZOLE SODIUM 40 MILLIGRAM(S): 20 TABLET, DELAYED RELEASE ORAL at 05:33

## 2023-02-11 RX ADMIN — HYDROMORPHONE HYDROCHLORIDE 2 MILLIGRAM(S): 2 INJECTION INTRAMUSCULAR; INTRAVENOUS; SUBCUTANEOUS at 06:04

## 2023-02-11 RX ADMIN — HYDROMORPHONE HYDROCHLORIDE 2 MILLIGRAM(S): 2 INJECTION INTRAMUSCULAR; INTRAVENOUS; SUBCUTANEOUS at 20:00

## 2023-02-11 RX ADMIN — POLYETHYLENE GLYCOL 3350 17 GRAM(S): 17 POWDER, FOR SOLUTION ORAL at 09:55

## 2023-02-11 RX ADMIN — HYDROMORPHONE HYDROCHLORIDE 2 MILLIGRAM(S): 2 INJECTION INTRAMUSCULAR; INTRAVENOUS; SUBCUTANEOUS at 19:48

## 2023-02-11 RX ADMIN — HEPARIN SODIUM 5000 UNIT(S): 5000 INJECTION INTRAVENOUS; SUBCUTANEOUS at 15:49

## 2023-02-11 RX ADMIN — Medication 325 MILLIGRAM(S): at 12:32

## 2023-02-11 RX ADMIN — Medication 975 MILLIGRAM(S): at 06:03

## 2023-02-11 RX ADMIN — HYDROMORPHONE HYDROCHLORIDE 2 MILLIGRAM(S): 2 INJECTION INTRAMUSCULAR; INTRAVENOUS; SUBCUTANEOUS at 16:09

## 2023-02-11 RX ADMIN — NALOXEGOL OXALATE 12.5 MILLIGRAM(S): 12.5 TABLET, FILM COATED ORAL at 12:32

## 2023-02-11 RX ADMIN — BUMETANIDE 1 MILLIGRAM(S): 0.25 INJECTION INTRAMUSCULAR; INTRAVENOUS at 05:34

## 2023-02-11 RX ADMIN — Medication 1: at 17:36

## 2023-02-11 RX ADMIN — HYDROMORPHONE HYDROCHLORIDE 2 MILLIGRAM(S): 2 INJECTION INTRAMUSCULAR; INTRAVENOUS; SUBCUTANEOUS at 12:30

## 2023-02-11 RX ADMIN — CARVEDILOL PHOSPHATE 6.25 MILLIGRAM(S): 80 CAPSULE, EXTENDED RELEASE ORAL at 05:33

## 2023-02-11 RX ADMIN — CALAMINE AND ZINC OXIDE AND PHENOL 1 APPLICATION(S): 160; 10 LOTION TOPICAL at 15:50

## 2023-02-11 RX ADMIN — HYDROMORPHONE HYDROCHLORIDE 2 MILLIGRAM(S): 2 INJECTION INTRAMUSCULAR; INTRAVENOUS; SUBCUTANEOUS at 08:39

## 2023-02-11 RX ADMIN — Medication 9 MILLIGRAM(S): at 22:06

## 2023-02-11 RX ADMIN — CALAMINE AND ZINC OXIDE AND PHENOL 1 APPLICATION(S): 160; 10 LOTION TOPICAL at 05:33

## 2023-02-11 RX ADMIN — Medication 975 MILLIGRAM(S): at 17:52

## 2023-02-11 RX ADMIN — Medication 975 MILLIGRAM(S): at 12:32

## 2023-02-11 RX ADMIN — HYDROMORPHONE HYDROCHLORIDE 2 MILLIGRAM(S): 2 INJECTION INTRAMUSCULAR; INTRAVENOUS; SUBCUTANEOUS at 12:03

## 2023-02-11 RX ADMIN — HYDROMORPHONE HYDROCHLORIDE 1 MILLIGRAM(S): 2 INJECTION INTRAMUSCULAR; INTRAVENOUS; SUBCUTANEOUS at 17:52

## 2023-02-11 RX ADMIN — BUMETANIDE 1 MILLIGRAM(S): 0.25 INJECTION INTRAMUSCULAR; INTRAVENOUS at 15:49

## 2023-02-11 RX ADMIN — HYDROMORPHONE HYDROCHLORIDE 2 MILLIGRAM(S): 2 INJECTION INTRAMUSCULAR; INTRAVENOUS; SUBCUTANEOUS at 05:34

## 2023-02-11 RX ADMIN — Medication 2 UNIT(S): at 08:49

## 2023-02-11 RX ADMIN — Medication 975 MILLIGRAM(S): at 13:00

## 2023-02-11 RX ADMIN — Medication 975 MILLIGRAM(S): at 17:22

## 2023-02-11 RX ADMIN — SENNA PLUS 2 TABLET(S): 8.6 TABLET ORAL at 22:07

## 2023-02-11 RX ADMIN — HYDROMORPHONE HYDROCHLORIDE 1 MILLIGRAM(S): 2 INJECTION INTRAMUSCULAR; INTRAVENOUS; SUBCUTANEOUS at 00:29

## 2023-02-11 NOTE — PROGRESS NOTE ADULT - PROBLEM SELECTOR PLAN 1
Patient presenting in setting of L 2nd toe pain concerning for dry gangrene with history of prior toe amputation iso PAD. Initially with elevated ESR/CRP without active signs of infection however later with concern for infection and septic shock.   - pain control w/ Dilaudid 2mg q3hr PRN for severe; Dilaudid 1mg q4hr PRN for moderate, ATC tylenol, dilaudid 0.5mg q4hr PRN added for breakthrough given increased reported pain  -s/p vascular intervention with CO2 LLE angiogram 2/10  - pending podiatry surgical intervention with TMA   - completed abx course as below, not currently on abx  - PT/OT

## 2023-02-11 NOTE — PROGRESS NOTE ADULT - SUBJECTIVE AND OBJECTIVE BOX
SURGERY  Pager: #[phone]    INTERVAL EVENTS/SUBJECTIVE: No acute events overnight. S/p LLE angiogram. Seen and examined at bedside on AM rounds.     ______________________________________________  OBJECTIVE:   T(C): 36.6 (02-11-23 @ 17:20), Max: 36.7 (02-11-23 @ 05:33)  HR: 68 (02-11-23 @ 17:20) (60 - 74)  BP: 115/67 (02-11-23 @ 17:20) (90/60 - 125/62)  RR: 18 (02-11-23 @ 17:20) (16 - 19)  SpO2: 99% (02-11-23 @ 17:20) (98% - 100%)  Wt(kg): --  CAPILLARY BLOOD GLUCOSE      POCT Blood Glucose.: 177 mg/dL (11 Feb 2023 17:35)  POCT Blood Glucose.: 71 mg/dL (11 Feb 2023 11:30)  POCT Blood Glucose.: 101 mg/dL (11 Feb 2023 08:24)  POCT Blood Glucose.: 119 mg/dL (10 Feb 2023 21:24)  POCT Blood Glucose.: 92 mg/dL (10 Feb 2023 17:56)    I&O's Detail    10 Feb 2023 07:01  -  11 Feb 2023 07:00  --------------------------------------------------------  IN:    Oral Fluid: 430 mL    sodium chloride 0.9%: 75 mL    sodium chloride 0.9%: 500 mL  Total IN: 1005 mL    OUT:    IV PiggyBack: 0 mL    Voided (mL): 300 mL  Total OUT: 300 mL    Total NET: 705 mL      11 Feb 2023 07:01  -  11 Feb 2023 17:36  --------------------------------------------------------  IN:    Oral Fluid: 100 mL  Total IN: 100 mL    OUT:    Voided (mL): 0 mL  Total OUT: 0 mL    Total NET: 100 mL          Physical Examination:  General: NAD, resting comfortably in bed  HEENT: Normocephalic atraumatic  Respiratory: Nonlabored respirations, normal CW expansion.  Cardio: S1S2, regular rate and rhythm.  Abdomen: SNTND. Groins access site is CDI without hematoma  Vascular: extremities are warm and well perfused. Gangrenous toes. Peroneal signal   ______________________________________________  LABS:  CBC Full  -  ( 11 Feb 2023 06:01 )  WBC Count : 15.36 K/uL  RBC Count : 3.46 M/uL  Hemoglobin : 8.7 g/dL  Hematocrit : 29.8 %  Platelet Count - Automated : 524 K/uL  Mean Cell Volume : 86.1 fL  Mean Cell Hemoglobin : 25.1 pg  Mean Cell Hemoglobin Concentration : 29.2 gm/dL  Auto Neutrophil # : x  Auto Lymphocyte # : x  Auto Monocyte # : x  Auto Eosinophil # : x  Auto Basophil # : x  Auto Neutrophil % : x  Auto Lymphocyte % : x  Auto Monocyte % : x  Auto Eosinophil % : x  Auto Basophil % : x    02-11    129<L>  |  96<L>  |  39<H>  ----------------------------<  102<H>  4.7   |  19<L>  |  1.76<H>    Ca    8.8      11 Feb 2023 06:01  Phos  5.3     02-11  Mg     1.90     02-11      _____________________________________________  RADIOLOGY:

## 2023-02-11 NOTE — PROGRESS NOTE ADULT - ASSESSMENT
42F w/ PMH coronary artery disease status post CABG x4 in 2018, CHF s/p ICD placement (interrogated 2022), DM, PAD s/p 3rd toe amputation who presents with left 2nd toe pain concerning for dry gangrene. Now s/p LLE angio 2/10.     Recommendations:  - Pt. with small vessel disease on diagnostic angio; may require amputation, to be determined   - Will follow    C Team Surgery  #[id number]

## 2023-02-11 NOTE — PROGRESS NOTE ADULT - PROBLEM SELECTOR PLAN 6
On metformin 500mg BID, glimepiride 4mg, lantus 8U at bedtime   - A1c 7.1  - hold lantus and premeal lispro given low FSG (was on 5U basal on admission)  - monitor poc glucose    - c/w ISS

## 2023-02-11 NOTE — PROGRESS NOTE ADULT - SUBJECTIVE AND OBJECTIVE BOX
[facility] Division of Kidney Diseases & Hypertension  FOLLOW UP NOTE  --------------------------------------------------------------------------------    HPI: 42-year-female with PMH of HTN, HLD, DM, PAD, CHF admitted with left toe pain. Pt. developed BOB during hospital stay. Pt. was transferred to the MICU on 1/29/23 for septic shock requiring vasopressor support. Pt. clinically improved and was transferred to medical floors on 2/3/23. Pt. being seen for BOB.       24 hour events/subjective: Pt. seen and examined at bedside today. Pt. continues to have L foot pain. Had angiogram.  on evaluation today has no sob no cp      PAST HISTORY  --------------------------------------------------------------------------------  No significant changes to PMH, PSH, FHx, SHx, unless otherwise noted    ALLERGIES & MEDICATIONS  --------------------------------------------------------------------------------  Allergies    No Known Allergies    Intolerances      Standing Inpatient Medications  acetaminophen     Tablet .. 975 milliGRAM(s) Oral every 6 hours  aspirin enteric coated 81 milliGRAM(s) Oral daily  atorvastatin 80 milliGRAM(s) Oral at bedtime  buMETAnide 1 milliGRAM(s) Oral two times a day  calamine/zinc oxide Lotion 1 Application(s) Topical three times a day  carvedilol 6.25 milliGRAM(s) Oral every 12 hours  dextrose 5%. 1000 milliLiter(s) IV Continuous <Continuous>  dextrose 5%. 1000 milliLiter(s) IV Continuous <Continuous>  dextrose 50% Injectable 25 Gram(s) IV Push once  dextrose 50% Injectable 12.5 Gram(s) IV Push once  dextrose 50% Injectable 25 Gram(s) IV Push once  ferrous    sulfate 325 milliGRAM(s) Oral daily  glucagon  Injectable 1 milliGRAM(s) IntraMuscular once  heparin   Injectable 5000 Unit(s) SubCutaneous every 8 hours  influenza   Vaccine 0.5 milliLiter(s) IntraMuscular once  insulin glargine Injectable (LANTUS) 3 Unit(s) SubCutaneous at bedtime  insulin lispro (ADMELOG) corrective regimen sliding scale   SubCutaneous three times a day before meals  insulin lispro (ADMELOG) corrective regimen sliding scale   SubCutaneous at bedtime  insulin lispro Injectable (ADMELOG) 2 Unit(s) SubCutaneous three times a day before meals  melatonin 9 milliGRAM(s) Oral at bedtime  naloxegol 12.5 milliGRAM(s) Oral daily  pantoprazole    Tablet 40 milliGRAM(s) Oral before breakfast  polyethylene glycol 3350 17 Gram(s) Oral two times a day  senna 2 Tablet(s) Oral at bedtime    PRN Inpatient Medications  acetaminophen     Tablet .. 650 milliGRAM(s) Oral every 6 hours PRN  dextrose Oral Gel 15 Gram(s) Oral once PRN  HYDROmorphone  Injectable 1 milliGRAM(s) IV Push every 4 hours PRN  HYDROmorphone  Injectable 0.5 milliGRAM(s) IV Push every 4 hours PRN  HYDROmorphone  Injectable 2 milliGRAM(s) IV Push every 3 hours PRN  ondansetron Injectable 4 milliGRAM(s) IV Push every 8 hours PRN      REVIEW OF SYSTEMS  --------------------------------------------------------------------------------  Gen: no fever  Respiratory: no dyspnea  CV: no orthopnea no cp  GI: no ab pain  : no complaints urinating without issue  MSK: no pain    VITALS/PHYSICAL EXAM  --------------------------------------------------------------------------------  T(C): 36.7 (02-11-23 @ 08:37), Max: 36.8 (02-10-23 @ 14:25)  HR: 64 (02-11-23 @ 08:37) (60 - 73)  BP: 103/67 (02-11-23 @ 08:37) (90/60 - 125/62)  ABP: --  ABP(mean): --  RR: 18 (02-11-23 @ 08:37) (16 - 19)  SpO2: 99% (02-11-23 @ 08:37) (98% - 100%)  CVP(mm Hg): --        02-10-23 @ 07:01  -  02-11-23 @ 07:00  --------------------------------------------------------  IN: 1005 mL / OUT: 300 mL / NET: 705 mL    02-11-23 @ 07:01  -  02-11-23 @ 10:38  --------------------------------------------------------  IN: 100 mL / OUT: 0 mL / NET: 100 mL    Physical Exam:    Gen: resting, NAD  HEENT: Anicteric  Pulm: slight rales  CV: S1S2+JVD noted  Abd: Soft, +BS    Ext: B/L LE edema resolved, left foot: gangrenous changes seen  Neuro: Awake and alert  Skin: Warm and dry    LABS/STUDIES  --------------------------------------------------------------------------------              8.7    15.36 >-----------<  524      [02-11-23 @ 06:01]              29.8     129  |  96  |  39  ----------------------------<  102      [02-11-23 @ 06:01]  4.7   |  19  |  1.76        Ca     8.8     [02-11-23 @ 06:01]      Mg     1.90     [02-11-23 @ 06:01]      Phos  5.3     [02-11-23 @ 06:01]      PT/INR: PT 16.0 , INR 1.37       [02-10-23 @ 02:41]  PTT: 35.5       [02-10-23 @ 02:41]      Creatinine Trend:  SCr 1.76 [02-11 @ 06:01]  SCr 1.72 [02-10 @ 02:41]  SCr 1.86 [02-09 @ 06:41]  SCr 1.68 [02-08 @ 19:57]  SCr 1.85 [02-08 @ 02:30]

## 2023-02-11 NOTE — CHART NOTE - NSCHARTNOTEFT_GEN_A_CORE
Case discussed with vascular resident, as per our discussion, case to be reviewed by the vascular attendings to determine whether next step is another Peripheral angiogram in OR Vs BKA.  Pt and family were updated of above plan. Case discussed with vascular resident, as per our discussion, case to be reviewed by the vascular attendings to determine whether next step is another Peripheral angiogram in OR for possible revascularization  Vs BKA.  Pt and family were updated of above plan.

## 2023-02-11 NOTE — PROGRESS NOTE ADULT - PROBLEM SELECTOR PLAN 5
CT angio with moderate-marked bilateral lower extremity peripheral vascular disease, calcifications of AAA, Occluded distal right peroneal artery and left posterior tibial and   peroneal arteries.  - s/p LLE angio 2/10  - c/w atorvastatin   - c/w ASA  - f/u vascular recs

## 2023-02-11 NOTE — PROGRESS NOTE ADULT - PROBLEM SELECTOR PLAN 1
Pt. with BOB in setting of hypotension, IV contrast use and CHF. Upon review of labs on [facility]IE/[employer], Scr was elevated at 1.33 on 6/28/21 and improved to 1.15 on 2/24/22. Scr was 1.22 on admission (1/25/23). Pt. underwent LE angiogram on 1/25/23. Scr worsened to 4.23 on 1/30/23. No hydronephrosis seen on CT scan study. Pt. was oliguric, initiated on IV Bumex infusion in MICU on 1/30/23. Pt. responded well to IV diuretic therapy and was then switched to oral diuretic therapy. Currently on PO Bumex 1 mg BID with stable serum creatinine today (2/11). Received IVF yesterday for MARTÍNEZ ppx.  On examination today slight rales and some JVD noted but no symptoms.  Recommend stop IVF continue with PO bumex. Monitor BMP And volume status.       Sacha Weiss MD   Division of Kidney Diseases and Hypertension  Office: [phone]  Cell: [phone]  Fellow Pager: [phone]

## 2023-02-11 NOTE — PROGRESS NOTE ADULT - PROBLEM SELECTOR PLAN 8
Initially presented with reduced breath sound on R lung field. Hx of chronic effusion 2/2 HF with prior thoracentesis.    - CXR 1/26: moderate R sided pleural effusion.   - CXR 1/29: increasing R sized pleural effusion   - CXR 2/8  - Moderate to large right effusion unchanged.   - CXR 2/9 - not significantly changed pleural effusion  - s/p diuresis, cont with po diuresis as above  - monitor pulse ox   - if worsening respiratory symptoms, obtain CT Chest

## 2023-02-11 NOTE — PROGRESS NOTE ADULT - SUBJECTIVE AND OBJECTIVE BOX
Patient is a 42y old  Female who presents with a chief complaint of Toe pain.    SUBJECTIVE / OVERNIGHT EVENTS: s/p LLE angiogram by vascular yesterday. Denies worsening foot pain. pain is well controlled with IV dilaudid. Denies any constipation, last bm yesterday. no fever/chills, no nausea/vomiting.     MEDICATIONS  (STANDING):  acetaminophen     Tablet .. 975 milliGRAM(s) Oral every 6 hours  aspirin enteric coated 81 milliGRAM(s) Oral daily  atorvastatin 80 milliGRAM(s) Oral at bedtime  buMETAnide 1 milliGRAM(s) Oral two times a day  calamine/zinc oxide Lotion 1 Application(s) Topical three times a day  carvedilol 6.25 milliGRAM(s) Oral every 12 hours  dextrose 5%. 1000 milliLiter(s) (100 mL/Hr) IV Continuous <Continuous>  dextrose 5%. 1000 milliLiter(s) (50 mL/Hr) IV Continuous <Continuous>  dextrose 50% Injectable 25 Gram(s) IV Push once  dextrose 50% Injectable 12.5 Gram(s) IV Push once  dextrose 50% Injectable 25 Gram(s) IV Push once  ferrous    sulfate 325 milliGRAM(s) Oral daily  glucagon  Injectable 1 milliGRAM(s) IntraMuscular once  heparin   Injectable 5000 Unit(s) SubCutaneous every 8 hours  influenza   Vaccine 0.5 milliLiter(s) IntraMuscular once  insulin glargine Injectable (LANTUS) 3 Unit(s) SubCutaneous at bedtime  insulin lispro (ADMELOG) corrective regimen sliding scale   SubCutaneous three times a day before meals  insulin lispro (ADMELOG) corrective regimen sliding scale   SubCutaneous at bedtime  insulin lispro Injectable (ADMELOG) 2 Unit(s) SubCutaneous three times a day before meals  melatonin 9 milliGRAM(s) Oral at bedtime  naloxegol 12.5 milliGRAM(s) Oral daily  pantoprazole    Tablet 40 milliGRAM(s) Oral before breakfast  polyethylene glycol 3350 17 Gram(s) Oral two times a day  senna 2 Tablet(s) Oral at bedtime    MEDICATIONS  (PRN):  acetaminophen     Tablet .. 650 milliGRAM(s) Oral every 6 hours PRN Temp greater or equal to 38C (100.4F), Mild Pain (1 - 3)  dextrose Oral Gel 15 Gram(s) Oral once PRN Blood Glucose LESS THAN 70 milliGRAM(s)/deciliter  HYDROmorphone  Injectable 1 milliGRAM(s) IV Push every 4 hours PRN Moderate Pain (4 - 6)  HYDROmorphone  Injectable 0.5 milliGRAM(s) IV Push every 4 hours PRN breakthrough pain  HYDROmorphone  Injectable 2 milliGRAM(s) IV Push every 3 hours PRN Severe Pain (7 - 10)  ondansetron Injectable 4 milliGRAM(s) IV Push every 8 hours PRN Nausea and/or Vomiting      PHYSICAL EXAM:  VITAL SIGNS:  T(C): 36.7 (02-11-23 @ 08:37), Max: 36.8 (02-10-23 @ 14:25)  T(F): 98 (02-11-23 @ 08:37), Max: 98.2 (02-10-23 @ 14:25)  HR: 64 (02-11-23 @ 08:37) (60 - 73)  BP: 103/67 (02-11-23 @ 08:37) (90/60 - 125/62)  BP(mean): --  RR: 18 (02-11-23 @ 08:37) (16 - 19)  SpO2: 99% (02-11-23 @ 08:37) (98% - 100%)  Wt(kg): --    CONSTITUTIONAL: NAD, sitting up in bed   EYES: conjunctiva and sclera clear  ENMT: Moist oral mucosa  NECK: Supple  RESPIRATORY: Normal respiratory effort; lungs are clear to auscultation bilaterally  CARDIOVASCULAR: Regular rate and rhythm, normal S1 and S2, No lower extremity edema  ABDOMEN: Nontender to palpation, normoactive bowel sounds, no rebound/guarding  NEUROLOGY: moving all extremities  EXT: necrotic appearing L 2nd toe    LABS:                       8.7    15.36 )-----------( 524      ( 11 Feb 2023 06:01 )             29.8     02-11    129<L>  |  96<L>  |  39<H>  ----------------------------<  102<H>  4.7   |  19<L>  |  1.76<H>    Ca    8.8      11 Feb 2023 06:01  Phos  5.3     02-11  Mg     1.90     02-11      PT/INR - ( 10 Feb 2023 02:41 )   PT: 16.0 sec;   INR: 1.37 ratio         PTT - ( 10 Feb 2023 02:41 )  PTT:35.5 sec    CAPILLARY BLOOD GLUCOSE  POCT Blood Glucose.: 71 mg/dL (11 Feb 2023 11:30)  POCT Blood Glucose.: 101 mg/dL (11 Feb 2023 08:24)  POCT Blood Glucose.: 119 mg/dL (10 Feb 2023 21:24)  POCT Blood Glucose.: 92 mg/dL (10 Feb 2023 17:56)  POCT Blood Glucose.: 105 mg/dL (10 Feb 2023 12:55)      RADIOLOGY & ADDITIONAL TESTS: Reviewed    COORDINATION OF CARE:  Care Discussed with Consultants/Other Providers [Y- Medicine ACP]

## 2023-02-11 NOTE — PROGRESS NOTE ADULT - PROBLEM SELECTOR PLAN 9
Mild hyponatremia Na 129. Possibly SIADH in s.o pain.  - monitor BMP and continue with pain control and fluid restriction

## 2023-02-12 LAB
ANION GAP SERPL CALC-SCNC: 16 MMOL/L — HIGH (ref 7–14)
BUN SERPL-MCNC: 41 MG/DL — HIGH (ref 7–23)
CALCIUM SERPL-MCNC: 9.3 MG/DL — SIGNIFICANT CHANGE UP (ref 8.4–10.5)
CHLORIDE SERPL-SCNC: 94 MMOL/L — LOW (ref 98–107)
CO2 SERPL-SCNC: 20 MMOL/L — LOW (ref 22–31)
CREAT SERPL-MCNC: 2.02 MG/DL — HIGH (ref 0.5–1.3)
EGFR: 31 ML/MIN/1.73M2 — LOW
GLUCOSE BLDC GLUCOMTR-MCNC: 157 MG/DL — HIGH (ref 70–99)
GLUCOSE BLDC GLUCOMTR-MCNC: 175 MG/DL — HIGH (ref 70–99)
GLUCOSE BLDC GLUCOMTR-MCNC: 178 MG/DL — HIGH (ref 70–99)
GLUCOSE BLDC GLUCOMTR-MCNC: 187 MG/DL — HIGH (ref 70–99)
GLUCOSE SERPL-MCNC: 151 MG/DL — HIGH (ref 70–99)
HCT VFR BLD CALC: 32.6 % — LOW (ref 34.5–45)
HGB BLD-MCNC: 9.6 G/DL — LOW (ref 11.5–15.5)
MAGNESIUM SERPL-MCNC: 2 MG/DL — SIGNIFICANT CHANGE UP (ref 1.6–2.6)
MCHC RBC-ENTMCNC: 25.1 PG — LOW (ref 27–34)
MCHC RBC-ENTMCNC: 29.4 GM/DL — LOW (ref 32–36)
MCV RBC AUTO: 85.1 FL — SIGNIFICANT CHANGE UP (ref 80–100)
NRBC # BLD: 0 /100 WBCS — SIGNIFICANT CHANGE UP (ref 0–0)
NRBC # FLD: 0.05 K/UL — HIGH (ref 0–0)
PHOSPHATE SERPL-MCNC: 5.6 MG/DL — HIGH (ref 2.5–4.5)
PLATELET # BLD AUTO: 549 K/UL — HIGH (ref 150–400)
POTASSIUM SERPL-MCNC: 4.7 MMOL/L — SIGNIFICANT CHANGE UP (ref 3.5–5.3)
POTASSIUM SERPL-SCNC: 4.7 MMOL/L — SIGNIFICANT CHANGE UP (ref 3.5–5.3)
RBC # BLD: 3.83 M/UL — SIGNIFICANT CHANGE UP (ref 3.8–5.2)
RBC # FLD: 19.1 % — HIGH (ref 10.3–14.5)
SODIUM SERPL-SCNC: 130 MMOL/L — LOW (ref 135–145)
WBC # BLD: 13.72 K/UL — HIGH (ref 3.8–10.5)
WBC # FLD AUTO: 13.72 K/UL — HIGH (ref 3.8–10.5)

## 2023-02-12 PROCEDURE — 99233 SBSQ HOSP IP/OBS HIGH 50: CPT

## 2023-02-12 RX ADMIN — HYDROMORPHONE HYDROCHLORIDE 2 MILLIGRAM(S): 2 INJECTION INTRAMUSCULAR; INTRAVENOUS; SUBCUTANEOUS at 02:57

## 2023-02-12 RX ADMIN — HYDROMORPHONE HYDROCHLORIDE 2 MILLIGRAM(S): 2 INJECTION INTRAMUSCULAR; INTRAVENOUS; SUBCUTANEOUS at 17:11

## 2023-02-12 RX ADMIN — HYDROMORPHONE HYDROCHLORIDE 2 MILLIGRAM(S): 2 INJECTION INTRAMUSCULAR; INTRAVENOUS; SUBCUTANEOUS at 06:07

## 2023-02-12 RX ADMIN — Medication 975 MILLIGRAM(S): at 12:18

## 2023-02-12 RX ADMIN — BUMETANIDE 1 MILLIGRAM(S): 0.25 INJECTION INTRAMUSCULAR; INTRAVENOUS at 06:08

## 2023-02-12 RX ADMIN — SENNA PLUS 2 TABLET(S): 8.6 TABLET ORAL at 21:59

## 2023-02-12 RX ADMIN — Medication 1: at 18:30

## 2023-02-12 RX ADMIN — HYDROMORPHONE HYDROCHLORIDE 2 MILLIGRAM(S): 2 INJECTION INTRAMUSCULAR; INTRAVENOUS; SUBCUTANEOUS at 12:16

## 2023-02-12 RX ADMIN — BUMETANIDE 1 MILLIGRAM(S): 0.25 INJECTION INTRAMUSCULAR; INTRAVENOUS at 13:41

## 2023-02-12 RX ADMIN — Medication 1: at 12:57

## 2023-02-12 RX ADMIN — CARVEDILOL PHOSPHATE 6.25 MILLIGRAM(S): 80 CAPSULE, EXTENDED RELEASE ORAL at 18:41

## 2023-02-12 RX ADMIN — CARVEDILOL PHOSPHATE 6.25 MILLIGRAM(S): 80 CAPSULE, EXTENDED RELEASE ORAL at 06:08

## 2023-02-12 RX ADMIN — POLYETHYLENE GLYCOL 3350 17 GRAM(S): 17 POWDER, FOR SOLUTION ORAL at 21:59

## 2023-02-12 RX ADMIN — HYDROMORPHONE HYDROCHLORIDE 0.5 MILLIGRAM(S): 2 INJECTION INTRAMUSCULAR; INTRAVENOUS; SUBCUTANEOUS at 11:26

## 2023-02-12 RX ADMIN — Medication 325 MILLIGRAM(S): at 12:18

## 2023-02-12 RX ADMIN — HYDROMORPHONE HYDROCHLORIDE 2 MILLIGRAM(S): 2 INJECTION INTRAMUSCULAR; INTRAVENOUS; SUBCUTANEOUS at 20:30

## 2023-02-12 RX ADMIN — HYDROMORPHONE HYDROCHLORIDE 0.5 MILLIGRAM(S): 2 INJECTION INTRAMUSCULAR; INTRAVENOUS; SUBCUTANEOUS at 11:11

## 2023-02-12 RX ADMIN — HYDROMORPHONE HYDROCHLORIDE 2 MILLIGRAM(S): 2 INJECTION INTRAMUSCULAR; INTRAVENOUS; SUBCUTANEOUS at 12:31

## 2023-02-12 RX ADMIN — HYDROMORPHONE HYDROCHLORIDE 2 MILLIGRAM(S): 2 INJECTION INTRAMUSCULAR; INTRAVENOUS; SUBCUTANEOUS at 07:22

## 2023-02-12 RX ADMIN — Medication 975 MILLIGRAM(S): at 06:55

## 2023-02-12 RX ADMIN — HYDROMORPHONE HYDROCHLORIDE 2 MILLIGRAM(S): 2 INJECTION INTRAMUSCULAR; INTRAVENOUS; SUBCUTANEOUS at 20:08

## 2023-02-12 RX ADMIN — Medication 975 MILLIGRAM(S): at 07:23

## 2023-02-12 RX ADMIN — HYDROMORPHONE HYDROCHLORIDE 1 MILLIGRAM(S): 2 INJECTION INTRAMUSCULAR; INTRAVENOUS; SUBCUTANEOUS at 08:21

## 2023-02-12 RX ADMIN — CALAMINE AND ZINC OXIDE AND PHENOL 1 APPLICATION(S): 160; 10 LOTION TOPICAL at 13:42

## 2023-02-12 RX ADMIN — CALAMINE AND ZINC OXIDE AND PHENOL 1 APPLICATION(S): 160; 10 LOTION TOPICAL at 06:00

## 2023-02-12 RX ADMIN — CALAMINE AND ZINC OXIDE AND PHENOL 1 APPLICATION(S): 160; 10 LOTION TOPICAL at 21:58

## 2023-02-12 RX ADMIN — Medication 9 MILLIGRAM(S): at 21:59

## 2023-02-12 RX ADMIN — Medication 975 MILLIGRAM(S): at 18:35

## 2023-02-12 RX ADMIN — Medication 975 MILLIGRAM(S): at 13:18

## 2023-02-12 RX ADMIN — HYDROMORPHONE HYDROCHLORIDE 1 MILLIGRAM(S): 2 INJECTION INTRAMUSCULAR; INTRAVENOUS; SUBCUTANEOUS at 08:36

## 2023-02-12 RX ADMIN — Medication 81 MILLIGRAM(S): at 12:18

## 2023-02-12 RX ADMIN — PANTOPRAZOLE SODIUM 40 MILLIGRAM(S): 20 TABLET, DELAYED RELEASE ORAL at 06:08

## 2023-02-12 RX ADMIN — HYDROMORPHONE HYDROCHLORIDE 2 MILLIGRAM(S): 2 INJECTION INTRAMUSCULAR; INTRAVENOUS; SUBCUTANEOUS at 03:15

## 2023-02-12 RX ADMIN — ATORVASTATIN CALCIUM 80 MILLIGRAM(S): 80 TABLET, FILM COATED ORAL at 21:59

## 2023-02-12 RX ADMIN — HEPARIN SODIUM 5000 UNIT(S): 5000 INJECTION INTRAVENOUS; SUBCUTANEOUS at 06:07

## 2023-02-12 RX ADMIN — HEPARIN SODIUM 5000 UNIT(S): 5000 INJECTION INTRAVENOUS; SUBCUTANEOUS at 13:43

## 2023-02-12 RX ADMIN — HYDROMORPHONE HYDROCHLORIDE 2 MILLIGRAM(S): 2 INJECTION INTRAMUSCULAR; INTRAVENOUS; SUBCUTANEOUS at 16:56

## 2023-02-12 RX ADMIN — Medication 1: at 09:24

## 2023-02-12 RX ADMIN — NALOXEGOL OXALATE 12.5 MILLIGRAM(S): 12.5 TABLET, FILM COATED ORAL at 12:19

## 2023-02-12 RX ADMIN — HEPARIN SODIUM 5000 UNIT(S): 5000 INJECTION INTRAVENOUS; SUBCUTANEOUS at 21:59

## 2023-02-12 NOTE — PROGRESS NOTE ADULT - PROBLEM SELECTOR PLAN 2
Patient w/ baseline SCr ~1.2 has been uptrending during hospitalization to Cr. 4.2, now stable but increased to 2 today likely dt recent angiogram.   - continues on bumex 1 PO BID  - continue to monitor closely  - appreciate nephrology recommendations

## 2023-02-12 NOTE — PROGRESS NOTE ADULT - ASSESSMENT
42F w/ PMH coronary artery disease status post CABG x4 in 2018, CHF s/p ICD placement (interrogated 2022), DM, PAD s/p 3rd toe amputation who presents with left 2nd toe pain concerning for dry gangrene. Seen by podiatry and vascular teams with plans for possible transmetatarsal amputation. Course c/b worsening renal function, and shock likely sepsis 2/2 LE wounds w/ possible cardiogenic component requiring pressors, now improved. S/p LLE angio by vascular, currently pending possible revascularization vs. amputation.

## 2023-02-12 NOTE — PROGRESS NOTE ADULT - PROBLEM SELECTOR PLAN 1
Patient presenting in setting of L 2nd toe pain concerning for dry gangrene with history of prior toe amputation iso PAD. Initially with elevated ESR/CRP without active signs of infection however later with concern for infection and septic shock.   - pain control w/ Dilaudid 2mg q3hr PRN for severe; Dilaudid 1mg q4hr PRN for moderate, ATC tylenol, dilaudid 0.5mg q4hr PRN added for breakthrough given increased reported pain  -s/p vascular intervention with CO2 LLE angiogram 2/10  - pending podiatry surgical intervention with TMA vs. vascular revascularization  - completed abx course as below, not currently on abx  - PT/OT

## 2023-02-12 NOTE — PROGRESS NOTE ADULT - ASSESSMENT
42F presents with left foot 2nd digit dry gangrene and ischemic changes to dorsal MPJs 1-5.  - Pt seen and evaluated.  - Afebrile, WBC 13.72  - Left Foot X-Ray: No gas, no osteomyelitis.  - Left foot 2nd and 4th digit dry gangrene,, dehisced partial 3rd ray resection to sub-dermis, ischemic changes the dorsal and plantar forefoot, cold to touch, no drainage, no purulence, no acute signs of infection. Right foot no wounds, no acute signs of infection.  - No left foot wound culture taken 2/2 no acute signs of infection.  - ID recommendations appreciated - monitor off abx   - SEBASTIÁN/PVR: RABI ncv, RTBI 0.66, LABI 0.58, LTBI flat wvfms, BL wvfms multi-seg art disease.  - CTA: Right distal peroneal artery and left PT/peroneal arteries occluded.  - 2/10 s/p LLE CO2 angio w small vessel disease  - Vasc plan possible revascularization Vs BKA pending attending decision  -Pod plan local wound care pending vasc recs  - Seen with attending.

## 2023-02-12 NOTE — PROGRESS NOTE ADULT - SUBJECTIVE AND OBJECTIVE BOX
Patient is a 42y old  Female who presents with a chief complaint of Toe pain.    SUBJECTIVE / OVERNIGHT EVENTS: states there was a delay in getting her pain meds overnight so couldn't get good night sleep.  having normal BM.     MEDICATIONS  (STANDING):  acetaminophen     Tablet .. 975 milliGRAM(s) Oral every 6 hours  aspirin enteric coated 81 milliGRAM(s) Oral daily  atorvastatin 80 milliGRAM(s) Oral at bedtime  buMETAnide 1 milliGRAM(s) Oral two times a day  calamine/zinc oxide Lotion 1 Application(s) Topical three times a day  carvedilol 6.25 milliGRAM(s) Oral every 12 hours  dextrose 5%. 1000 milliLiter(s) (50 mL/Hr) IV Continuous <Continuous>  dextrose 5%. 1000 milliLiter(s) (100 mL/Hr) IV Continuous <Continuous>  dextrose 50% Injectable 25 Gram(s) IV Push once  dextrose 50% Injectable 12.5 Gram(s) IV Push once  dextrose 50% Injectable 25 Gram(s) IV Push once  ferrous    sulfate 325 milliGRAM(s) Oral daily  glucagon  Injectable 1 milliGRAM(s) IntraMuscular once  heparin   Injectable 5000 Unit(s) SubCutaneous every 8 hours  influenza   Vaccine 0.5 milliLiter(s) IntraMuscular once  insulin lispro (ADMELOG) corrective regimen sliding scale   SubCutaneous three times a day before meals  insulin lispro (ADMELOG) corrective regimen sliding scale   SubCutaneous at bedtime  melatonin 9 milliGRAM(s) Oral at bedtime  naloxegol 12.5 milliGRAM(s) Oral daily  pantoprazole    Tablet 40 milliGRAM(s) Oral before breakfast  polyethylene glycol 3350 17 Gram(s) Oral two times a day  senna 2 Tablet(s) Oral at bedtime    MEDICATIONS  (PRN):  acetaminophen     Tablet .. 650 milliGRAM(s) Oral every 6 hours PRN Temp greater or equal to 38C (100.4F), Mild Pain (1 - 3)  dextrose Oral Gel 15 Gram(s) Oral once PRN Blood Glucose LESS THAN 70 milliGRAM(s)/deciliter  HYDROmorphone  Injectable 1 milliGRAM(s) IV Push every 4 hours PRN Moderate Pain (4 - 6)  HYDROmorphone  Injectable 0.5 milliGRAM(s) IV Push every 4 hours PRN breakthrough pain  HYDROmorphone  Injectable 2 milliGRAM(s) IV Push every 3 hours PRN Severe Pain (7 - 10)  ondansetron Injectable 4 milliGRAM(s) IV Push every 8 hours PRN Nausea and/or Vomiting      PHYSICAL EXAM:  Vital Signs Last 24 Hrs  T(C): 36.6 (12 Feb 2023 06:02), Max: 36.7 (11 Feb 2023 16:05)  T(F): 97.8 (12 Feb 2023 06:02), Max: 98 (11 Feb 2023 16:05)  HR: 72 (12 Feb 2023 12:07) (65 - 88)  BP: 116/68 (12 Feb 2023 12:07) (113/60 - 138/75)  BP(mean): 82 (12 Feb 2023 12:07) (82 - 97)  RR: 18 (12 Feb 2023 12:07) (18 - 19)  SpO2: 100% (12 Feb 2023 12:07) (99% - 100%)    Parameters below as of 12 Feb 2023 12:07  Patient On (Oxygen Delivery Method): room air    CONSTITUTIONAL: NAD, sitting up in bed   EYES: conjunctiva and sclera clear  ENMT: Moist oral mucosa  NECK: Supple  RESPIRATORY: Normal respiratory effort; lungs are clear to auscultation bilaterally  CARDIOVASCULAR: Regular rate and rhythm, normal S1 and S2, No lower extremity edema  ABDOMEN: Nontender to palpation, normoactive bowel sounds, no rebound/guarding  NEUROLOGY: moving all extremities  EXT: necrotic appearing L 2nd toe    LABS:                9.6    13.72 )-----------( 549      ( 12 Feb 2023 05:49 )             32.6     02-12    130<L>  |  94<L>  |  41<H>  ----------------------------<  151<H>  4.7   |  20<L>  |  2.02<H>    Ca    9.3      12 Feb 2023 05:49  Phos  5.6     02-12  Mg     2.00     02-12      CAPILLARY BLOOD GLUCOSE      POCT Blood Glucose.: 157 mg/dL (12 Feb 2023 12:00)      RADIOLOGY & ADDITIONAL TESTS: Reviewed    COORDINATION OF CARE:  Care Discussed with Consultants/Other Providers [Y- Medicine ACP]

## 2023-02-12 NOTE — PROGRESS NOTE ADULT - SUBJECTIVE AND OBJECTIVE BOX
Podiatry pager #: [phone] ([place])/ [phone] ([facility])    Patient is a 42y old  Female who presents with a chief complaint of Toe pain (11 Feb 2023 17:36)       INTERVAL HPI/OVERNIGHT EVENTS:  Patient seen and evaluated at bedside.  Pt is resting comfortable in NAD. Denies N/V/F/C.     Allergies    No Known Allergies    Intolerances        Vital Signs Last 24 Hrs  T(C): 36.6 (12 Feb 2023 06:02), Max: 36.7 (11 Feb 2023 16:05)  T(F): 97.8 (12 Feb 2023 06:02), Max: 98 (11 Feb 2023 16:05)  HR: 72 (12 Feb 2023 12:07) (65 - 88)  BP: 116/68 (12 Feb 2023 12:07) (113/60 - 138/75)  BP(mean): 82 (12 Feb 2023 12:07) (82 - 97)  RR: 18 (12 Feb 2023 12:07) (18 - 19)  SpO2: 100% (12 Feb 2023 12:07) (99% - 100%)    Parameters below as of 12 Feb 2023 12:07  Patient On (Oxygen Delivery Method): room air        LABS:                        9.6    13.72 )-----------( 549      ( 12 Feb 2023 05:49 )             32.6     02-12    130<L>  |  94<L>  |  41<H>  ----------------------------<  151<H>  4.7   |  20<L>  |  2.02<H>    Ca    9.3      12 Feb 2023 05:49  Phos  5.6     02-12  Mg     2.00     02-12          CAPILLARY BLOOD GLUCOSE      POCT Blood Glucose.: 157 mg/dL (12 Feb 2023 12:00)  POCT Blood Glucose.: 178 mg/dL (12 Feb 2023 09:16)  POCT Blood Glucose.: 214 mg/dL (11 Feb 2023 22:01)  POCT Blood Glucose.: 177 mg/dL (11 Feb 2023 17:35)      Lower Extremity Physical Exam:  Vascular: DP/PT 0/4 left, DT/PT 1/4, CFT <3 seconds B/L, Temperature gradient warm to cold left, warm to cool right.   Neuro: Epicritic sensation decreased to the level of toes, B/L.  Musculoskeletal/Ortho: s/p left foot partial 3rd ray resection.  Skin: Left foot 2nd and 4thdigit dry gangrene, dehisced partial 3rd ray resection to sub-dermis, ischemic changes the dorsal and plantar forefoot, cold to touch, no drainage, no purulence, no acute signs of infection. Right foot no wounds, no acute signs of infection.  RADIOLOGY & ADDITIONAL TESTS:  RADIOLOGY & ADDITIONAL TESTS:

## 2023-02-13 LAB
ANION GAP SERPL CALC-SCNC: 15 MMOL/L — HIGH (ref 7–14)
BUN SERPL-MCNC: 46 MG/DL — HIGH (ref 7–23)
CALCIUM SERPL-MCNC: 9 MG/DL — SIGNIFICANT CHANGE UP (ref 8.4–10.5)
CHLORIDE SERPL-SCNC: 95 MMOL/L — LOW (ref 98–107)
CO2 SERPL-SCNC: 19 MMOL/L — LOW (ref 22–31)
CREAT SERPL-MCNC: 2.23 MG/DL — HIGH (ref 0.5–1.3)
EGFR: 27 ML/MIN/1.73M2 — LOW
GLUCOSE BLDC GLUCOMTR-MCNC: 141 MG/DL — HIGH (ref 70–99)
GLUCOSE BLDC GLUCOMTR-MCNC: 156 MG/DL — HIGH (ref 70–99)
GLUCOSE BLDC GLUCOMTR-MCNC: 161 MG/DL — HIGH (ref 70–99)
GLUCOSE BLDC GLUCOMTR-MCNC: 202 MG/DL — HIGH (ref 70–99)
GLUCOSE BLDC GLUCOMTR-MCNC: 229 MG/DL — HIGH (ref 70–99)
GLUCOSE SERPL-MCNC: 146 MG/DL — HIGH (ref 70–99)
HCT VFR BLD CALC: 29.4 % — LOW (ref 34.5–45)
HGB BLD-MCNC: 8.7 G/DL — LOW (ref 11.5–15.5)
MAGNESIUM SERPL-MCNC: 1.9 MG/DL — SIGNIFICANT CHANGE UP (ref 1.6–2.6)
MCHC RBC-ENTMCNC: 24.9 PG — LOW (ref 27–34)
MCHC RBC-ENTMCNC: 29.6 GM/DL — LOW (ref 32–36)
MCV RBC AUTO: 84 FL — SIGNIFICANT CHANGE UP (ref 80–100)
NRBC # BLD: 0 /100 WBCS — SIGNIFICANT CHANGE UP (ref 0–0)
NRBC # FLD: 0.1 K/UL — HIGH (ref 0–0)
PHOSPHATE SERPL-MCNC: 5.7 MG/DL — HIGH (ref 2.5–4.5)
PLATELET # BLD AUTO: 477 K/UL — HIGH (ref 150–400)
POTASSIUM SERPL-MCNC: 4.7 MMOL/L — SIGNIFICANT CHANGE UP (ref 3.5–5.3)
POTASSIUM SERPL-SCNC: 4.7 MMOL/L — SIGNIFICANT CHANGE UP (ref 3.5–5.3)
RBC # BLD: 3.5 M/UL — LOW (ref 3.8–5.2)
RBC # FLD: 19.1 % — HIGH (ref 10.3–14.5)
SODIUM SERPL-SCNC: 129 MMOL/L — LOW (ref 135–145)
WBC # BLD: 12.23 K/UL — HIGH (ref 3.8–10.5)
WBC # FLD AUTO: 12.23 K/UL — HIGH (ref 3.8–10.5)

## 2023-02-13 PROCEDURE — 99232 SBSQ HOSP IP/OBS MODERATE 35: CPT

## 2023-02-13 RX ADMIN — HEPARIN SODIUM 5000 UNIT(S): 5000 INJECTION INTRAVENOUS; SUBCUTANEOUS at 13:09

## 2023-02-13 RX ADMIN — PANTOPRAZOLE SODIUM 40 MILLIGRAM(S): 20 TABLET, DELAYED RELEASE ORAL at 06:23

## 2023-02-13 RX ADMIN — Medication 975 MILLIGRAM(S): at 09:28

## 2023-02-13 RX ADMIN — HYDROMORPHONE HYDROCHLORIDE 2 MILLIGRAM(S): 2 INJECTION INTRAMUSCULAR; INTRAVENOUS; SUBCUTANEOUS at 13:30

## 2023-02-13 RX ADMIN — HYDROMORPHONE HYDROCHLORIDE 2 MILLIGRAM(S): 2 INJECTION INTRAMUSCULAR; INTRAVENOUS; SUBCUTANEOUS at 06:56

## 2023-02-13 RX ADMIN — Medication 975 MILLIGRAM(S): at 00:51

## 2023-02-13 RX ADMIN — BUMETANIDE 1 MILLIGRAM(S): 0.25 INJECTION INTRAMUSCULAR; INTRAVENOUS at 13:16

## 2023-02-13 RX ADMIN — HEPARIN SODIUM 5000 UNIT(S): 5000 INJECTION INTRAVENOUS; SUBCUTANEOUS at 06:22

## 2023-02-13 RX ADMIN — HYDROMORPHONE HYDROCHLORIDE 2 MILLIGRAM(S): 2 INJECTION INTRAMUSCULAR; INTRAVENOUS; SUBCUTANEOUS at 17:49

## 2023-02-13 RX ADMIN — HYDROMORPHONE HYDROCHLORIDE 2 MILLIGRAM(S): 2 INJECTION INTRAMUSCULAR; INTRAVENOUS; SUBCUTANEOUS at 06:22

## 2023-02-13 RX ADMIN — Medication 2: at 18:14

## 2023-02-13 RX ADMIN — CALAMINE AND ZINC OXIDE AND PHENOL 1 APPLICATION(S): 160; 10 LOTION TOPICAL at 13:16

## 2023-02-13 RX ADMIN — HEPARIN SODIUM 5000 UNIT(S): 5000 INJECTION INTRAVENOUS; SUBCUTANEOUS at 22:29

## 2023-02-13 RX ADMIN — HYDROMORPHONE HYDROCHLORIDE 2 MILLIGRAM(S): 2 INJECTION INTRAMUSCULAR; INTRAVENOUS; SUBCUTANEOUS at 22:28

## 2023-02-13 RX ADMIN — HYDROMORPHONE HYDROCHLORIDE 2 MILLIGRAM(S): 2 INJECTION INTRAMUSCULAR; INTRAVENOUS; SUBCUTANEOUS at 00:09

## 2023-02-13 RX ADMIN — HYDROMORPHONE HYDROCHLORIDE 2 MILLIGRAM(S): 2 INJECTION INTRAMUSCULAR; INTRAVENOUS; SUBCUTANEOUS at 13:15

## 2023-02-13 RX ADMIN — BUMETANIDE 1 MILLIGRAM(S): 0.25 INJECTION INTRAMUSCULAR; INTRAVENOUS at 06:22

## 2023-02-13 RX ADMIN — Medication 325 MILLIGRAM(S): at 13:15

## 2023-02-13 RX ADMIN — SENNA PLUS 2 TABLET(S): 8.6 TABLET ORAL at 22:29

## 2023-02-13 RX ADMIN — POLYETHYLENE GLYCOL 3350 17 GRAM(S): 17 POWDER, FOR SOLUTION ORAL at 22:30

## 2023-02-13 RX ADMIN — Medication 975 MILLIGRAM(S): at 10:28

## 2023-02-13 RX ADMIN — HYDROMORPHONE HYDROCHLORIDE 2 MILLIGRAM(S): 2 INJECTION INTRAMUSCULAR; INTRAVENOUS; SUBCUTANEOUS at 09:40

## 2023-02-13 RX ADMIN — ATORVASTATIN CALCIUM 80 MILLIGRAM(S): 80 TABLET, FILM COATED ORAL at 22:29

## 2023-02-13 RX ADMIN — CALAMINE AND ZINC OXIDE AND PHENOL 1 APPLICATION(S): 160; 10 LOTION TOPICAL at 23:06

## 2023-02-13 RX ADMIN — Medication 975 MILLIGRAM(S): at 01:40

## 2023-02-13 RX ADMIN — HYDROMORPHONE HYDROCHLORIDE 2 MILLIGRAM(S): 2 INJECTION INTRAMUSCULAR; INTRAVENOUS; SUBCUTANEOUS at 22:50

## 2023-02-13 RX ADMIN — CARVEDILOL PHOSPHATE 6.25 MILLIGRAM(S): 80 CAPSULE, EXTENDED RELEASE ORAL at 06:23

## 2023-02-13 RX ADMIN — Medication 9 MILLIGRAM(S): at 22:29

## 2023-02-13 RX ADMIN — Medication 81 MILLIGRAM(S): at 13:15

## 2023-02-13 RX ADMIN — HYDROMORPHONE HYDROCHLORIDE 2 MILLIGRAM(S): 2 INJECTION INTRAMUSCULAR; INTRAVENOUS; SUBCUTANEOUS at 01:50

## 2023-02-13 RX ADMIN — NALOXEGOL OXALATE 12.5 MILLIGRAM(S): 12.5 TABLET, FILM COATED ORAL at 13:15

## 2023-02-13 RX ADMIN — CARVEDILOL PHOSPHATE 6.25 MILLIGRAM(S): 80 CAPSULE, EXTENDED RELEASE ORAL at 17:37

## 2023-02-13 RX ADMIN — Medication 1: at 13:09

## 2023-02-13 RX ADMIN — HYDROMORPHONE HYDROCHLORIDE 2 MILLIGRAM(S): 2 INJECTION INTRAMUSCULAR; INTRAVENOUS; SUBCUTANEOUS at 09:55

## 2023-02-13 RX ADMIN — CALAMINE AND ZINC OXIDE AND PHENOL 1 APPLICATION(S): 160; 10 LOTION TOPICAL at 06:23

## 2023-02-13 NOTE — PROGRESS NOTE ADULT - SUBJECTIVE AND OBJECTIVE BOX
Von Gayle MD  [facility] Division of Hospital Medicine  Pager: [phone]  Available via MS Teams    SUBJECTIVE / OVERNIGHT EVENTS:    No new complaints     MEDICATIONS  (STANDING):  acetaminophen     Tablet .. 975 milliGRAM(s) Oral every 6 hours  aspirin enteric coated 81 milliGRAM(s) Oral daily  atorvastatin 80 milliGRAM(s) Oral at bedtime  buMETAnide 1 milliGRAM(s) Oral two times a day  calamine/zinc oxide Lotion 1 Application(s) Topical three times a day  carvedilol 6.25 milliGRAM(s) Oral every 12 hours  dextrose 5%. 1000 milliLiter(s) (50 mL/Hr) IV Continuous <Continuous>  dextrose 5%. 1000 milliLiter(s) (100 mL/Hr) IV Continuous <Continuous>  dextrose 50% Injectable 25 Gram(s) IV Push once  dextrose 50% Injectable 12.5 Gram(s) IV Push once  dextrose 50% Injectable 25 Gram(s) IV Push once  ferrous    sulfate 325 milliGRAM(s) Oral daily  glucagon  Injectable 1 milliGRAM(s) IntraMuscular once  heparin   Injectable 5000 Unit(s) SubCutaneous every 8 hours  influenza   Vaccine 0.5 milliLiter(s) IntraMuscular once  insulin lispro (ADMELOG) corrective regimen sliding scale   SubCutaneous at bedtime  insulin lispro (ADMELOG) corrective regimen sliding scale   SubCutaneous three times a day before meals  melatonin 9 milliGRAM(s) Oral at bedtime  naloxegol 12.5 milliGRAM(s) Oral daily  pantoprazole    Tablet 40 milliGRAM(s) Oral before breakfast  polyethylene glycol 3350 17 Gram(s) Oral two times a day  senna 2 Tablet(s) Oral at bedtime    MEDICATIONS  (PRN):  acetaminophen     Tablet .. 650 milliGRAM(s) Oral every 6 hours PRN Temp greater or equal to 38C (100.4F), Mild Pain (1 - 3)  dextrose Oral Gel 15 Gram(s) Oral once PRN Blood Glucose LESS THAN 70 milliGRAM(s)/deciliter  HYDROmorphone  Injectable 0.5 milliGRAM(s) IV Push every 4 hours PRN breakthrough pain  HYDROmorphone  Injectable 2 milliGRAM(s) IV Push every 3 hours PRN Severe Pain (7 - 10)  ondansetron Injectable 4 milliGRAM(s) IV Push every 8 hours PRN Nausea and/or Vomiting      I&O's Summary    12 Feb 2023 07:01  -  13 Feb 2023 07:00  --------------------------------------------------------  IN: 0 mL / OUT: 850 mL / NET: -850 mL    13 Feb 2023 07:01  -  13 Feb 2023 14:14  --------------------------------------------------------  IN: 200 mL / OUT: 0 mL / NET: 200 mL        PHYSICAL EXAM:  Vital Signs Last 24 Hrs  T(C): 36.3 (13 Feb 2023 13:15), Max: 36.6 (13 Feb 2023 06:18)  T(F): 97.4 (13 Feb 2023 13:15), Max: 97.8 (13 Feb 2023 06:18)  HR: 67 (13 Feb 2023 13:15) (65 - 75)  BP: 112/64 (13 Feb 2023 13:15) (103/63 - 122/67)  BP(mean): --  RR: 16 (13 Feb 2023 13:15) (16 - 18)  SpO2: 100% (13 Feb 2023 13:15) (98% - 100%)    Parameters below as of 13 Feb 2023 13:15  Patient On (Oxygen Delivery Method): room air      CONSTITUTIONAL: NAD, well-developed   EYES: conjunctiva and sclera clear  ENMT: Moist oral mucosa   NECK: Supple   RESPIRATORY: Normal respiratory effort; lungs are clear to auscultation bilaterally  CARDIOVASCULAR: Regular rate and rhythm, normal S1 and S2, no murmur/rub/gallop; Peripheral pulses are 2+ bilaterally  ABDOMEN: Nontender to palpation, normoactive bowel sounds, no rebound/guarding   MUSCULOSKELETAL: noted to have toe gangrene   PSYCH: A+O to person, place, and time; affect appropriate  NEUROLOGY: no gross sensory or motor deficits   SKIN: No rashes    LABS:                        8.7    12.23 )-----------( 477      ( 13 Feb 2023 06:15 )             29.4     02-13    129<L>  |  95<L>  |  46<H>  ----------------------------<  146<H>  4.7   |  19<L>  |  2.23<H>    Ca    9.0      13 Feb 2023 06:15  Phos  5.7     02-13  Mg     1.90     02-13                COVID-19 PCR: NotDetec (08 Feb 2023 03:18)      RADIOLOGY & ADDITIONAL TESTS:  Other Results Reviewed Today: BMP with Cr slightly worsening for past few days, CBC with stable Hg     COORDINATION OF CARE:  Communication: discussed plan of care with ACP

## 2023-02-13 NOTE — PROGRESS NOTE ADULT - PROBLEM SELECTOR PLAN 1
Pt. with BOB in setting of hypotension, IV contrast use and CHF. [facility], Scr was elevated at 1.33 on 6/28/21 and improved to 1.15 on 2/24/22. Scr was 1.22 on admission (1/25/23). Pt. underwent LE angiogram on 1/25/23. Scr worsened to 4.23 on 1/30/23. No hydronephrosis seen on CT scan study. Pt. was oliguric, initiated on IV Bumex infusion in MICU on 1/30/23. Pt. responded well to IV diuretic therapy and was then switched to oral diuretic therapy. Currently on PO Bumex 1 mg BID with stable serum creatinine today (2/12) at 2.23.  On examination today no rales and some JVD but lying flat comfortably.  Monitor BMP And volume status.       Sacha Weiss MD   Division of Kidney Diseases and Hypertension  Office: [phone]  Cell: [phone]  Fellow Pager: [phone]

## 2023-02-13 NOTE — PROGRESS NOTE ADULT - SUBJECTIVE AND OBJECTIVE BOX
[facility] Division of Kidney Diseases & Hypertension  FOLLOW UP NOTE  --------------------------------------------------------------------------------    HPI: 42-year-female with PMH of HTN, HLD, DM, PAD, CHF admitted with left toe pain. Pt. developed BOB during hospital stay. Pt. was transferred to the MICU on 1/29/23 for septic shock requiring vasopressor support. Pt. clinically improved and was transferred to medical floors on 2/3/23. Pt. being seen for BOB.       24 hour events/subjective: Pt. seen and examined at bedside today. Pt. continues to have L foot pain. Had angiogram last week.  On examination today no chest pain no shortness of breath no NVD.     PAST HISTORY  --------------------------------------------------------------------------------  No significant changes to PMH, PSH, FHx, SHx, unless otherwise noted    ALLERGIES & MEDICATIONS  --------------------------------------------------------------------------------  Allergies    No Known Allergies    Intolerances      Standing Inpatient Medications  acetaminophen     Tablet .. 975 milliGRAM(s) Oral every 6 hours  aspirin enteric coated 81 milliGRAM(s) Oral daily  atorvastatin 80 milliGRAM(s) Oral at bedtime  buMETAnide 1 milliGRAM(s) Oral two times a day  calamine/zinc oxide Lotion 1 Application(s) Topical three times a day  carvedilol 6.25 milliGRAM(s) Oral every 12 hours  dextrose 5%. 1000 milliLiter(s) IV Continuous <Continuous>  dextrose 5%. 1000 milliLiter(s) IV Continuous <Continuous>  dextrose 50% Injectable 25 Gram(s) IV Push once  dextrose 50% Injectable 12.5 Gram(s) IV Push once  dextrose 50% Injectable 25 Gram(s) IV Push once  ferrous    sulfate 325 milliGRAM(s) Oral daily  glucagon  Injectable 1 milliGRAM(s) IntraMuscular once  heparin   Injectable 5000 Unit(s) SubCutaneous every 8 hours  influenza   Vaccine 0.5 milliLiter(s) IntraMuscular once  insulin lispro (ADMELOG) corrective regimen sliding scale   SubCutaneous three times a day before meals  insulin lispro (ADMELOG) corrective regimen sliding scale   SubCutaneous at bedtime  melatonin 9 milliGRAM(s) Oral at bedtime  naloxegol 12.5 milliGRAM(s) Oral daily  pantoprazole    Tablet 40 milliGRAM(s) Oral before breakfast  polyethylene glycol 3350 17 Gram(s) Oral two times a day  senna 2 Tablet(s) Oral at bedtime    PRN Inpatient Medications  acetaminophen     Tablet .. 650 milliGRAM(s) Oral every 6 hours PRN  dextrose Oral Gel 15 Gram(s) Oral once PRN  HYDROmorphone  Injectable 0.5 milliGRAM(s) IV Push every 4 hours PRN  HYDROmorphone  Injectable 2 milliGRAM(s) IV Push every 3 hours PRN  ondansetron Injectable 4 milliGRAM(s) IV Push every 8 hours PRN      REVIEW OF SYSTEMS  --------------------------------------------------------------------------------  Gen: no fever  Respiratory: no sob  CV: no cp  GI: no ab pain  : no complaints  MSK: pain in foot    VITALS/PHYSICAL EXAM  --------------------------------------------------------------------------------  SIMBA): 36.6 (02-13-23 @ 06:18), Max: 36.7 (02-12-23 @ 13:31)  HR: 69 (02-13-23 @ 06:18) (65 - 75)  BP: 122/67 (02-13-23 @ 06:18) (103/63 - 122/67)  ABP: --  ABP(mean): --  RR: 16 (02-13-23 @ 06:18) (16 - 18)  SpO2: 100% (02-13-23 @ 06:18) (98% - 100%)  CVP(mm Hg): --        02-12-23 @ 07:01  -  02-13-23 @ 07:00  --------------------------------------------------------  IN: 0 mL / OUT: 850 mL / NET: -850 mL    02-13-23 @ 07:01  -  02-13-23 @ 10:11  --------------------------------------------------------  IN: 0 mL / OUT: 0 mL / NET: 0 mL    Physical Exam:    Gen: resting, NAD  HEENT: Anicteric  Pulm: no rales  CV: S1S2+JVD noted but lying flat comfortably  Abd: Soft, +BS    Ext: B/L LE edema resolved, left foot: gangrenous changes seen  Neuro: Awake and alert  Skin: Warm and dry    LABS/STUDIES  --------------------------------------------------------------------------------              8.7    12.23 >-----------<  477      [02-13-23 @ 06:15]              29.4     129  |  95  |  46  ----------------------------<  146      [02-13-23 @ 06:15]  4.7   |  19  |  2.23        Ca     9.0     [02-13-23 @ 06:15]      Mg     1.90     [02-13-23 @ 06:15]      Phos  5.7     [02-13-23 @ 06:15]            Creatinine Trend:  SCr 2.23 [02-13 @ 06:15]  SCr 2.02 [02-12 @ 05:49]  SCr 1.76 [02-11 @ 06:01]  SCr 1.72 [02-10 @ 02:41]  SCr 1.86 [02-09 @ 06:41]

## 2023-02-13 NOTE — PROGRESS NOTE ADULT - PROBLEM SELECTOR PLAN 2
Patient w/ baseline SCr ~1.2 has been uptrending during hospitalization to Cr. 4.2, previously dropped to 1.7 range now stable but increasing likely dt recent angiogram.   - continues on bumex 1 PO BID  - continue to monitor closely  - appreciate nephrology recommendations

## 2023-02-14 LAB
ANION GAP SERPL CALC-SCNC: 13 MMOL/L — SIGNIFICANT CHANGE UP (ref 7–14)
BASOPHILS # BLD AUTO: 0.04 K/UL — SIGNIFICANT CHANGE UP (ref 0–0.2)
BASOPHILS NFR BLD AUTO: 0.3 % — SIGNIFICANT CHANGE UP (ref 0–2)
BUN SERPL-MCNC: 51 MG/DL — HIGH (ref 7–23)
CALCIUM SERPL-MCNC: 8.8 MG/DL — SIGNIFICANT CHANGE UP (ref 8.4–10.5)
CHLORIDE SERPL-SCNC: 97 MMOL/L — LOW (ref 98–107)
CO2 SERPL-SCNC: 20 MMOL/L — LOW (ref 22–31)
CREAT SERPL-MCNC: 2.19 MG/DL — HIGH (ref 0.5–1.3)
EGFR: 28 ML/MIN/1.73M2 — LOW
EOSINOPHIL # BLD AUTO: 0.09 K/UL — SIGNIFICANT CHANGE UP (ref 0–0.5)
EOSINOPHIL NFR BLD AUTO: 0.7 % — SIGNIFICANT CHANGE UP (ref 0–6)
GLUCOSE BLDC GLUCOMTR-MCNC: 182 MG/DL — HIGH (ref 70–99)
GLUCOSE BLDC GLUCOMTR-MCNC: 182 MG/DL — HIGH (ref 70–99)
GLUCOSE BLDC GLUCOMTR-MCNC: 209 MG/DL — HIGH (ref 70–99)
GLUCOSE BLDC GLUCOMTR-MCNC: 267 MG/DL — HIGH (ref 70–99)
GLUCOSE SERPL-MCNC: 193 MG/DL — HIGH (ref 70–99)
HCT VFR BLD CALC: 29.3 % — LOW (ref 34.5–45)
HGB BLD-MCNC: 9 G/DL — LOW (ref 11.5–15.5)
IANC: 11.09 K/UL — HIGH (ref 1.8–7.4)
IMM GRANULOCYTES NFR BLD AUTO: 0.7 % — SIGNIFICANT CHANGE UP (ref 0–0.9)
LYMPHOCYTES # BLD AUTO: 1.51 K/UL — SIGNIFICANT CHANGE UP (ref 1–3.3)
LYMPHOCYTES # BLD AUTO: 11.1 % — LOW (ref 13–44)
MAGNESIUM SERPL-MCNC: 1.9 MG/DL — SIGNIFICANT CHANGE UP (ref 1.6–2.6)
MCHC RBC-ENTMCNC: 25.4 PG — LOW (ref 27–34)
MCHC RBC-ENTMCNC: 30.7 GM/DL — LOW (ref 32–36)
MCV RBC AUTO: 82.8 FL — SIGNIFICANT CHANGE UP (ref 80–100)
MONOCYTES # BLD AUTO: 0.77 K/UL — SIGNIFICANT CHANGE UP (ref 0–0.9)
MONOCYTES NFR BLD AUTO: 5.7 % — SIGNIFICANT CHANGE UP (ref 2–14)
NEUTROPHILS # BLD AUTO: 11.09 K/UL — HIGH (ref 1.8–7.4)
NEUTROPHILS NFR BLD AUTO: 81.5 % — HIGH (ref 43–77)
NRBC # BLD: 0 /100 WBCS — SIGNIFICANT CHANGE UP (ref 0–0)
NRBC # FLD: 0.12 K/UL — HIGH (ref 0–0)
PHOSPHATE SERPL-MCNC: 5.3 MG/DL — HIGH (ref 2.5–4.5)
PLATELET # BLD AUTO: 495 K/UL — HIGH (ref 150–400)
POTASSIUM SERPL-MCNC: 4.4 MMOL/L — SIGNIFICANT CHANGE UP (ref 3.5–5.3)
POTASSIUM SERPL-SCNC: 4.4 MMOL/L — SIGNIFICANT CHANGE UP (ref 3.5–5.3)
RBC # BLD: 3.54 M/UL — LOW (ref 3.8–5.2)
RBC # FLD: 18.9 % — HIGH (ref 10.3–14.5)
SODIUM SERPL-SCNC: 130 MMOL/L — LOW (ref 135–145)
WBC # BLD: 13.59 K/UL — HIGH (ref 3.8–10.5)
WBC # FLD AUTO: 13.59 K/UL — HIGH (ref 3.8–10.5)

## 2023-02-14 PROCEDURE — 99232 SBSQ HOSP IP/OBS MODERATE 35: CPT

## 2023-02-14 RX ADMIN — CARVEDILOL PHOSPHATE 6.25 MILLIGRAM(S): 80 CAPSULE, EXTENDED RELEASE ORAL at 05:21

## 2023-02-14 RX ADMIN — HYDROMORPHONE HYDROCHLORIDE 2 MILLIGRAM(S): 2 INJECTION INTRAMUSCULAR; INTRAVENOUS; SUBCUTANEOUS at 13:59

## 2023-02-14 RX ADMIN — POLYETHYLENE GLYCOL 3350 17 GRAM(S): 17 POWDER, FOR SOLUTION ORAL at 22:17

## 2023-02-14 RX ADMIN — BUMETANIDE 1 MILLIGRAM(S): 0.25 INJECTION INTRAMUSCULAR; INTRAVENOUS at 05:22

## 2023-02-14 RX ADMIN — Medication 1: at 17:59

## 2023-02-14 RX ADMIN — HYDROMORPHONE HYDROCHLORIDE 2 MILLIGRAM(S): 2 INJECTION INTRAMUSCULAR; INTRAVENOUS; SUBCUTANEOUS at 20:45

## 2023-02-14 RX ADMIN — HYDROMORPHONE HYDROCHLORIDE 2 MILLIGRAM(S): 2 INJECTION INTRAMUSCULAR; INTRAVENOUS; SUBCUTANEOUS at 18:01

## 2023-02-14 RX ADMIN — HYDROMORPHONE HYDROCHLORIDE 2 MILLIGRAM(S): 2 INJECTION INTRAMUSCULAR; INTRAVENOUS; SUBCUTANEOUS at 04:47

## 2023-02-14 RX ADMIN — ATORVASTATIN CALCIUM 80 MILLIGRAM(S): 80 TABLET, FILM COATED ORAL at 22:24

## 2023-02-14 RX ADMIN — NALOXEGOL OXALATE 12.5 MILLIGRAM(S): 12.5 TABLET, FILM COATED ORAL at 13:59

## 2023-02-14 RX ADMIN — BUMETANIDE 1 MILLIGRAM(S): 0.25 INJECTION INTRAMUSCULAR; INTRAVENOUS at 13:14

## 2023-02-14 RX ADMIN — HYDROMORPHONE HYDROCHLORIDE 2 MILLIGRAM(S): 2 INJECTION INTRAMUSCULAR; INTRAVENOUS; SUBCUTANEOUS at 14:15

## 2023-02-14 RX ADMIN — PANTOPRAZOLE SODIUM 40 MILLIGRAM(S): 20 TABLET, DELAYED RELEASE ORAL at 05:21

## 2023-02-14 RX ADMIN — SENNA PLUS 2 TABLET(S): 8.6 TABLET ORAL at 22:19

## 2023-02-14 RX ADMIN — HEPARIN SODIUM 5000 UNIT(S): 5000 INJECTION INTRAVENOUS; SUBCUTANEOUS at 05:21

## 2023-02-14 RX ADMIN — Medication 325 MILLIGRAM(S): at 13:15

## 2023-02-14 RX ADMIN — HYDROMORPHONE HYDROCHLORIDE 0.5 MILLIGRAM(S): 2 INJECTION INTRAMUSCULAR; INTRAVENOUS; SUBCUTANEOUS at 22:24

## 2023-02-14 RX ADMIN — HEPARIN SODIUM 5000 UNIT(S): 5000 INJECTION INTRAVENOUS; SUBCUTANEOUS at 13:14

## 2023-02-14 RX ADMIN — HYDROMORPHONE HYDROCHLORIDE 2 MILLIGRAM(S): 2 INJECTION INTRAMUSCULAR; INTRAVENOUS; SUBCUTANEOUS at 10:38

## 2023-02-14 RX ADMIN — Medication 975 MILLIGRAM(S): at 00:19

## 2023-02-14 RX ADMIN — HYDROMORPHONE HYDROCHLORIDE 2 MILLIGRAM(S): 2 INJECTION INTRAMUSCULAR; INTRAVENOUS; SUBCUTANEOUS at 05:02

## 2023-02-14 RX ADMIN — CALAMINE AND ZINC OXIDE AND PHENOL 1 APPLICATION(S): 160; 10 LOTION TOPICAL at 05:21

## 2023-02-14 RX ADMIN — HEPARIN SODIUM 5000 UNIT(S): 5000 INJECTION INTRAVENOUS; SUBCUTANEOUS at 22:17

## 2023-02-14 RX ADMIN — Medication 2: at 09:16

## 2023-02-14 RX ADMIN — HYDROMORPHONE HYDROCHLORIDE 2 MILLIGRAM(S): 2 INJECTION INTRAMUSCULAR; INTRAVENOUS; SUBCUTANEOUS at 11:38

## 2023-02-14 RX ADMIN — Medication 1: at 22:26

## 2023-02-14 RX ADMIN — Medication 81 MILLIGRAM(S): at 13:15

## 2023-02-14 RX ADMIN — CALAMINE AND ZINC OXIDE AND PHENOL 1 APPLICATION(S): 160; 10 LOTION TOPICAL at 22:17

## 2023-02-14 RX ADMIN — HYDROMORPHONE HYDROCHLORIDE 2 MILLIGRAM(S): 2 INJECTION INTRAMUSCULAR; INTRAVENOUS; SUBCUTANEOUS at 17:33

## 2023-02-14 RX ADMIN — HYDROMORPHONE HYDROCHLORIDE 2 MILLIGRAM(S): 2 INJECTION INTRAMUSCULAR; INTRAVENOUS; SUBCUTANEOUS at 20:15

## 2023-02-14 RX ADMIN — Medication 1: at 13:20

## 2023-02-14 RX ADMIN — Medication 9 MILLIGRAM(S): at 22:18

## 2023-02-14 RX ADMIN — HYDROMORPHONE HYDROCHLORIDE 0.5 MILLIGRAM(S): 2 INJECTION INTRAMUSCULAR; INTRAVENOUS; SUBCUTANEOUS at 22:54

## 2023-02-14 NOTE — ADVANCED PRACTICE NURSE CONSULT - ASSESSMENT
General: A&Ox4, out of bed with assist, continent of urine and stool. Skin warm, dry with increased moisture in intertriginous folds, adequate skin turgor, scattered areas of hyperpigmentation and hypopigmentation, scattered areas stable scabs to BLE. Right foot with Kerlix dressing intact, patient reports dressing just changed by podiatry.    Bilateral groin - severe moisture associated dermatitis as evidenced by macerated and denuded skin and increased moisture. No evidence of candidiasis.     Patient states that she has been using Critic-aid moisture barrier ointment to bilateral groin but it causes burning. Patient educated on use of interdry textile sheeting for management of moisture and to avoid irritation to bilateral groin such as rubbing from fabric of underwear. Patient agreeable to interdry, verbalized understanding.

## 2023-02-14 NOTE — PROGRESS NOTE ADULT - ASSESSMENT
42F w/ PMH coronary artery disease status post CABG x4 in 2018, CHF s/p ICD placement (interrogated 2022), DM, PAD s/p 3rd toe amputation who presents with left 2nd toe pain concerning for dry gangrene. Now s/p LLE angio 2/10.     Plan:  - Added on to OR schedule for Friday 2/17 for L BKA  - Would appreciate documentation of medical optimization and cardiac risk stratification  - NPO after midnight on Thursday night  - 4AM preop labs on Friday morning (CBC, BMP, Mag, Phos, coags)  - Please maintain active COVID and T+S within 72 hours of procedure      Vascular (C Team) Surgery  [id number]

## 2023-02-14 NOTE — PROGRESS NOTE ADULT - SUBJECTIVE AND OBJECTIVE BOX
Vascular Surgery Progress Note    Subjective/24hour Events:   Patient seen and examined. No acute events. Pain controlled.      Vital Signs:  Vital Signs Last 24 Hrs  T(C): 36.8 (14 Feb 2023 04:45), Max: 36.8 (14 Feb 2023 01:00)  T(F): 98.3 (14 Feb 2023 04:45), Max: 98.3 (14 Feb 2023 04:45)  HR: 83 (14 Feb 2023 04:45) (67 - 83)  BP: 122/63 (14 Feb 2023 04:45) (112/64 - 122/63)  BP(mean): --  RR: 18 (14 Feb 2023 04:45) (16 - 18)  SpO2: 95% (14 Feb 2023 04:45) (95% - 100%)    Parameters below as of 14 Feb 2023 04:45  Patient On (Oxygen Delivery Method): room air        CAPILLARY BLOOD GLUCOSE      POCT Blood Glucose.: 209 mg/dL (14 Feb 2023 09:14)  POCT Blood Glucose.: 202 mg/dL (13 Feb 2023 23:05)  POCT Blood Glucose.: 229 mg/dL (13 Feb 2023 18:13)  POCT Blood Glucose.: 161 mg/dL (13 Feb 2023 13:07)      I&O's Detail    13 Feb 2023 07:01  -  14 Feb 2023 07:00  --------------------------------------------------------  IN:    Oral Fluid: 500 mL  Total IN: 500 mL    OUT:    Voided (mL): 850 mL  Total OUT: 850 mL    Total NET: -350 mL          Physical Exam:  General: NAD, resting comfortably in bed  HEENT: Normocephalic atraumatic  Respiratory: Nonlabored respirations, normal CW expansion.  Cardio: S1S2, regular rate and rhythm.  Abdomen: SNTND. Groins access site is CDI without hematoma  Vascular: extremities are warm and well perfused. Gangrenous toes. Peroneal signal         Labs:    02-14    130<L>  |  97<L>  |  51<H>  ----------------------------<  193<H>  4.4   |  20<L>  |  2.19<H>    Ca    8.8      14 Feb 2023 06:25  Phos  5.3     02-14  Mg     1.90     02-14                              9.0    13.59 )-----------( 495      ( 14 Feb 2023 06:25 )             29.3

## 2023-02-14 NOTE — PROGRESS NOTE ADULT - ASSESSMENT
42F presents with left foot 2nd digit dry gangrene and ischemic changes to dorsal MPJs 1-5.  - Pt seen and evaluated.  - Afebrile, WBC 13.59  - Left foot 2nd and 4th digit dry gangrene to MPJ, ischemic changes the dorsal and plantar forefoot, cold to touch to the ankle, no drainage, no purulence, no acute signs of infection. Right foot no wounds, no acute signs of infection.  - Left Foot X-Ray: No gas, no osteomyelitis.  - No left foot wound culture taken 2/2 no acute signs of infection.  - ID recommendations appreciated - monitor off abx   - SEBASTIÁN/PVR: RABI ncv, RTBI 0.66, LABI 0.58, LTBI flat wvfms, BL wvfms multi-seg art disease.  - CTA: Right distal peroneal artery and left PT/peroneal arteries occluded.  - 2/10 s/p LLE CO2 angio w small vessel disease  - Vasc plan possible revascularization vs BKA pending attending decision  - Pod plan local wound care vs left foot TMA pending vasc recs  - Seen with attending

## 2023-02-14 NOTE — ADVANCED PRACTICE NURSE CONSULT - RECOMMEDATIONS
Topical recommendations:     Bilateral groin - Gently cleanse with NS, pat dry. Apply Interdry textile sheeting under intertriginous folds leaving 2 inches exposed at ends to wick, remove to wash & dry affected area, then replace. Individual sheeting may be used for up to 5 days unless soiled. Inspect skin daily.     Plan discussed with patient and primary RN Megan Rogers.     Please contact Wound/Ostomy Care Service Line if we can be of further assistance (ext [phone]).

## 2023-02-14 NOTE — ADVANCED PRACTICE NURSE CONSULT - REASON FOR CONSULT
Patient seen on skin care rounds after wound care referral received for assessment of skin impairment and recommendations of topical management of bilateral groin MAD. As per H&P, patient is a 42F w/ PMH coronary artery disease status post CABG x4 in 2018, CHF s/p ICD placement (interrogated 2022), DM, PAD s/p 3rd toe amputation who presented with left 2nd toe pain concerning for dry gangrene. Seen by podiatry and vascular teams with plans for possible transmetatarsal amputation. Course c/b worsening renal function, and shock likely sepsis 2/2 LE wounds w/ possible cardiogenic component. Patient s/p levophed, Vanc/Cefepime/Flagyl, medically optimizing for TMA. Chart reviewed: WBC 13.59, H/H 9.0/29.3, INR 1.37, Platelets 495, hA1c 7.1, BMI 25.5, robson 19.

## 2023-02-14 NOTE — CHART NOTE - NSCHARTNOTEFT_GEN_A_CORE
PAtient was previously seen by cardiology and pre-op clearance given. Patient can proceed with BKA. Please refer to full previous note for full details.    Efrain Espinoza MD  Cardiology fellow

## 2023-02-14 NOTE — PROGRESS NOTE ADULT - SUBJECTIVE AND OBJECTIVE BOX
Von Gayle MD  [facility] Division of Hospital Medicine  Pager: [phone]  Available via MS Teams    SUBJECTIVE / OVERNIGHT EVENTS:    Upset at news about needing a BKA  All questions answered   No other new complaints      MEDICATIONS  (STANDING):  acetaminophen     Tablet .. 975 milliGRAM(s) Oral every 6 hours  aspirin enteric coated 81 milliGRAM(s) Oral daily  atorvastatin 80 milliGRAM(s) Oral at bedtime  buMETAnide 1 milliGRAM(s) Oral two times a day  calamine/zinc oxide Lotion 1 Application(s) Topical three times a day  carvedilol 6.25 milliGRAM(s) Oral every 12 hours  dextrose 5%. 1000 milliLiter(s) (100 mL/Hr) IV Continuous <Continuous>  dextrose 5%. 1000 milliLiter(s) (50 mL/Hr) IV Continuous <Continuous>  dextrose 50% Injectable 25 Gram(s) IV Push once  dextrose 50% Injectable 12.5 Gram(s) IV Push once  dextrose 50% Injectable 25 Gram(s) IV Push once  ferrous    sulfate 325 milliGRAM(s) Oral daily  glucagon  Injectable 1 milliGRAM(s) IntraMuscular once  heparin   Injectable 5000 Unit(s) SubCutaneous every 8 hours  influenza   Vaccine 0.5 milliLiter(s) IntraMuscular once  insulin lispro (ADMELOG) corrective regimen sliding scale   SubCutaneous three times a day before meals  insulin lispro (ADMELOG) corrective regimen sliding scale   SubCutaneous at bedtime  melatonin 9 milliGRAM(s) Oral at bedtime  naloxegol 12.5 milliGRAM(s) Oral daily  pantoprazole    Tablet 40 milliGRAM(s) Oral before breakfast  polyethylene glycol 3350 17 Gram(s) Oral two times a day  senna 2 Tablet(s) Oral at bedtime    MEDICATIONS  (PRN):  acetaminophen     Tablet .. 650 milliGRAM(s) Oral every 6 hours PRN Temp greater or equal to 38C (100.4F), Mild Pain (1 - 3)  dextrose Oral Gel 15 Gram(s) Oral once PRN Blood Glucose LESS THAN 70 milliGRAM(s)/deciliter  HYDROmorphone  Injectable 0.5 milliGRAM(s) IV Push every 4 hours PRN breakthrough pain  HYDROmorphone  Injectable 2 milliGRAM(s) IV Push every 3 hours PRN Severe Pain (7 - 10)  ondansetron Injectable 4 milliGRAM(s) IV Push every 8 hours PRN Nausea and/or Vomiting      I&O's Summary    13 Feb 2023 07:01  -  14 Feb 2023 07:00  --------------------------------------------------------  IN: 500 mL / OUT: 850 mL / NET: -350 mL    14 Feb 2023 07:01  -  14 Feb 2023 13:08  --------------------------------------------------------  IN: 250 mL / OUT: 0 mL / NET: 250 mL        PHYSICAL EXAM:  Vital Signs Last 24 Hrs  T(C): 36.8 (14 Feb 2023 04:45), Max: 36.8 (14 Feb 2023 01:00)  T(F): 98.3 (14 Feb 2023 04:45), Max: 98.3 (14 Feb 2023 04:45)  HR: 83 (14 Feb 2023 04:45) (67 - 83)  BP: 122/63 (14 Feb 2023 04:45) (112/64 - 122/63)  BP(mean): --  RR: 18 (14 Feb 2023 04:45) (16 - 18)  SpO2: 95% (14 Feb 2023 04:45) (95% - 100%)    Parameters below as of 14 Feb 2023 04:45  Patient On (Oxygen Delivery Method): room air      CONSTITUTIONAL: NAD, well-developed   EYES: conjunctiva and sclera clear  ENMT: Moist oral mucosa   NECK: Supple   RESPIRATORY: Normal respiratory effort; lungs are clear to auscultation bilaterally  CARDIOVASCULAR: Regular rate and rhythm, normal S1 and S2, no murmur/rub/gallop; Peripheral pulses are 2+ bilaterally  ABDOMEN: Nontender to palpation, normoactive bowel sounds, no rebound/guarding   MUSCULOSKELETAL: noted to have toe gangrene   PSYCH: A+O to person, place, and time; affect appropriate  NEUROLOGY: no gross sensory or motor deficits   SKIN: No rashes      LABS:                        9.0    13.59 )-----------( 495      ( 14 Feb 2023 06:25 )             29.3     02-14    130<L>  |  97<L>  |  51<H>  ----------------------------<  193<H>  4.4   |  20<L>  |  2.19<H>    Ca    8.8      14 Feb 2023 06:25  Phos  5.3     02-14  Mg     1.90     02-14                COVID-19 PCR: NotDetec (08 Feb 2023 03:18)      RADIOLOGY & ADDITIONAL TESTS:  Other Results Reviewed Today: BMP with stable Cr, CBC with stable Hg     COORDINATION OF CARE:  Communication: discussed plan of care with ACP

## 2023-02-14 NOTE — PROGRESS NOTE ADULT - PROBLEM SELECTOR PLAN 1
Patient presenting in setting of L 2nd toe pain concerning for dry gangrene with history of prior toe amputation iso PAD. Initially with elevated ESR/CRP without active signs of infection however later with concern for infection and septic shock.   - pain control w/ Dilaudid 2mg q3hr PRN for severe; Dilaudid 1mg q4hr PRN for moderate, ATC tylenol, dilaudid 0.5mg q4hr PRN added for breakthrough given increased reported pain  -s/p vascular intervention with CO2 LLE angiogram 2/10  - patient scheduled for L BKA on 2/17   - completed abx course as below, not currently on abx  - PT/OT  - Cardiology risk strat appreciated. Pt can proceed to surgery as she is medically optimized

## 2023-02-14 NOTE — PROGRESS NOTE ADULT - SUBJECTIVE AND OBJECTIVE BOX
[facility] Division of Kidney Diseases & Hypertension  FOLLOW UP NOTE  --------------------------------------------------------------------------------  HPI: 42-year-female with PMH of HTN, HLD, DM, PAD, CHF admitted with left toe pain. Pt. developed BOB during hospital stay. Pt. was transferred to the MICU on 1/29/23 for septic shock requiring vasopressor support. Pt. clinically improved and was transferred to medical floors on 2/3/23. Pt. being seen for BOB.       24 hour events/subjective: Pt. seen and examined at bedside today. Pt. continues to have L foot pain. Had angiogram last week.  On examination today no chest pain no shortness of breath no NVD.       PAST HISTORY  --------------------------------------------------------------------------------  No significant changes to PMH, PSH, FHx, SHx, unless otherwise noted    ALLERGIES & MEDICATIONS  --------------------------------------------------------------------------------  Allergies    No Known Allergies    Intolerances      Standing Inpatient Medications  acetaminophen     Tablet .. 975 milliGRAM(s) Oral every 6 hours  aspirin enteric coated 81 milliGRAM(s) Oral daily  atorvastatin 80 milliGRAM(s) Oral at bedtime  buMETAnide 1 milliGRAM(s) Oral two times a day  calamine/zinc oxide Lotion 1 Application(s) Topical three times a day  carvedilol 6.25 milliGRAM(s) Oral every 12 hours  dextrose 5%. 1000 milliLiter(s) IV Continuous <Continuous>  dextrose 5%. 1000 milliLiter(s) IV Continuous <Continuous>  dextrose 50% Injectable 25 Gram(s) IV Push once  dextrose 50% Injectable 12.5 Gram(s) IV Push once  dextrose 50% Injectable 25 Gram(s) IV Push once  ferrous    sulfate 325 milliGRAM(s) Oral daily  glucagon  Injectable 1 milliGRAM(s) IntraMuscular once  heparin   Injectable 5000 Unit(s) SubCutaneous every 8 hours  influenza   Vaccine 0.5 milliLiter(s) IntraMuscular once  insulin lispro (ADMELOG) corrective regimen sliding scale   SubCutaneous at bedtime  insulin lispro (ADMELOG) corrective regimen sliding scale   SubCutaneous three times a day before meals  melatonin 9 milliGRAM(s) Oral at bedtime  naloxegol 12.5 milliGRAM(s) Oral daily  pantoprazole    Tablet 40 milliGRAM(s) Oral before breakfast  polyethylene glycol 3350 17 Gram(s) Oral two times a day  senna 2 Tablet(s) Oral at bedtime    PRN Inpatient Medications  acetaminophen     Tablet .. 650 milliGRAM(s) Oral every 6 hours PRN  dextrose Oral Gel 15 Gram(s) Oral once PRN  HYDROmorphone  Injectable 0.5 milliGRAM(s) IV Push every 4 hours PRN  HYDROmorphone  Injectable 2 milliGRAM(s) IV Push every 3 hours PRN  ondansetron Injectable 4 milliGRAM(s) IV Push every 8 hours PRN      REVIEW OF SYSTEMS  --------------------------------------------------------------------------------  Gen: no fever  Respiratory: no sob  CV: no cp  GI: no ab pain  : no difficulty with urination  MSK: toe pain    VITALS/PHYSICAL EXAM  --------------------------------------------------------------------------------  T(C): 36.8 (02-14-23 @ 04:45), Max: 36.8 (02-14-23 @ 01:00)  HR: 83 (02-14-23 @ 04:45) (67 - 83)  BP: 122/63 (02-14-23 @ 04:45) (112/64 - 122/63)  ABP: --  ABP(mean): --  RR: 18 (02-14-23 @ 04:45) (16 - 18)  SpO2: 95% (02-14-23 @ 04:45) (95% - 100%)  CVP(mm Hg): --        02-13-23 @ 07:01  -  02-14-23 @ 07:00  --------------------------------------------------------  IN: 500 mL / OUT: 850 mL / NET: -350 mL      Physical Exam:    Gen: resting, NAD  HEENT: Anicteric  Pulm: no rales  CV: S1S2+JVD noted but lying flat comfortably  Abd: Soft, +BS    Ext: B/L LE edema resolved, left foot: gangrenous changes seen  Neuro: Awake and alert  Skin: Warm and dry    LABS/STUDIES  --------------------------------------------------------------------------------              9.0    13.59 >-----------<  495      [02-14-23 @ 06:25]              29.3     130  |  97  |  51  ----------------------------<  193      [02-14-23 @ 06:25]  4.4   |  20  |  2.19        Ca     8.8     [02-14-23 @ 06:25]      Mg     1.90     [02-14-23 @ 06:25]      Phos  5.3     [02-14-23 @ 06:25]            Creatinine Trend:  SCr 2.19 [02-14 @ 06:25]  SCr 2.23 [02-13 @ 06:15]  SCr 2.02 [02-12 @ 05:49]  SCr 1.76 [02-11 @ 06:01]  SCr 1.72 [02-10 @ 02:41]

## 2023-02-14 NOTE — PROGRESS NOTE ADULT - PROBLEM SELECTOR PLAN 1
Pt. with BOB in setting of hypotension, IV contrast use and CHF. Upon review of labs on [facility], Scr was elevated at 1.33 on 6/28/21 and improved to 1.15 on 2/24/22. Scr was 1.22 on admission (1/25/23). Pt. underwent LE angiogram on 1/25/23. Scr worsened to 4.23 on 1/30/23. No hydronephrosis seen on CT scan study. Pt. was oliguric, initiated on IV Bumex infusion in MICU on 1/30/23. Pt. responded well to IV diuretic therapy and was then switched to oral diuretic therapy. Currently on PO Bumex 1 mg BID with elevated but stable serum creatinine today (2/14) at 2.19.  On examination today no rales and some JVD but lying flat comfortably.  Monitor BMP And volume status.   If patient going for additional contrast studies will need to risk stratify.    Sacha Weiss MD   Division of Kidney Diseases and Hypertension  Office: [phone]  Cell: [phone]  Fellow Pager: [phone]

## 2023-02-14 NOTE — PROGRESS NOTE ADULT - SUBJECTIVE AND OBJECTIVE BOX
Patient is a 42y old  Female who presents with a chief complaint of Toe pain (13 Feb 2023 14:14)       INTERVAL HPI/OVERNIGHT EVENTS:  Patient seen and evaluated at bedside.  Pt is resting comfortable in NAD. Denies N/V/F/C.  Pain rated at X/10    Allergies    No Known Allergies    Intolerances        Vital Signs Last 24 Hrs  T(C): 36.8 (14 Feb 2023 04:45), Max: 36.8 (14 Feb 2023 01:00)  T(F): 98.3 (14 Feb 2023 04:45), Max: 98.3 (14 Feb 2023 04:45)  HR: 83 (14 Feb 2023 04:45) (67 - 83)  BP: 122/63 (14 Feb 2023 04:45) (112/64 - 122/63)  BP(mean): --  RR: 18 (14 Feb 2023 04:45) (16 - 18)  SpO2: 95% (14 Feb 2023 04:45) (95% - 100%)    Parameters below as of 14 Feb 2023 04:45  Patient On (Oxygen Delivery Method): room air        LABS:                        9.0    13.59 )-----------( 495      ( 14 Feb 2023 06:25 )             29.3     02-14    130<L>  |  97<L>  |  51<H>  ----------------------------<  193<H>  4.4   |  20<L>  |  2.19<H>    Ca    8.8      14 Feb 2023 06:25  Phos  5.3     02-14  Mg     1.90     02-14          CAPILLARY BLOOD GLUCOSE      POCT Blood Glucose.: 209 mg/dL (14 Feb 2023 09:14)  POCT Blood Glucose.: 202 mg/dL (13 Feb 2023 23:05)  POCT Blood Glucose.: 229 mg/dL (13 Feb 2023 18:13)  POCT Blood Glucose.: 161 mg/dL (13 Feb 2023 13:07)      Lower Extremity Physical Exam:  Vascular: DP/PT 0/4 left, DT/PT 1/4, CFT <3 seconds B/L, Temperature gradient warm to cold left, warm to cool right.   Neuro: Epicritic sensation decreased to the level of toes, B/L.  Musculoskeletal/Ortho: s/p left foot partial 3rd ray resection.  Skin: Left foot 2nd and 4th digit dry gangrene to MPJ, ischemic changes the dorsal and plantar forefoot, cold to touch to the ankle, no drainage, no purulence, no acute signs of infection. Right foot no wounds, no acute signs of infection.    RADIOLOGY & ADDITIONAL TESTS:

## 2023-02-15 LAB
ANION GAP SERPL CALC-SCNC: 13 MMOL/L — SIGNIFICANT CHANGE UP (ref 7–14)
BASOPHILS # BLD AUTO: 0.04 K/UL — SIGNIFICANT CHANGE UP (ref 0–0.2)
BASOPHILS NFR BLD AUTO: 0.3 % — SIGNIFICANT CHANGE UP (ref 0–2)
BUN SERPL-MCNC: 51 MG/DL — HIGH (ref 7–23)
CALCIUM SERPL-MCNC: 8.9 MG/DL — SIGNIFICANT CHANGE UP (ref 8.4–10.5)
CHLORIDE SERPL-SCNC: 94 MMOL/L — LOW (ref 98–107)
CO2 SERPL-SCNC: 21 MMOL/L — LOW (ref 22–31)
CREAT SERPL-MCNC: 1.98 MG/DL — HIGH (ref 0.5–1.3)
EGFR: 31 ML/MIN/1.73M2 — LOW
EOSINOPHIL # BLD AUTO: 0.09 K/UL — SIGNIFICANT CHANGE UP (ref 0–0.5)
EOSINOPHIL NFR BLD AUTO: 0.7 % — SIGNIFICANT CHANGE UP (ref 0–6)
GLUCOSE BLDC GLUCOMTR-MCNC: 157 MG/DL — HIGH (ref 70–99)
GLUCOSE BLDC GLUCOMTR-MCNC: 199 MG/DL — HIGH (ref 70–99)
GLUCOSE BLDC GLUCOMTR-MCNC: 289 MG/DL — HIGH (ref 70–99)
GLUCOSE BLDC GLUCOMTR-MCNC: 313 MG/DL — HIGH (ref 70–99)
GLUCOSE SERPL-MCNC: 198 MG/DL — HIGH (ref 70–99)
HCT VFR BLD CALC: 30.3 % — LOW (ref 34.5–45)
HGB BLD-MCNC: 8.9 G/DL — LOW (ref 11.5–15.5)
IANC: 8.94 K/UL — HIGH (ref 1.8–7.4)
IMM GRANULOCYTES NFR BLD AUTO: 0.7 % — SIGNIFICANT CHANGE UP (ref 0–0.9)
LYMPHOCYTES # BLD AUTO: 16.7 % — SIGNIFICANT CHANGE UP (ref 13–44)
LYMPHOCYTES # BLD AUTO: 2.02 K/UL — SIGNIFICANT CHANGE UP (ref 1–3.3)
MAGNESIUM SERPL-MCNC: 1.8 MG/DL — SIGNIFICANT CHANGE UP (ref 1.6–2.6)
MCHC RBC-ENTMCNC: 24.9 PG — LOW (ref 27–34)
MCHC RBC-ENTMCNC: 29.4 GM/DL — LOW (ref 32–36)
MCV RBC AUTO: 84.9 FL — SIGNIFICANT CHANGE UP (ref 80–100)
MONOCYTES # BLD AUTO: 0.94 K/UL — HIGH (ref 0–0.9)
MONOCYTES NFR BLD AUTO: 7.8 % — SIGNIFICANT CHANGE UP (ref 2–14)
NEUTROPHILS # BLD AUTO: 8.94 K/UL — HIGH (ref 1.8–7.4)
NEUTROPHILS NFR BLD AUTO: 73.8 % — SIGNIFICANT CHANGE UP (ref 43–77)
NRBC # BLD: 1 /100 WBCS — HIGH (ref 0–0)
NRBC # FLD: 0.15 K/UL — HIGH (ref 0–0)
PHOSPHATE SERPL-MCNC: 4.1 MG/DL — SIGNIFICANT CHANGE UP (ref 2.5–4.5)
PLATELET # BLD AUTO: 510 K/UL — HIGH (ref 150–400)
POTASSIUM SERPL-MCNC: 4.7 MMOL/L — SIGNIFICANT CHANGE UP (ref 3.5–5.3)
POTASSIUM SERPL-SCNC: 4.7 MMOL/L — SIGNIFICANT CHANGE UP (ref 3.5–5.3)
RBC # BLD: 3.57 M/UL — LOW (ref 3.8–5.2)
RBC # FLD: 19.3 % — HIGH (ref 10.3–14.5)
SARS-COV-2 RNA SPEC QL NAA+PROBE: SIGNIFICANT CHANGE UP
SODIUM SERPL-SCNC: 128 MMOL/L — LOW (ref 135–145)
WBC # BLD: 12.11 K/UL — HIGH (ref 3.8–10.5)
WBC # FLD AUTO: 12.11 K/UL — HIGH (ref 3.8–10.5)

## 2023-02-15 PROCEDURE — 99232 SBSQ HOSP IP/OBS MODERATE 35: CPT

## 2023-02-15 RX ADMIN — HYDROMORPHONE HYDROCHLORIDE 2 MILLIGRAM(S): 2 INJECTION INTRAMUSCULAR; INTRAVENOUS; SUBCUTANEOUS at 01:11

## 2023-02-15 RX ADMIN — Medication 975 MILLIGRAM(S): at 12:28

## 2023-02-15 RX ADMIN — Medication 975 MILLIGRAM(S): at 07:03

## 2023-02-15 RX ADMIN — SENNA PLUS 2 TABLET(S): 8.6 TABLET ORAL at 21:12

## 2023-02-15 RX ADMIN — CALAMINE AND ZINC OXIDE AND PHENOL 1 APPLICATION(S): 160; 10 LOTION TOPICAL at 22:34

## 2023-02-15 RX ADMIN — HYDROMORPHONE HYDROCHLORIDE 2 MILLIGRAM(S): 2 INJECTION INTRAMUSCULAR; INTRAVENOUS; SUBCUTANEOUS at 16:43

## 2023-02-15 RX ADMIN — NALOXEGOL OXALATE 12.5 MILLIGRAM(S): 12.5 TABLET, FILM COATED ORAL at 12:27

## 2023-02-15 RX ADMIN — Medication 3: at 09:30

## 2023-02-15 RX ADMIN — HYDROMORPHONE HYDROCHLORIDE 2 MILLIGRAM(S): 2 INJECTION INTRAMUSCULAR; INTRAVENOUS; SUBCUTANEOUS at 21:26

## 2023-02-15 RX ADMIN — POLYETHYLENE GLYCOL 3350 17 GRAM(S): 17 POWDER, FOR SOLUTION ORAL at 09:30

## 2023-02-15 RX ADMIN — HYDROMORPHONE HYDROCHLORIDE 0.5 MILLIGRAM(S): 2 INJECTION INTRAMUSCULAR; INTRAVENOUS; SUBCUTANEOUS at 09:30

## 2023-02-15 RX ADMIN — HYDROMORPHONE HYDROCHLORIDE 2 MILLIGRAM(S): 2 INJECTION INTRAMUSCULAR; INTRAVENOUS; SUBCUTANEOUS at 07:21

## 2023-02-15 RX ADMIN — CALAMINE AND ZINC OXIDE AND PHENOL 1 APPLICATION(S): 160; 10 LOTION TOPICAL at 05:30

## 2023-02-15 RX ADMIN — ATORVASTATIN CALCIUM 80 MILLIGRAM(S): 80 TABLET, FILM COATED ORAL at 21:12

## 2023-02-15 RX ADMIN — HEPARIN SODIUM 5000 UNIT(S): 5000 INJECTION INTRAVENOUS; SUBCUTANEOUS at 21:12

## 2023-02-15 RX ADMIN — Medication 975 MILLIGRAM(S): at 18:39

## 2023-02-15 RX ADMIN — HEPARIN SODIUM 5000 UNIT(S): 5000 INJECTION INTRAVENOUS; SUBCUTANEOUS at 14:45

## 2023-02-15 RX ADMIN — HYDROMORPHONE HYDROCHLORIDE 0.5 MILLIGRAM(S): 2 INJECTION INTRAMUSCULAR; INTRAVENOUS; SUBCUTANEOUS at 10:00

## 2023-02-15 RX ADMIN — HYDROMORPHONE HYDROCHLORIDE 2 MILLIGRAM(S): 2 INJECTION INTRAMUSCULAR; INTRAVENOUS; SUBCUTANEOUS at 01:41

## 2023-02-15 RX ADMIN — HYDROMORPHONE HYDROCHLORIDE 0.5 MILLIGRAM(S): 2 INJECTION INTRAMUSCULAR; INTRAVENOUS; SUBCUTANEOUS at 14:44

## 2023-02-15 RX ADMIN — Medication 975 MILLIGRAM(S): at 19:39

## 2023-02-15 RX ADMIN — BUMETANIDE 1 MILLIGRAM(S): 0.25 INJECTION INTRAMUSCULAR; INTRAVENOUS at 05:27

## 2023-02-15 RX ADMIN — HYDROMORPHONE HYDROCHLORIDE 2 MILLIGRAM(S): 2 INJECTION INTRAMUSCULAR; INTRAVENOUS; SUBCUTANEOUS at 07:38

## 2023-02-15 RX ADMIN — CARVEDILOL PHOSPHATE 6.25 MILLIGRAM(S): 80 CAPSULE, EXTENDED RELEASE ORAL at 18:05

## 2023-02-15 RX ADMIN — HYDROMORPHONE HYDROCHLORIDE 2 MILLIGRAM(S): 2 INJECTION INTRAMUSCULAR; INTRAVENOUS; SUBCUTANEOUS at 17:13

## 2023-02-15 RX ADMIN — HYDROMORPHONE HYDROCHLORIDE 0.5 MILLIGRAM(S): 2 INJECTION INTRAMUSCULAR; INTRAVENOUS; SUBCUTANEOUS at 05:24

## 2023-02-15 RX ADMIN — Medication 1: at 12:34

## 2023-02-15 RX ADMIN — POLYETHYLENE GLYCOL 3350 17 GRAM(S): 17 POWDER, FOR SOLUTION ORAL at 21:11

## 2023-02-15 RX ADMIN — HYDROMORPHONE HYDROCHLORIDE 0.5 MILLIGRAM(S): 2 INJECTION INTRAMUSCULAR; INTRAVENOUS; SUBCUTANEOUS at 15:14

## 2023-02-15 RX ADMIN — CALAMINE AND ZINC OXIDE AND PHENOL 1 APPLICATION(S): 160; 10 LOTION TOPICAL at 14:44

## 2023-02-15 RX ADMIN — HYDROMORPHONE HYDROCHLORIDE 2 MILLIGRAM(S): 2 INJECTION INTRAMUSCULAR; INTRAVENOUS; SUBCUTANEOUS at 12:24

## 2023-02-15 RX ADMIN — HEPARIN SODIUM 5000 UNIT(S): 5000 INJECTION INTRAVENOUS; SUBCUTANEOUS at 05:25

## 2023-02-15 RX ADMIN — CARVEDILOL PHOSPHATE 6.25 MILLIGRAM(S): 80 CAPSULE, EXTENDED RELEASE ORAL at 05:30

## 2023-02-15 RX ADMIN — Medication 325 MILLIGRAM(S): at 12:26

## 2023-02-15 RX ADMIN — PANTOPRAZOLE SODIUM 40 MILLIGRAM(S): 20 TABLET, DELAYED RELEASE ORAL at 05:27

## 2023-02-15 RX ADMIN — BUMETANIDE 1 MILLIGRAM(S): 0.25 INJECTION INTRAMUSCULAR; INTRAVENOUS at 14:45

## 2023-02-15 RX ADMIN — Medication 81 MILLIGRAM(S): at 12:26

## 2023-02-15 RX ADMIN — HYDROMORPHONE HYDROCHLORIDE 2 MILLIGRAM(S): 2 INJECTION INTRAMUSCULAR; INTRAVENOUS; SUBCUTANEOUS at 12:54

## 2023-02-15 RX ADMIN — HYDROMORPHONE HYDROCHLORIDE 2 MILLIGRAM(S): 2 INJECTION INTRAMUSCULAR; INTRAVENOUS; SUBCUTANEOUS at 21:11

## 2023-02-15 RX ADMIN — Medication 9 MILLIGRAM(S): at 21:12

## 2023-02-15 RX ADMIN — Medication 975 MILLIGRAM(S): at 05:26

## 2023-02-15 RX ADMIN — HYDROMORPHONE HYDROCHLORIDE 0.5 MILLIGRAM(S): 2 INJECTION INTRAMUSCULAR; INTRAVENOUS; SUBCUTANEOUS at 07:03

## 2023-02-15 RX ADMIN — Medication 4: at 18:30

## 2023-02-15 NOTE — PROGRESS NOTE ADULT - PROBLEM SELECTOR PLAN 1
Patient presenting in setting of L 2nd toe pain concerning for dry gangrene with history of prior toe amputation iso PAD. Initially with elevated ESR/CRP without active signs of infection however later with concern for infection and septic shock.   - pain control w/ Dilaudid 2mg q3hr PRN for severe; Dilaudid 1mg q4hr PRN for moderate, ATC tylenol, dilaudid 0.5mg q4hr PRN added for breakthrough given increased reported pain  -s/p vascular intervention with CO2 LLE angiogram 2/10  - patient scheduled for L BKA on 2/17   - completed abx course as below, not currently on abx  - PT/OT  - Cardiology risk strat appreciated. Pt can proceed to BKA on Friday as she is medically optimized

## 2023-02-15 NOTE — PROGRESS NOTE ADULT - SUBJECTIVE AND OBJECTIVE BOX
Von Gayle MD  [facility] Division of Hospital Medicine  Pager: [phone]  Available via MS Teams    SUBJECTIVE / OVERNIGHT EVENTS:    While pain regimen works, pt does have sig pain when there is a delay in getting meds   pain is in the left foot in the gangrene region     MEDICATIONS  (STANDING):  acetaminophen     Tablet .. 975 milliGRAM(s) Oral every 6 hours  aspirin enteric coated 81 milliGRAM(s) Oral daily  atorvastatin 80 milliGRAM(s) Oral at bedtime  buMETAnide 1 milliGRAM(s) Oral two times a day  calamine/zinc oxide Lotion 1 Application(s) Topical three times a day  carvedilol 6.25 milliGRAM(s) Oral every 12 hours  dextrose 5%. 1000 milliLiter(s) (100 mL/Hr) IV Continuous <Continuous>  dextrose 5%. 1000 milliLiter(s) (50 mL/Hr) IV Continuous <Continuous>  dextrose 50% Injectable 25 Gram(s) IV Push once  dextrose 50% Injectable 12.5 Gram(s) IV Push once  dextrose 50% Injectable 25 Gram(s) IV Push once  ferrous    sulfate 325 milliGRAM(s) Oral daily  glucagon  Injectable 1 milliGRAM(s) IntraMuscular once  heparin   Injectable 5000 Unit(s) SubCutaneous every 8 hours  influenza   Vaccine 0.5 milliLiter(s) IntraMuscular once  insulin lispro (ADMELOG) corrective regimen sliding scale   SubCutaneous three times a day before meals  insulin lispro (ADMELOG) corrective regimen sliding scale   SubCutaneous at bedtime  melatonin 9 milliGRAM(s) Oral at bedtime  naloxegol 12.5 milliGRAM(s) Oral daily  pantoprazole    Tablet 40 milliGRAM(s) Oral before breakfast  polyethylene glycol 3350 17 Gram(s) Oral two times a day  senna 2 Tablet(s) Oral at bedtime    MEDICATIONS  (PRN):  acetaminophen     Tablet .. 650 milliGRAM(s) Oral every 6 hours PRN Temp greater or equal to 38C (100.4F), Mild Pain (1 - 3)  dextrose Oral Gel 15 Gram(s) Oral once PRN Blood Glucose LESS THAN 70 milliGRAM(s)/deciliter  HYDROmorphone  Injectable 2 milliGRAM(s) IV Push every 3 hours PRN Severe Pain (7 - 10)  HYDROmorphone  Injectable 0.5 milliGRAM(s) IV Push every 4 hours PRN breakthrough pain  ondansetron Injectable 4 milliGRAM(s) IV Push every 8 hours PRN Nausea and/or Vomiting      I&O's Summary    14 Feb 2023 07:01  -  15 Feb 2023 07:00  --------------------------------------------------------  IN: 450 mL / OUT: 400 mL / NET: 50 mL        PHYSICAL EXAM:  Vital Signs Last 24 Hrs  T(C): 36.5 (15 Feb 2023 12:08), Max: 37.1 (14 Feb 2023 22:09)  T(F): 97.7 (15 Feb 2023 12:08), Max: 98.8 (14 Feb 2023 22:09)  HR: 75 (15 Feb 2023 12:08) (75 - 101)  BP: 106/61 (15 Feb 2023 12:08) (103/67 - 130/66)  BP(mean): --  RR: 17 (15 Feb 2023 12:08) (16 - 18)  SpO2: 99% (15 Feb 2023 12:08) (99% - 100%)    Parameters below as of 15 Feb 2023 12:08  Patient On (Oxygen Delivery Method): room air      CONSTITUTIONAL: NAD, well-developed   EYES: conjunctiva and sclera clear  ENMT: Moist oral mucosa   NECK: Supple   RESPIRATORY: Normal respiratory effort; lungs are clear to auscultation bilaterally  CARDIOVASCULAR: Regular rate and rhythm, normal S1 and S2, no murmur/rub/gallop   ABDOMEN: Nontender to palpation, normoactive bowel sounds, no rebound/guarding   MUSCULOSKELETAL: noted to have left sided dry toe gangrene   PSYCH: A+O to person, place, and time; affect appropriate  NEUROLOGY: no gross sensory or motor deficits   SKIN: No rashes    LABS:                        8.9    12.11 )-----------( 510      ( 15 Feb 2023 05:40 )             30.3     02-15    128<L>  |  94<L>  |  51<H>  ----------------------------<  198<H>  4.7   |  21<L>  |  1.98<H>    Ca    8.9      15 Feb 2023 05:40  Phos  4.1     02-15  Mg     1.80     02-15                COVID-19 PCR: NotDetec (08 Feb 2023 03:18)      RADIOLOGY & ADDITIONAL TESTS:  Other Results Reviewed Today: BMP with stable Cr, CBC with stable Hg     COORDINATION OF CARE:  Communication: discussed plan of care with ACP

## 2023-02-15 NOTE — PROGRESS NOTE ADULT - ASSESSMENT
42F w/ PMH coronary artery disease status post CABG x4 in 2018, CHF s/p ICD placement (interrogated 2022), DM, PAD s/p 3rd toe amputation who presents with left 2nd toe pain concerning for dry gangrene. Seen by podiatry and vascular teams with plans for possible transmetatarsal amputation. Course c/b worsening renal function, and shock likely sepsis 2/2 LE wounds w/ possible cardiogenic component requiring pressors, now improved. S/p LLE angio by vascular, currently pending BKA on Friday

## 2023-02-15 NOTE — PROGRESS NOTE ADULT - SUBJECTIVE AND OBJECTIVE BOX
Vascular Surgery Progress Note    Subjective/24hour Events:   Patient seen and examined. No acute events. Pain controlled.      Vital Signs:  Vital Signs Last 24 Hrs  T(C): 36.5 (15 Feb 2023 12:08), Max: 37.1 (14 Feb 2023 22:09)  T(F): 97.7 (15 Feb 2023 12:08), Max: 98.8 (14 Feb 2023 22:09)  HR: 75 (15 Feb 2023 12:08) (75 - 101)  BP: 106/61 (15 Feb 2023 12:08) (103/67 - 130/66)  BP(mean): --  RR: 17 (15 Feb 2023 12:08) (16 - 18)  SpO2: 99% (15 Feb 2023 12:08) (99% - 100%)    Parameters below as of 15 Feb 2023 12:08  Patient On (Oxygen Delivery Method): room air        CAPILLARY BLOOD GLUCOSE      POCT Blood Glucose.: 199 mg/dL (15 Feb 2023 12:32)  POCT Blood Glucose.: 289 mg/dL (15 Feb 2023 08:47)  POCT Blood Glucose.: 267 mg/dL (14 Feb 2023 22:15)  POCT Blood Glucose.: 182 mg/dL (14 Feb 2023 17:56)      I&O's Detail    14 Feb 2023 07:01  -  15 Feb 2023 07:00  --------------------------------------------------------  IN:    Oral Fluid: 450 mL  Total IN: 450 mL    OUT:    Voided (mL): 400 mL  Total OUT: 400 mL    Total NET: 50 mL          Physical Exam:  General: NAD, resting comfortably in bed  HEENT: Normocephalic atraumatic  Respiratory: Nonlabored respirations, normal CW expansion.  Cardio: S1S2, regular rate and rhythm.  Abdomen: SNTND. Groins access site is CDI without hematoma  Vascular: extremities are warm and well perfused. Gangrenous toes. Peroneal signal       Labs:    02-15    128<L>  |  94<L>  |  51<H>  ----------------------------<  198<H>  4.7   |  21<L>  |  1.98<H>    Ca    8.9      15 Feb 2023 05:40  Phos  4.1     02-15  Mg     1.80     02-15                              8.9    12.11 )-----------( 510      ( 15 Feb 2023 05:40 )             30.3

## 2023-02-15 NOTE — PROGRESS NOTE ADULT - ASSESSMENT
42F w/ PMH coronary artery disease status post CABG x4 in 2018, CHF s/p ICD placement (interrogated 2022), DM, PAD s/p 3rd toe amputation who presents with left 2nd toe pain concerning for dry gangrene. Now s/p LLE angio 2/10.     Plan:  - Added on to OR schedule for Friday 2/17 for L BKA  - Appreciate documented medical optimization and cardiac risk stratification  - NPO after midnight on Thursday night  - 4AM preop labs on Friday morning (CBC, BMP, Mag, Phos, coags)  - Please maintain active COVID and T+S within 72 hours of procedure  - Consent signed and placed in chart      Vascular (C Team) Surgery  [id number]

## 2023-02-16 ENCOUNTER — TRANSCRIPTION ENCOUNTER (OUTPATIENT)
Age: 43
End: 2023-02-16

## 2023-02-16 LAB
ANION GAP SERPL CALC-SCNC: 13 MMOL/L — SIGNIFICANT CHANGE UP (ref 7–14)
BLD GP AB SCN SERPL QL: NEGATIVE — SIGNIFICANT CHANGE UP
BUN SERPL-MCNC: 51 MG/DL — HIGH (ref 7–23)
CALCIUM SERPL-MCNC: 8.6 MG/DL — SIGNIFICANT CHANGE UP (ref 8.4–10.5)
CHLORIDE SERPL-SCNC: 96 MMOL/L — LOW (ref 98–107)
CO2 SERPL-SCNC: 19 MMOL/L — LOW (ref 22–31)
CREAT SERPL-MCNC: 1.8 MG/DL — HIGH (ref 0.5–1.3)
EGFR: 35 ML/MIN/1.73M2 — LOW
GLUCOSE BLDC GLUCOMTR-MCNC: 159 MG/DL — HIGH (ref 70–99)
GLUCOSE BLDC GLUCOMTR-MCNC: 164 MG/DL — HIGH (ref 70–99)
GLUCOSE BLDC GLUCOMTR-MCNC: 243 MG/DL — HIGH (ref 70–99)
GLUCOSE BLDC GLUCOMTR-MCNC: 337 MG/DL — HIGH (ref 70–99)
GLUCOSE SERPL-MCNC: 141 MG/DL — HIGH (ref 70–99)
HCT VFR BLD CALC: 28.6 % — LOW (ref 34.5–45)
HGB BLD-MCNC: 8.4 G/DL — LOW (ref 11.5–15.5)
MAGNESIUM SERPL-MCNC: 1.7 MG/DL — SIGNIFICANT CHANGE UP (ref 1.6–2.6)
MCHC RBC-ENTMCNC: 25.3 PG — LOW (ref 27–34)
MCHC RBC-ENTMCNC: 29.4 GM/DL — LOW (ref 32–36)
MCV RBC AUTO: 86.1 FL — SIGNIFICANT CHANGE UP (ref 80–100)
NRBC # BLD: 0 /100 WBCS — SIGNIFICANT CHANGE UP (ref 0–0)
NRBC # FLD: 0.08 K/UL — HIGH (ref 0–0)
PHOSPHATE SERPL-MCNC: 3.9 MG/DL — SIGNIFICANT CHANGE UP (ref 2.5–4.5)
PLATELET # BLD AUTO: 342 K/UL — SIGNIFICANT CHANGE UP (ref 150–400)
POTASSIUM SERPL-MCNC: 4.7 MMOL/L — SIGNIFICANT CHANGE UP (ref 3.5–5.3)
POTASSIUM SERPL-SCNC: 4.7 MMOL/L — SIGNIFICANT CHANGE UP (ref 3.5–5.3)
RBC # BLD: 3.32 M/UL — LOW (ref 3.8–5.2)
RBC # FLD: 19.5 % — HIGH (ref 10.3–14.5)
RH IG SCN BLD-IMP: POSITIVE — SIGNIFICANT CHANGE UP
SODIUM SERPL-SCNC: 128 MMOL/L — LOW (ref 135–145)
WBC # BLD: 12.49 K/UL — HIGH (ref 3.8–10.5)
WBC # FLD AUTO: 12.49 K/UL — HIGH (ref 3.8–10.5)

## 2023-02-16 PROCEDURE — 99232 SBSQ HOSP IP/OBS MODERATE 35: CPT | Mod: GC

## 2023-02-16 PROCEDURE — 99232 SBSQ HOSP IP/OBS MODERATE 35: CPT

## 2023-02-16 RX ORDER — HYDROMORPHONE HYDROCHLORIDE 2 MG/ML
2 INJECTION INTRAMUSCULAR; INTRAVENOUS; SUBCUTANEOUS
Refills: 0 | Status: DISCONTINUED | OUTPATIENT
Start: 2023-02-16 | End: 2023-02-18

## 2023-02-16 RX ORDER — BUMETANIDE 0.25 MG/ML
2 INJECTION INTRAMUSCULAR; INTRAVENOUS ONCE
Refills: 0 | Status: COMPLETED | OUTPATIENT
Start: 2023-02-16 | End: 2023-02-16

## 2023-02-16 RX ORDER — MAGNESIUM OXIDE 400 MG ORAL TABLET 241.3 MG
400 TABLET ORAL ONCE
Refills: 0 | Status: COMPLETED | OUTPATIENT
Start: 2023-02-16 | End: 2023-02-16

## 2023-02-16 RX ORDER — HYDROMORPHONE HYDROCHLORIDE 2 MG/ML
0.5 INJECTION INTRAMUSCULAR; INTRAVENOUS; SUBCUTANEOUS EVERY 4 HOURS
Refills: 0 | Status: DISCONTINUED | OUTPATIENT
Start: 2023-02-16 | End: 2023-02-18

## 2023-02-16 RX ADMIN — HYDROMORPHONE HYDROCHLORIDE 2 MILLIGRAM(S): 2 INJECTION INTRAMUSCULAR; INTRAVENOUS; SUBCUTANEOUS at 10:03

## 2023-02-16 RX ADMIN — Medication 4: at 18:01

## 2023-02-16 RX ADMIN — HYDROMORPHONE HYDROCHLORIDE 0.5 MILLIGRAM(S): 2 INJECTION INTRAMUSCULAR; INTRAVENOUS; SUBCUTANEOUS at 12:08

## 2023-02-16 RX ADMIN — Medication 975 MILLIGRAM(S): at 13:07

## 2023-02-16 RX ADMIN — HYDROMORPHONE HYDROCHLORIDE 2 MILLIGRAM(S): 2 INJECTION INTRAMUSCULAR; INTRAVENOUS; SUBCUTANEOUS at 18:54

## 2023-02-16 RX ADMIN — BUMETANIDE 2 MILLIGRAM(S): 0.25 INJECTION INTRAMUSCULAR; INTRAVENOUS at 20:23

## 2023-02-16 RX ADMIN — HYDROMORPHONE HYDROCHLORIDE 2 MILLIGRAM(S): 2 INJECTION INTRAMUSCULAR; INTRAVENOUS; SUBCUTANEOUS at 09:33

## 2023-02-16 RX ADMIN — CARVEDILOL PHOSPHATE 6.25 MILLIGRAM(S): 80 CAPSULE, EXTENDED RELEASE ORAL at 06:10

## 2023-02-16 RX ADMIN — CALAMINE AND ZINC OXIDE AND PHENOL 1 APPLICATION(S): 160; 10 LOTION TOPICAL at 22:18

## 2023-02-16 RX ADMIN — HEPARIN SODIUM 5000 UNIT(S): 5000 INJECTION INTRAVENOUS; SUBCUTANEOUS at 13:13

## 2023-02-16 RX ADMIN — CALAMINE AND ZINC OXIDE AND PHENOL 1 APPLICATION(S): 160; 10 LOTION TOPICAL at 07:00

## 2023-02-16 RX ADMIN — HYDROMORPHONE HYDROCHLORIDE 0.5 MILLIGRAM(S): 2 INJECTION INTRAMUSCULAR; INTRAVENOUS; SUBCUTANEOUS at 00:20

## 2023-02-16 RX ADMIN — Medication 975 MILLIGRAM(S): at 01:21

## 2023-02-16 RX ADMIN — HEPARIN SODIUM 5000 UNIT(S): 5000 INJECTION INTRAVENOUS; SUBCUTANEOUS at 22:18

## 2023-02-16 RX ADMIN — HYDROMORPHONE HYDROCHLORIDE 0.5 MILLIGRAM(S): 2 INJECTION INTRAMUSCULAR; INTRAVENOUS; SUBCUTANEOUS at 12:28

## 2023-02-16 RX ADMIN — HYDROMORPHONE HYDROCHLORIDE 2 MILLIGRAM(S): 2 INJECTION INTRAMUSCULAR; INTRAVENOUS; SUBCUTANEOUS at 04:13

## 2023-02-16 RX ADMIN — HYDROMORPHONE HYDROCHLORIDE 2 MILLIGRAM(S): 2 INJECTION INTRAMUSCULAR; INTRAVENOUS; SUBCUTANEOUS at 22:17

## 2023-02-16 RX ADMIN — HYDROMORPHONE HYDROCHLORIDE 0.5 MILLIGRAM(S): 2 INJECTION INTRAMUSCULAR; INTRAVENOUS; SUBCUTANEOUS at 06:25

## 2023-02-16 RX ADMIN — HYDROMORPHONE HYDROCHLORIDE 0.5 MILLIGRAM(S): 2 INJECTION INTRAMUSCULAR; INTRAVENOUS; SUBCUTANEOUS at 06:10

## 2023-02-16 RX ADMIN — Medication 81 MILLIGRAM(S): at 13:13

## 2023-02-16 RX ADMIN — POLYETHYLENE GLYCOL 3350 17 GRAM(S): 17 POWDER, FOR SOLUTION ORAL at 09:33

## 2023-02-16 RX ADMIN — Medication 1: at 09:32

## 2023-02-16 RX ADMIN — HYDROMORPHONE HYDROCHLORIDE 2 MILLIGRAM(S): 2 INJECTION INTRAMUSCULAR; INTRAVENOUS; SUBCUTANEOUS at 15:00

## 2023-02-16 RX ADMIN — Medication 975 MILLIGRAM(S): at 00:21

## 2023-02-16 RX ADMIN — MAGNESIUM OXIDE 400 MG ORAL TABLET 400 MILLIGRAM(S): 241.3 TABLET ORAL at 17:34

## 2023-02-16 RX ADMIN — HEPARIN SODIUM 5000 UNIT(S): 5000 INJECTION INTRAVENOUS; SUBCUTANEOUS at 06:10

## 2023-02-16 RX ADMIN — PANTOPRAZOLE SODIUM 40 MILLIGRAM(S): 20 TABLET, DELAYED RELEASE ORAL at 07:43

## 2023-02-16 RX ADMIN — NALOXEGOL OXALATE 12.5 MILLIGRAM(S): 12.5 TABLET, FILM COATED ORAL at 12:07

## 2023-02-16 RX ADMIN — Medication 975 MILLIGRAM(S): at 17:35

## 2023-02-16 RX ADMIN — Medication 975 MILLIGRAM(S): at 18:35

## 2023-02-16 RX ADMIN — Medication 9 MILLIGRAM(S): at 22:17

## 2023-02-16 RX ADMIN — BUMETANIDE 1 MILLIGRAM(S): 0.25 INJECTION INTRAMUSCULAR; INTRAVENOUS at 06:10

## 2023-02-16 RX ADMIN — Medication 975 MILLIGRAM(S): at 12:07

## 2023-02-16 RX ADMIN — POLYETHYLENE GLYCOL 3350 17 GRAM(S): 17 POWDER, FOR SOLUTION ORAL at 22:17

## 2023-02-16 RX ADMIN — HYDROMORPHONE HYDROCHLORIDE 2 MILLIGRAM(S): 2 INJECTION INTRAMUSCULAR; INTRAVENOUS; SUBCUTANEOUS at 22:32

## 2023-02-16 RX ADMIN — CARVEDILOL PHOSPHATE 6.25 MILLIGRAM(S): 80 CAPSULE, EXTENDED RELEASE ORAL at 17:36

## 2023-02-16 RX ADMIN — HYDROMORPHONE HYDROCHLORIDE 0.5 MILLIGRAM(S): 2 INJECTION INTRAMUSCULAR; INTRAVENOUS; SUBCUTANEOUS at 00:35

## 2023-02-16 RX ADMIN — CALAMINE AND ZINC OXIDE AND PHENOL 1 APPLICATION(S): 160; 10 LOTION TOPICAL at 13:12

## 2023-02-16 RX ADMIN — HYDROMORPHONE HYDROCHLORIDE 2 MILLIGRAM(S): 2 INJECTION INTRAMUSCULAR; INTRAVENOUS; SUBCUTANEOUS at 15:30

## 2023-02-16 RX ADMIN — ATORVASTATIN CALCIUM 80 MILLIGRAM(S): 80 TABLET, FILM COATED ORAL at 22:18

## 2023-02-16 RX ADMIN — HYDROMORPHONE HYDROCHLORIDE 2 MILLIGRAM(S): 2 INJECTION INTRAMUSCULAR; INTRAVENOUS; SUBCUTANEOUS at 03:58

## 2023-02-16 RX ADMIN — Medication 1: at 13:10

## 2023-02-16 RX ADMIN — BUMETANIDE 1 MILLIGRAM(S): 0.25 INJECTION INTRAMUSCULAR; INTRAVENOUS at 13:12

## 2023-02-16 RX ADMIN — HYDROMORPHONE HYDROCHLORIDE 2 MILLIGRAM(S): 2 INJECTION INTRAMUSCULAR; INTRAVENOUS; SUBCUTANEOUS at 18:24

## 2023-02-16 NOTE — PROGRESS NOTE ADULT - ASSESSMENT
42F w/ PMH coronary artery disease status post CABG x4 in 2018, CHF s/p ICD placement (interrogated 2022), DM, PAD s/p 3rd toe amputation who presents with left 2nd toe pain concerning for dry gangrene. Now s/p LLE angio 2/10. Planning for L BKA tomorrow 2/17.     Plan:  - Added on to OR schedule for Friday 2/17 for L BKA  - Appreciate documented medical optimization and cardiac risk stratification  - NPO after midnight tonight   - 1AM preop labs on Friday morning (CBC, BMP, Mag, Phos, coags) ordered   - Please maintain active COVID and T+S within 72 hours of procedure  - Consent signed and placed in chart    Vascular (C Team) Surgery  [id number] 42F w/ PMH coronary artery disease status post CABG x4 in 2018, CHF s/p ICD placement (interrogated 2022), DM, PAD s/p 3rd toe amputation who presents with left 2nd toe pain concerning for dry gangrene. Now s/p LLE angio 2/10. Planning for L BKA tomorrow 2/17.     Plan:  - Added on to OR schedule for Friday 2/17 for L BKA  - Appreciate documented medical optimization and cardiac risk stratification  - NPO after midnight tonight   - 1AM preop labs on Friday morning (CBC, BMP, Mag, Phos, coags) ordered   - Please maintain active COVID and T+S within 72 hours of procedure  - Consent signed and placed in chart    Vascular (C Team) Surgery  [id number]    ATTENDING STATEMENT :   Full progress note as above; discussed with surgery resident and vascular fellow.   She has dry gangrene of the LT 3rd + 4th toes.  She has mild to moderate pain. She had an angiogram  that noted severe ischemia in the foot.  There was no target artery down there.  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 The only option for her is unfortunately a BKA. She is agreeable with having it done.

## 2023-02-16 NOTE — PROGRESS NOTE ADULT - SUBJECTIVE AND OBJECTIVE BOX
Von Gayle MD  [facility] Division of Hospital Medicine  Pager: [phone]  Available via MS Teams    SUBJECTIVE / OVERNIGHT EVENTS:    Endorses inadequate pain control when meds are not received on time   otherwise no new complaints     MEDICATIONS  (STANDING):  acetaminophen     Tablet .. 975 milliGRAM(s) Oral every 6 hours  aspirin enteric coated 81 milliGRAM(s) Oral daily  atorvastatin 80 milliGRAM(s) Oral at bedtime  buMETAnide 1 milliGRAM(s) Oral two times a day  calamine/zinc oxide Lotion 1 Application(s) Topical three times a day  carvedilol 6.25 milliGRAM(s) Oral every 12 hours  dextrose 5%. 1000 milliLiter(s) (50 mL/Hr) IV Continuous <Continuous>  dextrose 5%. 1000 milliLiter(s) (100 mL/Hr) IV Continuous <Continuous>  dextrose 50% Injectable 25 Gram(s) IV Push once  dextrose 50% Injectable 12.5 Gram(s) IV Push once  dextrose 50% Injectable 25 Gram(s) IV Push once  glucagon  Injectable 1 milliGRAM(s) IntraMuscular once  heparin   Injectable 5000 Unit(s) SubCutaneous every 8 hours  influenza   Vaccine 0.5 milliLiter(s) IntraMuscular once  insulin lispro (ADMELOG) corrective regimen sliding scale   SubCutaneous three times a day before meals  insulin lispro (ADMELOG) corrective regimen sliding scale   SubCutaneous at bedtime  magnesium oxide 400 milliGRAM(s) Oral once  melatonin 9 milliGRAM(s) Oral at bedtime  naloxegol 12.5 milliGRAM(s) Oral daily  pantoprazole    Tablet 40 milliGRAM(s) Oral before breakfast  polyethylene glycol 3350 17 Gram(s) Oral two times a day  senna 2 Tablet(s) Oral at bedtime    MEDICATIONS  (PRN):  acetaminophen     Tablet .. 650 milliGRAM(s) Oral every 6 hours PRN Temp greater or equal to 38C (100.4F), Mild Pain (1 - 3)  dextrose Oral Gel 15 Gram(s) Oral once PRN Blood Glucose LESS THAN 70 milliGRAM(s)/deciliter  HYDROmorphone  Injectable 2 milliGRAM(s) IV Push every 3 hours PRN Severe Pain (7 - 10)  HYDROmorphone  Injectable 0.5 milliGRAM(s) IV Push every 4 hours PRN breakthrough pain  ondansetron Injectable 4 milliGRAM(s) IV Push every 8 hours PRN Nausea and/or Vomiting      I&O's Summary      PHYSICAL EXAM:  Vital Signs Last 24 Hrs  T(C): 36.1 (16 Feb 2023 11:21), Max: 36.7 (15 Feb 2023 18:00)  T(F): 97 (16 Feb 2023 11:21), Max: 98.1 (15 Feb 2023 18:00)  HR: 72 (16 Feb 2023 11:21) (62 - 72)  BP: 111/64 (16 Feb 2023 11:21) (111/64 - 133/74)  BP(mean): --  RR: 17 (16 Feb 2023 11:21) (16 - 18)  SpO2: 100% (16 Feb 2023 11:21) (100% - 100%)    Parameters below as of 16 Feb 2023 11:21  Patient On (Oxygen Delivery Method): room air      CONSTITUTIONAL: NAD, well-developed   EYES: conjunctiva and sclera clear  ENMT: Moist oral mucosa   NECK: Supple   RESPIRATORY: Normal respiratory effort; lungs are clear to auscultation bilaterally  CARDIOVASCULAR: Regular rate and rhythm, normal S1 and S2, no murmur/rub/gallop   ABDOMEN: Nontender to palpation, normoactive bowel sounds, no rebound/guarding   MUSCULOSKELETAL: noted to have left sided dry toe gangrene   PSYCH: A+O to person, place, and time; affect appropriate  NEUROLOGY: no gross sensory or motor deficits   SKIN: No rashes      LABS:                        8.4    12.49 )-----------( 342      ( 16 Feb 2023 05:38 )             28.6     02-16    128<L>  |  96<L>  |  51<H>  ----------------------------<  141<H>  4.7   |  19<L>  |  1.80<H>    Ca    8.6      16 Feb 2023 05:38  Phos  3.9     02-16  Mg     1.70     02-16                COVID-19 PCR: NotDetec (15 Feb 2023 13:00)  COVID-19 PCR: NotDetec (08 Feb 2023 03:18)      RADIOLOGY & ADDITIONAL TESTS:  Other Results Reviewed Today: BMP with stable Cr, CBC with stable Hg   New Imaging Personally Reviewed Today:  New Electrocardiogram Personally Reviewed Today:    COORDINATION OF CARE:  Communication: discussed plan of care with ACP

## 2023-02-16 NOTE — PROGRESS NOTE ADULT - SUBJECTIVE AND OBJECTIVE BOX
[facility] Division of Kidney Diseases & Hypertension  FOLLOW UP NOTE  [phone]--------------------------------------------------------------------------------    HPI: 42-year-female with PMH of HTN, HLD, DM, PAD, CHF admitted with left toe pain. Pt. developed BOB during hospital stay. Pt. was transferred to the MICU on 1/29/23 for septic shock requiring vasopressor support. Pt. clinically improved and was transferred to medical floors on 2/3/23. Pt. being seen for BOB. Sh is currently receiving Bumex 1 mg PO bid.    24 hour events/subjective: Pt. seen and examined at bedside today. Pt. continues to have L foot pain. On examination today no chest pain no shortness of breath no NVD.     PAST HISTORY  --------------------------------------------------------------------------------  No significant changes to PMH, PSH, FHx, SHx, unless otherwise noted    ALLERGIES & MEDICATIONS  --------------------------------------------------------------------------------  Allergies    No Known Allergies    Intolerances      Standing Inpatient Medications  acetaminophen     Tablet .. 975 milliGRAM(s) Oral every 6 hours  aspirin enteric coated 81 milliGRAM(s) Oral daily  atorvastatin 80 milliGRAM(s) Oral at bedtime  buMETAnide 1 milliGRAM(s) Oral two times a day  calamine/zinc oxide Lotion 1 Application(s) Topical three times a day  carvedilol 6.25 milliGRAM(s) Oral every 12 hours  dextrose 5%. 1000 milliLiter(s) IV Continuous <Continuous>  dextrose 5%. 1000 milliLiter(s) IV Continuous <Continuous>  dextrose 50% Injectable 25 Gram(s) IV Push once  dextrose 50% Injectable 12.5 Gram(s) IV Push once  dextrose 50% Injectable 25 Gram(s) IV Push once  glucagon  Injectable 1 milliGRAM(s) IntraMuscular once  heparin   Injectable 5000 Unit(s) SubCutaneous every 8 hours  influenza   Vaccine 0.5 milliLiter(s) IntraMuscular once  insulin lispro (ADMELOG) corrective regimen sliding scale   SubCutaneous three times a day before meals  insulin lispro (ADMELOG) corrective regimen sliding scale   SubCutaneous at bedtime  melatonin 9 milliGRAM(s) Oral at bedtime  naloxegol 12.5 milliGRAM(s) Oral daily  pantoprazole    Tablet 40 milliGRAM(s) Oral before breakfast  polyethylene glycol 3350 17 Gram(s) Oral two times a day  senna 2 Tablet(s) Oral at bedtime    PRN Inpatient Medications  acetaminophen     Tablet .. 650 milliGRAM(s) Oral every 6 hours PRN  dextrose Oral Gel 15 Gram(s) Oral once PRN  HYDROmorphone  Injectable 0.5 milliGRAM(s) IV Push every 4 hours PRN  HYDROmorphone  Injectable 2 milliGRAM(s) IV Push every 3 hours PRN  ondansetron Injectable 4 milliGRAM(s) IV Push every 8 hours PRN      REVIEW OF SYSTEMS  --------------------------------------------------------------------------------  Gen: no fever  Respiratory: no sob  CV: no cp  GI: no ab pain  : no difficulty with urination  MSK: +toe pain    VITALS/PHYSICAL EXAM  --------------------------------------------------------------------------------  T(C): 36.1 (02-16-23 @ 11:21), Max: 36.7 (02-15-23 @ 18:00)  HR: 72 (02-16-23 @ 11:21) (62 - 72)  BP: 111/64 (02-16-23 @ 11:21) (111/64 - 133/74)  RR: 17 (02-16-23 @ 11:21) (16 - 18)  SpO2: 100% (02-16-23 @ 11:21) (100% - 100%)  Wt(kg): --      Physical Exam:  Gen: resting, NAD  HEENT: Anicteric  Pulm: no rales  CV: S1S2+JVD noted but lying flat comfortably  Abd: Soft, +BS    Ext: B/L LE edema resolved, left foot: gangrenous changes seen  Neuro: Awake and alert  Skin: Warm and dry      LABS/STUDIES  --------------------------------------------------------------------------------              8.4    12.49 >-----------<  342      [02-16-23 @ 05:38]              28.6     128  |  96  |  51  ----------------------------<  141      [02-16-23 @ 05:38]  4.7   |  19  |  1.80        Ca     8.6     [02-16-23 @ 05:38]      Mg     1.70     [02-16-23 @ 05:38]      Phos  3.9     [02-16-23 @ 05:38]    Creatinine Trend:  SCr 1.80 [02-16 @ 05:38]  SCr 1.98 [02-15 @ 05:40]  SCr 2.19 [02-14 @ 06:25]  SCr 2.23 [02-13 @ 06:15]  SCr 2.02 [02-12 @ 05:49]

## 2023-02-16 NOTE — PROGRESS NOTE ADULT - SUBJECTIVE AND OBJECTIVE BOX
Vascular Surgery Progress Note    Subjective/24hour Events:   Patient seen and examined. No acute events. Pain controlled.      Vital Signs:  T(C): 36.6 (16 Feb 2023 06:06), Max: 36.7 (15 Feb 2023 18:00)  T(F): 97.8 (16 Feb 2023 06:06), Max: 98.1 (15 Feb 2023 18:00)  HR: 62 (16 Feb 2023 06:06) (62 - 75)  BP: 123/79 (16 Feb 2023 06:06) (106/61 - 133/74  RR: 16 (16 Feb 2023 06:06) (16 - 18)  SpO2: 100% (16 Feb 2023 06:06) (99% - 100%)    O2 Parameters below as of 16 Feb 2023 06:06  Patient On (Oxygen Delivery Method): room air      CAPILLARY BLOOD GLUCOSE    POCT Blood Glucose.: 157 mg/dL (15 Feb 2023 22:31)  POCT Blood Glucose.: 313 mg/dL (15 Feb 2023 18:29)  POCT Blood Glucose.: 199 mg/dL (15 Feb 2023 12:32)  POCT Blood Glucose.: 289 mg/dL (15 Feb 2023 08:47)              Physical Exam:  General: NAD, resting comfortably in bed  HEENT: Normocephalic atraumatic  Respiratory: Nonlabored respirations, normal CW expansion.  Cardio: S1S2, regular rate and rhythm.  Abdomen: SNTND. Groins access site is CDI without hematoma  Vascular: extremities are warm and well perfused. Gangrenous toes. Peroneal signal       Labs:    02-15    128<L>  |  94<L>  |  51<H>  ----------------------------<  198<H>  4.7   |  21<L>  |  1.98<H>    Ca    8.9      15 Feb 2023 05:40  Phos  4.1     02-15  Mg     1.80     02-15                              8.9    12.11 )-----------( 510      ( 15 Feb 2023 05:40 )             30.3

## 2023-02-16 NOTE — PROGRESS NOTE ADULT - PROBLEM SELECTOR PLAN 1
Pt. with BOB in setting of hypotension, IV contrast use and CHF. Upon review of labs on [facility] HIE/[place], Scr was elevated at 1.33 on 6/28/21 and improved to 1.15 on 2/24/22. Scr was 1.22 on admission (1/25/23). Pt. underwent LE angiogram on 1/25/23. Scr worsened to 4.23 on 1/30/23. No hydronephrosis seen on CT scan study. Pt. was oliguric, initiated on IV Bumex infusion in MICU on 1/30/23. Pt. responded well to IV diuretic therapy and was then switched to oral diuretic therapy. Currently on PO Bumex 1 mg BID with stable/improved serum creatinine today (2/16) at 1.80.  On examination today no rales.  Monitor BMP And volume status.   If patient going for additional contrast studies will need to risk stratify.    If you have any questions, please feel free to contact me  Talia Swift  Nephrology Fellow  [phone] / [employer] Teams(Preferred)  (After 5pm or on weekends please page the on-call fellow)

## 2023-02-17 LAB
ANION GAP SERPL CALC-SCNC: 12 MMOL/L — SIGNIFICANT CHANGE UP (ref 7–14)
ANION GAP SERPL CALC-SCNC: 13 MMOL/L — SIGNIFICANT CHANGE UP (ref 7–14)
APTT BLD: 36.1 SEC — SIGNIFICANT CHANGE UP (ref 27–36.3)
BLD GP AB SCN SERPL QL: NEGATIVE — SIGNIFICANT CHANGE UP
BUN SERPL-MCNC: 50 MG/DL — HIGH (ref 7–23)
BUN SERPL-MCNC: 50 MG/DL — HIGH (ref 7–23)
CALCIUM SERPL-MCNC: 8.3 MG/DL — LOW (ref 8.4–10.5)
CALCIUM SERPL-MCNC: 8.4 MG/DL — SIGNIFICANT CHANGE UP (ref 8.4–10.5)
CHLORIDE SERPL-SCNC: 95 MMOL/L — LOW (ref 98–107)
CHLORIDE SERPL-SCNC: 96 MMOL/L — LOW (ref 98–107)
CO2 SERPL-SCNC: 22 MMOL/L — SIGNIFICANT CHANGE UP (ref 22–31)
CO2 SERPL-SCNC: 22 MMOL/L — SIGNIFICANT CHANGE UP (ref 22–31)
CREAT SERPL-MCNC: 1.55 MG/DL — HIGH (ref 0.5–1.3)
CREAT SERPL-MCNC: 1.58 MG/DL — HIGH (ref 0.5–1.3)
EGFR: 41 ML/MIN/1.73M2 — LOW
EGFR: 42 ML/MIN/1.73M2 — LOW
GLUCOSE BLDC GLUCOMTR-MCNC: 125 MG/DL — HIGH (ref 70–99)
GLUCOSE BLDC GLUCOMTR-MCNC: 135 MG/DL — HIGH (ref 70–99)
GLUCOSE BLDC GLUCOMTR-MCNC: 184 MG/DL — HIGH (ref 70–99)
GLUCOSE BLDC GLUCOMTR-MCNC: 231 MG/DL — HIGH (ref 70–99)
GLUCOSE SERPL-MCNC: 241 MG/DL — HIGH (ref 70–99)
GLUCOSE SERPL-MCNC: 249 MG/DL — HIGH (ref 70–99)
HCG SERPL-ACNC: <5 MIU/ML — SIGNIFICANT CHANGE UP
HCT VFR BLD CALC: 30.2 % — LOW (ref 34.5–45)
HCT VFR BLD CALC: 32.2 % — LOW (ref 34.5–45)
HGB BLD-MCNC: 9.1 G/DL — LOW (ref 11.5–15.5)
HGB BLD-MCNC: 9.6 G/DL — LOW (ref 11.5–15.5)
INR BLD: 1.34 RATIO — HIGH (ref 0.88–1.16)
MAGNESIUM SERPL-MCNC: 1.6 MG/DL — SIGNIFICANT CHANGE UP (ref 1.6–2.6)
MAGNESIUM SERPL-MCNC: 1.7 MG/DL — SIGNIFICANT CHANGE UP (ref 1.6–2.6)
MCHC RBC-ENTMCNC: 25 PG — LOW (ref 27–34)
MCHC RBC-ENTMCNC: 25.1 PG — LOW (ref 27–34)
MCHC RBC-ENTMCNC: 29.8 GM/DL — LOW (ref 32–36)
MCHC RBC-ENTMCNC: 30.1 GM/DL — LOW (ref 32–36)
MCV RBC AUTO: 83.2 FL — SIGNIFICANT CHANGE UP (ref 80–100)
MCV RBC AUTO: 83.9 FL — SIGNIFICANT CHANGE UP (ref 80–100)
NRBC # BLD: 0 /100 WBCS — SIGNIFICANT CHANGE UP (ref 0–0)
NRBC # BLD: 0 /100 WBCS — SIGNIFICANT CHANGE UP (ref 0–0)
NRBC # FLD: 0.05 K/UL — HIGH (ref 0–0)
NRBC # FLD: 0.09 K/UL — HIGH (ref 0–0)
PHOSPHATE SERPL-MCNC: 3.6 MG/DL — SIGNIFICANT CHANGE UP (ref 2.5–4.5)
PHOSPHATE SERPL-MCNC: 3.8 MG/DL — SIGNIFICANT CHANGE UP (ref 2.5–4.5)
PLATELET # BLD AUTO: 475 K/UL — HIGH (ref 150–400)
PLATELET # BLD AUTO: 503 K/UL — HIGH (ref 150–400)
POTASSIUM SERPL-MCNC: 4.5 MMOL/L — SIGNIFICANT CHANGE UP (ref 3.5–5.3)
POTASSIUM SERPL-MCNC: 4.6 MMOL/L — SIGNIFICANT CHANGE UP (ref 3.5–5.3)
POTASSIUM SERPL-SCNC: 4.5 MMOL/L — SIGNIFICANT CHANGE UP (ref 3.5–5.3)
POTASSIUM SERPL-SCNC: 4.6 MMOL/L — SIGNIFICANT CHANGE UP (ref 3.5–5.3)
PROTHROM AB SERPL-ACNC: 15.6 SEC — HIGH (ref 10.5–13.4)
RBC # BLD: 3.63 M/UL — LOW (ref 3.8–5.2)
RBC # BLD: 3.84 M/UL — SIGNIFICANT CHANGE UP (ref 3.8–5.2)
RBC # FLD: 19.5 % — HIGH (ref 10.3–14.5)
RBC # FLD: 19.5 % — HIGH (ref 10.3–14.5)
RH IG SCN BLD-IMP: POSITIVE — SIGNIFICANT CHANGE UP
SODIUM SERPL-SCNC: 130 MMOL/L — LOW (ref 135–145)
SODIUM SERPL-SCNC: 130 MMOL/L — LOW (ref 135–145)
WBC # BLD: 14.42 K/UL — HIGH (ref 3.8–10.5)
WBC # BLD: 15.29 K/UL — HIGH (ref 3.8–10.5)
WBC # FLD AUTO: 14.42 K/UL — HIGH (ref 3.8–10.5)
WBC # FLD AUTO: 15.29 K/UL — HIGH (ref 3.8–10.5)

## 2023-02-17 PROCEDURE — 99232 SBSQ HOSP IP/OBS MODERATE 35: CPT | Mod: GC

## 2023-02-17 PROCEDURE — 88307 TISSUE EXAM BY PATHOLOGIST: CPT | Mod: 26

## 2023-02-17 PROCEDURE — 99233 SBSQ HOSP IP/OBS HIGH 50: CPT

## 2023-02-17 PROCEDURE — 88311 DECALCIFY TISSUE: CPT | Mod: 26

## 2023-02-17 DEVICE — LIGATING CLIPS WECK HORIZON LARGE (ORANGE) 24: Type: IMPLANTABLE DEVICE | Status: FUNCTIONAL

## 2023-02-17 DEVICE — SURGICEL 2 X 14": Type: IMPLANTABLE DEVICE | Status: FUNCTIONAL

## 2023-02-17 DEVICE — LIGATING CLIPS WECK HORIZON MEDIUM (BLUE) 24: Type: IMPLANTABLE DEVICE | Status: FUNCTIONAL

## 2023-02-17 RX ORDER — HYDROMORPHONE HYDROCHLORIDE 2 MG/ML
1 INJECTION INTRAMUSCULAR; INTRAVENOUS; SUBCUTANEOUS
Refills: 0 | Status: DISCONTINUED | OUTPATIENT
Start: 2023-02-17 | End: 2023-02-18

## 2023-02-17 RX ORDER — ONDANSETRON 8 MG/1
4 TABLET, FILM COATED ORAL ONCE
Refills: 0 | Status: DISCONTINUED | OUTPATIENT
Start: 2023-02-17 | End: 2023-02-17

## 2023-02-17 RX ORDER — HYDROMORPHONE HYDROCHLORIDE 2 MG/ML
0.5 INJECTION INTRAMUSCULAR; INTRAVENOUS; SUBCUTANEOUS
Refills: 0 | Status: DISCONTINUED | OUTPATIENT
Start: 2023-02-17 | End: 2023-02-17

## 2023-02-17 RX ORDER — OXYCODONE HYDROCHLORIDE 5 MG/1
5 TABLET ORAL ONCE
Refills: 0 | Status: DISCONTINUED | OUTPATIENT
Start: 2023-02-17 | End: 2023-02-17

## 2023-02-17 RX ORDER — HYDROMORPHONE HYDROCHLORIDE 2 MG/ML
0.5 INJECTION INTRAMUSCULAR; INTRAVENOUS; SUBCUTANEOUS
Refills: 0 | Status: DISCONTINUED | OUTPATIENT
Start: 2023-02-17 | End: 2023-02-18

## 2023-02-17 RX ADMIN — OXYCODONE HYDROCHLORIDE 5 MILLIGRAM(S): 5 TABLET ORAL at 17:34

## 2023-02-17 RX ADMIN — HYDROMORPHONE HYDROCHLORIDE 0.5 MILLIGRAM(S): 2 INJECTION INTRAMUSCULAR; INTRAVENOUS; SUBCUTANEOUS at 17:05

## 2023-02-17 RX ADMIN — ATORVASTATIN CALCIUM 80 MILLIGRAM(S): 80 TABLET, FILM COATED ORAL at 21:28

## 2023-02-17 RX ADMIN — HYDROMORPHONE HYDROCHLORIDE 0.5 MILLIGRAM(S): 2 INJECTION INTRAMUSCULAR; INTRAVENOUS; SUBCUTANEOUS at 17:04

## 2023-02-17 RX ADMIN — HYDROMORPHONE HYDROCHLORIDE 1 MILLIGRAM(S): 2 INJECTION INTRAMUSCULAR; INTRAVENOUS; SUBCUTANEOUS at 18:00

## 2023-02-17 RX ADMIN — Medication 2: at 10:02

## 2023-02-17 RX ADMIN — Medication 975 MILLIGRAM(S): at 07:44

## 2023-02-17 RX ADMIN — CALAMINE AND ZINC OXIDE AND PHENOL 1 APPLICATION(S): 160; 10 LOTION TOPICAL at 06:43

## 2023-02-17 RX ADMIN — HYDROMORPHONE HYDROCHLORIDE 2 MILLIGRAM(S): 2 INJECTION INTRAMUSCULAR; INTRAVENOUS; SUBCUTANEOUS at 12:12

## 2023-02-17 RX ADMIN — HYDROMORPHONE HYDROCHLORIDE 2 MILLIGRAM(S): 2 INJECTION INTRAMUSCULAR; INTRAVENOUS; SUBCUTANEOUS at 10:01

## 2023-02-17 RX ADMIN — Medication 9 MILLIGRAM(S): at 21:36

## 2023-02-17 RX ADMIN — Medication 975 MILLIGRAM(S): at 06:46

## 2023-02-17 RX ADMIN — HYDROMORPHONE HYDROCHLORIDE 2 MILLIGRAM(S): 2 INJECTION INTRAMUSCULAR; INTRAVENOUS; SUBCUTANEOUS at 20:34

## 2023-02-17 RX ADMIN — Medication 975 MILLIGRAM(S): at 00:06

## 2023-02-17 RX ADMIN — HYDROMORPHONE HYDROCHLORIDE 2 MILLIGRAM(S): 2 INJECTION INTRAMUSCULAR; INTRAVENOUS; SUBCUTANEOUS at 23:54

## 2023-02-17 RX ADMIN — CARVEDILOL PHOSPHATE 6.25 MILLIGRAM(S): 80 CAPSULE, EXTENDED RELEASE ORAL at 06:46

## 2023-02-17 RX ADMIN — HYDROMORPHONE HYDROCHLORIDE 0.5 MILLIGRAM(S): 2 INJECTION INTRAMUSCULAR; INTRAVENOUS; SUBCUTANEOUS at 16:46

## 2023-02-17 RX ADMIN — Medication 975 MILLIGRAM(S): at 21:35

## 2023-02-17 RX ADMIN — BUMETANIDE 1 MILLIGRAM(S): 0.25 INJECTION INTRAMUSCULAR; INTRAVENOUS at 06:46

## 2023-02-17 RX ADMIN — HYDROMORPHONE HYDROCHLORIDE 2 MILLIGRAM(S): 2 INJECTION INTRAMUSCULAR; INTRAVENOUS; SUBCUTANEOUS at 04:14

## 2023-02-17 RX ADMIN — HYDROMORPHONE HYDROCHLORIDE 0.5 MILLIGRAM(S): 2 INJECTION INTRAMUSCULAR; INTRAVENOUS; SUBCUTANEOUS at 01:05

## 2023-02-17 RX ADMIN — HYDROMORPHONE HYDROCHLORIDE 0.5 MILLIGRAM(S): 2 INJECTION INTRAMUSCULAR; INTRAVENOUS; SUBCUTANEOUS at 00:05

## 2023-02-17 RX ADMIN — HYDROMORPHONE HYDROCHLORIDE 1 MILLIGRAM(S): 2 INJECTION INTRAMUSCULAR; INTRAVENOUS; SUBCUTANEOUS at 17:33

## 2023-02-17 RX ADMIN — HYDROMORPHONE HYDROCHLORIDE 2 MILLIGRAM(S): 2 INJECTION INTRAMUSCULAR; INTRAVENOUS; SUBCUTANEOUS at 20:19

## 2023-02-17 RX ADMIN — HEPARIN SODIUM 5000 UNIT(S): 5000 INJECTION INTRAVENOUS; SUBCUTANEOUS at 06:47

## 2023-02-17 RX ADMIN — HEPARIN SODIUM 5000 UNIT(S): 5000 INJECTION INTRAVENOUS; SUBCUTANEOUS at 21:30

## 2023-02-17 RX ADMIN — PANTOPRAZOLE SODIUM 40 MILLIGRAM(S): 20 TABLET, DELAYED RELEASE ORAL at 06:47

## 2023-02-17 RX ADMIN — HYDROMORPHONE HYDROCHLORIDE 0.5 MILLIGRAM(S): 2 INJECTION INTRAMUSCULAR; INTRAVENOUS; SUBCUTANEOUS at 07:44

## 2023-02-17 RX ADMIN — HYDROMORPHONE HYDROCHLORIDE 0.5 MILLIGRAM(S): 2 INJECTION INTRAMUSCULAR; INTRAVENOUS; SUBCUTANEOUS at 17:30

## 2023-02-17 RX ADMIN — Medication 975 MILLIGRAM(S): at 22:35

## 2023-02-17 RX ADMIN — CARVEDILOL PHOSPHATE 6.25 MILLIGRAM(S): 80 CAPSULE, EXTENDED RELEASE ORAL at 21:29

## 2023-02-17 RX ADMIN — HYDROMORPHONE HYDROCHLORIDE 0.5 MILLIGRAM(S): 2 INJECTION INTRAMUSCULAR; INTRAVENOUS; SUBCUTANEOUS at 06:46

## 2023-02-17 RX ADMIN — Medication 975 MILLIGRAM(S): at 01:06

## 2023-02-17 RX ADMIN — HYDROMORPHONE HYDROCHLORIDE 2 MILLIGRAM(S): 2 INJECTION INTRAMUSCULAR; INTRAVENOUS; SUBCUTANEOUS at 04:29

## 2023-02-17 NOTE — PROGRESS NOTE ADULT - PROBLEM SELECTOR PLAN 1
Pt. with BOB in setting of hypotension, IV contrast use and CHF. Upon review of labs on [facility] HIE/[place], Scr was elevated at 1.33 on 6/28/21 and improved to 1.15 on 2/24/22. Scr was 1.22 on admission (1/25/23). Pt. underwent LE angiogram on 1/25/23. Scr worsened to 4.23 on 1/30/23. No hydronephrosis seen on CT scan study. Pt. was oliguric, initiated on IV Bumex infusion in MICU on 1/30/23. Pt. responded well to IV diuretic therapy and was then transitioned to oral diuretic therapy. Currently on PO Bumex 1 mg BID with stable/improved serum creatinine today (2/17) at 1.58. On examination today no rales. Vascular surgery note from today reviewed, plan for OR for L BKA today. Recommend to continue with PO Bumex. Monitor labs and urine output. Avoid nephrotoxins. Dose medications as per eGFR.     If you have any questions, please feel free to contact me  Talia Swift  Nephrology Fellow  [phone] / [employer] Teams(Preferred)  (After 5pm or on weekends please page the on-call fellow)

## 2023-02-17 NOTE — PROGRESS NOTE ADULT - SUBJECTIVE AND OBJECTIVE BOX
Von Gayle MD  [facility] Division of Hospital Medicine  Pager: [phone]  Available via MS Teams    SUBJECTIVE / OVERNIGHT EVENTS:    No new complaints. Waiting for BKA patiently     MEDICATIONS  (STANDING):  acetaminophen     Tablet .. 975 milliGRAM(s) Oral every 6 hours  aspirin enteric coated 81 milliGRAM(s) Oral daily  atorvastatin 80 milliGRAM(s) Oral at bedtime  buMETAnide 1 milliGRAM(s) Oral two times a day  calamine/zinc oxide Lotion 1 Application(s) Topical three times a day  carvedilol 6.25 milliGRAM(s) Oral every 12 hours  dextrose 5%. 1000 milliLiter(s) (100 mL/Hr) IV Continuous <Continuous>  dextrose 5%. 1000 milliLiter(s) (50 mL/Hr) IV Continuous <Continuous>  dextrose 50% Injectable 25 Gram(s) IV Push once  dextrose 50% Injectable 12.5 Gram(s) IV Push once  dextrose 50% Injectable 25 Gram(s) IV Push once  glucagon  Injectable 1 milliGRAM(s) IntraMuscular once  heparin   Injectable 5000 Unit(s) SubCutaneous every 8 hours  influenza   Vaccine 0.5 milliLiter(s) IntraMuscular once  insulin lispro (ADMELOG) corrective regimen sliding scale   SubCutaneous three times a day before meals  insulin lispro (ADMELOG) corrective regimen sliding scale   SubCutaneous at bedtime  melatonin 9 milliGRAM(s) Oral at bedtime  naloxegol 12.5 milliGRAM(s) Oral daily  pantoprazole    Tablet 40 milliGRAM(s) Oral before breakfast  polyethylene glycol 3350 17 Gram(s) Oral two times a day  senna 2 Tablet(s) Oral at bedtime    MEDICATIONS  (PRN):  acetaminophen     Tablet .. 650 milliGRAM(s) Oral every 6 hours PRN Temp greater or equal to 38C (100.4F), Mild Pain (1 - 3)  dextrose Oral Gel 15 Gram(s) Oral once PRN Blood Glucose LESS THAN 70 milliGRAM(s)/deciliter  HYDROmorphone  Injectable 0.5 milliGRAM(s) IV Push every 4 hours PRN breakthrough pain  HYDROmorphone  Injectable 2 milliGRAM(s) IV Push every 3 hours PRN Severe Pain (7 - 10)  ondansetron Injectable 4 milliGRAM(s) IV Push every 8 hours PRN Nausea and/or Vomiting      I&O's Summary    16 Feb 2023 07:01  -  17 Feb 2023 07:00  --------------------------------------------------------  IN: 0 mL / OUT: 400 mL / NET: -400 mL        PHYSICAL EXAM:  Vital Signs Last 24 Hrs  T(C): 36.4 (17 Feb 2023 13:28), Max: 36.7 (17 Feb 2023 06:42)  T(F): 97.6 (17 Feb 2023 13:28), Max: 98.1 (17 Feb 2023 06:42)  HR: 71 (17 Feb 2023 13:28) (66 - 72)  BP: 136/82 (17 Feb 2023 13:28) (112/57 - 136/82)  BP(mean): --  RR: 18 (17 Feb 2023 13:28) (16 - 18)  SpO2: 98% (17 Feb 2023 13:28) (98% - 100%)    Parameters below as of 17 Feb 2023 13:28  Patient On (Oxygen Delivery Method): room air      CONSTITUTIONAL: NAD, well-developed   EYES: conjunctiva and sclera clear  ENMT: Moist oral mucosa   NECK: Supple   RESPIRATORY: Normal respiratory effort; lungs are clear to auscultation bilaterally  CARDIOVASCULAR: Regular rate and rhythm, normal S1 and S2, no murmur/rub/gallop   ABDOMEN: Nontender to palpation, normoactive bowel sounds, no rebound/guarding   MUSCULOSKELETAL: noted to have left sided dry toe gangrene   PSYCH: A+O to person, place, and time; affect appropriate  NEUROLOGY: no gross sensory or motor deficits   SKIN: No rashes    LABS:                        9.1    14.42 )-----------( 475      ( 17 Feb 2023 05:50 )             30.2     02-17    130<L>  |  96<L>  |  50<H>  ----------------------------<  241<H>  4.5   |  22  |  1.55<H>    Ca    8.3<L>      17 Feb 2023 05:50  Phos  3.6     02-17  Mg     1.60     02-17      PT/INR - ( 17 Feb 2023 01:00 )   PT: 15.6 sec;   INR: 1.34 ratio         PTT - ( 17 Feb 2023 01:00 )  PTT:36.1 sec          COVID-19 PCR: NotDetec (15 Feb 2023 13:00)  COVID-19 PCR: NotDetec (08 Feb 2023 03:18)      RADIOLOGY & ADDITIONAL TESTS:  Other Results Reviewed Today: BMP with stable Cr, CBC with stable Hg     COORDINATION OF CARE:  Communication: discussed plan of care with ACP

## 2023-02-17 NOTE — PROGRESS NOTE ADULT - ASSESSMENT
42F w/ PMH coronary artery disease status post CABG x4 in 2018, CHF s/p ICD placement (interrogated 2022), DM, PAD s/p 3rd toe amputation who presents with left 2nd toe pain concerning for dry gangrene. Now s/p LLE angio 2/10. Planning for L BKA tomorrow 2/17.     Plan:  - OR today for L BKA   - Appreciate documented medical optimization and cardiac risk stratification  - NPO   - Preop labs on Friday morning (CBC, BMP, Mag, Phos, coags) complete and reviewed   - Consent signed and placed in chart      Vascular (C Team) Surgery  [id number]

## 2023-02-17 NOTE — PROGRESS NOTE ADULT - ATTENDING COMMENTS
improving kidney function   continue diuretics   Further detailed plan of management and recommendation as outlined above by the renal fellow.

## 2023-02-17 NOTE — PROGRESS NOTE ADULT - SUBJECTIVE AND OBJECTIVE BOX
[facility] Division of Kidney Diseases & Hypertension  FOLLOW UP NOTE  [phone]--------------------------------------------------------------------------------  HPI: 42-year-female with PMH of HTN, HLD, DM, PAD, CHF admitted with left toe pain. Pt. developed BOB during hospital stay. Pt. was transferred to the MICU on 1/29/23 for septic shock requiring vasopressor support. Pt. clinically improved and was transferred to medical floors on 2/3/23. Pt. being seen for BOB. Sh is currently receiving Bumex 1 mg PO bid.    24 hour events/subjective: Pt. seen and examined at bedside today. Pt. continues to have L foot pain. No chest pain, shortness of breath, or N/V/D.       PAST HISTORY  --------------------------------------------------------------------------------  No significant changes to PMH, PSH, FHx, SHx, unless otherwise noted    ALLERGIES & MEDICATIONS  --------------------------------------------------------------------------------  Allergies    No Known Allergies    Intolerances      Standing Inpatient Medications  acetaminophen     Tablet .. 975 milliGRAM(s) Oral every 6 hours  aspirin enteric coated 81 milliGRAM(s) Oral daily  atorvastatin 80 milliGRAM(s) Oral at bedtime  buMETAnide 1 milliGRAM(s) Oral two times a day  calamine/zinc oxide Lotion 1 Application(s) Topical three times a day  carvedilol 6.25 milliGRAM(s) Oral every 12 hours  dextrose 5%. 1000 milliLiter(s) IV Continuous <Continuous>  dextrose 5%. 1000 milliLiter(s) IV Continuous <Continuous>  dextrose 50% Injectable 25 Gram(s) IV Push once  dextrose 50% Injectable 12.5 Gram(s) IV Push once  dextrose 50% Injectable 25 Gram(s) IV Push once  glucagon  Injectable 1 milliGRAM(s) IntraMuscular once  heparin   Injectable 5000 Unit(s) SubCutaneous every 8 hours  influenza   Vaccine 0.5 milliLiter(s) IntraMuscular once  insulin lispro (ADMELOG) corrective regimen sliding scale   SubCutaneous three times a day before meals  insulin lispro (ADMELOG) corrective regimen sliding scale   SubCutaneous at bedtime  melatonin 9 milliGRAM(s) Oral at bedtime  naloxegol 12.5 milliGRAM(s) Oral daily  pantoprazole    Tablet 40 milliGRAM(s) Oral before breakfast  polyethylene glycol 3350 17 Gram(s) Oral two times a day  senna 2 Tablet(s) Oral at bedtime    PRN Inpatient Medications  acetaminophen     Tablet .. 650 milliGRAM(s) Oral every 6 hours PRN  dextrose Oral Gel 15 Gram(s) Oral once PRN  HYDROmorphone  Injectable 0.5 milliGRAM(s) IV Push every 4 hours PRN  HYDROmorphone  Injectable 2 milliGRAM(s) IV Push every 3 hours PRN  ondansetron Injectable 4 milliGRAM(s) IV Push every 8 hours PRN      REVIEW OF SYSTEMS  --------------------------------------------------------------------------------  Gen: no fever  Respiratory: no sob  CV: no cp  GI: no ab pain  : no difficulty with urination  MSK: +L foot pain    VITALS/PHYSICAL EXAM  --------------------------------------------------------------------------------  T(C): 36.5 (02-17-23 @ 11:41), Max: 36.7 (02-17-23 @ 06:42)  HR: 66 (02-17-23 @ 11:41) (66 - 72)  BP: 114/68 (02-17-23 @ 11:41) (112/57 - 122/66)  RR: 17 (02-17-23 @ 11:41) (16 - 17)  SpO2: 100% (02-17-23 @ 11:41) (100% - 100%)  Wt(kg): --        02-16-23 @ 07:01  -  02-17-23 @ 07:00  --------------------------------------------------------  IN: 0 mL / OUT: 400 mL / NET: -400 mL      Physical Exam:  Gen: resting, NAD  HEENT: Anicteric  Pulm: no rales  CV: S1S2+JVD noted but lying flat comfortably  Abd: Soft, +BS    Ext: +BL LE edema, +L foot wrapped  Neuro: Awake and alert  Skin: Warm and dry    LABS/STUDIES  --------------------------------------------------------------------------------              9.1    14.42 >-----------<  475      [02-17-23 @ 05:50]              30.2     130  |  96  |  50  ----------------------------<  241      [02-17-23 @ 05:50]  4.5   |  22  |  1.55        Ca     8.3     [02-17-23 @ 05:50]      Mg     1.60     [02-17-23 @ 05:50]      Phos  3.6     [02-17-23 @ 05:50]    PT/INR: PT 15.6 , INR 1.34       [02-17-23 @ 01:00]  PTT: 36.1       [02-17-23 @ 01:00]    Creatinine Trend:  SCr 1.55 [02-17 @ 05:50]  SCr 1.58 [02-17 @ 01:00]  SCr 1.80 [02-16 @ 05:38]  SCr 1.98 [02-15 @ 05:40]  SCr 2.19 [02-14 @ 06:25]

## 2023-02-17 NOTE — PACU DISCHARGE NOTE - NSPTMEETSDISCHCRITERIADT_GEN_A_CORE
17-Feb-2023 17:50 Tranexamic Acid Pregnancy And Lactation Text: It is unknown if this medication is safe during pregnancy or breast feeding.

## 2023-02-17 NOTE — PROGRESS NOTE ADULT - PROBLEM SELECTOR PLAN 1
Patient presenting in setting of L 2nd toe pain concerning for dry gangrene with history of prior toe amputation iso PAD. Initially with elevated ESR/CRP without active signs of infection however later with concern for infection and septic shock.   - pain control w/ Dilaudid 2mg q3hr PRN for severe; Dilaudid 1mg q4hr PRN for moderate, ATC tylenol, dilaudid 0.5mg q4hr PRN added for breakthrough given increased reported pain  -s/p vascular intervention with CO2 LLE angiogram 2/10. Rec L BKA   - Cardiology risk strat appreciated. Pt can proceed to BKA on 2/17 as she is medically optimized  - completed abx course as below, not currently on abx  - PT/OT

## 2023-02-17 NOTE — CHART NOTE - NSCHARTNOTESELECT_GEN_ALL_CORE
Chest pain/Event Note
Event Note
Event Note
MICU transfer/Event Note
Medical Optimization
Post op check
CArdiology fellow/Event Note
Cardiology/Event Note
Event Note
Follow Up/Nutrition Services
Follow-up/Nutrition Services
MICU Accept
MICU POCUS
MICU POCUS
Nutrition Services
post op check/Event Note
site check/Event Note

## 2023-02-17 NOTE — CHART NOTE - NSCHARTNOTEFT_GEN_A_CORE
Post Operative Note  Patient: AMRIT COELLO 42y (1980) Female   MRN: [id number]  Location: [place] [address] A  Visit: 01-25-23 Inpatient  Date: 02-17-23 @ 22:57    Procedure: S/P left BKA     Subjective:   Patient seen and examined at bedside. Patient endorses pain but otherwise no acute complaints.     Objective:  Vitals: T(F): 97.5 (02-17-23 @ 20:18), Max: 98.1 (02-17-23 @ 06:42)  HR: 69 (02-17-23 @ 20:18)  BP: 146/83 (02-17-23 @ 20:18) (114/68 - 150/70)  RR: 18 (02-17-23 @ 20:18)  SpO2: 100% (02-17-23 @ 20:18)  Vent Settings:     In:   02-16-23 @ 07:01  -  02-17-23 @ 07:00  --------------------------------------------------------  IN: 0 mL    02-17-23 @ 07:01  -  02-17-23 @ 22:57  --------------------------------------------------------  IN: 0 mL      IV Fluids: dextrose 5%. 1000 milliLiter(s) (100 mL/Hr) IV Continuous <Continuous>  dextrose 5%. 1000 milliLiter(s) (50 mL/Hr) IV Continuous <Continuous>      Out:   02-16-23 @ 07:01  -  02-17-23 @ 07:00  --------------------------------------------------------  OUT: 400 mL    02-17-23 @ 07:01  -  02-17-23 @ 22:57  --------------------------------------------------------  OUT: 10 mL      EBL:     Voided Urine:   02-16-23 @ 07:01  -  02-17-23 @ 07:00  --------------------------------------------------------  OUT: 400 mL    02-17-23 @ 07:01  -  02-17-23 @ 22:57  --------------------------------------------------------  OUT: 10 mL      Clements Catheter: yes no   Drains:   COOKIE:   02-17-23 @ 07:01  -  02-17-23 @ 22:57  --------------------------------------------------------  OUT: 10 mL     ,   Chest Tube:      NG Tube:         Physical Examination:  General: NAD, resting comfortably in bed  Respiratory: Nonlabored respirations  Cardio: pulse present   Vascular: extremities are warm and well perfused.   Extremities: L BKA dressing c/d/i, knee binder in place, COOKIE drain with s/s drainage     Imaging:  No post-op imaging studies    Assessment:  42yFemale patient S/P L BKA 2/17    Plan:  - Pain control PRN  - Diet: as tolerated   - ASA  - DVT ppx  - dressings to come off POD3  - appreciate care per primary team     Date/Time: 02-17-23 @ 22:57    Surgery C Team  [id number]

## 2023-02-17 NOTE — CHART NOTE - NSCHARTNOTEFT_GEN_A_CORE
NUTRITION FOLLOW-UP:    42F w/ PMH coronary artery disease status post CABG x4 in 2018, CHF s/p ICD placement (interrogated 2022), DM, PAD s/p 3rd toe amputation who presents with left 2nd toe pain concerning for dry gangrene. Seen by podiatry and vascular teams with plans for possible transmetatarsal amputation. Course c/b worsening renal function, and shock likely sepsis 2/2 LE wounds w/ possible cardiogenic component requiring pressors, now improved. S/p LLE angio by vascular, currently pending BKA on Friday.    Pt f/u for malnutrition, Currently NPO for BKA otherwise consuming 50% intake of meals and supplement as ordered.  Encouraged to fill out menu to receive food preferences. No GI distress (nausea/vomiting/diarrhea/constipation.) at present. Will reassess nutritional needs s/p BKA.    Weight: 168.7# (2/16/23), 157.5# (1/26/23)    Labs:  02-17 Na 130 mmol/L<L> Glu 241 mg/dL<H> K+ 4.5 mmol/L Cr 1.55 mg/dL<H> BUN 50 mg/dL<H> Phos 3.6 mg/dL  02-17 @ 11:12 POCT 184 mg/dL  02-17 @ 09:08 POCT 231 mg/dL  02-16 @ 21:57 POCT 243 mg/dL  02-16 @ 17:49 POCT 337 mg/dL    Current Diet: Diet, NPO after Midnight:      NPO Start Date: 16-Feb-2023,   NPO Start Time: 23:59  Except Medications (02-16-23 @ 08:20)  Diet, DASH/TLC:   Sodium & Cholesterol Restricted  Consistent Carbohydrate {No Snacks} (CSTCHO)  Supplement Feeding Modality:  Oral  Nepro Cans or Servings Per Day:  1       Frequency:  Daily (02-03-23 @ 13:14)    Skin- ecchymosis, no edema as per RN flow sheet    MEDICATIONS  (STANDING):  acetaminophen     Tablet .. 975 milliGRAM(s) Oral every 6 hours  aspirin enteric coated 81 milliGRAM(s) Oral daily  atorvastatin 80 milliGRAM(s) Oral at bedtime  buMETAnide 1 milliGRAM(s) Oral two times a day  calamine/zinc oxide Lotion 1 Application(s) Topical three times a day  carvedilol 6.25 milliGRAM(s) Oral every 12 hours  dextrose 5%. 1000 milliLiter(s) (100 mL/Hr) IV Continuous <Continuous>  dextrose 5%. 1000 milliLiter(s) (50 mL/Hr) IV Continuous <Continuous>  dextrose 50% Injectable 25 Gram(s) IV Push once  dextrose 50% Injectable 12.5 Gram(s) IV Push once  dextrose 50% Injectable 25 Gram(s) IV Push once  glucagon  Injectable 1 milliGRAM(s) IntraMuscular once  heparin   Injectable 5000 Unit(s) SubCutaneous every 8 hours  influenza   Vaccine 0.5 milliLiter(s) IntraMuscular once  insulin lispro (ADMELOG) corrective regimen sliding scale   SubCutaneous three times a day before meals  insulin lispro (ADMELOG) corrective regimen sliding scale   SubCutaneous at bedtime  melatonin 9 milliGRAM(s) Oral at bedtime  naloxegol 12.5 milliGRAM(s) Oral daily  pantoprazole    Tablet 40 milliGRAM(s) Oral before breakfast  polyethylene glycol 3350 17 Gram(s) Oral two times a day  senna 2 Tablet(s) Oral at bedtime    Estimated needs:  NO changes since previous assessment ( will reassess needs s/p BKA)    Nutrition Diagnosis:  Ongoing    RD to Remain Available:    Additional Recommendations:   Continue with current diet and supplementa as ordered.   Monitor PO intake, weight trends, nutrition related lab values, BMs/GI distress, hydration status, skin integrity.       aKyce Urena RDN #[id number]

## 2023-02-17 NOTE — PROGRESS NOTE ADULT - SUBJECTIVE AND OBJECTIVE BOX
VASCULAR SURGERY DAILY PROGRESS NOTE:     SUBJECTIVE/ROS:   Patient seen and evaluated on AM rounds.     OBJECTIVE:  Vital Signs Last 24 Hrs  T(C): 36.7 (2023 06:42), Max: 36.7 (2023 06:42)  T(F): 98.1 (2023 06:42), Max: 98.1 (2023 06:42)  HR: 72 (2023 06:42) (68 - 72)  BP: 122/66 (2023 06:42) (111/64 - 122/66)  BP(mean): --  RR: 16 (2023 06:42) (16 - 17)  SpO2: 100% (2023 06:42) (100% - 100%)    Parameters below as of 2023 06:42  Patient On (Oxygen Delivery Method): room air      I&O's Detail    2023 07:01  -  2023 07:00  --------------------------------------------------------  IN:  Total IN: 0 mL    OUT:    Voided (mL): 400 mL  Total OUT: 400 mL    Total NET: -400 mL        Daily     Daily Weight in k (2023 06:42)  MEDICATIONS  (STANDING):  acetaminophen     Tablet .. 975 milliGRAM(s) Oral every 6 hours  aspirin enteric coated 81 milliGRAM(s) Oral daily  atorvastatin 80 milliGRAM(s) Oral at bedtime  buMETAnide 1 milliGRAM(s) Oral two times a day  calamine/zinc oxide Lotion 1 Application(s) Topical three times a day  carvedilol 6.25 milliGRAM(s) Oral every 12 hours  dextrose 5%. 1000 milliLiter(s) (100 mL/Hr) IV Continuous <Continuous>  dextrose 5%. 1000 milliLiter(s) (50 mL/Hr) IV Continuous <Continuous>  dextrose 50% Injectable 25 Gram(s) IV Push once  dextrose 50% Injectable 12.5 Gram(s) IV Push once  dextrose 50% Injectable 25 Gram(s) IV Push once  glucagon  Injectable 1 milliGRAM(s) IntraMuscular once  heparin   Injectable 5000 Unit(s) SubCutaneous every 8 hours  influenza   Vaccine 0.5 milliLiter(s) IntraMuscular once  insulin lispro (ADMELOG) corrective regimen sliding scale   SubCutaneous three times a day before meals  insulin lispro (ADMELOG) corrective regimen sliding scale   SubCutaneous at bedtime  melatonin 9 milliGRAM(s) Oral at bedtime  naloxegol 12.5 milliGRAM(s) Oral daily  pantoprazole    Tablet 40 milliGRAM(s) Oral before breakfast  polyethylene glycol 3350 17 Gram(s) Oral two times a day  senna 2 Tablet(s) Oral at bedtime    MEDICATIONS  (PRN):  acetaminophen     Tablet .. 650 milliGRAM(s) Oral every 6 hours PRN Temp greater or equal to 38C (100.4F), Mild Pain (1 - 3)  dextrose Oral Gel 15 Gram(s) Oral once PRN Blood Glucose LESS THAN 70 milliGRAM(s)/deciliter  HYDROmorphone  Injectable 0.5 milliGRAM(s) IV Push every 4 hours PRN breakthrough pain  HYDROmorphone  Injectable 2 milliGRAM(s) IV Push every 3 hours PRN Severe Pain (7 - 10)  ondansetron Injectable 4 milliGRAM(s) IV Push every 8 hours PRN Nausea and/or Vomiting      Labs:                        9.1    14.42 )-----------( 475      ( 2023 05:50 )             30.2     02-17    130<L>  |  96<L>  |  50<H>  ----------------------------<  241<H>  4.5   |  22  |  1.55<H>    Ca    8.3<L>      2023 05:50  Phos  3.6     02-17  Mg     1.60     02-17      PT/INR - ( 2023 01:00 )   PT: 15.6 sec;   INR: 1.34 ratio         PTT - ( 2023 01:00 )  PTT:36.1 sec        Physical Exam:  General: NAD, resting comfortably in bed  HEENT: Normocephalic atraumatic  Respiratory: Nonlabored respirations, normal CW expansion.  Cardio: S1S2, regular rate and rhythm.  Abdomen: SNTND. Groins access site is CDI without hematoma  Vascular: extremities are warm and well perfused. Gangrenous toes. Peroneal signal

## 2023-02-18 LAB
GLUCOSE BLDC GLUCOMTR-MCNC: 147 MG/DL — HIGH (ref 70–99)
GLUCOSE BLDC GLUCOMTR-MCNC: 218 MG/DL — HIGH (ref 70–99)
GLUCOSE BLDC GLUCOMTR-MCNC: 309 MG/DL — HIGH (ref 70–99)
GLUCOSE BLDC GLUCOMTR-MCNC: 90 MG/DL — SIGNIFICANT CHANGE UP (ref 70–99)

## 2023-02-18 PROCEDURE — 99233 SBSQ HOSP IP/OBS HIGH 50: CPT

## 2023-02-18 RX ORDER — HYDROMORPHONE HYDROCHLORIDE 2 MG/ML
30 INJECTION INTRAMUSCULAR; INTRAVENOUS; SUBCUTANEOUS
Refills: 0 | Status: DISCONTINUED | OUTPATIENT
Start: 2023-02-18 | End: 2023-02-19

## 2023-02-18 RX ORDER — ONDANSETRON 8 MG/1
4 TABLET, FILM COATED ORAL EVERY 6 HOURS
Refills: 0 | Status: DISCONTINUED | OUTPATIENT
Start: 2023-02-18 | End: 2023-02-22

## 2023-02-18 RX ORDER — OXYCODONE HYDROCHLORIDE 5 MG/1
5 TABLET ORAL EVERY 6 HOURS
Refills: 0 | Status: DISCONTINUED | OUTPATIENT
Start: 2023-02-18 | End: 2023-02-18

## 2023-02-18 RX ORDER — SODIUM CHLORIDE 9 MG/ML
1000 INJECTION INTRAMUSCULAR; INTRAVENOUS; SUBCUTANEOUS
Refills: 0 | Status: DISCONTINUED | OUTPATIENT
Start: 2023-02-18 | End: 2023-02-19

## 2023-02-18 RX ORDER — NALOXONE HYDROCHLORIDE 4 MG/.1ML
0.1 SPRAY NASAL
Refills: 0 | Status: DISCONTINUED | OUTPATIENT
Start: 2023-02-18 | End: 2023-02-22

## 2023-02-18 RX ORDER — HYDROMORPHONE HYDROCHLORIDE 2 MG/ML
0.5 INJECTION INTRAMUSCULAR; INTRAVENOUS; SUBCUTANEOUS
Refills: 0 | Status: DISCONTINUED | OUTPATIENT
Start: 2023-02-18 | End: 2023-02-19

## 2023-02-18 RX ORDER — HYDROMORPHONE HYDROCHLORIDE 2 MG/ML
2 INJECTION INTRAMUSCULAR; INTRAVENOUS; SUBCUTANEOUS ONCE
Refills: 0 | Status: DISCONTINUED | OUTPATIENT
Start: 2023-02-18 | End: 2023-02-18

## 2023-02-18 RX ADMIN — HYDROMORPHONE HYDROCHLORIDE 2 MILLIGRAM(S): 2 INJECTION INTRAMUSCULAR; INTRAVENOUS; SUBCUTANEOUS at 00:09

## 2023-02-18 RX ADMIN — Medication 975 MILLIGRAM(S): at 22:30

## 2023-02-18 RX ADMIN — HYDROMORPHONE HYDROCHLORIDE 2 MILLIGRAM(S): 2 INJECTION INTRAMUSCULAR; INTRAVENOUS; SUBCUTANEOUS at 04:36

## 2023-02-18 RX ADMIN — BUMETANIDE 1 MILLIGRAM(S): 0.25 INJECTION INTRAMUSCULAR; INTRAVENOUS at 05:02

## 2023-02-18 RX ADMIN — ATORVASTATIN CALCIUM 80 MILLIGRAM(S): 80 TABLET, FILM COATED ORAL at 22:33

## 2023-02-18 RX ADMIN — Medication 975 MILLIGRAM(S): at 12:45

## 2023-02-18 RX ADMIN — Medication 975 MILLIGRAM(S): at 11:54

## 2023-02-18 RX ADMIN — BUMETANIDE 1 MILLIGRAM(S): 0.25 INJECTION INTRAMUSCULAR; INTRAVENOUS at 15:20

## 2023-02-18 RX ADMIN — Medication 975 MILLIGRAM(S): at 06:02

## 2023-02-18 RX ADMIN — HYDROMORPHONE HYDROCHLORIDE 2 MILLIGRAM(S): 2 INJECTION INTRAMUSCULAR; INTRAVENOUS; SUBCUTANEOUS at 14:25

## 2023-02-18 RX ADMIN — HYDROMORPHONE HYDROCHLORIDE 2 MILLIGRAM(S): 2 INJECTION INTRAMUSCULAR; INTRAVENOUS; SUBCUTANEOUS at 05:26

## 2023-02-18 RX ADMIN — HYDROMORPHONE HYDROCHLORIDE 2 MILLIGRAM(S): 2 INJECTION INTRAMUSCULAR; INTRAVENOUS; SUBCUTANEOUS at 08:06

## 2023-02-18 RX ADMIN — Medication 975 MILLIGRAM(S): at 23:30

## 2023-02-18 RX ADMIN — CALAMINE AND ZINC OXIDE AND PHENOL 1 APPLICATION(S): 160; 10 LOTION TOPICAL at 22:29

## 2023-02-18 RX ADMIN — HEPARIN SODIUM 5000 UNIT(S): 5000 INJECTION INTRAVENOUS; SUBCUTANEOUS at 15:21

## 2023-02-18 RX ADMIN — CARVEDILOL PHOSPHATE 6.25 MILLIGRAM(S): 80 CAPSULE, EXTENDED RELEASE ORAL at 18:21

## 2023-02-18 RX ADMIN — PANTOPRAZOLE SODIUM 40 MILLIGRAM(S): 20 TABLET, DELAYED RELEASE ORAL at 05:03

## 2023-02-18 RX ADMIN — HYDROMORPHONE HYDROCHLORIDE 2 MILLIGRAM(S): 2 INJECTION INTRAMUSCULAR; INTRAVENOUS; SUBCUTANEOUS at 16:06

## 2023-02-18 RX ADMIN — NALOXEGOL OXALATE 12.5 MILLIGRAM(S): 12.5 TABLET, FILM COATED ORAL at 11:55

## 2023-02-18 RX ADMIN — HEPARIN SODIUM 5000 UNIT(S): 5000 INJECTION INTRAVENOUS; SUBCUTANEOUS at 22:34

## 2023-02-18 RX ADMIN — Medication 975 MILLIGRAM(S): at 05:02

## 2023-02-18 RX ADMIN — Medication 81 MILLIGRAM(S): at 11:55

## 2023-02-18 RX ADMIN — CARVEDILOL PHOSPHATE 6.25 MILLIGRAM(S): 80 CAPSULE, EXTENDED RELEASE ORAL at 05:02

## 2023-02-18 RX ADMIN — Medication 9 MILLIGRAM(S): at 22:33

## 2023-02-18 RX ADMIN — OXYCODONE HYDROCHLORIDE 5 MILLIGRAM(S): 5 TABLET ORAL at 15:20

## 2023-02-18 RX ADMIN — HYDROMORPHONE HYDROCHLORIDE 30 MILLILITER(S): 2 INJECTION INTRAMUSCULAR; INTRAVENOUS; SUBCUTANEOUS at 21:15

## 2023-02-18 RX ADMIN — OXYCODONE HYDROCHLORIDE 5 MILLIGRAM(S): 5 TABLET ORAL at 16:56

## 2023-02-18 RX ADMIN — HYDROMORPHONE HYDROCHLORIDE 2 MILLIGRAM(S): 2 INJECTION INTRAMUSCULAR; INTRAVENOUS; SUBCUTANEOUS at 11:51

## 2023-02-18 RX ADMIN — Medication 4: at 18:22

## 2023-02-18 RX ADMIN — HEPARIN SODIUM 5000 UNIT(S): 5000 INJECTION INTRAVENOUS; SUBCUTANEOUS at 05:03

## 2023-02-18 RX ADMIN — HYDROMORPHONE HYDROCHLORIDE 2 MILLIGRAM(S): 2 INJECTION INTRAMUSCULAR; INTRAVENOUS; SUBCUTANEOUS at 16:56

## 2023-02-18 NOTE — CONSULT NOTE ADULT - CONSULT REQUESTED DATE/TIME
04-Feb-2023 11:48
06-Feb-2023 10:26
25-Jan-2023 17:14
25-Jan-2023 17:26
29-Jan-2023 10:02
29-Jan-2023 10:20
30-Jan-2023 12:47
18-Feb-2023 16:07
28-Jan-2023 14:40
27-Jan-2023

## 2023-02-18 NOTE — PROGRESS NOTE ADULT - SUBJECTIVE AND OBJECTIVE BOX
Patient is a 42y old  Female who presents with a chief complaint of Toe pain (18 Feb 2023 10:20)      SUBJECTIVE / OVERNIGHT EVENTS: No acute events overnight. Endorsing some pain at surgical site, but improves with pain medicine.     MEDICATIONS  (STANDING):  acetaminophen     Tablet .. 975 milliGRAM(s) Oral every 6 hours  aspirin enteric coated 81 milliGRAM(s) Oral daily  atorvastatin 80 milliGRAM(s) Oral at bedtime  buMETAnide 1 milliGRAM(s) Oral two times a day  calamine/zinc oxide Lotion 1 Application(s) Topical three times a day  carvedilol 6.25 milliGRAM(s) Oral every 12 hours  dextrose 5%. 1000 milliLiter(s) (100 mL/Hr) IV Continuous <Continuous>  dextrose 5%. 1000 milliLiter(s) (50 mL/Hr) IV Continuous <Continuous>  dextrose 50% Injectable 25 Gram(s) IV Push once  dextrose 50% Injectable 12.5 Gram(s) IV Push once  dextrose 50% Injectable 25 Gram(s) IV Push once  glucagon  Injectable 1 milliGRAM(s) IntraMuscular once  heparin   Injectable 5000 Unit(s) SubCutaneous every 8 hours  HYDROmorphone PCA (1 mG/mL) 30 milliLiter(s) PCA Continuous PCA Continuous  influenza   Vaccine 0.5 milliLiter(s) IntraMuscular once  insulin lispro (ADMELOG) corrective regimen sliding scale   SubCutaneous three times a day before meals  insulin lispro (ADMELOG) corrective regimen sliding scale   SubCutaneous at bedtime  melatonin 9 milliGRAM(s) Oral at bedtime  naloxegol 12.5 milliGRAM(s) Oral daily  pantoprazole    Tablet 40 milliGRAM(s) Oral before breakfast  polyethylene glycol 3350 17 Gram(s) Oral two times a day  senna 2 Tablet(s) Oral at bedtime    MEDICATIONS  (PRN):  dextrose Oral Gel 15 Gram(s) Oral once PRN Blood Glucose LESS THAN 70 milliGRAM(s)/deciliter  HYDROmorphone PCA (1 mG/mL) Rescue Clinician Bolus 0.5 milliGRAM(s) IV Push every 15 minutes PRN for Pain Scale GREATER THAN 6  naloxone Injectable 0.1 milliGRAM(s) IV Push every 3 minutes PRN For ANY of the following changes in patient status:  A. RR LESS THAN 10 breaths per minute, B. Oxygen saturation LESS THAN 90%, C. Sedation score of 6  ondansetron Injectable 4 milliGRAM(s) IV Push every 8 hours PRN Nausea and/or Vomiting  ondansetron Injectable 4 milliGRAM(s) IV Push every 6 hours PRN Nausea      T(C): 36.5 (02-18-23 @ 11:00), Max: 36.7 (02-18-23 @ 04:30)  HR: 77 (02-18-23 @ 11:00) (67 - 77)  BP: 103/66 (02-18-23 @ 11:00) (103/66 - 150/70)  RR: 18 (02-18-23 @ 11:00) (14 - 18)  SpO2: 100% (02-18-23 @ 11:00) (95% - 100%)  CAPILLARY BLOOD GLUCOSE      POCT Blood Glucose.: 147 mg/dL (18 Feb 2023 11:50)  POCT Blood Glucose.: 90 mg/dL (18 Feb 2023 08:08)  POCT Blood Glucose.: 125 mg/dL (17 Feb 2023 21:24)  POCT Blood Glucose.: 135 mg/dL (17 Feb 2023 16:55)    I&O's Summary    17 Feb 2023 07:01  -  18 Feb 2023 07:00  --------------------------------------------------------  IN: 0 mL / OUT: 10 mL / NET: -10 mL        PHYSICAL EXAM:  GENERAL: no apparent distress, on room air  EYES: EOMI, PERRLA, conjunctiva and sclera clear b/l  CHEST/LUNG: Clear to auscultation bilaterally; No wheezing or crackles  HEART: s1/s2, RRR, no murmurs appreciated  ABDOMEN: Soft, Nontender, Nondistended; Bowel sounds present  EXTREMITIES:  left BKA site with dressing c/d/i  MSK: no joint effusions  Back: no spinal tenderness  NEUROLOGY: awake, alert, responds to Qs appropriately, no sensory or motor deficits, 5/5 muscle strength equal in all extremities, CN 2-12 grossly intact  PSYCH: calm, cooperative  SKIN: No rashes or lesions    LABS:                        9.1    14.42 )-----------( 475      ( 17 Feb 2023 05:50 )             30.2     02-17    130<L>  |  96<L>  |  50<H>  ----------------------------<  241<H>  4.5   |  22  |  1.55<H>    Ca    8.3<L>      17 Feb 2023 05:50  Phos  3.6     02-17  Mg     1.60     02-17      PT/INR - ( 17 Feb 2023 01:00 )   PT: 15.6 sec;   INR: 1.34 ratio         PTT - ( 17 Feb 2023 01:00 )  PTT:36.1 sec          RADIOLOGY & ADDITIONAL TESTS:

## 2023-02-18 NOTE — PROGRESS NOTE ADULT - PROBLEM SELECTOR PLAN 5
CT angio with moderate-marked bilateral lower extremity peripheral vascular disease, calcifications of AAA, Occluded distal right peroneal artery and left posterior tibial and   peroneal arteries.  - s/p LLE angio 2/10  - c/w atorvastatin   - c/w ASA  - s/p BKA

## 2023-02-18 NOTE — PROVIDER CONTACT NOTE (MEDICATION) - SITUATION
Patient took oxycodone 5mg and an hour later did not feel relief. Physician ordered PCA at this time because patient has been continuously asking for medication and having no relief of symptoms. 2mg Dilaudid given IV after oxycodone 5mg was given orally. Patient at this time is very sleepy from pain meds.

## 2023-02-18 NOTE — CONSULT NOTE ADULT - PROVIDER SPECIALTY LIST ADULT
Pain Medicine
Pain Medicine
Podiatry
Cardiology
CCU
Dermatology
Infectious Disease
MICU
Vascular Surgery
Nephrology

## 2023-02-18 NOTE — PROGRESS NOTE ADULT - SUBJECTIVE AND OBJECTIVE BOX
VASCULAR SURGERY DAILY PROGRESS NOTE:     SUBJECTIVE/ROS:     Overnight: no acute events   Patient seen and evaluated on AM rounds. Patient endorsing some pain at surgical site, controlled on pain regimen.      OBJECTIVE:  Vital Signs Last 24 Hrs  T(C): 36.5 (18 Feb 2023 05:00), Max: 36.7 (18 Feb 2023 04:30)  T(F): 97.7 (18 Feb 2023 05:00), Max: 98 (18 Feb 2023 04:30)  HR: 74 (18 Feb 2023 05:00) (66 - 74)  BP: 111/60 (18 Feb 2023 05:00) (111/60 - 150/70)  BP(mean): 95 (17 Feb 2023 18:00) (92 - 106)  RR: 18 (18 Feb 2023 05:00) (14 - 18)  SpO2: 100% (18 Feb 2023 05:00) (95% - 100%)    Parameters below as of 18 Feb 2023 05:00  Patient On (Oxygen Delivery Method): room air      I&O's Detail    17 Feb 2023 07:01  -  18 Feb 2023 07:00  --------------------------------------------------------  IN:  Total IN: 0 mL    OUT:    Bulb (mL): 10 mL  Total OUT: 10 mL    Total NET: -10 mL        Daily Height in cm: 167.64 (17 Feb 2023 13:28)    Daily   MEDICATIONS  (STANDING):  acetaminophen     Tablet .. 975 milliGRAM(s) Oral every 6 hours  aspirin enteric coated 81 milliGRAM(s) Oral daily  atorvastatin 80 milliGRAM(s) Oral at bedtime  buMETAnide 1 milliGRAM(s) Oral two times a day  calamine/zinc oxide Lotion 1 Application(s) Topical three times a day  carvedilol 6.25 milliGRAM(s) Oral every 12 hours  dextrose 5%. 1000 milliLiter(s) (100 mL/Hr) IV Continuous <Continuous>  dextrose 5%. 1000 milliLiter(s) (50 mL/Hr) IV Continuous <Continuous>  dextrose 50% Injectable 25 Gram(s) IV Push once  dextrose 50% Injectable 12.5 Gram(s) IV Push once  dextrose 50% Injectable 25 Gram(s) IV Push once  glucagon  Injectable 1 milliGRAM(s) IntraMuscular once  heparin   Injectable 5000 Unit(s) SubCutaneous every 8 hours  influenza   Vaccine 0.5 milliLiter(s) IntraMuscular once  insulin lispro (ADMELOG) corrective regimen sliding scale   SubCutaneous three times a day before meals  insulin lispro (ADMELOG) corrective regimen sliding scale   SubCutaneous at bedtime  melatonin 9 milliGRAM(s) Oral at bedtime  naloxegol 12.5 milliGRAM(s) Oral daily  pantoprazole    Tablet 40 milliGRAM(s) Oral before breakfast  polyethylene glycol 3350 17 Gram(s) Oral two times a day  senna 2 Tablet(s) Oral at bedtime    MEDICATIONS  (PRN):  acetaminophen     Tablet .. 650 milliGRAM(s) Oral every 6 hours PRN Temp greater or equal to 38C (100.4F), Mild Pain (1 - 3)  dextrose Oral Gel 15 Gram(s) Oral once PRN Blood Glucose LESS THAN 70 milliGRAM(s)/deciliter  HYDROmorphone  Injectable 0.5 milliGRAM(s) IV Push every 15 minutes PRN Moderate Pain (4 - 6)  HYDROmorphone  Injectable 1 milliGRAM(s) IV Push every 15 minutes PRN Severe Pain (7 - 10)  HYDROmorphone  Injectable 0.5 milliGRAM(s) IV Push every 4 hours PRN breakthrough pain  HYDROmorphone  Injectable 2 milliGRAM(s) IV Push every 3 hours PRN Severe Pain (7 - 10)  ondansetron Injectable 4 milliGRAM(s) IV Push every 8 hours PRN Nausea and/or Vomiting      Labs:                        9.1    14.42 )-----------( 475      ( 17 Feb 2023 05:50 )             30.2     02-17    130<L>  |  96<L>  |  50<H>  ----------------------------<  241<H>  4.5   |  22  |  1.55<H>    Ca    8.3<L>      17 Feb 2023 05:50  Phos  3.6     02-17  Mg     1.60     02-17      PT/INR - ( 17 Feb 2023 01:00 )   PT: 15.6 sec;   INR: 1.34 ratio         PTT - ( 17 Feb 2023 01:00 )  PTT:36.1 sec        Physical Exam:  General: well developed, well nourished, NAD  Cardiovascular: appears well perfused   Respiratory: respirations non labored  Gastrointestinal: soft, nontender, nondistended  Extremities:   - LLE: BKA dressing c/d/i. Knee immobilizer in place   Neurological: A+Ox3

## 2023-02-18 NOTE — PROGRESS NOTE ADULT - ASSESSMENT
42F w/ PMH coronary artery disease status post CABG x4 in 2018, CHF s/p ICD placement (interrogated 2022), DM, PAD s/p 3rd toe amputation who presents with left 2nd toe pain concerning for dry gangrene. Now s/p LLE angio 2/10 and L BKA on 2/17.     Plan:  - Dressing change on POD#3  - Continue ASA   - Remainder care per primary       Vascular (C Team) Surgery  [id number]

## 2023-02-18 NOTE — CONSULT NOTE ADULT - SUBJECTIVE AND OBJECTIVE BOX
Acute Pain Management Consult requested for pain not well controlled.  HPI:  Pt is 42-year-old female past medical history coronary artery disease status post CABG x4 in 2018, CHF s/p ICD placement (interrogated ), DM, PAD s/p 3rd toe amputation who presents with left 2nd toe pain. Patient reports that she was discharged from [facility] post amputation beginning of 2022. Reports that the trigger was a cart running over her toe, did not have any issues with her 2nd toe at the time. She was put on IV antibiotics for a month post-discharge and completed 1/3. Has not followed up with her podiatrist/vascular doctor since the surgery Patient reports that she has been walking without issues until 5 days ago when she noticed blackening of the toe as well as severe pain. Notices that it is worse when she lays flat or walks around, had been taking tylenol every few hours without relief. Reports feeling chills occasionally but denies fevers at home. Reports poor PO intake due to loss of appetite every since she was discharged. Denies chest pain, palpitations, worsening SOB, diarrhea, dysuria.     ED course:   130/72, HR 72, afebrile, 100% on RA   S/p 1 dose vanc/zosyn  (2023 23:16)      PAST MEDICAL & SURGICAL HISTORY:  MI (myocardial infarction)  2017      Type 2 diabetes mellitus      Hypertension      Hyperlipidemia      Coronary artery disease  had CABG x 4      Cerebrovascular accident (CVA)  residual of right sided weakness      H/O congestive heart disease  ICM/chronic systolic CHF as per Dr. Diana Menjivar      History of cardiomyopathy      Thrombus  left ventricular mural thrombus      S/P       H/O tubal ligation  2016      Coronary artery disease  CABG x 4 in 2018          FAMILY HISTORY:  Family history of heart disease (Father)        SOCIAL HISTORY:  [ ] Denies Smoking, Alcohol, or Drug Use    Allergies    No Known Allergies    Intolerances        PAIN MEDICATIONS:  acetaminophen     Tablet .. 975 milliGRAM(s) Oral every 6 hours  HYDROmorphone PCA (1 mG/mL) 30 milliLiter(s) PCA Continuous PCA Continuous  HYDROmorphone PCA (1 mG/mL) Rescue Clinician Bolus 0.5 milliGRAM(s) IV Push every 15 minutes PRN  melatonin 9 milliGRAM(s) Oral at bedtime  ondansetron Injectable 4 milliGRAM(s) IV Push every 8 hours PRN  ondansetron Injectable 4 milliGRAM(s) IV Push every 6 hours PRN    Heme:  aspirin enteric coated 81 milliGRAM(s) Oral daily  heparin   Injectable 5000 Unit(s) SubCutaneous every 8 hours    Antibiotics:    Cardiovascular:  buMETAnide 1 milliGRAM(s) Oral two times a day  carvedilol 6.25 milliGRAM(s) Oral every 12 hours    GI:  naloxegol 12.5 milliGRAM(s) Oral daily  pantoprazole    Tablet 40 milliGRAM(s) Oral before breakfast  polyethylene glycol 3350 17 Gram(s) Oral two times a day  senna 2 Tablet(s) Oral at bedtime    Endocrine:  atorvastatin 80 milliGRAM(s) Oral at bedtime  dextrose 50% Injectable 25 Gram(s) IV Push once  dextrose 50% Injectable 12.5 Gram(s) IV Push once  dextrose 50% Injectable 25 Gram(s) IV Push once  dextrose Oral Gel 15 Gram(s) Oral once PRN  glucagon  Injectable 1 milliGRAM(s) IntraMuscular once  insulin lispro (ADMELOG) corrective regimen sliding scale   SubCutaneous three times a day before meals  insulin lispro (ADMELOG) corrective regimen sliding scale   SubCutaneous at bedtime    All Other Medications:  calamine/zinc oxide Lotion 1 Application(s) Topical three times a day  dextrose 5%. 1000 milliLiter(s) IV Continuous <Continuous>  dextrose 5%. 1000 milliLiter(s) IV Continuous <Continuous>  influenza   Vaccine 0.5 milliLiter(s) IntraMuscular once  naloxone Injectable 0.1 milliGRAM(s) IV Push every 3 minutes PRN      Vital Signs Last 24 Hrs  T(C): 36.5 (2023 11:00), Max: 36.7 (2023 04:30)  T(F): 97.7 (2023 11:00), Max: 98 (2023 04:30)  HR: 77 (2023 11:00) (67 - 77)  BP: 103/66 (2023 11:00) (103/66 - 150/70)  BP(mean): 95 (2023 18:00) (92 - 106)  RR: 18 (2023 11:00) (14 - 18)  SpO2: 100% (2023 11:00) (95% - 100%)    Parameters below as of 2023 11:00  Patient On (Oxygen Delivery Method): room air        PAIN SCORE:    10/10    see chart           Physical Exam: A & O x 3 in some discomfort    LABS:                          9.1    14.42 )-----------( 475      ( 2023 05:50 )             30.2     02-17    130<L>  |  96<L>  |  50<H>  ----------------------------<  241<H>  4.5   |  22  |  1.55<H>    Ca    8.3<L>      2023 05:50  Phos  3.6     02-  Mg     1.60           PT/INR - ( 2023 01:00 )   PT: 15.6 sec;   INR: 1.34 ratio         PTT - ( 2023 01:00 )  PTT:36.1 sec      Drug Screen:    SUMMARY:    Patient seen at bedside complaining of severe pain 10/10 radiating from her left hip to her leg. Patient states the " entire leg" hurts. Patient reports that the pain is sharp and constant. Patient states the IV Dilaudid helps with the pain instantly and last for about 3 hours. Patient denies opioid use at home. Patient denies anxiety and depression. Denies alcohol use, smoking and illicit drug use. Patient alert and orientedx4. Patient maintains eye contact. Answers questions appropriately. Not in any apparent distress.    [ X]  [place]  Reviewed and no medications found  Impression/Plan: Pain not adequately relieved with current opioid treatment regimen. Agree with plan to begin Hydromorphone IV PCA along with any non-opioid adjuvant analgesic medication that can be added and reevaluate by Pain Service tomorrow.             Acute Pain Management Consult requested for poorly controlled postoperative pain.  HPI:  Pt is 42-year-old female past medical history coronary artery disease status post CABG x4 in 2018, CHF s/p ICD placement (interrogated ), DM, PAD s/p 3rd toe amputation who presented with left 2nd toe pain, now s/p left BKA on 23. Patient reports that she was discharged from [facility] post amputation beginning of 2022. Reports that the trigger was a cart running over her toe, did not have any issues with her 2nd toe at the time. She was put on IV antibiotics for a month post-discharge and completed 1/3. Has not followed up with her podiatrist/vascular doctor since the surgery Patient reports that she has been walking without issues until 5 days ago when she noticed blackening of the toe as well as severe pain. Notices that it is worse when she lays flat or walks around, had been taking tylenol every few hours without relief. Reports feeling chills occasionally but denies fevers at home. Reports poor PO intake due to loss of appetite every since she was discharged. Denies chest pain, palpitations, worsening SOB, diarrhea, dysuria.     ED course:   130/72, HR 72, afebrile, 100% on RA   S/p 1 dose vanc/zosyn  (2023 23:16)      PAST MEDICAL & SURGICAL HISTORY:  MI (myocardial infarction)  2017      Type 2 diabetes mellitus      Hypertension      Hyperlipidemia      Coronary artery disease  had CABG x 4      Cerebrovascular accident (CVA)  residual of right sided weakness      H/O congestive heart disease  ICM/chronic systolic CHF as per Dr. Diana Menjivar      History of cardiomyopathy      Thrombus  left ventricular mural thrombus      S/P       H/O tubal ligation  2016      Coronary artery disease  CABG x 4 in 2018          FAMILY HISTORY:  Family history of heart disease (Father)        SOCIAL HISTORY:  [ ] Denies Smoking, Alcohol, or Drug Use    Allergies    No Known Allergies    Intolerances        PAIN MEDICATIONS:  acetaminophen     Tablet .. 975 milliGRAM(s) Oral every 6 hours  HYDROmorphone PCA (1 mG/mL) 30 milliLiter(s) PCA Continuous PCA Continuous  HYDROmorphone PCA (1 mG/mL) Rescue Clinician Bolus 0.5 milliGRAM(s) IV Push every 15 minutes PRN  melatonin 9 milliGRAM(s) Oral at bedtime  ondansetron Injectable 4 milliGRAM(s) IV Push every 8 hours PRN  ondansetron Injectable 4 milliGRAM(s) IV Push every 6 hours PRN    Heme:  aspirin enteric coated 81 milliGRAM(s) Oral daily  heparin   Injectable 5000 Unit(s) SubCutaneous every 8 hours    Antibiotics:    Cardiovascular:  buMETAnide 1 milliGRAM(s) Oral two times a day  carvedilol 6.25 milliGRAM(s) Oral every 12 hours    GI:  naloxegol 12.5 milliGRAM(s) Oral daily  pantoprazole    Tablet 40 milliGRAM(s) Oral before breakfast  polyethylene glycol 3350 17 Gram(s) Oral two times a day  senna 2 Tablet(s) Oral at bedtime    Endocrine:  atorvastatin 80 milliGRAM(s) Oral at bedtime  dextrose 50% Injectable 25 Gram(s) IV Push once  dextrose 50% Injectable 12.5 Gram(s) IV Push once  dextrose 50% Injectable 25 Gram(s) IV Push once  dextrose Oral Gel 15 Gram(s) Oral once PRN  glucagon  Injectable 1 milliGRAM(s) IntraMuscular once  insulin lispro (ADMELOG) corrective regimen sliding scale   SubCutaneous three times a day before meals  insulin lispro (ADMELOG) corrective regimen sliding scale   SubCutaneous at bedtime    All Other Medications:  calamine/zinc oxide Lotion 1 Application(s) Topical three times a day  dextrose 5%. 1000 milliLiter(s) IV Continuous <Continuous>  dextrose 5%. 1000 milliLiter(s) IV Continuous <Continuous>  influenza   Vaccine 0.5 milliLiter(s) IntraMuscular once  naloxone Injectable 0.1 milliGRAM(s) IV Push every 3 minutes PRN      Vital Signs Last 24 Hrs  T(C): 36.5 (2023 11:00), Max: 36.7 (2023 04:30)  T(F): 97.7 (2023 11:00), Max: 98 (2023 04:30)  HR: 77 (2023 11:00) (67 - 77)  BP: 103/66 (2023 11:00) (103/66 - 150/70)  BP(mean): 95 (2023 18:00) (92 - 106)  RR: 18 (2023 11:00) (14 - 18)  SpO2: 100% (2023 11:00) (95% - 100%)    Parameters below as of 2023 11:00  Patient On (Oxygen Delivery Method): room air        PAIN SCORE:    10/10    see chart           Physical Exam: A & O x 3 in some discomfort    LABS:                          9.1    14.42 )-----------( 475      ( 2023 05:50 )             30.2     02-17    130<L>  |  96<L>  |  50<H>  ----------------------------<  241<H>  4.5   |  22  |  1.55<H>    Ca    8.3<L>      2023 05:50  Phos  3.6     -  Mg     1.60     -17      PT/INR - ( 2023 01:00 )   PT: 15.6 sec;   INR: 1.34 ratio         PTT - ( 2023 01:00 )  PTT:36.1 sec      Drug Screen:    SUMMARY:    Patient seen at bedside complaining of severe pain 10/10 radiating from her left hip to her leg. Patient states the " entire leg" hurts. Patient reports that the pain is sharp and constant. Patient states the IV Dilaudid helps with the pain instantly and last for about 3 hours. Patient denies opioid use at home. Patient denies anxiety and depression. Denies alcohol use, smoking and illicit drug use. Patient alert and orientedx4. Patient maintains eye contact. Answers questions appropriately. Not in any apparent distress.    [ X]  [place]  Reviewed and no medications found  Impression/Plan: Pain not adequately relieved with current opioid treatment regimen. Agree with plan to begin Hydromorphone IV PCA along with any non-opioid adjuvant analgesic medication that can be added and reevaluate by Pain Service tomorrow.

## 2023-02-18 NOTE — PROGRESS NOTE ADULT - PROBLEM SELECTOR PLAN 3
Patient w/ RRT for hypotension likely septic shock iso L toe gangrene/wound complicated by hypothermia, leukocytosis and hypotension refractory to IV fluids requiring pressor support.   - BCx/ UCx negative.  - s/p vanc/zosyn briefly on adm, Unasyn 1/26-1/29; Cefepime, metronidazole, vanc (by level) 1/29 - 1/31, switched to Zosyn on 1/31 then d/c

## 2023-02-18 NOTE — CONSULT NOTE ADULT - ASSESSMENT
41 yo F with PMH of CAD s/p CABG x4 in 2018, ICM/chronic systolic CHF s/p ICD placement (interrogated 2022), CVA with residual right sided weakness, DM2, PAD s/p 3rd toe amputation at outside hospital who presented with gangrenous 2nd toe requiring surgical intervention, now s/p left BKA on 2/17/23. Acute pain service consulted for evaluation and management of poorly controlled postoperative pain.    Agree with plan to change PRN hydromorphone IVP to hydromorphone IV PCA for better and more consistent pain control. Continue standing acetaminophen. Would hold gabapentin d/t poor renal function. If pain still poorly controlled would consider adding other non-opioid analgesics and possibly regional nerve block.  Pain service will reevaluate tomorrow and continue to follow.     Thank you for this consult.      Sherman Turpin M.D.  Department of Anesthesiology  [facility]

## 2023-02-19 LAB
ANION GAP SERPL CALC-SCNC: 11 MMOL/L — SIGNIFICANT CHANGE UP (ref 7–14)
ANION GAP SERPL CALC-SCNC: 9 MMOL/L — SIGNIFICANT CHANGE UP (ref 7–14)
BUN SERPL-MCNC: 53 MG/DL — HIGH (ref 7–23)
BUN SERPL-MCNC: 53 MG/DL — HIGH (ref 7–23)
CALCIUM SERPL-MCNC: 8.1 MG/DL — LOW (ref 8.4–10.5)
CALCIUM SERPL-MCNC: 8.3 MG/DL — LOW (ref 8.4–10.5)
CHLORIDE SERPL-SCNC: 97 MMOL/L — LOW (ref 98–107)
CHLORIDE SERPL-SCNC: 97 MMOL/L — LOW (ref 98–107)
CO2 SERPL-SCNC: 22 MMOL/L — SIGNIFICANT CHANGE UP (ref 22–31)
CO2 SERPL-SCNC: 23 MMOL/L — SIGNIFICANT CHANGE UP (ref 22–31)
CREAT SERPL-MCNC: 1.83 MG/DL — HIGH (ref 0.5–1.3)
CREAT SERPL-MCNC: 1.85 MG/DL — HIGH (ref 0.5–1.3)
EGFR: 34 ML/MIN/1.73M2 — LOW
EGFR: 34 ML/MIN/1.73M2 — LOW
GLUCOSE BLDC GLUCOMTR-MCNC: 142 MG/DL — HIGH (ref 70–99)
GLUCOSE BLDC GLUCOMTR-MCNC: 164 MG/DL — HIGH (ref 70–99)
GLUCOSE BLDC GLUCOMTR-MCNC: 198 MG/DL — HIGH (ref 70–99)
GLUCOSE BLDC GLUCOMTR-MCNC: 250 MG/DL — HIGH (ref 70–99)
GLUCOSE SERPL-MCNC: 154 MG/DL — HIGH (ref 70–99)
GLUCOSE SERPL-MCNC: 200 MG/DL — HIGH (ref 70–99)
HCT VFR BLD CALC: 27.3 % — LOW (ref 34.5–45)
HCT VFR BLD CALC: 27.4 % — LOW (ref 34.5–45)
HGB BLD-MCNC: 8.1 G/DL — LOW (ref 11.5–15.5)
HGB BLD-MCNC: 8.3 G/DL — LOW (ref 11.5–15.5)
MAGNESIUM SERPL-MCNC: 1.6 MG/DL — SIGNIFICANT CHANGE UP (ref 1.6–2.6)
MAGNESIUM SERPL-MCNC: 1.6 MG/DL — SIGNIFICANT CHANGE UP (ref 1.6–2.6)
MCHC RBC-ENTMCNC: 24.4 PG — LOW (ref 27–34)
MCHC RBC-ENTMCNC: 25.4 PG — LOW (ref 27–34)
MCHC RBC-ENTMCNC: 29.7 GM/DL — LOW (ref 32–36)
MCHC RBC-ENTMCNC: 30.3 GM/DL — LOW (ref 32–36)
MCV RBC AUTO: 82.2 FL — SIGNIFICANT CHANGE UP (ref 80–100)
MCV RBC AUTO: 83.8 FL — SIGNIFICANT CHANGE UP (ref 80–100)
NRBC # BLD: 0 /100 WBCS — SIGNIFICANT CHANGE UP (ref 0–0)
NRBC # BLD: 0 /100 WBCS — SIGNIFICANT CHANGE UP (ref 0–0)
NRBC # FLD: 0.02 K/UL — HIGH (ref 0–0)
NRBC # FLD: 0.06 K/UL — HIGH (ref 0–0)
PHOSPHATE SERPL-MCNC: 4.5 MG/DL — SIGNIFICANT CHANGE UP (ref 2.5–4.5)
PHOSPHATE SERPL-MCNC: 4.5 MG/DL — SIGNIFICANT CHANGE UP (ref 2.5–4.5)
PLATELET # BLD AUTO: 525 K/UL — HIGH (ref 150–400)
PLATELET # BLD AUTO: 534 K/UL — HIGH (ref 150–400)
POTASSIUM SERPL-MCNC: 4.4 MMOL/L — SIGNIFICANT CHANGE UP (ref 3.5–5.3)
POTASSIUM SERPL-MCNC: 4.6 MMOL/L — SIGNIFICANT CHANGE UP (ref 3.5–5.3)
POTASSIUM SERPL-SCNC: 4.4 MMOL/L — SIGNIFICANT CHANGE UP (ref 3.5–5.3)
POTASSIUM SERPL-SCNC: 4.6 MMOL/L — SIGNIFICANT CHANGE UP (ref 3.5–5.3)
RBC # BLD: 3.27 M/UL — LOW (ref 3.8–5.2)
RBC # BLD: 3.32 M/UL — LOW (ref 3.8–5.2)
RBC # FLD: 19.4 % — HIGH (ref 10.3–14.5)
RBC # FLD: 19.5 % — HIGH (ref 10.3–14.5)
SODIUM SERPL-SCNC: 129 MMOL/L — LOW (ref 135–145)
SODIUM SERPL-SCNC: 130 MMOL/L — LOW (ref 135–145)
WBC # BLD: 11.69 K/UL — HIGH (ref 3.8–10.5)
WBC # BLD: 11.89 K/UL — HIGH (ref 3.8–10.5)
WBC # FLD AUTO: 11.69 K/UL — HIGH (ref 3.8–10.5)
WBC # FLD AUTO: 11.89 K/UL — HIGH (ref 3.8–10.5)

## 2023-02-19 PROCEDURE — 99232 SBSQ HOSP IP/OBS MODERATE 35: CPT

## 2023-02-19 RX ORDER — HYDROMORPHONE HYDROCHLORIDE 2 MG/ML
2 INJECTION INTRAMUSCULAR; INTRAVENOUS; SUBCUTANEOUS
Refills: 0 | Status: DISCONTINUED | OUTPATIENT
Start: 2023-02-19 | End: 2023-02-22

## 2023-02-19 RX ORDER — CYCLOBENZAPRINE HYDROCHLORIDE 10 MG/1
5 TABLET, FILM COATED ORAL EVERY 8 HOURS
Refills: 0 | Status: DISCONTINUED | OUTPATIENT
Start: 2023-02-19 | End: 2023-02-22

## 2023-02-19 RX ORDER — FAMOTIDINE 10 MG/ML
20 INJECTION INTRAVENOUS ONCE
Refills: 0 | Status: COMPLETED | OUTPATIENT
Start: 2023-02-19 | End: 2023-02-19

## 2023-02-19 RX ORDER — HYDROMORPHONE HYDROCHLORIDE 2 MG/ML
0.5 INJECTION INTRAMUSCULAR; INTRAVENOUS; SUBCUTANEOUS
Refills: 0 | Status: DISCONTINUED | OUTPATIENT
Start: 2023-02-19 | End: 2023-02-22

## 2023-02-19 RX ADMIN — CYCLOBENZAPRINE HYDROCHLORIDE 5 MILLIGRAM(S): 10 TABLET, FILM COATED ORAL at 12:03

## 2023-02-19 RX ADMIN — Medication 975 MILLIGRAM(S): at 11:34

## 2023-02-19 RX ADMIN — BUMETANIDE 1 MILLIGRAM(S): 0.25 INJECTION INTRAMUSCULAR; INTRAVENOUS at 06:47

## 2023-02-19 RX ADMIN — NALOXEGOL OXALATE 12.5 MILLIGRAM(S): 12.5 TABLET, FILM COATED ORAL at 11:10

## 2023-02-19 RX ADMIN — HEPARIN SODIUM 5000 UNIT(S): 5000 INJECTION INTRAVENOUS; SUBCUTANEOUS at 23:47

## 2023-02-19 RX ADMIN — Medication 975 MILLIGRAM(S): at 17:58

## 2023-02-19 RX ADMIN — HYDROMORPHONE HYDROCHLORIDE 2 MILLIGRAM(S): 2 INJECTION INTRAMUSCULAR; INTRAVENOUS; SUBCUTANEOUS at 23:46

## 2023-02-19 RX ADMIN — HYDROMORPHONE HYDROCHLORIDE 2 MILLIGRAM(S): 2 INJECTION INTRAMUSCULAR; INTRAVENOUS; SUBCUTANEOUS at 11:34

## 2023-02-19 RX ADMIN — POLYETHYLENE GLYCOL 3350 17 GRAM(S): 17 POWDER, FOR SOLUTION ORAL at 09:24

## 2023-02-19 RX ADMIN — ATORVASTATIN CALCIUM 80 MILLIGRAM(S): 80 TABLET, FILM COATED ORAL at 23:47

## 2023-02-19 RX ADMIN — HEPARIN SODIUM 5000 UNIT(S): 5000 INJECTION INTRAVENOUS; SUBCUTANEOUS at 06:47

## 2023-02-19 RX ADMIN — Medication 1: at 09:23

## 2023-02-19 RX ADMIN — Medication 975 MILLIGRAM(S): at 11:10

## 2023-02-19 RX ADMIN — HEPARIN SODIUM 5000 UNIT(S): 5000 INJECTION INTRAVENOUS; SUBCUTANEOUS at 13:29

## 2023-02-19 RX ADMIN — Medication 975 MILLIGRAM(S): at 05:18

## 2023-02-19 RX ADMIN — BUMETANIDE 1 MILLIGRAM(S): 0.25 INJECTION INTRAMUSCULAR; INTRAVENOUS at 13:29

## 2023-02-19 RX ADMIN — Medication 975 MILLIGRAM(S): at 23:45

## 2023-02-19 RX ADMIN — PANTOPRAZOLE SODIUM 40 MILLIGRAM(S): 20 TABLET, DELAYED RELEASE ORAL at 06:48

## 2023-02-19 RX ADMIN — CARVEDILOL PHOSPHATE 6.25 MILLIGRAM(S): 80 CAPSULE, EXTENDED RELEASE ORAL at 06:48

## 2023-02-19 RX ADMIN — FAMOTIDINE 20 MILLIGRAM(S): 10 INJECTION INTRAVENOUS at 02:08

## 2023-02-19 RX ADMIN — CALAMINE AND ZINC OXIDE AND PHENOL 1 APPLICATION(S): 160; 10 LOTION TOPICAL at 13:30

## 2023-02-19 RX ADMIN — Medication 9 MILLIGRAM(S): at 23:46

## 2023-02-19 RX ADMIN — CALAMINE AND ZINC OXIDE AND PHENOL 1 APPLICATION(S): 160; 10 LOTION TOPICAL at 23:47

## 2023-02-19 RX ADMIN — Medication 975 MILLIGRAM(S): at 04:18

## 2023-02-19 RX ADMIN — HYDROMORPHONE HYDROCHLORIDE 30 MILLILITER(S): 2 INJECTION INTRAMUSCULAR; INTRAVENOUS; SUBCUTANEOUS at 07:36

## 2023-02-19 RX ADMIN — Medication 81 MILLIGRAM(S): at 11:11

## 2023-02-19 RX ADMIN — CALAMINE AND ZINC OXIDE AND PHENOL 1 APPLICATION(S): 160; 10 LOTION TOPICAL at 06:48

## 2023-02-19 RX ADMIN — HYDROMORPHONE HYDROCHLORIDE 2 MILLIGRAM(S): 2 INJECTION INTRAMUSCULAR; INTRAVENOUS; SUBCUTANEOUS at 15:05

## 2023-02-19 RX ADMIN — HYDROMORPHONE HYDROCHLORIDE 2 MILLIGRAM(S): 2 INJECTION INTRAMUSCULAR; INTRAVENOUS; SUBCUTANEOUS at 19:46

## 2023-02-19 RX ADMIN — HYDROMORPHONE HYDROCHLORIDE 2 MILLIGRAM(S): 2 INJECTION INTRAMUSCULAR; INTRAVENOUS; SUBCUTANEOUS at 11:10

## 2023-02-19 RX ADMIN — Medication 2: at 17:51

## 2023-02-19 RX ADMIN — HYDROMORPHONE HYDROCHLORIDE 2 MILLIGRAM(S): 2 INJECTION INTRAMUSCULAR; INTRAVENOUS; SUBCUTANEOUS at 18:46

## 2023-02-19 RX ADMIN — CARVEDILOL PHOSPHATE 6.25 MILLIGRAM(S): 80 CAPSULE, EXTENDED RELEASE ORAL at 17:50

## 2023-02-19 RX ADMIN — HYDROMORPHONE HYDROCHLORIDE 2 MILLIGRAM(S): 2 INJECTION INTRAMUSCULAR; INTRAVENOUS; SUBCUTANEOUS at 14:48

## 2023-02-19 RX ADMIN — Medication 975 MILLIGRAM(S): at 17:51

## 2023-02-19 NOTE — PROGRESS NOTE ADULT - PROBLEM SELECTOR PLAN 1
Patient presenting in setting of L 2nd toe pain concerning for dry gangrene with history of prior toe amputation iso PAD. Initially with elevated ESR/CRP without active signs of infection however later with concern for infection and septic shock.   - pain control w/ Dilaudid 2mg q3hr PRN for severe; Dilaudid 1mg q4hr PRN for moderate, ATC tylenol, dilaudid 0.5mg q4hr PRN added for breakthrough given increased reported pain  -s/p vascular intervention with CO2 LLE angiogram 2/10. now s/p L BKA   - Cardiology risk strat appreciated. Pt can proceed to BKA on 2/17 as she is medically optimized  - completed abx course as below, not currently on abx  - PT/OT

## 2023-02-19 NOTE — PROGRESS NOTE ADULT - SUBJECTIVE AND OBJECTIVE BOX
Anesthesia Pain Management Service    SUBJECTIVE: Patient reports IV PCA helps but feels a "locked" stiffness in her leg. Patient appears sleepy and continuously falls asleep while speaking to her. Per primary RN, patient was very sleepy the day before when she was on prior pain regimen of IV Dilaudid 2mg Q3H PRN.    Pain Scale Score	At rest: __6/10_ 	With Activity: ___ 	[X ] Refer to charted pain scores    THERAPY:    [ ] IV PCA Morphine		[ ] 5 mg/mL	[ ] 1 mg/mL  [X ] IV PCA Hydromorphone	[ ] 5 mg/mL	[X ] 1 mg/mL  [ ] IV PCA Fentanyl		[ ] 50 micrograms/mL    Demand dose __0.2_ lockout __6_ (minutes) Continuous Rate _0__ Total: _2_   mg used (in past 24 hrs)      MEDICATIONS  (STANDING):  acetaminophen     Tablet .. 975 milliGRAM(s) Oral every 6 hours  aspirin enteric coated 81 milliGRAM(s) Oral daily  atorvastatin 80 milliGRAM(s) Oral at bedtime  buMETAnide 1 milliGRAM(s) Oral two times a day  calamine/zinc oxide Lotion 1 Application(s) Topical three times a day  carvedilol 6.25 milliGRAM(s) Oral every 12 hours  cyclobenzaprine 5 milliGRAM(s) Oral every 8 hours  dextrose 5%. 1000 milliLiter(s) (100 mL/Hr) IV Continuous <Continuous>  dextrose 5%. 1000 milliLiter(s) (50 mL/Hr) IV Continuous <Continuous>  dextrose 50% Injectable 25 Gram(s) IV Push once  dextrose 50% Injectable 12.5 Gram(s) IV Push once  dextrose 50% Injectable 25 Gram(s) IV Push once  glucagon  Injectable 1 milliGRAM(s) IntraMuscular once  heparin   Injectable 5000 Unit(s) SubCutaneous every 8 hours  influenza   Vaccine 0.5 milliLiter(s) IntraMuscular once  insulin lispro (ADMELOG) corrective regimen sliding scale   SubCutaneous three times a day before meals  insulin lispro (ADMELOG) corrective regimen sliding scale   SubCutaneous at bedtime  melatonin 9 milliGRAM(s) Oral at bedtime  naloxegol 12.5 milliGRAM(s) Oral daily  pantoprazole    Tablet 40 milliGRAM(s) Oral before breakfast  polyethylene glycol 3350 17 Gram(s) Oral two times a day  senna 2 Tablet(s) Oral at bedtime  sodium chloride 0.9%. 1000 milliLiter(s) (25 mL/Hr) IV Continuous <Continuous>    MEDICATIONS  (PRN):  dextrose Oral Gel 15 Gram(s) Oral once PRN Blood Glucose LESS THAN 70 milliGRAM(s)/deciliter  HYDROmorphone   Tablet 2 milliGRAM(s) Oral every 3 hours PRN Moderate - Severe Pain (4 - 10)  HYDROmorphone  Injectable 0.5 milliGRAM(s) IV Push every 3 hours PRN Severe Breakthrough Pain (7 - 10)  naloxone Injectable 0.1 milliGRAM(s) IV Push every 3 minutes PRN For ANY of the following changes in patient status:  A. RR LESS THAN 10 breaths per minute, B. Oxygen saturation LESS THAN 90%, C. Sedation score of 6  ondansetron Injectable 4 milliGRAM(s) IV Push every 8 hours PRN Nausea and/or Vomiting  ondansetron Injectable 4 milliGRAM(s) IV Push every 6 hours PRN Nausea      OBJECTIVE: Patient sitting up in bed.    Sedation Score:	[ X] Alert	[ ] Drowsy 	[ ] Arousable	[ ] Asleep	[ ] Unresponsive    Side Effects:	[X ] None	[ ] Nausea	[ ] Vomiting	[ ] Pruritus  		[ ] Other:    Vital Signs Last 24 Hrs  T(C): 36.8 (19 Feb 2023 09:00), Max: 36.8 (18 Feb 2023 17:00)  T(F): 98.2 (19 Feb 2023 09:00), Max: 98.2 (18 Feb 2023 17:00)  HR: 72 (19 Feb 2023 09:00) (72 - 82)  BP: 105/62 (19 Feb 2023 09:00) (93/55 - 121/60)  BP(mean): --  RR: 18 (19 Feb 2023 09:00) (18 - 20)  SpO2: 100% (19 Feb 2023 09:00) (98% - 100%)    Parameters below as of 19 Feb 2023 09:00  Patient On (Oxygen Delivery Method): room air        ASSESSMENT/ PLAN    Therapy to  be:	[ ] Continue   [ X] Discontinued   [X ] Change to prn Analgesics    Documentation and Verification of current medications:   [X] Done	[ ] Not done, not elligible    Comments: Discussed patient with primary team, IV PCA discontinued. Flexeril 5mg TID ordered standing x 1 day, then PRN. PRN Oral/IV opioids and/or Adjuvant non-opioid medication to be ordered at this point.    Progress Note written now but Patient was seen earlier.

## 2023-02-19 NOTE — PROGRESS NOTE ADULT - SUBJECTIVE AND OBJECTIVE BOX
Patient is a 42y old  Female who presents with a chief complaint of Toe pain (19 Feb 2023 10:18)      SUBJECTIVE / OVERNIGHT EVENTS: No acute events overnight. Continued pain.     MEDICATIONS  (STANDING):  acetaminophen     Tablet .. 975 milliGRAM(s) Oral every 6 hours  aspirin enteric coated 81 milliGRAM(s) Oral daily  atorvastatin 80 milliGRAM(s) Oral at bedtime  buMETAnide 1 milliGRAM(s) Oral two times a day  calamine/zinc oxide Lotion 1 Application(s) Topical three times a day  carvedilol 6.25 milliGRAM(s) Oral every 12 hours  cyclobenzaprine 5 milliGRAM(s) Oral every 8 hours  dextrose 5%. 1000 milliLiter(s) (100 mL/Hr) IV Continuous <Continuous>  dextrose 5%. 1000 milliLiter(s) (50 mL/Hr) IV Continuous <Continuous>  dextrose 50% Injectable 25 Gram(s) IV Push once  dextrose 50% Injectable 12.5 Gram(s) IV Push once  dextrose 50% Injectable 25 Gram(s) IV Push once  glucagon  Injectable 1 milliGRAM(s) IntraMuscular once  heparin   Injectable 5000 Unit(s) SubCutaneous every 8 hours  influenza   Vaccine 0.5 milliLiter(s) IntraMuscular once  insulin lispro (ADMELOG) corrective regimen sliding scale   SubCutaneous three times a day before meals  insulin lispro (ADMELOG) corrective regimen sliding scale   SubCutaneous at bedtime  melatonin 9 milliGRAM(s) Oral at bedtime  naloxegol 12.5 milliGRAM(s) Oral daily  pantoprazole    Tablet 40 milliGRAM(s) Oral before breakfast  polyethylene glycol 3350 17 Gram(s) Oral two times a day  senna 2 Tablet(s) Oral at bedtime    MEDICATIONS  (PRN):  dextrose Oral Gel 15 Gram(s) Oral once PRN Blood Glucose LESS THAN 70 milliGRAM(s)/deciliter  HYDROmorphone   Tablet 2 milliGRAM(s) Oral every 3 hours PRN Moderate - Severe Pain (4 - 10)  HYDROmorphone  Injectable 0.5 milliGRAM(s) IV Push every 3 hours PRN Severe Breakthrough Pain (7 - 10)  naloxone Injectable 0.1 milliGRAM(s) IV Push every 3 minutes PRN For ANY of the following changes in patient status:  A. RR LESS THAN 10 breaths per minute, B. Oxygen saturation LESS THAN 90%, C. Sedation score of 6  ondansetron Injectable 4 milliGRAM(s) IV Push every 8 hours PRN Nausea and/or Vomiting  ondansetron Injectable 4 milliGRAM(s) IV Push every 6 hours PRN Nausea      T(C): 36.4 (02-19-23 @ 12:49), Max: 36.8 (02-18-23 @ 17:00)  HR: 75 (02-19-23 @ 12:49) (72 - 82)  BP: 114/52 (02-19-23 @ 12:49) (93/55 - 121/60)  RR: 18 (02-19-23 @ 12:49) (18 - 20)  SpO2: 100% (02-19-23 @ 12:49) (98% - 100%)  CAPILLARY BLOOD GLUCOSE      POCT Blood Glucose.: 142 mg/dL (19 Feb 2023 13:01)  POCT Blood Glucose.: 164 mg/dL (19 Feb 2023 08:59)  POCT Blood Glucose.: 218 mg/dL (18 Feb 2023 21:29)  POCT Blood Glucose.: 309 mg/dL (18 Feb 2023 17:13)    I&O's Summary    18 Feb 2023 07:01  -  19 Feb 2023 07:00  --------------------------------------------------------  IN: 300 mL / OUT: 0 mL / NET: 300 mL    19 Feb 2023 07:01  -  19 Feb 2023 14:49  --------------------------------------------------------  IN: 170 mL / OUT: 10 mL / NET: 160 mL        PHYSICAL EXAM:  GENERAL: no apparent distress, on room air  EYES: EOMI, PERRLA, conjunctiva and sclera clear b/l  CHEST/LUNG: Clear to auscultation bilaterally; No wheezing or crackles  HEART: s1/s2, RRR, no murmurs appreciated  ABDOMEN: Soft, Nontender, Nondistended; Bowel sounds present  EXTREMITIES:  left BKA site with dressing c/d/i  MSK: no joint effusions  Back: no spinal tenderness  NEUROLOGY: awake, alert, responds to Qs appropriately, no sensory or motor deficits, 5/5 muscle strength equal in all extremities, CN 2-12 grossly intact  PSYCH: calm, cooperative  SKIN: No rashes or lesions    LABS:                        8.1    11.69 )-----------( 525      ( 19 Feb 2023 04:55 )             27.3     02-19    130<L>  |  97<L>  |  53<H>  ----------------------------<  154<H>  4.6   |  22  |  1.85<H>    Ca    8.3<L>      19 Feb 2023 04:55  Phos  4.5     02-19  Mg     1.60     02-19                RADIOLOGY & ADDITIONAL TESTS:

## 2023-02-20 LAB
ANION GAP SERPL CALC-SCNC: 13 MMOL/L — SIGNIFICANT CHANGE UP (ref 7–14)
BUN SERPL-MCNC: 52 MG/DL — HIGH (ref 7–23)
CALCIUM SERPL-MCNC: 8.4 MG/DL — SIGNIFICANT CHANGE UP (ref 8.4–10.5)
CHLORIDE SERPL-SCNC: 97 MMOL/L — LOW (ref 98–107)
CO2 SERPL-SCNC: 21 MMOL/L — LOW (ref 22–31)
CREAT SERPL-MCNC: 1.76 MG/DL — HIGH (ref 0.5–1.3)
EGFR: 36 ML/MIN/1.73M2 — LOW
GLUCOSE BLDC GLUCOMTR-MCNC: 161 MG/DL — HIGH (ref 70–99)
GLUCOSE BLDC GLUCOMTR-MCNC: 178 MG/DL — HIGH (ref 70–99)
GLUCOSE BLDC GLUCOMTR-MCNC: 202 MG/DL — HIGH (ref 70–99)
GLUCOSE BLDC GLUCOMTR-MCNC: 257 MG/DL — HIGH (ref 70–99)
GLUCOSE SERPL-MCNC: 153 MG/DL — HIGH (ref 70–99)
HCT VFR BLD CALC: 25.4 % — LOW (ref 34.5–45)
HGB BLD-MCNC: 7.6 G/DL — LOW (ref 11.5–15.5)
MAGNESIUM SERPL-MCNC: 1.7 MG/DL — SIGNIFICANT CHANGE UP (ref 1.6–2.6)
MCHC RBC-ENTMCNC: 24.9 PG — LOW (ref 27–34)
MCHC RBC-ENTMCNC: 29.9 GM/DL — LOW (ref 32–36)
MCV RBC AUTO: 83.3 FL — SIGNIFICANT CHANGE UP (ref 80–100)
NRBC # BLD: 0 /100 WBCS — SIGNIFICANT CHANGE UP (ref 0–0)
NRBC # FLD: 0 K/UL — SIGNIFICANT CHANGE UP (ref 0–0)
PHOSPHATE SERPL-MCNC: 4.6 MG/DL — HIGH (ref 2.5–4.5)
PLATELET # BLD AUTO: 502 K/UL — HIGH (ref 150–400)
POTASSIUM SERPL-MCNC: 4.4 MMOL/L — SIGNIFICANT CHANGE UP (ref 3.5–5.3)
POTASSIUM SERPL-SCNC: 4.4 MMOL/L — SIGNIFICANT CHANGE UP (ref 3.5–5.3)
RBC # BLD: 3.05 M/UL — LOW (ref 3.8–5.2)
RBC # FLD: 19.7 % — HIGH (ref 10.3–14.5)
SARS-COV-2 RNA SPEC QL NAA+PROBE: SIGNIFICANT CHANGE UP
SODIUM SERPL-SCNC: 131 MMOL/L — LOW (ref 135–145)
WBC # BLD: 9.03 K/UL — SIGNIFICANT CHANGE UP (ref 3.8–10.5)
WBC # FLD AUTO: 9.03 K/UL — SIGNIFICANT CHANGE UP (ref 3.8–10.5)

## 2023-02-20 PROCEDURE — 99232 SBSQ HOSP IP/OBS MODERATE 35: CPT

## 2023-02-20 PROCEDURE — 99232 SBSQ HOSP IP/OBS MODERATE 35: CPT | Mod: GC

## 2023-02-20 RX ADMIN — NALOXEGOL OXALATE 12.5 MILLIGRAM(S): 12.5 TABLET, FILM COATED ORAL at 13:39

## 2023-02-20 RX ADMIN — CALAMINE AND ZINC OXIDE AND PHENOL 1 APPLICATION(S): 160; 10 LOTION TOPICAL at 21:32

## 2023-02-20 RX ADMIN — BUMETANIDE 1 MILLIGRAM(S): 0.25 INJECTION INTRAMUSCULAR; INTRAVENOUS at 13:43

## 2023-02-20 RX ADMIN — HYDROMORPHONE HYDROCHLORIDE 2 MILLIGRAM(S): 2 INJECTION INTRAMUSCULAR; INTRAVENOUS; SUBCUTANEOUS at 00:59

## 2023-02-20 RX ADMIN — HEPARIN SODIUM 5000 UNIT(S): 5000 INJECTION INTRAVENOUS; SUBCUTANEOUS at 14:04

## 2023-02-20 RX ADMIN — HYDROMORPHONE HYDROCHLORIDE 2 MILLIGRAM(S): 2 INJECTION INTRAMUSCULAR; INTRAVENOUS; SUBCUTANEOUS at 12:43

## 2023-02-20 RX ADMIN — ATORVASTATIN CALCIUM 80 MILLIGRAM(S): 80 TABLET, FILM COATED ORAL at 21:26

## 2023-02-20 RX ADMIN — CARVEDILOL PHOSPHATE 6.25 MILLIGRAM(S): 80 CAPSULE, EXTENDED RELEASE ORAL at 06:06

## 2023-02-20 RX ADMIN — HEPARIN SODIUM 5000 UNIT(S): 5000 INJECTION INTRAVENOUS; SUBCUTANEOUS at 06:04

## 2023-02-20 RX ADMIN — CARVEDILOL PHOSPHATE 6.25 MILLIGRAM(S): 80 CAPSULE, EXTENDED RELEASE ORAL at 18:04

## 2023-02-20 RX ADMIN — CALAMINE AND ZINC OXIDE AND PHENOL 1 APPLICATION(S): 160; 10 LOTION TOPICAL at 06:05

## 2023-02-20 RX ADMIN — HYDROMORPHONE HYDROCHLORIDE 2 MILLIGRAM(S): 2 INJECTION INTRAMUSCULAR; INTRAVENOUS; SUBCUTANEOUS at 15:19

## 2023-02-20 RX ADMIN — Medication 81 MILLIGRAM(S): at 13:37

## 2023-02-20 RX ADMIN — Medication 975 MILLIGRAM(S): at 07:04

## 2023-02-20 RX ADMIN — HYDROMORPHONE HYDROCHLORIDE 2 MILLIGRAM(S): 2 INJECTION INTRAMUSCULAR; INTRAVENOUS; SUBCUTANEOUS at 06:04

## 2023-02-20 RX ADMIN — CALAMINE AND ZINC OXIDE AND PHENOL 1 APPLICATION(S): 160; 10 LOTION TOPICAL at 21:31

## 2023-02-20 RX ADMIN — HYDROMORPHONE HYDROCHLORIDE 2 MILLIGRAM(S): 2 INJECTION INTRAMUSCULAR; INTRAVENOUS; SUBCUTANEOUS at 07:04

## 2023-02-20 RX ADMIN — Medication 1: at 13:34

## 2023-02-20 RX ADMIN — HYDROMORPHONE HYDROCHLORIDE 2 MILLIGRAM(S): 2 INJECTION INTRAMUSCULAR; INTRAVENOUS; SUBCUTANEOUS at 11:43

## 2023-02-20 RX ADMIN — Medication 975 MILLIGRAM(S): at 13:36

## 2023-02-20 RX ADMIN — CYCLOBENZAPRINE HYDROCHLORIDE 5 MILLIGRAM(S): 10 TABLET, FILM COATED ORAL at 01:14

## 2023-02-20 RX ADMIN — HYDROMORPHONE HYDROCHLORIDE 2 MILLIGRAM(S): 2 INJECTION INTRAMUSCULAR; INTRAVENOUS; SUBCUTANEOUS at 22:23

## 2023-02-20 RX ADMIN — Medication 9 MILLIGRAM(S): at 21:27

## 2023-02-20 RX ADMIN — Medication 975 MILLIGRAM(S): at 06:04

## 2023-02-20 RX ADMIN — CALAMINE AND ZINC OXIDE AND PHENOL 1 APPLICATION(S): 160; 10 LOTION TOPICAL at 13:43

## 2023-02-20 RX ADMIN — Medication 975 MILLIGRAM(S): at 00:59

## 2023-02-20 RX ADMIN — Medication 975 MILLIGRAM(S): at 16:19

## 2023-02-20 RX ADMIN — HEPARIN SODIUM 5000 UNIT(S): 5000 INJECTION INTRAVENOUS; SUBCUTANEOUS at 21:26

## 2023-02-20 RX ADMIN — HYDROMORPHONE HYDROCHLORIDE 2 MILLIGRAM(S): 2 INJECTION INTRAMUSCULAR; INTRAVENOUS; SUBCUTANEOUS at 16:19

## 2023-02-20 RX ADMIN — Medication 3: at 18:03

## 2023-02-20 RX ADMIN — HYDROMORPHONE HYDROCHLORIDE 2 MILLIGRAM(S): 2 INJECTION INTRAMUSCULAR; INTRAVENOUS; SUBCUTANEOUS at 21:23

## 2023-02-20 RX ADMIN — Medication 2: at 09:23

## 2023-02-20 RX ADMIN — PANTOPRAZOLE SODIUM 40 MILLIGRAM(S): 20 TABLET, DELAYED RELEASE ORAL at 06:05

## 2023-02-20 NOTE — PROGRESS NOTE ADULT - SUBJECTIVE AND OBJECTIVE BOX
[facility] DIVISION OF KIDNEY DISEASES AND HYPERTENSION   FOLLOW UP NOTE  --------------------------------------------------------------------------------  HPI: 42-year-female with PMH of HTN, HLD, DM, PAD, CHF admitted with left toe pain. Pt. developed BOB during hospital stay. Pt. was transferred to the MICU on 1/29/23 for septic shock requiring vasopressor support. Pt. clinically improved and was transferred to medical floors on 2/3/23. Pt. being seen for BOB. She is currently receiving Bumex 1 mg PO bid.    24 hour events/subjective: Pt. seen and examined at bedside today. She has occasional LLE pain but "doing better". She denied chest pain, shortness of breath, or N/V/D.      PAST HISTORY  --------------------------------------------------------------------------------  No significant changes to PMH, PSH, FHx, SHx, unless otherwise noted    ALLERGIES & MEDICATIONS  --------------------------------------------------------------------------------  Allergies  No Known Allergies    Standing Inpatient Medications  acetaminophen     Tablet .. 975 milliGRAM(s) Oral every 6 hours  aspirin enteric coated 81 milliGRAM(s) Oral daily  atorvastatin 80 milliGRAM(s) Oral at bedtime  buMETAnide 1 milliGRAM(s) Oral two times a day  calamine/zinc oxide Lotion 1 Application(s) Topical three times a day  carvedilol 6.25 milliGRAM(s) Oral every 12 hours  cyclobenzaprine 5 milliGRAM(s) Oral every 8 hours  dextrose 5%. 1000 milliLiter(s) IV Continuous <Continuous>  dextrose 5%. 1000 milliLiter(s) IV Continuous <Continuous>  dextrose 50% Injectable 25 Gram(s) IV Push once  dextrose 50% Injectable 12.5 Gram(s) IV Push once  dextrose 50% Injectable 25 Gram(s) IV Push once  glucagon  Injectable 1 milliGRAM(s) IntraMuscular once  heparin   Injectable 5000 Unit(s) SubCutaneous every 8 hours  influenza   Vaccine 0.5 milliLiter(s) IntraMuscular once  insulin lispro (ADMELOG) corrective regimen sliding scale   SubCutaneous three times a day before meals  insulin lispro (ADMELOG) corrective regimen sliding scale   SubCutaneous at bedtime  melatonin 9 milliGRAM(s) Oral at bedtime  naloxegol 12.5 milliGRAM(s) Oral daily  pantoprazole    Tablet 40 milliGRAM(s) Oral before breakfast  polyethylene glycol 3350 17 Gram(s) Oral two times a day  senna 2 Tablet(s) Oral at bedtime    PRN Inpatient Medications  dextrose Oral Gel 15 Gram(s) Oral once PRN  HYDROmorphone   Tablet 2 milliGRAM(s) Oral every 3 hours PRN  HYDROmorphone  Injectable 0.5 milliGRAM(s) IV Push every 3 hours PRN  naloxone Injectable 0.1 milliGRAM(s) IV Push every 3 minutes PRN  ondansetron Injectable 4 milliGRAM(s) IV Push every 6 hours PRN    REVIEW OF SYSTEMS  --------------------------------------------------------------------------------  Gen: no fever  Respiratory: no sob  CV: no cp  GI: no ab pain  : no difficulty with urination  MSK: +LLE pain     All other systems were reviewed and are negative, except as noted.    VITALS/PHYSICAL EXAM  --------------------------------------------------------------------------------  T(C): 36.6 (02-20-23 @ 05:48), Max: 36.6 (02-20-23 @ 05:48)  HR: 75 (02-20-23 @ 05:48) (74 - 76)  BP: 128/69 (02-20-23 @ 05:48) (111/62 - 147/88)  RR: 20 (02-20-23 @ 05:48) (18 - 20)  SpO2: 100% (02-20-23 @ 05:48) (100% - 100%)  Wt(kg): --    02-19-23 @ 07:01  -  02-20-23 @ 07:00  --------------------------------------------------------  IN: 890 mL / OUT: 20 mL / NET: 870 mL    Physical Exam:  Gen: Laying in bed and in NAD  HEENT: Anicteric  Pulm: CTA B/L  CV: S1S2  Abd: Soft, +BS    Ext: Trace RLE edema. LLE dressed (s/p L BKA)  Neuro: Awake and alert  Skin: Warm and dry    LABS/STUDIES  --------------------------------------------------------------------------------              7.6    9.03  >-----------<  502      [02-20-23 @ 06:00]              25.4     131  |  97  |  52  ----------------------------<  153      [02-20-23 @ 06:00]  4.4   |  21  |  1.76        Ca     8.4     [02-20-23 @ 06:00]      Mg     1.70     [02-20-23 @ 06:00]      Phos  4.6     [02-20-23 @ 06:00]    Creatinine Trend:  SCr 1.76 [02-20 @ 06:00]  SCr 1.85 [02-19 @ 04:55]  SCr 1.83 [02-18 @ 23:50]  SCr 1.55 [02-17 @ 05:50]  SCr 1.58 [02-17 @ 01:00]

## 2023-02-20 NOTE — PROGRESS NOTE ADULT - ASSESSMENT
42F w/ PMH coronary artery disease status post CABG x4 in 2018, CHF s/p ICD placement (interrogated 2022), DM, PAD s/p 3rd toe amputation who presents with left 2nd toe pain concerning for dry gangrene.Course c/b worsening renal function, and shock likely sepsis 2/2 LE wounds w/ possible cardiogenic component requiring pressors, now improved. S/p BKA

## 2023-02-20 NOTE — PROGRESS NOTE ADULT - PROBLEM SELECTOR PLAN 1
Patient presenting in setting of L 2nd toe pain concerning for dry gangrene with history of prior toe amputation iso PAD. Initially with elevated ESR/CRP without active signs of infection however later with concern for infection and septic shock.   - pain control w/ Dilaudid 2mg q3hr PRN for severe; Dilaudid 1mg q4hr PRN for moderate, ATC tylenol, dilaudid 0.5mg q4hr PRN added for breakthrough given increased reported pain  -s/p vascular intervention with CO2 LLE angiogram 2/10. now s/p L BKA   - PT/OT

## 2023-02-20 NOTE — PROGRESS NOTE ADULT - SUBJECTIVE AND OBJECTIVE BOX
[facility] Medicine  Dr. Maude Farias  Pager [phone]    Patient is a 42y old  Female who presents with a chief complaint of Toe pain (19 Feb 2023 10:18)    SUBJECTIVE / OVERNIGHT EVENTS: No acute events overnight. Denies pain this morning. Dressing was changed this morning as well. No new concerns     MEDICATIONS  (STANDING):  acetaminophen     Tablet .. 975 milliGRAM(s) Oral every 6 hours  aspirin enteric coated 81 milliGRAM(s) Oral daily  atorvastatin 80 milliGRAM(s) Oral at bedtime  buMETAnide 1 milliGRAM(s) Oral two times a day  calamine/zinc oxide Lotion 1 Application(s) Topical three times a day  carvedilol 6.25 milliGRAM(s) Oral every 12 hours  cyclobenzaprine 5 milliGRAM(s) Oral every 8 hours  dextrose 5%. 1000 milliLiter(s) (100 mL/Hr) IV Continuous <Continuous>  dextrose 5%. 1000 milliLiter(s) (50 mL/Hr) IV Continuous <Continuous>  dextrose 50% Injectable 25 Gram(s) IV Push once  dextrose 50% Injectable 12.5 Gram(s) IV Push once  dextrose 50% Injectable 25 Gram(s) IV Push once  glucagon  Injectable 1 milliGRAM(s) IntraMuscular once  heparin   Injectable 5000 Unit(s) SubCutaneous every 8 hours  influenza   Vaccine 0.5 milliLiter(s) IntraMuscular once  insulin lispro (ADMELOG) corrective regimen sliding scale   SubCutaneous three times a day before meals  insulin lispro (ADMELOG) corrective regimen sliding scale   SubCutaneous at bedtime  melatonin 9 milliGRAM(s) Oral at bedtime  naloxegol 12.5 milliGRAM(s) Oral daily  pantoprazole    Tablet 40 milliGRAM(s) Oral before breakfast  polyethylene glycol 3350 17 Gram(s) Oral two times a day  senna 2 Tablet(s) Oral at bedtime    MEDICATIONS  (PRN):  dextrose Oral Gel 15 Gram(s) Oral once PRN Blood Glucose LESS THAN 70 milliGRAM(s)/deciliter  HYDROmorphone   Tablet 2 milliGRAM(s) Oral every 3 hours PRN Moderate - Severe Pain (4 - 10)  HYDROmorphone  Injectable 0.5 milliGRAM(s) IV Push every 3 hours PRN Severe Breakthrough Pain (7 - 10)  naloxone Injectable 0.1 milliGRAM(s) IV Push every 3 minutes PRN For ANY of the following changes in patient status:  A. RR LESS THAN 10 breaths per minute, B. Oxygen saturation LESS THAN 90%, C. Sedation score of 6  ondansetron Injectable 4 milliGRAM(s) IV Push every 8 hours PRN Nausea and/or Vomiting  ondansetron Injectable 4 milliGRAM(s) IV Push every 6 hours PRN Nausea    Vital Signs Last 24 Hrs  T(C): 36.6 (20 Feb 2023 05:48), Max: 36.6 (20 Feb 2023 05:48)  T(F): 97.9 (20 Feb 2023 05:48), Max: 97.9 (20 Feb 2023 05:48)  HR: 75 (20 Feb 2023 05:48) (74 - 76)  BP: 128/69 (20 Feb 2023 05:48) (111/62 - 147/88)  BP(mean): --  RR: 20 (20 Feb 2023 05:48) (18 - 20)  SpO2: 100% (20 Feb 2023 05:48) (100% - 100%)    Parameters below as of 20 Feb 2023 05:48  Patient On (Oxygen Delivery Method): room air        CAPILLARY BLOOD GLUCOSE  POCT Blood Glucose.: 202 mg/dL (20 Feb 2023 09:20)  POCT Blood Glucose.: 198 mg/dL (19 Feb 2023 21:49)  POCT Blood Glucose.: 250 mg/dL (19 Feb 2023 17:35)  POCT Blood Glucose.: 142 mg/dL (19 Feb 2023 13:01)        PHYSICAL EXAM:  GENERAL: no apparent distress, on room air  EYES: EOMI, PERRLA, conjunctiva and sclera clear b/l  CHEST/LUNG: Clear to auscultation bilaterally; No wheezing or crackles  HEART: s1/s2, RRR, no murmurs appreciated  ABDOMEN: Soft, Nontender, Nondistended; Bowel sounds present  EXTREMITIES:  left BKA site with dressing c/d/i  MSK: no joint effusions  Back: no spinal tenderness  NEUROLOGY: awake, alert, responds to Qs appropriately, no sensory or motor deficits, 5/5 muscle strength equal in all extremities, CN 2-12 grossly intact  PSYCH: calm, cooperative  SKIN: No rashes or lesions    LABS:                          7.6    9.03  )-----------( 502      ( 20 Feb 2023 06:00 )             25.4   02-20    131<L>  |  97<L>  |  52<H>  ----------------------------<  153<H>  4.4   |  21<L>  |  1.76<H>    Ca    8.4      20 Feb 2023 06:00  Phos  4.6     02-20  Mg     1.70     02-20    RADIOLOGY & ADDITIONAL TESTS:

## 2023-02-20 NOTE — PROGRESS NOTE ADULT - ASSESSMENT
42F w/ PMH coronary artery disease status post CABG x4 in 2018, CHF s/p ICD placement (interrogated 2022), DM, PAD s/p 3rd toe amputation who presents with left 2nd toe pain concerning for dry gangrene. Now s/p LLE angio 2/10 and L BKA on 2/17.     Plan:  - Daily dressing changes  - Continue ASA   - Remainder care per primary   - PT eval / treatment      Vascular (C Team) Surgery  [id number]

## 2023-02-20 NOTE — PROGRESS NOTE ADULT - ATTENDING SUPERVISION STATEMENT
Resident
Fellow
Resident
Resident
Fellow
Resident
Resident
Fellow
Resident
Fellow
Fellow
Resident

## 2023-02-20 NOTE — PROGRESS NOTE ADULT - SUBJECTIVE AND OBJECTIVE BOX
VASCULAR SURGERY DAILY PROGRESS NOTE:     SUBJECTIVE/ROS:     Overnight: no acute events   Patient seen and evaluated on AM rounds. Patient endorsing some pain at surgical site, controlled on pain regimen.      Vital Signs Last 24 Hrs  T(C): 36.6 (20 Feb 2023 05:48), Max: 36.6 (20 Feb 2023 05:48)  T(F): 97.9 (20 Feb 2023 05:48), Max: 97.9 (20 Feb 2023 05:48)  HR: 75 (20 Feb 2023 05:48) (74 - 76)  BP: 128/69 (20 Feb 2023 05:48) (111/62 - 147/88)  BP(mean): --  RR: 20 (20 Feb 2023 05:48) (18 - 20)  SpO2: 100% (20 Feb 2023 05:48) (100% - 100%)    Parameters below as of 20 Feb 2023 05:48  Patient On (Oxygen Delivery Method): room air    I&O's Detail    19 Feb 2023 07:01  -  20 Feb 2023 07:00  --------------------------------------------------------  IN:    Oral Fluid: 840 mL    sodium chloride 0.9%: 50 mL  Total IN: 890 mL    OUT:    Bulb (mL): 20 mL  Total OUT: 20 mL    Total NET: 870 mL      MEDICATIONS  (STANDING):  acetaminophen     Tablet .. 975 milliGRAM(s) Oral every 6 hours  aspirin enteric coated 81 milliGRAM(s) Oral daily  atorvastatin 80 milliGRAM(s) Oral at bedtime  buMETAnide 1 milliGRAM(s) Oral two times a day  calamine/zinc oxide Lotion 1 Application(s) Topical three times a day  carvedilol 6.25 milliGRAM(s) Oral every 12 hours  cyclobenzaprine 5 milliGRAM(s) Oral every 8 hours  dextrose 5%. 1000 milliLiter(s) (100 mL/Hr) IV Continuous <Continuous>  dextrose 5%. 1000 milliLiter(s) (50 mL/Hr) IV Continuous <Continuous>  dextrose 50% Injectable 25 Gram(s) IV Push once  dextrose 50% Injectable 12.5 Gram(s) IV Push once  dextrose 50% Injectable 25 Gram(s) IV Push once  glucagon  Injectable 1 milliGRAM(s) IntraMuscular once  heparin   Injectable 5000 Unit(s) SubCutaneous every 8 hours  influenza   Vaccine 0.5 milliLiter(s) IntraMuscular once  insulin lispro (ADMELOG) corrective regimen sliding scale   SubCutaneous three times a day before meals  insulin lispro (ADMELOG) corrective regimen sliding scale   SubCutaneous at bedtime  melatonin 9 milliGRAM(s) Oral at bedtime  naloxegol 12.5 milliGRAM(s) Oral daily  pantoprazole    Tablet 40 milliGRAM(s) Oral before breakfast  polyethylene glycol 3350 17 Gram(s) Oral two times a day  senna 2 Tablet(s) Oral at bedtime    MEDICATIONS  (PRN):  dextrose Oral Gel 15 Gram(s) Oral once PRN Blood Glucose LESS THAN 70 milliGRAM(s)/deciliter  HYDROmorphone   Tablet 2 milliGRAM(s) Oral every 3 hours PRN Moderate - Severe Pain (4 - 10)  HYDROmorphone  Injectable 0.5 milliGRAM(s) IV Push every 3 hours PRN Severe Breakthrough Pain (7 - 10)  naloxone Injectable 0.1 milliGRAM(s) IV Push every 3 minutes PRN For ANY of the following changes in patient status:  A. RR LESS THAN 10 breaths per minute, B. Oxygen saturation LESS THAN 90%, C. Sedation score of 6  ondansetron Injectable 4 milliGRAM(s) IV Push every 6 hours PRN Nausea        Labs:                                              7.6    9.03  )-----------( 502      ( 20 Feb 2023 06:00 )             25.4     02-20    131<L>  |  97<L>  |  52<H>  ----------------------------<  153<H>  4.4   |  21<L>  |  1.76<H>    Ca    8.4      20 Feb 2023 06:00  Phos  4.6     02-20  Mg     1.70     02-20        Physical Exam:  General: well developed, well nourished, NAD  Cardiovascular: appears well perfused   Respiratory: respirations non labored  Gastrointestinal: soft, nontender, nondistended  Extremities:   - LLE: BKA dressing c/d/i. Surgical site hemostatic. Knee immobilizer in place   Neurological: A+Ox3

## 2023-02-20 NOTE — PROGRESS NOTE ADULT - ATTENDING COMMENTS
Patient examined and ROS reviewed. A case of BOB in the setting of hypotension, IV contrast use and CHF. Patient is being diuresed and serum creatinine stable. Advised continue diuresis.

## 2023-02-20 NOTE — PROGRESS NOTE ADULT - PROBLEM SELECTOR PLAN 1
Pt. with BOB in setting of hypotension, IV contrast use and CHF. Upon review of labs on [facility]/[place], Scr was elevated at 1.33 on 6/28/21 and improved to 1.15 on 2/24/22. Scr was 1.22 on admission (1/25/23). Pt. underwent LE angiogram on 1/25/23. Scr worsened to 4.23 on 1/30/23. No hydronephrosis seen on CT scan study. Pt. was oliguric, initiated on IV Bumex infusion in MICU on 1/30/23. Pt. responded well to IV diuretic therapy and was then transitioned to oral diuretic therapy. Currently on PO Bumex 1 mg BID with stable/improved serum creatinine today 1.76 (from 1.85 on 2/19/23). Recommend to continue with PO Bumex. Monitor labs and urine output. Avoid nephrotoxins. Dose medications as per eGFR.

## 2023-02-21 LAB — GLUCOSE BLDC GLUCOMTR-MCNC: 220 MG/DL — HIGH (ref 70–99)

## 2023-02-21 PROCEDURE — 99232 SBSQ HOSP IP/OBS MODERATE 35: CPT

## 2023-02-21 RX ORDER — GABAPENTIN 400 MG/1
300 CAPSULE ORAL ONCE
Refills: 0 | Status: COMPLETED | OUTPATIENT
Start: 2023-02-21 | End: 2023-02-21

## 2023-02-21 RX ORDER — BUMETANIDE 0.25 MG/ML
2 INJECTION INTRAMUSCULAR; INTRAVENOUS
Refills: 0 | Status: DISCONTINUED | OUTPATIENT
Start: 2023-02-21 | End: 2023-02-22

## 2023-02-21 RX ADMIN — HYDROMORPHONE HYDROCHLORIDE 2 MILLIGRAM(S): 2 INJECTION INTRAMUSCULAR; INTRAVENOUS; SUBCUTANEOUS at 09:13

## 2023-02-21 RX ADMIN — Medication 975 MILLIGRAM(S): at 12:50

## 2023-02-21 RX ADMIN — NALOXEGOL OXALATE 12.5 MILLIGRAM(S): 12.5 TABLET, FILM COATED ORAL at 12:51

## 2023-02-21 RX ADMIN — ATORVASTATIN CALCIUM 80 MILLIGRAM(S): 80 TABLET, FILM COATED ORAL at 23:22

## 2023-02-21 RX ADMIN — Medication 9 MILLIGRAM(S): at 23:23

## 2023-02-21 RX ADMIN — Medication 1: at 12:51

## 2023-02-21 RX ADMIN — HYDROMORPHONE HYDROCHLORIDE 2 MILLIGRAM(S): 2 INJECTION INTRAMUSCULAR; INTRAVENOUS; SUBCUTANEOUS at 13:08

## 2023-02-21 RX ADMIN — HYDROMORPHONE HYDROCHLORIDE 2 MILLIGRAM(S): 2 INJECTION INTRAMUSCULAR; INTRAVENOUS; SUBCUTANEOUS at 23:23

## 2023-02-21 RX ADMIN — Medication 2: at 23:31

## 2023-02-21 RX ADMIN — BUMETANIDE 1 MILLIGRAM(S): 0.25 INJECTION INTRAMUSCULAR; INTRAVENOUS at 05:20

## 2023-02-21 RX ADMIN — Medication 975 MILLIGRAM(S): at 13:50

## 2023-02-21 RX ADMIN — HYDROMORPHONE HYDROCHLORIDE 2 MILLIGRAM(S): 2 INJECTION INTRAMUSCULAR; INTRAVENOUS; SUBCUTANEOUS at 16:23

## 2023-02-21 RX ADMIN — CALAMINE AND ZINC OXIDE AND PHENOL 1 APPLICATION(S): 160; 10 LOTION TOPICAL at 13:10

## 2023-02-21 RX ADMIN — Medication 975 MILLIGRAM(S): at 05:21

## 2023-02-21 RX ADMIN — CARVEDILOL PHOSPHATE 6.25 MILLIGRAM(S): 80 CAPSULE, EXTENDED RELEASE ORAL at 05:20

## 2023-02-21 RX ADMIN — HYDROMORPHONE HYDROCHLORIDE 2 MILLIGRAM(S): 2 INJECTION INTRAMUSCULAR; INTRAVENOUS; SUBCUTANEOUS at 13:38

## 2023-02-21 RX ADMIN — Medication 975 MILLIGRAM(S): at 19:26

## 2023-02-21 RX ADMIN — HEPARIN SODIUM 5000 UNIT(S): 5000 INJECTION INTRAVENOUS; SUBCUTANEOUS at 13:56

## 2023-02-21 RX ADMIN — HYDROMORPHONE HYDROCHLORIDE 2 MILLIGRAM(S): 2 INJECTION INTRAMUSCULAR; INTRAVENOUS; SUBCUTANEOUS at 04:31

## 2023-02-21 RX ADMIN — Medication 975 MILLIGRAM(S): at 06:21

## 2023-02-21 RX ADMIN — Medication 2: at 08:57

## 2023-02-21 RX ADMIN — Medication 975 MILLIGRAM(S): at 23:21

## 2023-02-21 RX ADMIN — HYDROMORPHONE HYDROCHLORIDE 2 MILLIGRAM(S): 2 INJECTION INTRAMUSCULAR; INTRAVENOUS; SUBCUTANEOUS at 17:23

## 2023-02-21 RX ADMIN — Medication 975 MILLIGRAM(S): at 18:26

## 2023-02-21 RX ADMIN — Medication 81 MILLIGRAM(S): at 12:51

## 2023-02-21 RX ADMIN — HYDROMORPHONE HYDROCHLORIDE 2 MILLIGRAM(S): 2 INJECTION INTRAMUSCULAR; INTRAVENOUS; SUBCUTANEOUS at 09:43

## 2023-02-21 RX ADMIN — Medication 975 MILLIGRAM(S): at 00:14

## 2023-02-21 RX ADMIN — HYDROMORPHONE HYDROCHLORIDE 2 MILLIGRAM(S): 2 INJECTION INTRAMUSCULAR; INTRAVENOUS; SUBCUTANEOUS at 03:31

## 2023-02-21 RX ADMIN — CARVEDILOL PHOSPHATE 6.25 MILLIGRAM(S): 80 CAPSULE, EXTENDED RELEASE ORAL at 18:26

## 2023-02-21 RX ADMIN — HEPARIN SODIUM 5000 UNIT(S): 5000 INJECTION INTRAVENOUS; SUBCUTANEOUS at 05:20

## 2023-02-21 RX ADMIN — Medication 975 MILLIGRAM(S): at 01:14

## 2023-02-21 RX ADMIN — Medication 3: at 18:29

## 2023-02-21 RX ADMIN — PANTOPRAZOLE SODIUM 40 MILLIGRAM(S): 20 TABLET, DELAYED RELEASE ORAL at 05:20

## 2023-02-21 RX ADMIN — GABAPENTIN 300 MILLIGRAM(S): 400 CAPSULE ORAL at 20:30

## 2023-02-21 RX ADMIN — HEPARIN SODIUM 5000 UNIT(S): 5000 INJECTION INTRAVENOUS; SUBCUTANEOUS at 23:22

## 2023-02-21 RX ADMIN — HYDROMORPHONE HYDROCHLORIDE 2 MILLIGRAM(S): 2 INJECTION INTRAMUSCULAR; INTRAVENOUS; SUBCUTANEOUS at 20:22

## 2023-02-21 NOTE — PROVIDER CONTACT NOTE (OTHER) - BACKGROUND
PMH: Gangrene of lt toe.
Patient admitted for pain of toe of left foot
Patient admitted for dry gangrene of left foot middle toes
Patient admitted for dry gangrene of left foot middle toes
Pt admitted for pain in toe of left foot
Pt admitted for pain in toe of left foot

## 2023-02-21 NOTE — PROGRESS NOTE ADULT - PROBLEM SELECTOR PLAN 2
Hyponatremia in the setting of impaired free water clearance recommend increase PO diuretics as above.  Monitor serum sodium.

## 2023-02-21 NOTE — PROGRESS NOTE ADULT - SUBJECTIVE AND OBJECTIVE BOX
[facility] Division of Kidney Diseases & Hypertension  FOLLOW UP NOTE  --------------------------------------------------------------------------------    HPI: 42-year-female with PMH of HTN, HLD, DM, PAD, CHF admitted with left toe pain. Pt. developed BOB during hospital stay. Pt. was transferred to the MICU on 1/29/23 for septic shock requiring vasopressor support. Pt. clinically improved and was transferred to medical floors on 2/3/23. Pt. being seen for BOB. She is currently receiving Bumex 1 mg PO bid.    24 hour events/subjective: Pt. seen and examined at bedside today. She has occasional LLE pain no sob.    PAST HISTORY  --------------------------------------------------------------------------------  No significant changes to PMH, PSH, FHx, SHx, unless otherwise noted    ALLERGIES & MEDICATIONS  --------------------------------------------------------------------------------  Allergies    No Known Allergies    Intolerances      Standing Inpatient Medications  acetaminophen     Tablet .. 975 milliGRAM(s) Oral every 6 hours  aspirin enteric coated 81 milliGRAM(s) Oral daily  atorvastatin 80 milliGRAM(s) Oral at bedtime  buMETAnide 1 milliGRAM(s) Oral two times a day  calamine/zinc oxide Lotion 1 Application(s) Topical three times a day  carvedilol 6.25 milliGRAM(s) Oral every 12 hours  cyclobenzaprine 5 milliGRAM(s) Oral every 8 hours  dextrose 5%. 1000 milliLiter(s) IV Continuous <Continuous>  dextrose 5%. 1000 milliLiter(s) IV Continuous <Continuous>  dextrose 50% Injectable 25 Gram(s) IV Push once  dextrose 50% Injectable 12.5 Gram(s) IV Push once  dextrose 50% Injectable 25 Gram(s) IV Push once  glucagon  Injectable 1 milliGRAM(s) IntraMuscular once  heparin   Injectable 5000 Unit(s) SubCutaneous every 8 hours  influenza   Vaccine 0.5 milliLiter(s) IntraMuscular once  insulin lispro (ADMELOG) corrective regimen sliding scale   SubCutaneous three times a day before meals  insulin lispro (ADMELOG) corrective regimen sliding scale   SubCutaneous at bedtime  melatonin 9 milliGRAM(s) Oral at bedtime  naloxegol 12.5 milliGRAM(s) Oral daily  pantoprazole    Tablet 40 milliGRAM(s) Oral before breakfast  polyethylene glycol 3350 17 Gram(s) Oral two times a day  senna 2 Tablet(s) Oral at bedtime    PRN Inpatient Medications  dextrose Oral Gel 15 Gram(s) Oral once PRN  HYDROmorphone   Tablet 2 milliGRAM(s) Oral every 3 hours PRN  HYDROmorphone  Injectable 0.5 milliGRAM(s) IV Push every 3 hours PRN  naloxone Injectable 0.1 milliGRAM(s) IV Push every 3 minutes PRN  ondansetron Injectable 4 milliGRAM(s) IV Push every 6 hours PRN      REVIEW OF SYSTEMS  --------------------------------------------------------------------------------  Gen: no fever  Respiratory: no sob  CV: no cp  GI: no ab pain  : no urinary complaints  MSK: pain as above.    VITALS/PHYSICAL EXAM  --------------------------------------------------------------------------------  T(C): 36.8 (02-21-23 @ 04:00), Max: 36.8 (02-21-23 @ 04:00)  HR: 74 (02-21-23 @ 04:00) (73 - 79)  BP: 134/82 (02-21-23 @ 04:00) (130/72 - 144/89)  ABP: --  ABP(mean): --  RR: 17 (02-21-23 @ 04:00) (17 - 18)  SpO2: 100% (02-21-23 @ 04:00) (100% - 100%)  CVP(mm Hg): --        02-20-23 @ 07:01  -  02-21-23 @ 07:00  --------------------------------------------------------  IN: 240 mL / OUT: 0 mL / NET: 240 mL    02-21-23 @ 07:01  -  02-21-23 @ 10:28  --------------------------------------------------------  IN: 240 mL / OUT: 0 mL / NET: 240 mL    Physical Exam:  Gen: Laying in bed and in NAD  HEENT: Anicteric  Pulm: CTA B/L + JVD  CV: S1S2  Abd: Soft, +BS    Ext:  RLE edema. LLE dressed (s/p L BKA)  Neuro: Awake and alert  Skin: Warm and dry    LABS/STUDIES  --------------------------------------------------------------------------------              7.6    9.03  >-----------<  502      [02-20-23 @ 06:00]              25.4     131  |  97  |  52  ----------------------------<  153      [02-20-23 @ 06:00]  4.4   |  21  |  1.76        Ca     8.4     [02-20-23 @ 06:00]      Mg     1.70     [02-20-23 @ 06:00]      Phos  4.6     [02-20-23 @ 06:00]            Creatinine Trend:  SCr 1.76 [02-20 @ 06:00]  SCr 1.85 [02-19 @ 04:55]  SCr 1.83 [02-18 @ 23:50]  SCr 1.55 [02-17 @ 05:50]  SCr 1.58 [02-17 @ 01:00]

## 2023-02-21 NOTE — PROGRESS NOTE ADULT - PROBLEM SELECTOR PLAN 1
Pt. with BOB in setting of hypotension, IV contrast use and CHF. Upon review of labs on [facility]/[place], Scr was elevated at 1.33 on 6/28/21 and improved to 1.15 on 2/24/22. Scr was 1.22 on admission (1/25/23). Pt. underwent LE angiogram on 1/25/23. Scr worsened to 4.23 on 1/30/23. No hydronephrosis seen on CT scan study. Pt. was oliguric, initiated on IV Bumex infusion in MICU on 1/30/23. Pt. responded well to IV diuretic therapy and was then transitioned to oral diuretic therapy. Currently on PO Bumex 1 mg BID with stable/improved serum creatinine yesterday 1.76 (from 1.85 on 2/19/23). Recommend to increase PO Bumex to 2 b BID given persistent evidence of hypervolemia on examination. Monitor labs and urine output. Avoid nephrotoxins. Dose medications as per eGFR.

## 2023-02-21 NOTE — PROGRESS NOTE ADULT - NUTRITIONAL ASSESSMENT
This patient has been assessed with a concern for Malnutrition and has been determined to have a diagnosis/diagnoses of Severe protein-calorie malnutrition.    This patient is being managed with:   Diet DASH/TLC-  Sodium & Cholesterol Restricted  Consistent Carbohydrate {No Snacks} (CSTCHO)  Supplement Feeding Modality:  Oral  Nepro Cans or Servings Per Day:  1       Frequency:  Daily  Entered: Feb  3 2023  1:13PM    
This patient has been assessed with a concern for Malnutrition and has been determined to have a diagnosis/diagnoses of Severe protein-calorie malnutrition.    This patient is being managed with:   Diet NPO after Midnight-     NPO Start Date: 16-Feb-2023   NPO Start Time: 23:59  Except Medications  Entered: Feb 16 2023  8:17AM    Diet DASH/TLC-  Sodium & Cholesterol Restricted  Consistent Carbohydrate {No Snacks} (CSTCHO)  Supplement Feeding Modality:  Oral  Nepro Cans or Servings Per Day:  1       Frequency:  Daily  Entered: Feb  3 2023  1:13PM    
This patient has been assessed with a concern for Malnutrition and has been determined to have a diagnosis/diagnoses of Severe protein-calorie malnutrition.    This patient is being managed with:   Diet DASH/TLC-  Sodium & Cholesterol Restricted  Consistent Carbohydrate {No Snacks} (CSTCHO)  Supplement Feeding Modality:  Oral  Nepro Cans or Servings Per Day:  1       Frequency:  Daily  Entered: Feb  3 2023  1:13PM    
This patient has been assessed with a concern for Malnutrition and has been determined to have a diagnosis/diagnoses of Severe protein-calorie malnutrition.    This patient is being managed with:   Diet NPO after Midnight-     NPO Start Date: 09-Feb-2023   NPO Start Time: 23:59  Entered: Feb 9 2023  6:58AM    Diet DASH/TLC-  Sodium & Cholesterol Restricted  Consistent Carbohydrate {No Snacks} (CSTCHO)  Supplement Feeding Modality:  Oral  Nepro Cans or Servings Per Day:  1       Frequency:  Daily  Entered: Feb  3 2023  1:13PM    
This patient has been assessed with a concern for Malnutrition and has been determined to have a diagnosis/diagnoses of Severe protein-calorie malnutrition.    This patient is being managed with:   Diet NPO after Midnight-     NPO Start Date: 16-Feb-2023   NPO Start Time: 23:59  Except Medications  Entered: Feb 16 2023  8:17AM    Diet DASH/TLC-  Sodium & Cholesterol Restricted  Consistent Carbohydrate {No Snacks} (CSTCHO)  Supplement Feeding Modality:  Oral  Nepro Cans or Servings Per Day:  1       Frequency:  Daily  Entered: Feb  3 2023  1:13PM    
This patient has been assessed with a concern for Malnutrition and has been determined to have a diagnosis/diagnoses of Severe protein-calorie malnutrition.    This patient is being managed with:   Diet DASH/TLC-  Sodium & Cholesterol Restricted  Consistent Carbohydrate {No Snacks} (CSTCHO)  For patients receiving Renal Replacement - No Protein Restr No Conc K No Conc Phos Low Sodium (RENAL)  Supplement Feeding Modality:  Oral  Nepro Cans or Servings Per Day:  3       Frequency:  Daily  Entered: Jan 30 2023  2:08PM    
This patient has been assessed with a concern for Malnutrition and has been determined to have a diagnosis/diagnoses of Severe protein-calorie malnutrition.    This patient is being managed with:   Diet DASH/TLC-  Sodium & Cholesterol Restricted  Consistent Carbohydrate {No Snacks} (CSTCHO)  Supplement Feeding Modality:  Oral  Nepro Cans or Servings Per Day:  1       Frequency:  Daily  Entered: Feb  3 2023  1:13PM    
This patient has been assessed with a concern for Malnutrition and has been determined to have a diagnosis/diagnoses of Severe protein-calorie malnutrition.    This patient is being managed with:   Diet DASH/TLC-  Sodium & Cholesterol Restricted  Consistent Carbohydrate {No Snacks} (CSTCHO)  Supplement Feeding Modality:  Oral  Nepro Cans or Servings Per Day:  1       Frequency:  Daily  Entered: Feb  3 2023  1:13PM    
This patient has been assessed with a concern for Malnutrition and has been determined to have a diagnosis/diagnoses of Severe protein-calorie malnutrition.    This patient is being managed with:   Diet NPO after Midnight-     NPO Start Date: 09-Feb-2023   NPO Start Time: 23:59  Entered: Feb 9 2023  6:58AM    Diet DASH/TLC-  Sodium & Cholesterol Restricted  Consistent Carbohydrate {No Snacks} (CSTCHO)  Supplement Feeding Modality:  Oral  Nepro Cans or Servings Per Day:  1       Frequency:  Daily  Entered: Feb  3 2023  1:13PM    
This patient has been assessed with a concern for Malnutrition and has been determined to have a diagnosis/diagnoses of Severe protein-calorie malnutrition.    This patient is being managed with:   Diet DASH/TLC-  Sodium & Cholesterol Restricted  Consistent Carbohydrate {No Snacks} (CSTCHO)  Supplement Feeding Modality:  Oral  Nepro Cans or Servings Per Day:  1       Frequency:  Daily  Entered: Feb  3 2023  1:13PM    
This patient has been assessed with a concern for Malnutrition and has been determined to have a diagnosis/diagnoses of Severe protein-calorie malnutrition.    This patient is being managed with:   Diet NPO after Midnight-     NPO Start Date: 16-Feb-2023   NPO Start Time: 23:59  Except Medications  Entered: Feb 16 2023  8:17AM    Diet DASH/TLC-  Sodium & Cholesterol Restricted  Consistent Carbohydrate {No Snacks} (CSTCHO)  Supplement Feeding Modality:  Oral  Nepro Cans or Servings Per Day:  1       Frequency:  Daily  Entered: Feb  3 2023  1:13PM    
This patient has been assessed with a concern for Malnutrition and has been determined to have a diagnosis/diagnoses of Severe protein-calorie malnutrition.    This patient is being managed with:   Diet DASH/TLC-  Sodium & Cholesterol Restricted  Consistent Carbohydrate {No Snacks} (CSTCHO)  Supplement Feeding Modality:  Oral  Nepro Cans or Servings Per Day:  1       Frequency:  Daily  Entered: Feb  3 2023  1:13PM    
This patient has been assessed with a concern for Malnutrition and has been determined to have a diagnosis/diagnoses of Severe protein-calorie malnutrition.    This patient is being managed with:   Diet DASH/TLC-  Sodium & Cholesterol Restricted  Consistent Carbohydrate {No Snacks} (CSTCHO)  Supplement Feeding Modality:  Oral  Nepro Cans or Servings Per Day:  1       Frequency:  Daily  Entered: Feb  3 2023  1:13PM    
This patient has been assessed with a concern for Malnutrition and has been determined to have a diagnosis/diagnoses of Severe protein-calorie malnutrition.    This patient is being managed with:   Diet NPO after Midnight-     NPO Start Date: 16-Feb-2023   NPO Start Time: 23:59  Except Medications  Entered: Feb 16 2023  8:17AM    Diet DASH/TLC-  Sodium & Cholesterol Restricted  Consistent Carbohydrate {No Snacks} (CSTCHO)  Supplement Feeding Modality:  Oral  Nepro Cans or Servings Per Day:  1       Frequency:  Daily  Entered: Feb  3 2023  1:13PM    
This patient has been assessed with a concern for Malnutrition and has been determined to have a diagnosis/diagnoses of Severe protein-calorie malnutrition.    This patient is being managed with:   Diet DASH/TLC-  Sodium & Cholesterol Restricted  Consistent Carbohydrate {No Snacks} (CSTCHO)  Supplement Feeding Modality:  Oral  Nepro Cans or Servings Per Day:  1       Frequency:  Daily  Entered: Feb  3 2023  1:13PM    
This patient has been assessed with a concern for Malnutrition and has been determined to have a diagnosis/diagnoses of Severe protein-calorie malnutrition.    This patient is being managed with:   Diet DASH/TLC-  Sodium & Cholesterol Restricted  Consistent Carbohydrate {No Snacks} (CSTCHO)  Supplement Feeding Modality:  Oral  Nepro Cans or Servings Per Day:  1       Frequency:  Daily  Entered: Feb  3 2023  1:13PM    
This patient has been assessed with a concern for Malnutrition and has been determined to have a diagnosis/diagnoses of Severe protein-calorie malnutrition.    This patient is being managed with:   Diet NPO after Midnight-     NPO Start Date: 16-Feb-2023   NPO Start Time: 23:59  Except Medications  Entered: Feb 16 2023  8:17AM    Diet DASH/TLC-  Sodium & Cholesterol Restricted  Consistent Carbohydrate {No Snacks} (CSTCHO)  Supplement Feeding Modality:  Oral  Nepro Cans or Servings Per Day:  1       Frequency:  Daily  Entered: Feb  3 2023  1:13PM    
This patient has been assessed with a concern for Malnutrition and has been determined to have a diagnosis/diagnoses of Severe protein-calorie malnutrition.    This patient is being managed with:   Diet DASH/TLC-  Sodium & Cholesterol Restricted  Consistent Carbohydrate {No Snacks} (CSTCHO)  Supplement Feeding Modality:  Oral  Nepro Cans or Servings Per Day:  1       Frequency:  Daily  Entered: Feb  3 2023  1:13PM    
This patient has been assessed with a concern for Malnutrition and has been determined to have a diagnosis/diagnoses of Severe protein-calorie malnutrition.    This patient is being managed with:   Diet DASH/TLC-  Sodium & Cholesterol Restricted  Consistent Carbohydrate {No Snacks} (CSTCHO)  Supplement Feeding Modality:  Oral  Nepro Cans or Servings Per Day:  1       Frequency:  Daily  Entered: Feb  3 2023  1:13PM    
This patient has been assessed with a concern for Malnutrition and has been determined to have a diagnosis/diagnoses of Severe protein-calorie malnutrition.    This patient is being managed with:   Diet NPO-  NPO for Procedure/Test     NPO Start Date: 10-Feb-2023   NPO Start Time: 05:57  Except Medications  Entered: Feb 10 2023  5:57AM    Diet DASH/TLC-  Sodium & Cholesterol Restricted  Consistent Carbohydrate {No Snacks} (CSTCHO)  Supplement Feeding Modality:  Oral  Nepro Cans or Servings Per Day:  1       Frequency:  Daily  Entered: Feb  3 2023  1:13PM    
This patient has been assessed with a concern for Malnutrition and has been determined to have a diagnosis/diagnoses of Severe protein-calorie malnutrition.    This patient is being managed with:   Diet DASH/TLC-  Sodium & Cholesterol Restricted  Consistent Carbohydrate {No Snacks} (CSTCHO)  Supplement Feeding Modality:  Oral  Nepro Cans or Servings Per Day:  1       Frequency:  Daily  Entered: Feb  3 2023  1:13PM    
This patient has been assessed with a concern for Malnutrition and has been determined to have a diagnosis/diagnoses of Severe protein-calorie malnutrition.    This patient is being managed with:   Diet DASH/TLC-  Sodium & Cholesterol Restricted  Consistent Carbohydrate {No Snacks} (CSTCHO)  Supplement Feeding Modality:  Oral  Nepro Cans or Servings Per Day:  1       Frequency:  Daily  Entered: Feb  3 2023  1:13PM

## 2023-02-21 NOTE — PROGRESS NOTE ADULT - PROBLEM SELECTOR PLAN 2
Patient w/ baseline SCr ~1.2 has been uptrending during hospitalization to Cr. 4.2, now improving but increasing likely dt recent angiogram.   - continues on bumex 1 PO BID  - continue to monitor closely  - appreciate nephrology recommendations

## 2023-02-21 NOTE — PROVIDER CONTACT NOTE (OTHER) - ACTION/TREATMENT ORDERED:
As per Medical Resident Denise Smith continue to monitor patient and continue with PRN pain regimen. Continuous monitoring remains in place
Hold standing 2 units of insulin prior to lunch
@2220 Spoke with Night float will order Dilaudid x1 dose for patient now
As per MD, continue to monitor.
As per Night Float Edwin marrufo to give PRN dose 1 hour early, see provider contact note. Continuous monitoring remains in place
Continue to monitor.
Patient now eating, will monitor them
ACP Tammie aware, awaiting orders
Provider to change medication frequency for pain management. Patient updated.

## 2023-02-21 NOTE — PROVIDER CONTACT NOTE (OTHER) - DATE AND TIME:
28-Jan-2023 00:57
28-Jan-2023 20:25
04-Feb-2023 01:50
05-Feb-2023 05:00
13-Feb-2023 13:45
27-Jan-2023 20:11
11-Feb-2023 11:33
21-Feb-2023 14:02
28-Jan-2023 18:59

## 2023-02-21 NOTE — PROGRESS NOTE ADULT - SUBJECTIVE AND OBJECTIVE BOX
[facility] Division of Hospital Medicine  Misha Crooks MD  Pager [phone]      Patient is a 42y old  Female who presents with a chief complaint of Toe pain (21 Feb 2023 10:28)      SUBJECTIVE / OVERNIGHT EVENTS: Improving LE pain. No CP or SOB    MEDICATIONS  (STANDING):  acetaminophen     Tablet .. 975 milliGRAM(s) Oral every 6 hours  aspirin enteric coated 81 milliGRAM(s) Oral daily  atorvastatin 80 milliGRAM(s) Oral at bedtime  buMETAnide 2 milliGRAM(s) Oral two times a day  calamine/zinc oxide Lotion 1 Application(s) Topical three times a day  carvedilol 6.25 milliGRAM(s) Oral every 12 hours  cyclobenzaprine 5 milliGRAM(s) Oral every 8 hours  dextrose 5%. 1000 milliLiter(s) (100 mL/Hr) IV Continuous <Continuous>  dextrose 5%. 1000 milliLiter(s) (50 mL/Hr) IV Continuous <Continuous>  dextrose 50% Injectable 25 Gram(s) IV Push once  dextrose 50% Injectable 12.5 Gram(s) IV Push once  dextrose 50% Injectable 25 Gram(s) IV Push once  glucagon  Injectable 1 milliGRAM(s) IntraMuscular once  heparin   Injectable 5000 Unit(s) SubCutaneous every 8 hours  influenza   Vaccine 0.5 milliLiter(s) IntraMuscular once  insulin lispro (ADMELOG) corrective regimen sliding scale   SubCutaneous three times a day before meals  insulin lispro (ADMELOG) corrective regimen sliding scale   SubCutaneous at bedtime  melatonin 9 milliGRAM(s) Oral at bedtime  naloxegol 12.5 milliGRAM(s) Oral daily  pantoprazole    Tablet 40 milliGRAM(s) Oral before breakfast  polyethylene glycol 3350 17 Gram(s) Oral two times a day  senna 2 Tablet(s) Oral at bedtime    MEDICATIONS  (PRN):  dextrose Oral Gel 15 Gram(s) Oral once PRN Blood Glucose LESS THAN 70 milliGRAM(s)/deciliter  HYDROmorphone   Tablet 2 milliGRAM(s) Oral every 3 hours PRN Moderate - Severe Pain (4 - 10)  HYDROmorphone  Injectable 0.5 milliGRAM(s) IV Push every 3 hours PRN Severe Breakthrough Pain (7 - 10)  naloxone Injectable 0.1 milliGRAM(s) IV Push every 3 minutes PRN For ANY of the following changes in patient status:  A. RR LESS THAN 10 breaths per minute, B. Oxygen saturation LESS THAN 90%, C. Sedation score of 6  ondansetron Injectable 4 milliGRAM(s) IV Push every 6 hours PRN Nausea      CAPILLARY BLOOD GLUCOSE      POCT Blood Glucose.: 312 mg/dL (21 Feb 2023 23:20)  POCT Blood Glucose.: 292 mg/dL (21 Feb 2023 18:02)  POCT Blood Glucose.: 178 mg/dL (21 Feb 2023 12:33)    I&O's Summary    21 Feb 2023 07:01  -  22 Feb 2023 07:00  --------------------------------------------------------  IN: 1100 mL / OUT: 300 mL / NET: 800 mL        PHYSICAL EXAM:  Vital Signs Last 24 Hrs  T(C): 36.7 (22 Feb 2023 03:00), Max: 36.7 (21 Feb 2023 12:45)  T(F): 98.1 (22 Feb 2023 03:00), Max: 98.1 (21 Feb 2023 12:45)  HR: 69 (22 Feb 2023 03:00) (69 - 76)  BP: 118/63 (22 Feb 2023 03:00) (118/63 - 146/79)  BP(mean): --  RR: 16 (22 Feb 2023 03:00) (16 - 18)  SpO2: 95% (22 Feb 2023 03:00) (95% - 100%)    Parameters below as of 22 Feb 2023 03:00  Patient On (Oxygen Delivery Method): room air      CONSTITUTIONAL: NAD  EYES: conjunctiva and sclera clear  ENMT: mmm  NECK: Supple,  RESPIRATORY: Normal respiratory effort;   CARDIOVASCULAR: Regular rate and rhythm, + S1 and S2  ABDOMEN: Nontender to palpation, normoactive bowel sounds, no rebound/guarding  MUSCULOSKELETAL:  L BKA  PSYCH: A+O x 2-3  NEUROLOGY: no gross deficits   SKIN: No rashes;     LABS:                        7.9    6.54  )-----------( 550      ( 22 Feb 2023 05:28 )             27.3     02-22    129<L>  |  97<L>  |  45<H>  ----------------------------<  226<H>  5.0   |  18<L>  |  1.24    Ca    8.1<L>      22 Feb 2023 05:28  Phos  3.4     02-22  Mg     1.80     02-22

## 2023-02-21 NOTE — PROGRESS NOTE ADULT - PROBLEM SELECTOR PLAN 1
L 2nd toe pain concerning for dry gangrene with history of prior toe amputation iso PAD. with concern for infection and septic shock.   --s/p vascular intervention with CO2 LLE angiogram 2/10. now s/p L BKA   - pain control w/ Dilaudid PRN for pain, ATC tylenol, dilaudid 0.5mg q4hr PRN added for breakthrough given increased reported pain  - PT/OT

## 2023-02-21 NOTE — PROVIDER CONTACT NOTE (OTHER) - REASON
Severe Pain
Patient c/o pain to lt foot
Pt complaining of Chest Tightness
BP: 99/54- pt. asymptomatic.
Patient vomited
Pt having 10/10 breakthrough pain
Pt. refusing dressing to left second toe- wound VIOLET
Blood Glucose
Patient refused Bumex, see worklist

## 2023-02-21 NOTE — PROVIDER CONTACT NOTE (OTHER) - SITUATION
BP: 99/54- pt. asymptomatic.
Patient complained of severe pain to Rt leg, prior to transfer at 1800 pt received 0.5 Hydromorphone IV, Requesting additional dose at this time.
Patient refused Bumex, see worklist
Patient admitted pain of lt toe/foot pain
Patients blood glucose 71. Patient due for 2 units standing insulin dose before lunch
Pt. refusing dressing to left second toe- wound VIOLET
Patient vomited
Pt complaining of chest tightness
Pt experiencing 10/10 breakthrough pain. Pt not due for PRN pain medication until 0630.

## 2023-02-21 NOTE — PROGRESS NOTE ADULT - PROBLEM SELECTOR PLAN 6
On metformin 500mg BID, glimepiride 4mg, lantus 8U at bedtime   - A1c 7.1  - Resume Lantus given hyperglycemia, c/w ISS   - monitor poc glucose

## 2023-02-21 NOTE — PROVIDER CONTACT NOTE (OTHER) - ASSESSMENT
Patient states they're fine, just need to eat
Pt in 10/10 pain
Pt. states that it hurts when anything touches toe- refusing dressing to be done. Prefers wound to be VIOLET.
On assessment, pt. is asymptomatic. Denies SOB and/or chest pain at this time.
Patient asymptomatic
Patient continues to complain of pain, describes it as burning pain/nerve pain in her leg. Some relief with Dilaudid, can anything else be added to her regimen?
Patient states they don't want to take the bumex
Pt complaining of "chest tightness", pt clarified she is not experiencing pain. Pt vitals are stable -see vital sign flowsheet. Pt received PRN dose of 1mG IV push Hydromorphone for severe pain

## 2023-02-21 NOTE — PROGRESS NOTE ADULT - SUBJECTIVE AND OBJECTIVE BOX
VASCULAR SURGERY DAILY PROGRESS NOTE:     SUBJECTIVE/ROS:   Patient seen and evaluated on AM rounds.       OBJECTIVE:  Vital Signs Last 24 Hrs  T(C): 36.8 (21 Feb 2023 04:00), Max: 36.8 (21 Feb 2023 04:00)  T(F): 98.3 (21 Feb 2023 04:00), Max: 98.3 (21 Feb 2023 04:00)  HR: 74 (21 Feb 2023 04:00) (73 - 79)  BP: 134/82 (21 Feb 2023 04:00) (130/72 - 144/89)  BP(mean): --  RR: 17 (21 Feb 2023 04:00) (17 - 18)  SpO2: 100% (21 Feb 2023 04:00) (100% - 100%)    Parameters below as of 21 Feb 2023 04:00  Patient On (Oxygen Delivery Method): room air      I&O's Detail    20 Feb 2023 07:01  -  21 Feb 2023 07:00  --------------------------------------------------------  IN:    Oral Fluid: 240 mL  Total IN: 240 mL    OUT:  Total OUT: 0 mL    Total NET: 240 mL        Daily     Daily   MEDICATIONS  (STANDING):  acetaminophen     Tablet .. 975 milliGRAM(s) Oral every 6 hours  aspirin enteric coated 81 milliGRAM(s) Oral daily  atorvastatin 80 milliGRAM(s) Oral at bedtime  buMETAnide 1 milliGRAM(s) Oral two times a day  calamine/zinc oxide Lotion 1 Application(s) Topical three times a day  carvedilol 6.25 milliGRAM(s) Oral every 12 hours  cyclobenzaprine 5 milliGRAM(s) Oral every 8 hours  dextrose 5%. 1000 milliLiter(s) (100 mL/Hr) IV Continuous <Continuous>  dextrose 5%. 1000 milliLiter(s) (50 mL/Hr) IV Continuous <Continuous>  dextrose 50% Injectable 25 Gram(s) IV Push once  dextrose 50% Injectable 12.5 Gram(s) IV Push once  dextrose 50% Injectable 25 Gram(s) IV Push once  glucagon  Injectable 1 milliGRAM(s) IntraMuscular once  heparin   Injectable 5000 Unit(s) SubCutaneous every 8 hours  influenza   Vaccine 0.5 milliLiter(s) IntraMuscular once  insulin lispro (ADMELOG) corrective regimen sliding scale   SubCutaneous three times a day before meals  insulin lispro (ADMELOG) corrective regimen sliding scale   SubCutaneous at bedtime  melatonin 9 milliGRAM(s) Oral at bedtime  naloxegol 12.5 milliGRAM(s) Oral daily  pantoprazole    Tablet 40 milliGRAM(s) Oral before breakfast  polyethylene glycol 3350 17 Gram(s) Oral two times a day  senna 2 Tablet(s) Oral at bedtime    MEDICATIONS  (PRN):  dextrose Oral Gel 15 Gram(s) Oral once PRN Blood Glucose LESS THAN 70 milliGRAM(s)/deciliter  HYDROmorphone   Tablet 2 milliGRAM(s) Oral every 3 hours PRN Moderate - Severe Pain (4 - 10)  HYDROmorphone  Injectable 0.5 milliGRAM(s) IV Push every 3 hours PRN Severe Breakthrough Pain (7 - 10)  naloxone Injectable 0.1 milliGRAM(s) IV Push every 3 minutes PRN For ANY of the following changes in patient status:  A. RR LESS THAN 10 breaths per minute, B. Oxygen saturation LESS THAN 90%, C. Sedation score of 6  ondansetron Injectable 4 milliGRAM(s) IV Push every 6 hours PRN Nausea      Labs:                        7.6    9.03  )-----------( 502      ( 20 Feb 2023 06:00 )             25.4     02-20    131<L>  |  97<L>  |  52<H>  ----------------------------<  153<H>  4.4   |  21<L>  |  1.76<H>    Ca    8.4      20 Feb 2023 06:00  Phos  4.6     02-20  Mg     1.70     02-20        Physical Exam:  General: well developed, well nourished, NAD  Cardiovascular: appears well perfused   Respiratory: respirations non labored  Gastrointestinal: soft, nontender, nondistended  Extremities:   - LLE: BKA dressing c/d/i. Surgical site hemostatic. COOKIE drain w/ ss output. Knee immobilizer in place   Neurological: A+Ox3

## 2023-02-21 NOTE — PROGRESS NOTE ADULT - PROBLEM SELECTOR PLAN 3
chronic systolic HF, Does not appear to be acutely decompensated on exam. Last TTE from 3/21 with EF 27%, s/p ICD placement and interrogated in 2022   - Repeat TTE: 21%. Severe LV systolic dysfunction. decreased RV function   - was on home coreg 25 BID  -> started on coreg 6.25 BID; eventually uptitrate as able  - was on home entresto 24/26  -> hold given BOB   - was on home spironolactone 25mg -> hold given BOB   - d/c home torsemide ->continuing with bumex 1 PO BID for diuresis

## 2023-02-21 NOTE — PROGRESS NOTE ADULT - ASSESSMENT
42F w/ PMH coronary artery disease status post CABG x4 in 2018, CHF s/p ICD placement (interrogated 2022), DM, PAD s/p 3rd toe amputation who presents with left 2nd toe pain concerning for dry gangrene. Now s/p LLE angio 2/10 and L BKA on 2/17.     Plan:  - COOKIE drain with ~5cc last 24 hours, removed at bedside  - Continue daily dressing changes (xeroform, gauze, kerlix, and ace wrap for comfort)   - Continue knee immobilizer   - Continue ASA   - Remainder care per primary   - Please call back with questions or concerns       Vascular (C Team) Surgery  [id number]

## 2023-02-21 NOTE — PROVIDER CONTACT NOTE (OTHER) - RECOMMENDATIONS
Further recommendations pending
Continue to monitor.
As per MD, continue to monitor.
notify provider
Further recommendations pending
Medical Resident Denise Smith
Night Float Edwin Joseph aware. RN asked if she can either give PRN early or have one time dose of 2mg IV push hydromorphone

## 2023-02-21 NOTE — PROGRESS NOTE ADULT - PROBLEM SELECTOR PLAN 10
- Fluids: NA  - Electrolytes: replete PRN  - Nutrition: DASH  - Activity: OOB as tolerated  - DVT Prophylaxis: heparin sub Q  - Disposition: pending medical management
- Fluids: NA  - Electrolytes: replete PRN  - Nutrition: DASH  - Activity: OOB as tolerated  - DVT Prophylaxis: heparin sub Q  - Disposition: LO
- Fluids: NA  - Electrolytes: replete PRN  - Nutrition: DASH  - Activity: OOB as tolerated  - DVT Prophylaxis: heparin sub Q  - Disposition: pending BKA, will need rehab after
- Fluids: NA  - Electrolytes: replete PRN  - Nutrition: DASH  - Activity: OOB as tolerated  - DVT Prophylaxis: heparin sub Q  - Disposition: pending medical management
- Fluids: NA  - Electrolytes: replete PRN  - Nutrition: DASH  - Activity: OOB as tolerated  - DVT Prophylaxis: heparin sub Q  - Disposition: LO
- Fluids: NA  - Electrolytes: replete PRN  - Nutrition: DASH  - Activity: OOB as tolerated  - DVT Prophylaxis: heparin sub Q  - Disposition: pending medical management
- Fluids: NA  - Electrolytes: replete PRN  - Nutrition: DASH  - Activity: OOB as tolerated  - DVT Prophylaxis: heparin sub Q  - Disposition: LO
- Fluids: NA  - Electrolytes: replete PRN  - Nutrition: DASH  - Activity: OOB as tolerated  - DVT Prophylaxis: heparin sub Q  - Disposition: LO    Pt seen and examined 2/21
- Fluids: NA  - Electrolytes: replete PRN  - Nutrition: npo/DASH  - Activity: OOB as tolerated  - DVT Prophylaxis: heparin sub Q  - Disposition: pending medical management

## 2023-02-22 ENCOUNTER — TRANSCRIPTION ENCOUNTER (OUTPATIENT)
Age: 43
End: 2023-02-22

## 2023-02-22 ENCOUNTER — NON-APPOINTMENT (OUTPATIENT)
Age: 43
End: 2023-02-22

## 2023-02-22 VITALS
DIASTOLIC BLOOD PRESSURE: 79 MMHG | RESPIRATION RATE: 17 BRPM | TEMPERATURE: 98 F | HEART RATE: 70 BPM | OXYGEN SATURATION: 100 % | SYSTOLIC BLOOD PRESSURE: 133 MMHG

## 2023-02-22 DIAGNOSIS — A41.9 SEPSIS, UNSPECIFIED ORGANISM: ICD-10-CM

## 2023-02-22 LAB
ANION GAP SERPL CALC-SCNC: 14 MMOL/L — SIGNIFICANT CHANGE UP (ref 7–14)
BUN SERPL-MCNC: 45 MG/DL — HIGH (ref 7–23)
CALCIUM SERPL-MCNC: 8.1 MG/DL — LOW (ref 8.4–10.5)
CHLORIDE SERPL-SCNC: 97 MMOL/L — LOW (ref 98–107)
CO2 SERPL-SCNC: 18 MMOL/L — LOW (ref 22–31)
CREAT SERPL-MCNC: 1.24 MG/DL — SIGNIFICANT CHANGE UP (ref 0.5–1.3)
EGFR: 55 ML/MIN/1.73M2 — LOW
GLUCOSE SERPL-MCNC: 226 MG/DL — HIGH (ref 70–99)
HCT VFR BLD CALC: 27.3 % — LOW (ref 34.5–45)
HGB BLD-MCNC: 7.9 G/DL — LOW (ref 11.5–15.5)
MAGNESIUM SERPL-MCNC: 1.8 MG/DL — SIGNIFICANT CHANGE UP (ref 1.6–2.6)
MCHC RBC-ENTMCNC: 24.7 PG — LOW (ref 27–34)
MCHC RBC-ENTMCNC: 28.9 GM/DL — LOW (ref 32–36)
MCV RBC AUTO: 85.3 FL — SIGNIFICANT CHANGE UP (ref 80–100)
NRBC # BLD: 0 /100 WBCS — SIGNIFICANT CHANGE UP (ref 0–0)
NRBC # FLD: 0 K/UL — SIGNIFICANT CHANGE UP (ref 0–0)
PHOSPHATE SERPL-MCNC: 3.4 MG/DL — SIGNIFICANT CHANGE UP (ref 2.5–4.5)
PLATELET # BLD AUTO: 550 K/UL — HIGH (ref 150–400)
POTASSIUM SERPL-MCNC: 5 MMOL/L — SIGNIFICANT CHANGE UP (ref 3.5–5.3)
POTASSIUM SERPL-SCNC: 5 MMOL/L — SIGNIFICANT CHANGE UP (ref 3.5–5.3)
RBC # BLD: 3.2 M/UL — LOW (ref 3.8–5.2)
RBC # FLD: 20 % — HIGH (ref 10.3–14.5)
SODIUM SERPL-SCNC: 129 MMOL/L — LOW (ref 135–145)
WBC # BLD: 6.54 K/UL — SIGNIFICANT CHANGE UP (ref 3.8–10.5)
WBC # FLD AUTO: 6.54 K/UL — SIGNIFICANT CHANGE UP (ref 3.8–10.5)

## 2023-02-22 PROCEDURE — 99232 SBSQ HOSP IP/OBS MODERATE 35: CPT

## 2023-02-22 PROCEDURE — 99239 HOSP IP/OBS DSCHRG MGMT >30: CPT

## 2023-02-22 RX ORDER — SENNA PLUS 8.6 MG/1
2 TABLET ORAL
Qty: 0 | Refills: 0 | DISCHARGE
Start: 2023-02-22

## 2023-02-22 RX ORDER — HYDROMORPHONE HYDROCHLORIDE 2 MG/ML
2 INJECTION INTRAMUSCULAR; INTRAVENOUS; SUBCUTANEOUS EVERY 6 HOURS
Refills: 0 | Status: DISCONTINUED | OUTPATIENT
Start: 2023-02-22 | End: 2023-02-22

## 2023-02-22 RX ORDER — CARVEDILOL PHOSPHATE 80 MG/1
1 CAPSULE, EXTENDED RELEASE ORAL
Qty: 0 | Refills: 0 | DISCHARGE
Start: 2023-02-22

## 2023-02-22 RX ORDER — BUMETANIDE 0.25 MG/ML
1 INJECTION INTRAMUSCULAR; INTRAVENOUS
Qty: 0 | Refills: 0 | DISCHARGE
Start: 2023-02-22

## 2023-02-22 RX ORDER — CARVEDILOL PHOSPHATE 80 MG/1
1 CAPSULE, EXTENDED RELEASE ORAL
Qty: 0 | Refills: 0 | DISCHARGE

## 2023-02-22 RX ORDER — CYCLOBENZAPRINE HYDROCHLORIDE 10 MG/1
1 TABLET, FILM COATED ORAL
Qty: 0 | Refills: 0 | DISCHARGE
Start: 2023-02-22

## 2023-02-22 RX ORDER — CALAMINE AND ZINC OXIDE AND PHENOL 160; 10 MG/ML; MG/ML
1 LOTION TOPICAL
Qty: 0 | Refills: 0 | DISCHARGE
Start: 2023-02-22

## 2023-02-22 RX ORDER — INSULIN GLARGINE 100 [IU]/ML
7 INJECTION, SOLUTION SUBCUTANEOUS AT BEDTIME
Refills: 0 | Status: DISCONTINUED | OUTPATIENT
Start: 2023-02-22 | End: 2023-02-22

## 2023-02-22 RX ORDER — SPIRONOLACTONE 25 MG/1
1 TABLET, FILM COATED ORAL
Qty: 0 | Refills: 0 | DISCHARGE

## 2023-02-22 RX ORDER — POLYETHYLENE GLYCOL 3350 17 G/17G
17 POWDER, FOR SOLUTION ORAL
Qty: 0 | Refills: 0 | DISCHARGE
Start: 2023-02-22

## 2023-02-22 RX ORDER — HYDROMORPHONE HYDROCHLORIDE 2 MG/ML
1 INJECTION INTRAMUSCULAR; INTRAVENOUS; SUBCUTANEOUS
Qty: 0 | Refills: 0 | DISCHARGE
Start: 2023-02-22

## 2023-02-22 RX ADMIN — HYDROMORPHONE HYDROCHLORIDE 2 MILLIGRAM(S): 2 INJECTION INTRAMUSCULAR; INTRAVENOUS; SUBCUTANEOUS at 15:26

## 2023-02-22 RX ADMIN — HYDROMORPHONE HYDROCHLORIDE 2 MILLIGRAM(S): 2 INJECTION INTRAMUSCULAR; INTRAVENOUS; SUBCUTANEOUS at 00:05

## 2023-02-22 RX ADMIN — Medication 3: at 09:47

## 2023-02-22 RX ADMIN — Medication 975 MILLIGRAM(S): at 06:37

## 2023-02-22 RX ADMIN — HYDROMORPHONE HYDROCHLORIDE 2 MILLIGRAM(S): 2 INJECTION INTRAMUSCULAR; INTRAVENOUS; SUBCUTANEOUS at 14:36

## 2023-02-22 RX ADMIN — BUMETANIDE 2 MILLIGRAM(S): 0.25 INJECTION INTRAMUSCULAR; INTRAVENOUS at 05:39

## 2023-02-22 RX ADMIN — BUMETANIDE 2 MILLIGRAM(S): 0.25 INJECTION INTRAMUSCULAR; INTRAVENOUS at 14:15

## 2023-02-22 RX ADMIN — NALOXEGOL OXALATE 12.5 MILLIGRAM(S): 12.5 TABLET, FILM COATED ORAL at 12:06

## 2023-02-22 RX ADMIN — CALAMINE AND ZINC OXIDE AND PHENOL 1 APPLICATION(S): 160; 10 LOTION TOPICAL at 05:38

## 2023-02-22 RX ADMIN — CARVEDILOL PHOSPHATE 6.25 MILLIGRAM(S): 80 CAPSULE, EXTENDED RELEASE ORAL at 05:39

## 2023-02-22 RX ADMIN — HYDROMORPHONE HYDROCHLORIDE 2 MILLIGRAM(S): 2 INJECTION INTRAMUSCULAR; INTRAVENOUS; SUBCUTANEOUS at 10:25

## 2023-02-22 RX ADMIN — HYDROMORPHONE HYDROCHLORIDE 2 MILLIGRAM(S): 2 INJECTION INTRAMUSCULAR; INTRAVENOUS; SUBCUTANEOUS at 11:25

## 2023-02-22 RX ADMIN — HYDROMORPHONE HYDROCHLORIDE 2 MILLIGRAM(S): 2 INJECTION INTRAMUSCULAR; INTRAVENOUS; SUBCUTANEOUS at 04:17

## 2023-02-22 RX ADMIN — Medication 975 MILLIGRAM(S): at 00:06

## 2023-02-22 RX ADMIN — HYDROMORPHONE HYDROCHLORIDE 2 MILLIGRAM(S): 2 INJECTION INTRAMUSCULAR; INTRAVENOUS; SUBCUTANEOUS at 04:09

## 2023-02-22 RX ADMIN — Medication 3: at 14:01

## 2023-02-22 RX ADMIN — Medication 81 MILLIGRAM(S): at 12:07

## 2023-02-22 RX ADMIN — HEPARIN SODIUM 5000 UNIT(S): 5000 INJECTION INTRAVENOUS; SUBCUTANEOUS at 05:38

## 2023-02-22 RX ADMIN — PANTOPRAZOLE SODIUM 40 MILLIGRAM(S): 20 TABLET, DELAYED RELEASE ORAL at 05:37

## 2023-02-22 RX ADMIN — Medication 975 MILLIGRAM(S): at 12:07

## 2023-02-22 RX ADMIN — Medication 975 MILLIGRAM(S): at 05:37

## 2023-02-22 RX ADMIN — HEPARIN SODIUM 5000 UNIT(S): 5000 INJECTION INTRAVENOUS; SUBCUTANEOUS at 14:05

## 2023-02-22 NOTE — DISCHARGE NOTE NURSING/CASE MANAGEMENT/SOCIAL WORK - NSDCCRNAME_GEN_ALL_CORE_FT
1 - [facility], [phone] [facility], [place]  2 - ambulance arranged by [facility] Care Coordination Unit

## 2023-02-22 NOTE — PROGRESS NOTE ADULT - PROBLEM SELECTOR PLAN 2
Hyponatremia in the setting of impaired free water clearance recommend continue PO diuretics as above.  Monitor serum sodium.

## 2023-02-22 NOTE — DISCHARGE NOTE NURSING/CASE MANAGEMENT/SOCIAL WORK - PATIENT PORTAL LINK FT
You can access the FollowMyHealth Patient Portal offered by [facility] by registering at the following website: [email or web address]. By joining [facility]’s FollowMyHealth portal, you will also be able to view your health information using other applications (apps) compatible with our system.

## 2023-02-22 NOTE — PROGRESS NOTE ADULT - REASON FOR ADMISSION
Toe pain

## 2023-02-22 NOTE — DISCHARGE NOTE NURSING/CASE MANAGEMENT/SOCIAL WORK - NSDCFUADDAPPT_GEN_ALL_CORE_FT
Follow up with vascular surgery Dr. Winslow 2-4 weeks after discharge  Please follow with primary care doctor upon discharge

## 2023-02-22 NOTE — PROGRESS NOTE ADULT - SUBJECTIVE AND OBJECTIVE BOX
[facility] Division of Kidney Diseases & Hypertension  FOLLOW UP NOTE  --------------------------------------------------------------------------------    HPI: 42-year-female with PMH of HTN, HLD, DM, PAD, CHF admitted with left toe pain. Pt. developed BOB during hospital stay. Pt. was transferred to the MICU on 1/29/23 for septic shock requiring vasopressor support. Pt. clinically improved and was transferred to medical floors on 2/3/23. Pt. being seen for BOB. She is currently receiving Bumex 1 mg PO bid.    24 hour events/subjective: Pt. seen and examined at bedside today. No pain at time of examination.  no chest pain no shortness of breath.    PAST HISTORY  --------------------------------------------------------------------------------  No significant changes to PMH, PSH, FHx, SHx, unless otherwise noted    ALLERGIES & MEDICATIONS  --------------------------------------------------------------------------------  Allergies    No Known Allergies    Intolerances      Standing Inpatient Medications  acetaminophen     Tablet .. 975 milliGRAM(s) Oral every 6 hours  aspirin enteric coated 81 milliGRAM(s) Oral daily  atorvastatin 80 milliGRAM(s) Oral at bedtime  buMETAnide 2 milliGRAM(s) Oral two times a day  calamine/zinc oxide Lotion 1 Application(s) Topical three times a day  carvedilol 6.25 milliGRAM(s) Oral every 12 hours  cyclobenzaprine 5 milliGRAM(s) Oral every 8 hours  dextrose 5%. 1000 milliLiter(s) IV Continuous <Continuous>  dextrose 5%. 1000 milliLiter(s) IV Continuous <Continuous>  dextrose 50% Injectable 25 Gram(s) IV Push once  dextrose 50% Injectable 12.5 Gram(s) IV Push once  dextrose 50% Injectable 25 Gram(s) IV Push once  glucagon  Injectable 1 milliGRAM(s) IntraMuscular once  heparin   Injectable 5000 Unit(s) SubCutaneous every 8 hours  influenza   Vaccine 0.5 milliLiter(s) IntraMuscular once  insulin glargine Injectable (LANTUS) 7 Unit(s) SubCutaneous at bedtime  insulin lispro (ADMELOG) corrective regimen sliding scale   SubCutaneous three times a day before meals  insulin lispro (ADMELOG) corrective regimen sliding scale   SubCutaneous at bedtime  melatonin 9 milliGRAM(s) Oral at bedtime  naloxegol 12.5 milliGRAM(s) Oral daily  pantoprazole    Tablet 40 milliGRAM(s) Oral before breakfast  polyethylene glycol 3350 17 Gram(s) Oral two times a day  senna 2 Tablet(s) Oral at bedtime    PRN Inpatient Medications  dextrose Oral Gel 15 Gram(s) Oral once PRN  HYDROmorphone   Tablet 2 milliGRAM(s) Oral every 6 hours PRN  naloxone Injectable 0.1 milliGRAM(s) IV Push every 3 minutes PRN  ondansetron Injectable 4 milliGRAM(s) IV Push every 6 hours PRN      REVIEW OF SYSTEMS  --------------------------------------------------------------------------------  Gen: no fever  Respiratory: no sob  CV: no cp  GI: no ab pain  : urinating a lot  MSK: no pain    VITALS/PHYSICAL EXAM  --------------------------------------------------------------------------------  T(C): 36.7 (02-22-23 @ 03:00), Max: 36.7 (02-21-23 @ 12:45)  HR: 69 (02-22-23 @ 03:00) (69 - 76)  BP: 118/63 (02-22-23 @ 03:00) (118/63 - 146/79)  ABP: --  ABP(mean): --  RR: 16 (02-22-23 @ 03:00) (16 - 18)  SpO2: 95% (02-22-23 @ 03:00) (95% - 100%)  CVP(mm Hg): --        02-21-23 @ 07:01  -  02-22-23 @ 07:00  --------------------------------------------------------  IN: 1100 mL / OUT: 300 mL / NET: 800 mL    Physical Exam:  Gen: Laying in bed and in NAD  HEENT: Anicteric  Pulm: CTA B/L JVD improved  CV: S1S2  Abd: Soft, +BS    Ext: lower extremity edema has resolved LLE dressed (s/p L BKA)  Neuro: Awake and alert  Skin: Warm and dry    LABS/STUDIES  --------------------------------------------------------------------------------              7.9    6.54  >-----------<  550      [02-22-23 @ 05:28]              27.3     129  |  97  |  45  ----------------------------<  226      [02-22-23 @ 05:28]  5.0   |  18  |  1.24        Ca     8.1     [02-22-23 @ 05:28]      Mg     1.80     [02-22-23 @ 05:28]      Phos  3.4     [02-22-23 @ 05:28]            Creatinine Trend:  SCr 1.24 [02-22 @ 05:28]  SCr 1.76 [02-20 @ 06:00]  SCr 1.85 [02-19 @ 04:55]  SCr 1.83 [02-18 @ 23:50]  SCr 1.55 [02-17 @ 05:50]

## 2023-02-22 NOTE — PROGRESS NOTE ADULT - PROBLEM SELECTOR PLAN 1
Pt. with BOB in setting of hypotension, IV contrast use and CHF. Upon review of labs on [facility] HIE/[place], Scr was elevated at 1.33 on 6/28/21 and improved to 1.15 on 2/24/22. Scr was 1.22 on admission (1/25/23). Pt. underwent LE angiogram on 1/25/23. Scr worsened to 4.23 on 1/30/23. No hydronephrosis seen on CT scan study. Pt. was oliguric, initiated on IV Bumex infusion in MICU on 1/30/23. Pt. responded well to IV diuretic therapy and was then transitioned to oral diuretic therapy. Currently on PO Bumex 2 mg BID (refused dose yeserday per RN) with stable/improved serum creatinine yesterday 1.2 (from 1.85 on 2/19/23). Recommend to continue PO Bumex to 2 b BID given persistent evidence of hypervolemia on examination. Monitor labs and urine output. Avoid nephrotoxins. Dose medications as per eGFR.

## 2023-02-22 NOTE — DISCHARGE NOTE NURSING/CASE MANAGEMENT/SOCIAL WORK - NSDCPEFALRISK_GEN_ALL_CORE
For information on Fall & Injury Prevention, visit: [email or web address] OR  [email or web address] OR  https://www.cdc.gov/steadi/patient.html negative

## 2023-02-22 NOTE — PROGRESS NOTE ADULT - PROVIDER SPECIALTY LIST ADULT
Nephrology
Vascular Surgery
Infectious Disease
Internal Medicine
Internal Medicine
MICU
Nephrology
Pain Medicine
Podiatry
Vascular Surgery
Cardiology
Dermatology
MICU
MICU
Nephrology
Podiatry
Vascular Surgery
Cardiology
Podiatry
Podiatry
Vascular Surgery
Nephrology
Internal Medicine
Internal Medicine
Nephrology
Hospitalist
Internal Medicine
Hospitalist
Internal Medicine
Hospitalist
Internal Medicine
Internal Medicine
Hospitalist

## 2023-02-23 ENCOUNTER — APPOINTMENT (OUTPATIENT)
Dept: ELECTROPHYSIOLOGY | Facility: CLINIC | Age: 43
End: 2023-02-23

## 2023-02-28 LAB — SURGICAL PATHOLOGY STUDY: SIGNIFICANT CHANGE UP

## 2023-03-06 PROBLEM — I50.22 CHRONIC SYSTOLIC HF (HEART FAILURE): Status: ACTIVE | Noted: 2018-09-18

## 2023-03-06 NOTE — CARDIOLOGY SUMMARY
[de-identified] : 3/6/23  NSR 85, PRWP, NSST\par 2/24/22 NSR 83, PRWP, NSST\par 6/28/21 NSR, PRWP, NSST\par 5/17/21 NSR, PRWP, NSST\par  [de-identified] : \par 3/15/21 TTE: LVEF 27%, LVIDD 5.6 cm, severe global LV systolic dysfunction, decreased RV systolic function\par \par 10/20/2020 LVEF 25%. \par \par 2/17/2020, LVEF 37% \par  [de-identified] : 3/15/21; Right Internal Carotid Artery: There is mild heterogenous\par plaque within the proximal vessel, consistent with a\par 16-49% stenosis. No hemodynamically significant stenosis.\par Left Internal Carotid Artery: Normal left internal\par carotid artery. No hemodynamically significant stenosis\par noted.\par  [de-identified] : 9/7/2018, ormal LM, 85% pLAD lesion, 80% mLAD lesion, 50% mD1 lesion, 50-60% mLCx lesion, 50% mRCA lesion, 80% dRCA lesion. EF:40-45%. \par \par  \par

## 2023-03-06 NOTE — ASSESSMENT
[FreeTextEntry1] : 41 y/o woman with Hx of HTN, HLD,  DMII, CAD s/p 4 V CABG ( [place] 2018), LV thrombus, on warfarin but changed to Eliquis, CVA, and ICM/chronic systolic CHF, S/P ICD 12/4/21 who presents today for initial evaluation secondary to chronic systolic heart failure LVEF 25%. Pt had  admission at [facility] 2/2021 with increasing edema and orthopnea and was transferred to [place] where she had a [facility] and reports grafts were open. Carotid Dopplers with 16-49% mild plaque in prox LILI, no hemodynamically significant  stenosis and LICA normal.  3/15/21 TTE with LVEF 27%. Pt is here for a post hospital follow up and device check after prolonged admission S/P gangrene 1/25/23-2/22/23 S/P Left BKA  and is currently in rehab.\par \par \par ACC/AHA Stage C, NYHA Class III\par Appears compensated and low normotensive

## 2023-03-06 NOTE — DISCUSSION/SUMMARY
[___ Week(s)] : in [unfilled] week(s) [FreeTextEntry1] : 1. ICM/chronic systolic CHF: EKG today NSR. \par - NYHA Class III symptoms\par - . LVEF < 35% on prior TTE and is S/P ICD\par - device interrogated today with Optivol elevated , will check labs and consider increase in bumex if serum pro BNP is elevated\par - continue Entresto 24-26 mg bid from   mg bid\par - pt is off  everton 25 mg daily\par - check labs today including CMP, CBC, HgA1C and serum pro BNP\par - Continue bumex 2 mg bid with additional 2mg  as needed for weight gain of 2-3 lbs in 2-3 days\par - Continue Coreg  6.25 mg bid from 25 mg bid \par - limit salt to less than 2000 mg per day\par - limit fluid to less than 2 liters per day\par - daily weight and B/P log\par \par 2.S/P gangrene S/P left BKA\par - pt waiting for prose thesis\par -continue PT\par - PCP in rehab will address pain med and sleeping meds\par \par 3. HTN \par - Coreg as above\par - Entresto as above \par -  Encouraged heart healthy diet, sodium restriction, and weight loss. \par \par 3. HLD: \par - continue statin therapy as prescribed and regular f/u with Cardiologist for routine lipid monitoring and management.\par \par 4. LV thrombus/ Prior CVA\par - pt was taken off eliquis but cardiologist Dr. Neumann . She had urticaria with eliquis \par - Continue claritin 10 mg as needed\par -  Carotid Dopplers with LILI with 16-49% plaque with no hemodynamically significant stenosis, LICA normal\par \par 5. CAD S/P 4 V CABG- no active chest pain or EKG changes\par - continue ASA 81 mg daily\par - continue statin\par \par Follow up in office in 6-8 weeks, will call with labs and will send to rehab\par \par  [EKG obtained to assist in diagnosis and management of assessed problem(s)] : EKG obtained to assist in diagnosis and management of assessed problem(s)

## 2023-03-06 NOTE — PHYSICAL EXAM
[Well Nourished] : well nourished [No Acute Distress] : no acute distress [Normal Conjunctiva] : normal conjunctiva [Normal S1, S2] : normal S1, S2 [Soft] : abdomen soft [Non Tender] : non-tender [Normal] : alert and oriented, normal memory [de-identified] : JVP 8 cm  sitting in wheelchair [de-identified] : Left chest ICD [de-identified] : decreased breath sounds at bases but clear [de-identified] : wheel chair bound with Left BKA with dressing over stump intact [de-identified] : trace lower extremity of right foot

## 2023-03-06 NOTE — HISTORY OF PRESENT ILLNESS
[FreeTextEntry1] : Jocelyne Conrad is a 41 y/o woman with Hx of HTN, HLD,  DMII, CAD s/p 4 V CABG ( [place] 2018), LV thrombus, on warfarin but changed to Eliquis, CVA, and ICM/chronic systolic CHF, S/P ICD 12/4/21 who presents today for initial evaluation secondary to chronic systolic heart failure LVEF 25%. Pt had  admission at [facility] 2/2021 with increasing edema and orthopnea and was transferred to [place] where she had a [facility] and reports grafts were open. Carotid Dopplers with 16-49% mild plaque in prox LILI, no hemodynamically significant  stenosis and LICA normal.  3/15/21 TTE with LVEF 27%. Pt is here for a post hospital follow up and device check after prolonged admission S/P gangrene 1/25/23-2/22/23 S/P Left BKA  and is currently in rehab. PCP: Shaik Nel MD\par \par Course in hospital 1/25-2/22/23- presented 1/25 with left toe pain found to have left toe gangrene plan for LLE\par angiogram. Cardiology followed for pre-op risk stratification. Course was complicated by septic shock now off pressors/abx.\par \par HPI Pt states she came from [place] in 2017, sponsored by her mother. She has a  and 4 year old in [place] and 2 older children here. 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 She is a non smoker and no ETOH, no family history. \par \par Pt is here for a post hospital follow up after prolonged admission S/P gangrene 1/25/23-2/22/23 S/P Left BKA  and is currently in rehab. Pt is accompanied by HHA. Pt is wheel chair bound and has been working in rehab with PT to try to stand. She is waiting for her prosthesis to be delivered. \par \par She was d/c on lower doses of her GDMT including; ENtresto 24-26 mg bid from  mg bid, bumex 2 mg bid from torsmide, coreg 25 mg bid to 6.25 mg bid, decreased metformin to 500 mg bid and started glimiperide 4 mg leticia, d/c farxiga.  Her main complaint is  nerve pain at the stump  and insomnia. She requests an increase in her pain meds and meds to sleep. \par \par Denies chest pain, palpitations, dizziness/LH, syncope and no ICD shocks. Optivol is elevated. \par \par \par Denies chest pain, palpitaions , dizzines/LH\par No orthopnea/PND.\par \par \par \par  6/28/22, she followed up with her cardiologist Dr. Neumann and had a TTE reported to have improvement in LVEF 33%. He also took her off Eliquis, had previously had anemia with heavy menses.  She spent some time visiting family in [place] including her young daughter.. \par States her torsemide was decreased to 20 mg QOD from 40 mg bid since last visit. Her weight is 149 lbs today with clothes from 133 lbs last visit. She has a remote monitor but the transmissions have not come through. She had a device check in office today and assisted with setting up remote monitor. Interrogation with Optivol that was above threshold in January and is currently returning to baseline. \par States she can walk 3-4 blocks and climb 4 fights of stairs up to he apartment with mild dyspnea but feels a stiffness in her chest after. She sleeps with 2 pillows with no orthopnea. She reports she has been having increased reflux and states pepcid has not helped. \par She is adhering to a low salt diet and is drinking less than 2 liters of fluid per day. Denies chest pain, palpitations, syncope or near syncope and has not had any ICD firing. \par \par She received the Covid 19 vaccine x 2 without adverse reaction. \par \par \par

## 2023-05-08 PROBLEM — I10 BENIGN ESSENTIAL HTN: Status: ACTIVE | Noted: 2020-11-16

## 2023-05-10 NOTE — PHYSICAL EXAM
[Well Nourished] : well nourished [No Acute Distress] : no acute distress [Normal Conjunctiva] : normal conjunctiva [Normal S1, S2] : normal S1, S2 [Soft] : abdomen soft [Non Tender] : non-tender [Normal] : alert and oriented, normal memory [Clear Lung Fields] : clear lung fields [de-identified] : JVP 8 cm  sitting in wheelchair [de-identified] : Left chest ICD [de-identified] : wheel chair bound with Left BKA with dressing over stump intact [de-identified] : no edema of right foot

## 2023-05-10 NOTE — HISTORY OF PRESENT ILLNESS
[FreeTextEntry1] : Jocelyne Conrad is a 41 y/o woman with Hx of HTN, HLD,  DMII, CAD s/p 4 V CABG ( [place] 2018), LV thrombus, on warfarin but changed to Eliquis, CVA, and ICM/chronic systolic CHF, S/P ICD 12/4/21 who presents today for initial evaluation secondary to chronic systolic heart failure LVEF 25%. Pt had  admission at [facility] 2/2021 with increasing edema and orthopnea and was transferred to [place] where she had a [facility] and reports grafts were open. Carotid Dopplers with 16-49% mild plaque in prox LILI, no hemodynamically significant  stenosis and LICA normal.  3/15/21 TTE with LVEF 27%. Prolonged admission S/P gangrene 1/25/23-2/22/23 S/P Left BKA  Pt is here for a post hospital follow up after re-admission 2 weeks ago at [facility] for ADHF.  Pt is currently in rehab. PCP: Shaik Nel MD\par \par Prior course in hospital 1/25-2/22/23- presented 1/25 with left toe pain found to have left toe gangrene plan for LLE angiogram. Course was complicated by septic shock now off pressors/abx.\par \par HPI Pt states she came from [place] in 2017, sponsored by her mother. She has a  and 4 year old in [place] and 2 older children here. 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 She is a non smoker and no ETOH, no family history. \par \par Pt is here for a post hospital follow up after  admission at [facility]. Pt is also SP prolonged admission S/P gangrene 1/25/23-2/22/23 S/P Left BKA  and is currently in rehab. Pt is accompanied by HHA. Pt is wheel chair bound and has been working in rehab with PT to try to stand. She is waiting for her prosthesis to be delivered. \par Currently, she can participate with PT for 1 hour including riding exercise bicycle without dyspnea.\par \par She was d/c off glimepiride, metformin, and is on insulin; basaglar and admelog.She was restarted on Eliquis and is off  ASA. Her current weight is 140 lbs and is on bumex 1 mg bid from prior 2 mg bid. Coreg was changed to metoprolol 50 mg daily. She is tolerating Entresto 24-26 mg bid. A remote [employer] transmission was reviewed with stable optivol, below threshold . It was previously elevated 12/14/23-4/5/23. B/p was initially 81/44 with no dizziness and improved to 96/65 after a cup of fluid. \par \par Denies chest pain, palpitations, dizziness/LH,  orthopnea/PND, syncope and no ICD shocks. \par \par She received the Covid 19 vaccine x 2 without adverse reaction. \par \par \par

## 2023-05-10 NOTE — DISCUSSION/SUMMARY
[___ Week(s)] : in [unfilled] week(s) [EKG obtained to assist in diagnosis and management of assessed problem(s)] : EKG obtained to assist in diagnosis and management of assessed problem(s) [FreeTextEntry1] : 1. ICM/chronic systolic CHF: EKG today NSR. \par - NYHA Class III symptoms\par - . LVEF < 35% on prior TTE and is S/P ICD\par - device remote 5/1/23 with stable OptiVol, had been elevated 14/14-4/5/23. \par - continue Entresto 24-26 mg bid. Labs drawn with K 5.3, BUN/creat 82/1.82 and decrease in serum pro BNP 1827 from 15, 321.\par - coreg 6.25 mg bid changed to metorpolol 50 mg daily in [facility], will continue\par - pt is off  everton 25 mg daily\par - check labs today including CMP, CBC, HgA1C and serum pro BNP\par - Continue bumex 1 mg bid with additional 1 mg  as needed for weight gain of 2-3 lbs in 2-3 days\par - limit salt to less than 2000 mg per day\par - limit fluid to less than 2 liters per day\par - daily weight and B/P log\par \par 2.S/P gangrene S/P left BKA\par - pt waiting for prosthesis \par -continue PT\par \par 3. HTN \par - metoprolol as above\par - Entresto as above \par -  Encouraged heart healthy diet, sodium restriction\par \par 3. HLD: \par - continue statin therapy as prescribed and regular f/u with Cardiologist for routine lipid monitoring and management.\par \par 4. LV thrombus/ Prior CVA\par - Previously, she had urticaria with eliquis but restarted and is tolerating it well\par - Continue claritin 10 mg as needed\par -  Carotid Dopplers with LILI with 16-49% plaque with no hemodynamically significant stenosis, LICA normal\par \par 5. CAD S/P 4 V CABG- no active chest pain or EKG changes\par - continue ASA 81 mg daily\par - continue statin\par \par 6. DM\par - on levemir and adelog, off glimepiride and metformin\par \par Pt needs a letter of necessity for her  to travel from [place] to [place]\par \par Follow up in office in 6-8 weeks, will call with labs and will send to rehab\par \par

## 2023-05-10 NOTE — ADDENDUM
[FreeTextEntry1] : Labs reviewed notable for K 5.3, BUN/creat 52/1.82 and improvement in serum pro BNP 1827 from 15,321 last visit. Pt is taking bumex 1 mg twice a day but if her weight goes up by 2 lbs in 2 days should take extra 1 mg bumex or may have to increase back to 2 mg bid. Will continue current meds.

## 2023-05-10 NOTE — CARDIOLOGY SUMMARY
[de-identified] : 5/8/23 NSR 81, short TX, PRWP, NSST\par 3/6/23  NSR 85, PRWP, NSST\par 2/24/22 NSR 83, PRWP, NSST\par 6/28/21 NSR, PRWP, NSST\par 5/17/21 NSR, PRWP, NSST\par  [de-identified] : 6/28/22, TTE with cardiologist Dr. Neumann  reported to have improvement in LVEF 33%\par 3/15/21 TTE: LVEF 27%, LVIDD 5.6 cm, severe global LV systolic dysfunction, decreased RV systolic function\par \par 10/20/2020 LVEF 25%. \par \par 2/17/2020, LVEF 37% \par  [de-identified] : 9/7/2018, ormal LM, 85% pLAD lesion, 80% mLAD lesion, 50% mD1 lesion, 50-60% mLCx lesion, 50% mRCA lesion, 80% dRCA lesion. EF:40-45%. \par \par  \par  [de-identified] : 3/15/21; Right Internal Carotid Artery: There is mild heterogenous\par plaque within the proximal vessel, consistent with a\par 16-49% stenosis. No hemodynamically significant stenosis.\par Left Internal Carotid Artery: Normal left internal\par carotid artery. No hemodynamically significant stenosis\par noted.\par

## 2023-05-10 NOTE — ASSESSMENT
[FreeTextEntry1] : 41 y/o woman with Hx of HTN, HLD,  DMII, CAD s/p 4 V CABG ( [place] 2018), LV thrombus, on warfarin but changed to Eliquis, CVA, and ICM/chronic systolic CHF, S/P ICD 12/4/21 who presents today for initial evaluation secondary to chronic systolic heart failure LVEF 25%. Pt had  admission at [facility] 2/2021 with increasing edema and orthopnea and was transferred to [place] where she had a [facility] and reports grafts were open. Carotid Dopplers with 16-49% mild plaque in prox LILI, no hemodynamically significant  stenosis and LICA normal.  3/15/21 TTE with LVEF 27%. Prolonged admission S/P gangrene 1/25/23-2/22/23 S/P Left BKA  Pt is here for a post hospital follow up after re-admission 2 weeks ago at [facility] for ADHF.  Pt is currently in rehab. PCP: Shaik Nel MD\par \par ACC/AHA Stage C, NYHA Class III\par Appears euvolemic and low normotensive 96/65 with stable OptiVol on remote 5/1/23 and serum pro BNP decreased to 1827 from 15,321

## 2023-06-08 PROBLEM — S88.112A BELOW-KNEE AMPUTATION OF LEFT LOWER EXTREMITY: Status: ACTIVE | Noted: 2023-01-01

## 2023-06-08 NOTE — ASSESSMENT
Noted. bds   [FreeTextEntry1] : 42-year-old with severe peripheral arterial disease status post left BKA\par \par Continue medical management\par Continue left lower extremity prosthetic fitting

## 2023-06-08 NOTE — PHYSICAL EXAM
[JVD] : no jugular venous distention  [Normal Breath Sounds] : Normal breath sounds [Normal Heart Sounds] : normal heart sounds [Abdomen Masses] : No abdominal masses [Abdomen Tenderness] : ~T ~M No abdominal tenderness [Alert] : alert [FreeTextEntry1] : Left BKA completely healed.  Palpable bilateral femoral pulses.

## 2023-06-08 NOTE — HISTORY OF PRESENT ILLNESS
[FreeTextEntry1] : Very pleasant 42-year-old diabetic patient who underwent left lower extremity angiogram.  There was no target vessel and ultimately she required right left below-knee amputation was performed by Dr. Mora.  She is here for follow-up.  Doing very well.  Left below-knee amputation completely healed.

## 2023-06-12 NOTE — PROGRESS NOTE ADULT - PROBLEM SELECTOR PROBLEM 2
Acute kidney injury Acitretin Pregnancy And Lactation Text: This medication is Pregnancy Category X and should not be given to women who are pregnant or may become pregnant in the future. This medication is excreted in breast milk.

## 2023-07-12 PROBLEM — I25.5 CARDIOMYOPATHY, ISCHEMIC: Status: ACTIVE | Noted: 2020-11-16

## 2023-07-12 PROBLEM — E78.5 HLD (HYPERLIPIDEMIA): Status: ACTIVE | Noted: 2020-11-16

## 2023-07-12 PROBLEM — I25.10 CAD (CORONARY ARTERY DISEASE): Status: ACTIVE | Noted: 2018-09-10

## 2023-07-12 PROBLEM — Z95.1 S/P CABG X 4: Status: ACTIVE | Noted: 2018-09-18

## 2023-07-12 PROBLEM — I51.3 LV (LEFT VENTRICULAR) MURAL THROMBUS: Status: ACTIVE | Noted: 2020-11-16

## 2023-07-12 NOTE — ASSESSMENT
[FreeTextEntry1] : 41 y/o woman with Hx of HTN, HLD,  DMII, CAD s/p 4 V CABG ( [place] 2018), LV thrombus, on warfarin but changed to Eliquis, CVA, and ICM/chronic systolic CHF, S/P ICD 12/4/21 who presents today for initial evaluation secondary to chronic systolic heart failure LVEF 25%. Pt had  admission at [facility] 2/2021 with increasing edema and orthopnea and was transferred to [place] where she had a [facility] and reports grafts were open. Carotid Dopplers with 16-49% mild plaque in prox LILI, no hemodynamically significant  stenosis and LICA normal.  3/15/21 TTE with LVEF 27%. Prolonged admission S/P gangrene 1/25/23-2/22/23 S/P Left BKA  Pt admitted at [facility] 1/25-2/22/23 and  re-admission  at [facility] for ADHF. D/C from rehab 7/7/23.  Pt is here for a follow up. . PCP: Shaik Nel MD\par \par ACC/AHA Stage C, NYHA Class III\par Appears compensate and  normotensive 113/70 with stable OptiVol on remote 5/1/23 and serum pro BNP decreased to 1827 from 15,321 with last labs 5/8/23

## 2023-07-12 NOTE — HISTORY OF PRESENT ILLNESS
[FreeTextEntry1] : Jocelyne Conrad is a 43 y/o woman with Hx of HTN, HLD,  DMII, CAD s/p 4 V CABG ( [place] 2018), LV thrombus, on warfarin but changed to Eliquis, CVA, and ICM/chronic systolic CHF, S/P ICD 12/4/21 who presents today for initial evaluation secondary to chronic systolic heart failure LVEF 25%. Pt had  admission at [facility] 2/2021 with increasing edema and orthopnea and was transferred to [place] where she had a [facility] and reports grafts were open. Carotid Dopplers with 16-49% mild plaque in prox LILI, no hemodynamically significant  stenosis and LICA normal.  3/15/21 TTE with LVEF 27%. Prolonged admission S/P gangrene 1/25/23-2/22/23 S/P Left BKA  Pt admitted at [facility] 1/25-2/22/23 and  re-admission  at [facility] for ADHF. D/C from rehab 7/7/23.  Pt is here for a follow up. . PCP: Shaik Nel MD\par \par Prior course in hospital 1/25-2/22/23- presented 1/25 with left toe pain found to have left toe gangrene plan for LLE angiogram. Course was complicated by septic shock now off pressors/abx.\par \par HPI Pt states she came from [place] in 2017, sponsored by her mother. She has a  and 4 year old in [place] and 2 older children here. 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 She is a non smoker and no ETOH, no family history. \par \par Pt is here for  follow up  with her son. She was just d/c from rehab 7/7/23 after  admission at [facility]. Pt is also SP prolonged admission S/P gangrene 1/25/23-2/22/23 S/P Left BKA  and is S/P rehab..  Pt is wheel chair bound and has been working in rehab with PT to try to stand and can walk a few steps. She has a prosthesis and is working with PT to improve mobility. \par Currently, she was able to participate with PT for 1 hour including riding exercise bicycle without dyspnea. She will continue home PT> \par \par She was d/c off glimepiride, metformin, and is on insulin; basaglar and admelog.She is on Eliquis and is off  ASA. Her bumex was changed in rehab to 0.5 mg bid from 1 mg bid. Unable to stand today to weigh and she is not able to weight herself at home but denies any swelling in her RLE. LLE with amputation with prosthesis. She is having difficulty at home with stairs and would like to find another place with an elevator. Coreg was changed to metoprolol 50 mg daily. She is tolerating Entresto 24-26 mg bid.  Last remote [employer] transmission was reviewed with stable optivol, below threshold . It was previously elevated 12/14/23-4/5/23. \par \par Denies chest pain, palpitations, dizziness/LH,  orthopnea/PND, syncope and no ICD shocks. \par \par She received the Covid 19 vaccine x 2 without adverse reaction. \par \par \par

## 2023-07-12 NOTE — CARDIOLOGY SUMMARY
[de-identified] : 7/10/23 NSR 71, short TX, PRWP, NSST\par 5/8/23 NSR 81, short TX, PRWP, NSST\par 3/6/23  NSR 85, PRWP, NSST\par 2/24/22 NSR 83, PRWP, NSST\par 6/28/21 NSR, PRWP, NSST\par 5/17/21 NSR, PRWP, NSST\par  [de-identified] : 6/28/22, TTE with cardiologist Dr. Neumann  reported to have improvement in LVEF 33%\par 3/15/21 TTE: LVEF 27%, LVIDD 5.6 cm, severe global LV systolic dysfunction, decreased RV systolic function\par \par 10/20/2020 LVEF 25%. \par \par 2/17/2020, LVEF 37% \par  [de-identified] : 9/7/2018, ormal LM, 85% pLAD lesion, 80% mLAD lesion, 50% mD1 lesion, 50-60% mLCx lesion, 50% mRCA lesion, 80% dRCA lesion. EF:40-45%. \par \par  \par  [de-identified] : 3/15/21; Right Internal Carotid Artery: There is mild heterogenous\par plaque within the proximal vessel, consistent with a\par 16-49% stenosis. No hemodynamically significant stenosis.\par Left Internal Carotid Artery: Normal left internal\par carotid artery. No hemodynamically significant stenosis\par noted.\par

## 2023-07-12 NOTE — PHYSICAL EXAM
[Well Nourished] : well nourished [No Acute Distress] : no acute distress [Normal Conjunctiva] : normal conjunctiva [Normal S1, S2] : normal S1, S2 [Clear Lung Fields] : clear lung fields [Soft] : abdomen soft [Non Tender] : non-tender [Normal] : alert and oriented, normal memory [de-identified] : JVP 8 cm  sitting in wheelchair [de-identified] : Left chest ICD [de-identified] : wheel chair bound with Left BKA with prosthesies [de-identified] : no edema of right foot

## 2023-07-12 NOTE — DISCUSSION/SUMMARY
[___ Week(s)] : in [unfilled] week(s) [EKG obtained to assist in diagnosis and management of assessed problem(s)] : EKG obtained to assist in diagnosis and management of assessed problem(s) [FreeTextEntry1] : 1. ICM/chronic systolic CHF: EKG today NSR. \par - NYHA Class III symptoms\par - . LVEF < 35% on prior TTE and is S/P ICD\par - device remote 5/1/23 with stable OptiVol, had been elevated 14/14-4/5/23. \par - continue Entresto 24-26 mg bid. Labs drawn  5/8/23 with K 5.3, BUN/creat 82/1.82 and decrease in serum pro BNP 1827 from 15, 321.\par - continue decreased dose of bumex 0.5 mgb bid but will take additional or increase to 1 mg is she has any s/s of retaining fluid such as swelling of RLE/orthopnea.\par - continue metoprolol 50 mg daily\par - pt is off  everton 25 mg daily, will not restart due to K 5.3\par - limit salt to less than 2000 mg per day\par - limit fluid to less than 2 liters per day\par - daily weight and B/P log- when she is able to stand on scale safely\par - follow up with PCP, cardiologist and endocrinologist\par \par 2.S/P gangrene S/P left BKA\par - pt with prosthesis \par -continue PT\par \par 3. HTN \par - metoprolol as above\par - Entresto as above \par -  Encouraged heart healthy diet, sodium restriction\par \par 3. HLD: \par - continue statin therapy as prescribed and regular f/u with Cardiologist for routine lipid monitoring and management.\par \par 4. LV thrombus/ Prior CVA\par - Previously, she had urticaria with eliquis but restarted and is tolerating it well\par - Continue claritin 10 mg as needed\par -  Carotid Dopplers with LILI with 16-49% plaque with no hemodynamically significant stenosis, LICA normal\par \par 5. CAD S/P 4 V CABG- no active chest pain or EKG changes\par - continue ASA 81 mg daily\par - continue statin\par \par 6. DM\par - on levemir and adelog, off glimepiride and metformin\par \par Follow up in office in 6-8 weeks\par \par

## 2024-01-01 ENCOUNTER — APPOINTMENT (OUTPATIENT)
Dept: ELECTROPHYSIOLOGY | Facility: CLINIC | Age: 44
End: 2024-01-01

## 2024-01-01 ENCOUNTER — RX RENEWAL (OUTPATIENT)
Age: 44
End: 2024-01-01

## 2024-01-01 RX ORDER — SACUBITRIL AND VALSARTAN 24; 26 MG/1; MG/1
24-26 TABLET, FILM COATED ORAL
Qty: 60 | Refills: 2 | Status: ACTIVE | COMMUNITY
Start: 2020-10-20 | End: 1900-01-01

## 2024-01-14 NOTE — H&P PST ADULT - NSANTHOSAYNRD_GEN_A_CORE
and other typographical errors may be present.  This note has not been completely proofread for errors.      Gina Barrios PA  01/14/24 1905    
No. RENE screening performed.  STOP BANG Legend: 0-2 = LOW Risk; 3-4 = INTERMEDIATE Risk; 5-8 = HIGH Risk

## 2024-03-04 ENCOUNTER — APPOINTMENT (OUTPATIENT)
Dept: ELECTROPHYSIOLOGY | Facility: CLINIC | Age: 44
End: 2024-03-04

## 2024-03-11 ENCOUNTER — NON-APPOINTMENT (OUTPATIENT)
Age: 44
End: 2024-03-11

## 2024-06-07 ENCOUNTER — APPOINTMENT (OUTPATIENT)
Dept: ELECTROPHYSIOLOGY | Facility: CLINIC | Age: 44
End: 2024-06-07

## 2024-07-26 NOTE — ADDENDUM
[FreeTextEntry1] : Called with results of labs with small improvement in pro BNP. Will continue torsemide 40 mg bid for another week. Pt will follow up with Dr. Neumann to discuss Eliquis as she thinks it may be causing her skin lesions and itchiness. 
no

## 2024-09-19 NOTE — PROCEDURE NOTE - ESTIMATED BLOOD LOSS
Thank you for visiting the [facility]In, [place]     It is difficult to recognize all elements of any illness or injury in a single visit. The examination, treatment, and x-rays received are on a preliminary basis only. A radiologist will also review your x-rays for final reading. Call your primary care provider if you have questions or problems before your next appointment. If you are unable to  reach your Primary Care Provider (PCP), please call or return to the Walk-In Clinic.  If symptoms worsen or do not resolve please follow-up with your Primary Care Provider (PCP), Walk-In or the nearest  Emergency Room for emergency symptoms.  If you are unsure of whom to follow up with, call [phone] and ask to speak with either your PCP, the Walk-In nurse or the on-call Provider if you are calling after hours.      If you are referred to a specialist or scheduled for a test, our Referrals department will call you with your appointment date and time within 3 business days. If you have not heard from them in this time frame, please call [phone] and ask for the Referrals department.     Test results: Unless otherwise instructed, you should be notified of test results within one week. Please call our office if you do not hear from us within this time frame at [phone].     Hours:  Monday through Friday: 7:00 am to 7:00 pm  Saturday: 7:00 am to 3:00 pm  Sunday: 7:00 am to 3:00 pm.  Holiday hours may vary, please call [phone] on Holidays.  Walk-In is closed on Thanksgiving Day, Leonela Day and  New Year's Day.      Thank you again for visiting [facility] [place].  Tarik Abarca MD   
[FreeTextEntry1] : Diet and exercise Check complete blood work Pap mammogram breast self-exam Bone density colonoscopy ophthalmology vitamin D Depression screen done and reviewed 5 minutes Up-to-date on vaccines Obesity follow-up endocrine to consider weight loss medication Uterine cancer follow-up oncology at [place] Hypertension continue metoprolol diet exercise weight loss low-sodium Hyperlipidemia continue rosuvastatin 10 mg check lipids and advise Hypothyroidism continue levothyroxine 100 check thyroid function tests and advise Follow-up 6 months 
None
None

## 2024-10-24 NOTE — PATIENT PROFILE ADULT. - FUNCTIONAL SCREEN CURRENT LEVEL: DRESSING, MLM
Physical Therapy  10LM    Visit Type: treatment  SUBJECTIVE  Patient agreed to participate in therapy this date.  RN in agreement to work with patient for therapy session.  RN Susy aware of therapy session.    Patient agreeable for therapy today.  Patient stated \"I'm feeling much better.\"  Patient / Family Goal: maximize function    Pain   Patient reports pain is not an issue/concern.     OBJECTIVE     Cognitive Status   Level of Consciousness   - alert  Arousal Alertness   - appropriate responses to stimuli  Affect/Behavior    - pleasant and cooperative  Orientation    - Oriented to: person, place, time and situation  Functional Communication   - Overall Communication Status: impaired    Patient Activity Tolerance: 1 to 1 activity to rest       Standing Balance  (MARTIN = base of support)  Firm Surface: Double Leg      - Static, Eyes Open       - Trial 1 details: minimal assist (light Min Assist)     - Dynamic, Eyes Open       - Trial 1 details: minimal assist (light Min Assist)  Patient required light Min Assist with standing dynamic reaching balance tasks with no device with clothing management with cueing with safety and balance.       Bed Mobility  - Rolling left: supervision  - Rolling right: supervision  - Supine to sit: supervision  - Sit to supine: supervision  Supervision with bed mobility with cueing with direction and sequencing  Transfers  Assistive devices: gait belt  - Sit to stand: minimal assist (light Min Assist)  - Stand to sit: minimal assist (light Min Assist)  - Stand pivot: minimal assist (light Min Assist)  Light Min Assist with transfers with no device with cueing with safety and balance    Ambulation / Gait  - Assistive device: gait belt  - Distance (feet unless otherwise indicated): 300  - Assist Level: minimal assist (light Min Assist)  - Surface: even  - Description: shuffling    Light Min Assist with ambulation with no device with cueing with erect posture, safety and increased step  length      Interventions     Skilled input: Verbal instruction/cues and tactile instruction/cues  Verbal Consent: Writer verbally educated and received verbal consent for hand placement, positioning of patient, and techniques to be performed today from patient for therapist position for techniques as described above and how they are pertinent to the patient's plan of care.         Education:   - Present and ready to learn: patient  Education provided during session:  - Results of above outlined education: Verbalizes understanding    ASSESSMENT   Progress: progressing toward goals  Interfering components: decreased activity tolerance    Discharge needs based on today's assessment:   - Current level of function: slightly below baseline level of function   - Therapy needs at discharge: therapy 5 or more times per week   - Activities of daily living (ADLs) requiring support at discharge: bed mobility, transfers and ambulation   - Impairments that require further therapy intervention: activity tolerance, balance, strength and safety awareness    AM-PAC  - Generalized Prior Level of Function: IND/MOD I ([facility] 22-24)       Key: MOD A=moderate assistance, IND/MOD I=independent/modified independent  - Generalized Current Level of Function     - Current Mobility Score: 18       AM-PAC Scoring Key= >21 Modified Independent; 20-21 Supervision; 18-19 Minimal assist; 13-17 Moderate assist; 9-12 Max assist; <9 Total assist      PLAN (while hospitalized)  Suggestions for next session as indicated: Bed mobility, balance, transfers, ambulation with no device  PT Frequency: 3-5 x per week      PT/OT Mobility Equipment for Discharge: Continue to assess  PT/OT ADL Equipment for Discharge: Patient may need a tub transfers bench, and toilet riser for discharge.  continue to assess.  Agreement to plan and goals: patient agrees with goals and treatment plan        GOALS  Review Date: 10/24/2024  Long Term Goals: (to be met by time of  discharge from hospital)  Sit to supine: Patient will complete sit to supine modified independent.  Status: progressing/ongoing  Supine to sit: Patient will complete supine to sit modified independent.  Status: progressing/ongoing  Sit to stand: Patient will complete sit to stand transfer with modified independent.   Status: progressing/ongoing  Stand to sit: Patient will complete stand to sit transfer with modified independent.   Status: progressing/ongoing  Ambulation (even): Patient will ambulate on even surface for 150 feet with modified independent.   Status: progressing/ongoing  Flight of stairs: Patient will ambulate a flight of stairs with modified independent.   Status: progressing/ongoing  Documented in the chart in the following areas: Assessment/Plan.      Patient at End of Session:   Location: in bed  Safety measures: alarm system in place/re-engaged and call light within reach  Handoff to: nurse      Therapy procedure time and total treatment time can be found documented on the Time Entry flowsheet   (0) independent

## 2025-03-14 NOTE — PATIENT PROFILE ADULT. - TEACHING/LEARNING LEARNING PREFERENCES
Medical Assessment Completed on: 14-Mar-2025 20:50 individual instruction/verbal instruction/written material

## 2025-04-08 NOTE — H&P ADULT - RESPIRATORY RATE (BREATHS/MIN)
Post-Op Assessment Note    CV Status:  Stable  Pain Score: 0    Pain management: adequate       Mental Status:  Alert and awake   Hydration Status:  Euvolemic   PONV Controlled:  Controlled   Airway Patency:  Patent     Post Op Vitals Reviewed: Yes    No anethesia notable event occurred.    Staff: CRNA           Last Filed PACU Vitals:  Vitals Value Taken Time   Temp     Pulse 71    /67    Resp     SpO2 97                18

## 2025-05-13 NOTE — PRE-OP CHECKLIST - HIBICLENS SHOWER 1 DATE
- DVT PPX: hep gtt  - GI PPX: N/A  - Diet: soft and bite sized - DASH  - PT eval pending  - Dispo: pending clinical course 12-Sep-2018

## 2025-05-22 NOTE — CONSULT NOTE ADULT - CONSULT REASON
.Name of Medication(s) Requested:  Requested Prescriptions     Pending Prescriptions Disp Refills    omeprazole (PRILOSEC) 40 MG delayed release capsule [Pharmacy Med Name: Omeprazole Oral Capsule Delayed Release 40 MG] 90 capsule 1     Sig: TAKE ONE CAPSULE BY MOUTH DAILY       Medication is on current medication list Yes    Dosage and directions were verified? Yes    Quantity verified: 90 day supply     Pharmacy Verified?  Yes    Last Appointment:  3/31/2025    Future appts:  Future Appointments   Date Time Provider Department Center   6/6/2025 11:15 AM [facility]        (If no appt send self scheduling link. .REFILLAPPT)  Scheduling request sent?     [] Yes  [x] No    Does patient need updated?  [] Yes  [x] No    Pre-op risk stratification
